# Patient Record
Sex: MALE | Race: WHITE | NOT HISPANIC OR LATINO | Employment: UNEMPLOYED | URBAN - METROPOLITAN AREA
[De-identification: names, ages, dates, MRNs, and addresses within clinical notes are randomized per-mention and may not be internally consistent; named-entity substitution may affect disease eponyms.]

---

## 2020-01-22 ENCOUNTER — OFFICE VISIT (OUTPATIENT)
Dept: URGENT CARE | Facility: MEDICAL CENTER | Age: 55
End: 2020-01-22
Payer: COMMERCIAL

## 2020-01-22 ENCOUNTER — APPOINTMENT (OUTPATIENT)
Dept: RADIOLOGY | Facility: MEDICAL CENTER | Age: 55
End: 2020-01-22
Payer: COMMERCIAL

## 2020-01-22 VITALS
HEART RATE: 97 BPM | HEIGHT: 70 IN | WEIGHT: 225 LBS | TEMPERATURE: 97.5 F | RESPIRATION RATE: 17 BRPM | OXYGEN SATURATION: 96 % | BODY MASS INDEX: 32.21 KG/M2

## 2020-01-22 DIAGNOSIS — R55 SYNCOPE, UNSPECIFIED SYNCOPE TYPE: Primary | ICD-10-CM

## 2020-01-22 DIAGNOSIS — S82.401A CLOSED FRACTURE OF RIGHT TIBIA AND FIBULA, INITIAL ENCOUNTER: ICD-10-CM

## 2020-01-22 DIAGNOSIS — R55 SYNCOPE, UNSPECIFIED SYNCOPE TYPE: ICD-10-CM

## 2020-01-22 DIAGNOSIS — S82.201A CLOSED FRACTURE OF RIGHT TIBIA AND FIBULA, INITIAL ENCOUNTER: ICD-10-CM

## 2020-01-22 PROCEDURE — 99213 OFFICE O/P EST LOW 20 MIN: CPT | Performed by: PHYSICIAN ASSISTANT

## 2020-01-22 PROCEDURE — 73630 X-RAY EXAM OF FOOT: CPT

## 2020-01-22 PROCEDURE — 73590 X-RAY EXAM OF LOWER LEG: CPT

## 2020-01-22 PROCEDURE — 73610 X-RAY EXAM OF ANKLE: CPT

## 2020-01-22 NOTE — PROGRESS NOTES
[facility] [employer] [facility]        NAME: Christoph Lantigua is a 35 y o  male  : 1978    MRN: [id number]  DATE: 2020  TIME: 7:16 PM    Assessment and Plan   Syncope, unspecified syncope type [R55]  1  Syncope, unspecified syncope type  XR ankle 3+ vw right    XR foot 3+ vw right    XR tibia fibula 2 vw right   2  Closed fracture of right tibia and fibula, initial encounter           Patient Instructions     Syncope  Referred to ER for evaluation  Fracture tibia- fibula  Follow up with PCP in 3-5 days  Proceed to  ER if symptoms worsen  Chief Complaint     Chief Complaint   Patient presents with    Ankle Injury     RIGHT ANKLE, HE HURT IT ON MONDAY, TRIPPED IN THE HOUSE, HE SAYS ITS REALLY PAINFUL AND VERY SWOLLEN  History of Present Illness       34 y/o male presents c/o pain to ankle  Patient states he was trying to move his bowels but felt sweaty, weak  Stood up and walked toward the living room and his wife (in the room) states she found him passed out on the floor  Patient denies chest pain, SOB, palpitations  States he woke up with pain to right ankle      Review of Systems   Review of Systems   Constitutional: Negative  HENT: Negative  Eyes: Negative  Respiratory: Negative  Negative for apnea, cough, choking, chest tightness, shortness of breath, wheezing and stridor  Cardiovascular: Negative  Negative for chest pain  Musculoskeletal: Positive for arthralgias  Current Medications     No current outpatient medications on file  Current Allergies     Allergies as of 2020    (No Known Allergies)            The following portions of the patient's history were reviewed and updated as appropriate: allergies, current medications, past family history, past medical history, past social history, past surgical history and problem list      History reviewed  No pertinent past medical history  History reviewed  No pertinent surgical history      History reviewed  No pertinent family history  Medications have been verified  Objective   Pulse 97   Temp 97 5 °F (36 4 °C) (Temporal)   Resp 17   Ht 5' 10" (1 778 m)   Wt 102 kg (225 lb)   SpO2 96%   BMI 32 28 kg/m²        Physical Exam     Physical Exam   Constitutional: He is oriented to person, place, and time  He appears well-developed and well-nourished  No distress  HENT:   Head: Normocephalic and atraumatic  Right Ear: External ear normal    Left Ear: External ear normal    Mouth/Throat: Oropharynx is clear and moist    Neck: Normal range of motion  Neck supple  Cardiovascular: Normal rate, regular rhythm, normal heart sounds and intact distal pulses  Pulmonary/Chest: Effort normal and breath sounds normal  No respiratory distress  He has no wheezes  He has no rales  He exhibits no tenderness  Musculoskeletal:        Right ankle: He exhibits decreased range of motion and swelling  He exhibits no ecchymosis, no deformity, no laceration and normal pulse  Tenderness  Lateral malleolus tenderness found  No medial malleolus, no AITFL, no CF ligament, no posterior TFL, no head of 5th metatarsal and no proximal fibula tenderness found  Achilles tendon normal    Lymphadenopathy:     He has no cervical adenopathy  Neurological: He is alert and oriented to person, place, and time  He displays normal reflexes  No cranial nerve deficit  He exhibits normal muscle tone  Coordination normal    Skin: He is not diaphoretic

## 2020-01-22 NOTE — PATIENT INSTRUCTIONS
Syncope  Referred to ER for evaluation  Fracture tibia- fibula  Follow up with PCP in 3-5 days  Proceed to  ER if symptoms worsen  Syncope   WHAT YOU NEED TO KNOW:   Syncope is also called fainting or passing out  Syncope is a sudden, temporary loss of consciousness, followed by a fall from a standing or sitting position  Syncope ranges from not serious to a sign of a more serious condition that needs to be treated  You can control some health conditions that cause syncope  Your healthcare providers can help you create a plan to manage syncope and prevent episodes  DISCHARGE INSTRUCTIONS:   Seek care immediately if:   · You are bleeding because you hit your head when you fainted  · You suddenly have double vision, difficulty speaking, numbness, and cannot move your arms or legs  · You have chest pain and trouble breathing  · You vomit blood or material that looks like coffee grounds  · You see blood in your bowel movement  Contact your healthcare provider if:   · You have new or worsening symptoms  · You have another syncope episode  · You have a headache, fast heartbeat, or feel too dizzy to stand up  · You have questions or concerns about your condition or care  Follow up with your healthcare provider as directed:  Write down your questions so you remember to ask them during your visits  Manage syncope:   · Keep a record of your syncope episodes  Include your symptoms and your activity before and after the episode  The record can help your healthcare provider find the cause of your syncope and help you manage episodes  · Sit or lie down when needed  This includes when you feel dizzy, your throat is getting tight, and your vision changes  Raise your legs above the level of your heart  · Take slow, deep breaths if you start to breathe faster with anxiety or fear  This can help decrease dizziness and the feeling that you might faint  · Check your blood pressure often    This is important if you take medicine to lower your blood pressure  Check your blood pressure when you are lying down and when you are standing  Ask how often to check during the day  Keep a record of your blood pressure numbers  Your healthcare provider may use the record to help plan your treatment  Prevent a syncope episode:   · Move slowly and let yourself get used to one position before you move to another position  This is very important when you change from a lying or sitting position to a standing position  Take some deep breaths before you stand up from a lying position  Stand up slowly  Sudden movements may cause a fainting spell  Sit on the side of the bed or couch for a few minutes before you stand up  If you are on bedrest, try to be upright for about 2 hours each day, or as directed  Do not lock your legs if you are standing for a long period of time  Move your legs and bend your knees to keep blood flowing  · Follow your healthcare provider's recommendations  Your provider may  recommend that you drink more liquids to prevent dehydration  You may also need to have more salt to keep your blood pressure from dropping too low and causing syncope  Your provider will tell you how much liquid and sodium to have each day  · Watch for signs of low blood sugar  These include hunger, nervousness, sweating, and fast or fluttery heartbeats  Talk with your healthcare provider about ways to keep your blood sugar level steady  · Do not strain if you are constipated  You may faint if you strain to have a bowel movement  Walking is the best way to get your bowels moving  Eat foods high in fiber to make it easier to have a bowel movement  Good examples are high-fiber cereals, beans, vegetables, and whole-grain breads  Prune juice may help make bowel movements softer  · Be careful in hot weather  Heat can cause a syncope episode  Limit activity done outside on hot days   Physical activity in hot weather can lead to dehydration  This can cause an episode  © 2017 [employer]  Information is for End User's use only and may not be sold, redistributed or otherwise used for commercial purposes  All illustrations and images included in CareNotes® are the copyrighted property of A D A M [employer] Inc  or [employer]  The above information is an  only  It is not intended as medical advice for individual conditions or treatments  Talk to your doctor, nurse or pharmacist before following any medical regimen to see if it is safe and effective for you

## 2020-01-24 ENCOUNTER — HOSPITAL ENCOUNTER (EMERGENCY)
Facility: HOSPITAL | Age: 55
Discharge: HOME | End: 2020-01-24
Attending: EMERGENCY MEDICINE | Admitting: EMERGENCY MEDICINE
Payer: COMMERCIAL

## 2020-01-24 VITALS
RESPIRATION RATE: 20 BRPM | HEART RATE: 79 BPM | HEIGHT: 70 IN | SYSTOLIC BLOOD PRESSURE: 157 MMHG | WEIGHT: 229.94 LBS | OXYGEN SATURATION: 94 % | DIASTOLIC BLOOD PRESSURE: 86 MMHG | TEMPERATURE: 97.9 F | BODY MASS INDEX: 32.92 KG/M2

## 2020-01-24 DIAGNOSIS — I10 HIGH BLOOD PRESSURE: ICD-10-CM

## 2020-01-24 DIAGNOSIS — R55 VASOVAGAL SYNCOPE: Primary | ICD-10-CM

## 2020-01-24 DIAGNOSIS — S82.401A CLOSED FRACTURE OF RIGHT TIBIA AND FIBULA, INITIAL ENCOUNTER: ICD-10-CM

## 2020-01-24 DIAGNOSIS — S82.201A CLOSED FRACTURE OF RIGHT TIBIA AND FIBULA, INITIAL ENCOUNTER: ICD-10-CM

## 2020-01-24 LAB
ANION GAP SERPL CALCULATED.3IONS-SCNC: 8 MMOL/L (ref 4–13)
ATRIAL RATE: 96 BPM
BASOPHILS # BLD AUTO: 0.07 THOUSANDS/ΜL (ref 0–0.1)
BASOPHILS NFR BLD AUTO: 1 % (ref 0–1)
BUN SERPL-MCNC: 11 MG/DL (ref 5–25)
CALCIUM SERPL-MCNC: 8.9 MG/DL (ref 8.3–10.1)
CHLORIDE SERPL-SCNC: 97 MMOL/L (ref 100–108)
CO2 SERPL-SCNC: 29 MMOL/L (ref 21–32)
CREAT SERPL-MCNC: 0.93 MG/DL (ref 0.6–1.3)
EOSINOPHIL # BLD AUTO: 0.41 THOUSAND/ΜL (ref 0–0.61)
EOSINOPHIL NFR BLD AUTO: 5 % (ref 0–6)
ERYTHROCYTE [DISTWIDTH] IN BLOOD BY AUTOMATED COUNT: 12.2 % (ref 11.6–15.1)
GFR SERPL CREATININE-BSD FRML MDRD: 91 ML/MIN/1.73SQ M
GLUCOSE SERPL-MCNC: 111 MG/DL (ref 65–140)
HCT VFR BLD AUTO: 47.8 % (ref 36.5–49.3)
HGB BLD-MCNC: 15.9 G/DL (ref 12–17)
IMM GRANULOCYTES # BLD AUTO: 0.03 THOUSAND/UL (ref 0–0.2)
IMM GRANULOCYTES NFR BLD AUTO: 0 % (ref 0–2)
LYMPHOCYTES # BLD AUTO: 1.55 THOUSANDS/ΜL (ref 0.6–4.47)
LYMPHOCYTES NFR BLD AUTO: 19 % (ref 14–44)
MAGNESIUM SERPL-MCNC: 2 MG/DL (ref 1.6–2.6)
MCH RBC QN AUTO: 31.5 PG (ref 26.8–34.3)
MCHC RBC AUTO-ENTMCNC: 33.3 G/DL (ref 31.4–37.4)
MCV RBC AUTO: 95 FL (ref 82–98)
MONOCYTES # BLD AUTO: 1.12 THOUSAND/ΜL (ref 0.17–1.22)
MONOCYTES NFR BLD AUTO: 14 % (ref 4–12)
NEUTROPHILS # BLD AUTO: 5.11 THOUSANDS/ΜL (ref 1.85–7.62)
NEUTS SEG NFR BLD AUTO: 61 % (ref 43–75)
NRBC BLD AUTO-RTO: 0 /100 WBCS
P AXIS: 57 DEGREES
PLATELET # BLD AUTO: 186 THOUSANDS/UL (ref 149–390)
PMV BLD AUTO: 10.4 FL (ref 8.9–12.7)
POTASSIUM SERPL-SCNC: 3.6 MMOL/L (ref 3.5–5.3)
PR INTERVAL: 166 MS
QRS AXIS: 69 DEGREES
QRSD INTERVAL: 108 MS
QT INTERVAL: 380 MS
QTC INTERVAL: 480 MS
RBC # BLD AUTO: 5.04 MILLION/UL (ref 3.88–5.62)
SODIUM SERPL-SCNC: 134 MMOL/L (ref 136–145)
T WAVE AXIS: 80 DEGREES
TROPONIN I SERPL-MCNC: <0.02 NG/ML
TSH SERPL DL<=0.05 MIU/L-ACNC: 1.66 UIU/ML (ref 0.36–3.74)
VENTRICULAR RATE: 96 BPM
WBC # BLD AUTO: 8.29 THOUSAND/UL (ref 4.31–10.16)

## 2020-01-24 PROCEDURE — 99284 EMERGENCY DEPT VISIT MOD MDM: CPT

## 2020-01-24 PROCEDURE — 93010 ELECTROCARDIOGRAM REPORT: CPT | Performed by: INTERNAL MEDICINE

## 2020-01-24 PROCEDURE — 83735 ASSAY OF MAGNESIUM: CPT | Performed by: EMERGENCY MEDICINE

## 2020-01-24 PROCEDURE — 85025 COMPLETE CBC W/AUTO DIFF WBC: CPT | Performed by: EMERGENCY MEDICINE

## 2020-01-24 PROCEDURE — 84484 ASSAY OF TROPONIN QUANT: CPT | Performed by: EMERGENCY MEDICINE

## 2020-01-24 PROCEDURE — 93005 ELECTROCARDIOGRAM TRACING: CPT

## 2020-01-24 PROCEDURE — 99285 EMERGENCY DEPT VISIT HI MDM: CPT | Performed by: EMERGENCY MEDICINE

## 2020-01-24 PROCEDURE — 84443 ASSAY THYROID STIM HORMONE: CPT | Performed by: EMERGENCY MEDICINE

## 2020-01-24 PROCEDURE — 36415 COLL VENOUS BLD VENIPUNCTURE: CPT | Performed by: EMERGENCY MEDICINE

## 2020-01-24 PROCEDURE — 80048 BASIC METABOLIC PNL TOTAL CA: CPT | Performed by: EMERGENCY MEDICINE

## 2020-01-25 NOTE — ED PROVIDER NOTES
History  Chief Complaint   Patient presents with    Syncope     Pt sent here from urgent care for a a fracture of fibula/tibula, which they splinted  However urgent care was more concerned that patient had an episode of passing out on monday after straining on the toliet for a bowel movement  Pt also cannot see orthopaedics till tuesday      HPI    None       History reviewed  No pertinent past medical history  History reviewed  No pertinent surgical history  History reviewed  No pertinent family history  I have reviewed and agree with the history as documented  Social History     Tobacco Use    Smoking status: Never Smoker    Smokeless tobacco: Never Used   Substance Use Topics    Alcohol use: Yes     Frequency: Never    Drug use: Never        Review of Systems    Physical Exam  Physical Exam   Constitutional: He is oriented to person, place, and time  He appears well-developed and well-nourished  No distress  hypertensive   HENT:   Head: Normocephalic and atraumatic  Mouth/Throat: Oropharynx is clear and moist    Eyes: Pupils are equal, round, and reactive to light  Conjunctivae are normal    Neck: Normal range of motion  No tracheal deviation present  Cardiovascular: Normal rate, regular rhythm, normal heart sounds and intact distal pulses  Pulmonary/Chest: Effort normal and breath sounds normal  No respiratory distress  Abdominal: Soft  Bowel sounds are normal  He exhibits no distension  There is no tenderness  Musculoskeletal:   Posterior splint on right lower leg   Neurological: He is alert and oriented to person, place, and time  GCS eye subscore is 4  GCS verbal subscore is 5  GCS motor subscore is 6  Skin: Skin is warm and dry  Psychiatric: He has a normal mood and affect  His behavior is normal    Nursing note and vitals reviewed        Vital Signs  ED Triage Vitals [01/24/20 1801]   Temperature Pulse Respirations Blood Pressure SpO2   97 9 °F (36 6 °C) 92 18 (!) 175/91 94 % Temp Source Heart Rate Source Patient Position - Orthostatic VS BP Location FiO2 (%)   Oral Monitor Sitting Left arm --      Pain Score       --           Vitals:    01/24/20 1801 01/24/20 2000 01/24/20 2115 01/24/20 2130   BP: (!) 175/91 165/85 163/82 157/86   Pulse: 92 88 83 79   Patient Position - Orthostatic VS: Sitting            Visual Acuity  Visual Acuity      Most Recent Value   L Pupil Size (mm)  3   R Pupil Size (mm)  3          ED Medications  Medications - No data to display    Diagnostic Studies  Results Reviewed     Procedure Component Value Units Date/Time    Basic metabolic panel [[id number]]  (Abnormal) Collected:  01/24/20 2028    Lab Status:  Final result Specimen:  Blood from Arm, Right Updated:  01/24/20 2103     Sodium 134 mmol/L      Potassium 3 6 mmol/L      Chloride 97 mmol/L      CO2 29 mmol/L      ANION GAP 8 mmol/L      BUN 11 mg/dL      Creatinine 0 93 mg/dL      Glucose 111 mg/dL      Calcium 8 9 mg/dL      eGFR 91 ml/min/1 73sq m     Narrative:       [place] guidelines for Chronic Kidney Disease (CKD):     Stage 1 with normal or high GFR (GFR > 90 mL/min/1 73 square meters)    Stage 2 Mild CKD (GFR = 60-89 mL/min/1 73 square meters)    Stage 3A Moderate CKD (GFR = 45-59 mL/min/1 73 square meters)    Stage 3B Moderate CKD (GFR = 30-44 mL/min/1 73 square meters)    Stage 4 Severe CKD (GFR = 15-29 mL/min/1 73 square meters)    Stage 5 End Stage CKD (GFR <15 mL/min/1 73 square meters)  Note: GFR calculation is accurate only with a steady state creatinine    Magnesium [[id number]]  (Normal) Collected:  01/24/20 2028    Lab Status:  Final result Specimen:  Blood from Arm, Right Updated:  01/24/20 2103     Magnesium 2 0 mg/dL     TSH [[id number]]  (Normal) Collected:  01/24/20 2028    Lab Status:  Final result Specimen:  Blood from Arm, Right Updated:  01/24/20 2103     TSH 3RD GENERATON 1 658 uIU/mL     Narrative:       Patients undergoing fluorescein dye angiography may retain small amounts of fluorescein in the body for 48-72 hours post procedure  Samples containing fluorescein can produce falsely depressed TSH values  If the patient had this procedure,a specimen should be resubmitted post fluorescein clearance        Troponin I [[id number]]  (Normal) Collected:  01/24/20 2028    Lab Status:  Final result Specimen:  Blood from Arm, Right Updated:  01/24/20 2056     Troponin I <0 02 ng/mL     CBC and differential [[id number]]  (Abnormal) Collected:  01/24/20 2028    Lab Status:  Final result Specimen:  Blood from Arm, Right Updated:  01/24/20 2036     WBC 8 29 Thousand/uL      RBC 5 04 Million/uL      Hemoglobin 15 9 g/dL      Hematocrit 47 8 %      MCV 95 fL      MCH 31 5 pg      MCHC 33 3 g/dL      RDW 12 2 %      MPV 10 4 fL      Platelets 117 Thousands/uL      nRBC 0 /100 WBCs      Neutrophils Relative 61 %      Immat GRANS % 0 %      Lymphocytes Relative 19 %      Monocytes Relative 14 %      Eosinophils Relative 5 %      Basophils Relative 1 %      Neutrophils Absolute 5 11 Thousands/µL      Immature Grans Absolute 0 03 Thousand/uL      Lymphocytes Absolute 1 55 Thousands/µL      Monocytes Absolute 1 12 Thousand/µL      Eosinophils Absolute 0 41 Thousand/µL      Basophils Absolute 0 07 Thousands/µL                  No orders to display              Procedures  ECG 12 Lead Documentation Only  Date/Time: 1/24/2020 7:46 PM  Performed by: Francisca Cunha MD  Authorized by: Francisca Cunha MD     Indications / Diagnosis:  Syncope  ECG reviewed by me, the ED Provider: yes    Patient location:  ED  Previous ECG:     Previous ECG:  Unavailable  Interpretation:     Interpretation: abnormal    Rate:     ECG rate:  96    ECG rate assessment: normal    Rhythm:     Rhythm: sinus rhythm    Ectopy:     Ectopy: none    QRS:     QRS axis:  Normal    QRS intervals:  Normal  Conduction:     Conduction: abnormal      Abnormal conduction: incomplete RBBB ST segments:     ST segments:  Normal  T waves:     T waves: normal    Other findings:     Other findings: poor R wave progression               ED Course                               MDM  Number of Diagnoses or Management Options  Closed fracture of right tibia and fibula, initial encounter:   High blood pressure:   Vasovagal syncope: new and requires workup  Diagnosis management comments: This is a 76-year-old male who presents here today with a syncopal event  On 01/20, he was on the toilet, pushing to strain to move his bowels when he began feeling nauseous and lightheaded  He broke out into a sweat  He tried to get up to walk outside to "get some air and passed out in the hallway  His wife states he was only out for a couple of seconds  He injured his right leg during this  He denies any other injuries from the fall  He denies any preceding chest pain, palpitations, headache, focal weakness or numbness  He denies preceding nausea, vomiting, diarrhea  He has no fevers or URI symptoms  He denies any underlying medical problems, though states it has been several years since he last saw a doctor  He takes no daily medications, including over-the-counter medications  He was seen at urgent care yesterday because of continued difficulties walking because of the leg pain, was told he had a tibia/fibula fracture and was placed in a splint  He does have follow-up scheduled on 01/28 with Orthopedics  He was advised to come to the ER for evaluation of his syncopal event  He waited until today to be seen, because he did not feel it coming yesterday  He denies any recurrent syncopal or presyncopal events  He has had no problems moving his bowels since then  He has no other complaints  Review of systems:  Otherwise negative unless stated as above    He is well-appearing, in no acute distress  He is a posterior splint on his right lower leg    He is hypertensive here, but denies any symptoms related to this and cannot tell me the last time his blood pressure was checked  His exam is otherwise unremarkable  His episode sounds like vasovagal syncope  However, we will check an EKG, lab work to evaluate for anemia, electrolyte abnormality, ACS, dysrhythmia, thyroid abnormality that would have contributed to the event  Review of systems lab work is unremarkable  I discussed with the patient findings, treatment at home, follow-up, and indications for return, and he expresses understanding with this plan  Amount and/or Complexity of Data Reviewed  Clinical lab tests: ordered and reviewed  Independent visualization of images, tracings, or specimens: yes          Disposition  Final diagnoses:   Vasovagal syncope   Closed fracture of right tibia and fibula, initial encounter   High blood pressure     Time reflects when diagnosis was documented in both MDM as applicable and the Disposition within this note     Time User Action Codes Description Comment    1/24/2020  9:22 PM Vilma Mclaughlin Add [R55] Vasovagal syncope     1/24/2020  9:25 PM Vilma Mclaughlin Add [S82 201A,  S82 401A] Closed fracture of right tibia and fibula, initial encounter     1/24/2020  9:26 PM Vilma Mclaughlin Add [I10] High blood pressure       ED Disposition     ED Disposition Condition Date/Time Comment    Discharge Good Fri Jan 24, 2020  9:21 PM Bruno Varela discharge to home/self care  Follow-up Information     Follow up With Specialties Details Why Contact Info    Pavel Estrada MD Orthopedic Surgery Go on 1/28/2020 as scheduled, to follow up on your leg Sergio 1980  [facility]      [employer]  Schedule an appointment as soon as possible for a visit  to set up care with a new primary care doctor [phone]            There are no discharge medications for this patient  No discharge procedures on file      ED Provider  Electronically Signed by Elizabeth Fitzgerald MD  01/24/20 8421

## 2020-01-25 NOTE — DISCHARGE INSTRUCTIONS
Keep your leg elevated as much as possible to help with swelling and pain  Follow up with Orthopedics on Tuesday as scheduled  Do not push to strain to move your bowels, as it may cause you to pass out  If you are having a hard time moving your bowels, take a stool softener or laxative  Your blood pressure was high here  Check it at home, and follow up with a primary care doctor to determine whether you need to be started on medications

## 2020-01-28 ENCOUNTER — APPOINTMENT (OUTPATIENT)
Dept: RADIOLOGY | Facility: CLINIC | Age: 55
End: 2020-01-28
Payer: COMMERCIAL

## 2020-01-28 VITALS
WEIGHT: 229 LBS | DIASTOLIC BLOOD PRESSURE: 90 MMHG | SYSTOLIC BLOOD PRESSURE: 150 MMHG | HEIGHT: 70 IN | BODY MASS INDEX: 32.78 KG/M2

## 2020-01-28 DIAGNOSIS — S82.434A CLOSED NONDISPLACED OBLIQUE FRACTURE OF SHAFT OF RIGHT FIBULA, INITIAL ENCOUNTER: ICD-10-CM

## 2020-01-28 DIAGNOSIS — M25.571 PAIN, JOINT, ANKLE AND FOOT, RIGHT: ICD-10-CM

## 2020-01-28 DIAGNOSIS — S82.301A CLOSED EXTRA-ARTICULAR FRACTURE OF DISTAL END OF RIGHT TIBIA, INITIAL ENCOUNTER: Primary | ICD-10-CM

## 2020-01-28 PROCEDURE — 27824 TREAT LOWER LEG FRACTURE: CPT | Performed by: ORTHOPAEDIC SURGERY

## 2020-01-28 PROCEDURE — 73610 X-RAY EXAM OF ANKLE: CPT

## 2020-01-28 PROCEDURE — 73590 X-RAY EXAM OF LOWER LEG: CPT

## 2020-01-28 PROCEDURE — 27780 TREATMENT OF FIBULA FRACTURE: CPT | Performed by: ORTHOPAEDIC SURGERY

## 2020-01-28 PROCEDURE — 99203 OFFICE O/P NEW LOW 30 MIN: CPT | Performed by: ORTHOPAEDIC SURGERY

## 2020-01-28 RX ORDER — NAPROXEN SODIUM 550 MG/1
550 TABLET ORAL 2 TIMES DAILY WITH MEALS
Qty: 60 TABLET | Refills: 1 | Status: SHIPPED | OUTPATIENT
Start: 2020-01-28 | End: 2020-09-17 | Stop reason: ALTCHOICE

## 2020-01-28 NOTE — PROGRESS NOTES
Assessment:     1  Closed extra-articular fracture of distal end of right tibia, initial encounter    2  Closed nondisplaced oblique fracture of shaft of right fibula, initial encounter          Plan:     Problem List Items Addressed This Visit        Musculoskeletal and Integument    Closed extra-articular fracture of distal end of right tibia - Primary     78-year-old male with a right distal tibia fracture  His alignment has remained excellent with no displacement  He was placed in a long-leg splint today  He will remain nonweightbearing  He will work on elevating and icing  Follow up in one week with x-rays of the right ankle and right tibia in the splint         Relevant Orders    XR tibia fibula 2 vw right    XR ankle 3+ vw right    Walker    Fracture / Dislocation Treatment    Fracture / Dislocation Treatment    Closed nondisplaced oblique fracture of shaft of right fibula    Relevant Orders    Fracture / Dislocation Treatment    Fracture / Dislocation Treatment           Patient ID: Ruth Aguero is a 62 y o  male  Chief Complaint:  Leg injury    HPI:  78-year-old male who was at home, using the restroom on January 20th at night  He got lightheaded and dizzy and fell  He does not recall exactly what happened to his leg  He thinks he maybe had his feet caught up in his underwear pants  He went to the emergency department two days later because he was unable to bear weight and was having pain and swelling  He states that once he was placed in a splint it felt stable  He has not been putting any weight on it  He does complain of some throbbing sensations  He denies numbness or tingling  He is currently on disability  Allergy:  No Known Allergies    Medications:  all current active meds have been reviewed    Past Medical History:  History reviewed  No pertinent past medical history  Past Surgical History:  History reviewed  No pertinent surgical history      Family History:  History reviewed  No pertinent family history  Social History:  Social History     Substance and Sexual Activity   Alcohol Use Yes    Frequency: Never     Social History     Substance and Sexual Activity   Drug Use Never     Social History     Tobacco Use   Smoking Status Never Smoker   Smokeless Tobacco Never Used           ROS:  Review of Systems   Constitutional: Negative  HENT: Negative  Eyes: Negative  Respiratory: Negative  Cardiovascular: Negative  Gastrointestinal: Negative  Endocrine: Negative  Genitourinary: Negative  Musculoskeletal: Positive for arthralgias, joint swelling and myalgias  Skin: Negative  Allergic/Immunologic: Negative  Neurological: Negative  Hematological: Negative  Psychiatric/Behavioral: Negative  Objective:  BP Readings from Last 1 Encounters:   01/28/20 150/90      Wt Readings from Last 1 Encounters:   01/28/20 104 kg (229 lb)        BMI:   Estimated body mass index is 32 86 kg/m² as calculated from the following:    Height as of this encounter: 5' 10" (1 778 m)  Weight as of this encounter: 104 kg (229 lb)  EXAM:   Physical Exam   Constitutional: He is oriented to person, place, and time  He appears well-developed and well-nourished  No distress  HENT:   Head: Normocephalic and atraumatic  Eyes: Right eye exhibits no discharge  Left eye exhibits no discharge  Neck: Normal range of motion  Neck supple  No tracheal deviation present  Cardiovascular: Normal rate and regular rhythm  Pulmonary/Chest: Effort normal  No respiratory distress  Abdominal: Soft  He exhibits no distension  There is no tenderness  Neurological: He is alert and oriented to person, place, and time  Skin: Skin is warm  He is not diaphoretic  No erythema  Psychiatric: He has a normal mood and affect   His behavior is normal      Right Ankle Exam     Comments:  Swelling of the right lower extremity, moving toes up and down, tender to palpation over the distal tibia and the more proximal fibula, brisk cap refill of all toes, no fracture blisters, no lacerations or abrasions      Left Ankle Exam   Left ankle exam is normal     Tenderness   The patient is experiencing no tenderness  Swelling: none    Range of Motion   The patient has normal left ankle ROM  Muscle Strength   The patient has normal left ankle strength  Tests   Anterior drawer: negative  Varus tilt: negative    Other   Erythema: absent  Sensation: normal  Pulse: present              Radiographs:  I have personally reviewed pertinent films in PACS and my interpretation is Nondisplaced fracture of the tibial plafond, extra-articular with a fracture of the fibula shaft proximally  Overall alignment remains excellent      Fracture / Dislocation Treatment  Date/Time: 1/28/2020 1:08 PM  Performed by: Aidee Colbert MD  Authorized by: Aidee Colbert MD     Patient Location:  Clinic  Other Assisting Provider: No    Verbal consent obtained?: Yes    Consent given by:  Patient  Patient states understanding of procedure being performed: Yes    Patient identity confirmed:  Verbally with patient  Injury location:  Lower leg  Location details:  Right lower leg  Injury type:  Fracture  Fracture type: tibial plafond    Fracture type: tibial plafond    Neurovascular status: Neurovascularly intact    Distal perfusion: normal    Neurological function: normal    Range of motion: reduced    Manipulation performed?: No    Immobilization:  Splint  Splint type:  Long leg  Neurovascular status: Neurovascularly intact    Distal perfusion: normal    Neurological function: normal    Range of motion: unchanged    Patient tolerance:  Patient tolerated the procedure well with no immediate complications  Fracture / Dislocation Treatment  Date/Time: 1/28/2020 1:09 PM  Performed by: Aidee Colbert MD  Authorized by: Aidee Colbert MD     Patient Location:  Clinic  Other Assisting Provider: No    Verbal consent obtained?: Yes    Consent given by:  Patient  Patient states understanding of procedure being performed: Yes    Patient identity confirmed:  Verbally with patient  Injury location:  Lower leg  Location details:  Right lower leg  Injury type:  Fracture  Fracture type: fibular shaft    Neurovascular status: Neurovascularly intact    Distal perfusion: normal    Neurological function: normal    Range of motion: reduced    Local anesthesia used?: No    Manipulation performed?: No    Immobilization:  Splint  Neurovascular status: Neurovascularly intact    Distal perfusion: normal    Neurological function: normal    Range of motion: unchanged    Patient tolerance:  Patient tolerated the procedure well with no immediate complications

## 2020-01-28 NOTE — ASSESSMENT & PLAN NOTE
49-year-old male with a right distal tibia fracture  His alignment has remained excellent with no displacement  He was placed in a long-leg splint today  He will remain nonweightbearing  He will work on elevating and icing    Follow up in one week with x-rays of the right ankle and right tibia in the splint

## 2020-01-28 NOTE — LETTER
January 28, 2020     Patient: Keila Gagnon   YOB: 1965   Date of Visit: 1/28/2020       To Whom it May Concern:    Keila Gagnon is under my professional care  He was seen in my office on 1/28/2020  He should not return to work until cleared by a physician  If you have any questions or concerns, please don't hesitate to call           Sincerely,          Kaylee Baron MD        CC: No Recipients

## 2020-01-28 NOTE — TELEPHONE ENCOUNTER
Garry Soto is calling stating that the patient was supposed to have naproxen called in for him but there is nothing at the pharmacy

## 2020-02-04 ENCOUNTER — APPOINTMENT (OUTPATIENT)
Dept: RADIOLOGY | Facility: CLINIC | Age: 55
End: 2020-02-04
Payer: COMMERCIAL

## 2020-02-04 ENCOUNTER — OFFICE VISIT (OUTPATIENT)
Dept: OBGYN CLINIC | Facility: CLINIC | Age: 55
End: 2020-02-04
Payer: COMMERCIAL

## 2020-02-04 VITALS — BODY MASS INDEX: 32.86 KG/M2 | HEIGHT: 70 IN | SYSTOLIC BLOOD PRESSURE: 144 MMHG | DIASTOLIC BLOOD PRESSURE: 90 MMHG

## 2020-02-04 DIAGNOSIS — S82.301A CLOSED EXTRA-ARTICULAR FRACTURE OF DISTAL END OF RIGHT TIBIA, INITIAL ENCOUNTER: ICD-10-CM

## 2020-02-04 DIAGNOSIS — S82.434D CLOSED NONDISPLACED OBLIQUE FRACTURE OF SHAFT OF RIGHT FIBULA WITH ROUTINE HEALING, SUBSEQUENT ENCOUNTER: ICD-10-CM

## 2020-02-04 DIAGNOSIS — S82.301A CLOSED EXTRA-ARTICULAR FRACTURE OF DISTAL END OF RIGHT TIBIA, INITIAL ENCOUNTER: Primary | ICD-10-CM

## 2020-02-04 PROCEDURE — 73610 X-RAY EXAM OF ANKLE: CPT

## 2020-02-04 PROCEDURE — 99024 POSTOP FOLLOW-UP VISIT: CPT | Performed by: ORTHOPAEDIC SURGERY

## 2020-02-04 PROCEDURE — 29435 APPL PATLLR TDN BEARING CAST: CPT | Performed by: ORTHOPAEDIC SURGERY

## 2020-02-04 NOTE — ASSESSMENT & PLAN NOTE
57-year-old male with a right distal tibia fracture and high fibula fracture  X-rays today show well-maintained alignment  He was placed in a molded cast that came up to the level of the knee to minimize range motion of the knee but not eliminated completely  He will follow up in one week with x-rays of the right ankle    X-rays will be done in the cast

## 2020-02-04 NOTE — PROGRESS NOTES
Assessment:     1  Closed extra-articular fracture of distal end of right tibia, initial encounter    2  Closed nondisplaced oblique fracture of shaft of right fibula with routine healing, subsequent encounter          Plan:     Problem List Items Addressed This Visit        Musculoskeletal and Integument    Closed extra-articular fracture of distal end of right tibia - Primary     15-year-old male with a right distal tibia fracture and high fibula fracture  X-rays today show well-maintained alignment  He was placed in a molded cast that came up to the level of the knee to minimize range motion of the knee but not eliminated completely  He will follow up in one week with x-rays of the right ankle  X-rays will be done in the cast          Relevant Orders    XR ankle 3+ vw right    Closed nondisplaced oblique fracture of shaft of right fibula           Patient ID: Jammie Cuellar is a 62 y o  male  Chief Complaint:  Leg injury    HPI:  15-year-old male who was at home, using the restroom on January 20th at night  He got lightheaded and dizzy and fell sustaining a distal tibia fracture and a high fibula fracture  Allergy:  No Known Allergies    Medications:  all current active meds have been reviewed    Past Medical History:  History reviewed  No pertinent past medical history  Past Surgical History:  History reviewed  No pertinent surgical history  Family History:  History reviewed  No pertinent family history  Social History:  Social History     Substance and Sexual Activity   Alcohol Use Yes    Frequency: Never     Social History     Substance and Sexual Activity   Drug Use Never     Social History     Tobacco Use   Smoking Status Never Smoker   Smokeless Tobacco Never Used           ROS:  Review of Systems   Constitutional: Negative  HENT: Negative  Eyes: Negative  Respiratory: Negative  Cardiovascular: Negative  Gastrointestinal: Negative  Endocrine: Negative  Genitourinary: Negative  Musculoskeletal: Positive for arthralgias and joint swelling  Skin: Negative  Allergic/Immunologic: Negative  Neurological: Negative  Hematological: Negative  Psychiatric/Behavioral: Negative  Objective:  BP Readings from Last 1 Encounters:   02/04/20 144/90      Wt Readings from Last 1 Encounters:   01/28/20 104 kg (229 lb)        BMI:   Estimated body mass index is 32 86 kg/m² as calculated from the following:    Height as of this encounter: 5' 10" (1 778 m)  Weight as of 1/28/20: 104 kg (229 lb)  EXAM:   Physical Exam     Right Ankle Exam     Comments:  Minimal swelling of the right lower extremity, moving toes up and down, tender to palpation over the distal tibia and the more proximal fibula, brisk cap refill of all toes, no fracture blisters, no lacerations or abrasions              Radiographs:  I have personally reviewed pertinent films in PACS and my interpretation is minimally displaced fracture of the tibial plafond, extra-articular with a fracture of the fibula shaft proximally  Overall alignment remains excellent  Cast application  Date/Time: 2/4/2020 11:36 AM  Performed by: Nahum Callahan MD  Authorized by: Nahum Callahan MD     Consent:     Consent obtained:  Verbal    Consent given by:  Patient    Risks discussed:  Swelling  Pre-procedure details:     Sensation:  Normal  Procedure details:     Laterality:  Right    Location:  Ankle    Ankle:  R ankleCast type:  Patella tendon bearing    Supplies:  Cotton padding and fiberglass  Post-procedure details:     Pain:  Unchanged    Sensation:  Normal    Patient tolerance of procedure:   Tolerated well, no immediate complications

## 2020-02-11 ENCOUNTER — OFFICE VISIT (OUTPATIENT)
Dept: OBGYN CLINIC | Facility: CLINIC | Age: 55
End: 2020-02-11

## 2020-02-11 ENCOUNTER — APPOINTMENT (OUTPATIENT)
Dept: RADIOLOGY | Facility: CLINIC | Age: 55
End: 2020-02-11
Payer: COMMERCIAL

## 2020-02-11 VITALS — HEIGHT: 70 IN | BODY MASS INDEX: 32.86 KG/M2 | DIASTOLIC BLOOD PRESSURE: 78 MMHG | SYSTOLIC BLOOD PRESSURE: 164 MMHG

## 2020-02-11 DIAGNOSIS — S82.434D CLOSED NONDISPLACED OBLIQUE FRACTURE OF SHAFT OF RIGHT FIBULA WITH ROUTINE HEALING, SUBSEQUENT ENCOUNTER: ICD-10-CM

## 2020-02-11 DIAGNOSIS — S82.301A CLOSED EXTRA-ARTICULAR FRACTURE OF DISTAL END OF RIGHT TIBIA, INITIAL ENCOUNTER: ICD-10-CM

## 2020-02-11 DIAGNOSIS — S82.301A CLOSED EXTRA-ARTICULAR FRACTURE OF DISTAL END OF RIGHT TIBIA, INITIAL ENCOUNTER: Primary | ICD-10-CM

## 2020-02-11 PROCEDURE — 73610 X-RAY EXAM OF ANKLE: CPT

## 2020-02-11 PROCEDURE — 99024 POSTOP FOLLOW-UP VISIT: CPT | Performed by: ORTHOPAEDIC SURGERY

## 2020-02-11 NOTE — ASSESSMENT & PLAN NOTE
66-year-old male with well-maintained alignment of his right tibia fracture  He will continue nonweightbearing    Follow up in five weeks with x-rays of the right ankle out of the cast

## 2020-02-11 NOTE — PROGRESS NOTES
Assessment:     1  Closed extra-articular fracture of distal end of right tibia, initial encounter    2  Closed nondisplaced oblique fracture of shaft of right fibula with routine healing, subsequent encounter          Plan:     Problem List Items Addressed This Visit        Musculoskeletal and Integument    Closed extra-articular fracture of distal end of right tibia - Primary     78-year-old male with well-maintained alignment of his right tibia fracture  He will continue nonweightbearing  Follow up in five weeks with x-rays of the right ankle out of the cast          Relevant Orders    XR ankle 3+ vw right    Closed nondisplaced oblique fracture of shaft of right fibula           Patient ID: Yuko Ely is a 62 y o  male  Chief Complaint:  Leg injury    HPI:  78-year-old male who was at home, using the restroom on January 20th at night  He got lightheaded and dizzy and fell sustaining a distal tibia fracture and a high fibula fracture  He was placed in a cast at his last visit  He has no complaints currently  Allergy:  No Known Allergies    Medications:  all current active meds have been reviewed    Past Medical History:  History reviewed  No pertinent past medical history  Past Surgical History:  History reviewed  No pertinent surgical history  Family History:  History reviewed  No pertinent family history  Social History:  Social History     Substance and Sexual Activity   Alcohol Use Yes    Frequency: Never     Social History     Substance and Sexual Activity   Drug Use Never     Social History     Tobacco Use   Smoking Status Never Smoker   Smokeless Tobacco Never Used           ROS:  Review of Systems   All other systems reviewed and are negative        Objective:  BP Readings from Last 1 Encounters:   02/11/20 164/78      Wt Readings from Last 1 Encounters:   01/28/20 104 kg (229 lb)        BMI:   Estimated body mass index is 32 86 kg/m² as calculated from the following:    Height as of this encounter: 5' 10" (1 778 m)  Weight as of 1/28/20: 104 kg (229 lb)  EXAM:   Physical Exam  Right Ankle Exam     Comments:  Cast fitting well, in good condition, moving toes, brisk cap refill of toes              Radiographs:  I have personally reviewed pertinent films in PACS and my interpretation is minimally displaced fracture of the tibial plafond, extra-articular with a fracture of the fibula shaft proximally  Overall alignment remains excellent

## 2020-02-12 ENCOUNTER — TELEPHONE (OUTPATIENT)
Dept: OBGYN CLINIC | Facility: HOSPITAL | Age: 55
End: 2020-02-12

## 2020-02-12 NOTE — TELEPHONE ENCOUNTER
[employer] rep called  No dates on the patient's disability form  Patient cannot be paid without dates  They will fax the forms back to [address] [facility] and ask that Dr Erica Moreno fill in the dates on the paperwork

## 2020-02-20 NOTE — TELEPHONE ENCOUNTER
This has been complete to the best of my ability   This form is missing the patients signature, but I will fax this back to [phone]

## 2020-02-25 ENCOUNTER — TELEPHONE (OUTPATIENT)
Dept: OBGYN CLINIC | Facility: CLINIC | Age: 55
End: 2020-02-25

## 2020-02-25 DIAGNOSIS — S82.434A CLOSED NONDISPLACED OBLIQUE FRACTURE OF SHAFT OF RIGHT FIBULA, INITIAL ENCOUNTER: Primary | ICD-10-CM

## 2020-02-25 DIAGNOSIS — S82.301A CLOSED EXTRA-ARTICULAR FRACTURE OF DISTAL END OF RIGHT TIBIA, INITIAL ENCOUNTER: ICD-10-CM

## 2020-02-26 ENCOUNTER — TELEPHONE (OUTPATIENT)
Dept: OBGYN CLINIC | Facility: HOSPITAL | Age: 55
End: 2020-02-26

## 2020-02-26 DIAGNOSIS — S82.301D CLOSED EXTRA-ARTICULAR FRACTURE OF DISTAL END OF RIGHT TIBIA WITH ROUTINE HEALING, SUBSEQUENT ENCOUNTER: Primary | ICD-10-CM

## 2020-02-26 NOTE — TELEPHONE ENCOUNTER
Patient sees Dr Olayinka Sanchez at [facility] equipment is calling in asking if we can send a demographic sheet for this patient along with a combo cushion for the wheel chair  She stated that the combo cushion can even be written in the comments of the order for the wheel chair  She is asking if this  Can be done as soon as possible  She is asking if this can be faxed over to them as well            FAX# [phone]  CALL BACK IF NEEDED# [phone]

## 2020-03-17 ENCOUNTER — APPOINTMENT (OUTPATIENT)
Dept: RADIOLOGY | Facility: CLINIC | Age: 55
End: 2020-03-17
Payer: COMMERCIAL

## 2020-03-17 ENCOUNTER — OFFICE VISIT (OUTPATIENT)
Dept: OBGYN CLINIC | Facility: CLINIC | Age: 55
End: 2020-03-17

## 2020-03-17 ENCOUNTER — TELEPHONE (OUTPATIENT)
Dept: OBGYN CLINIC | Facility: HOSPITAL | Age: 55
End: 2020-03-17

## 2020-03-17 VITALS
SYSTOLIC BLOOD PRESSURE: 162 MMHG | TEMPERATURE: 96.4 F | BODY MASS INDEX: 32.78 KG/M2 | DIASTOLIC BLOOD PRESSURE: 88 MMHG | HEIGHT: 70 IN | WEIGHT: 229 LBS

## 2020-03-17 DIAGNOSIS — S82.301D CLOSED EXTRA-ARTICULAR FRACTURE OF DISTAL END OF RIGHT TIBIA WITH ROUTINE HEALING, SUBSEQUENT ENCOUNTER: Primary | ICD-10-CM

## 2020-03-17 DIAGNOSIS — S82.301D CLOSED EXTRA-ARTICULAR FRACTURE OF DISTAL END OF RIGHT TIBIA WITH ROUTINE HEALING, SUBSEQUENT ENCOUNTER: ICD-10-CM

## 2020-03-17 PROCEDURE — 73610 X-RAY EXAM OF ANKLE: CPT

## 2020-03-17 PROCEDURE — 99024 POSTOP FOLLOW-UP VISIT: CPT | Performed by: ORTHOPAEDIC SURGERY

## 2020-03-17 NOTE — PROGRESS NOTES
Assessment:     1  Closed extra-articular fracture of distal end of right tibia with routine healing, subsequent encounter          Plan:     Problem List Items Addressed This Visit        Musculoskeletal and Integument    Closed extra-articular fracture of distal end of right tibia - Primary     41-year-old male with a healing right distal tibia fracture  His alignment remains excellent  There is some early interval healing noted  He will be placed in a Cam boot today  He was instructed on ankle range of motion  He will continue to be nonweightbearing  Follow up with me in four weeks with x-rays of the right tibia and the right ankle  Plan of on progressing weight-bearing at that time if appropriate  Relevant Orders    XR ankle 3+ vw right    Cam Boot           Patient ID: Karlie Hurley is a 62 y o  male  Chief Complaint:  Leg injury    HPI:  41-year-old male who was at home, using the restroom on January 20th at night  He got lightheaded and dizzy and fell sustaining a distal tibia fracture and a high fibula fracture  He has been in a cast since his last visit  He has tolerated this well without problems  Allergy:  No Known Allergies    Medications:  all current active meds have been reviewed    Past Medical History:  History reviewed  No pertinent past medical history  Past Surgical History:  History reviewed  No pertinent surgical history  Family History:  History reviewed  No pertinent family history  Social History:  Social History     Substance and Sexual Activity   Alcohol Use Yes    Frequency: Never     Social History     Substance and Sexual Activity   Drug Use Never     Social History     Tobacco Use   Smoking Status Never Smoker   Smokeless Tobacco Never Used           ROS:  Review of Systems   All other systems reviewed and are negative        Objective:  BP Readings from Last 1 Encounters:   03/17/20 162/88      Wt Readings from Last 1 Encounters:   03/17/20 104 kg (229 lb)        BMI:   Estimated body mass index is 32 86 kg/m² as calculated from the following:    Height as of this encounter: 5' 10" (1 778 m)  Weight as of this encounter: 104 kg (229 lb)  EXAM:   Physical Exam  Right Ankle Exam     Comments:  No swelling, minimal tenderness over the fracture site, limited ankle range of motion, no skin wounds or breakdown              Radiographs:  I have personally reviewed pertinent films in PACS and my interpretation is minimally displaced fracture of the tibial plafond, extra-articular with a fracture of the fibula shaft proximally  Overall alignment remains excellent, there is callus formation and interval healing noted that is early

## 2020-03-17 NOTE — ASSESSMENT & PLAN NOTE
49-year-old male with a healing right distal tibia fracture  His alignment remains excellent  There is some early interval healing noted  He will be placed in a Cam boot today  He was instructed on ankle range of motion  He will continue to be nonweightbearing  Follow up with me in four weeks with x-rays of the right tibia and the right ankle  Plan of on progressing weight-bearing at that time if appropriate

## 2020-03-17 NOTE — TELEPHONE ENCOUNTER
Patient is calling to find out if he can remove the boot that he got today when he sleeps       [phone]

## 2020-03-18 NOTE — TELEPHONE ENCOUNTER
No- he needs to sleep with it on  He may remove it for bathing, dressing, and for 5 min 3x/day for gentle ankle ROM  No other times

## 2020-03-31 ENCOUNTER — TELEPHONE (OUTPATIENT)
Dept: OBGYN CLINIC | Facility: CLINIC | Age: 55
End: 2020-03-31

## 2020-04-01 ENCOUNTER — TELEPHONE (OUTPATIENT)
Dept: OBGYN CLINIC | Facility: CLINIC | Age: 55
End: 2020-04-01

## 2020-04-13 ENCOUNTER — APPOINTMENT (OUTPATIENT)
Dept: RADIOLOGY | Facility: CLINIC | Age: 55
End: 2020-04-13
Payer: COMMERCIAL

## 2020-04-13 ENCOUNTER — OFFICE VISIT (OUTPATIENT)
Dept: OBGYN CLINIC | Facility: CLINIC | Age: 55
End: 2020-04-13

## 2020-04-13 ENCOUNTER — TELEPHONE (OUTPATIENT)
Dept: OBGYN CLINIC | Facility: CLINIC | Age: 55
End: 2020-04-13

## 2020-04-13 VITALS — BODY MASS INDEX: 32.86 KG/M2 | HEIGHT: 70 IN | TEMPERATURE: 98.2 F

## 2020-04-13 DIAGNOSIS — S82.301D CLOSED EXTRA-ARTICULAR FRACTURE OF DISTAL END OF RIGHT TIBIA WITH ROUTINE HEALING, SUBSEQUENT ENCOUNTER: Primary | ICD-10-CM

## 2020-04-13 DIAGNOSIS — S82.301D CLOSED EXTRA-ARTICULAR FRACTURE OF DISTAL END OF RIGHT TIBIA WITH ROUTINE HEALING, SUBSEQUENT ENCOUNTER: ICD-10-CM

## 2020-04-13 DIAGNOSIS — S82.434D CLOSED NONDISPLACED OBLIQUE FRACTURE OF SHAFT OF RIGHT FIBULA WITH ROUTINE HEALING, SUBSEQUENT ENCOUNTER: ICD-10-CM

## 2020-04-13 PROCEDURE — 99024 POSTOP FOLLOW-UP VISIT: CPT | Performed by: ORTHOPAEDIC SURGERY

## 2020-04-13 PROCEDURE — 73610 X-RAY EXAM OF ANKLE: CPT

## 2020-05-21 ENCOUNTER — TELEPHONE (OUTPATIENT)
Dept: OBGYN CLINIC | Facility: CLINIC | Age: 55
End: 2020-05-21

## 2020-05-26 ENCOUNTER — OFFICE VISIT (OUTPATIENT)
Dept: OBGYN CLINIC | Facility: CLINIC | Age: 55
End: 2020-05-26
Payer: COMMERCIAL

## 2020-05-26 ENCOUNTER — APPOINTMENT (OUTPATIENT)
Dept: RADIOLOGY | Facility: CLINIC | Age: 55
End: 2020-05-26
Payer: COMMERCIAL

## 2020-05-26 VITALS
DIASTOLIC BLOOD PRESSURE: 100 MMHG | TEMPERATURE: 97 F | BODY MASS INDEX: 32.86 KG/M2 | HEIGHT: 70 IN | SYSTOLIC BLOOD PRESSURE: 152 MMHG

## 2020-05-26 DIAGNOSIS — S82.301D CLOSED EXTRA-ARTICULAR FRACTURE OF DISTAL END OF RIGHT TIBIA WITH ROUTINE HEALING, SUBSEQUENT ENCOUNTER: Primary | ICD-10-CM

## 2020-05-26 DIAGNOSIS — S82.301D CLOSED EXTRA-ARTICULAR FRACTURE OF DISTAL END OF RIGHT TIBIA WITH ROUTINE HEALING, SUBSEQUENT ENCOUNTER: ICD-10-CM

## 2020-05-26 PROCEDURE — 99214 OFFICE O/P EST MOD 30 MIN: CPT | Performed by: ORTHOPAEDIC SURGERY

## 2020-05-26 PROCEDURE — 73610 X-RAY EXAM OF ANKLE: CPT

## 2020-05-26 PROCEDURE — 73590 X-RAY EXAM OF LOWER LEG: CPT

## 2020-07-01 ENCOUNTER — TELEPHONE (OUTPATIENT)
Dept: OBGYN CLINIC | Facility: HOSPITAL | Age: 55
End: 2020-07-01

## 2020-07-01 NOTE — TELEPHONE ENCOUNTER
We have received a form asking if the patient is able to return to work as of him last office visit      Please advise

## 2020-07-07 ENCOUNTER — OFFICE VISIT (OUTPATIENT)
Dept: OBGYN CLINIC | Facility: CLINIC | Age: 55
End: 2020-07-07
Payer: COMMERCIAL

## 2020-07-07 ENCOUNTER — APPOINTMENT (OUTPATIENT)
Dept: RADIOLOGY | Facility: CLINIC | Age: 55
End: 2020-07-07
Payer: COMMERCIAL

## 2020-07-07 VITALS
SYSTOLIC BLOOD PRESSURE: 164 MMHG | WEIGHT: 229 LBS | TEMPERATURE: 98.1 F | DIASTOLIC BLOOD PRESSURE: 88 MMHG | BODY MASS INDEX: 32.86 KG/M2

## 2020-07-07 DIAGNOSIS — S82.434D CLOSED NONDISPLACED OBLIQUE FRACTURE OF SHAFT OF RIGHT FIBULA WITH ROUTINE HEALING, SUBSEQUENT ENCOUNTER: ICD-10-CM

## 2020-07-07 DIAGNOSIS — S82.301D CLOSED EXTRA-ARTICULAR FRACTURE OF DISTAL END OF RIGHT TIBIA WITH ROUTINE HEALING, SUBSEQUENT ENCOUNTER: Primary | ICD-10-CM

## 2020-07-07 DIAGNOSIS — S82.301D CLOSED EXTRA-ARTICULAR FRACTURE OF DISTAL END OF RIGHT TIBIA WITH ROUTINE HEALING, SUBSEQUENT ENCOUNTER: ICD-10-CM

## 2020-07-07 PROCEDURE — 99213 OFFICE O/P EST LOW 20 MIN: CPT | Performed by: ORTHOPAEDIC SURGERY

## 2020-07-07 PROCEDURE — 73610 X-RAY EXAM OF ANKLE: CPT

## 2020-07-07 PROCEDURE — 73590 X-RAY EXAM OF LOWER LEG: CPT

## 2020-07-07 NOTE — PROGRESS NOTES
Assessment:     1  Closed extra-articular fracture of distal end of right tibia with routine healing, subsequent encounter    2  Closed nondisplaced oblique fracture of shaft of right fibula with routine healing, subsequent encounter        Plan:     Problem List Items Addressed This Visit        Musculoskeletal and Integument    Closed extra-articular fracture of distal end of right tibia - Primary    Relevant Orders    XR ankle 3+ vw right    XR tibia fibula 2 vw right    Closed nondisplaced oblique fracture of shaft of right fibula          62 y o  male with right distal fibula and fibula shaft fractures, DOI 01/20/2020  Repeat Tib/Fib and ankle x-ray's were performed in the office today  His fracture is healing, but the fracture line is still visible  He will continue the use of the CAM walker boot when ambulating  He was encouraged to wean off of the crutches over the next 2-4 weeks  He may use a cane for balance if needed  Lito Terry is a , he is out of work at this time, a work note was provided  He was encouraged to work on gentle ankle ROM while out of the boot  If he begins to experience stiffness he was advised to sleep in the boot  I will see him back in 6 weeks time with repeat right ankle and right tib/fib x-ray's  He will likely not be ready to return to full duty for approximately 4 months  Subjective:     Patient ID: Rafa Nicole is a 62 y o  male  Chief Complaint:  62 y o  male presents to the office today for a follow up regarding a right ankle fracture that occurred on 01/20/2020  Overall Lito Terry is doing well  He has been WBAT to his RLE with the use of a cam walker boot  He states that he is able to walk around the house without the use of the crutches  He is taking NSAID's as needed for pain control  Allergy:  No Known Allergies  Medications:  all current active meds have been reviewed  Past Medical History:  History reviewed  No pertinent past medical history    Past Surgical History:  History reviewed  No pertinent surgical history  Family History:  History reviewed  No pertinent family history  Social History:  Social History     Substance and Sexual Activity   Alcohol Use Yes    Frequency: Never     Social History     Substance and Sexual Activity   Drug Use Never     Social History     Tobacco Use   Smoking Status Never Smoker   Smokeless Tobacco Never Used     Review of Systems   Constitutional: Negative for chills, fever and unexpected weight change  HENT: Negative for hearing loss, nosebleeds and sore throat  Eyes: Negative for pain, redness and visual disturbance  Respiratory: Negative for cough, shortness of breath and wheezing  Cardiovascular: Negative for chest pain, palpitations and leg swelling  Gastrointestinal: Negative for abdominal pain, nausea and vomiting  Endocrine: Negative for polydipsia and polyuria  Genitourinary: Negative for difficulty urinating and hematuria  Musculoskeletal: Negative for arthralgias, joint swelling and myalgias  Skin: Negative for rash and wound  Neurological: Negative for dizziness, numbness and headaches  Psychiatric/Behavioral: Negative for decreased concentration, dysphoric mood and suicidal ideas  The patient is not nervous/anxious  Objective:  BP Readings from Last 1 Encounters:   07/07/20 164/88      Wt Readings from Last 1 Encounters:   07/07/20 104 kg (229 lb)      BMI:   Estimated body mass index is 32 86 kg/m² as calculated from the following:    Height as of 5/26/20: 5' 10" (1 778 m)  Weight as of this encounter: 104 kg (229 lb)  BSA:   Estimated body surface area is 2 21 meters squared as calculated from the following:    Height as of 5/26/20: 5' 10" (1 778 m)  Weight as of this encounter: 104 kg (229 lb)  Physical Exam   Constitutional: He is oriented to person, place, and time  He appears well-developed and well-nourished     Eyes: Pupils are equal, round, and reactive to light  Conjunctivae are normal    Pulmonary/Chest: Effort normal and breath sounds normal    Neurological: He is alert and oriented to person, place, and time  Skin: Skin is warm and dry  Psychiatric: He has a normal mood and affect  His behavior is normal    Nursing note and vitals reviewed  Right Ankle Exam     Tenderness   The patient is experiencing no tenderness  Swelling: mild    Range of Motion   Dorsiflexion: 20 (passive )   Right ankle plantar flexion: full  Other   Erythema: absent  Scars: absent  Sensation: normal  Pulse: present     Comments:  Sensation intact to light touch distally             I have personally reviewed pertinent films in PACS and my interpretation is increased callus formation, fracture line still visible, maintained alignment         Scribe Attestation    I,:   Samm Gilmore am acting as a scribe while in the presence of the attending physician :        I,:   Priscilla Opitz, MD personally performed the services described in this documentation    as scribed in my presence :

## 2020-07-07 NOTE — LETTER
July 7, 2020     Patient: Irina Temple   YOB: 1965   Date of Visit: 7/7/2020       To Whom it May Concern:    Irina Temple is under my professional care  He was seen in my office on 7/7/2020  He is out of work at this time  He will be re-evaluated in 6 weeks time  If you have any questions or concerns, please don't hesitate to call           Sincerely,          Lyndsey Felder MD

## 2020-08-18 ENCOUNTER — OFFICE VISIT (OUTPATIENT)
Dept: OBGYN CLINIC | Facility: CLINIC | Age: 55
End: 2020-08-18
Payer: COMMERCIAL

## 2020-08-18 ENCOUNTER — APPOINTMENT (OUTPATIENT)
Dept: RADIOLOGY | Facility: CLINIC | Age: 55
End: 2020-08-18
Payer: COMMERCIAL

## 2020-08-18 VITALS
DIASTOLIC BLOOD PRESSURE: 96 MMHG | TEMPERATURE: 98.4 F | BODY MASS INDEX: 31.92 KG/M2 | HEIGHT: 70 IN | SYSTOLIC BLOOD PRESSURE: 160 MMHG | WEIGHT: 223 LBS

## 2020-08-18 DIAGNOSIS — S82.301D CLOSED EXTRA-ARTICULAR FRACTURE OF DISTAL END OF RIGHT TIBIA WITH ROUTINE HEALING, SUBSEQUENT ENCOUNTER: ICD-10-CM

## 2020-08-18 DIAGNOSIS — S82.434A CLOSED NONDISPLACED OBLIQUE FRACTURE OF SHAFT OF RIGHT FIBULA, INITIAL ENCOUNTER: Primary | ICD-10-CM

## 2020-08-18 DIAGNOSIS — S82.434A CLOSED NONDISPLACED OBLIQUE FRACTURE OF SHAFT OF RIGHT FIBULA, INITIAL ENCOUNTER: ICD-10-CM

## 2020-08-18 PROCEDURE — 73590 X-RAY EXAM OF LOWER LEG: CPT

## 2020-08-18 PROCEDURE — 73610 X-RAY EXAM OF ANKLE: CPT

## 2020-08-18 PROCEDURE — 99213 OFFICE O/P EST LOW 20 MIN: CPT | Performed by: ORTHOPAEDIC SURGERY

## 2020-08-18 NOTE — LETTER
August 18, 2020     Patient: Nargis Sherman   YOB: 1965   Date of Visit: 8/18/2020       To Whom it May Concern:    Nargis Sherman is under my professional care  He was seen in my office on 8/18/2020  He is unable to return to work until cleared  If you have any questions or concerns, please don't hesitate to call           Sincerely,          Severiano Overland, MD

## 2020-08-18 NOTE — PROGRESS NOTES
Assessment:     1  Closed nondisplaced oblique fracture of shaft of right fibula, initial encounter    2  Closed extra-articular fracture of distal end of right tibia with routine healing, subsequent encounter          Plan:     Problem List Items Addressed This Visit        Musculoskeletal and Integument    Closed extra-articular fracture of distal end of right tibia    Relevant Orders    XR ankle 3+ vw right    Cam Boot    Closed nondisplaced oblique fracture of shaft of right fibula - Primary    Relevant Orders    XR tibia fibula 2 vw right    Cam Boot           62 y o  male with right distal tibial shaft fracture and right proximal fibula fracture  Repeat x-ray's were obtained in the office today  He may begin to walk without the cam walker boot while around the house  He should wear the cam walker boot while ambulating out of the house  If he does have pain over his fracture site while ambulating without the boot, he should use the boot  He is a  and is unable to return to work at this time, note was provided  He was fit with a new cam walker boot today  I will see him back in 2 months time with repeat right ankle and right tib/fib x-ray's  Patient ID: Bari Perez is a 62 y o  male  Chief Complaint:  Right  ankle fracture     HPI:  62 y o  male presents to the office today for a follow up regarding a right ankle fracture  Radhaleonardo Corazon has been imobilized in a cam walker boot  He has weaned off of the crutches  He does note pain to the medial aspect of his ankle with ambulation  He is taking Ibuprofen as needed for pain control  Allergy:  No Known Allergies    Medications:  all current active meds have been reviewed    Past Medical History:  History reviewed  No pertinent past medical history  Past Surgical History:  History reviewed  No pertinent surgical history  Family History:  History reviewed  No pertinent family history      Social History:  Social History Substance and Sexual Activity   Alcohol Use Yes    Frequency: Never     Social History     Substance and Sexual Activity   Drug Use Never     Social History     Tobacco Use   Smoking Status Never Smoker   Smokeless Tobacco Never Used           ROS:  Review of Systems   Constitutional: Negative for chills, fever and unexpected weight change  HENT: Negative for hearing loss, nosebleeds and sore throat  Eyes: Negative for pain, redness and visual disturbance  Respiratory: Negative for cough, shortness of breath and wheezing  Cardiovascular: Negative for chest pain, palpitations and leg swelling  Gastrointestinal: Negative for abdominal pain, nausea and vomiting  Endocrine: Negative for polydipsia and polyuria  Genitourinary: Negative for difficulty urinating and hematuria  Musculoskeletal: Negative for arthralgias, joint swelling and myalgias  Skin: Negative for rash and wound  Neurological: Negative for dizziness, numbness and headaches  Psychiatric/Behavioral: Negative for decreased concentration, dysphoric mood and suicidal ideas  The patient is not nervous/anxious  Objective:  BP Readings from Last 1 Encounters:   08/18/20 160/96      Wt Readings from Last 1 Encounters:   08/18/20 101 kg (223 lb)        BMI:   Estimated body mass index is 32 kg/m² as calculated from the following:    Height as of this encounter: 5' 10" (1 778 m)  Weight as of this encounter: 101 kg (223 lb)  EXAM:   Physical Exam  Vitals signs and nursing note reviewed  Constitutional:       Appearance: He is well-developed  Eyes:      Conjunctiva/sclera: Conjunctivae normal       Pupils: Pupils are equal, round, and reactive to light  Pulmonary:      Effort: Pulmonary effort is normal       Breath sounds: Normal breath sounds  Skin:     General: Skin is warm and dry  Neurological:      Mental Status: He is alert and oriented to person, place, and time     Psychiatric:         Behavior: Behavior normal        Right Ankle Exam     Tenderness   Right ankle tenderness location: tenderness over fracture site   Swelling: none    Range of Motion   Dorsiflexion: 10   Plantar flexion: 40     Other   Erythema: absent  Scars: absent  Sensation: normal  Pulse: present     Comments:  Sensation intact to light touch distally   Palpable pulses             Radiographs:  I have personally reviewed pertinent films in PACS and my interpretation is consolidation of bridging callus at fracture sites with progressive healing, maintained good alignment       Scribe Attestation    I,:   Sebastien Sprague am acting as a scribe while in the presence of the attending physician :        I,:   Mara Harris MD personally performed the services described in this documentation    as scribed in my presence :

## 2020-09-17 ENCOUNTER — OFFICE VISIT (OUTPATIENT)
Dept: FAMILY MEDICINE CLINIC | Facility: CLINIC | Age: 55
End: 2020-09-17
Payer: COMMERCIAL

## 2020-09-17 VITALS
SYSTOLIC BLOOD PRESSURE: 144 MMHG | HEART RATE: 86 BPM | BODY MASS INDEX: 32.64 KG/M2 | OXYGEN SATURATION: 98 % | WEIGHT: 228 LBS | TEMPERATURE: 98 F | HEIGHT: 70 IN | DIASTOLIC BLOOD PRESSURE: 94 MMHG

## 2020-09-17 DIAGNOSIS — Z13.220 SCREENING FOR HYPERLIPIDEMIA: ICD-10-CM

## 2020-09-17 DIAGNOSIS — E55.9 VITAMIN D DEFICIENCY: ICD-10-CM

## 2020-09-17 DIAGNOSIS — E66.09 CLASS 1 OBESITY DUE TO EXCESS CALORIES WITH BODY MASS INDEX (BMI) OF 32.0 TO 32.9 IN ADULT, UNSPECIFIED WHETHER SERIOUS COMORBIDITY PRESENT: ICD-10-CM

## 2020-09-17 DIAGNOSIS — Z11.4 SCREENING FOR HUMAN IMMUNODEFICIENCY VIRUS: ICD-10-CM

## 2020-09-17 DIAGNOSIS — Z12.11 SCREENING FOR COLON CANCER: ICD-10-CM

## 2020-09-17 DIAGNOSIS — Z13.1 SCREENING FOR DIABETES MELLITUS: ICD-10-CM

## 2020-09-17 DIAGNOSIS — Z11.59 NEED FOR HEPATITIS C SCREENING TEST: ICD-10-CM

## 2020-09-17 DIAGNOSIS — Z76.89 ENCOUNTER TO ESTABLISH CARE: Primary | ICD-10-CM

## 2020-09-17 DIAGNOSIS — Z23 FLU VACCINE NEED: ICD-10-CM

## 2020-09-17 DIAGNOSIS — Z13.29 SCREENING FOR THYROID DISORDER: ICD-10-CM

## 2020-09-17 DIAGNOSIS — Z13.0 SCREENING FOR DEFICIENCY ANEMIA: ICD-10-CM

## 2020-09-17 DIAGNOSIS — Z12.5 SCREENING FOR PROSTATE CANCER: ICD-10-CM

## 2020-09-17 PROBLEM — E66.811 CLASS 1 OBESITY DUE TO EXCESS CALORIES WITH BODY MASS INDEX (BMI) OF 32.0 TO 32.9 IN ADULT: Status: ACTIVE | Noted: 2020-09-17

## 2020-09-17 PROCEDURE — 90471 IMMUNIZATION ADMIN: CPT

## 2020-09-17 PROCEDURE — 99203 OFFICE O/P NEW LOW 30 MIN: CPT | Performed by: NURSE PRACTITIONER

## 2020-09-17 PROCEDURE — 3725F SCREEN DEPRESSION PERFORMED: CPT | Performed by: NURSE PRACTITIONER

## 2020-09-17 PROCEDURE — 90682 RIV4 VACC RECOMBINANT DNA IM: CPT

## 2020-09-17 NOTE — PATIENT INSTRUCTIONS
Heart Healthy Diet   AMBULATORY CARE:   A heart healthy diet  is an eating plan low in total fat, unhealthy fats, and sodium (salt)  A heart healthy diet helps decrease your risk for heart disease and stroke  Limit the amount of fat you eat to 25% to 35% of your total daily calories  Limit sodium to less than 2,300 mg each day  Healthy fats:  Healthy fats can help improve cholesterol levels  The risk for heart disease is decreased when cholesterol levels are normal  Choose healthy fats, such as the following:  · Unsaturated fat  is found in foods such as soybean, canola, olive, corn, and safflower oils  It is also found in soft tub margarine that is made with liquid vegetable oil  · Omega-3 fat  is found in certain fish, such as salmon, tuna, and trout, and in walnuts and flaxseed  Unhealthy fats:  Unhealthy fats can cause unhealthy cholesterol levels in your blood and increase your risk of heart disease  Limit unhealthy fats, such as the following:  · Cholesterol  is found in animal foods, such as eggs and lobster, and in dairy products made from whole milk  Limit cholesterol to less than 300 milligrams (mg) each day  You may need to limit cholesterol to 200 mg each day if you have heart disease  · Saturated fat  is found in meats, such as santos and hamburger  It is also found in chicken or turkey skin, whole milk, and butter  Limit saturated fat to less than 7% of your total daily calories  Limit saturated fat to less than 6% if you have heart disease or are at increased risk for it  · Trans fat  is found in packaged foods, such as potato chips and cookies  It is also in hard margarine, some fried foods, and shortening  Avoid trans fats as much as possible    Heart healthy foods and drinks to include:  Ask your dietitian or healthcare provider how many servings to have from each of the following food groups:  · Grains:      ¨ Whole-wheat breads, cereals, and pastas, and brown rice    ¨ Low-fat, low-sodium crackers and chips    · Vegetables:      ¨ Broccoli, green beans, green peas, and spinach    ¨ Collards, kale, and lima beans    ¨ Carrots, sweet potatoes, tomatoes, and peppers    ¨ Canned vegetables with no salt added    · Fruits:      ¨ Bananas, peaches, pears, and pineapple    ¨ Grapes, raisins, and dates    ¨ Oranges, tangerines, grapefruit, orange juice, and grapefruit juice    ¨ Apricots, mangoes, melons, and papaya    ¨ Raspberries and strawberries    ¨ Canned fruit with no added sugar    · Low-fat dairy products:      ¨ Nonfat (skim) milk, 1% milk, and low-fat almond, cashew, or soy milks fortified with calcium    ¨ Low-fat cheese, regular or frozen yogurt, and cottage cheese    · Meats and proteins , such as lean cuts of beef and pork (loin, leg, round), skinless chicken and turkey, legumes, soy products, egg whites, and nuts  Foods and drinks to limit or avoid:  Ask your dietitian or healthcare provider about these and other foods that are high in unhealthy fat, sodium, and sugar:  · Snack or packaged foods , such as frozen dinners, cookies, macaroni and cheese, and cereals with more than 300 mg of sodium per serving    · Canned or dry mixes  for cakes, soups, sauces, or gravies    · Vegetables with added sodium , such as instant potatoes, vegetables with added sauces, or regular canned vegetables    · Other foods high in sodium , such as ketchup, barbecue sauce, salad dressing, pickles, olives, soy sauce, and miso    · High-fat dairy foods  such as whole or 2% milk, cream cheese, or sour cream, and cheeses     · High-fat protein foods  such as high-fat cuts of beef (T-bone steaks, ribs), chicken or turkey with skin, and organ meats, such as liver    · Cured or smoked meats , such as hot dogs, santos, and sausage    · Unhealthy fats and oils , such as butter, stick margarine, shortening, and cooking oils such as coconut or palm oil    · Food and drinks high in sugar , such as soft drinks (soda), sports drinks, sweetened tea, candy, cake, cookies, pies, and doughnuts  Other diet guidelines to follow:   · Eat more foods containing omega-3 fats  Eat fish high in omega-3 fats at least 2 times a week  · Limit alcohol  Too much alcohol can damage your heart and raise your blood pressure  Women should limit alcohol to 1 drink a day  Men should limit alcohol to 2 drinks a day  A drink of alcohol is 12 ounces of beer, 5 ounces of wine, or 1½ ounces of liquor  · Choose low-sodium foods  High-sodium foods can lead to high blood pressure  Add little or no salt to food you prepare  Use herbs and spices in place of salt  · Eat more fiber  to help lower cholesterol levels  Eat at least 5 servings of fruits and vegetables each day  Eat 3 ounces of whole-grain foods each day  Legumes (beans) are also a good source of fiber  Lifestyle guidelines:   · Do not smoke  Nicotine and other chemicals in cigarettes and cigars can cause lung and heart damage  Ask your healthcare provider for information if you currently smoke and need help to quit  E-cigarettes or smokeless tobacco still contain nicotine  Talk to your healthcare provider before you use these products  · Exercise regularly  to help you maintain a healthy weight and improve your blood pressure and cholesterol levels  Ask your healthcare provider about the best exercise plan for you  Do not start an exercise program without asking your healthcare provider  Follow up with your healthcare provider as directed:  Write down your questions so you remember to ask them during your visits  © 2017 [address] [employer] Information is for End User's use only and may not be sold, redistributed or otherwise used for commercial purposes  All illustrations and images included in CareNotes® are the copyrighted property of [employer]  or [employer]  The above information is an  only   It is not intended as medical advice for individual conditions or treatments  Talk to your doctor, nurse or pharmacist before following any medical regimen to see if it is safe and effective for you  Exercise Safety   AMBULATORY CARE:   Exercise safety  includes using correct equipment and positions to work the muscles without causing more injury  You will need to know which exercises to do and how often to do them  You will also need to know what to do if you feel pain or think an exercise caused injury  Do not start an exercise program before you talk to your healthcare provider  You may need to wait until your swelling and pain have gone down before you start to exercise  Contact your healthcare provider if:   · You have sharp pain during exercise or at rest     · You have questions or concerns about your stretches or exercises  What you need to know before you exercise:   · Exercises help lower pain and swelling after an injury or surgery  They also help strengthen the muscles that support your joints and bones  This may help prevent another injury  · You may need a light weight or an exercise band for some exercises  Your healthcare provider will tell you how much weight to exercise with  Ask your healthcare provider where to buy a weight or an exercise band  · Your healthcare provider will tell you how often to do each exercise  The provider will also tell you how many times to repeat each exercise and how many sets you should do  You may be told to do different exercises on different days  · Warm up and stretch before you exercise  Walk or ride a stationary bike for 5 to 10 minutes to help you warm up  Stretching helps increase range of motion  It may also relieve muscle soreness and help prevent another injury  Your healthcare provider will show you which stretches you need to do  What you can do to exercise safely:   · Move slowly and smoothly  Avoid fast or jerky motions  This will help prevent another injury       · Breathe normally  Do not hold your breath  It is important to breathe in and out so you do not tense up during exercise  Tension could prevent you from moving your joint in a full range of motion  · Stop if you feel sharp pain or an increase in pain  Stop the exercise and contact your healthcare provider if you have these symptoms  It is normal to feel some discomfort, such as a dull ache, during exercise  Regular exercise will help decrease your discomfort over time  Follow up with your physical therapist as directed:  Write down your questions so you remember to ask them during your visits  © 2017 [address] [facility] Information is for End User's use only and may not be sold, redistributed or otherwise used for commercial purposes  All illustrations and images included in CareNotes® are the copyrighted property of [employer]  or [employer]  The above information is an  only  It is not intended as medical advice for individual conditions or treatments  Talk to your doctor, nurse or pharmacist before following any medical regimen to see if it is safe and effective for you  Prostate specific antigen measurement   GENERAL INFORMATION:  What is this test?  This test measures the amount of prostate specific antigen (PSA) in blood  This test may be used when benign prostatic hyperplasia (BPH) is suspected  It may also be used to screen for prostate cancer, to help diagnose prostate cancer when it is suspected, and to monitor prostate cancer after treatment  What are other names for this test?  · PSA measurement  · PSA - Prostate-specific antigen level  · PSA - Serum prostate specific antigen level  · tPSA measurement - Total prostate specific antigen measurement  What are related tests? · Rectal examination  · Transrectal biopsy of prostate  Why do I need this test?  Laboratory tests may be done for many reasons   Tests are performed for routine health screenings or if a disease or toxicity is suspected  Lab tests may be used to determine if a medical condition is improving or worsening  Lab tests may also be used to measure the success or failure of a medication or treatment plan  Lab tests may be ordered for professional or legal reasons  You may need this test if you have:   · BPH - Benign prostatic hypertrophy  · Prostate cancer  When and how often should I have this test?  When and how often laboratory tests are done may depend on many factors  The timing of laboratory tests may rely on the results or completion of other tests, procedures, or treatments  Lab tests may be performed immediately in an emergency, or tests may be delayed as a condition is treated or monitored  A test may be suggested or become necessary when certain signs or symptoms appear  Due to changes in the way your body naturally functions through the course of a day, lab tests may need to be performed at a certain time of day  If you have prepared for a test by changing your food or fluid intake, lab tests may be timed in accordance with those changes  Timing of tests may be based on increased and decreased levels of medications, drugs or other substances in the body  The age or gender of the person being tested may affect when and how often a lab test is required  Chronic or progressive conditions may need ongoing monitoring through the use of lab tests  Conditions that worsen and improve may also need frequent monitoring  Certain tests may be repeated to obtain a series of results, or tests may need to be repeated to confirm or disprove results  Timing and frequency of lab tests may vary if they are performed for professional or legal reasons  How should I get ready for the test?  Before having blood collected, tell the person drawing your blood if you are allergic to latex  Tell the healthcare worker if you have a medical condition or are using a medication or supplement that causes excessive bleeding  Also tell the healthcare worker if you have felt nauseated, lightheaded, or have fainted while having blood drawn in the past   How is the test done? When a blood sample from a vein is needed, a vein in your arm is usually selected  A tourniquet (large rubber strap) may be secured above the vein  The skin over the vein will be cleaned, and a needle will be inserted  You will be asked to hold very still while your blood is collected  Blood will be collected into one or more tubes, and the tourniquet will be removed  When enough blood has been collected, the healthcare worker will take the needle out  How will the test feel? The amount of discomfort you feel will depend on many factors, including your sensitivity to pain  Communicate how you are feeling with the person doing the test  Inform the person doing the test if you feel that you cannot continue with the test   During a blood draw, you may feel mild discomfort at the location where the blood sample is being collected  What should I do after the test?  After a blood sample is collected from your vein, a bandage, cotton ball, or gauze may be placed on the area where the needle was inserted  You may be asked to apply pressure to the area  Avoid strenuous exercise immediately after your blood draw  Contact your healthcare worker if you feel pain or see redness, swelling, or discharge from the puncture site  What are the risks? Blood: During a blood draw, a hematoma (blood-filled bump under the skin) or slight bleeding from the puncture site may occur  After a blood draw, a bruise or infection may occur at the puncture site  The person doing this test may need to perform it more than once  Talk to your healthcare worker if you have any concerns about the risks of this test   What are normal results for this test?  Laboratory test results may vary depending on your age, gender, health history, the method used for the test, and many other factors   If your results are different from the results suggested below, this may not mean that you have a disease  Contact your healthcare worker if you have any questions  The following are considered to be normal results for this test:  · Males, ?36years of age: 0-2 ng/mL (0-2 mcg/L) :  · Males, >36years of age: 0-4 ng/mL (0-4 mcg/L)  · Females: <0 5 ng/mL (<0 5 mcg/L) : What might affect my test results? Drug Therapy Use:  Some medications may affect the results of the test  These medications include:  · Finasteride (Oral route, Tablet)  · Indium In 111 Capromab Pendetide (Intravenous route, Kit)  Ejaculation and digital rectal exam can cause an insignificant rise in PSA levels while prostate biopsy and cystoscopy can cause substantial increases; PSA testing should be delayed until 3 to 4 weeks after these procedures  Exercise, infection, and some medications can also cause a rise in PSA levels  Finasteride will lower PSA by approximately 50%   What follow up should I do after this test?  Ask your healthcare worker how you will be informed of the test results  You may be asked to call for results, schedule an appointment to discuss results, or notified of results by mail  Follow up care varies depending on many factors related to your test  Sometimes there is no follow up after you have been notified of test results  At other times follow up may be suggested or necessary  Some examples of follow up care include changes to medication or treatment plans, referral to a specialist, more or less frequent monitoring, and additional tests or procedures  Talk with your healthcare worker about any concerns or questions you have regarding follow up care or instructions  Where can I get more information? Related Companies   ? American Urological Association - https://www burrell org/  org  ? National Kidney and Urologic Diseases Information Clearinghouse - http://kidney  niddk nih gov/    CARE AGREEMENT:   You have the right to help plan your care  To help with this plan, you must learn about your health condition and how it may be treated  You can then discuss treatment options with your caregivers  Work with them to decide what care may be used to treat you  You always have the right to refuse treatment  © Copyright [employer] 2018  Information is for End User's use only and may not be sold, redistributed or otherwise used for commercial purposes  The above information is an  only  It is not intended as a substitute for medical advice for your individual conditions or treatments  Talk to your doctor, nurse or pharmacist before following any medical regimen to see if it is safe and effective for you  Gastroesophageal Reflux Disease   WHAT YOU NEED TO KNOW:   Gastroesophageal reflux occurs when acid and food in the stomach back up into the esophagus  Gastroesophageal reflux disease (GERD) is reflux that occurs more than twice a week for a few weeks  It usually causes heartburn and other symptoms  GERD can cause other health problems over time if it is not treated  DISCHARGE INSTRUCTIONS:   Seek care immediately if:   · You feel full and cannot burp or vomit  · You have severe chest pain and sudden trouble breathing  · Your bowel movements are black, bloody, or tarry-looking  · Your vomit looks like coffee grounds or has blood in it  Contact your healthcare provider if:   · You vomit large amounts, or you vomit often  · You have trouble breathing after you vomit  · You have trouble swallowing, or pain with swallowing  · You are losing weight without trying  · Your symptoms get worse or do not improve with treatment  · You have questions or concerns about your condition or care  Medicines:   · Medicines  are used to decrease stomach acid  Medicine may also be used to help your lower esophageal sphincter and stomach contract (tighten) more  · Take your medicine as directed    Contact your healthcare provider if you think your medicine is not helping or if you have side effects  Tell him or her if you are allergic to any medicine  Keep a list of the medicines, vitamins, and herbs you take  Include the amounts, and when and why you take them  Bring the list or the pill bottles to follow-up visits  Carry your medicine list with you in case of an emergency  Manage GERD:   · Do not have foods or drinks that may increase heartburn  These include chocolate, peppermint, fried or fatty foods, drinks that contain caffeine, or carbonated drinks (soda)  Other foods include spicy foods, onions, tomatoes, and tomato-based foods  Do not have foods or drinks that can irritate your esophagus, such as citrus fruits, juices, and alcohol  · Do not eat large meals  When you eat a lot of food at one time, your stomach needs more acid to digest it  Eat 6 small meals each day instead of 3 large ones, and eat slowly  Do not eat meals 2 to 3 hours before bedtime  · Elevate the head of your bed  Place 6-inch blocks under the head of your bed frame  You may also use more than one pillow under your head and shoulders while you sleep  · Maintain a healthy weight  If you are overweight, weight loss may help relieve symptoms of GERD  · Do not smoke  Smoking weakens the lower esophageal sphincter and increases the risk of GERD  Ask your healthcare provider for information if you currently smoke and need help to quit  E-cigarettes or smokeless tobacco still contain nicotine  Talk to your healthcare provider before you use these products  · Do not wear clothing that is tight around your waist   Tight clothing can put pressure on your stomach and cause or worsen GERD symptoms  Follow up with your healthcare provider as directed:  Write down your questions so you remember to ask them during your visits    © 2017 [employer] Information is for End User's use only and may not be sold, redistributed or otherwise used for commercial purposes  All illustrations and images included in CareNotes® are the copyrighted property of [employer]  or [employer]  The above information is an  only  It is not intended as medical advice for individual conditions or treatments  Talk to your doctor, nurse or pharmacist before following any medical regimen to see if it is safe and effective for you

## 2020-09-17 NOTE — PROGRESS NOTES
Assessment/Plan:    Problem List Items Addressed This Visit     Class 1 obesity due to excess calories with body mass index (BMI) of 32 0 to 32 9 in adult      Other Visit Diagnoses     Encounter to establish care    -  Primary    Need for hepatitis C screening test        Relevant Orders    Hepatitis C antibody    Screening for deficiency anemia        Relevant Orders    CBC and differential    Screening for diabetes mellitus        Relevant Orders    Comprehensive metabolic panel    Screening for hyperlipidemia        Relevant Orders    Lipid panel    Screening for thyroid disorder        Relevant Orders    TSH, 3rd generation with Free T4 reflex    Vitamin D deficiency        Relevant Orders    Vitamin D 25 hydroxy    Screening for prostate cancer        Relevant Orders    PSA, Total Screen    Screening for human immunodeficiency virus        Relevant Orders    HIV 1/2 Antigen/Antibody (4th Generation) w Reflex [facility]    Screening for colon cancer        Relevant Orders    Cologuard    Flu vaccine need        Relevant Orders    influenza vaccine, quadrivalent, recombinant, PF, 0 5 mL, for patients 18 yr+ ([facility]) (Completed)           Diagnoses and all orders for this visit:    Encounter to establish care    Need for hepatitis C screening test  -     Hepatitis C antibody; Future    Screening for deficiency anemia  -     CBC and differential; Future    Screening for diabetes mellitus  -     Comprehensive metabolic panel; Future    Screening for hyperlipidemia  -     Lipid panel; Future    Screening for thyroid disorder  -     TSH, 3rd generation with Free T4 reflex; Future    Vitamin D deficiency  -     Vitamin D 25 hydroxy; Future    Screening for prostate cancer  -     PSA, Total Screen;  Future    Screening for human immunodeficiency virus  -     HIV 1/2 Antigen/Antibody (4th Generation) w Reflex [facility]; Future    Class 1 obesity due to excess calories with body mass index (BMI) of 32 0 to 32 9 in adult, unspecified whether serious comorbidity present  Comments:  discussed diet modification and exercise regimen    Screening for colon cancer  -     Cologuard; Future    Flu vaccine need  -     influenza vaccine, quadrivalent, recombinant, PF, 0 5 mL, for patients 18 yr+ (FLUBLOK)    Other orders  -     Cancel: Ambulatory referral to Gastroenterology; Future        No problem-specific Assessment & Plan notes found for this encounter  Subjective:      Patient ID: Lauren Ovalle is a 60 y o  male  Lauren Ovalle presents today to establish care at this office and routine visit  Previous PCP none  Pt is a full time sheet metal construction  Pt doing well overall, offers no acute complaints today  Discussed with Lauren Ovalle [facility] recommendations for HIV screening for Patients between 13 y o  and 72 y o   Laurencharles Ovalle acknowledges the test and order for HIV screening placed  Risks and benefits of Hep C testing discussed to screen for Hep C infection  Laurencharles Ovalle agreeable to the test and order for Hep C ab placed  Risks and benefits of CRC screening discussed;  Lauren Ovalle is aware of the following:    Cologuard is recommended to be done every three years and  If Cologuard result is not "negative", it is recommended that a colonoscopy will need to be done to provide definitive results  Lauren Ovalle denies first family linage of Colon CA before age 48 and is agreeable to Cologuard  Discussed with Lauren Ovalle that when Cologuard box is received that:  Stool sample is formed and about 3-4 inches and  There is a 1 week time limit to provide sample and send back to address on box by dropping off at a [employer] drop box, or drop off at this office    Pt doing well overall, offers no acute complaints today  Heartburn   He complains of heartburn  This is a recurrent problem  The heartburn is located in the substernum  The heartburn does not wake him from sleep   The heartburn does not limit his activity  The heartburn doesn't change with position  The symptoms are aggravated by certain foods and lying down  Pertinent negatives include no anemia, fatigue, melena, muscle weakness, orthopnea or weight loss  Risk factors include obesity, lack of exercise and ETOH use  Treatments tried: tums  The treatment provided moderate relief  The following portions of the patient's history were reviewed and updated as appropriate:   He has a past medical history of Foot fracture  ,  does not have any pertinent problems on file  ,   has a past surgical history that includes No past surgeries  ,  family history includes ALS in his mother; No Known Problems in his sister; Pneumonia in his father  ,   reports that he quit smoking about 3 years ago  His smoking use included cigarettes  He has never used smokeless tobacco  He reports current alcohol use  He reports previous drug use ,  has No Known Allergies     No current outpatient medications on file  No current facility-administered medications for this visit  PHQ-9 Depression Screening    PHQ-9:    Frequency of the following problems over the past two weeks:       Little interest or pleasure in doing things:  0 - not at all  Feeling down, depressed, or hopeless:  0 - not at all  PHQ-2 Score:  0           Review of Systems   Constitutional: Negative  Negative for fatigue and weight loss  HENT: Negative  Eyes: Negative  Respiratory: Negative  Cardiovascular: Negative  Gastrointestinal: Positive for heartburn  Negative for melena  Endocrine: Negative  Genitourinary: Negative  Musculoskeletal: Negative  Negative for muscle weakness  Skin: Negative  Allergic/Immunologic: Negative  Neurological: Negative  Hematological: Negative  Psychiatric/Behavioral: Negative            Objective:  Vitals:    09/17/20 1338   BP: 144/94   BP Location: Left arm   Patient Position: Sitting   Cuff Size: Large   Pulse: 86 Temp: 98 °F (36 7 °C)   TempSrc: Tympanic   SpO2: 98%   Weight: 103 kg (228 lb)   Height: 5' 10" (1 778 m)     Body mass index is 32 71 kg/m²  BMI Counseling: Body mass index is 32 71 kg/m²  Discussed the patient's BMI with him  The BMI is above normal  Nutrition recommendations include reducing portion sizes, decreasing overall calorie intake, 3-5 servings of fruits/vegetables daily, reducing fast food intake, consuming healthier snacks, decreasing soda and/or juice intake, moderation in carbohydrate intake, increasing intake of lean protein, reducing intake of saturated fat and trans fat and reducing intake of cholesterol  Exercise recommendations include vigorous aerobic physical activity for 75 minutes/week, exercising 3-5 times per week and strength training exercises  Physical Exam  Vitals signs and nursing note reviewed  Constitutional:       Appearance: Normal appearance  He is well-developed  HENT:      Head: Normocephalic and atraumatic  Right Ear: Tympanic membrane, ear canal and external ear normal       Left Ear: Tympanic membrane, ear canal and external ear normal       Nose: Nose normal       Mouth/Throat:      Mouth: Mucous membranes are moist       Pharynx: Uvula midline  Eyes:      General: Lids are normal       Conjunctiva/sclera: Conjunctivae normal       Pupils: Pupils are equal, round, and reactive to light  Neck:      Musculoskeletal: Full passive range of motion without pain, normal range of motion and neck supple  Thyroid: No thyroid mass  Vascular: No JVD  Trachea: Trachea and phonation normal    Cardiovascular:      Rate and Rhythm: Normal rate and regular rhythm  Pulses: Normal pulses  Heart sounds: Normal heart sounds, S1 normal and S2 normal  No murmur  No friction rub  No gallop  Pulmonary:      Effort: Pulmonary effort is normal       Breath sounds: Normal breath sounds     Abdominal:      General: Bowel sounds are normal  Palpations: Abdomen is soft  Tenderness: There is no abdominal tenderness  Genitourinary:     Comments: Deferred  Musculoskeletal: Normal range of motion  Right lower leg: No edema  Left lower leg: No edema  Skin:     General: Skin is warm and dry  Capillary Refill: Capillary refill takes less than 2 seconds  Neurological:      General: No focal deficit present  Mental Status: He is alert and oriented to person, place, and time  Cranial Nerves: Cranial nerves are intact  Sensory: Sensation is intact  Motor: Motor function is intact  Coordination: Coordination is intact  Gait: Gait is intact  Psychiatric:         Attention and Perception: Attention and perception normal          Mood and Affect: Mood and affect normal          Speech: Speech normal          Behavior: Behavior normal  Behavior is cooperative  Thought Content:  Thought content normal          Cognition and Memory: Cognition normal          Judgment: Judgment normal

## 2020-09-23 ENCOUNTER — APPOINTMENT (OUTPATIENT)
Dept: LAB | Facility: CLINIC | Age: 55
End: 2020-09-23
Payer: COMMERCIAL

## 2020-09-23 ENCOUNTER — TRANSCRIBE ORDERS (OUTPATIENT)
Dept: LAB | Facility: CLINIC | Age: 55
End: 2020-09-23

## 2020-09-23 DIAGNOSIS — E55.9 VITAMIN D DEFICIENCY: ICD-10-CM

## 2020-09-23 DIAGNOSIS — Z13.0 SCREENING FOR DEFICIENCY ANEMIA: ICD-10-CM

## 2020-09-23 DIAGNOSIS — Z12.5 SCREENING FOR PROSTATE CANCER: ICD-10-CM

## 2020-09-23 DIAGNOSIS — Z13.1 SCREENING FOR DIABETES MELLITUS: ICD-10-CM

## 2020-09-23 DIAGNOSIS — Z13.220 SCREENING FOR HYPERLIPIDEMIA: ICD-10-CM

## 2020-09-23 DIAGNOSIS — Z11.4 SCREENING FOR HUMAN IMMUNODEFICIENCY VIRUS: ICD-10-CM

## 2020-09-23 DIAGNOSIS — Z11.59 NEED FOR HEPATITIS C SCREENING TEST: ICD-10-CM

## 2020-09-23 DIAGNOSIS — Z13.29 SCREENING FOR THYROID DISORDER: ICD-10-CM

## 2020-09-23 LAB
25(OH)D3 SERPL-MCNC: 10.2 NG/ML (ref 30–100)
ALBUMIN SERPL BCP-MCNC: 3.6 G/DL (ref 3.5–5)
ALP SERPL-CCNC: 162 U/L (ref 46–116)
ALT SERPL W P-5'-P-CCNC: 56 U/L (ref 12–78)
ANION GAP SERPL CALCULATED.3IONS-SCNC: 8 MMOL/L (ref 4–13)
AST SERPL W P-5'-P-CCNC: 35 U/L (ref 5–45)
BASOPHILS # BLD AUTO: 0.1 THOUSANDS/ΜL (ref 0–0.1)
BASOPHILS NFR BLD AUTO: 1 % (ref 0–1)
BILIRUB SERPL-MCNC: 1.07 MG/DL (ref 0.2–1)
BUN SERPL-MCNC: 8 MG/DL (ref 5–25)
CALCIUM SERPL-MCNC: 9.7 MG/DL (ref 8.3–10.1)
CHLORIDE SERPL-SCNC: 104 MMOL/L (ref 100–108)
CHOLEST SERPL-MCNC: 213 MG/DL (ref 50–200)
CO2 SERPL-SCNC: 24 MMOL/L (ref 21–32)
CREAT SERPL-MCNC: 0.62 MG/DL (ref 0.6–1.3)
EOSINOPHIL # BLD AUTO: 0.25 THOUSAND/ΜL (ref 0–0.61)
EOSINOPHIL NFR BLD AUTO: 4 % (ref 0–6)
ERYTHROCYTE [DISTWIDTH] IN BLOOD BY AUTOMATED COUNT: 12.4 % (ref 11.6–15.1)
GFR SERPL CREATININE-BSD FRML MDRD: 110 ML/MIN/1.73SQ M
GLUCOSE P FAST SERPL-MCNC: 128 MG/DL (ref 65–99)
HCT VFR BLD AUTO: 48.1 % (ref 36.5–49.3)
HCV AB SER QL: NORMAL
HDLC SERPL-MCNC: 40 MG/DL
HGB BLD-MCNC: 15.3 G/DL (ref 12–17)
IMM GRANULOCYTES # BLD AUTO: 0.02 THOUSAND/UL (ref 0–0.2)
IMM GRANULOCYTES NFR BLD AUTO: 0 % (ref 0–2)
LDLC SERPL CALC-MCNC: 150 MG/DL (ref 0–100)
LYMPHOCYTES # BLD AUTO: 1.61 THOUSANDS/ΜL (ref 0.6–4.47)
LYMPHOCYTES NFR BLD AUTO: 23 % (ref 14–44)
MCH RBC QN AUTO: 30.1 PG (ref 26.8–34.3)
MCHC RBC AUTO-ENTMCNC: 31.8 G/DL (ref 31.4–37.4)
MCV RBC AUTO: 95 FL (ref 82–98)
MONOCYTES # BLD AUTO: 0.72 THOUSAND/ΜL (ref 0.17–1.22)
MONOCYTES NFR BLD AUTO: 10 % (ref 4–12)
NEUTROPHILS # BLD AUTO: 4.37 THOUSANDS/ΜL (ref 1.85–7.62)
NEUTS SEG NFR BLD AUTO: 62 % (ref 43–75)
NONHDLC SERPL-MCNC: 173 MG/DL
NRBC BLD AUTO-RTO: 0 /100 WBCS
PLATELET # BLD AUTO: 309 THOUSANDS/UL (ref 149–390)
PMV BLD AUTO: 11.4 FL (ref 8.9–12.7)
POTASSIUM SERPL-SCNC: 4.5 MMOL/L (ref 3.5–5.3)
PROT SERPL-MCNC: 7.7 G/DL (ref 6.4–8.2)
PSA SERPL-MCNC: 0.6 NG/ML (ref 0–4)
RBC # BLD AUTO: 5.08 MILLION/UL (ref 3.88–5.62)
SODIUM SERPL-SCNC: 136 MMOL/L (ref 136–145)
TRIGL SERPL-MCNC: 116 MG/DL
TSH SERPL DL<=0.05 MIU/L-ACNC: 1.43 UIU/ML (ref 0.36–3.74)
WBC # BLD AUTO: 7.07 THOUSAND/UL (ref 4.31–10.16)

## 2020-09-23 PROCEDURE — 82306 VITAMIN D 25 HYDROXY: CPT

## 2020-09-23 PROCEDURE — 80061 LIPID PANEL: CPT

## 2020-09-23 PROCEDURE — 36415 COLL VENOUS BLD VENIPUNCTURE: CPT

## 2020-09-23 PROCEDURE — 85025 COMPLETE CBC W/AUTO DIFF WBC: CPT

## 2020-09-23 PROCEDURE — 87389 HIV-1 AG W/HIV-1&-2 AB AG IA: CPT

## 2020-09-23 PROCEDURE — G0103 PSA SCREENING: HCPCS

## 2020-09-23 PROCEDURE — 86803 HEPATITIS C AB TEST: CPT

## 2020-09-23 PROCEDURE — 80053 COMPREHEN METABOLIC PANEL: CPT

## 2020-09-23 PROCEDURE — 84443 ASSAY THYROID STIM HORMONE: CPT

## 2020-09-24 LAB — HIV 1+2 AB+HIV1 P24 AG SERPL QL IA: NORMAL

## 2020-10-05 ENCOUNTER — TRANSCRIBE ORDERS (OUTPATIENT)
Dept: LAB | Facility: CLINIC | Age: 55
End: 2020-10-05

## 2020-10-09 ENCOUNTER — OFFICE VISIT (OUTPATIENT)
Dept: FAMILY MEDICINE CLINIC | Facility: CLINIC | Age: 55
End: 2020-10-09
Payer: COMMERCIAL

## 2020-10-09 VITALS
OXYGEN SATURATION: 96 % | HEIGHT: 70 IN | WEIGHT: 233 LBS | SYSTOLIC BLOOD PRESSURE: 132 MMHG | DIASTOLIC BLOOD PRESSURE: 82 MMHG | TEMPERATURE: 97.3 F | BODY MASS INDEX: 33.36 KG/M2 | HEART RATE: 99 BPM

## 2020-10-09 DIAGNOSIS — E55.9 VITAMIN D DEFICIENCY: ICD-10-CM

## 2020-10-09 DIAGNOSIS — R74.8 ELEVATED SERUM ALKALINE PHOSPHATASE LEVEL: Primary | ICD-10-CM

## 2020-10-09 DIAGNOSIS — E78.00 ELEVATED LDL CHOLESTEROL LEVEL: ICD-10-CM

## 2020-10-09 DIAGNOSIS — F10.10 ALCOHOL USE DISORDER, MILD, ABUSE: ICD-10-CM

## 2020-10-09 DIAGNOSIS — R73.01 ELEVATED FASTING BLOOD SUGAR: ICD-10-CM

## 2020-10-09 PROCEDURE — 3079F DIAST BP 80-89 MM HG: CPT | Performed by: NURSE PRACTITIONER

## 2020-10-09 PROCEDURE — 99213 OFFICE O/P EST LOW 20 MIN: CPT | Performed by: NURSE PRACTITIONER

## 2020-10-09 RX ORDER — ERGOCALCIFEROL 1.25 MG/1
50000 CAPSULE ORAL WEEKLY
Qty: 4 CAPSULE | Refills: 11 | Status: SHIPPED | OUTPATIENT
Start: 2020-10-09 | End: 2022-01-11

## 2020-10-20 ENCOUNTER — APPOINTMENT (OUTPATIENT)
Dept: RADIOLOGY | Facility: CLINIC | Age: 55
End: 2020-10-20
Payer: COMMERCIAL

## 2020-10-20 ENCOUNTER — OFFICE VISIT (OUTPATIENT)
Dept: OBGYN CLINIC | Facility: CLINIC | Age: 55
End: 2020-10-20
Payer: COMMERCIAL

## 2020-10-20 VITALS
HEIGHT: 70 IN | TEMPERATURE: 97.8 F | SYSTOLIC BLOOD PRESSURE: 148 MMHG | WEIGHT: 236 LBS | BODY MASS INDEX: 33.79 KG/M2 | DIASTOLIC BLOOD PRESSURE: 89 MMHG | HEART RATE: 94 BPM

## 2020-10-20 DIAGNOSIS — S82.434D CLOSED NONDISPLACED OBLIQUE FRACTURE OF SHAFT OF RIGHT FIBULA WITH ROUTINE HEALING, SUBSEQUENT ENCOUNTER: ICD-10-CM

## 2020-10-20 DIAGNOSIS — S82.301D CLOSED EXTRA-ARTICULAR FRACTURE OF DISTAL END OF RIGHT TIBIA WITH ROUTINE HEALING, SUBSEQUENT ENCOUNTER: ICD-10-CM

## 2020-10-20 DIAGNOSIS — S82.301D CLOSED EXTRA-ARTICULAR FRACTURE OF DISTAL END OF RIGHT TIBIA WITH ROUTINE HEALING, SUBSEQUENT ENCOUNTER: Primary | ICD-10-CM

## 2020-10-20 PROCEDURE — 73590 X-RAY EXAM OF LOWER LEG: CPT

## 2020-10-20 PROCEDURE — 1036F TOBACCO NON-USER: CPT | Performed by: ORTHOPAEDIC SURGERY

## 2020-10-20 PROCEDURE — 99213 OFFICE O/P EST LOW 20 MIN: CPT | Performed by: ORTHOPAEDIC SURGERY

## 2020-10-26 ENCOUNTER — EVALUATION (OUTPATIENT)
Dept: PHYSICAL THERAPY | Facility: CLINIC | Age: 55
End: 2020-10-26
Payer: COMMERCIAL

## 2020-10-26 DIAGNOSIS — S82.434D CLOSED NONDISPLACED OBLIQUE FRACTURE OF SHAFT OF RIGHT FIBULA WITH ROUTINE HEALING, SUBSEQUENT ENCOUNTER: ICD-10-CM

## 2020-10-26 DIAGNOSIS — S82.301D CLOSED EXTRA-ARTICULAR FRACTURE OF DISTAL END OF RIGHT TIBIA WITH ROUTINE HEALING, SUBSEQUENT ENCOUNTER: ICD-10-CM

## 2020-10-26 PROCEDURE — 97162 PT EVAL MOD COMPLEX 30 MIN: CPT | Performed by: PHYSICAL THERAPIST

## 2020-10-29 ENCOUNTER — OFFICE VISIT (OUTPATIENT)
Dept: PHYSICAL THERAPY | Facility: CLINIC | Age: 55
End: 2020-10-29
Payer: COMMERCIAL

## 2020-10-29 DIAGNOSIS — S82.301D CLOSED EXTRA-ARTICULAR FRACTURE OF DISTAL END OF RIGHT TIBIA WITH ROUTINE HEALING, SUBSEQUENT ENCOUNTER: Primary | ICD-10-CM

## 2020-10-29 DIAGNOSIS — S82.434D CLOSED NONDISPLACED OBLIQUE FRACTURE OF SHAFT OF RIGHT FIBULA WITH ROUTINE HEALING, SUBSEQUENT ENCOUNTER: ICD-10-CM

## 2020-10-29 PROCEDURE — 97140 MANUAL THERAPY 1/> REGIONS: CPT | Performed by: PHYSICAL THERAPIST

## 2020-10-29 PROCEDURE — 97110 THERAPEUTIC EXERCISES: CPT | Performed by: PHYSICAL THERAPIST

## 2020-10-29 PROCEDURE — 97112 NEUROMUSCULAR REEDUCATION: CPT | Performed by: PHYSICAL THERAPIST

## 2020-11-02 ENCOUNTER — OFFICE VISIT (OUTPATIENT)
Dept: PHYSICAL THERAPY | Facility: CLINIC | Age: 55
End: 2020-11-02
Payer: COMMERCIAL

## 2020-11-02 DIAGNOSIS — S82.301D CLOSED EXTRA-ARTICULAR FRACTURE OF DISTAL END OF RIGHT TIBIA WITH ROUTINE HEALING, SUBSEQUENT ENCOUNTER: Primary | ICD-10-CM

## 2020-11-02 DIAGNOSIS — S82.434D CLOSED NONDISPLACED OBLIQUE FRACTURE OF SHAFT OF RIGHT FIBULA WITH ROUTINE HEALING, SUBSEQUENT ENCOUNTER: ICD-10-CM

## 2020-11-02 PROCEDURE — 97140 MANUAL THERAPY 1/> REGIONS: CPT

## 2020-11-02 PROCEDURE — 97110 THERAPEUTIC EXERCISES: CPT

## 2020-11-02 PROCEDURE — 97112 NEUROMUSCULAR REEDUCATION: CPT

## 2020-11-05 ENCOUNTER — APPOINTMENT (OUTPATIENT)
Dept: PHYSICAL THERAPY | Facility: CLINIC | Age: 55
End: 2020-11-05
Payer: COMMERCIAL

## 2020-11-06 ENCOUNTER — OFFICE VISIT (OUTPATIENT)
Dept: PHYSICAL THERAPY | Facility: CLINIC | Age: 55
End: 2020-11-06
Payer: COMMERCIAL

## 2020-11-06 DIAGNOSIS — S82.434D CLOSED NONDISPLACED OBLIQUE FRACTURE OF SHAFT OF RIGHT FIBULA WITH ROUTINE HEALING, SUBSEQUENT ENCOUNTER: ICD-10-CM

## 2020-11-06 DIAGNOSIS — S82.301D CLOSED EXTRA-ARTICULAR FRACTURE OF DISTAL END OF RIGHT TIBIA WITH ROUTINE HEALING, SUBSEQUENT ENCOUNTER: Primary | ICD-10-CM

## 2020-11-06 PROCEDURE — 97140 MANUAL THERAPY 1/> REGIONS: CPT | Performed by: PHYSICAL THERAPIST

## 2020-11-06 PROCEDURE — 97110 THERAPEUTIC EXERCISES: CPT | Performed by: PHYSICAL THERAPIST

## 2020-11-06 PROCEDURE — 97112 NEUROMUSCULAR REEDUCATION: CPT | Performed by: PHYSICAL THERAPIST

## 2020-11-09 ENCOUNTER — OFFICE VISIT (OUTPATIENT)
Dept: PHYSICAL THERAPY | Facility: CLINIC | Age: 55
End: 2020-11-09
Payer: COMMERCIAL

## 2020-11-09 DIAGNOSIS — S82.301D CLOSED EXTRA-ARTICULAR FRACTURE OF DISTAL END OF RIGHT TIBIA WITH ROUTINE HEALING, SUBSEQUENT ENCOUNTER: Primary | ICD-10-CM

## 2020-11-09 DIAGNOSIS — S82.434D CLOSED NONDISPLACED OBLIQUE FRACTURE OF SHAFT OF RIGHT FIBULA WITH ROUTINE HEALING, SUBSEQUENT ENCOUNTER: ICD-10-CM

## 2020-11-09 PROCEDURE — 97140 MANUAL THERAPY 1/> REGIONS: CPT

## 2020-11-09 PROCEDURE — 97110 THERAPEUTIC EXERCISES: CPT

## 2020-11-16 ENCOUNTER — OFFICE VISIT (OUTPATIENT)
Dept: PHYSICAL THERAPY | Facility: CLINIC | Age: 55
End: 2020-11-16
Payer: COMMERCIAL

## 2020-11-16 DIAGNOSIS — S82.434D CLOSED NONDISPLACED OBLIQUE FRACTURE OF SHAFT OF RIGHT FIBULA WITH ROUTINE HEALING, SUBSEQUENT ENCOUNTER: ICD-10-CM

## 2020-11-16 DIAGNOSIS — S82.301D CLOSED EXTRA-ARTICULAR FRACTURE OF DISTAL END OF RIGHT TIBIA WITH ROUTINE HEALING, SUBSEQUENT ENCOUNTER: Primary | ICD-10-CM

## 2020-11-16 PROCEDURE — 97110 THERAPEUTIC EXERCISES: CPT | Performed by: PHYSICAL THERAPIST

## 2020-11-16 PROCEDURE — 97112 NEUROMUSCULAR REEDUCATION: CPT | Performed by: PHYSICAL THERAPIST

## 2020-11-16 PROCEDURE — 97140 MANUAL THERAPY 1/> REGIONS: CPT | Performed by: PHYSICAL THERAPIST

## 2020-11-19 ENCOUNTER — OFFICE VISIT (OUTPATIENT)
Dept: PHYSICAL THERAPY | Facility: CLINIC | Age: 55
End: 2020-11-19
Payer: COMMERCIAL

## 2020-11-19 DIAGNOSIS — S82.301D CLOSED EXTRA-ARTICULAR FRACTURE OF DISTAL END OF RIGHT TIBIA WITH ROUTINE HEALING, SUBSEQUENT ENCOUNTER: Primary | ICD-10-CM

## 2020-11-19 DIAGNOSIS — S82.434D CLOSED NONDISPLACED OBLIQUE FRACTURE OF SHAFT OF RIGHT FIBULA WITH ROUTINE HEALING, SUBSEQUENT ENCOUNTER: ICD-10-CM

## 2020-11-19 PROCEDURE — 97140 MANUAL THERAPY 1/> REGIONS: CPT | Performed by: PHYSICAL THERAPIST

## 2020-11-19 PROCEDURE — 97112 NEUROMUSCULAR REEDUCATION: CPT | Performed by: PHYSICAL THERAPIST

## 2020-11-19 PROCEDURE — 97110 THERAPEUTIC EXERCISES: CPT | Performed by: PHYSICAL THERAPIST

## 2020-11-24 ENCOUNTER — OFFICE VISIT (OUTPATIENT)
Dept: PHYSICAL THERAPY | Facility: CLINIC | Age: 55
End: 2020-11-24
Payer: COMMERCIAL

## 2020-11-24 DIAGNOSIS — S82.434D CLOSED NONDISPLACED OBLIQUE FRACTURE OF SHAFT OF RIGHT FIBULA WITH ROUTINE HEALING, SUBSEQUENT ENCOUNTER: ICD-10-CM

## 2020-11-24 DIAGNOSIS — S82.301D CLOSED EXTRA-ARTICULAR FRACTURE OF DISTAL END OF RIGHT TIBIA WITH ROUTINE HEALING, SUBSEQUENT ENCOUNTER: Primary | ICD-10-CM

## 2020-11-24 PROCEDURE — 97110 THERAPEUTIC EXERCISES: CPT

## 2020-11-24 PROCEDURE — 97112 NEUROMUSCULAR REEDUCATION: CPT

## 2020-11-30 ENCOUNTER — APPOINTMENT (OUTPATIENT)
Dept: PHYSICAL THERAPY | Facility: CLINIC | Age: 55
End: 2020-11-30
Payer: COMMERCIAL

## 2020-12-02 ENCOUNTER — TELEPHONE (OUTPATIENT)
Dept: OBGYN CLINIC | Facility: HOSPITAL | Age: 55
End: 2020-12-02

## 2020-12-03 ENCOUNTER — EVALUATION (OUTPATIENT)
Dept: PHYSICAL THERAPY | Facility: CLINIC | Age: 55
End: 2020-12-03
Payer: COMMERCIAL

## 2020-12-03 DIAGNOSIS — S82.301D CLOSED EXTRA-ARTICULAR FRACTURE OF DISTAL END OF RIGHT TIBIA WITH ROUTINE HEALING, SUBSEQUENT ENCOUNTER: Primary | ICD-10-CM

## 2020-12-03 DIAGNOSIS — S82.434D CLOSED NONDISPLACED OBLIQUE FRACTURE OF SHAFT OF RIGHT FIBULA WITH ROUTINE HEALING, SUBSEQUENT ENCOUNTER: ICD-10-CM

## 2020-12-03 PROCEDURE — 97112 NEUROMUSCULAR REEDUCATION: CPT | Performed by: PHYSICAL THERAPIST

## 2020-12-03 PROCEDURE — 97110 THERAPEUTIC EXERCISES: CPT | Performed by: PHYSICAL THERAPIST

## 2020-12-03 PROCEDURE — 97140 MANUAL THERAPY 1/> REGIONS: CPT | Performed by: PHYSICAL THERAPIST

## 2020-12-08 ENCOUNTER — APPOINTMENT (OUTPATIENT)
Dept: PHYSICAL THERAPY | Facility: CLINIC | Age: 55
End: 2020-12-08
Payer: COMMERCIAL

## 2020-12-10 ENCOUNTER — APPOINTMENT (OUTPATIENT)
Dept: PHYSICAL THERAPY | Facility: CLINIC | Age: 55
End: 2020-12-10
Payer: COMMERCIAL

## 2020-12-18 ENCOUNTER — OFFICE VISIT (OUTPATIENT)
Dept: FAMILY MEDICINE CLINIC | Facility: CLINIC | Age: 55
End: 2020-12-18
Payer: COMMERCIAL

## 2020-12-18 ENCOUNTER — LAB (OUTPATIENT)
Dept: LAB | Facility: CLINIC | Age: 55
End: 2020-12-18
Payer: COMMERCIAL

## 2020-12-18 ENCOUNTER — TRANSCRIBE ORDERS (OUTPATIENT)
Dept: LAB | Facility: CLINIC | Age: 55
End: 2020-12-18

## 2020-12-18 VITALS
OXYGEN SATURATION: 95 % | TEMPERATURE: 97.2 F | SYSTOLIC BLOOD PRESSURE: 152 MMHG | DIASTOLIC BLOOD PRESSURE: 86 MMHG | HEART RATE: 81 BPM | HEIGHT: 70 IN | WEIGHT: 230 LBS | BODY MASS INDEX: 32.93 KG/M2

## 2020-12-18 DIAGNOSIS — I10 BENIGN ESSENTIAL HTN: ICD-10-CM

## 2020-12-18 DIAGNOSIS — Z13.29 SCREENING FOR THYROID DISORDER: ICD-10-CM

## 2020-12-18 DIAGNOSIS — Z13.220 SCREENING FOR HYPERLIPIDEMIA: ICD-10-CM

## 2020-12-18 DIAGNOSIS — E55.9 VITAMIN D DEFICIENCY: ICD-10-CM

## 2020-12-18 DIAGNOSIS — Z13.0 SCREENING FOR DEFICIENCY ANEMIA: ICD-10-CM

## 2020-12-18 DIAGNOSIS — H91.93 BILATERAL HEARING LOSS, UNSPECIFIED HEARING LOSS TYPE: ICD-10-CM

## 2020-12-18 DIAGNOSIS — Z13.31 DEPRESSION SCREENING NEGATIVE: ICD-10-CM

## 2020-12-18 DIAGNOSIS — Z12.5 SCREENING FOR PROSTATE CANCER: ICD-10-CM

## 2020-12-18 DIAGNOSIS — Z00.01 ENCOUNTER FOR WELL ADULT EXAM WITH ABNORMAL FINDINGS: Primary | ICD-10-CM

## 2020-12-18 DIAGNOSIS — R73.01 ELEVATED FASTING BLOOD SUGAR: ICD-10-CM

## 2020-12-18 DIAGNOSIS — R74.8 ELEVATED SERUM ALKALINE PHOSPHATASE LEVEL: ICD-10-CM

## 2020-12-18 DIAGNOSIS — E66.09 CLASS 1 OBESITY DUE TO EXCESS CALORIES WITH SERIOUS COMORBIDITY AND BODY MASS INDEX (BMI) OF 33.0 TO 33.9 IN ADULT: ICD-10-CM

## 2020-12-18 DIAGNOSIS — Z13.1 SCREENING FOR DIABETES MELLITUS: ICD-10-CM

## 2020-12-18 LAB
ALBUMIN SERPL BCP-MCNC: 4.1 G/DL (ref 3.5–5)
ALP SERPL-CCNC: 97 U/L (ref 46–116)
ALT SERPL W P-5'-P-CCNC: 39 U/L (ref 12–78)
ANION GAP SERPL CALCULATED.3IONS-SCNC: 5 MMOL/L (ref 4–13)
AST SERPL W P-5'-P-CCNC: 31 U/L (ref 5–45)
BILIRUB SERPL-MCNC: 1.08 MG/DL (ref 0.2–1)
BUN SERPL-MCNC: 9 MG/DL (ref 5–25)
CALCIUM SERPL-MCNC: 9.7 MG/DL (ref 8.3–10.1)
CHLORIDE SERPL-SCNC: 102 MMOL/L (ref 100–108)
CO2 SERPL-SCNC: 28 MMOL/L (ref 21–32)
CREAT SERPL-MCNC: 0.83 MG/DL (ref 0.6–1.3)
GFR SERPL CREATININE-BSD FRML MDRD: 98 ML/MIN/1.73SQ M
GLUCOSE P FAST SERPL-MCNC: 139 MG/DL (ref 65–99)
POTASSIUM SERPL-SCNC: 4.1 MMOL/L (ref 3.5–5.3)
PROT SERPL-MCNC: 8.5 G/DL (ref 6.4–8.2)
SODIUM SERPL-SCNC: 135 MMOL/L (ref 136–145)

## 2020-12-18 PROCEDURE — 80053 COMPREHEN METABOLIC PANEL: CPT

## 2020-12-18 PROCEDURE — 36415 COLL VENOUS BLD VENIPUNCTURE: CPT

## 2020-12-18 PROCEDURE — 3008F BODY MASS INDEX DOCD: CPT | Performed by: ORTHOPAEDIC SURGERY

## 2020-12-18 PROCEDURE — 99396 PREV VISIT EST AGE 40-64: CPT | Performed by: NURSE PRACTITIONER

## 2020-12-18 RX ORDER — OLMESARTAN MEDOXOMIL 20 MG/1
20 TABLET ORAL DAILY
Qty: 30 TABLET | Refills: 0 | Status: SHIPPED | OUTPATIENT
Start: 2020-12-18 | End: 2021-01-12

## 2020-12-29 ENCOUNTER — OFFICE VISIT (OUTPATIENT)
Dept: OBGYN CLINIC | Facility: CLINIC | Age: 55
End: 2020-12-29
Payer: COMMERCIAL

## 2020-12-29 VITALS — BODY MASS INDEX: 32.93 KG/M2 | HEIGHT: 70 IN | WEIGHT: 230 LBS

## 2020-12-29 DIAGNOSIS — S82.301D CLOSED EXTRA-ARTICULAR FRACTURE OF DISTAL END OF RIGHT TIBIA WITH ROUTINE HEALING, SUBSEQUENT ENCOUNTER: ICD-10-CM

## 2020-12-29 DIAGNOSIS — Z12.11 ENCOUNTER FOR SCREENING COLONOSCOPY: Primary | ICD-10-CM

## 2020-12-29 PROCEDURE — 99213 OFFICE O/P EST LOW 20 MIN: CPT | Performed by: ORTHOPAEDIC SURGERY

## 2020-12-29 PROCEDURE — 3008F BODY MASS INDEX DOCD: CPT | Performed by: ORTHOPAEDIC SURGERY

## 2020-12-29 PROCEDURE — 1036F TOBACCO NON-USER: CPT | Performed by: ORTHOPAEDIC SURGERY

## 2021-01-11 DIAGNOSIS — I10 BENIGN ESSENTIAL HTN: ICD-10-CM

## 2021-01-12 ENCOUNTER — TELEPHONE (OUTPATIENT)
Dept: FAMILY MEDICINE CLINIC | Facility: CLINIC | Age: 56
End: 2021-01-12

## 2021-01-12 ENCOUNTER — CLINICAL SUPPORT (OUTPATIENT)
Dept: FAMILY MEDICINE CLINIC | Facility: CLINIC | Age: 56
End: 2021-01-12

## 2021-01-12 VITALS — DIASTOLIC BLOOD PRESSURE: 78 MMHG | SYSTOLIC BLOOD PRESSURE: 142 MMHG

## 2021-01-12 DIAGNOSIS — I10 HYPERTENSION, UNSPECIFIED TYPE: Primary | ICD-10-CM

## 2021-01-12 RX ORDER — OLMESARTAN MEDOXOMIL 20 MG/1
TABLET ORAL
Qty: 90 TABLET | Refills: 0 | Status: SHIPPED | OUTPATIENT
Start: 2021-01-12 | End: 2022-01-11 | Stop reason: SDUPTHER

## 2021-01-25 ENCOUNTER — TELEPHONE (OUTPATIENT)
Dept: OBGYN CLINIC | Facility: HOSPITAL | Age: 56
End: 2021-01-25

## 2021-01-25 NOTE — TELEPHONE ENCOUNTER
Patient sees Dr Erica Moreno    Patient is calling for an updated work status note  Patient would like a call so he can pick it up in the office      Call back # [phone]

## 2021-01-26 DIAGNOSIS — S82.434D CLOSED NONDISPLACED OBLIQUE FRACTURE OF SHAFT OF RIGHT FIBULA WITH ROUTINE HEALING, SUBSEQUENT ENCOUNTER: Primary | ICD-10-CM

## 2021-01-26 DIAGNOSIS — S82.301D CLOSED EXTRA-ARTICULAR FRACTURE OF DISTAL END OF RIGHT TIBIA WITH ROUTINE HEALING, SUBSEQUENT ENCOUNTER: ICD-10-CM

## 2021-02-01 ENCOUNTER — APPOINTMENT (OUTPATIENT)
Dept: PHYSICAL THERAPY | Facility: CLINIC | Age: 56
End: 2021-02-01
Payer: COMMERCIAL

## 2021-02-04 ENCOUNTER — EVALUATION (OUTPATIENT)
Dept: PHYSICAL THERAPY | Facility: CLINIC | Age: 56
End: 2021-02-04
Payer: COMMERCIAL

## 2021-02-04 DIAGNOSIS — S82.301D CLOSED EXTRA-ARTICULAR FRACTURE OF DISTAL END OF RIGHT TIBIA WITH ROUTINE HEALING, SUBSEQUENT ENCOUNTER: ICD-10-CM

## 2021-02-04 DIAGNOSIS — S82.434D CLOSED NONDISPLACED OBLIQUE FRACTURE OF SHAFT OF RIGHT FIBULA WITH ROUTINE HEALING, SUBSEQUENT ENCOUNTER: Primary | ICD-10-CM

## 2021-02-04 PROCEDURE — 97140 MANUAL THERAPY 1/> REGIONS: CPT | Performed by: PHYSICAL THERAPIST

## 2021-02-04 PROCEDURE — 97110 THERAPEUTIC EXERCISES: CPT | Performed by: PHYSICAL THERAPIST

## 2021-02-04 PROCEDURE — 97161 PT EVAL LOW COMPLEX 20 MIN: CPT | Performed by: PHYSICAL THERAPIST

## 2021-02-04 NOTE — PROGRESS NOTES
PT Evaluation     Today's date: 2021  Patient name: Bruno Varela  : 1965  MRN: [id number]  Referring provider: Licha Rivas MD  Dx:   Encounter Diagnosis     ICD-10-CM    1  Closed nondisplaced oblique fracture of shaft of right fibula with routine healing, subsequent encounter  S82 434D    2  Closed extra-articular fracture of distal end of right tibia with routine healing, subsequent encounter  S82 301D                   Assessment  Assessment details: Bruno Varela is a 60 y o  male who presents to outpatient PT with a  Closed nondisplaced oblique fracture of shaft of right fibula with routine healing, subsequent encounter  (primary encounter diagnosis)  Closed extra-articular fracture of distal end of right tibia with routine healing, subsequent encounter  No further referral appears necessary at this time based upon examination results  Pt presents with decreased strength, ROM, balance, functional activity tolerance, and pain with movement in his right ankle,  which is  limiting his ability to perform the aforementioned functional activities  Etiologic factors include repetitive poor body mechanics  Prognosis is good given HEP compliance and PT 2-3/wk  Please contact me if you have any questions or recommendations  Thank you for the opportunity to share in  [facility] care  Impairments: abnormal coordination, abnormal gait, abnormal muscle firing, abnormal muscle tone, abnormal or restricted ROM, activity intolerance, impaired balance, impaired physical strength, lacks appropriate home exercise program, pain with function, safety issue, weight-bearing intolerance, poor posture  and poor body mechanics  Functional limitations: Difficulty with ambulation, ADL's, IADL's, Stair negotiation  Understanding of Dx/Px/POC: good   Prognosis: good    Goals  STG to be achieved in 4 weeks     1  Pt will reduce subjective pain rating by at least 50 percent the help facilitate return to PLOF  2   Pt will improve right ankle ROM by at least 10 degrees to help promote improved functional activity tolerance   3  Pt will improve right ankle MMT scores by at least 1/2 grade to promote improved functional activity tolerance       LTG to be achieved in 6-8 weeks  1  Pt will be complaint with HEP   2  Pt will improve Right ankle ROM to [facility], to help facilitate independence with ADL's, IADL's, and functional activities   3  Pt will improve Right ankle Strength to [facility] to help facilitate independence with ADL's, IADL's, and functional activities   4  Pt will have no limitations with ambulation to help facilitate independence at home and in the community  5  Pt will have no limitations with stair negotiation to help facilitate independence at home and in the community         Plan  Patient would benefit from: skilled physical therapy  Planned therapy interventions: ADL training, balance, balance/weight bearing training, flexibility, functional ROM exercises, gait training, graded activity, graded exercise, joint mobilization, manual therapy, neuromuscular re-education, patient education, postural training, strengthening, therapeutic activities, therapeutic exercise, stretching, therapeutic training and home exercise program  Frequency: 2x week  Duration in weeks: 12  Plan of Care beginning date: 2021  Plan of Care expiration date: 2021  Treatment plan discussed with: patient, PTA and referring physician        Subjective Evaluation    History of Present Illness  Mechanism of injury: The patient is a 60year old male who presents to outpatient PT with a chief complaint of right ankle pain after fracture of distal right tibia  He recently completed PT 2 months ago, of which he reports helped with the strength and ROM in his right ankle  Reports deficits with walking up and down stairs, and ambulating on uneven surfaces     Pain  No pain reported  Current pain ratin  At best pain ratin  At worst pain rating: 0    Treatments  Previous treatment: physical therapy  Current treatment: physical therapy  Patient Goals  Patient goals for therapy: increased strength, decreased pain and increased motion          Objective     Active Range of Motion     Right Ankle/Foot   Dorsiflexion (ke): 10 degrees   Plantar flexion: 45 degrees   Inversion: 20 degrees   Eversion: 10 degrees     Strength/Myotome Testing     Right Ankle/Foot   Dorsiflexion: 4  Plantar flexion: 4  Inversion: 4-  Eversion: 4-      Flowsheet Rows      Most Recent Value   PT/OT G-Codes   Current Score  44   Projected Score  66                 Manuals 2/4             Stretching to right ankle  Performed 10 min                                                    Neuro Re-Ed             SLS balance              Tandem stance             Biodex  Targets 30x each                                                                 Ther Ex 2/4             Treadmill  1 5 MPH 8 min cues for sequencing               Ankle TB 4 way              Self Gastroc  Prostretch 30''3x             HR/TR             Step Up              Leg Press              Standing Hip 3 way                           Ther Activity                                       Gait Training                                       Modalities

## 2021-02-05 ENCOUNTER — OFFICE VISIT (OUTPATIENT)
Dept: OBGYN CLINIC | Facility: CLINIC | Age: 56
End: 2021-02-05
Payer: COMMERCIAL

## 2021-02-05 VITALS — BODY MASS INDEX: 32.64 KG/M2 | WEIGHT: 228 LBS | HEIGHT: 70 IN

## 2021-02-05 DIAGNOSIS — S82.301D CLOSED EXTRA-ARTICULAR FRACTURE OF DISTAL END OF RIGHT TIBIA WITH ROUTINE HEALING, SUBSEQUENT ENCOUNTER: Primary | ICD-10-CM

## 2021-02-05 PROCEDURE — 1036F TOBACCO NON-USER: CPT | Performed by: ORTHOPAEDIC SURGERY

## 2021-02-05 PROCEDURE — 99213 OFFICE O/P EST LOW 20 MIN: CPT | Performed by: ORTHOPAEDIC SURGERY

## 2021-02-05 PROCEDURE — 3008F BODY MASS INDEX DOCD: CPT | Performed by: ORTHOPAEDIC SURGERY

## 2021-02-05 NOTE — PROGRESS NOTES
Assessment:   No diagnosis found  Plan:     Problem List Items Addressed This Visit        Musculoskeletal and Integument    Closed extra-articular fracture of distal end of right tibia - Primary    Relevant Orders    Ambulatory referral to Physical Therapy            68-year-old male presented to the office for a follow up for a healed right distal tibial shaft fracture and right proximal fibula fracture  The patient is doing well, he has significantly improved since last office visit  The patient will going physical therapy to improve range of motion  He is not ready to return to work at this time, Dr Herminio Taylor is in agreement  Patient has a work note approved until the end of the month, when he believes he will be ready to return  The patient can return to office as needed  Patient ID: Estrellita Mackenzie is a 60 y o  male  Chief Complaint:  Right ankle fracture    HPI:  60 YOM presents to office today for a follow up for a right distal tibial shaft fracture and right proximal fibula fracture  The injury occurred on 01/20/2020  The patient states that he has continued with physical therapy and has significantly improved since last office visit  He states that he has "good days and bad days" and still experiences an achy pain in his ankle and stiffness with ambulation  He also complains of frequent swelling in the lower extremity  The patient is a  and at this time does not feel ready to return to work  The patient denies numbness or tingling  He does not have any other questions or concerns today                 Allergy:  No Known Allergies    Medications:  all current active meds have been reviewed    Past Medical History:  Past Medical History:   Diagnosis Date    Foot fracture        Past Surgical History:  Past Surgical History:   Procedure Laterality Date    NO PAST SURGERIES         Family History:  Family History   Problem Relation Age of Onset   Julieann Frankel ALS Mother     Pneumonia Father  No Known Problems Sister        Social History:  Social History     Substance and Sexual Activity   Alcohol Use Yes    Frequency: 4 or more times a week    Drinks per session: 5 or 6    Binge frequency: Daily or almost daily     Social History     Substance and Sexual Activity   Drug Use Not Currently     Social History     Tobacco Use   Smoking Status Former Smoker    Types: Cigarettes    Quit date:     Years since quittin 0   Smokeless Tobacco Never Used           ROS:  Review of Systems   Constitutional: Negative for chills and fever  Respiratory: Negative for cough and shortness of breath  Cardiovascular: Negative for chest pain  Gastrointestinal: Negative for abdominal pain and vomiting  Musculoskeletal: Positive for arthralgias and joint swelling  Skin: Negative for color change and rash  All other systems reviewed and are negative  Objective:  BP Readings from Last 1 Encounters:   21 142/78      Wt Readings from Last 1 Encounters:   21 103 kg (228 lb)        BMI:   Estimated body mass index is 32 71 kg/m² as calculated from the following:    Height as of this encounter: 5' 10" (1 778 m)  Weight as of this encounter: 103 kg (228 lb)  EXAM:     Right Ankle Exam     Tenderness   The patient is experiencing no tenderness  Swelling: none    Muscle Strength   Dorsiflexion:  4/5  Plantar flexion:  4/5    Other   Erythema: absent  Sensation: normal  Pulse: present     Comments:  Good dorsiflexion, plantarflexion, eversion, and inversion  Slight antalgic gait present                Radiographs:  No x-rays taken in office today

## 2021-02-05 NOTE — PATIENT INSTRUCTIONS
78-year-old male presented to the office for a follow up for a healed right distal tibial shaft fracture and right proximal fibula fracture  The patient is doing well, he has significantly improved since last office visit  The patient will going physical therapy to improve range of motion  He is not ready to return to work at this time, Dr Michael Newman is in agreement  Patient has a work note approved until the end of the month, when he believes he will be ready to return  The patient can return to office in 3 months for follow up and to discuss returning to work

## 2021-02-09 ENCOUNTER — APPOINTMENT (OUTPATIENT)
Dept: PHYSICAL THERAPY | Facility: CLINIC | Age: 56
End: 2021-02-09
Payer: COMMERCIAL

## 2021-02-11 ENCOUNTER — OFFICE VISIT (OUTPATIENT)
Dept: PHYSICAL THERAPY | Facility: CLINIC | Age: 56
End: 2021-02-11
Payer: COMMERCIAL

## 2021-02-11 DIAGNOSIS — S82.301D CLOSED EXTRA-ARTICULAR FRACTURE OF DISTAL END OF RIGHT TIBIA WITH ROUTINE HEALING, SUBSEQUENT ENCOUNTER: ICD-10-CM

## 2021-02-11 DIAGNOSIS — S82.434D CLOSED NONDISPLACED OBLIQUE FRACTURE OF SHAFT OF RIGHT FIBULA WITH ROUTINE HEALING, SUBSEQUENT ENCOUNTER: Primary | ICD-10-CM

## 2021-02-11 PROCEDURE — 97110 THERAPEUTIC EXERCISES: CPT | Performed by: PHYSICAL THERAPIST

## 2021-02-11 PROCEDURE — 97140 MANUAL THERAPY 1/> REGIONS: CPT | Performed by: PHYSICAL THERAPIST

## 2021-02-11 NOTE — PROGRESS NOTES
Daily Note     Today's date: 2021  Patient name: Estrellita Mackenzie  : 1965  MRN: [id number]  Referring provider: Donna Recio MD  Dx:   Encounter Diagnosis     ICD-10-CM    1  Closed nondisplaced oblique fracture of shaft of right fibula with routine healing, subsequent encounter  S82 434D    2  Closed extra-articular fracture of distal end of right tibia with routine healing, subsequent encounter  S82 301D                   Subjective: " My ankle feels ok today "       Objective: See treatment diary below      Assessment: Tolerated treatment well  Patient exhibited good technique with therapeutic exercises and would benefit from continued PT   Good tolerance to initial treatment this session  Cues for proper sequencing during treadmill walking  Plan: Continue per plan of care  Manuals            Stretching to right ankle  Performed 10 min  RR 10 min                                                   Neuro Re-Ed             SLS balance              Tandem stance             Biodex  Targets 30x each  Targets 30x each                                                                Ther Ex             Treadmill  1 5 MPH 8 min cues for sequencing    1 5 MPH 10 min cues for sequencing           Ankle TB 4 way   Blue 20x           Self Gastroc  Prostretch 30''3x  Prostretch 30''3x           HR/TR  20x           Step Up   2x10            Leg Press              Standing Hip 3 way   10x each foam                         Ther Activity                                       Gait Training                                       Modalities

## 2021-02-15 ENCOUNTER — OFFICE VISIT (OUTPATIENT)
Dept: PHYSICAL THERAPY | Facility: CLINIC | Age: 56
End: 2021-02-15
Payer: COMMERCIAL

## 2021-02-15 DIAGNOSIS — S82.301D CLOSED EXTRA-ARTICULAR FRACTURE OF DISTAL END OF RIGHT TIBIA WITH ROUTINE HEALING, SUBSEQUENT ENCOUNTER: ICD-10-CM

## 2021-02-15 DIAGNOSIS — S82.434D CLOSED NONDISPLACED OBLIQUE FRACTURE OF SHAFT OF RIGHT FIBULA WITH ROUTINE HEALING, SUBSEQUENT ENCOUNTER: Primary | ICD-10-CM

## 2021-02-15 PROCEDURE — 97140 MANUAL THERAPY 1/> REGIONS: CPT | Performed by: PHYSICAL THERAPIST

## 2021-02-15 PROCEDURE — 97112 NEUROMUSCULAR REEDUCATION: CPT | Performed by: PHYSICAL THERAPIST

## 2021-02-15 PROCEDURE — 97110 THERAPEUTIC EXERCISES: CPT | Performed by: PHYSICAL THERAPIST

## 2021-02-15 NOTE — PROGRESS NOTES
Daily Note     Today's date: 2/15/2021  Patient name: Oj Campa  : 1965  MRN: [id number]  Referring provider: Roberto Carlos Long MD  Dx:   Encounter Diagnosis     ICD-10-CM    1  Closed nondisplaced oblique fracture of shaft of right fibula with routine healing, subsequent encounter  S82 434D    2  Closed extra-articular fracture of distal end of right tibia with routine healing, subsequent encounter  S82 301D                   Subjective: Patient offers no new complaints today  Saw ortho last week and she wants him to continue PT  Objective: See treatment diary below      Assessment: Tolerated treatment well  Limited functional ROM, challenged with biodex inversion/eversion ROM  Patient demonstrated fatigue post treatment, exhibited good technique with therapeutic exercises and would benefit from continued PT      Plan: Continue per plan of care  Progress treatment as tolerated  Manuals 2/4  2/11 2/15     Stretching to right ankle  Performed 10 min  RR 10 min  KG x15 mins                             Neuro Re-Ed        SLS balance         Tandem stance        Biodex  Targets 30x each  Targets 30x each  L1 2 mins ea PF/DF, "U"                                     Ther Ex 2/4  2/11  2/15     Treadmill  1 5 MPH 8 min cues for sequencing    1 5 MPH 10 min cues for sequencing 1 5 MPH 10 min cues for sequencing     Ankle TB 4 way   Blue 20x Blue 20x     Self Gastroc  Prostretch 30''3x  Prostretch 30''3x Prostretch 30''3x     HR/TR  20x 20x     Step Up   2x10  2x10     Leg Press         Standing Hip 3 way   10x each foam  10x each foam              Ther Activity                        Gait Training                        Modalities

## 2021-02-22 ENCOUNTER — APPOINTMENT (OUTPATIENT)
Dept: PHYSICAL THERAPY | Facility: CLINIC | Age: 56
End: 2021-02-22
Payer: COMMERCIAL

## 2021-02-23 ENCOUNTER — OFFICE VISIT (OUTPATIENT)
Dept: PHYSICAL THERAPY | Facility: CLINIC | Age: 56
End: 2021-02-23
Payer: COMMERCIAL

## 2021-02-23 DIAGNOSIS — S82.301D CLOSED EXTRA-ARTICULAR FRACTURE OF DISTAL END OF RIGHT TIBIA WITH ROUTINE HEALING, SUBSEQUENT ENCOUNTER: ICD-10-CM

## 2021-02-23 DIAGNOSIS — S82.434D CLOSED NONDISPLACED OBLIQUE FRACTURE OF SHAFT OF RIGHT FIBULA WITH ROUTINE HEALING, SUBSEQUENT ENCOUNTER: Primary | ICD-10-CM

## 2021-02-23 PROCEDURE — 97110 THERAPEUTIC EXERCISES: CPT | Performed by: PHYSICAL THERAPIST

## 2021-02-23 PROCEDURE — 97112 NEUROMUSCULAR REEDUCATION: CPT | Performed by: PHYSICAL THERAPIST

## 2021-02-23 PROCEDURE — 97140 MANUAL THERAPY 1/> REGIONS: CPT | Performed by: PHYSICAL THERAPIST

## 2021-02-23 NOTE — PROGRESS NOTES
Daily Note     Today's date: 2021  Patient name: Pili Jin  : 1965  MRN: [id number]  Referring provider: Cira Gomez MD  Dx:   Encounter Diagnosis     ICD-10-CM    1  Closed nondisplaced oblique fracture of shaft of right fibula with routine healing, subsequent encounter  S82 434D    2  Closed extra-articular fracture of distal end of right tibia with routine healing, subsequent encounter  S82 301D                   Subjective: "it's alright"      Objective: See treatment diary below      Assessment: Tolerated treatment well  PREs progressed this visit with pt noting increased difficulty with exercises  Decreased R ankle eversion ROM, requires increased cues during ankle 4 ways exercise to prevent compensation of tibial external rotation  Patient demonstrated fatigue post treatment and would benefit from continued PT  Plan: Continue per plan of care  Progress treatment as tolerated  Manuals 2/4  2/11 2/15 2/23    Stretching to right ankle  Performed 10 min  RR 10 min  KG x15 mins BE/RR   10 min                            Neuro Re-Ed        SLS balance         Tandem stance    L10 2x30 sec ea     Biodex  Targets 30x each  Targets 30x each  L1 2 mins ea PF/DF, "U" L1 30x each  DF/PF, "U"                                    Ther Ex 2/4  2/11  2/15 2/22    Treadmill  1 5 MPH 8 min cues for sequencing    1 5 MPH 10 min cues for sequencing 1 5 MPH 10 min cues for sequencing 1 5 MPH 10 min, no cues required for sequencing this session    Ankle TB 4 way   Blue 20x Blue 20x Blue 20x    Self Gastroc  Prostretch 30''3x  Prostretch 30''3x Prostretch 30''3x     HR/TR  20x 20x 20x on foam     Step Up   2x10  2x10 2x10 B step with L knee hike    Leg Press     80# 2x10    Standing Hip 3 way   10x each foam  10x each foam  1# 10x ea direction  on foam     Mini Squats     2x10    Lunges                 Ther Activity                        Gait Training                        Modalities

## 2021-02-25 ENCOUNTER — OFFICE VISIT (OUTPATIENT)
Dept: PHYSICAL THERAPY | Facility: CLINIC | Age: 56
End: 2021-02-25
Payer: COMMERCIAL

## 2021-02-25 DIAGNOSIS — S82.301D CLOSED EXTRA-ARTICULAR FRACTURE OF DISTAL END OF RIGHT TIBIA WITH ROUTINE HEALING, SUBSEQUENT ENCOUNTER: ICD-10-CM

## 2021-02-25 DIAGNOSIS — S82.434D CLOSED NONDISPLACED OBLIQUE FRACTURE OF SHAFT OF RIGHT FIBULA WITH ROUTINE HEALING, SUBSEQUENT ENCOUNTER: Primary | ICD-10-CM

## 2021-02-25 PROCEDURE — 97112 NEUROMUSCULAR REEDUCATION: CPT | Performed by: PHYSICAL THERAPIST

## 2021-02-25 PROCEDURE — 97110 THERAPEUTIC EXERCISES: CPT | Performed by: PHYSICAL THERAPIST

## 2021-02-25 PROCEDURE — 97140 MANUAL THERAPY 1/> REGIONS: CPT | Performed by: PHYSICAL THERAPIST

## 2021-02-25 NOTE — PROGRESS NOTES
Daily Note     Today's date: 2021  Patient name: Acosta Franks  : 1965  MRN: [id number]  Referring provider: Oleg Powers MD  Dx:   Encounter Diagnosis     ICD-10-CM    1  Closed nondisplaced oblique fracture of shaft of right fibula with routine healing, subsequent encounter  S82 434D    2  Closed extra-articular fracture of distal end of right tibia with routine healing, subsequent encounter  S82 301D                   Subjective: " I am doing good "       Objective: See treatment diary below      Assessment: Tolerated treatment well  Patient exhibited good technique with therapeutic exercises and would benefit from continued PT      Plan: Continue per plan of care  Manuals 2/4  2/11 2/15 2/23 2/25   Stretching to right ankle  Performed 10 min  RR 10 min  KG x15 mins BE/RR   10 min RR  10 min                             Neuro Re-Ed        SLS balance         Tandem stance    L10 2x30 sec ea  L10 2x30 seconds each    Biodex  Targets 30x each  Targets 30x each  L1 2 mins ea PF/DF, "U" L1 30x each  DF/PF, "U" L1 30x each DF/PF "U"                                    Ther Ex 2/4  2/11  2/15 2/22 2/26    Treadmill  1 5 MPH 8 min cues for sequencing    1 5 MPH 10 min cues for sequencing 1 5 MPH 10 min cues for sequencing 1 5 MPH 10 min, no cues required for sequencing this session 1 5 MPH 10 min, no cues required for sequencing this session   Ankle TB 4 way   Blue 20x Blue 20x Blue 20x Blue 20x each    Self Gastroc  Prostretch 30''3x  Prostretch 30''3x Prostretch 30''3x  Prostretch 30''3x   HR/TR  20x 20x 20x on foam  20x on foam    Step Up   2x10  2x10 2x10 B step with L knee hike 2x10 B Step with Left knee    Leg Press     80# 2x10 80# 2x10    Standing Hip 3 way   10x each foam  10x each foam  1# 10x ea direction  on foam  1# 10x each direction on foam    Mini Squats     2x10 2x10    Lunges                 Ther Activity                        Gait Training                        Modalities

## 2021-03-01 ENCOUNTER — TELEPHONE (OUTPATIENT)
Dept: OBGYN CLINIC | Facility: HOSPITAL | Age: 56
End: 2021-03-01

## 2021-03-01 NOTE — TELEPHONE ENCOUNTER
Patient sees Dr Keaton Veliz  Patient is calling to request and updated letter stating is is still to remain out of work        Fax to: [phone] Attn: Sonny Piña

## 2021-03-02 ENCOUNTER — OFFICE VISIT (OUTPATIENT)
Dept: PHYSICAL THERAPY | Facility: CLINIC | Age: 56
End: 2021-03-02
Payer: COMMERCIAL

## 2021-03-02 DIAGNOSIS — S82.301D CLOSED EXTRA-ARTICULAR FRACTURE OF DISTAL END OF RIGHT TIBIA WITH ROUTINE HEALING, SUBSEQUENT ENCOUNTER: ICD-10-CM

## 2021-03-02 DIAGNOSIS — S82.434D CLOSED NONDISPLACED OBLIQUE FRACTURE OF SHAFT OF RIGHT FIBULA WITH ROUTINE HEALING, SUBSEQUENT ENCOUNTER: Primary | ICD-10-CM

## 2021-03-02 PROCEDURE — 97112 NEUROMUSCULAR REEDUCATION: CPT

## 2021-03-02 PROCEDURE — 97140 MANUAL THERAPY 1/> REGIONS: CPT

## 2021-03-02 PROCEDURE — 97110 THERAPEUTIC EXERCISES: CPT

## 2021-03-02 NOTE — PROGRESS NOTES
Daily Note     Today's date: 3/2/2021  Patient name: Estrellita Mackenzie  : 1965  MRN: [id number]  Referring provider: Donna Recio MD  Dx:   Encounter Diagnosis     ICD-10-CM    1  Closed nondisplaced oblique fracture of shaft of right fibula with routine healing, subsequent encounter  S82 434D    2  Closed extra-articular fracture of distal end of right tibia with routine healing, subsequent encounter  S82 301D                   Subjective: Pt has no new c/o  Objective: See treatment diary below      Assessment: Tolerated treatment well  Patient exhibited good technique with therapeutic exercises and would benefit from continued PT      Plan: Continue per plan of care            Manuals 2/11 2/15 2/23 2/25 3   Stretching to right ankle  RR 10 min  KG x15 mins BE/RR   10 min RR  10 min  MJC  10 min                            Neuro Re-Ed        SLS balance         Tandem stance   L10 2x30 sec ea  L10 2x30 seconds each  L10 2x30"ea   Biodex  Targets 30x each  L1 2 mins ea PF/DF, "U" L1 30x each  DF/PF, "U" L1 30x each DF/PF "U"  L1 x30  ea PF/DF  INV/EV                                   Ther Ex 2/11  2/15 2/22 2/26  3/   Treadmill  1 5 MPH 10 min cues for sequencing 1 5 MPH 10 min cues for sequencing 1 5 MPH 10 min, no cues required for sequencing this session 1 5 MPH 10 min, no cues1 required for sequencing this session 1 5 MPH 10 min, no cues1 required for sequencing this session   Ankle TB 4 way  Blue 20x Blue 20x Blue 20x Blue 20x each  Black  x20   Self Gastroc  Prostretch 30''3x Prostretch 30''3x  Prostretch 30''3x prostretch   HR/TR 20x 20x 20x on foam  20x on foam  x20 ea on foam     Step Up  2x10  2x10 2x10 B step with L knee hike 2x10 B Step with Left knee  1# 2x10 B Step with Left knee    Leg Press    80# 2x10 80# 2x10  80# 2x10   Standing Hip 3 way  10x each foam  10x each foam  1# 10x ea direction  on foam  1# 10x each direction on foam  1# 10x each direction on foam    Mini Squats    2x10 2x10     Lunges                 Ther Activity                        Gait Training                        Modalities

## 2021-03-04 ENCOUNTER — OFFICE VISIT (OUTPATIENT)
Dept: PHYSICAL THERAPY | Facility: CLINIC | Age: 56
End: 2021-03-04
Payer: COMMERCIAL

## 2021-03-04 DIAGNOSIS — S82.434D CLOSED NONDISPLACED OBLIQUE FRACTURE OF SHAFT OF RIGHT FIBULA WITH ROUTINE HEALING, SUBSEQUENT ENCOUNTER: Primary | ICD-10-CM

## 2021-03-04 DIAGNOSIS — S82.301D CLOSED EXTRA-ARTICULAR FRACTURE OF DISTAL END OF RIGHT TIBIA WITH ROUTINE HEALING, SUBSEQUENT ENCOUNTER: ICD-10-CM

## 2021-03-04 PROCEDURE — 97140 MANUAL THERAPY 1/> REGIONS: CPT

## 2021-03-04 PROCEDURE — 97112 NEUROMUSCULAR REEDUCATION: CPT

## 2021-03-04 PROCEDURE — 97110 THERAPEUTIC EXERCISES: CPT

## 2021-03-04 NOTE — PROGRESS NOTES
Daily Note     Today's date: 3/4/2021  Patient name: Anais Harp  : 1965  MRN: [id number]  Referring provider: Danie Pillai MD  Dx:   Encounter Diagnosis     ICD-10-CM    1  Closed nondisplaced oblique fracture of shaft of right fibula with routine healing, subsequent encounter  S82 434D    2  Closed extra-articular fracture of distal end of right tibia with routine healing, subsequent encounter  S82 301D                   Subjective: Pt reports his R ankle is feeling ok today  States he still has difficulty going down stairs  Objective: See treatment diary below      Assessment: Tolerated treatment well  Added step downs to program today  Patient exhibited good technique with therapeutic exercises and would benefit from continued PT      Plan: Continue per plan of care            Manuals 2/15 2/23 2/25 3/2 3   Stretching to right ankle  KG x15 mins BE/RR   10 min RR  10 min  MJC  10 min MJC  10min                            Neuro Re-Ed        SLS balance         Tandem stance  L10 2x30 sec ea  L10 2x30 seconds each  L10 2x30"ea L 10  30" x2 ea   Biodex  L1 2 mins ea PF/DF, "U" L1 30x each  DF/PF, "U" L1 30x each DF/PF "U"  L1 x30  ea PF/DF  INV/EV L1 x30 ea  PF/DF  INV/EV                                   Ther Ex 2/15 2/22 2/26  3/2 3/   Treadmill  1 5 MPH 10 min cues for sequencing 1 5 MPH 10 min, no cues required for sequencing this session 1 5 MPH 10 min, no cues1 required for sequencing this session 1 5 MPH 10 min, no cues1 required for sequencing this session 1 5 mph  x10 min   Ankle TB 4 way  Blue 20x Blue 20x Blue 20x each  Black  x20 Black  x20   Self Gastroc  Prostretch 30''3x  Prostretch 30''3x Prostretch  30" x3 Prostretch  30" x3   HR/TR 20x 20x on foam  20x on foam  x20 ea on foam   20 ea on foam     Step Up  2x10 2x10 B step with L knee hike 2x10 B Step with Left knee  1# 2x10 B Step with Left knee  2# "B" 2x10   Leg Press   80# 2x10 80# 2x10  80# 2x10 90# 2x10   Standing Hip 3 way  10x each foam  1# 10x ea direction  on foam  1# 10x each direction on foam  1# 10x each direction on foam  2# x10 ea  Direction  On foam   Mini Squats   2x10 2x10      Lunges         Step downs     "A" x10 step down with L           Ther Activity                        Gait Training                        Modalities

## 2021-03-09 ENCOUNTER — EVALUATION (OUTPATIENT)
Dept: PHYSICAL THERAPY | Facility: CLINIC | Age: 56
End: 2021-03-09
Payer: COMMERCIAL

## 2021-03-09 DIAGNOSIS — S82.301D CLOSED EXTRA-ARTICULAR FRACTURE OF DISTAL END OF RIGHT TIBIA WITH ROUTINE HEALING, SUBSEQUENT ENCOUNTER: ICD-10-CM

## 2021-03-09 DIAGNOSIS — S82.434D CLOSED NONDISPLACED OBLIQUE FRACTURE OF SHAFT OF RIGHT FIBULA WITH ROUTINE HEALING, SUBSEQUENT ENCOUNTER: Primary | ICD-10-CM

## 2021-03-09 PROCEDURE — 97140 MANUAL THERAPY 1/> REGIONS: CPT | Performed by: PHYSICAL THERAPIST

## 2021-03-09 PROCEDURE — 97112 NEUROMUSCULAR REEDUCATION: CPT | Performed by: PHYSICAL THERAPIST

## 2021-03-09 PROCEDURE — 97110 THERAPEUTIC EXERCISES: CPT | Performed by: PHYSICAL THERAPIST

## 2021-03-09 NOTE — PROGRESS NOTES
PT Progress Note   Today's date: 3/9/2021  Patient name: Joyce Douglas   : 1965  MRN: [id number]  Referring provider: Lesa Quiñonez MD  Dx:   Encounter Diagnosis     ICD-10-CM    1  Closed nondisplaced oblique fracture of shaft of right fibula with routine healing, subsequent encounter  S82 434D    2  Closed extra-articular fracture of distal end of right tibia with routine healing, subsequent encounter  S82 301D                 Assessment  Assessment details: Joyce Douglas is a 60 y o  male who presents to outpatient PT with a  Closed nondisplaced oblique fracture of shaft of right fibula with routine healing, subsequent encounter  (primary encounter diagnosis)  Closed extra-articular fracture of distal end of right tibia with routine healing, subsequent encounter  No further referral appears necessary at this time based upon examination results  Pt presents with decreased strength, ROM, balance, functional activity tolerance, and pain with movement in his right ankle,  which is  limiting his ability to perform the aforementioned functional activities  Etiologic factors include repetitive poor body mechanics  Prognosis is good given HEP compliance and PT 2-3/wk  Please contact me if you have any questions or recommendations  Thank you for the opportunity to share in  [facility]  RA 3/9     Joyce Douglas has demonstrated decreased pain, increased strength, increased range of motion, and increased activity tolerance since starting physical therapy services  He reports  an overall improvement of 65% thus far  He continues to present with pain, decreased strength, decreased range of motion, and decreased activity tolerance and would benefit from additional skilled physical therapy interventions to address impairments and maximize function          Impairments: abnormal coordination, abnormal gait, abnormal muscle firing, abnormal muscle tone, abnormal or restricted ROM, activity intolerance, impaired balance, impaired physical strength, lacks appropriate home exercise program, pain with function, safety issue, weight-bearing intolerance, poor posture  and poor body mechanics  Functional limitations: Difficulty with ambulation, ADL's, IADL's, Stair negotiation  Understanding of Dx/Px/POC: good   Prognosis: good    Goals  STG to be achieved in 4 weeks     1  Pt will reduce subjective pain rating by at least 50 percent the help facilitate return to PLOF  Met   2  Pt will improve right ankle ROM by at least 10 degrees to help promote improved functional activity tolerance   Met   3  Pt will improve right ankle MMT scores by at least 1/2 grade to promote improved functional activity tolerance   Met       LTG to be achieved in 6-8 weeks  1  Pt will be complaint with HEP   Met   2  Pt will improve Right ankle ROM to [facility], to help facilitate independence with ADL's, IADL's, and functional activities   Met   3  Pt will improve Right ankle Strength to [facility] to help facilitate independence with ADL's, IADL's, and functional activities  Progressing     4  Pt will have no limitations with ambulation to help facilitate independence at home and in the community  Progressing   5   Pt will have no limitations with stair negotiation to help facilitate independence at home and in the community   Progressing         Plan  Patient would benefit from: skilled physical therapy  Planned therapy interventions: ADL training, balance, balance/weight bearing training, flexibility, functional ROM exercises, gait training, graded activity, graded exercise, joint mobilization, manual therapy, neuromuscular re-education, patient education, postural training, strengthening, therapeutic activities, therapeutic exercise, stretching, therapeutic training and home exercise program  Frequency: 2x week  Duration in weeks: 12  Plan of Care beginning date: 2/4/2021  Plan of Care expiration date: 5/4/2021  Treatment plan discussed with: patient, PTA and referring physician        Subjective Evaluation    History of Present Illness  Mechanism of injury: The patient is a 60year old male who presents to outpatient PT with a chief complaint of right ankle pain after fracture of distal right tibia  He recently completed PT 2 months ago, of which he reports helped with the strength and ROM in his right ankle  Reports deficits with walking up and down stairs, and ambulating on uneven surfaces  RA 3/9   The patient reports an improvement of 65 percent since beginning skilled PT  Reports right ankle is feeling good over all  Largest deficit is descending stairs     Pain     RA 3/9   No pain reported  Current pain ratin    0  At best pain ratin    0  At worst pain ratin    0    Treatments  Previous treatment: physical therapy  Current treatment: physical therapy  Patient Goals  Patient goals for therapy: increased strength, decreased pain and increased motion          Objective     Active Range of Motion     Right Ankle/Foot     RA 3/9   Dorsiflexion (ke): 10 degrees    10  Deg   Plantar flexion: 45 degrees    55  Deg  Inversion: 20 degrees     35  Deg   Eversion: 10 degrees     15  Deg     Strength/Myotome Testing     Right Ankle/Foot   Dorsiflexion: 4     4+  Plantar flexion: 4    4+  Inversion: 4-     4  Eversion: 4-     4           Manuals 3/9 2/23 2/25 3/2 3   Stretching to right ankle  RR x15 mins BE/RR   10 min RR  10 min  MJC  10 min MJC  10min                            Neuro Re-Ed        SLS balance  3/9       Tandem stance L10 2x30 seconds  L10 2x30 sec ea  L10 2x30 seconds each  L10 2x30"ea L 10  30" x2 ea   Biodex  L1 2 mins ea PF/DF, "U" L1 30x each  DF/PF, "U" L1 30x each DF/PF "U"  L1 x30  ea PF/DF  INV/EV L1 x30 ea  PF/DF  INV/EV                                   Ther Ex 3/9  2/22 2/26  3/2 3   Treadmill  1 5 MPH 10 min cues for sequencing 1 5 MPH 10 min, no cues required for sequencing this session 1 5 MPH 10 min, no cues1 required for sequencing this session 1 5 MPH 10 min, no cues1 required for sequencing this session 1 5 mph  x10 min   Ankle TB 4 way  Blue 20x Blue 20x Blue 20x each  Black  x20 Black  x20   Self Gastroc  Prostretch 30''3x  Prostretch 30''3x Prostretch  30" x3 Prostretch  30" x3   HR/TR 20x on foam  20x on foam  20x on foam  x20 ea on foam   20 ea on foam     Step Up  2x10 B step  2x10 B step with L knee hike 2x10 B Step with Left knee  1# 2x10 B Step with Left knee  2# "B" 2x10   Leg Press   80# 2x10 80# 2x10  80# 2x10 90# 2x10   Standing Hip 3 way  2# x10 ea  Direction  On foam 1# 10x ea direction  on foam  1# 10x each direction on foam  1# 10x each direction on foam  2# x10 ea  Direction  On foam   Mini Squats   2x10 2x10      Lunges         Step downs A x 10 step down with LLE     "A" x10 step down with L           Ther Activity                        Gait Training                        Modalities

## 2021-03-12 ENCOUNTER — OFFICE VISIT (OUTPATIENT)
Dept: PHYSICAL THERAPY | Facility: CLINIC | Age: 56
End: 2021-03-12
Payer: COMMERCIAL

## 2021-03-12 DIAGNOSIS — S82.301D CLOSED EXTRA-ARTICULAR FRACTURE OF DISTAL END OF RIGHT TIBIA WITH ROUTINE HEALING, SUBSEQUENT ENCOUNTER: ICD-10-CM

## 2021-03-12 DIAGNOSIS — S82.434D CLOSED NONDISPLACED OBLIQUE FRACTURE OF SHAFT OF RIGHT FIBULA WITH ROUTINE HEALING, SUBSEQUENT ENCOUNTER: Primary | ICD-10-CM

## 2021-03-12 PROCEDURE — 97110 THERAPEUTIC EXERCISES: CPT | Performed by: PHYSICAL THERAPIST

## 2021-03-12 PROCEDURE — 97140 MANUAL THERAPY 1/> REGIONS: CPT | Performed by: PHYSICAL THERAPIST

## 2021-03-12 PROCEDURE — 97112 NEUROMUSCULAR REEDUCATION: CPT | Performed by: PHYSICAL THERAPIST

## 2021-03-12 NOTE — PROGRESS NOTES
Daily Note     Today's date: 3/12/2021  Patient name: Anais Harp  : 1965  MRN: [id number]  Referring provider: Danie Pillai MD  Dx:   Encounter Diagnosis     ICD-10-CM    1  Closed nondisplaced oblique fracture of shaft of right fibula with routine healing, subsequent encounter  S82 434D    2  Closed extra-articular fracture of distal end of right tibia with routine healing, subsequent encounter  S82 301D                   Subjective: Patient reports that his "ankle feels okay"      Objective: See treatment diary below      Assessment: Tolerated treatment well  Lunges on bosu added this visit to challenge R ankle stability  Patient with moderate difficulty completing, required occasional UE support on parallel bars  Patient demonstrated fatigue post treatment and would benefit from continued PT  Plan: Continue per plan of care            Manuals 3/9 3/12 2/25 3/2 3   Stretching to right ankle  RR x15 mins BE/RR  10 min RR  10 min  MJC  10 min MJC  10min                            Neuro Re-Ed        SLS balance         Tandem stance L10 2x30 seconds  L10 2x30" ea  L10 2x30 seconds each  L10 2x30"ea L 10  30" x2 ea   Biodex  L1 2 mins ea PF/DF, "U" L1 x30 ea df/pf, inv/ev  L1 30x each DF/PF "U"  L1 x30  ea PF/DF  INV/EV L1 x30 ea  PF/DF  INV/EV   Bosu Lunges  2x10 with R LE                               Ther Ex 3/9  3/12 2/26  3/2 3/   Treadmill  1 5 MPH 10 min cues for sequencing 2 0 MPH   x10 min  1 5 MPH 10 min, no cues1 required for sequencing this session 1 5 MPH 10 min, no cues1 required for sequencing this session 1 5 mph  x10 min   Ankle TB 4 way  Blue 20x Black x20  Blue 20x each  Black  x20 Black  x20   Self Gastroc  Prostretch 30''3x Prostretch   30" x3  Prostretch 30''3x Prostretch  30" x3 Prostretch  30" x3   HR/TR 20x on foam  20x on foam  20x on foam  x20 ea on foam   20 ea on foam     Step Up  2x10 B step  2# "B" 2x10ea  2x10 B Step with Left knee  1# 2x10 B Step with Left knee 2# "B" 2x10   Leg Press   100# 3x10  80# 2x10  80# 2x10 90# 2x10   TR/HR Leg Press   80# 2x10      Standing Hip 3 way  2# x10 ea  Direction  On foam 2# x10 ea direction on foam  1# 10x each direction on foam  1# 10x each direction on foam  2# x10 ea  Direction  On foam   Mini Squats    2x10      Step downs A x 10 step down with LLE  "A" x10 with L LE    "A" x10 step down with L           Ther Activity                        Gait Training                        Modalities

## 2021-03-16 ENCOUNTER — OFFICE VISIT (OUTPATIENT)
Dept: PHYSICAL THERAPY | Facility: CLINIC | Age: 56
End: 2021-03-16
Payer: COMMERCIAL

## 2021-03-16 DIAGNOSIS — S82.434D CLOSED NONDISPLACED OBLIQUE FRACTURE OF SHAFT OF RIGHT FIBULA WITH ROUTINE HEALING, SUBSEQUENT ENCOUNTER: Primary | ICD-10-CM

## 2021-03-16 DIAGNOSIS — S82.301D CLOSED EXTRA-ARTICULAR FRACTURE OF DISTAL END OF RIGHT TIBIA WITH ROUTINE HEALING, SUBSEQUENT ENCOUNTER: ICD-10-CM

## 2021-03-16 PROCEDURE — 97112 NEUROMUSCULAR REEDUCATION: CPT | Performed by: PHYSICAL THERAPIST

## 2021-03-16 PROCEDURE — 97110 THERAPEUTIC EXERCISES: CPT | Performed by: PHYSICAL THERAPIST

## 2021-03-16 PROCEDURE — 97140 MANUAL THERAPY 1/> REGIONS: CPT | Performed by: PHYSICAL THERAPIST

## 2021-03-16 NOTE — PROGRESS NOTES
Daily Note     Today's date: 3/16/2021  Patient name: Rikki Tran  : 1965  MRN: [id number]  Referring provider: Buzz Beebe MD  Dx:   Encounter Diagnosis     ICD-10-CM    1  Closed nondisplaced oblique fracture of shaft of right fibula with routine healing, subsequent encounter  S82 434D    2  Closed extra-articular fracture of distal end of right tibia with routine healing, subsequent encounter  S82 301D                   Subjective: Patient reports that his ankle feels sore today  Objective: See treatment diary below      Assessment: Tolerated treatment well  Patient with increased difficulty completing tandem stance with R LE behind as well as R SLS  Patient exhibited good technique with therapeutic exercises and would benefit from continued PT  Plan: Continue per plan of care            Manuals 3/9 3/12 3/16 3/2 3/4   Stretching to right ankle  RR x15 mins BE/RR  10 min BE/RR  10 min MJC  10 min MJC  10min                            Neuro Re-Ed        SLS balance    R LE 30"x2     Tandem stance L10 2x30 seconds  L10 2x30" ea  L 10 2x30" ea  L10 2x30"ea L 10  30" x2 ea   Biodex  L1 2 mins ea PF/DF, "U" L1 x30 ea df/pf, inv/ev  L1 x30 ea   Df/pf, inv/ev L1 x30  ea PF/DF  INV/EV L1 x30 ea  PF/DF  INV/EV   Bosu Lunges  2x10 with R LE  2x10 with R LE                             Ther Ex 3/9  3/12 3/16 32 3/4   Treadmill  1 5 MPH 10 min cues for sequencing 2 0 MPH   x10 min  2 0 mph   x10 min 1 5 MPH 10 min, no cues1 required for sequencing this session 1 5 mph  x10 min   Ankle TB 4 way  Blue 20x Black x20  Black x20  Black  x20 Black  x20   Self Gastroc  Prostretch 30''3x Prostretch   30" x3  Prostretch 30"x3 Prostretch  30" x3 Prostretch  30" x3   HR/TR 20x on foam  20x on foam  20x on foam x20 ea on foam   20 ea on foam     Step Up  2x10 B step  2# "B" 2x10ea  2# "B" 2x10 ea 1# 2x10 B Step with Left knee  2# "B" 2x10   Leg Press   100# 3x10  100# 3x10  80# 2x10 90# 2x10   TR/HR Leg Press 80# 2x10 80# 2x10     Standing Hip 3 way  2# x10 ea  Direction  On foam 2# x10 ea direction on foam  2# 3x10 ea direction on foam  1# 10x each direction on foam  2# x10 ea  Direction  On foam   Mini Squats         Step downs A x 10 step down with LLE  "A" x10 with L LE  "A" 2x10 step down with L LE   "A" x10 step down with L           Ther Activity                        Gait Training                        Modalities

## 2021-03-18 ENCOUNTER — OFFICE VISIT (OUTPATIENT)
Dept: PHYSICAL THERAPY | Facility: CLINIC | Age: 56
End: 2021-03-18
Payer: COMMERCIAL

## 2021-03-18 DIAGNOSIS — S82.301D CLOSED EXTRA-ARTICULAR FRACTURE OF DISTAL END OF RIGHT TIBIA WITH ROUTINE HEALING, SUBSEQUENT ENCOUNTER: ICD-10-CM

## 2021-03-18 DIAGNOSIS — S82.434D CLOSED NONDISPLACED OBLIQUE FRACTURE OF SHAFT OF RIGHT FIBULA WITH ROUTINE HEALING, SUBSEQUENT ENCOUNTER: Primary | ICD-10-CM

## 2021-03-18 PROCEDURE — 97112 NEUROMUSCULAR REEDUCATION: CPT

## 2021-03-18 PROCEDURE — 97110 THERAPEUTIC EXERCISES: CPT

## 2021-03-18 PROCEDURE — 97140 MANUAL THERAPY 1/> REGIONS: CPT

## 2021-03-18 NOTE — PROGRESS NOTES
Daily Note     Today's date: 3/18/2021  Patient name: Ruth Aguero  : 1965  MRN: [id number]  Referring provider: Abraham James MD  Dx:   Encounter Diagnosis     ICD-10-CM    1  Closed nondisplaced oblique fracture of shaft of right fibula with routine healing, subsequent encounter  S82 434D    2  Closed extra-articular fracture of distal end of right tibia with routine healing, subsequent encounter  S82 301D        Start Time: 1500          Subjective: Pt reports R ankle is feeling ok  States stairs are getting a little easier now  Objective: See treatment diary below      Assessment: Tolerated treatment well  Progressed with step height on step downs today, stepping down as tolerated  Patient demonstrated fatigue post treatment, exhibited good technique with therapeutic exercises and would benefit from continued PT      Plan: Continue per plan of care            Manuals 3/9 3/12 3/16 3/18 34   Stretching to right ankle  RR x15 mins BE/RR  10 min BE/RR  10 min MJC  10 min MJC  10min                            Neuro Re-Ed        SLS balance    R LE 30"x2 Static R LE  30" x2    Tandem stance L10 2x30 seconds  L10 2x30" ea  L 10 2x30" ea  L 10 30" x2 ea L 10  30" x2 ea   Biodex  L1 2 mins ea PF/DF, "U" L1 x30 ea df/pf, inv/ev  L1 x30 ea   Df/pf, inv/ev L1 x30 ea  DF/PF  INV,EV L1 x30 ea  PF/DF  INV/EV   Bosu Lunges  2x10 with R LE  2x10 with R LE 2x10 with R LE                            Ther Ex 3/9  3/12 3/16 3/18 3/4   Treadmill  1 5 MPH 10 min cues for sequencing 2 0 MPH   x10 min  2 0 mph   x10 min 2 0 mph  x10 min 1 5 mph  x10 min   Ankle TB 4 way  Blue 20x Black x20  Black x20   Black  x20   Self Gastroc  Prostretch 30''3x Prostretch   30" x3  Prostretch 30"x3 Prostretch  30" x3  Prostretch  30" x3   HR/TR 20x on foam  20x on foam  20x on foam  20 ea on foam     Step Up  2x10 B step  2# "B" 2x10ea  2# "B" 2x10 ea 3# "B" 2x10 ea 2# "B" 2x10   Leg Press   100# 3x10  100# 3x10  100# 3x10 90# 2x10 TR/HR Leg Press   80# 2x10 80# 2x10 80# 3x10    Standing Hip 3 way  2# x10 ea  Direction  On foam 2# x10 ea direction on foam  2# 3x10 ea direction on foam  3# 2 x10 ea dir   On foam 2# x10 ea  Direction  On foam   Mini Squats         Step downs A x 10 step down with LLE  "A" x10 with L LE  "A" 2x10 step down with L LE  "B" x15 step   Down with L LE "A" x10 step down with L           Ther Activity                        Gait Training                        Modalities

## 2021-03-23 ENCOUNTER — APPOINTMENT (OUTPATIENT)
Dept: PHYSICAL THERAPY | Facility: CLINIC | Age: 56
End: 2021-03-23
Payer: COMMERCIAL

## 2021-03-25 ENCOUNTER — OFFICE VISIT (OUTPATIENT)
Dept: PHYSICAL THERAPY | Facility: CLINIC | Age: 56
End: 2021-03-25
Payer: COMMERCIAL

## 2021-03-25 DIAGNOSIS — S82.434D CLOSED NONDISPLACED OBLIQUE FRACTURE OF SHAFT OF RIGHT FIBULA WITH ROUTINE HEALING, SUBSEQUENT ENCOUNTER: Primary | ICD-10-CM

## 2021-03-25 DIAGNOSIS — S82.301D CLOSED EXTRA-ARTICULAR FRACTURE OF DISTAL END OF RIGHT TIBIA WITH ROUTINE HEALING, SUBSEQUENT ENCOUNTER: ICD-10-CM

## 2021-03-25 PROCEDURE — 97112 NEUROMUSCULAR REEDUCATION: CPT

## 2021-03-25 PROCEDURE — 97110 THERAPEUTIC EXERCISES: CPT

## 2021-03-25 NOTE — PROGRESS NOTES
Daily Note     Today's date: 3/25/2021  Patient name: Karlie Hurley  : 1965  MRN: [id number]  Referring provider: David Winston MD  Dx:   Encounter Diagnosis     ICD-10-CM    1  Closed nondisplaced oblique fracture of shaft of right fibula with routine healing, subsequent encounter  S82 434D    2  Closed extra-articular fracture of distal end of right tibia with routine healing, subsequent encounter  S82 301D                   Subjective: Pt report he is doing better on stairs at home  Objective: See treatment diary below      Assessment: Tolerated treatment well  Progressed resistance with Leg Press today  Improved tolerance with step downs  Occasional verbal cues with exercise required  Patient demonstrated fatigue post treatment, exhibited good technique with therapeutic exercises and would benefit from continued PT      Plan: Continue per plan of care            Manuals 3/9 3/12 3/16 3/18 3/25   Stretching to right ankle  RR x15 mins BE/RR  10 min BE/RR  10 min MJC  10 min MJC  10 min                            Neuro Re-Ed        SLS balance    R LE 30"x2 Static R LE  30" x2 Static R LE  30" x2   Tandem stance L10 2x30 seconds  L10 2x30" ea  L 10 2x30" ea  L 10 30" x2 ea L9 30" x2 ea   Biodex  L1 2 mins ea PF/DF, "U" L1 x30 ea df/pf, inv/ev  L1 x30 ea   Df/pf, inv/ev L1 x30 ea  DF/PF  INV,EV L1 x30 ea PF/DF  INV/EV   Bosu Lunges  2x10 with R LE  2x10 with R LE 2x10 with R LE 2x10 with RLE                           Ther Ex 3/9  3/12 3/16 3/18 3/25   Treadmill  1 5 MPH 10 min cues for sequencing 2 0 MPH   x10 min  2 0 mph   x10 min 2 0 mph  x10 min 2 0 mph  x10 min   Ankle TB 4 way  Blue 20x Black x20  Black x20      Self Gastroc  Prostretch 30''3x Prostretch   30" x3  Prostretch 30"x3 Prostretch  30" x3  Prostretch  30" x3   HR/TR 20x on foam  20x on foam  20x on foam     Step Up  2x10 B step  2# "B" 2x10ea  2# "B" 2x10 ea 3# "B" 2x10 ea 3# "B"     Leg Press   100# 3x10  100# 3x10  100# 3x10 110# 2x10   TR/HR Leg Press   80# 2x10 80# 2x10 80# 3x10 85# 2x10   Standing Hip 3 way  2# x10 ea  Direction  On foam 2# x10 ea direction on foam  2# 3x10 ea direction on foam  3# 2 x10 ea dir   On foam 3# 2x10 ea dir  On foam   1 UE support   Mini Squats         Step downs A x 10 step down with LLE  "A" x10 with L LE  "A" 2x10 step down with L LE  "B" x15 step   Down with L LE "B" 2x10   Down with LLE           Ther Activity                        Gait Training                        Modalities

## 2021-03-30 ENCOUNTER — OFFICE VISIT (OUTPATIENT)
Dept: PHYSICAL THERAPY | Facility: CLINIC | Age: 56
End: 2021-03-30
Payer: COMMERCIAL

## 2021-03-30 DIAGNOSIS — S82.301D CLOSED EXTRA-ARTICULAR FRACTURE OF DISTAL END OF RIGHT TIBIA WITH ROUTINE HEALING, SUBSEQUENT ENCOUNTER: ICD-10-CM

## 2021-03-30 DIAGNOSIS — S82.434D CLOSED NONDISPLACED OBLIQUE FRACTURE OF SHAFT OF RIGHT FIBULA WITH ROUTINE HEALING, SUBSEQUENT ENCOUNTER: Primary | ICD-10-CM

## 2021-03-30 PROCEDURE — 97112 NEUROMUSCULAR REEDUCATION: CPT

## 2021-03-30 PROCEDURE — 97110 THERAPEUTIC EXERCISES: CPT

## 2021-03-30 PROCEDURE — 97140 MANUAL THERAPY 1/> REGIONS: CPT

## 2021-03-30 NOTE — PROGRESS NOTES
Daily Note     Today's date: 3/30/2021  Patient name: Bruno Varela  : 1965  MRN: [id number]  Referring provider: Licha Rivas MD  Dx:   Encounter Diagnosis     ICD-10-CM    1  Closed nondisplaced oblique fracture of shaft of right fibula with routine healing, subsequent encounter  S82 434D    2  Closed extra-articular fracture of distal end of right tibia with routine healing, subsequent encounter  S82 301D        Start Time: 1330          Subjective: Pt has no new c/o  States his R ankle is feeling ok  Objective: See treatment diary below      Assessment: Tolerated treatment well  Uneven surfaces remain challenging for patient  Patient demonstrated fatigue post treatment, exhibited good technique with therapeutic exercises and would benefit from continued PT       Plan: Continue per plan of care            Manuals 3/12 3/16 3/18 3/25 3/29   Stretching to right ankle  BE/RR  10 min BE/RR  10 min MJC  10 min MJC  10 min MJC  10 min                            Neuro Re-Ed        SLS balance   R LE 30"x2 Static R LE  30" x2 Static R LE  30" x2 Static R LE   30" x2    Tandem stance L10 2x30" ea  L 10 2x30" ea  L 10 30" x2 ea L9 30" x2 ea L9 30" x2 ea   Biodex  L1 x30 ea df/pf, inv/ev  L1 x30 ea   Df/pf, inv/ev L1 x30 ea  DF/PF  INV,EV L1 x30 ea PF/DF  INV/EV L1 x30 ea PF/DF, INV/EV    Bosu Lunges 2x10 with R LE  2x10 with R LE 2x10 with R LE 2x10 with RLE 2x10 with RLE                           Ther Ex 3/12 3/16 3/18 3/25 3/29   Treadmill  2 0 MPH   x10 min  2 0 mph   x10 min 2 0 mph  x10 min 2 0 mph  x10 min 2 0 mph x10 min   Ankle TB 4 way  Black x20  Black x20    Black x20 ea   Self Gastroc  Prostretch   30" x3  Prostretch 30"x3 Prostretch  30" x3  Prostretch  30" x3 Prostretch  30x3   HR/TR 20x on foam  20x on foam      Step Up  2# "B" 2x10ea  2# "B" 2x10 ea 3# "B" 2x10 ea 3# "B" 2x10 ea   3# "B" 2x10    Leg Press  100# 3x10  100# 3x10  100# 3x10 110# 2x10 110# 2x10   TR/HR Leg Press  80# 2x10 80# 2x10 80# 3x10 85# 2x10 90# 2x10   Standing Hip 3 way  2# x10 ea direction on foam  2# 3x10 ea direction on foam  3# 2 x10 ea dir   On foam 3# 2x10 ea dir  On foam   1 UE support 3# 2x10 ea dir  On foam  1 UE support   Mini Squats         Step downs "A" x10 with L LE  "A" 2x10 step down with L LE  "B" x15 step   Down with L LE "B" 2x10   Down with LLE "B" 2x10   Down with LLE           Ther Activity                        Gait Training             Stairs up/down  4 steps reciprocally           Modalities

## 2021-04-01 ENCOUNTER — OFFICE VISIT (OUTPATIENT)
Dept: PHYSICAL THERAPY | Facility: CLINIC | Age: 56
End: 2021-04-01
Payer: COMMERCIAL

## 2021-04-01 DIAGNOSIS — S82.434D CLOSED NONDISPLACED OBLIQUE FRACTURE OF SHAFT OF RIGHT FIBULA WITH ROUTINE HEALING, SUBSEQUENT ENCOUNTER: Primary | ICD-10-CM

## 2021-04-01 DIAGNOSIS — S82.301D CLOSED EXTRA-ARTICULAR FRACTURE OF DISTAL END OF RIGHT TIBIA WITH ROUTINE HEALING, SUBSEQUENT ENCOUNTER: ICD-10-CM

## 2021-04-01 PROCEDURE — 97112 NEUROMUSCULAR REEDUCATION: CPT

## 2021-04-01 PROCEDURE — 97140 MANUAL THERAPY 1/> REGIONS: CPT

## 2021-04-01 PROCEDURE — 97110 THERAPEUTIC EXERCISES: CPT

## 2021-04-01 NOTE — PROGRESS NOTES
Daily Note     Today's date: 2021  Patient name: Kashif Ding  : 1965  MRN: [id number]  Referring provider: Rios Fisher MD  Dx:   Encounter Diagnosis     ICD-10-CM    1  Closed nondisplaced oblique fracture of shaft of right fibula with routine healing, subsequent encounter  S82 434D    2  Closed extra-articular fracture of distal end of right tibia with routine healing, subsequent encounter  S82 301D                   Subjective: Pt reports R ankle feels stiff today  Objective: See treatment diary below      Assessment: Tolerated treatment well  Progressed with tandem stand on Biodex today  Patient exhibited good technique with therapeutic exercises and would benefit from continued PT      Plan: Continue per plan of care            Manuals 3/16 3/18 3/25 3/29 4/1   Stretching to right ankle  BE/RR  10 min MJC  10 min MJC  10 min MJC  10 min MJC  10 min                            Neuro Re-Ed        SLS balance  R LE 30"x2 Static R LE  30" x2 Static R LE  30" x2 Static R LE   30" x2  Static  R LE 30" x2   Tandem stance L 10 2x30" ea  L 10 30" x2 ea L9 30" x2 ea L9 30" x2 ea L8 30" x2 ea   Biodex  L1 x30 ea   Df/pf, inv/ev L1 x30 ea  DF/PF  INV,EV L1 x30 ea PF/DF  INV/EV L1 x30 ea PF/DF, INV/EV  L1 x30 ea  PF/DF, INV, EV   Bosu Lunges 2x10 with R LE 2x10 with R LE 2x10 with RLE 2x10 with RLE 2x10 R LE                           Ther Ex 3/16 3/18 3/25 3/29 4/1   Treadmill  2 0 mph   x10 min 2 0 mph  x10 min 2 0 mph  x10 min 2 0 mph x10 min 2 0 mph x10 min   Ankle TB 4 way  Black x20    Black x20 ea Black x20 ea   Self Gastroc  Prostretch 30"x3 Prostretch  30" x3  Prostretch  30" x3 Prostretch  30x3 Prostretch  30" x3   HR/TR 20x on foam       Step Up  2# "B" 2x10 ea 3# "B" 2x10 ea 3# "B" 2x10 ea   3# "B" 2x10  3# "B" 2x10   Leg Press  100# 3x10  100# 3x10 110# 2x10 110# 2x10 110# 3x10   TR/HR Leg Press  80# 2x10 80# 3x10 85# 2x10 90# 2x10 90# 3x10   Standing Hip 3 way  2# 3x10 ea direction on foam 3# 2 x10 ea dir   On foam 3# 2x10 ea dir  On foam   1 UE support 3# 2x10 ea dir  On foam  1 UE support 3# 2x10 ea dir  On foam  1 UE support   Mini Squats         Step downs "A" 2x10 step down with L LE  "B" x15 step   Down with L LE "B" 2x10   Down with LLE "B" 2x10   Down with LLE "B" 2x10  Down with LLE           Ther Activity                        Gait Training            Stairs up/down  4 steps reciprocally            Modalities

## 2021-04-06 ENCOUNTER — EVALUATION (OUTPATIENT)
Dept: PHYSICAL THERAPY | Facility: CLINIC | Age: 56
End: 2021-04-06
Payer: COMMERCIAL

## 2021-04-06 DIAGNOSIS — S82.301D CLOSED EXTRA-ARTICULAR FRACTURE OF DISTAL END OF RIGHT TIBIA WITH ROUTINE HEALING, SUBSEQUENT ENCOUNTER: ICD-10-CM

## 2021-04-06 DIAGNOSIS — S82.434D CLOSED NONDISPLACED OBLIQUE FRACTURE OF SHAFT OF RIGHT FIBULA WITH ROUTINE HEALING, SUBSEQUENT ENCOUNTER: Primary | ICD-10-CM

## 2021-04-06 PROCEDURE — 97112 NEUROMUSCULAR REEDUCATION: CPT | Performed by: PHYSICAL THERAPIST

## 2021-04-06 PROCEDURE — 97110 THERAPEUTIC EXERCISES: CPT | Performed by: PHYSICAL THERAPIST

## 2021-04-06 PROCEDURE — 97140 MANUAL THERAPY 1/> REGIONS: CPT | Performed by: PHYSICAL THERAPIST

## 2021-04-08 ENCOUNTER — TELEPHONE (OUTPATIENT)
Dept: OBGYN CLINIC | Facility: HOSPITAL | Age: 56
End: 2021-04-08

## 2021-04-08 ENCOUNTER — OFFICE VISIT (OUTPATIENT)
Dept: PHYSICAL THERAPY | Facility: CLINIC | Age: 56
End: 2021-04-08
Payer: COMMERCIAL

## 2021-04-08 DIAGNOSIS — S82.301D CLOSED EXTRA-ARTICULAR FRACTURE OF DISTAL END OF RIGHT TIBIA WITH ROUTINE HEALING, SUBSEQUENT ENCOUNTER: ICD-10-CM

## 2021-04-08 DIAGNOSIS — S82.434D CLOSED NONDISPLACED OBLIQUE FRACTURE OF SHAFT OF RIGHT FIBULA WITH ROUTINE HEALING, SUBSEQUENT ENCOUNTER: Primary | ICD-10-CM

## 2021-04-08 PROCEDURE — 97110 THERAPEUTIC EXERCISES: CPT

## 2021-04-08 PROCEDURE — 97112 NEUROMUSCULAR REEDUCATION: CPT

## 2021-04-08 NOTE — PROGRESS NOTES
Daily Note     Today's date: 2021  Patient name: Ирина Pelaez  : 1965  MRN: [id number]  Referring provider: Yani Summers MD  Dx:   Encounter Diagnosis     ICD-10-CM    1  Closed nondisplaced oblique fracture of shaft of right fibula with routine healing, subsequent encounter  S82 434D    2  Closed extra-articular fracture of distal end of right tibia with routine healing, subsequent encounter  S82 301D        Start Time: 1415          Subjective: Pt reports ankle is feeling ok  Objective: See treatment diary below      Assessment: Tolerated treatment well  Increased resistance with Leg press today  Patient demonstrated fatigue post treatment, exhibited good technique with therapeutic exercises and would benefit from continued PT      Plan: Continue per plan of care            Manuals 4/6 4/8 3/25 3/29 4/1   Stretching to right ankle  BE/RR  10 min MJC  8 min MJC  10 min MJC  10 min MJC  10 min                            Neuro Re-Ed        SLS balance on R LE  Static 30"x2     biodex foam  20"x2 Biodex foam  30" x2    Static R LE  30" x2 Static R LE   30" x2  Static  R LE 30" x2   Tandem stance L8 30"x2 ea  L8 30" x2 ea   L9 30" x2 ea L9 30" x2 ea L8 30" x2 ea   Biodex  L1 x30 ea   DF/PF  INV/EV L1 x30 ea  DF/PF  INV/EV L1 x30 ea PF/DF  INV/EV L1 x30 ea PF/DF, INV/EV  L1 x30 ea  PF/DF, INV, EV   Bosu Lunges 2x10 with R LE  2x10 with R LE 2x10 with RLE 2x10 with RLE 2x10 R LE                           Ther Ex 4/6 4/8 3/25 3/29 4/1   Treadmill  2 5 mph   10 min 2 5 mph  x10 min 2 0 mph  x10 min 2 0 mph x10 min 2 0 mph x10 min   Ankle TB 4 way  Black x20 ea  Black  x20 ea  Black x20 ea Black x20 ea   Self Gastroc  Prostretch  30"x3 Prostretch  30" x3 Prostretch  30" x3 Prostretch  30x3 Prostretch  30" x3   HR/TR        Step Up  3# "B" 3x10  3# "B" 3x10 ea 3# "B" 2x10 ea   3# "B" 2x10  3# "B" 2x10   Leg Press  110# 3x10  120# 3x10   110# 2x10 110# 2x10 110# 3x10   TR/HR Leg Press  90# 3x10 100# 3x10 85# 2x10 90# 2x10 90# 3x10   Standing Hip 3 way  3# 2x10 ea direction, on foam with 1 UE support 3# 2x10 ea  Direction on foam with 1 UE support 3# 2x10 ea dir  On foam   1 UE support 3# 2x10 ea dir  On foam  1 UE support 3# 2x10 ea dir  On foam  1 UE support   Mini Squats         Step downs "B" 2x10 step down with L LE "B"  2x10  Step down with LLE "B" 2x10   Down with LLE "B" 2x10   Down with LLE "B" 2x10  Down with LLE           Ther Activity                        Gait Training            Stairs up/down  4 steps reciprocally            Modalities

## 2021-04-08 NOTE — TELEPHONE ENCOUNTER
Patient sees Dr Lorne Cerda  Patient called requesting an updated work letter for this month of April      Fax # [phone]; Nora Nielsen

## 2021-04-13 ENCOUNTER — APPOINTMENT (OUTPATIENT)
Dept: PHYSICAL THERAPY | Facility: CLINIC | Age: 56
End: 2021-04-13
Payer: COMMERCIAL

## 2021-04-15 ENCOUNTER — OFFICE VISIT (OUTPATIENT)
Dept: PHYSICAL THERAPY | Facility: CLINIC | Age: 56
End: 2021-04-15
Payer: COMMERCIAL

## 2021-04-15 DIAGNOSIS — S82.301D CLOSED EXTRA-ARTICULAR FRACTURE OF DISTAL END OF RIGHT TIBIA WITH ROUTINE HEALING, SUBSEQUENT ENCOUNTER: ICD-10-CM

## 2021-04-15 DIAGNOSIS — S82.434D CLOSED NONDISPLACED OBLIQUE FRACTURE OF SHAFT OF RIGHT FIBULA WITH ROUTINE HEALING, SUBSEQUENT ENCOUNTER: Primary | ICD-10-CM

## 2021-04-15 PROCEDURE — 97112 NEUROMUSCULAR REEDUCATION: CPT | Performed by: PHYSICAL THERAPIST

## 2021-04-15 PROCEDURE — 97110 THERAPEUTIC EXERCISES: CPT | Performed by: PHYSICAL THERAPIST

## 2021-04-15 PROCEDURE — 97140 MANUAL THERAPY 1/> REGIONS: CPT | Performed by: PHYSICAL THERAPIST

## 2021-04-15 NOTE — PROGRESS NOTES
Daily Note     Today's date: 4/15/2021  Patient name: Irina Temple  : 1965  MRN: [id number]  Referring provider: Jocelyn Del Rosario MD  Dx:   Encounter Diagnosis     ICD-10-CM    1  Closed nondisplaced oblique fracture of shaft of right fibula with routine healing, subsequent encounter  S82 434D    2  Closed extra-articular fracture of distal end of right tibia with routine healing, subsequent encounter  S82 301D                   Subjective: Patient reports that his right ankle feels stiff today  Objective: See treatment diary below      Assessment: Tolerated treatment well  Improving SLS balance on R LE when performing hip 3 ways, however patient reports minor discomfort during  Increased weight on leg press exercises for lower extremity strengthening, good tolerance to progressions  Patient demonstrated fatigue post treatment, exhibited good technique with therapeutic exercises and would benefit from continued PT  Plan: Continue per plan of care            Manuals 4/6 4/8 4/15 3/29 4/1   Stretching to right ankle  BE/RR  10 min MJC  8 min BE/RR  10 min MJC  10 min MJC  10 min                            Neuro Re-Ed        SLS balance on R LE  Static 30"x2     biodex foam  20"x2 Biodex foam  30" x2    Biodex foam   30"x2  Static R LE   30" x2  Static  R LE 30" x2   Tandem stance L8 30"x2 ea  L8 30" x2 ea   L7 30" x2 ea L9 30" x2 ea L8 30" x2 ea   Biodex  L1 x30 ea   DF/PF  INV/EV L1 x30 ea  DF/PF  INV/EV L1 x30 ea DF/PF  INV/EF L1 x30 ea PF/DF, INV/EV  L1 x30 ea  PF/DF, INV, EV   Bosu Lunges 2x10 with R LE  2x10 with R LE 2x10 with R LE 2x10 with RLE 2x10 R LE                           Ther Ex 4/6 4/8 4/15 3/29 4/1   Treadmill  2 5 mph   10 min 2 5 mph  x10 min 2-2 5 mph   x10 min 2 0 mph x10 min 2 0 mph x10 min   Ankle TB 4 way  Black x20 ea  Black  x20 ea Black x20 ea Black x20 ea Black x20 ea   Self Gastroc  Prostretch  30"x3 Prostretch  30" x3 Prostretch   30"x3 Prostretch  30x3 Prostretch  30" x3   HR/TR        Step Up  3# "B" 3x10  3# "B" 3x10 ea 3# "B" 3x10 ea  3# "B" 2x10  3# "B" 2x10   Leg Press  110# 3x10  120# 3x10   130# 3x10  110# 2x10 110# 3x10   TR/HR Leg Press  90# 3x10 100# 3x10 110# 90# 2x10 90# 3x10   Standing Hip 3 way  3# 2x10 ea direction, on foam with 1 UE support 3# 2x10 ea  Direction on foam with 1 UE support 3# 2x10 ea direction   Occasional use of 1 UE support 3# 2x10 ea dir  On foam  1 UE support 3# 2x10 ea dir  On foam  1 UE support   Mini Squats         Step downs "B" 2x10 step down with L LE "B"  2x10  Step down with LLE "B" 2x10 step down with LLE "B" 2x10   Down with LLE "B" 2x10  Down with LLE           Ther Activity                        Gait Training            Stairs up/down  4 steps reciprocally            Modalities

## 2021-04-20 ENCOUNTER — APPOINTMENT (OUTPATIENT)
Dept: PHYSICAL THERAPY | Facility: CLINIC | Age: 56
End: 2021-04-20
Payer: COMMERCIAL

## 2021-04-22 ENCOUNTER — OFFICE VISIT (OUTPATIENT)
Dept: PHYSICAL THERAPY | Facility: CLINIC | Age: 56
End: 2021-04-22
Payer: COMMERCIAL

## 2021-04-22 DIAGNOSIS — S82.434D CLOSED NONDISPLACED OBLIQUE FRACTURE OF SHAFT OF RIGHT FIBULA WITH ROUTINE HEALING, SUBSEQUENT ENCOUNTER: Primary | ICD-10-CM

## 2021-04-22 DIAGNOSIS — S82.301D CLOSED EXTRA-ARTICULAR FRACTURE OF DISTAL END OF RIGHT TIBIA WITH ROUTINE HEALING, SUBSEQUENT ENCOUNTER: ICD-10-CM

## 2021-04-22 PROCEDURE — 97110 THERAPEUTIC EXERCISES: CPT | Performed by: PHYSICAL THERAPIST

## 2021-04-22 PROCEDURE — 97112 NEUROMUSCULAR REEDUCATION: CPT | Performed by: PHYSICAL THERAPIST

## 2021-04-22 PROCEDURE — 97140 MANUAL THERAPY 1/> REGIONS: CPT | Performed by: PHYSICAL THERAPIST

## 2021-04-22 NOTE — PROGRESS NOTES
Daily Note     Today's date: 2021  Patient name: Faustina Alberto  : 1965  MRN: [id number]  Referring provider: Anny Adan MD  Dx:   Encounter Diagnosis     ICD-10-CM    1  Closed nondisplaced oblique fracture of shaft of right fibula with routine healing, subsequent encounter  S82 434D    2  Closed extra-articular fracture of distal end of right tibia with routine healing, subsequent encounter  S82 301D                   Subjective: Patient reports that his ankle feels good today  Objective: See treatment diary below      Assessment: Tolerated treatment well  Continues to be challenged with bosu lunges due to decreased dynamic ankle stability  Progressed weight for standing exercises without issue  Patient exhibited good technique with therapeutic exercises and would benefit from continued PT  Plan: Continue per plan of care            Manuals 4/6 4/8 4/15 4/22 4/1   Stretching to right ankle  BE/RR  10 min MJC  8 min BE/RR  10 min BE/RR  10 min MJC  10 min                            Neuro Re-Ed        SLS balance on R LE  Static 30"x2     biodex foam  20"x2 Biodex foam  30" x2    Biodex foam   30"x2  Biodex foam   30"x2 Static  R LE 30" x2   Tandem stance L8 30"x2 ea  L8 30" x2 ea   L7 30" x2 ea L7 30"x2 ea  L8 30" x2 ea   Biodex  L1 x30 ea   DF/PF  INV/EV L1 x30 ea  DF/PF  INV/EV L1 x30 ea DF/PF  INV/EF L1 x30 ea   DF/PF, INV/EV L1 x30 ea  PF/DF, INV, EV   Bosu Lunges 2x10 with R LE  2x10 with R LE 2x10 with R LE 2x10 with R LE 2x10 R LE                           Ther Ex 4/6 4/8 4/15 4/22 4/1   Treadmill  2 5 mph   10 min 2 5 mph  x10 min 2-2 5 mph   x10 min 2 5-2 8 mph x10 min 2 0 mph x10 min   Ankle TB 4 way  Black x20 ea  Black  x20 ea Black x20 ea Black x20 ea Black x20 ea   Self Gastroc  Prostretch  30"x3 Prostretch  30" x3 Prostretch   30"x3 Prostretch   30"x3 Prostretch  30" x3   HR/TR        Step Up  3# "B" 3x10  3# "B" 3x10 ea 3# "B" 3x10 ea  4# "B" 3x10 3# "B" 2x10   Leg Press 110# 3x10  120# 3x10   130# 3x10  130# 3x10 110# 3x10   TR/HR Leg Press  90# 3x10 100# 3x10 110# 110# 3x10 90# 3x10   Standing Hip 3 way  3# 2x10 ea direction, on foam with 1 UE support 3# 2x10 ea  Direction on foam with 1 UE support 3# 2x10 ea direction   Occasional use of 1 UE support 4# 2x10 ea dir on foam   Occasional use of 1 UE support  3# 2x10 ea dir  On foam  1 UE support   Mini Squats         Step downs "B" 2x10 step down with L LE "B"  2x10  Step down with LLE "B" 2x10 step down with LLE "B" 2x10 step down with LLE "B" 2x10  Down with LLE           Ther Activity                        Gait Training                        Modalities

## 2021-04-27 ENCOUNTER — OFFICE VISIT (OUTPATIENT)
Dept: PHYSICAL THERAPY | Facility: CLINIC | Age: 56
End: 2021-04-27
Payer: COMMERCIAL

## 2021-04-27 DIAGNOSIS — S82.434D CLOSED NONDISPLACED OBLIQUE FRACTURE OF SHAFT OF RIGHT FIBULA WITH ROUTINE HEALING, SUBSEQUENT ENCOUNTER: Primary | ICD-10-CM

## 2021-04-27 DIAGNOSIS — S82.301D CLOSED EXTRA-ARTICULAR FRACTURE OF DISTAL END OF RIGHT TIBIA WITH ROUTINE HEALING, SUBSEQUENT ENCOUNTER: ICD-10-CM

## 2021-04-27 PROCEDURE — 97140 MANUAL THERAPY 1/> REGIONS: CPT | Performed by: PHYSICAL THERAPIST

## 2021-04-27 PROCEDURE — 97110 THERAPEUTIC EXERCISES: CPT | Performed by: PHYSICAL THERAPIST

## 2021-04-27 PROCEDURE — 97112 NEUROMUSCULAR REEDUCATION: CPT | Performed by: PHYSICAL THERAPIST

## 2021-04-27 NOTE — PROGRESS NOTES
Daily Note     Today's date: 2021  Patient name: Live Gay  : 1965  MRN: [id number]  Referring provider: Estefani Zamora MD  Dx:   Encounter Diagnosis     ICD-10-CM    1  Closed nondisplaced oblique fracture of shaft of right fibula with routine healing, subsequent encounter  S82 434D    2  Closed extra-articular fracture of distal end of right tibia with routine healing, subsequent encounter  S82 301D                   Subjective:      " I feel pretty good today "     Objective: See treatment diary below      Assessment: Tolerated treatment well  Patient exhibited good technique with therapeutic exercises and would benefit from continued PT  Cues for proper performance of BOSU lunges this session  Mild closed chain stability deficits in right ankle observed during TE this session  Plan: Continue per plan of care            Manuals 4/27 4/8 4/15 4/22 4/1   Stretching to right ankle  BE/RR  10 min MJC  8 min BE/RR  10 min BE/RR  10 min MJC  10 min                            Neuro Re-Ed        SLS balance on R LE  Static 30"x2     biodex foam  20"x2 Biodex foam  30" x2    Biodex foam   30"x2  Biodex foam   30"x2 Static  R LE 30" x2   Tandem stance L8 30"x2 ea  L8 30" x2 ea   L7 30" x2 ea L7 30"x2 ea  L8 30" x2 ea   Biodex  L1 x30 ea   DF/PF  INV/EV L1 x30 ea  DF/PF  INV/EV L1 x30 ea DF/PF  INV/EF L1 x30 ea   DF/PF, INV/EV L1 x30 ea  PF/DF, INV, EV   Bosu Lunges 2x10 with R LE  2x10 with R LE 2x10 with R LE 2x10 with R LE 2x10 R LE                           Ther Ex 4/27 4/8 4/15 4/22 4/1   Treadmill  2 5 mph   10 min 2 5 mph  x10 min 2-2 5 mph   x10 min 2 5-2 8 mph x10 min 2 0 mph x10 min   Ankle TB 4 way   Black  x20 ea Black x20 ea Black x20 ea Black x20 ea   Self Gastroc  Prostretch  30"x3 Prostretch  30" x3 Prostretch   30"x3 Prostretch   30"x3 Prostretch  30" x3   HR/TR        Step Up   3# "B" 3x10 ea 3# "B" 3x10 ea  4# "B" 3x10 3# "B" 2x10   Leg Press  130# 3x10  120# 3x10   130# 3x10  130# 3x10 110# 3x10   TR/HR Leg Press  130# 3x10 100# 3x10 110# 110# 3x10 90# 3x10   Standing Hip 3 way  3# 2x10 ea direction, on foam with 1 UE support 3# 2x10 ea  Direction on foam with 1 UE support 3# 2x10 ea direction   Occasional use of 1 UE support 4# 2x10 ea dir on foam   Occasional use of 1 UE support  3# 2x10 ea dir  On foam  1 UE support   Mini Squats         Step downs "B" 2x10 step down with L LE "B"  2x10  Step down with LLE "B" 2x10 step down with LLE "B" 2x10 step down with LLE "B" 2x10  Down with LLE           Ther Activity                        Gait Training                        Modalities

## 2021-04-29 ENCOUNTER — OFFICE VISIT (OUTPATIENT)
Dept: PHYSICAL THERAPY | Facility: CLINIC | Age: 56
End: 2021-04-29
Payer: COMMERCIAL

## 2021-04-29 DIAGNOSIS — S82.434D CLOSED NONDISPLACED OBLIQUE FRACTURE OF SHAFT OF RIGHT FIBULA WITH ROUTINE HEALING, SUBSEQUENT ENCOUNTER: Primary | ICD-10-CM

## 2021-04-29 DIAGNOSIS — S82.301D CLOSED EXTRA-ARTICULAR FRACTURE OF DISTAL END OF RIGHT TIBIA WITH ROUTINE HEALING, SUBSEQUENT ENCOUNTER: ICD-10-CM

## 2021-04-29 PROCEDURE — 97140 MANUAL THERAPY 1/> REGIONS: CPT | Performed by: PHYSICAL THERAPIST

## 2021-04-29 PROCEDURE — 97110 THERAPEUTIC EXERCISES: CPT | Performed by: PHYSICAL THERAPIST

## 2021-04-29 PROCEDURE — 97112 NEUROMUSCULAR REEDUCATION: CPT | Performed by: PHYSICAL THERAPIST

## 2021-04-30 ENCOUNTER — IMMUNIZATIONS (OUTPATIENT)
Dept: FAMILY MEDICINE CLINIC | Facility: HOSPITAL | Age: 56
End: 2021-04-30

## 2021-04-30 DIAGNOSIS — Z23 ENCOUNTER FOR IMMUNIZATION: Primary | ICD-10-CM

## 2021-04-30 PROCEDURE — 0011A SARS-COV-2 / COVID-19 MRNA VACCINE (MODERNA) 100 MCG: CPT

## 2021-04-30 PROCEDURE — 91301 SARS-COV-2 / COVID-19 MRNA VACCINE (MODERNA) 100 MCG: CPT

## 2021-05-04 ENCOUNTER — OFFICE VISIT (OUTPATIENT)
Dept: OBGYN CLINIC | Facility: CLINIC | Age: 56
End: 2021-05-04
Payer: COMMERCIAL

## 2021-05-04 VITALS
BODY MASS INDEX: 32.64 KG/M2 | DIASTOLIC BLOOD PRESSURE: 115 MMHG | HEIGHT: 70 IN | HEART RATE: 82 BPM | SYSTOLIC BLOOD PRESSURE: 185 MMHG | WEIGHT: 228 LBS

## 2021-05-04 DIAGNOSIS — Z12.11 ENCOUNTER FOR SCREENING COLONOSCOPY: ICD-10-CM

## 2021-05-04 DIAGNOSIS — S82.301D CLOSED EXTRA-ARTICULAR FRACTURE OF DISTAL END OF RIGHT TIBIA WITH ROUTINE HEALING, SUBSEQUENT ENCOUNTER: Primary | ICD-10-CM

## 2021-05-04 PROCEDURE — 1036F TOBACCO NON-USER: CPT | Performed by: ORTHOPAEDIC SURGERY

## 2021-05-04 PROCEDURE — 99213 OFFICE O/P EST LOW 20 MIN: CPT | Performed by: ORTHOPAEDIC SURGERY

## 2021-05-04 PROCEDURE — 3008F BODY MASS INDEX DOCD: CPT | Performed by: ORTHOPAEDIC SURGERY

## 2021-05-04 NOTE — LETTER
May 4, 2021     Patient: Live Gay   YOB: 1965   Date of Visit: 5/4/2021       To Whom it May Concern:    Live Gay is under my professional care  He was seen in my office on 5/4/2021  He may return to work 6/01/2021 with no restrictions  If you have any questions or concerns, please don't hesitate to call           Sincerely,          Charlie Mehta MD        CC: No Recipients

## 2021-05-04 NOTE — PROGRESS NOTES
Assessment:     1  Closed extra-articular fracture of distal end of right tibia with routine healing, subsequent encounter    2  Encounter for screening colonoscopy          Plan:   Diagnoses and all orders for this visit:    Closed extra-articular fracture of distal end of right tibia with routine healing, subsequent encounter    Encounter for screening colonoscopy  -     Ambulatory referral for colon cancer education; Future         60 y o  male with follow up of healed right distal tibial shaft fracture and right proximal fibula fracture  I discussed with the patient that he should continue to work with physical therapy  I discussed that he may return to work 6/01/2021 without restrictions, and a work note was provided  I will see him back in office on an as needed basis if symptoms worsen or fail to improve  Patient ID: Connor Posada is a 60 y o  male  Chief Complaint:  Right ankle fracture     HPI:  60 y o  male presents to the office today for a follow up of his right distal tibial shaft fracture and right proximal fibula fracture  Patient stated the summary of physical therapy  Patient stated that he has been ambulating  without assistance and with regular footwear  Patient denied any new injuries but his ankle  Patient denies any numbness and tingling        Allergy:  No Known Allergies    Medications:  all current active meds have been reviewed    Past Medical History:  Past Medical History:   Diagnosis Date    Foot fracture        Past Surgical History:  Past Surgical History:   Procedure Laterality Date    NO PAST SURGERIES         Family History:  Family History   Problem Relation Age of Onset   Eun  ALS Mother     Pneumonia Father     No Known Problems Sister        Social History:  Social History     Substance and Sexual Activity   Alcohol Use Yes    Frequency: 4 or more times a week    Drinks per session: 5 or 6    Binge frequency: Daily or almost daily     Social History Substance and Sexual Activity   Drug Use Not Currently     Social History     Tobacco Use   Smoking Status Former Smoker    Types: Cigarettes    Quit date:     Years since quittin 3   Smokeless Tobacco Never Used           ROS:  Review of Systems   Constitutional: Negative for chills and fever  HENT: Negative for ear pain and sore throat  Eyes: Negative for pain and visual disturbance  Respiratory: Negative for cough and shortness of breath  Cardiovascular: Negative for chest pain and palpitations  Gastrointestinal: Negative for abdominal pain and vomiting  Genitourinary: Negative for dysuria and hematuria  Musculoskeletal: Positive for arthralgias  Negative for back pain  Skin: Negative for color change and rash  Neurological: Negative for seizures and syncope  All other systems reviewed and are negative  Objective:  BP Readings from Last 1 Encounters:   21 (!) 185/115      Wt Readings from Last 1 Encounters:   21 103 kg (228 lb)        BMI:   Estimated body mass index is 32 71 kg/m² as calculated from the following:    Height as of this encounter: 5' 10" (1 778 m)  Weight as of this encounter: 103 kg (228 lb)  EXAM:   Physical Exam  Right Ankle Exam     Tenderness   The patient is experiencing no tenderness  Swelling: none    Range of Motion   The patient has normal right ankle ROM  Muscle Strength   The patient has normal right ankle strength      Other   Erythema: absent  Scars: absent  Sensation: normal  Pulse: present     Comments:  Slightly antalgic gait       Left Ankle Exam   Left ankle exam is normal               Radiographs:  none today      Scribe Attestation    I,:  Monika Cordero am acting as a scribe while in the presence of the attending physician :       I,:  Valeria Malik MD personally performed the services described in this documentation    as scribed in my presence :

## 2021-05-28 ENCOUNTER — IMMUNIZATIONS (OUTPATIENT)
Dept: FAMILY MEDICINE CLINIC | Facility: HOSPITAL | Age: 56
End: 2021-05-28

## 2021-05-28 DIAGNOSIS — Z23 ENCOUNTER FOR IMMUNIZATION: Primary | ICD-10-CM

## 2021-05-28 PROCEDURE — 0012A SARS-COV-2 / COVID-19 MRNA VACCINE (MODERNA) 100 MCG: CPT

## 2021-05-28 PROCEDURE — 91301 SARS-COV-2 / COVID-19 MRNA VACCINE (MODERNA) 100 MCG: CPT

## 2021-10-26 ENCOUNTER — TELEPHONE (OUTPATIENT)
Dept: FAMILY MEDICINE CLINIC | Facility: CLINIC | Age: 56
End: 2021-10-26

## 2021-10-26 ENCOUNTER — OFFICE VISIT (OUTPATIENT)
Dept: URGENT CARE | Facility: CLINIC | Age: 56
End: 2021-10-26
Payer: COMMERCIAL

## 2021-10-26 VITALS
BODY MASS INDEX: 32.71 KG/M2 | RESPIRATION RATE: 20 BRPM | HEART RATE: 100 BPM | WEIGHT: 228 LBS | SYSTOLIC BLOOD PRESSURE: 144 MMHG | DIASTOLIC BLOOD PRESSURE: 88 MMHG | OXYGEN SATURATION: 98 %

## 2021-10-26 DIAGNOSIS — J06.9 VIRAL UPPER RESPIRATORY TRACT INFECTION: ICD-10-CM

## 2021-10-26 DIAGNOSIS — R09.81 NASAL CONGESTION: Primary | ICD-10-CM

## 2021-10-26 PROCEDURE — U0005 INFEC AGEN DETEC AMPLI PROBE: HCPCS | Performed by: FAMILY MEDICINE

## 2021-10-26 PROCEDURE — U0003 INFECTIOUS AGENT DETECTION BY NUCLEIC ACID (DNA OR RNA); SEVERE ACUTE RESPIRATORY SYNDROME CORONAVIRUS 2 (SARS-COV-2) (CORONAVIRUS DISEASE [COVID-19]), AMPLIFIED PROBE TECHNIQUE, MAKING USE OF HIGH THROUGHPUT TECHNOLOGIES AS DESCRIBED BY CMS-2020-01-R: HCPCS | Performed by: FAMILY MEDICINE

## 2021-10-26 PROCEDURE — 99213 OFFICE O/P EST LOW 20 MIN: CPT | Performed by: FAMILY MEDICINE

## 2021-10-27 ENCOUNTER — TELEPHONE (OUTPATIENT)
Dept: OTHER | Facility: OTHER | Age: 56
End: 2021-10-27

## 2021-10-27 LAB — SARS-COV-2 RNA RESP QL NAA+PROBE: NEGATIVE

## 2022-01-11 ENCOUNTER — APPOINTMENT (OUTPATIENT)
Dept: LAB | Facility: CLINIC | Age: 57
End: 2022-01-11
Payer: COMMERCIAL

## 2022-01-11 ENCOUNTER — OFFICE VISIT (OUTPATIENT)
Dept: FAMILY MEDICINE CLINIC | Facility: CLINIC | Age: 57
End: 2022-01-11
Payer: COMMERCIAL

## 2022-01-11 VITALS
OXYGEN SATURATION: 96 % | HEART RATE: 100 BPM | WEIGHT: 230 LBS | HEIGHT: 70 IN | DIASTOLIC BLOOD PRESSURE: 82 MMHG | BODY MASS INDEX: 32.93 KG/M2 | TEMPERATURE: 97.4 F | SYSTOLIC BLOOD PRESSURE: 142 MMHG

## 2022-01-11 DIAGNOSIS — H90.3 SENSORINEURAL HEARING LOSS (SNHL) OF BOTH EARS: ICD-10-CM

## 2022-01-11 DIAGNOSIS — Z13.31 DEPRESSION SCREENING NEGATIVE: ICD-10-CM

## 2022-01-11 DIAGNOSIS — Z12.5 SCREENING FOR PROSTATE CANCER: ICD-10-CM

## 2022-01-11 DIAGNOSIS — Z13.220 SCREENING FOR HYPERLIPIDEMIA: ICD-10-CM

## 2022-01-11 DIAGNOSIS — Z13.1 SCREENING FOR DIABETES MELLITUS: ICD-10-CM

## 2022-01-11 DIAGNOSIS — Z23 NEED FOR INFLUENZA VACCINATION: ICD-10-CM

## 2022-01-11 DIAGNOSIS — E66.09 CLASS 1 OBESITY DUE TO EXCESS CALORIES WITH BODY MASS INDEX (BMI) OF 33.0 TO 33.9 IN ADULT, UNSPECIFIED WHETHER SERIOUS COMORBIDITY PRESENT: ICD-10-CM

## 2022-01-11 DIAGNOSIS — Z13.0 SCREENING FOR DEFICIENCY ANEMIA: ICD-10-CM

## 2022-01-11 DIAGNOSIS — Z12.11 SCREENING FOR COLON CANCER: ICD-10-CM

## 2022-01-11 DIAGNOSIS — E78.00 ELEVATED LDL CHOLESTEROL LEVEL: ICD-10-CM

## 2022-01-11 DIAGNOSIS — Z00.00 ANNUAL PHYSICAL EXAM: Primary | ICD-10-CM

## 2022-01-11 DIAGNOSIS — Z13.29 SCREENING FOR THYROID DISORDER: ICD-10-CM

## 2022-01-11 DIAGNOSIS — I10 BENIGN ESSENTIAL HTN: ICD-10-CM

## 2022-01-11 LAB
ALBUMIN SERPL BCP-MCNC: 4.1 G/DL (ref 3.5–5)
ALP SERPL-CCNC: 112 U/L (ref 46–116)
ALT SERPL W P-5'-P-CCNC: 75 U/L (ref 12–78)
ANION GAP SERPL CALCULATED.3IONS-SCNC: 5 MMOL/L (ref 4–13)
AST SERPL W P-5'-P-CCNC: 57 U/L (ref 5–45)
BASOPHILS # BLD AUTO: 0.06 THOUSANDS/ΜL (ref 0–0.1)
BASOPHILS NFR BLD AUTO: 1 % (ref 0–1)
BILIRUB SERPL-MCNC: 1.65 MG/DL (ref 0.2–1)
BUN SERPL-MCNC: 12 MG/DL (ref 5–25)
CALCIUM SERPL-MCNC: 9.6 MG/DL (ref 8.3–10.1)
CHLORIDE SERPL-SCNC: 99 MMOL/L (ref 100–108)
CHOLEST SERPL-MCNC: 211 MG/DL
CO2 SERPL-SCNC: 28 MMOL/L (ref 21–32)
CREAT SERPL-MCNC: 0.81 MG/DL (ref 0.6–1.3)
EOSINOPHIL # BLD AUTO: 0.32 THOUSAND/ΜL (ref 0–0.61)
EOSINOPHIL NFR BLD AUTO: 5 % (ref 0–6)
ERYTHROCYTE [DISTWIDTH] IN BLOOD BY AUTOMATED COUNT: 12.2 % (ref 11.6–15.1)
GFR SERPL CREATININE-BSD FRML MDRD: 97 ML/MIN/1.73SQ M
GLUCOSE P FAST SERPL-MCNC: 133 MG/DL (ref 65–99)
HCT VFR BLD AUTO: 53.4 % (ref 36.5–49.3)
HDLC SERPL-MCNC: 30 MG/DL
HGB BLD-MCNC: 17.2 G/DL (ref 12–17)
IMM GRANULOCYTES # BLD AUTO: 0.01 THOUSAND/UL (ref 0–0.2)
IMM GRANULOCYTES NFR BLD AUTO: 0 % (ref 0–2)
LDLC SERPL CALC-MCNC: 160 MG/DL (ref 0–100)
LYMPHOCYTES # BLD AUTO: 1.79 THOUSANDS/ΜL (ref 0.6–4.47)
LYMPHOCYTES NFR BLD AUTO: 26 % (ref 14–44)
MCH RBC QN AUTO: 31 PG (ref 26.8–34.3)
MCHC RBC AUTO-ENTMCNC: 32.2 G/DL (ref 31.4–37.4)
MCV RBC AUTO: 96 FL (ref 82–98)
MONOCYTES # BLD AUTO: 0.85 THOUSAND/ΜL (ref 0.17–1.22)
MONOCYTES NFR BLD AUTO: 12 % (ref 4–12)
NEUTROPHILS # BLD AUTO: 3.95 THOUSANDS/ΜL (ref 1.85–7.62)
NEUTS SEG NFR BLD AUTO: 56 % (ref 43–75)
NRBC BLD AUTO-RTO: 0 /100 WBCS
PLATELET # BLD AUTO: 279 THOUSANDS/UL (ref 149–390)
PMV BLD AUTO: 11 FL (ref 8.9–12.7)
POTASSIUM SERPL-SCNC: 4.4 MMOL/L (ref 3.5–5.3)
PROT SERPL-MCNC: 9 G/DL (ref 6.4–8.2)
PSA SERPL-MCNC: 0.6 NG/ML (ref 0–4)
RBC # BLD AUTO: 5.54 MILLION/UL (ref 3.88–5.62)
SODIUM SERPL-SCNC: 132 MMOL/L (ref 136–145)
TRIGL SERPL-MCNC: 106 MG/DL
TSH SERPL DL<=0.05 MIU/L-ACNC: 0.87 UIU/ML (ref 0.36–3.74)
WBC # BLD AUTO: 6.98 THOUSAND/UL (ref 4.31–10.16)

## 2022-01-11 PROCEDURE — G0103 PSA SCREENING: HCPCS

## 2022-01-11 PROCEDURE — 84443 ASSAY THYROID STIM HORMONE: CPT

## 2022-01-11 PROCEDURE — 3008F BODY MASS INDEX DOCD: CPT | Performed by: NURSE PRACTITIONER

## 2022-01-11 PROCEDURE — 80053 COMPREHEN METABOLIC PANEL: CPT

## 2022-01-11 PROCEDURE — 90471 IMMUNIZATION ADMIN: CPT

## 2022-01-11 PROCEDURE — 1036F TOBACCO NON-USER: CPT | Performed by: NURSE PRACTITIONER

## 2022-01-11 PROCEDURE — 85025 COMPLETE CBC W/AUTO DIFF WBC: CPT

## 2022-01-11 PROCEDURE — 36415 COLL VENOUS BLD VENIPUNCTURE: CPT

## 2022-01-11 PROCEDURE — 80061 LIPID PANEL: CPT

## 2022-01-11 PROCEDURE — 3725F SCREEN DEPRESSION PERFORMED: CPT | Performed by: NURSE PRACTITIONER

## 2022-01-11 PROCEDURE — 90682 RIV4 VACC RECOMBINANT DNA IM: CPT

## 2022-01-11 PROCEDURE — 99396 PREV VISIT EST AGE 40-64: CPT | Performed by: NURSE PRACTITIONER

## 2022-01-11 RX ORDER — OLMESARTAN MEDOXOMIL 20 MG/1
20 TABLET ORAL DAILY
Qty: 30 TABLET | Refills: 0 | Status: SHIPPED | OUTPATIENT
Start: 2022-01-11 | End: 2022-02-07

## 2022-01-11 NOTE — PATIENT INSTRUCTIONS
Wellness Visit for Adults   AMBULATORY CARE:   A wellness visit  is when you see your healthcare provider to get screened for health problems  Your healthcare provider will also give you advice on how to stay healthy  Write down your questions so you remember to ask them  Ask your healthcare provider how often you should have a wellness visit  What happens at a wellness visit:  Your healthcare provider will ask about your health, and your family history of health problems  This includes high blood pressure, heart disease, and cancer  He or she will ask if you have symptoms that concern you, if you smoke, and about your mood  You may also be asked about your intake of medicines, supplements, food, and alcohol  Any of the following may be done:  · Your weight  will be checked  Your height may also be checked so your body mass index (BMI) can be calculated  Your BMI shows if you are at a healthy weight  · Your blood pressure  and heart rate will be checked  Your temperature may also be checked  · Blood and urine tests  may be done  Blood tests may be done to check your cholesterol levels  Abnormal cholesterol levels increase your risk for heart disease and stroke  You may also need a blood or urine test to check for diabetes if you are at increased risk  Urine tests may be done to look for signs of an infection or kidney disease  · A physical exam  includes checking your heartbeat and lungs with a stethoscope  Your healthcare provider may also check your skin to look for sun damage  · Screening tests  may be recommended  A screening test is done to check for diseases that may not cause symptoms  The screening tests you may need depend on your age, gender, family history, and lifestyle habits  For example, colorectal screening may be recommended if you are 48years old or older  Screening tests you need if you are a woman:   · A Pap smear  is used to screen for cervical cancer   Pap smears are usually done every 3 to 5 years depending on your age  You may need them more often if you have had abnormal Pap smear test results in the past  Ask your healthcare provider how often you should have a Pap smear  · A mammogram  is an x-ray of your breasts to screen for breast cancer  Experts recommend mammograms every 2 years starting at age 48 years  You may need a mammogram at age 52 years or younger if you have an increased risk for breast cancer  Talk to your healthcare provider about when you should start having mammograms and how often you need them  Vaccines you may need:   · Get an influenza vaccine  every year  The influenza vaccine protects you from the flu  Several types of viruses cause the flu  The viruses change over time, so new vaccines are made each year  · Get a tetanus-diphtheria (Td) booster vaccine  every 10 years  This vaccine protects you against tetanus and diphtheria  Tetanus is a severe infection that may cause painful muscle spasms and lockjaw  Diphtheria is a severe bacterial infection that causes a thick covering in the back of your mouth and throat  · Get a human papillomavirus (HPV) vaccine  if you are female and aged 23 to 32 or male 23 to 24 and never received it  This vaccine protects you from HPV infection  HPV is the most common infection spread by sexual contact  HPV may also cause vaginal, penile, and anal cancers  · Get a pneumococcal vaccine  if you are aged 72 years or older  The pneumococcal vaccine is an injection given to protect you from pneumococcal disease  Pneumococcal disease is an infection caused by pneumococcal bacteria  The infection may cause pneumonia, meningitis, or an ear infection  · Get a shingles vaccine  if you are 60 or older, even if you have had shingles before  The shingles vaccine is an injection to protect you from the varicella-zoster virus  This is the same virus that causes chickenpox   Shingles is a painful rash that develops in people who had chickenpox or have been exposed to the virus  How to eat healthy:  My Plate is a model for planning healthy meals  It shows the types and amounts of foods that should go on your plate  Fruits and vegetables make up about half of your plate, and grains and protein make up the other half  A serving of dairy is included on the side of your plate  The amount of calories and serving sizes you need depends on your age, gender, weight, and height  Examples of healthy foods are listed below:  · Eat a variety of vegetables  such as dark green, red, and orange vegetables  You can also include canned vegetables low in sodium (salt) and frozen vegetables without added butter or sauces  · Eat a variety of fresh fruits , canned fruit in 100% juice, frozen fruit, and dried fruit  · Include whole grains  At least half of the grains you eat should be whole grains  Examples include whole-wheat bread, wheat pasta, brown rice, and whole-grain cereals such as oatmeal     · Eat a variety of protein foods such as seafood (fish and shellfish), lean meat, and poultry without skin (turkey and chicken)  Examples of lean meats include pork leg, shoulder, or tenderloin, and beef round, sirloin, tenderloin, and extra lean ground beef  Other protein foods include eggs and egg substitutes, beans, peas, soy products, nuts, and seeds  · Choose low-fat dairy products such as skim or 1% milk or low-fat yogurt, cheese, and cottage cheese  · Limit unhealthy fats  such as butter, hard margarine, and shortening  Exercise:  Exercise at least 30 minutes per day on most days of the week  Some examples of exercise include walking, biking, dancing, and swimming  You can also fit in more physical activity by taking the stairs instead of the elevator or parking farther away from stores  Include muscle strengthening activities 2 days each week  Regular exercise provides many health benefits   It helps you manage your weight, and decreases your risk for type 2 diabetes, heart disease, stroke, and high blood pressure  Exercise can also help improve your mood  Ask your healthcare provider about the best exercise plan for you  General health and safety guidelines:   · Do not smoke  Nicotine and other chemicals in cigarettes and cigars can cause lung damage  Ask your healthcare provider for information if you currently smoke and need help to quit  E-cigarettes or smokeless tobacco still contain nicotine  Talk to your healthcare provider before you use these products  · Limit alcohol  A drink of alcohol is 12 ounces of beer, 5 ounces of wine, or 1½ ounces of liquor  · Lose weight, if needed  Being overweight increases your risk of certain health conditions  These include heart disease, high blood pressure, type 2 diabetes, and certain types of cancer  · Protect your skin  Do not sunbathe or use tanning beds  Use sunscreen with a SPF 15 or higher  Apply sunscreen at least 15 minutes before you go outside  Reapply sunscreen every 2 hours  Wear protective clothing, hats, and sunglasses when you are outside  · Drive safely  Always wear your seatbelt  Make sure everyone in your car wears a seatbelt  A seatbelt can save your life if you are in an accident  Do not use your cell phone when you are driving  This could distract you and cause an accident  Pull over if you need to make a call or send a text message  · Practice safe sex  Use latex condoms if are sexually active and have more than one partner  Your healthcare provider may recommend screening tests for sexually transmitted infections (STIs)  · Wear helmets, lifejackets, and protective gear  Always wear a helmet when you ride a bike or motorcycle, go skiing, or play sports that could cause a head injury  Wear protective equipment when you play sports  Wear a lifejacket when you are on a boat or doing water sports      © Copyright [employer] 2021 Information is for End User's use only and may not be sold, redistributed or otherwise used for commercial purposes  All illustrations and images included in CareNotes® are the copyrighted property of [employer]  or Raquel Izaguirre  The above information is an  only  It is not intended as medical advice for individual conditions or treatments  Talk to your doctor, nurse or pharmacist before following any medical regimen to see if it is safe and effective for you  Weight Management   AMBULATORY CARE:   Why it is important to manage your weight:  Being overweight increases your risk of health conditions such as heart disease, high blood pressure, type 2 diabetes, and certain types of cancer  It can also increase your risk for osteoarthritis, sleep apnea, and other respiratory problems  Aim for a slow, steady weight loss  Even a small amount of weight loss can lower your risk of health problems  How to lose weight safely:  A safe and healthy way to lose weight is to eat fewer calories and get regular exercise  · You can lose up about 1 pound a week by decreasing the number of calories you eat by 500 calories each day  You can decrease calories by eating smaller portion sizes or by cutting out high-calorie foods  Read labels to find out how many calories are in the foods you eat  · You can also burn calories with exercise such as walking, swimming, or biking  You will be more likely to keep weight off if you make these changes part of your lifestyle  Exercise at least 30 minutes per day on most days of the week  You can also fit in more physical activity by taking the stairs instead of the elevator or parking farther away from stores  Ask your healthcare provider about the best exercise plan for you  Healthy meal plan for weight management:  A healthy meal plan includes a variety of foods, contains fewer calories, and helps you stay healthy   A healthy meal plan includes the following:     · Eat whole-grain foods more often  A healthy meal plan should contain fiber  Fiber is the part of grains, fruits, and vegetables that is not broken down by your body  Whole-grain foods are healthy and provide extra fiber in your diet  Some examples of whole-grain foods are whole-wheat breads and pastas, oatmeal, brown rice, and bulgur  · Eat a variety of vegetables every day  Include dark, leafy greens such as spinach, kale, corina greens, and mustard greens  Eat yellow and orange vegetables such as carrots, sweet potatoes, and winter squash  · Eat a variety of fruits every day  Choose fresh or canned fruit (canned in its own juice or light syrup) instead of juice  Fruit juice has very little or no fiber  · Eat low-fat dairy foods  Drink fat-free (skim) milk or 1% milk  Eat fat-free yogurt and low-fat cottage cheese  Try low-fat cheeses such as mozzarella and other reduced-fat cheeses  · Choose meat and other protein foods that are low in fat  Choose beans or other legumes such as split peas or lentils  Choose fish, skinless poultry (chicken or turkey), or lean cuts of red meat (beef or pork)  Before you cook meat or poultry, cut off any visible fat  · Use less fat and oil  Try baking foods instead of frying them  Add less fat, such as margarine, sour cream, regular salad dressing and mayonnaise to foods  Eat fewer high-fat foods  Some examples of high-fat foods include french fries, doughnuts, ice cream, and cakes  · Eat fewer sweets  Limit foods and drinks that are high in sugar  This includes candy, cookies, regular soda, and sweetened drinks  Ways to decrease calories:   · Eat smaller portions  ? Use a small plate with smaller servings  ? Do not eat second helpings  ? When you eat at a restaurant, ask for a box and place half of your meal in the box before you eat  ? Share an entrée with someone else  · Replace high-calorie snacks with healthy, low-calorie snacks  ? Choose fresh fruit, vegetables, fat-free rice cakes, or air-popped popcorn instead of potato chips, nuts, or chocolate  ? Choose water or calorie-free drinks instead of soda or sweetened drinks  · Do not shop for groceries when you are hungry  You may be more likely to make unhealthy food choices  Take a grocery list of healthy foods and shop after you have eaten  · Eat regular meals  Do not skip meals  Skipping meals can lead to overeating later in the day  This can make it harder for you to lose weight  Eat a healthy snack in place of a meal if you do not have time to eat a regular meal  Talk with a dietitian to help you create a meal plan and schedule that is right for you  Other things to consider as you try to lose weight:   · Be aware of situations that may give you the urge to overeat, such as eating while watching television  Find ways to avoid these situations  For example, read a book, go for a walk, or do crafts  · Meet with a weight loss support group or friends who are also trying to lose weight  This may help you stay motivated to continue working on your weight loss goals  © Copyright [employer] 2021 Information is for End User's use only and may not be sold, redistributed or otherwise used for commercial purposes  All illustrations and images included in CareNotes® are the copyrighted property of [employer]  or Raquel Izaguirre  The above information is an  only  It is not intended as medical advice for individual conditions or treatments  Talk to your doctor, nurse or pharmacist before following any medical regimen to see if it is safe and effective for you  Obesity   AMBULATORY CARE:   Obesity  is when your body mass index (BMI) is greater than 30  Your healthcare provider will use your height and weight to measure your BMI  The risks of obesity include  many health problems, including injuries or physical disability   You may need tests to check for the following:  · Diabetes    · High blood pressure or high cholesterol    · Heart disease    · Stroke    · Gallbladder or liver disease    · Cancer of the colon, breast, prostate, liver, or kidney    · Sleep apnea    · Arthritis or gout    Seek care immediately if:   · You have a severe headache, confusion, or difficulty speaking  · You have weakness on one side of your body  · You have chest pain, sweating, or shortness of breath  Call your doctor if:   · You have symptoms of gallbladder or liver disease, such as pain in your upper abdomen  · You have knee or hip pain and discomfort while walking  · You have symptoms of diabetes, such as intense hunger and thirst, and frequent urination  · You have symptoms of sleep apnea, such as snoring or daytime sleepiness  · You have questions or concerns about your condition or care  Treatment for obesity  focuses on helping you lose weight to improve your health  Even a small decrease in BMI can reduce the risk for many health problems  Your healthcare provider will help you set a weight-loss goal   · Lifestyle changes  are the first step in treating obesity  These include making healthy food choices and getting regular physical activity  Your healthcare provider may suggest a weight-loss program that involves coaching, education, and therapy  · Medicine  may help you lose weight when it is used with a healthy foods and physical activity  · Surgery  can help you lose weight if you are very obese and have other health problems  There are several types of weight-loss surgery  Ask your healthcare provider for more information  Be successful losing weight:   · Set small, realistic goals  An example of a small goal is to walk for 20 minutes 5 days a week  Luis Fernando goal is to lose 5% of your body weight  · Tell friends, family members, and coworkers about your goals  and ask for their support   Ask a friend to lose weight with you, or join a weight-loss support group  · Identify foods or triggers that may cause you to overeat , and find ways to avoid them  Remove tempting high-calorie foods from your home and workplace  Place a bowl of fresh fruit on your kitchen counter  If stress causes you to eat, then find other ways to cope with stress  A counselor or therapist may be able to help you  · Keep a diary to track what you eat and drink  Also write down how many minutes of physical activity you do each day  Weigh yourself once a week and record it in your diary  Eating changes: You will need to eat 500 to 1,000 fewer calories each day than you currently eat to lose 1 to 2 pounds a week  The following changes will help you cut calories:  · Eat smaller portions  Use small plates, no larger than 9 inches in diameter  Fill your plate half full of fruits and vegetables  Measure your food using measuring cups until you know what a serving size looks like  · Eat 3 meals and 1 or 2 snacks each day  Plan your meals in advance  Sana Littlejohn and eat at home most of the time  Eat slowly  Do not skip meals  Skipping meals can lead to overeating later in the day  This can make it harder for you to lose weight  Talk with a dietitian to help you make a meal plan and schedule that is right for you  · Eat fruits and vegetables at every meal   They are low in calories and high in fiber, which makes you feel full  Do not add butter, margarine, or cream sauce to vegetables  Use herbs to season steamed vegetables  · Eat less fat and fewer fried foods  Eat more baked or grilled chicken and fish  These protein sources are lower in calories and fat than red meat  Limit fast food  Dress your salads with olive oil and vinegar instead of bottled dressing  · Limit the amount of sugar you eat  Do not drink sugary beverages  Limit alcohol  Activity changes:  Physical activity is good for your body in many ways   It helps you burn calories and build strong muscles  It decreases stress and depression, and improves your mood  It can also help you sleep better  Talk to your healthcare provider before you begin an exercise program   · Exercise for at least 30 minutes 5 days a week  Start slowly  Set aside time each day for physical activity that you enjoy and that is convenient for you  It is best to do both weight training and an activity that increases your heart rate, such as walking, bicycling, or swimming  · Find ways to be more active  Do yard work and housecleaning  Walk up the stairs instead of using elevators  Spend your leisure time going to events that require walking, such as outdoor festivals or fairs  This extra physical activity can help you lose weight and keep it off  Follow up with your doctor as directed: You may need to meet with a dietitian  Write down your questions so you remember to ask them during your visits  © Copyright [employer] 2021 Information is for End User's use only and may not be sold, redistributed or otherwise used for commercial purposes  All illustrations and images included in CareNotes® are the copyrighted property of [employer]  or [facility] Leonel petra   The above information is an  only  It is not intended as medical advice for individual conditions or treatments  Talk to your doctor, nurse or pharmacist before following any medical regimen to see if it is safe and effective for you  Cholesterol and Your Health   AMBULATORY CARE:   Cholesterol  is a waxy, fat-like substance  Your body uses cholesterol to make hormones and new cells, and to protect nerves  Cholesterol is made by your body  It also comes from certain foods you eat, such as meat and dairy products  Your healthcare provider can help you set goals for your cholesterol levels  He or she can help you create a plan to meet your goals  Cholesterol level goals:   Your cholesterol level goals depend on your risk for heart disease, your age, and your other health conditions  The following are general guidelines:  · Total cholesterol  includes low-density lipoprotein (LDL), high-density lipoprotein (HDL), and triglyceride levels  The total cholesterol level should be lower than 200 mg/dL and is best at about 150 mg/dL  · LDL cholesterol  is called bad cholesterol  because it forms plaque in your arteries  As plaque builds up, your arteries become narrow, and less blood flows through  When plaque decreases blood flow to your heart, you may have chest pain  If plaque completely blocks an artery that brings blood to your heart, you may have a heart attack  Plaque can break off and form blood clots  Blood clots may block arteries in your brain and cause a stroke  The level should be less than 130 mg/dL and is best at about 100 mg/dL  · HDL cholesterol  is called good cholesterol  because it helps remove LDL cholesterol from your arteries  It does this by attaching to LDL cholesterol and carrying it to your liver  Your liver breaks down LDL cholesterol so your body can get rid of it  High levels of HDL cholesterol can help prevent a heart attack and stroke  Low levels of HDL cholesterol can increase your risk for heart disease, heart attack, and stroke  The level should be 60 mg/dL or higher  · Triglycerides  are a type of fat that store energy from foods you eat  High levels of triglycerides also cause plaque buildup  This can increase your risk for a heart attack or stroke  If your triglyceride level is high, your LDL cholesterol level may also be high  The level should be less than 150 mg/dL      Any of the following can increase your risk for high cholesterol:   · Smoking cigarettes    · Being overweight or obese, or not getting enough exercise    · Drinking large amounts of alcohol    · A medical condition such as hypertension (high blood pressure) or diabetes    · Certain genes passed from your parents to you    · Age older than 72 years    What you need to know about having your cholesterol levels checked: Adults 21to 39years of age should have their cholesterol levels checked every 4 to 6 years  Adults 45 years or older should have their cholesterol checked every 1 to 2 years  You may need your cholesterol checked more often, or at a younger age, if you have risk factors for heart disease  You may also need to have your cholesterol checked more often if you have other health conditions, such as diabetes  Blood tests are used to check cholesterol levels  Blood tests measure your levels of triglycerides, LDL cholesterol, and HDL cholesterol  How healthy fats affect your cholesterol levels:  Healthy fats, also called unsaturated fats, help lower LDL cholesterol and triglyceride levels  Healthy fats include the following:  · Monounsaturated fats  are found in foods such as olive oil, canola oil, avocado, nuts, and olives  · Polyunsaturated fats,  such as omega 3 fats, are found in fish, such as salmon, trout, and tuna  They can also be found in plant foods such as flaxseed, walnuts, and soybeans  How unhealthy fats affect your cholesterol levels:  Unhealthy fats increase LDL cholesterol and triglyceride levels  They are found in foods high in cholesterol, saturated fat, and trans fat:  · Cholesterol  is found in eggs, dairy, and meat  · Saturated fat  is found in butter, cheese, ice cream, whole milk, and coconut oil  Saturated fat is also found in meat, such as sausage, hot dogs, and bologna  · Trans fat  is found in liquid oils and is used in fried and baked foods  Foods that contain trans fats include chips, crackers, muffins, sweet rolls, microwave popcorn, and cookies  Treatment  for high cholesterol will also decrease your risk of heart disease, heart attack, and stroke  Treatment may include any of the following:  · Lifestyle changes  may include food, exercise, weight loss, and quitting smoking   You may also need to decrease the amount of alcohol you drink  Your healthcare provider will want you to start with lifestyle changes  Other treatment may be added if lifestyle changes are not enough  Your healthcare provider may recommend you work with a team to manage hyperlipidemia  The team may include medical experts such as a dietitian, an exercise or physical therapist, and a behavior therapist  Your family members may be included in helping you create lifestyle changes  · Medicines  may be given to lower your LDL cholesterol, triglyceride levels, or total cholesterol level  You may need medicines to lower your cholesterol if any of the following is true:    ? You have a history of stroke, TIA, unstable angina, or a heart attack  ? Your LDL cholesterol level is 190 mg/dL or higher  ? You are age 36 to 76 years, have diabetes or heart disease risk factors, and your LDL cholesterol is 70 mg/dL or higher  · Supplements  include fish oil, red yeast rice, and garlic  Fish oil may help lower your triglyceride and LDL cholesterol levels  It may also increase your HDL cholesterol level  Red yeast rice may help decrease your total cholesterol level and LDL cholesterol level  Garlic may help lower your total cholesterol level  Do not take any supplements without talking to your healthcare provider  Food changes you can make to lower your cholesterol levels:  A dietitian can help you create a healthy eating plan  He or she can show you how to read food labels and choose foods low in saturated fat, trans fats, and cholesterol  · Decrease the total amount of fat you eat  Choose lean meats, fat-free or 1% fat milk, and low-fat dairy products, such as yogurt and cheese  Try to limit or avoid red meats  Limit or do not eat fried foods or baked goods, such as cookies  · Replace unhealthy fats with healthy fats  Cook foods in olive oil or canola oil  Choose soft margarines that are low in saturated fat and trans fat   Seeds, nuts, and avocados are other examples of healthy fats  · Eat foods with omega-3 fats  Examples include salmon, tuna, mackerel, walnuts, and flaxseed  Eat fish 2 times per week  Pregnant women should not eat fish that have high levels of mercury, such as shark, swordfish, and jef mackerel  · Increase the amount of high-fiber foods you eat  High-fiber foods can help lower your LDL cholesterol  Aim to get between 20 and 30 grams of fiber each day  Fruits and vegetables are high in fiber  Eat at least 5 servings each day  Other high-fiber foods are whole-grain or whole-wheat breads, pastas, or cereals, and brown rice  Eat 3 ounces of whole-grain foods each day  Increase fiber slowly  You may have abdominal discomfort, bloating, and gas if you add fiber to your diet too quickly  · Eat healthy protein foods  Examples include low-fat dairy products, skinless chicken and turkey, fish, and nuts  · Limit foods and drinks that are high in sugar  Your dietitian or healthcare provider can help you create daily limits for high-sugar foods and drinks  The limit may be lower if you have diabetes or another health condition  Limits can also help you lose weight if needed  Lifestyle changes you can make to lower your cholesterol levels:   · Maintain a healthy weight  Ask your healthcare provider what a healthy weight is for you  Ask him or her to help you create a weight loss plan if needed  Weight loss can decrease your total cholesterol and triglyceride levels  Weight loss may also help keep your blood pressure at a healthy level  · Be physically active throughout the day  Physical activity, such as exercise, can help lower your total cholesterol level and maintain a healthy weight  Physical activity can also help increase your HDL cholesterol level  Work with your healthcare provider to create an program that is right for you   Get at least 30 to 40 minutes of moderate physical activity most days of the week  Examples of exercise include brisk walking, swimming, or biking  Also include strength training at least 2 times each week  Your healthcare providers can help you create a physical activity plan  · Do not smoke  Nicotine and other chemicals in cigarettes and cigars can raise your cholesterol levels  Ask your healthcare provider for information if you currently smoke and need help to quit  E-cigarettes or smokeless tobacco still contain nicotine  Talk to your healthcare provider before you use these products  · Limit or do not drink alcohol  Alcohol can increase your triglyceride levels  Ask your healthcare provider before you drink alcohol  Ask how much is okay for you to drink in 24 hours or 1 week  Follow up with your doctor as directed:  Write down your questions so you remember to ask them during your visits  © Copyright [employer] 2021 Information is for End User's use only and may not be sold, redistributed or otherwise used for commercial purposes  All illustrations and images included in CareNotes® are the copyrighted property of A D A M , Inc  or [facility] [employer] [facility]  The above information is an  only  It is not intended as medical advice for individual conditions or treatments  Talk to your doctor, nurse or pharmacist before following any medical regimen to see if it is safe and effective for you  Hypertension   AMBULATORY CARE:   Hypertension  is high blood pressure (BP)  Your BP is the force of your blood moving against the walls of your arteries  Normal BP is less than 120/80  Prehypertension is between 120/80 and 139/89  Hypertension is 140/90 or higher  Hypertension causes your BP to get so high that your heart has to work much harder than normal  This can damage your heart  You can control hypertension with a healthy lifestyle or medicines   A controlled blood pressure helps protect your organs, such as your heart, lungs, brain, and kidneys  Common symptoms include the following:   · Headache     · Blurred vision     · Chest pain     · Dizziness or weakness     · Trouble breathing    · Nosebleeds  Call 911 for any of the following:   · You have discomfort in your chest that feels like squeezing, pressure, fullness, or pain  · You become confused or have difficulty speaking  · You suddenly feel lightheaded or have trouble breathing  · You have pain or discomfort in your back, neck, jaw, stomach, or arm  Seek care immediately if:   · You have a severe headache or vision loss  · You have weakness in an arm or leg  Contact your healthcare provider if:   · You feel faint, dizzy, confused, or drowsy  · You have been taking your BP medicine and your BP is still higher than your healthcare provider says it should be  · You have questions or concerns about your condition or care  Treatment for hypertension  may include medicine to lower your BP and lower your cholesterol level  A low cholesterol level helps prevent heart disease and makes it easier to control your blood pressure  You may also need to make lifestyle changes  Take your medicine exactly as directed  Manage hypertension:  Talk with your healthcare provider about these and other ways to manage hypertension:  · Check your BP at home  Sit and rest for 5 minutes before you take your BP  Extend your arm and support it on a flat surface  Your arm should be at the same level as your heart  Follow the directions that came with your BP monitor  If possible, take at least 2 BP readings each time  Take your BP at least twice a day at the same times each day, such as morning and evening  Keep a record of your BP readings and bring it to your follow-up visits  Ask your healthcare provider what your BP should be  · Limit sodium (salt) as directed  Too much sodium can affect your fluid balance  Check labels to find low-sodium or no-salt-added foods   Some low-sodium foods use potassium salts for flavor  Too much potassium can also cause health problems  Your healthcare provider will tell you how much sodium and potassium are safe for you to have in a day  He or she may recommend that you limit sodium to 2,300 mg a day  · Follow the meal plan recommended by your healthcare provider  A dietitian or your provider can give you more information on low-sodium plans or the DASH (Dietary Approaches to Stop Hypertension) eating plan  The DASH plan is low in sodium, unhealthy fats, and total fat  It is high in potassium, calcium, and fiber  · Exercise to maintain a healthy weight  Exercise at least 30 minutes per day, on most days of the week  This will help decrease your blood pressure  Ask your healthcare provider about the best exercise plan for you  · Decrease stress  This may help lower your BP  Learn ways to relax, such as deep breathing or listening to music  · Limit alcohol  Women should limit alcohol to 1 drink a day  Men should limit alcohol to 2 drinks a day  A drink of alcohol is 12 ounces of beer, 5 ounces of wine, or 1½ ounces of liquor  · Do not smoke  Nicotine and other chemicals in cigarettes and cigars can increase your BP and also cause lung damage  Ask your healthcare provider for information if you currently smoke and need help to quit  E-cigarettes or smokeless tobacco still contain nicotine  Talk to your healthcare provider before you use these products  · Manage any other health conditions you have  Health conditions such as diabetes can increase your risk for hypertension  Follow your healthcare provider's instructions and take all your medicines as directed  Follow up with your healthcare provider as directed: You will need to return to have your BP checked and to have other lab tests done  Write down your questions so you remember to ask them during your visits    © 2017 [employer] Information is for End User's use only and may not be sold, redistributed or otherwise used for commercial purposes  All illustrations and images included in CareNotes® are the copyrighted property of A D A M , Inc  or Waqar Fenton  The above information is an  only  It is not intended as medical advice for individual conditions or treatments  Talk to your doctor, nurse or pharmacist before following any medical regimen to see if it is safe and effective for you  DASH Eating Plan   AMBULATORY CARE:   The DASH (Dietary Approaches to Stop Hypertension) Eating Plan  is designed to help prevent or lower high blood pressure  It can also help to lower LDL (bad) cholesterol and decrease your risk for heart disease  The plan is low in sodium, sugar, unhealthy fats, and total fat  It is high in potassium, calcium, magnesium, and fiber  These nutrients are added when you eat more fruits, vegetables, and whole grains  Your sodium limit each day: Your dietitian will tell you how much sodium is safe for you to have each day  People with high blood pressure should have no more than 1,500 to 2,300 mg of sodium in a day  A teaspoon (tsp) of salt has 2,300 mg of sodium  This may seem like a difficult goal, but small changes to the foods you eat can make a big difference  Your healthcare provider or dietitian can help you create a meal plan that follows your sodium limit  How to limit sodium:   · Read food labels  Food labels can help you choose foods that are low in sodium  The amount of sodium is listed in milligrams (mg)  The % Daily Value (DV) column tells you how much of your daily needs are met by 1 serving of the food for each nutrient listed  Choose foods that have less than 5% of the DV of sodium  These foods are considered low in sodium  Foods that have 20% or more of the DV of sodium are considered high in sodium  Avoid foods that have more than 300 mg of sodium in each serving   Choose foods that say low-sodium, reduced-sodium, or no salt added on the food label  · Avoid salt  Do not salt food at the table, and add very little salt to foods during cooking  Use herbs and spices, such as onions, garlic, and salt-free seasonings to add flavor to foods  Try lemon or lime juice or vinegar to give foods a tart flavor  Use hot peppers or a small amount of hot pepper sauce to add a spicy flavor to foods  · Ask about salt substitutes  Ask your healthcare provider if you may use salt substitutes  Some salt substitutes have ingredients that can be harmful if you have certain health conditions  · Choose foods carefully at restaurants  Meals from restaurants, especially fast food restaurants, are often high in sodium  Some restaurants have nutrition information that tells you the amount of sodium in their foods  Ask to have your food prepared with less, or no salt  What you need to know about fats:   · Include healthy fats  Examples are unsaturated fats and omega-3 fatty acids  Unsaturated fats are found in soybean, canola, olive, or sunflower oil, and liquid and soft tub margarines  Omega-3 fatty acids are found in fatty fish, such as salmon, tuna, mackerel, and sardines  It is also found in flaxseed oil and ground flaxseed  · Avoid unhealthy fats  Do not eat unhealthy fats, such as saturated fats and trans fats  Saturated fats are found in foods that contain fat from animals  Examples are fatty meats, whole milk, butter, cream, and other dairy foods  It is also found in shortening, stick margarine, palm oil, and coconut oil  Trans fats are found in fried foods, crackers, chips, and baked goods made with margarine or shortening  Foods to include: With the DASH eating plan, you need to eat a certain number of servings from each food group  This will help you get enough of certain nutrients and limit others  The amount of servings you should eat depends on how many calories you need   Your dietitian can tell you how many calories you need  The number of servings listed next to the food groups below are for people who need about 2,000 calories each day    · Grains:  6 to 8 servings (3 of these servings should be whole-grain foods)    ¨ 1 slice of whole-grain bread     ¨ 1 ounce of dry cereal    ¨ ½ cup of cooked cereal, pasta, or brown rice    · Vegetables and fruits:  4 to 5 servings of fruits and 4 to 5 servings of vegetables    ¨ 1 medium fruit    ¨ ½ cup of frozen, canned (no added salt), or chopped fresh vegetables     ¨ ½ cup of fresh, frozen, dried, or canned fruit (canned in light syrup or fruit juice)    ¨ ½ cup of vegetable or fruit juice    · Dairy:  2 to 3 servings    ¨ 1 cup of nonfat (skim) or 1% milk    ¨ 1½ ounces of fat-free or low-fat cheese    ¨ 6 ounces of nonfat or low-fat yogurt    · Lean meat, poultry, and fish:  6 ounces or less    [employer] (chicken, turkey) with no skin    ¨ Fish (especially fatty fish, such as salmon, fresh tuna, or mackerel)    ¨ Lean beef and pork (loin, round, extra lean hamburger)    ¨ Egg whites and egg substitutes    · Nuts, seeds, and legumes:  4 to 5 servings each week    ¨ ½ cup of cooked beans and peas    ¨ 1½ ounces of unsalted nuts    ¨ 2 tablespoons of peanut butter or seeds    · Sweets and added sugars:  5 or less each week    ¨ 1 tablespoon of sugar, jelly, or jam    ¨ ½ cup of sorbet or gelatin    ¨ 1 cup of lemonade    · Fats:  2 to 3 servings each week    ¨ 1 teaspoon of soft margarine or vegetable oil    ¨ 1 tablespoon of mayonnaise    ¨ 2 tablespoons of salad dressing  Foods to avoid:   · Grains:      [employer], such as doughnuts, pastries, cookies, and biscuits (high in fat and sugar)    ¨ Mixes for cornbread and biscuits, packaged foods, such as bread stuffing, rice and pasta mixes, macaroni and cheese, and instant cereals (high in sodium)    · Fruits and vegetables:      ¨ Regular, canned vegetables (high in sodium)    ¨ Sauerkraut, pickled vegetables, and other foods prepared in brine (high in sodium)    ¨ Fried vegetables or vegetables in butter or high-fat sauces    ¨ Fruit in cream or butter sauce (high in fat)    · Dairy:      ¨ Whole milk, 2% milk, and cream (high in fat)    ¨ Regular cheese and processed cheese (high in fat and sodium)    · Meats and protein foods:      ¨ Smoked or cured meat, such as corned beef, santos, ham, hot dogs, and sausage (high in fat and sodium)    ¨ Canned beans and canned meats or spreads, such as potted meats, sardines, anchovies, and imitation seafood (high in sodium)    ¨ Deli or lunch meats, such as bologna, ham, turkey, and roast beef (high in sodium)    ¨ High-fat meat (T-bone steak, regular hamburger, and ribs)    ¨ Whole eggs and egg yolks (high in fat)    · Other:      ¨ Seasonings made with salt, such as garlic salt, celery salt, onion salt, seasoned salt, meat tenderizers, and monosodium glutamate (MSG)    ¨ Miso soup and canned or dried soup mixes (high in sodium)    ¨ Regular soy sauce, barbecue sauce, teriyaki sauce, steak sauce, Worcestershire sauce, and most flavored vinegars (high in sodium)    ¨ Regular condiments, such as mustard, ketchup, and salad dressings (high in sodium)    ¨ Gravy and sauces, such as Rogelio or cheese sauces (high in sodium and fat)    ¨ Drinks high in sugar, such as soda or fruit drinks    [employer] foods, such as salted chips, popcorn, pretzels, pork rinds, salted crackers, and salted nuts    ¨ Frozen foods, such as dinners, entrees, vegetables with sauces, and breaded meats (high in sodium)  Other guidelines to follow:   · Maintain a healthy weight  Your risk for heart disease is higher if you are overweight  Your healthcare provider may suggest that you lose weight if you are overweight  You can lose weight by eating fewer calories and foods that have added sugars and fat  The DASH meal plan can help you do this   Decrease calories by eating smaller portions at each meal and fewer snacks  Ask your healthcare provider for more information about how to lose weight  · Exercise regularly  Regular exercise can help you reach or maintain a healthy weight  Regular exercise can also help decrease your blood pressure and improve your cholesterol levels  Get 30 minutes or more of moderate exercise each day of the week  To lose weight, get at least 60 minutes of exercise  Talk to your healthcare provider about the best exercise program for you  · Limit alcohol  Women should limit alcohol to 1 drink a day  Men should limit alcohol to 2 drinks a day  A drink of alcohol is 12 ounces of beer, 5 ounces of wine, or 1½ ounces of liquor  © 2017 [facility] [facility] Information is for End User's use only and may not be sold, redistributed or otherwise used for commercial purposes  All illustrations and images included in CareNotes® are the copyrighted property of [employer]  or Waqar Fenton  The above information is an  only  It is not intended as medical advice for individual conditions or treatments  Talk to your doctor, nurse or pharmacist before following any medical regimen to see if it is safe and effective for you

## 2022-01-11 NOTE — PROGRESS NOTES
[facility]    NAME: Jonh Oliver  AGE: 60 y o  SEX: male  : 1965     DATE: 2022     Assessment and Plan:     Problem List Items Addressed This Visit     Class 1 obesity due to excess calories with body mass index (BMI) of 33 0 to 33 9 in adult    Elevated LDL cholesterol level    Relevant Orders    Lipid Panel with Direct LDL reflex      Other Visit Diagnoses     Annual physical exam    -  Primary    Screening for colon cancer        Relevant Orders    Cologuard    Screening for hyperlipidemia        Screening for thyroid disorder        Relevant Orders    TSH, 3rd generation with Free T4 reflex    Screening for deficiency anemia        Relevant Orders    CBC and differential    Screening for diabetes mellitus        Relevant Orders    Comprehensive metabolic panel    Screening for prostate cancer        Relevant Orders    PSA, Total Screen    Need for influenza vaccination        Relevant Orders    influenza vaccine, quadrivalent, recombinant, PF, 0 5 mL, for patients 18 yr+ (FLUBLOK) (Completed)    Benign essential HTN        Relevant Medications    olmesartan (BENICAR) 20 mg tablet    Depression screening negative        Sensorineural hearing loss (SNHL) of both ears        Relevant Orders    Ambulatory Referral to Audiology          Immunizations and preventive care screenings were discussed with patient today  Appropriate education was printed on patient's after visit summary  Counseling:  Alcohol/drug use: discussed moderation in alcohol intake, the recommendations for healthy alcohol use, and avoidance of illicit drug use  Dental Health: discussed importance of regular tooth brushing, flossing, and dental visits  Injury prevention: discussed safety/seat belts, safety helmets, smoke detectors, carbon dioxide detectors, and smoking near bedding or upholstery    Sexual health: discussed sexually transmitted diseases, partner selection, use of condoms, avoidance of unintended pregnancy, and contraceptive alternatives  · Exercise: the importance of regular exercise/physical activity was discussed  Recommend exercise 3-5 times per week for at least 30 minutes  BMI Counseling: Body mass index is 33 kg/m²  The BMI is above normal  Nutrition recommendations include decreasing portion sizes, consuming healthier snacks, limiting drinks that contain sugar, moderation in carbohydrate intake and reducing intake of cholesterol  Exercise recommendations include exercising 3-5 times per week  No pharmacotherapy was ordered  Rationale for BMI follow-up plan is due to patient being overweight or obese  Depression Screening and Follow-up Plan: Patient was screened for depression during today's encounter  They screened negative with a PHQ-2 score of 0  Return in about 1 month (around 2/11/2022)  Chief Complaint:     Chief Complaint   Patient presents with    Annual Exam      History of Present Illness:   Hypertension  This is a chronic problem  The problem is uncontrolled  Risk factors for coronary artery disease include male gender, obesity and dyslipidemia  Past treatments include angiotensin blockers  Compliance problems include exercise and diet (admits he did not take for most of year  Reports restarted medication in last month )  Hyperlipidemia  This is a chronic problem  Exacerbating diseases include obesity  Current antihyperlipidemic treatment includes diet change  Improvement on treatment: due to updated serology  Compliance problems include adherence to diet and adherence to exercise  Risk factors for coronary artery disease include dyslipidemia, hypertension, male sex and obesity  Adult Annual Physical   Patient here for a comprehensive physical exam  The patient reports no problems  Diet and Physical Activity  · Diet/Nutrition: well balanced diet and limited junk food  · Exercise: walking  Depression Screening  PHQ-2/9 Depression Screening    Little interest or pleasure in doing things: 0 - not at all  Feeling down, depressed, or hopeless: 0 - not at all  PHQ-2 Score: 0  PHQ-2 Interpretation: Negative depression screen       General Health  · Sleep: sleeps well and gets 7-8 hours of sleep on average  · Hearing: decreased - bilateral   · Vision: no vision problems, most recent eye exam >1 year ago and wears glasses  · Dental: no dental visits for >1 year, brushes teeth twice daily and flosses teeth occasionally   Health  · Symptoms include: none     Review of Systems:     Review of Systems   HENT: Positive for hearing loss  All other systems reviewed and are negative  Past Medical History:     Past Medical History:   Diagnosis Date    Foot fracture     Hypertension       Past Surgical History:     Past Surgical History:   Procedure Laterality Date    NO PAST SURGERIES        Family History:     Family History   Problem Relation Age of Onset   [facility] ALS Mother     Pneumonia Father     No Known Problems Sister       Social History:     Social History     Socioeconomic History    Marital status: Single     Spouse name: None    Number of children: None    Years of education: None    Highest education level: None   Occupational History    Occupation: Unemployed   Tobacco Use    Smoking status: Former Smoker     Types: Cigarettes     Quit date:      Years since quittin 0    Smokeless tobacco: Never Used   Vaping Use    Vaping Use: Never used   Substance and Sexual Activity    Alcohol use:  Yes    Drug use: Not Currently    Sexual activity: None   Other Topics Concern    None   Social History Narrative    ** Merged History Encounter **          Social Determinants of Health     Financial Resource Strain: Not on file   Food Insecurity: Not on file   Transportation Needs: Not on file   Physical Activity: Not on file   Stress: Not on file Social Connections: Not on file   Intimate Partner Violence: Not on file   Housing Stability: Not on file      Current Medications:     Current Outpatient Medications   Medication Sig Dispense Refill    olmesartan (BENICAR) 20 mg tablet Take 1 tablet (20 mg total) by mouth daily 30 tablet 0     No current facility-administered medications for this visit  Allergies:     No Known Allergies   Physical Exam:     /82 (BP Location: Left arm, Patient Position: Sitting, Cuff Size: Standard)   Pulse 100   Temp (!) 97 4 °F (36 3 °C) (Tympanic)   Ht 5' 10" (1 778 m)   Wt 104 kg (230 lb)   SpO2 96%   BMI 33 00 kg/m²     Physical Exam  Vitals and nursing note reviewed  Constitutional:       Appearance: Normal appearance  He is well-developed  He is obese  Interventions: Face mask in place  HENT:      Head: Normocephalic and atraumatic  Right Ear: External ear normal       Left Ear: External ear normal       Nose: Nose normal    Eyes:      Conjunctiva/sclera: Conjunctivae normal       Pupils: Pupils are equal, round, and reactive to light  Cardiovascular:      Rate and Rhythm: Normal rate and regular rhythm  Heart sounds: Normal heart sounds  Pulmonary:      Effort: Pulmonary effort is normal       Breath sounds: Normal breath sounds  Abdominal:      General: Bowel sounds are normal       Palpations: Abdomen is soft  Genitourinary:     Comments: Deferred  Musculoskeletal:         General: Normal range of motion  Cervical back: Normal range of motion and neck supple  Skin:     General: Skin is warm and dry  Capillary Refill: Capillary refill takes less than 2 seconds  Neurological:      Mental Status: He is alert and oriented to person, place, and time  Psychiatric:         Behavior: Behavior normal          Thought Content:  Thought content normal          Judgment: Judgment normal           CELI Madera Baring 69

## 2022-02-04 DIAGNOSIS — I10 BENIGN ESSENTIAL HTN: ICD-10-CM

## 2022-02-07 RX ORDER — OLMESARTAN MEDOXOMIL 20 MG/1
TABLET ORAL
Qty: 30 TABLET | Refills: 0 | Status: SHIPPED | OUTPATIENT
Start: 2022-02-07 | End: 2022-02-14 | Stop reason: SDUPTHER

## 2022-02-14 ENCOUNTER — OFFICE VISIT (OUTPATIENT)
Dept: FAMILY MEDICINE CLINIC | Facility: CLINIC | Age: 57
End: 2022-02-14
Payer: COMMERCIAL

## 2022-02-14 VITALS
WEIGHT: 236.8 LBS | OXYGEN SATURATION: 97 % | BODY MASS INDEX: 33.9 KG/M2 | HEART RATE: 102 BPM | HEIGHT: 70 IN | TEMPERATURE: 97.9 F | DIASTOLIC BLOOD PRESSURE: 82 MMHG | SYSTOLIC BLOOD PRESSURE: 138 MMHG

## 2022-02-14 DIAGNOSIS — F10.10 ALCOHOL USE DISORDER, MILD, ABUSE: ICD-10-CM

## 2022-02-14 DIAGNOSIS — R73.01 ELEVATED FASTING GLUCOSE: Primary | ICD-10-CM

## 2022-02-14 DIAGNOSIS — R73.03 PREDIABETES: ICD-10-CM

## 2022-02-14 DIAGNOSIS — E78.00 ELEVATED LDL CHOLESTEROL LEVEL: ICD-10-CM

## 2022-02-14 DIAGNOSIS — E66.09 CLASS 1 OBESITY DUE TO EXCESS CALORIES WITH SERIOUS COMORBIDITY AND BODY MASS INDEX (BMI) OF 33.0 TO 33.9 IN ADULT: ICD-10-CM

## 2022-02-14 DIAGNOSIS — I10 BENIGN ESSENTIAL HTN: ICD-10-CM

## 2022-02-14 LAB — SL AMB POCT HEMOGLOBIN AIC: 6.1 (ref ?–6.5)

## 2022-02-14 PROCEDURE — 83036 HEMOGLOBIN GLYCOSYLATED A1C: CPT | Performed by: NURSE PRACTITIONER

## 2022-02-14 PROCEDURE — 1036F TOBACCO NON-USER: CPT | Performed by: NURSE PRACTITIONER

## 2022-02-14 PROCEDURE — 99214 OFFICE O/P EST MOD 30 MIN: CPT | Performed by: NURSE PRACTITIONER

## 2022-02-14 PROCEDURE — 3008F BODY MASS INDEX DOCD: CPT | Performed by: NURSE PRACTITIONER

## 2022-02-14 RX ORDER — ATORVASTATIN CALCIUM 20 MG/1
20 TABLET, FILM COATED ORAL DAILY
Qty: 90 TABLET | Refills: 1 | Status: SHIPPED | OUTPATIENT
Start: 2022-02-14 | End: 2022-06-21

## 2022-02-14 RX ORDER — OLMESARTAN MEDOXOMIL 20 MG/1
20 TABLET ORAL DAILY
Qty: 90 TABLET | Refills: 1 | Status: SHIPPED | OUTPATIENT
Start: 2022-02-14

## 2022-02-14 NOTE — PROGRESS NOTES
Assessment/Plan:    Problem List Items Addressed This Visit     Class 1 obesity due to excess calories with body mass index (BMI) of 33 0 to 33 9 in adult    Elevated LDL cholesterol level    Relevant Medications    atorvastatin (LIPITOR) 20 mg tablet    Other Relevant Orders    Lipid Panel with Direct LDL reflex    Alcohol use disorder, mild, abuse    Benign essential HTN    Relevant Medications    olmesartan (BENICAR) 20 mg tablet    Prediabetes    Relevant Orders    Hemoglobin A1C      Other Visit Diagnoses     Elevated fasting glucose    -  Primary    Relevant Orders    POCT hemoglobin A1c (Completed)           Diagnoses and all orders for this visit:    Elevated fasting glucose  -     POCT hemoglobin A1c    Benign essential HTN  -     olmesartan (BENICAR) 20 mg tablet; Take 1 tablet (20 mg total) by mouth daily    Prediabetes  -     Hemoglobin A1C; Future    Elevated LDL cholesterol level  -     Lipid Panel with Direct LDL reflex; Future  -     atorvastatin (LIPITOR) 20 mg tablet; Take 1 tablet (20 mg total) by mouth daily    Class 1 obesity due to excess calories with serious comorbidity and body mass index (BMI) of 33 0 to 33 9 in adult    Alcohol use disorder, mild, abuse        No problem-specific Assessment & Plan notes found for this encounter  Subjective:      Patient ID: Ced Parent is a 60 y o  male  Patient's past medical history of hyperlipidemia, alcohol use disorder, obesity, and hypertension presents for hypertension follow-up  Patient was started on arms certain 20 mg daily  This shows the affected bring blood pressure down from 030 systolic down to 641 systolic  Noted patient's fasting glucose in the 120s and 130s  A1c checking a office today was 6 1  Hypertension  This is a chronic problem  The problem is controlled  There are no associated agents to hypertension  Risk factors for coronary artery disease include dyslipidemia, male gender and obesity (Pre-DM)   Past treatments include angiotensin blockers  The current treatment provides moderate improvement  Compliance problems include exercise  Hyperlipidemia  This is a chronic problem  The problem is uncontrolled  Pre-DM  Treatments tried: Recommend starting low dose statin  Risk factors for coronary artery disease include hypertension, male sex and obesity (Pre-DM)  Diabetes  He presents for his initial diabetic visit  Diabetes type: Pre-DM  There are no hypoglycemic associated symptoms  There are no diabetic associated symptoms  There are no hypoglycemic complications  There are no diabetic complications  Risk factors for coronary artery disease include hypertension, male sex, obesity and dyslipidemia  Current diabetic treatment includes diet  He never participates in exercise  An ACE inhibitor/angiotensin II receptor blocker is being taken  The following portions of the patient's history were reviewed and updated as appropriate:   He has a past medical history of Foot fracture and Hypertension  ,  does not have any pertinent problems on file  ,   has a past surgical history that includes No past surgeries  ,  family history includes ALS in his mother; No Known Problems in his sister; Pneumonia in his father  ,   reports that he quit smoking about 5 years ago  His smoking use included cigarettes  He has never used smokeless tobacco  He reports current alcohol use  He reports previous drug use ,  has No Known Allergies     Current Outpatient Medications   Medication Sig Dispense Refill    olmesartan (BENICAR) 20 mg tablet Take 1 tablet (20 mg total) by mouth daily 90 tablet 1    atorvastatin (LIPITOR) 20 mg tablet Take 1 tablet (20 mg total) by mouth daily 90 tablet 1     No current facility-administered medications for this visit  Review of Systems   Constitutional: Negative  HENT: Negative  Eyes: Negative  Respiratory: Negative  Cardiovascular: Negative  Gastrointestinal: Negative  Endocrine: Negative  Genitourinary: Negative  Musculoskeletal: Negative  Skin: Negative  Allergic/Immunologic: Negative  Neurological: Negative  Hematological: Negative  Psychiatric/Behavioral: Negative  Objective:  Vitals:    02/14/22 1333   BP: 138/82   BP Location: Left arm   Patient Position: Sitting   Cuff Size: Large   Pulse: 102   Temp: 97 9 °F (36 6 °C)   TempSrc: Tympanic   SpO2: 97%   Weight: 107 kg (236 lb 12 8 oz)   Height: 5' 10" (1 778 m)     Body mass index is 33 98 kg/m²  Physical Exam  Vitals and nursing note reviewed  Constitutional:       Appearance: Normal appearance  He is well-developed  He is obese  Interventions: Face mask in place  HENT:      Head: Normocephalic and atraumatic  Right Ear: Tympanic membrane, ear canal and external ear normal       Left Ear: Tympanic membrane, ear canal and external ear normal       Nose: Nose normal       Mouth/Throat:      Mouth: Mucous membranes are moist       Pharynx: Uvula midline  Eyes:      General: Lids are normal       Conjunctiva/sclera: Conjunctivae normal       Pupils: Pupils are equal, round, and reactive to light  Neck:      Thyroid: No thyroid mass  Vascular: No JVD  Trachea: Trachea and phonation normal    Cardiovascular:      Rate and Rhythm: Normal rate and regular rhythm  Pulses: Normal pulses  Heart sounds: Normal heart sounds, S1 normal and S2 normal  No murmur heard  No friction rub  No gallop  Pulmonary:      Effort: Pulmonary effort is normal       Breath sounds: Normal breath sounds  Abdominal:      General: Bowel sounds are normal       Palpations: Abdomen is soft  Tenderness: There is no abdominal tenderness  Genitourinary:     Comments: Deferred  Musculoskeletal:         General: Normal range of motion  Cervical back: Full passive range of motion without pain, normal range of motion and neck supple  Right lower leg: No edema        Left lower leg: No edema  Lymphadenopathy:      Head:      Right side of head: No submental, submandibular, tonsillar, preauricular, posterior auricular or occipital adenopathy  Left side of head: No submental, submandibular, tonsillar, preauricular, posterior auricular or occipital adenopathy  Cervical: No cervical adenopathy  Skin:     General: Skin is warm and dry  Capillary Refill: Capillary refill takes less than 2 seconds  Neurological:      General: No focal deficit present  Mental Status: He is alert and oriented to person, place, and time  Cranial Nerves: Cranial nerves are intact  Sensory: Sensation is intact  Motor: Motor function is intact  Coordination: Coordination is intact  Gait: Gait is intact  Psychiatric:         Attention and Perception: Attention and perception normal          Mood and Affect: Mood and affect normal          Speech: Speech normal          Behavior: Behavior normal  Behavior is cooperative  Thought Content:  Thought content normal          Cognition and Memory: Cognition normal          Judgment: Judgment normal          Office Visit on 02/14/2022   Component Date Value Ref Range Status    Hemoglobin A1C 02/14/2022 6 1  6 5 Final

## 2022-02-14 NOTE — PATIENT INSTRUCTIONS
Cholesterol and Your Health   AMBULATORY CARE:   Cholesterol  is a waxy, fat-like substance  Cholesterol is made by your body, but also comes from certain foods you eat  Your body uses cholesterol to make hormones and new cells  Your body also uses cholesterol to protect nerves  Cholesterol comes from foods such as meat and dairy products  Your total cholesterol level is made up by LDL cholesterol, HDL cholesterol, and triglycerides:  · LDL cholesterol  is called bad cholesterol  because it forms plaque in your arteries  As plaque builds up, your arteries become narrow, and less blood flows through  When plaque decreases blood flow to your heart, you may have chest pain  If plaque completely blocks an artery that bring blood to your heart, you may have a heart attack  Plaque can break off and form blood clots  Blood clots may block arteries in your brain and cause a stroke  · HDL cholesterol  is called good cholesterol  because it helps remove LDL cholesterol from your arteries  It does this by attaching to LDL cholesterol and carrying it to your liver  Your liver breaks down LDL cholesterol so your body can get rid of it  High levels of HDL cholesterol can help prevent a heart attack and stroke  Low levels of HDL cholesterol can increase your risk for heart disease, heart attack, and stroke  · Triglycerides  are a type of fat that store energy from foods you eat  High levels of triglycerides also cause plaque buildup  This can increase your risk for a heart attack or stroke  If your triglyceride level is high, your LDL cholesterol level may also be high  How food affects your cholesterol levels:   · Unhealthy fats  increase LDL cholesterol and triglyceride levels in your blood  They are found in foods high in cholesterol, saturated fat, and trans fat:     ¨ Cholesterol  is found in eggs, dairy, and meat  ¨ Saturated fat  is found in butter, cheese, ice cream, whole milk, and coconut oil  Saturated fat is also found in meat, such as sausage, hot dogs, and bologna  ¨ Trans fat  is found in liquid oils and is used in fried and baked foods  Foods that contain trans fats include chips, crackers, muffins, sweet rolls, microwave popcorn, and cookies  · Healthy fats,  also called unsaturated fats, help lower LDL cholesterol and triglyceride levels  Healthy fats include the following:     ¨ Monounsaturated fats  are found in foods such as olive oil, canola oil, avocado, nuts, and olives  ¨ Polyunsaturated fats,  such as omega 3 fats, are found in fish, such as salmon, trout, and tuna  They can also be found in plant foods such as flaxseed, walnuts, and soybeans  Other things that affect your cholesterol levels:   · Smoking cigarettes    · Being overweight or obese     · Drinking large amounts of alcohol    · Not enough exercise or no exercise    · Certain genes passed from your parents to you  What you need to know about having your cholesterol levels checked: Adults 21to 39years of age should have their cholesterol levels checked every 4 to 6 years  Adults 45 years and older should have their cholesterol checked every 1 to 2 years  You may need your cholesterol checked more often, or at a younger age, if you have risk factors for heart disease  You may also need to have your cholesterol checked more often if you have other health conditions, such as diabetes  Blood tests are used to check cholesterol levels  Blood tests measure your levels of triglycerides, LDL cholesterol, and HDL cholesterol  Cholesterol level goals: Your cholesterol level goal may depend on your risk for heart disease  It may also depend on your age and other health conditions  Ask your healthcare provider if the following goals are right for you:  · Your total cholesterol level  should be less than 200 mg/dL  This number may also depend on your HDL and LDL cholesterol goals       · Your LDL cholesterol level  should be less than 130 mg/dL  · Your HDL cholesterol level  should be 60 mg/dL or higher  · Your triglyceride level  should be less than 150 mg/dL  Treatment for high cholesterol:  Treatment for high cholesterol will also decrease your risk of heart disease, heart attack, and stroke  Treatment may include any of the following:  · Medicines  may be given to lower your LDL cholesterol, triglyceride levels, or total cholesterol level  You may need medicines to lower your cholesterol if any of the following is true:     ¨ You have a history of stroke, TIA, unstable angina, or a heart attack    ¨ Your LDL cholesterol level is 190 mg/dL or higher    ¨ You are age 36to 76years of age, have diabetes, and your LDL cholesterol is 70 mg/dL or higher    ¨ You are age 36to 76years of age, have risk factors for heart disease, and your LDL cholesterol is 70 mg/dL or higher    · Lifestyle changes  include changes to your diet, exercise, weight loss, and quitting smoking  It also includes decreasing the amount of alcohol you drink  · Supplements  include fish oil, red yeast rice, and garlic  Fish oil may help lower your triglyceride and LDL cholesterol levels  It may also increase your HDL cholesterol level  Red yeast rice may help decrease your total cholesterol level and LDL cholesterol level  Garlic may help lower your total cholesterol level  Do not take these supplements without talking to your healthcare provider  Nutrition to help lower your cholesterol levels:  A registered dietitian can help you create a healthy eating plan  Read food labels and choose foods low in saturated fat, trans fats, and cholesterol  · Decrease the total amount of fat you eat  Choose lean meats, fat-free or 1% fat milk, and low-fat dairy products, such as yogurt and cheese  Try to limit or avoid red meats  Limit or do not eat fried foods or baked goods such as cookies  · Replace unhealthy fats with healthy fats    Cook foods in olive oil or canola oil  Choose soft margarines that are low in saturated fat and trans fat  Seeds, nuts, and avocados are other examples of healthy fats  · Eat foods with omega-3 fats  Examples include salmon, tuna, mackerel, walnuts, and flaxseed  Eat fish 2 times per week  Children and pregnant women should not eat fish that have high levels of mercury, such as shark, swordfish, and jef mackerel  · Increase the amount of plant-based foods you eat  Plant-based foods are low in cholesterol and fat  Eating more of these foods may help lower your cholesterol and help you lose weight  Examples of plant-based foods includes fruits, vegetables, legumes, and whole grains  Replace milk that contains dairy with almond, soy, or coconut milk  Eat beans and foods with soy for protein instead of meat  Ask your healthcare provider or dietitian for more information on plant-based foods  · Increase the amount of fiber you eat  High-fiber foods can help lower your LDL cholesterol  You should eat between 20 and 30 grams of fiber each day  Eat at least 5 servings of fruits and vegetables each day  Other examples of high-fiber foods include whole-grain or whole-wheat breads, pastas, or cereals, and brown rice  Eat 3 ounces of whole-grain foods each day  Increase fiber slowly  You may have abdominal discomfort, bloating, and gas if you add fiber to your diet too quickly  Lifestyle changes you can make to help lower your cholesterol levels:   · Maintain a healthy weight  Ask your healthcare provider how much you should weigh  Ask him or her to help you create a weight loss plan if you are overweight  Weight loss can decrease your total cholesterol and triglyceride levels  · Exercise regularly  Exercise can help lower your total cholesterol level and maintain a healthy weight  Exercise can also help increase your HDL cholesterol level   Work with your healthcare provider to create an exercise program that is right for you  Get at least 30 minutes of moderate exercise most days of the week  Examples of exercise include brisk walking, swimming, or biking  · Do not smoke  Nicotine and other chemicals in cigarettes and cigars can damage your lungs, heart, and blood vessels  They can also raise your triglyceride levels  Ask your healthcare provider for information if you currently smoke and need help to quit  E-cigarettes or smokeless tobacco still contain nicotine  Talk to your healthcare provider before you use these products  · Limit or do not drink alcohol  Alcohol can increase your triglyceride levels  Ask your healthcare provider if it is safe for you to drink alcohol  Also ask how much is safe for you to drink each day  © 2017 [address] [employer]  Information is for End User's use only and may not be sold, redistributed or otherwise used for commercial purposes  All illustrations and images included in CareNotes® are the copyrighted property of [employer]  or [employer]  The above information is an  only  It is not intended as medical advice for individual conditions or treatments  Talk to your doctor, nurse or pharmacist before following any medical regimen to see if it is safe and effective for you  Lipid Profile   AMBULATORY CARE:   A lipid profile,  or lipid panel, is a blood test to check your lipid levels  Lipids are fats that cannot dissolve in blood  High lipid levels increase your risk for heart disease and a heart attack or stroke  A lipid profile includes the following:  · Total cholesterol  is the main number used for cholesterol values  ¨ Goal: Less than 200 mg/dL    ¨ Borderline high: 200 to 239 mg/dL    ¨ High: 240 mg/dL or higher    · LDL (bad) cholesterol  carries cholesterol and deposits it in the arteries  This can cause a blockage      ¨ Goal: 100 mg/dL or lower    ¨ Near goal: 100 to 129 mg/dL     ¨ Borderline high: 130 to 159 mg/dL    ¨ High: 160 to 189 mg/dL    ¨ Very high: 190 mg/dL or higher    · HDL (good) cholesterol  removes cholesterol from your body  ¨ Goal: 60 mg/dL or higher    ¨ Borderline risk: 40 to 59 mg/dL    ¨ High risk: 40 mg/dL or lower    · Triglycerides  are a different kind of fat than cholesterol  ¨ Goal: 150 mg/dL or lower    ¨ Borderline high: 150 to 199 mg/dL    ¨ High: 200 to 499 mg/dL    ¨ Very high: 500 mg/dL or higher  How to prepare for the test:  Do not eat or drink anything, except water, for 12 to 14 hours before the test  Ask your healthcare provider if you should take your medicines on the day of your test    What you need to know about your test results: Your healthcare provider will discuss your test results with you  If your test results are abnormal, you may need treatment to decrease your risk for heart disease  © 2017 [address] [employer] Information is for End User's use only and may not be sold, redistributed or otherwise used for commercial purposes  All illustrations and images included in CareNotes® are the copyrighted property of [employer]  or [employer]  The above information is an  only  It is not intended as medical advice for individual conditions or treatments  Talk to your doctor, nurse or pharmacist before following any medical regimen to see if it is safe and effective for you  Low Fat Diet   AMBULATORY CARE:   A low-fat diet  is an eating plan that is low in total fat, unhealthy fat, and cholesterol  You may need to follow a low-fat diet if you have trouble digesting or absorbing fat  You may also need to follow this diet if you have high cholesterol  You can also lower your cholesterol by increasing the amount of fiber in your diet  Soluble fiber is a type of fiber that helps to decrease cholesterol levels  Different types of fat in food:   · Limit unhealthy fats    A diet that is high in cholesterol, saturated fat, and trans fat may cause unhealthy cholesterol levels  Unhealthy cholesterol levels increase your risk of heart disease  ¨ Cholesterol:  Limit intake of cholesterol to less than 200 mg per day  Cholesterol is found in meat, eggs, and dairy  ¨ Saturated fat:  Limit saturated fat to less than 7% of your total daily calories  Ask your dietitian how many calories you need each day  Saturated fat is found in butter, cheese, ice cream, whole milk, and palm oil  Saturated fat is also found in meat, such as beef, pork, chicken skin, and processed meats  Processed meats include sausage, hot dogs, and bologna  ¨ Trans fat:  Avoid trans fat as much as possible  Trans fat is used in fried and baked foods  Foods that say trans fat free on the label may still have up to 0 5 grams of trans fat per serving  · Include healthy fats  Replace foods that are high in saturated and trans fat with foods high in healthy fats  This may help to decrease high cholesterol levels  ¨ Monounsaturated fats: These are found in avocados, nuts, and vegetable oils, such as olive, canola, and sunflower oil  ¨ Polyunsaturated fats: These can be found in vegetable oils, such as soybean or corn oil  Omega-3 fats can help to decrease the risk of heart disease  Omega-3 fats are found in fish, such as salmon, herring, trout, and tuna  Omega-3 fats can also be found in plant foods, such as walnuts, flaxseed, soybeans, and canola oil    Foods to limit or avoid:   · Grains:      ¨ Snacks that are made with partially hydrogenated oils, such as chips, regular crackers, and butter-flavored popcorn    ¨ High-fat baked goods, such as biscuits, croissants, doughnuts, pies, cookies, and pastries    · Dairy:      ¨ Whole milk, 2% milk, and yogurt and ice cream made with whole milk    ¨ Half and half creamer, heavy cream, and whipping cream    ¨ Cheese, cream cheese, and sour cream    · Meats and proteins:      ¨ High-fat cuts of meat (T-bone steak, regular hamburger, and ribs)    ¨ Fried meat, poultry (turkey and chicken), and fish    ¨ Poultry (chicken and turkey) with skin    ¨ Cold cuts (salami or bologna), hot dogs, santos, and sausage    ¨ Whole eggs and egg yolks    · Vegetables and fruits with added fat:      ¨ Fried vegetables or vegetables in butter or high-fat sauces, such as cream or cheese sauces    ¨ Fried fruit or fruit served with butter or cream    · Fats:      ¨ Butter, stick margarine, and shortening    ¨ Coconut, palm oil, and palm kernel oil  Foods to include:   · Grains:      ¨ Whole-grain breads, cereals, pasta, and brown rice    ¨ Low-fat crackers and pretzels    · Vegetables and fruits:      ¨ Fresh, frozen, or canned vegetables (no salt or low-sodium)    ¨ Fresh, frozen, dried, or canned fruit (canned in light syrup or fruit juice)    ¨ Avocado    · Low-fat dairy products:      ¨ Nonfat (skim) or 1% milk    ¨ Nonfat or low-fat cheese, yogurt, and cottage cheese    · Meats and proteins:      ¨ Chicken or turkey with no skin    ¨ Baked or broiled fish    ¨ Lean beef and pork (loin, round, extra lean hamburger)    ¨ Beans and peas, unsalted nuts, soy products    ¨ Egg whites and substitutes    ¨ Seeds and nuts    · Fats:      ¨ Unsaturated oil, such as canola, olive, peanut, soybean, or sunflower oil    ¨ Soft or liquid margarine and vegetable oil spread    ¨ Low-fat salad dressing  Other ways to decrease fat:   · Read food labels before you buy foods  Choose foods that have less than 30% of calories from fat  Choose low-fat or fat-free dairy products  Remember that fat free does not mean calorie free  These foods still contain calories, and too many calories can lead to weight gain  · Trim fat from meat and avoid fried food  Trim all visible fat from meat before you cook it  Remove the skin from poultry  Do not shrestha meat, fish, or poultry  Bake, roast, boil, or broil these foods instead  Avoid fried foods  Eat a baked potato instead of Western Sanna fries   Steam vegetables instead of sautéing them in butter  · Add less fat to foods  Use imitation santos bits on salads and baked potatoes instead of regular santos bits  Use fat-free or low-fat salad dressings instead of regular dressings  Use low-fat or nonfat butter-flavored topping instead of regular butter or margarine on popcorn and other foods  Ways to decrease fat in recipes:  Replace high-fat ingredients with low-fat or nonfat ones  This may cause baked goods to be drier than usual  You may need to use nonfat cooking spray on pans to prevent food from sticking  You also may need to change the amount of other ingredients, such as water, in the recipe  Try the following:  · Use low-fat or light margarine instead of regular margarine or shortening  · Use lean ground turkey breast or chicken, or lean ground beef (less than 5% fat) instead of hamburger  · Add 1 teaspoon of canola oil to 8 ounces of skim milk instead of using cream or half and half  · Use grated zucchini, carrots, or apples in breads instead of coconut  · Use blenderized, low-fat cottage cheese, plain tofu, or low-fat ricotta cheese instead of cream cheese  · Use 1 egg white and 1 teaspoon of canola oil, or use ¼ cup (2 ounces) of fat-free egg substitute instead of a whole egg  · Replace half of the oil that is called for in a recipe with applesauce when you bake  Use 3 tablespoons of cocoa powder and 1 tablespoon of canola oil instead of a square of baking chocolate  How to increase fiber:  Eat enough high-fiber foods to get 20 to 30 grams of fiber every day  Slowly increase your fiber intake to avoid stomach cramps, gas, and other problems  · Eat 3 ounces of whole-grain foods each day  An ounce is about 1 slice of bread  Eat whole-grain breads, such as whole-wheat bread  Whole wheat, whole-wheat flour, or other whole grains should be listed as the first ingredient on the food label   Replace white flour with whole-grain flour or use half of each in recipes  Whole-grain flour is heavier than white flour, so you may have to add more yeast or baking powder  · Eat a high-fiber cereal for breakfast   Oatmeal is a good source of soluble fiber  Look for cereals that have bran or fiber in the name  Choose whole-grain products, such as brown rice, barley, and whole-wheat pasta  · Eat more beans, peas, and lentils  For example, add beans to soups or salads  Eat at least 5 cups of fruits and vegetables each day  Eat fruits and vegetables with the peel because the peel is high in fiber  © 2017 [facility] [facility] Information is for End User's use only and may not be sold, redistributed or otherwise used for commercial purposes  All illustrations and images included in CareNotes® are the copyrighted property of [employer] M [employer]  or [employer]  The above information is an  only  It is not intended as medical advice for individual conditions or treatments  Talk to your doctor, nurse or pharmacist before following any medical regimen to see if it is safe and effective for you        Cholesterol tips     Foods to avoid:  ·         Cut back on saturated fats and trans fats - also called hydrogenated fats  ·         Fatty meats, cold cuts, santos, sausage  ·         Creamy sauces and fatty gravies  ·         Fried foods  ·         Egg yolks  ·         Shrimp  ·         Coconuts  ·         Shortening, butter, coconut oil, palm oil, hydrogenated oils, partially       hydrogenated margarine and lard   ·         High fat dairy products such as whole milk, cheese, ice cream  ·         Simple sugars (found in soft drinks, candy, cakes, cookies and other     baked goods)      Healthier Choices:  ·         Lean beef, skinless white meat poultry, fish  ·         Tomato sauce, vegetable purée  ·         Dried fruit, bagels, bread with jam  ·         Baked, boiled, steamed, roasted foods  ·         Egg whites or egg substitute  ·         Canola-based margarines (e g  Benacol, I can't Believe it's not Butter,   Smart Balance)  ·         Low-fat or nonfat dairy products such as 1% or fat free milk, reduced fat           cheese, nonfat frozen yogurt     Foods that will help lower cholesterol:  ·         High-fiber foods that contain lots of oats, barley, whole grains  ·         Beans  ·         Foods rich in antioxidants, such as fruits and vegetables - try for 5 -6     servings per day   ·         Cornmeal, popcorn  ·         Fatty fish such as salmon, duane, white albacore tuna, sardines,        mackerel, tilapia  ·         Foods rich in plant sterols, such as nuts like walnuts and almonds  ·         Pumpkin, sesame, or sunflower seeds   ·         Foods high in omega-3 fatty acids, such as avocado, flax seeds, olive oil          and canola oil      Increase your exercise  ·         To keep your heart healthy try for  30 minutes of repetitive exercise daily,         5 days per week (walking, running, cycling, stationary bike,    elliptical,         stair-stepper, swimming, etc ) or 25 minutes of high intensity           exercise 3      days per week  ·         If you need to lose weight or lower your blood pressure, your goal should         be 40 minutes or moderate to high intensity exercise 3 -4 times a week  ·         You can start this slowly with a few 10 minute sessions  ·         Adapt exercise routine to orthopedic limitations  ·         Stay aerobic  ·         You should be able to talk to the person next to you  Or, if alone, you    should be able to whistle or sing  ·         Remember some activity is better than none! ·         Start slow but keep it up!                       Examples of Moderate Intensity:  Walking briskly (3 miles per hour or faster, but not race-walking)   Water aerobics   Bicycling slower than 10 miles per hour   Tennis (doubles)   Ballroom dancing   General gardening Examples of Vigorous Intensity:  Race walking, jogging, or running   Swimming laps   Tennis (singles)   Aerobic dancing   Bicycling 10 miles per hour or faster   Jumping rope   Heavy gardening (continuous digging or hoeing)   Hiking uphill or with a heavy backpack     -Go to www heart  org [heart  org] for more tips   Prediabetes   AMBULATORY CARE:   Prediabetes  is a blood glucose level that is higher than normal  It is not high enough to be considered diabetes  Prediabetes increases your risk for type 2 diabetes and heart disease  Common symptoms include the following:  Prediabetes may not cause any symptoms, or it may cause symptoms similar to diabetes  These may include any of the following:  · More hunger or thirst than usual     · Frequent urination     · Weight loss without trying     · Blurred vision  Contact your healthcare provider if:   · You have more hunger or thirst than usual      · You are urinating more frequently than normal      · You lose weight without trying  · You have blurred vision  · You have questions or concerns about your condition or care  Medicines  may be given to control your blood sugar levels  Medicine may also be given to lower high blood pressure and high cholesterol  Manage prediabetes:  Weight loss and exercise work best to delay or prevent type 2 diabetes  You can decrease your risk of type 2 diabetes by doing the following:  · Lose weight if you are overweight  A weight loss of 7% of your body weight can help to lower your blood sugar level  For example, if you weigh 200 pounds, you should lose 14 pounds  · Exercise regularly  Exercise can help decrease your blood sugar level  It can also help to decrease your risk of heart disease and help you lose weight  Adults should exercise for at least 150 minutes every week  Spread this amount of exercise over at least 3 days a week  Do not skip exercise more than 2 days in a row   Children should exercise for at least 60 minutes on most days of the week  Examples of exercise include walking or swimming  Do not sit for longer than 30 minutes  Work with your healthcare provider to create an exercise plan  · Decrease the amount of calories you eat to help you lose weight  Eat a variety of fruits and vegetables, and eat whole-grain foods more often  Choose dairy foods, meat, and other protein foods that are low in fat  Eat fewer sweets such as candy, cookies, regular soda, and sweetened drinks  You can also decrease calories by eating smaller portion sizes  Work with your healthcare provider or dietitian to develop a meal plan that is right for you  · Do not smoke  Nicotine can damage blood vessels  Do not use e-cigarettes or smokeless tobacco in place of cigarettes or to help you quit  They still contain nicotine  Ask your healthcare provider for information if you currently smoke and need help quitting  Follow up with your healthcare provider as directed: You will need to return every year to get tested for diabetes  Write down your questions so you remember to ask them during your visits  © 2017 [facility] [facility] Information is for End User's use only and may not be sold, redistributed or otherwise used for commercial purposes  All illustrations and images included in CareNotes® are the copyrighted property of [employer]  or [employer]  The above information is an  only  It is not intended as medical advice for individual conditions or treatments  Talk to your doctor, nurse or pharmacist before following any medical regimen to see if it is safe and effective for you  Meal Planning with Diabetes Exchanges   AMBULATORY CARE:   Diabetes exchanges  are servings of food that contain similar amounts of carbohydrate, fat, protein, and calories within a food group   The exchanges can be used to develop a healthy meal plan that helps to keep your blood sugar within the recommended levels  A meal plan with the right amount of carbohydrates is especially important  Your blood sugar naturally rises after you eat carbohydrates  Too many carbohydrates in 1 meal or snack can raise your blood sugar level  Carbohydrates are found in starches, fruit, milk, yogurt, and sweets  How to create a meal plan with exchanges:  A dietitian will work with you to develop a healthy meal plan that is right for you  This meal plan will include the amount of exchanges you can have from each food group throughout the day  Follow your meal plan by keeping track of the amount of exchanges you eat for each meal and snack  Your meal plan will be based on your age, weight, blood sugar levels, medicine, and activity level  Starch food group exchanges:  Each exchange below contains about 15 grams of carbohydrate , 3 grams of protein, 1 gram of fat, and 80 calories  · 1 ounce of white, whole wheat or rye bread (1 slice)    · 1 ounce of bagel (about ¼ of a bagel)    · 1 6-inch flour or corn tortilla or 1 4-inch pancake (about ¼ inch thick)    · ? cup of cooked pasta or rice    · ¾ cup of dry, ready-to-eat cereal with no sugar added     · ½ cup of cooked cereal, such as oatmeal    · 3 sai cracker squares or 8 animal crackers    · 6 saltine-type crackers or     · 3 cups of popcorn or ¾ ounce of pretzels     · Starchy vegetables and cooked legumes:      ¨ ½ cup of corn, green peas, sweet potatoes, or mashed potatoes     ¨ ¼ of a large baked potato     ¨ 1 cup of acorn, butternut squash, or pumpkin     ¨ ½ cup of beans, lentils, or peas (such as kaur, kidney, or black-eyed)    ¨ ? cup of lima beans  Fruit group exchanges:  Each exchange contains about 15 grams of carbohydrate  and 60 calories    · 1 small (4 ounce) apple, banana orange, or nectarine    · ½ cup of canned or fresh fruit    · ½ cup (4 ounces) of unsweetened fruit juice    · 2 tablespoons of dried fruit  Milk group exchanges:  Each exchange contains about 12 grams of carbohydrate  and 8 grams of protein  The amount of fat and calories in each serving depends on the type of milk (such as whole, low-fat, or fat-free)  · 1 cup fat-free or low-fat milk    · ¾ cup of plain, nonfat yogurt    · 1 cup fat-free, flavored yogurt with artificial (no calorie) sweetener  Non-starchy vegetable group exchanges:  Each exchange contains about 5 grams of carbohydrate , 2 grams of protein, and 25 calories  Examples include beets, broccoli, cabbage, carrots, cauliflower, cucumber, mushrooms, tomatoes, and zucchini  · ½ cup of cooked vegetables or 1 cup of raw vegetables     · ½ cup of vegetable juice  Meat and meat substitute group exchanges:  Each exchange of a lean meat  listed below contains about 7 grams of protein, 0 to 3 grams of fat, and 45 calories  The meat and meat substitutes food group does not contain any carbohydrates  Medium and high-fat meats have more calories  · 1 ounce of chicken or turkey without skin, or 1 ounce of fish (not breaded or fried)     · 1 ounce of lean beef, pork, or lamb     · 1-inch cube or 1 ounce of low-fat cheese     · 2 egg whites or ¼ cup of egg substitute     · ½ cup of tofu  Sweets, desserts, and other carbohydrate group exchanges:   · Sweets and other desserts:  Each exchange has about 15 grams of carbohydrate   ¨ 1 ounce of thom food cake or 2-inch square cake (unfrosted)    ¨ 2 small cookies     ¨ ½ cup of sugar-free, fat-free ice cream    ¨ 1 tablespoon of syrup, jam, jelly, table sugar, or honey    · Combination foods:     ¨ 1 cup of an entrée, such as lasagna, spaghetti with meatballs, macaroni and cheese, and chili with beans (each serving counts as 2 carbohydrate exchanges )     ¨ 1 cup of tomato or vegetable beef soup (each serving counts as 1 carbohydrate exchange )  Fat group exchanges:  Each exchange contains 5 grams of fat and 45 calories    · 1 teaspoon of oil (such as canola, olive, or corn oil)     · 6 almonds or cashews, 10 peanuts, or 4 pecan halves     · 2 tablespoons of avocado     · ½ tablespoon of peanut butter     · 1 teaspoon of regular margarine or 2 teaspoons of low-fat margarine     · 1 teaspoon of regular butter or 1 tablespoon of low-fat butter     · 1 teaspoon of regular mayonnaise or 1 tablespoon of low-fat mayonnaise     · 1 tablespoon of regular salad dressing or 2 tablespoons of low-fat salad dressing  Free foods: The foods on this list are called free foods because they have very few calories  Free foods usually do not increase your blood sugar if you limit them  · 1 tablespoon of catsup or taco sauce     · ¼ cup of salsa     · 2 tablespoons of sugar-free syrup or 2 teaspoons of light jam or jelly     · 1 tablespoon of fat-free salad dressing     · 4 tablespoons of fat-free margarine or fat-free mayonnaise     · Sugar-free drinks: diet soda, sugar-free drink mixes, or mineral water     · Low-sodium bouillon or fat-free broth     · Mustard     · Seasonings such as spices, herbs, and garlic     · Sugar-free gelatin without added fruit  Other healthy nutrition guidelines:   · Eat more fiber  Choose foods that are good sources of fiber, such as fruits, vegetables, and whole grains  Cereals that contain 5 or more grams of fiber per serving are good sources of fiber  Legumes such as garbanzo, kaur beans, kidney beans, and lentils are also good sources  · Limit fat  Ask your dietitian or healthcare provider how much fat you should eat each day  Choose foods low in fat, saturated fat, trans fat, and cholesterol  Examples include turkey or chicken without the skin, fish, lean cuts of meat, and beans  Low-fat dairy foods, such as low-fat or fat-free milk and low-fat yogurt are also good choices  Omega-3 fatty acids are healthy fats that are found in canola oil, soybean oil and fatty fish  [place], albacore tuna, and sardines are good sources of omega 3 fatty acids   Eat 2 servings of these types of fish each week  Do not eat fried fish  · Limit sugar  Sugar and sweets must be counted toward the carbohydrate exchanges that you can have within your meal plan  Limit sugar and sweets because they are usually also high in calories and fat  Eat smaller portions of sweets by sharing a dessert or asking for a child-size portion at a restaurant  · Limit sodium  (salt) to about 2,300 mg per day  You may need to eat even less sodium if you have certain medical conditions  Foods high in sodium include soy sauce, potato chips, and soup  · Limit alcohol  Ask your healthcare provider if it is safe for you to drink alcohol  If alcohol is safe for you to have, eat a meal when you drink alcohol  If you drink alcohol on an empty stomach, your blood sugar may drop to a low level  Women should limit alcohol to 1 drink per day  Men should limit alcohol to 2 drinks per day  A drink of alcohol is 5 ounces of wine, 12 ounces of beer, or 1½ ounces of liquor  Other ways to manage your diabetes:   · Control your blood sugar level  Test your blood sugar level regularly and keep a record of the results  Ask your healthcare provider when and how often to test your blood sugar  You may need to check your blood sugar level at least 3 times each day  · Talk to your healthcare provider about your weight  Ask if you need to lose weight, and how much you need to lose  If you are overweight, you may need to make other changes to lose weight  Ask your healthcare provider to help you create a weight loss program      · Exercise  can help to control your blood sugar levels and decrease your risk of heart disease  It can also help you lose or maintain your weight  Get at least 30 minutes of exercise, 5 times each week  Do resistance training (using weights) 2 times each week  Do not sit for longer than 90 minutes  Work with your healthcare provider to plan the best exercise program for you    Contact your healthcare provider if:   · You have high blood sugar levels during a certain time of day, or almost all of the time  · You often have low blood sugar levels  · You have questions or concerns about your condition or care  © 2017 [address] [employer]  Information is for End User's use only and may not be sold, redistributed or otherwise used for commercial purposes  All illustrations and images included in CareNotes® are the copyrighted property of [employer] M [employer]  or [employer]  The above information is an  only  It is not intended as medical advice for individual conditions or treatments  Talk to your doctor, nurse or pharmacist before following any medical regimen to see if it is safe and effective for you  Hemoglobin A1c   WHAT YOU NEED TO KNOW:   What is a hemoglobin A1c test, and why do I need one? A hemoglobin A1c is a blood test that measures your average blood sugar level for the past 2 to 3 months  It is also called an HbA1c or glycohemoglobin test  An A1c test can help diagnose prediabetes or diabetes  It can also tell you how well your diabetes plan is working  A1c testing can help your healthcare provider make changes to your treatment plan  These changes can help improve or control your blood sugar levels  Good control of your blood sugar levels can decrease your risk for problems caused by diabetes  Examples include heart attack, stroke, blindness, kidney disease, and neuropathy (nerve problems)  What increases my risk for diabetes?   You may need an A1c test if you have any of the following risk factors for diabetes:  · Heart disease    · High blood pressure    · Polycystic ovarian syndrome    · A first-degree relative with diabetes, such as a parent, brother, sister, or child    · Being overweight    · Lack of exercise or activity     · History of gestational diabetes and poor nutrition while pregnant  Who should get an A1c test?   · Adults who are overweight and have 1 or more risk factors for diabetes    · Adults 39years of age or older    · Children 7 to 25 years who are overweight and have 2 or more risk factors for diabetes    · Anyone with signs or symptoms of diabetes, such as increased thirst, slow-healing infections, or increased urination  What do the results of an A1c test mean? The results are given in percentages  · An A1c of 5 6% or lower means you do not have diabetes  · An A1c of 5 7% to 6 4% means you are at risk for diabetes  This is also called prediabetes  · An A1c of 6 5% or higher means you have diabetes  · If you currently have diabetes in good control, your A1c goal may be 7% or lower  Your healthcare provider will decide what your goal should be  This decision is based on your diabetes history and other medical conditions  You may need an A1c test 2 to 4 times each year, depending on your blood sugar level control  How should I prepare for the test?  You do not need to do anything to prepare for the test  Wear a short-sleeved or loose shirt to the test  This will make it easier to draw your blood  Other tests may be needed to diagnose or monitor diabetes if you have certain conditions  Tell your healthcare provider if you have any of the following:  · Iron-deficiency or [id number] anemia    · Cystic fibrosis    · Recent heavy blood loss or a blood transfusion    · Recent erythropoietin therapy    · Sickle cell disease or thalassemia    · Kidney failure or liver disease  What will happen after the test?  Schedule a follow-up appointment with your healthcare provider to talk about your test results  · If your A1c is lower than your goal , your medicines may be changed  · If your A1c is at your goal , you may not need any changes to your diabetes treatment plan  · If your A1c is higher than your goal , you may need changes to your medicines, eating plan, or exercise plan    What else should I know about an A1c test?   · You may get an estimated Average Glucose (eAG) with your A1c results  The eAG gives your A1c result in numbers like you see on your glucose meter  For example, an A1c of 6% will be reported as an eAG of 126 mg/dL  This means your average blood sugar level was 126 mg/dL over the last 2 to 3 months  The eAG can help you understand if your A1c result is on target for what your healthcare provider recommends  · You are at risk for an uncommon type of hemoglobin if you are , Mediterranean, or [employer]  This type of hemoglobin can affect your A1c test results  Tell your healthcare provider if you come from any of these backgrounds  You may need to have your A1c sent to a lab with certain equipment  This will help make sure your results are accurate  CARE AGREEMENT:   You have the right to help plan your care  To help with this plan, you must learn about your lab tests  You can then discuss the results with your caregivers  Work with them to decide what care may be used to treat you  You always have the right to refuse treatment  The above information is an  only  It is not intended as medical advice for individual conditions or treatments  Talk to your doctor, nurse or pharmacist before following any medical regimen to see if it is safe and effective for you  © 2017 [address] [employer]  Information is for End User's use only and may not be sold, redistributed or otherwise used for commercial purposes  All illustrations and images included in CareNotes® are the copyrighted property of [employer] M [employer]  or [employer]

## 2022-05-09 ENCOUNTER — TELEPHONE (OUTPATIENT)
Dept: FAMILY MEDICINE CLINIC | Facility: CLINIC | Age: 57
End: 2022-05-09

## 2022-05-15 ENCOUNTER — APPOINTMENT (EMERGENCY)
Dept: ULTRASOUND IMAGING | Facility: HOSPITAL | Age: 57
End: 2022-05-15
Payer: COMMERCIAL

## 2022-05-15 ENCOUNTER — APPOINTMENT (EMERGENCY)
Dept: CT IMAGING | Facility: HOSPITAL | Age: 57
End: 2022-05-15
Payer: COMMERCIAL

## 2022-05-15 ENCOUNTER — HOSPITAL ENCOUNTER (EMERGENCY)
Facility: HOSPITAL | Age: 57
End: 2022-05-15
Attending: EMERGENCY MEDICINE | Admitting: EMERGENCY MEDICINE
Payer: COMMERCIAL

## 2022-05-15 ENCOUNTER — HOSPITAL ENCOUNTER (INPATIENT)
Facility: HOSPITAL | Age: 57
LOS: 7 days | Discharge: HOME | DRG: 418 | End: 2022-05-22
Attending: SURGERY | Admitting: SURGERY
Payer: COMMERCIAL

## 2022-05-15 VITALS
HEIGHT: 70 IN | SYSTOLIC BLOOD PRESSURE: 110 MMHG | OXYGEN SATURATION: 95 % | WEIGHT: 220 LBS | TEMPERATURE: 103.1 F | DIASTOLIC BLOOD PRESSURE: 62 MMHG | HEART RATE: 109 BPM | RESPIRATION RATE: 28 BRPM | BODY MASS INDEX: 31.5 KG/M2

## 2022-05-15 DIAGNOSIS — R79.89 ELEVATED LFTS: Primary | ICD-10-CM

## 2022-05-15 DIAGNOSIS — K83.09 CHOLANGITIS: ICD-10-CM

## 2022-05-15 DIAGNOSIS — K83.8 DILATED BILE DUCT: ICD-10-CM

## 2022-05-15 DIAGNOSIS — K80.50 CHOLEDOCHOLITHIASIS: Primary | ICD-10-CM

## 2022-05-15 LAB
ALBUMIN SERPL BCP-MCNC: 3.7 G/DL (ref 3.5–5)
ALP SERPL-CCNC: 248 U/L (ref 34–104)
ALT SERPL W P-5'-P-CCNC: 75 U/L (ref 7–52)
ANION GAP SERPL CALCULATED.3IONS-SCNC: 12 MMOL/L (ref 4–13)
APTT PPP: 30 SECONDS (ref 23–37)
AST SERPL W P-5'-P-CCNC: 98 U/L (ref 13–39)
BASOPHILS # BLD AUTO: 0.04 THOUSANDS/ΜL (ref 0–0.1)
BASOPHILS NFR BLD AUTO: 0 % (ref 0–1)
BILIRUB SERPL-MCNC: 9.18 MG/DL (ref 0.2–1)
BUN SERPL-MCNC: 19 MG/DL (ref 5–25)
CALCIUM SERPL-MCNC: 9.3 MG/DL (ref 8.4–10.2)
CHLORIDE SERPL-SCNC: 87 MMOL/L (ref 96–108)
CO2 SERPL-SCNC: 29 MMOL/L (ref 21–32)
CREAT SERPL-MCNC: 1.06 MG/DL (ref 0.6–1.3)
EOSINOPHIL # BLD AUTO: 0.04 THOUSAND/ΜL (ref 0–0.61)
EOSINOPHIL NFR BLD AUTO: 0 % (ref 0–6)
ERYTHROCYTE [DISTWIDTH] IN BLOOD BY AUTOMATED COUNT: 12.5 % (ref 11.6–15.1)
FLUAV RNA RESP QL NAA+PROBE: NEGATIVE
FLUBV RNA RESP QL NAA+PROBE: NEGATIVE
GFR SERPL CREATININE-BSD FRML MDRD: 76 ML/MIN/1.73SQ M
GLUCOSE SERPL-MCNC: 138 MG/DL (ref 65–140)
HCT VFR BLD AUTO: 43.5 % (ref 36.5–49.3)
HGB BLD-MCNC: 14 G/DL (ref 12–17)
IMM GRANULOCYTES # BLD AUTO: 0.1 THOUSAND/UL (ref 0–0.2)
IMM GRANULOCYTES NFR BLD AUTO: 1 % (ref 0–2)
INR PPP: 1.13 (ref 0.84–1.19)
LACTATE SERPL-SCNC: 1.6 MMOL/L (ref 0.5–2)
LIPASE SERPL-CCNC: 29 U/L (ref 11–82)
LYMPHOCYTES # BLD AUTO: 0.91 THOUSANDS/ΜL (ref 0.6–4.47)
LYMPHOCYTES NFR BLD AUTO: 7 % (ref 14–44)
MCH RBC QN AUTO: 30.4 PG (ref 26.8–34.3)
MCHC RBC AUTO-ENTMCNC: 32.2 G/DL (ref 31.4–37.4)
MCV RBC AUTO: 95 FL (ref 82–98)
MONOCYTES # BLD AUTO: 1.19 THOUSAND/ΜL (ref 0.17–1.22)
MONOCYTES NFR BLD AUTO: 10 % (ref 4–12)
NEUTROPHILS # BLD AUTO: 9.96 THOUSANDS/ΜL (ref 1.85–7.62)
NEUTS SEG NFR BLD AUTO: 82 % (ref 43–75)
NRBC BLD AUTO-RTO: 0 /100 WBCS
PLATELET # BLD AUTO: 270 THOUSANDS/UL (ref 149–390)
PMV BLD AUTO: 11.3 FL (ref 8.9–12.7)
POTASSIUM SERPL-SCNC: 3.7 MMOL/L (ref 3.5–5.3)
PROCALCITONIN SERPL-MCNC: 2.95 NG/ML
PROT SERPL-MCNC: 8.4 G/DL (ref 6.4–8.4)
PROTHROMBIN TIME: 14.3 SECONDS (ref 11.6–14.5)
RBC # BLD AUTO: 4.6 MILLION/UL (ref 3.88–5.62)
RSV RNA RESP QL NAA+PROBE: NEGATIVE
SARS-COV-2 RNA RESP QL NAA+PROBE: NEGATIVE
SODIUM SERPL-SCNC: 128 MMOL/L (ref 135–147)
WBC # BLD AUTO: 12.24 THOUSAND/UL (ref 4.31–10.16)

## 2022-05-15 PROCEDURE — 85610 PROTHROMBIN TIME: CPT | Performed by: EMERGENCY MEDICINE

## 2022-05-15 PROCEDURE — 76705 ECHO EXAM OF ABDOMEN: CPT

## 2022-05-15 PROCEDURE — 0241U HB NFCT DS VIR RESP RNA 4 TRGT: CPT | Performed by: EMERGENCY MEDICINE

## 2022-05-15 PROCEDURE — 96375 TX/PRO/DX INJ NEW DRUG ADDON: CPT

## 2022-05-15 PROCEDURE — 80053 COMPREHEN METABOLIC PANEL: CPT | Performed by: EMERGENCY MEDICINE

## 2022-05-15 PROCEDURE — 99285 EMERGENCY DEPT VISIT HI MDM: CPT

## 2022-05-15 PROCEDURE — 85025 COMPLETE CBC W/AUTO DIFF WBC: CPT | Performed by: EMERGENCY MEDICINE

## 2022-05-15 PROCEDURE — 83690 ASSAY OF LIPASE: CPT | Performed by: EMERGENCY MEDICINE

## 2022-05-15 PROCEDURE — 96361 HYDRATE IV INFUSION ADD-ON: CPT

## 2022-05-15 PROCEDURE — 84145 PROCALCITONIN (PCT): CPT | Performed by: EMERGENCY MEDICINE

## 2022-05-15 PROCEDURE — 74176 CT ABD & PELVIS W/O CONTRAST: CPT

## 2022-05-15 PROCEDURE — 99284 EMERGENCY DEPT VISIT MOD MDM: CPT

## 2022-05-15 PROCEDURE — 99284 EMERGENCY DEPT VISIT MOD MDM: CPT | Performed by: EMERGENCY MEDICINE

## 2022-05-15 PROCEDURE — 36415 COLL VENOUS BLD VENIPUNCTURE: CPT | Performed by: EMERGENCY MEDICINE

## 2022-05-15 PROCEDURE — 87040 BLOOD CULTURE FOR BACTERIA: CPT | Performed by: EMERGENCY MEDICINE

## 2022-05-15 PROCEDURE — 83605 ASSAY OF LACTIC ACID: CPT | Performed by: EMERGENCY MEDICINE

## 2022-05-15 PROCEDURE — 96365 THER/PROPH/DIAG IV INF INIT: CPT

## 2022-05-15 PROCEDURE — 85730 THROMBOPLASTIN TIME PARTIAL: CPT | Performed by: EMERGENCY MEDICINE

## 2022-05-15 RX ORDER — ACETAMINOPHEN 325 MG/1
650 TABLET ORAL ONCE
Status: COMPLETED | OUTPATIENT
Start: 2022-05-15 | End: 2022-05-15

## 2022-05-15 RX ORDER — ONDANSETRON 2 MG/ML
4 INJECTION INTRAMUSCULAR; INTRAVENOUS EVERY 6 HOURS PRN
Status: DISCONTINUED | OUTPATIENT
Start: 2022-05-15 | End: 2022-05-22 | Stop reason: HOSPADM

## 2022-05-15 RX ORDER — HYDROMORPHONE HCL/PF 1 MG/ML
0.5 SYRINGE (ML) INJECTION EVERY 4 HOURS PRN
Status: DISCONTINUED | OUTPATIENT
Start: 2022-05-15 | End: 2022-05-20

## 2022-05-15 RX ORDER — HEPARIN SODIUM 5000 [USP'U]/ML
5000 INJECTION, SOLUTION INTRAVENOUS; SUBCUTANEOUS EVERY 8 HOURS SCHEDULED
Status: DISCONTINUED | OUTPATIENT
Start: 2022-05-16 | End: 2022-05-22 | Stop reason: HOSPADM

## 2022-05-15 RX ORDER — ACETAMINOPHEN 325 MG/1
650 TABLET ORAL EVERY 6 HOURS PRN
Status: DISCONTINUED | OUTPATIENT
Start: 2022-05-15 | End: 2022-05-22 | Stop reason: HOSPADM

## 2022-05-15 RX ORDER — ONDANSETRON 2 MG/ML
4 INJECTION INTRAMUSCULAR; INTRAVENOUS ONCE
Status: COMPLETED | OUTPATIENT
Start: 2022-05-15 | End: 2022-05-15

## 2022-05-15 RX ORDER — SODIUM CHLORIDE, SODIUM LACTATE, POTASSIUM CHLORIDE, CALCIUM CHLORIDE 600; 310; 30; 20 MG/100ML; MG/100ML; MG/100ML; MG/100ML
125 INJECTION, SOLUTION INTRAVENOUS CONTINUOUS
Status: DISCONTINUED | OUTPATIENT
Start: 2022-05-16 | End: 2022-05-17

## 2022-05-15 RX ORDER — OXYCODONE HYDROCHLORIDE 5 MG/1
5 TABLET ORAL EVERY 4 HOURS PRN
Status: DISCONTINUED | OUTPATIENT
Start: 2022-05-15 | End: 2022-05-22 | Stop reason: HOSPADM

## 2022-05-15 RX ORDER — ATORVASTATIN CALCIUM 20 MG/1
20 TABLET, FILM COATED ORAL DAILY
Status: DISCONTINUED | OUTPATIENT
Start: 2022-05-16 | End: 2022-05-22 | Stop reason: HOSPADM

## 2022-05-15 RX ORDER — OXYCODONE HYDROCHLORIDE 10 MG/1
10 TABLET ORAL EVERY 4 HOURS PRN
Status: DISCONTINUED | OUTPATIENT
Start: 2022-05-15 | End: 2022-05-22 | Stop reason: HOSPADM

## 2022-05-15 RX ADMIN — SODIUM CHLORIDE 1000 ML: 0.9 INJECTION, SOLUTION INTRAVENOUS at 21:55

## 2022-05-15 RX ADMIN — ONDANSETRON 4 MG: 2 INJECTION INTRAMUSCULAR; INTRAVENOUS at 17:08

## 2022-05-15 RX ADMIN — SODIUM CHLORIDE 1000 ML: 0.9 INJECTION, SOLUTION INTRAVENOUS at 17:08

## 2022-05-15 RX ADMIN — ACETAMINOPHEN 650 MG: 325 TABLET ORAL at 22:11

## 2022-05-15 RX ADMIN — Medication 3.38 G: at 20:12

## 2022-05-15 NOTE — LETTER
[address] [facility] [address]  [address] [address]  Dept: [phone]    May 22, 2022     Patient: Kenyon Michele   YOB: 1965   Date of Visit: 5/15/2022       To Whom it May Concern:    Kenyon Michele is under my professional care  He was seen in the hospital from 5/15/2022   to 05/22/22  He may return to work in 1 week with the following limitations: weight lifting restriction of 20lbs maximum until cleared by surgery team outpatient  If you have any questions or concerns, please don't hesitate to call         Sincerely,        Theresa Jefferson MD

## 2022-05-15 NOTE — ED PROVIDER NOTES
History  Chief Complaint   Patient presents with    Abdominal Pain     Abdominal pain with N,V,D for the past two weeks  This is a 33-year-old man who complains of nausea, vomiting, diarrhea, and fever for unclear time somewhere between 1 week and 2 weeks  Also now reports abdominal pain has resolved, seems to be epigastric when it is present  Patient is a poor historian  Patient has been trying to take over-the-counter medications which have not helped with his symptoms  He does note that he had a fever of 101° at home  Reports vomiting has been nonbloody nonbilious  Stools have been watery but not black or tarry  Reports urination is now darker but is not any weight uncomfortable or painful  He now reports he is also having generalized myalgias and malaise  Prior to Admission Medications   Prescriptions Last Dose Informant Patient Reported? Taking?   atorvastatin (LIPITOR) 20 mg tablet   No No   Sig: Take 1 tablet (20 mg total) by mouth daily   olmesartan (BENICAR) 20 mg tablet   No No   Sig: Take 1 tablet (20 mg total) by mouth daily      Facility-Administered Medications: None       Past Medical History:   Diagnosis Date    Foot fracture     Hypertension        Past Surgical History:   Procedure Laterality Date    NO PAST SURGERIES         Family History   Problem Relation Age of Onset    ALS Mother     Pneumonia Father     No Known Problems Sister      I have reviewed and agree with the history as documented  E-Cigarette/Vaping    E-Cigarette Use Never User      E-Cigarette/Vaping Substances    Nicotine No     THC No     CBD No      Social History     Tobacco Use    Smoking status: Former Smoker     Types: Cigarettes     Quit date:      Years since quittin 3    Smokeless tobacco: Never Used   Vaping Use    Vaping Use: Never used   Substance Use Topics    Alcohol use:  Yes    Drug use: Not Currently       Review of Systems   Constitutional: Positive for chills, diaphoresis and fever  Eyes: Negative for visual disturbance  Respiratory: Negative for shortness of breath  Cardiovascular: Negative for chest pain  Gastrointestinal: Positive for diarrhea, nausea and vomiting  Negative for abdominal distention  Endocrine: Negative for polyuria  Genitourinary: Negative for decreased urine volume and dysuria  Skin: Negative for rash  Neurological: Negative for dizziness, syncope and weakness  Physical Exam  Physical Exam  Constitutional:       General: He is not in acute distress  Appearance: He is well-developed  He is not ill-appearing, toxic-appearing or diaphoretic  HENT:      Head: Normocephalic and atraumatic  Eyes:      Conjunctiva/sclera: Conjunctivae normal       Pupils: Pupils are equal, round, and reactive to light  Cardiovascular:      Rate and Rhythm: Normal rate and regular rhythm  Heart sounds: Normal heart sounds  Pulmonary:      Effort: Pulmonary effort is normal  No respiratory distress  Breath sounds: Normal breath sounds  Abdominal:      General: Bowel sounds are normal       Palpations: Abdomen is soft  Tenderness: There is abdominal tenderness in the epigastric area  There is no rebound  Musculoskeletal:         General: Normal range of motion  Cervical back: Normal range of motion and neck supple  Skin:     General: Skin is warm and dry  Neurological:      Mental Status: He is alert and oriented to person, place, and time  Psychiatric:         Behavior: Behavior normal          Thought Content:  Thought content normal          Judgment: Judgment normal          Vital Signs  ED Triage Vitals [05/15/22 1612]   Temperature Pulse Respirations Blood Pressure SpO2   (!) 97 4 °F (36 3 °C) 83 20 122/62 96 %      Temp Source Heart Rate Source Patient Position - Orthostatic VS BP Location FiO2 (%)   Temporal Monitor Sitting Right arm --      Pain Score       8           Vitals:    05/15/22 1612   BP: 122/62 Pulse: 83   Patient Position - Orthostatic VS: Sitting         Visual Acuity      ED Medications  Medications   barium (READI-CAT 2) suspension 900 mL (has no administration in time range)   acetaminophen (TYLENOL) tablet 650 mg (has no administration in time range)   sodium chloride 0 9 % bolus 1,000 mL (has no administration in time range)   sodium chloride 0 9 % bolus 1,000 mL (0 mL Intravenous Stopped 5/15/22 1908)   ondansetron (ZOFRAN) injection 4 mg (4 mg Intravenous Given 5/15/22 1708)   piperacillin-tazobactam (ZOSYN) IVPB 3 375 g (3 375 g Intravenous New Bag 5/15/22 2012)       Diagnostic Studies  Results Reviewed     Procedure Component Value Units Date/Time    COVID/FLU/RSV [[id number]]  (Normal) Collected: 05/15/22 1658    Lab Status: Final result Specimen: Nares from Nose Updated: 05/15/22 1742     SARS-CoV-2 Negative     INFLUENZA A PCR Negative     INFLUENZA B PCR Negative     RSV PCR Negative    Narrative:      FOR PEDIATRIC PATIENTS - copy/paste COVID Guidelines URL to browser: [email or web address]  [email or web address]    SARS-CoV-2 assay is a Nucleic Acid Amplification assay intended for the  qualitative detection of nucleic acid from SARS-CoV-2 in nasopharyngeal  swabs  Results are for the presumptive identification of SARS-CoV-2 RNA  Positive results are indicative of infection with SARS-CoV-2, the virus  causing COVID-19, but do not rule out bacterial infection or co-infection  with other viruses  Laboratories within the [place] and its  territories are required to report all positive results to the appropriate  public health authorities  Negative results do not preclude SARS-CoV-2  infection and should not be used as the sole basis for treatment or other  patient management decisions  Negative results must be combined with  clinical observations, patient history, and epidemiological information  This test has not been FDA cleared or approved      This test has been authorized by FDA under an Emergency Use Authorization  (EUA)  This test is only authorized for the duration of time the  declaration that circumstances exist justifying the authorization of the  emergency use of an in vitro diagnostic tests for detection of SARS-CoV-2  virus and/or diagnosis of COVID-19 infection under section 564(b)(1) of  the Act, 21 U  S C  090UPY-4(S)(6), unless the authorization is terminated  or revoked sooner  The test has been validated but independent review by FDA  and CLIA is pending  Test performed using [employer] GeneXpert: This RT-PCR assay targets N2,  a region unique to SARS-CoV-2  A conserved region in the E-gene was chosen  for pan-Sarbecovirus detection which includes SARS-CoV-2      Lipase [[id number]]  (Normal) Collected: 05/15/22 1658    Lab Status: Final result Specimen: Blood from Arm, Left Updated: 05/15/22 1723     Lipase 29 u/L     CMP [[id number]]  (Abnormal) Collected: 05/15/22 1658    Lab Status: Final result Specimen: Blood from Arm, Left Updated: 05/15/22 1723     Sodium 128 mmol/L      Potassium 3 7 mmol/L      Chloride 87 mmol/L      CO2 29 mmol/L      ANION GAP 12 mmol/L      BUN 19 mg/dL      Creatinine 1 06 mg/dL      Glucose 138 mg/dL      Calcium 9 3 mg/dL      AST 98 U/L      ALT 75 U/L      Alkaline Phosphatase 248 U/L      Total Protein 8 4 g/dL      Albumin 3 7 g/dL      Total Bilirubin 9 18 mg/dL      eGFR 76 ml/min/1 73sq m     Narrative:      Julienne guidelines for Chronic Kidney Disease (CKD):     Stage 1 with normal or high GFR (GFR > 90 mL/min/1 73 square meters)    Stage 2 Mild CKD (GFR = 60-89 mL/min/1 73 square meters)    Stage 3A Moderate CKD (GFR = 45-59 mL/min/1 73 square meters)    Stage 3B Moderate CKD (GFR = 30-44 mL/min/1 73 square meters)    Stage 4 Severe CKD (GFR = 15-29 mL/min/1 73 square meters)    Stage 5 End Stage CKD (GFR <15 mL/min/1 73 square meters)  Note: GFR calculation is accurate only with a steady state creatinine    CBC and differential [[id number]]  (Abnormal) Collected: 05/15/22 1658    Lab Status: Final result Specimen: Blood from Arm, Left Updated: 05/15/22 1702     WBC 12 24 Thousand/uL      RBC 4 60 Million/uL      Hemoglobin 14 0 g/dL      Hematocrit 43 5 %      MCV 95 fL      MCH 30 4 pg      MCHC 32 2 g/dL      RDW 12 5 %      MPV 11 3 fL      Platelets 128 Thousands/uL      nRBC 0 /100 WBCs      Neutrophils Relative 82 %      Immat GRANS % 1 %      Lymphocytes Relative 7 %      Monocytes Relative 10 %      Eosinophils Relative 0 %      Basophils Relative 0 %      Neutrophils Absolute 9 96 Thousands/µL      Immature Grans Absolute 0 10 Thousand/uL      Lymphocytes Absolute 0 91 Thousands/µL      Monocytes Absolute 1 19 Thousand/µL      Eosinophils Absolute 0 04 Thousand/µL      Basophils Absolute 0 04 Thousands/µL     UA w Reflex to Microscopic w Reflex to Culture [[id number]]     Lab Status: No result Specimen: Urine                  US right upper quadrant   Final Result by Anthony Castrejon MD (05/15 2023)      1  Intrahepatic and extrahepatic biliary ductal dilation with the CBD measuring up to 16 mm, similar to prior CT  There is apparent echogenic filling defect in the distal CBD near the ampulla, corresponding to the hyperdensity seen on prior CT  This    is suspicious for choledocholithiasis  Evaluation is suboptimal due to adjacent bowel gas  Further evaluation with MRI/MRCP would be beneficial       2  Cholelithiasis with moderate gallbladder distention and positive sonographic Gutierrez's sign  This may be secondary to the biliary obstruction  However, possibility of associated acute cholecystitis is not excluded  The study was marked in [facility] for immediate notification        Workstation performed: [id number]         CT abdomen pelvis without contrast   Final Result by Rene Payan MD (05/15 1904)      Dilated common bile duct with a faint hyperdensity at the level of the ampulla  An obstructing choledocholithiasis is not excluded  Further evaluation with ultrasound may be helpful  MRCP evaluation may be helpful  Cholelithiasis  No pericholecystic inflammation  Bilateral perinephric stranding may be chronic in etiology but infection not excluded  Mild bladder wall thickening  Cystitis not excluded  Additional findings as above  Workstation performed: [id number]                    Procedures  Procedures         ED Course                               SBIRT 20yo+    Flowsheet Row Most Recent Value   SBIRT (25 yo +)    In order to provide better care to our patients, we are screening all of our patients for alcohol and drug use  Would it be okay to ask you these screening questions? Yes Filed at: 05/15/2022 1701   Initial Alcohol Screen: US AUDIT-C     1  How often do you have a drink containing alcohol? 0 Filed at: 05/15/2022 1701   2  How many drinks containing alcohol do you have on a typical day you are drinking? 0 Filed at: 05/15/2022 1701   3a  Male UNDER 65: How often do you have five or more drinks on one occasion? 0 Filed at: 05/15/2022 1701   3b  FEMALE Any Age, or MALE 65+: How often do you have 4 or more drinks on one occassion? 0 Filed at: 05/15/2022 1701   Audit-C Score 0 Filed at: 05/15/2022 1701   SARITHA: How many times in the past year have you    Used an illegal drug or used a prescription medication for non-medical reasons? Never Filed at: 05/15/2022 1701                    MDM  Number of Diagnoses or Management Options  Choledocholithiasis: new and requires workup  Diagnosis management comments: This is a 53-year-old man with choledocholithiasis  Case discussed with General surgery at our facility contacted General surgery of [facility] accept the patient  Patient started on Zosyn  Given fluid  Patient improved at the time of transfer  States understanding agreement with plan    Patient did not have sepsis criteria when starting antibiotics  Patient later flag positive for sepsis screen aftercare had already been started  Amount and/or Complexity of Data Reviewed  Clinical lab tests: ordered and reviewed  Tests in the radiology section of CPT®: ordered and reviewed  Discuss the patient with other providers: yes  Independent visualization of images, tracings, or specimens: yes        Disposition  Final diagnoses:   Choledocholithiasis     Time reflects when diagnosis was documented in both MDM as applicable and the Disposition within this note     Time User Action Codes Description Comment    5/15/2022  7:41 PM Brendan Estrella Add [K80 50] Choledocholithiasis       ED Disposition     ED Disposition   Transfer to Another Facility-In Network    Condition   --    Date/Time   Sun May 15, 2022  7:41 PM    Comment   Anais Harp should be transferred out to [place]  Follow-up Information    None         Patient's Medications   Discharge Prescriptions    No medications on file       No discharge procedures on file      PDMP Review     None          ED Provider  Electronically Signed by           Kavya Rg MD  05/15/22 1883       Kavya Rg MD  05/15/22 7289

## 2022-05-16 ENCOUNTER — APPOINTMENT (INPATIENT)
Dept: RADIOLOGY | Facility: HOSPITAL | Age: 57
DRG: 418 | End: 2022-05-16
Payer: COMMERCIAL

## 2022-05-16 ENCOUNTER — APPOINTMENT (OUTPATIENT)
Dept: GASTROENTEROLOGY | Facility: HOSPITAL | Age: 57
DRG: 418 | End: 2022-05-16
Payer: COMMERCIAL

## 2022-05-16 ENCOUNTER — ANESTHESIA EVENT (OUTPATIENT)
Dept: ANESTHESIOLOGY | Facility: HOSPITAL | Age: 57
End: 2022-05-16

## 2022-05-16 ENCOUNTER — ANESTHESIA (OUTPATIENT)
Dept: ANESTHESIOLOGY | Facility: HOSPITAL | Age: 57
End: 2022-05-16

## 2022-05-16 ENCOUNTER — ANESTHESIA EVENT (INPATIENT)
Dept: GASTROENTEROLOGY | Facility: HOSPITAL | Age: 57
DRG: 418 | End: 2022-05-16
Payer: COMMERCIAL

## 2022-05-16 ENCOUNTER — ANESTHESIA (INPATIENT)
Dept: GASTROENTEROLOGY | Facility: HOSPITAL | Age: 57
DRG: 418 | End: 2022-05-16
Payer: COMMERCIAL

## 2022-05-16 PROBLEM — K83.09 CHOLANGITIS: Status: ACTIVE | Noted: 2022-05-16

## 2022-05-16 LAB
ALBUMIN SERPL BCP-MCNC: 2.3 G/DL (ref 3.5–5)
ALP SERPL-CCNC: 250 U/L (ref 46–116)
ALT SERPL W P-5'-P-CCNC: 81 U/L (ref 12–78)
ANION GAP SERPL CALCULATED.3IONS-SCNC: 7 MMOL/L (ref 4–13)
AST SERPL W P-5'-P-CCNC: 105 U/L (ref 5–45)
BACTERIA UR QL AUTO: NORMAL /HPF
BASOPHILS # BLD AUTO: 0.02 THOUSANDS/ΜL (ref 0–0.1)
BASOPHILS NFR BLD AUTO: 0 % (ref 0–1)
BILIRUB DIRECT SERPL-MCNC: 6.46 MG/DL (ref 0–0.2)
BILIRUB SERPL-MCNC: 8.23 MG/DL (ref 0.2–1)
BILIRUB UR QL STRIP: ABNORMAL
BUN SERPL-MCNC: 15 MG/DL (ref 5–25)
CALCIUM SERPL-MCNC: 8.5 MG/DL (ref 8.3–10.1)
CHLORIDE SERPL-SCNC: 94 MMOL/L (ref 100–108)
CLARITY UR: CLEAR
CO2 SERPL-SCNC: 30 MMOL/L (ref 21–32)
COLOR UR: ABNORMAL
CREAT SERPL-MCNC: 0.99 MG/DL (ref 0.6–1.3)
EOSINOPHIL # BLD AUTO: 0.02 THOUSAND/ΜL (ref 0–0.61)
EOSINOPHIL NFR BLD AUTO: 0 % (ref 0–6)
ERYTHROCYTE [DISTWIDTH] IN BLOOD BY AUTOMATED COUNT: 12.8 % (ref 11.6–15.1)
GFR SERPL CREATININE-BSD FRML MDRD: 83 ML/MIN/1.73SQ M
GLUCOSE SERPL-MCNC: 118 MG/DL (ref 65–140)
GLUCOSE UR STRIP-MCNC: NEGATIVE MG/DL
HCT VFR BLD AUTO: 40.3 % (ref 36.5–49.3)
HGB BLD-MCNC: 12.8 G/DL (ref 12–17)
HGB UR QL STRIP.AUTO: NEGATIVE
IMM GRANULOCYTES # BLD AUTO: 0.11 THOUSAND/UL (ref 0–0.2)
IMM GRANULOCYTES NFR BLD AUTO: 1 % (ref 0–2)
KETONES UR STRIP-MCNC: NEGATIVE MG/DL
LACTATE SERPL-SCNC: 1.5 MMOL/L (ref 0.5–2)
LEUKOCYTE ESTERASE UR QL STRIP: NEGATIVE
LYMPHOCYTES # BLD AUTO: 0.86 THOUSANDS/ΜL (ref 0.6–4.47)
LYMPHOCYTES NFR BLD AUTO: 8 % (ref 14–44)
MAGNESIUM SERPL-MCNC: 2 MG/DL (ref 1.6–2.6)
MCH RBC QN AUTO: 30.2 PG (ref 26.8–34.3)
MCHC RBC AUTO-ENTMCNC: 31.8 G/DL (ref 31.4–37.4)
MCV RBC AUTO: 95 FL (ref 82–98)
MONOCYTES # BLD AUTO: 1.02 THOUSAND/ΜL (ref 0.17–1.22)
MONOCYTES NFR BLD AUTO: 9 % (ref 4–12)
NEUTROPHILS # BLD AUTO: 8.83 THOUSANDS/ΜL (ref 1.85–7.62)
NEUTS SEG NFR BLD AUTO: 82 % (ref 43–75)
NITRITE UR QL STRIP: NEGATIVE
NON-SQ EPI CELLS URNS QL MICRO: NORMAL /HPF
NRBC BLD AUTO-RTO: 0 /100 WBCS
PH UR STRIP.AUTO: 6 [PH]
PHOSPHATE SERPL-MCNC: 2.7 MG/DL (ref 2.7–4.5)
PLATELET # BLD AUTO: 247 THOUSANDS/UL (ref 149–390)
PMV BLD AUTO: 11.4 FL (ref 8.9–12.7)
POTASSIUM SERPL-SCNC: 3.4 MMOL/L (ref 3.5–5.3)
PROT SERPL-MCNC: 7.1 G/DL (ref 6.4–8.2)
PROT UR STRIP-MCNC: ABNORMAL MG/DL
RBC # BLD AUTO: 4.24 MILLION/UL (ref 3.88–5.62)
RBC #/AREA URNS AUTO: NORMAL /HPF
SODIUM SERPL-SCNC: 131 MMOL/L (ref 136–145)
SP GR UR STRIP.AUTO: 1.03 (ref 1–1.03)
UROBILINOGEN UR STRIP-ACNC: 8 MG/DL
WBC # BLD AUTO: 10.86 THOUSAND/UL (ref 4.31–10.16)
WBC #/AREA URNS AUTO: NORMAL /HPF

## 2022-05-16 PROCEDURE — C1769 GUIDE WIRE: HCPCS

## 2022-05-16 PROCEDURE — 85025 COMPLETE CBC W/AUTO DIFF WBC: CPT | Performed by: SURGERY

## 2022-05-16 PROCEDURE — 0FC98ZZ EXTIRPATION OF MATTER FROM COMMON BILE DUCT, VIA NATURAL OR ARTIFICIAL OPENING ENDOSCOPIC: ICD-10-PCS | Performed by: INTERNAL MEDICINE

## 2022-05-16 PROCEDURE — 43264 ERCP REMOVE DUCT CALCULI: CPT | Performed by: INTERNAL MEDICINE

## 2022-05-16 PROCEDURE — 74330 X-RAY BILE/PANC ENDOSCOPY: CPT

## 2022-05-16 PROCEDURE — 83735 ASSAY OF MAGNESIUM: CPT | Performed by: SURGERY

## 2022-05-16 PROCEDURE — 84100 ASSAY OF PHOSPHORUS: CPT | Performed by: SURGERY

## 2022-05-16 PROCEDURE — 0F798ZZ DILATION OF COMMON BILE DUCT, VIA NATURAL OR ARTIFICIAL OPENING ENDOSCOPIC: ICD-10-PCS | Performed by: INTERNAL MEDICINE

## 2022-05-16 PROCEDURE — NC001 PR NO CHARGE: Performed by: SURGERY

## 2022-05-16 PROCEDURE — 80048 BASIC METABOLIC PNL TOTAL CA: CPT | Performed by: SURGERY

## 2022-05-16 PROCEDURE — 83605 ASSAY OF LACTIC ACID: CPT | Performed by: SURGERY

## 2022-05-16 PROCEDURE — 36415 COLL VENOUS BLD VENIPUNCTURE: CPT | Performed by: SURGERY

## 2022-05-16 PROCEDURE — C9113 INJ PANTOPRAZOLE SODIUM, VIA: HCPCS | Performed by: SURGERY

## 2022-05-16 PROCEDURE — 99222 1ST HOSP IP/OBS MODERATE 55: CPT | Performed by: INTERNAL MEDICINE

## 2022-05-16 PROCEDURE — 43262 ENDO CHOLANGIOPANCREATOGRAPH: CPT | Performed by: INTERNAL MEDICINE

## 2022-05-16 PROCEDURE — A9585 GADOBUTROL INJECTION: HCPCS | Performed by: INTERNAL MEDICINE

## 2022-05-16 PROCEDURE — 99222 1ST HOSP IP/OBS MODERATE 55: CPT | Performed by: SURGERY

## 2022-05-16 PROCEDURE — 0F7C8ZZ DILATION OF AMPULLA OF VATER, VIA NATURAL OR ARTIFICIAL OPENING ENDOSCOPIC: ICD-10-PCS | Performed by: INTERNAL MEDICINE

## 2022-05-16 PROCEDURE — 81001 URINALYSIS AUTO W/SCOPE: CPT | Performed by: SURGERY

## 2022-05-16 PROCEDURE — 80076 HEPATIC FUNCTION PANEL: CPT | Performed by: SURGERY

## 2022-05-16 RX ORDER — FENTANYL CITRATE 50 UG/ML
INJECTION, SOLUTION INTRAMUSCULAR; INTRAVENOUS AS NEEDED
Status: DISCONTINUED | OUTPATIENT
Start: 2022-05-16 | End: 2022-05-16

## 2022-05-16 RX ORDER — LIDOCAINE HYDROCHLORIDE 10 MG/ML
INJECTION, SOLUTION EPIDURAL; INFILTRATION; INTRACAUDAL; PERINEURAL AS NEEDED
Status: DISCONTINUED | OUTPATIENT
Start: 2022-05-16 | End: 2022-05-16

## 2022-05-16 RX ORDER — ONDANSETRON 2 MG/ML
INJECTION INTRAMUSCULAR; INTRAVENOUS AS NEEDED
Status: DISCONTINUED | OUTPATIENT
Start: 2022-05-16 | End: 2022-05-16

## 2022-05-16 RX ORDER — SUCCINYLCHOLINE/SOD CL,ISO/PF 100 MG/5ML
SYRINGE (ML) INTRAVENOUS AS NEEDED
Status: DISCONTINUED | OUTPATIENT
Start: 2022-05-16 | End: 2022-05-16

## 2022-05-16 RX ORDER — PANTOPRAZOLE SODIUM 40 MG/1
40 INJECTION, POWDER, FOR SOLUTION INTRAVENOUS
Status: DISCONTINUED | OUTPATIENT
Start: 2022-05-16 | End: 2022-05-19

## 2022-05-16 RX ORDER — PROPOFOL 10 MG/ML
INJECTION, EMULSION INTRAVENOUS AS NEEDED
Status: DISCONTINUED | OUTPATIENT
Start: 2022-05-16 | End: 2022-05-16

## 2022-05-16 RX ADMIN — PIPERACILLIN AND TAZOBACTAM 4.5 G: 36; 4.5 INJECTION, POWDER, FOR SOLUTION INTRAVENOUS at 08:59

## 2022-05-16 RX ADMIN — PIPERACILLIN AND TAZOBACTAM 4.5 G: 36; 4.5 INJECTION, POWDER, FOR SOLUTION INTRAVENOUS at 15:09

## 2022-05-16 RX ADMIN — PANTOPRAZOLE SODIUM 40 MG: 40 INJECTION, POWDER, FOR SOLUTION INTRAVENOUS at 00:52

## 2022-05-16 RX ADMIN — LIDOCAINE HYDROCHLORIDE 100 MG: 10 INJECTION, SOLUTION EPIDURAL; INFILTRATION; INTRACAUDAL at 11:25

## 2022-05-16 RX ADMIN — SODIUM CHLORIDE, SODIUM LACTATE, POTASSIUM CHLORIDE, AND CALCIUM CHLORIDE 1000 ML: .6; .31; .03; .02 INJECTION, SOLUTION INTRAVENOUS at 10:23

## 2022-05-16 RX ADMIN — Medication 160 MG: at 11:29

## 2022-05-16 RX ADMIN — SODIUM CHLORIDE, SODIUM LACTATE, POTASSIUM CHLORIDE, AND CALCIUM CHLORIDE 125 ML/HR: .6; .31; .03; .02 INJECTION, SOLUTION INTRAVENOUS at 00:55

## 2022-05-16 RX ADMIN — ACETAMINOPHEN 650 MG: 325 TABLET, FILM COATED ORAL at 08:52

## 2022-05-16 RX ADMIN — HYDROMORPHONE HYDROCHLORIDE 0.5 MG: 1 INJECTION, SOLUTION INTRAMUSCULAR; INTRAVENOUS; SUBCUTANEOUS at 04:27

## 2022-05-16 RX ADMIN — GADOBUTROL 20 ML: 604.72 INJECTION INTRAVENOUS at 12:45

## 2022-05-16 RX ADMIN — PROPOFOL 200 MG: 10 INJECTION, EMULSION INTRAVENOUS at 11:26

## 2022-05-16 RX ADMIN — HEPARIN SODIUM 5000 UNITS: 5000 INJECTION INTRAVENOUS; SUBCUTANEOUS at 09:12

## 2022-05-16 RX ADMIN — ATORVASTATIN CALCIUM 20 MG: 20 TABLET, FILM COATED ORAL at 08:52

## 2022-05-16 RX ADMIN — HEPARIN SODIUM 5000 UNITS: 5000 INJECTION INTRAVENOUS; SUBCUTANEOUS at 16:35

## 2022-05-16 RX ADMIN — INDOMETHACIN 100 MG: 50 SUPPOSITORY RECTAL at 11:39

## 2022-05-16 RX ADMIN — PIPERACILLIN AND TAZOBACTAM 4.5 G: 36; 4.5 INJECTION, POWDER, FOR SOLUTION INTRAVENOUS at 19:55

## 2022-05-16 RX ADMIN — HEPARIN SODIUM 5000 UNITS: 5000 INJECTION INTRAVENOUS; SUBCUTANEOUS at 00:55

## 2022-05-16 RX ADMIN — ONDANSETRON 4 MG: 2 INJECTION INTRAMUSCULAR; INTRAVENOUS at 11:30

## 2022-05-16 RX ADMIN — PANTOPRAZOLE SODIUM 40 MG: 40 INJECTION, POWDER, FOR SOLUTION INTRAVENOUS at 08:52

## 2022-05-16 RX ADMIN — FENTANYL CITRATE 100 MCG: 50 INJECTION INTRAMUSCULAR; INTRAVENOUS at 11:26

## 2022-05-16 RX ADMIN — SODIUM CHLORIDE, SODIUM LACTATE, POTASSIUM CHLORIDE, AND CALCIUM CHLORIDE 125 ML/HR: .6; .31; .03; .02 INJECTION, SOLUTION INTRAVENOUS at 16:35

## 2022-05-16 RX ADMIN — PIPERACILLIN AND TAZOBACTAM 4.5 G: 36; 4.5 INJECTION, POWDER, FOR SOLUTION INTRAVENOUS at 01:57

## 2022-05-16 NOTE — DISCHARGE INSTRUCTIONS
Discharge Instructions: Follow-up is required by the following physicians:  Surgery- please call to schedule appointment as soon as possible for a visit within 2 weeks  An order for a blood test to check your liver enzymes has been sent  Please have your blood drawn in 1 week prior to your surgery appointment so that we may review it together at your appointment  Primary care- please call to schedule an appointment as soon as possible for a visit within 1 week  Incidental findings have been discussed and documented, and will require continued follow-up with primary care physician  Diet-  Please continue to consume a low-fat diet upon discharge  For any questions or concerns please call the office    Medications-  A prescription has been sent to your pharmacy for pain medication  Take this as needed  No driving while taking pain medications and or until cleared by surgery  Wound Care-  Your prior abdominal drain site may continue to leak a small amount of fluid over the next couple of days  Okay to use 4 x 4 gauze and tape as needed for any drainage  For any questions or concerns please call the office for further information instructions  Call physician for temperature over 101, wound redness or discharge, vomiting, or intolerance to diet  Activity:  - No lifting greater than 20 pounds or strenuous physical activity or exercise for 2 weeks  - Walking and normal light activities are encouraged  - Normal daily activities including climbing steps are okay  - No driving until no longer using pain medications  Return to work:    - You may return to work in 2 weeks or sooner if you are feeling well enough  Wound Care:  - May shower daily  No tub baths or swimming until cleared by your surgeon   - Wash incision gently with soap and water and pat dry  - Do not apply any creams or ointments unless instructed to do so by your surgeon          Additional Instructions:  - If you have any questions or concerns after discharge please call the office   - Call office or return to ER if fever greater than 101, chills, persistent nausea/vomiting, worsening/uncontrollable pain, and/or increasing redness or purulent/foul smelling drainage from incision(s)  ERCP (Endoscopic Retrograde Cholangiopancreatography)   WHAT YOU NEED TO KNOW:   An ERCP (endoscopic retrograde cholangiopancreatography) is a procedure to examine the ducts of your pancreas or gallbladder  ERCP may also be used to open blocked ducts, or to diagnose problems with your pancreas or gallbladder  An endoscope (thin, flexible tube with a light) will be used for the procedure  DISCHARGE INSTRUCTIONS:   Seek care immediately if:   You have sudden, severe abdominal pain  You have problems swallowing  You have a large amount of black, sticky bowel movements or blood in your bowel movements  You have sudden trouble breathing  You feel weak, lightheaded, or faint or your heart beats faster than normal for you  Contact your healthcare provider if:   You have a fever and chills  You have nausea or are vomiting  Your abdomen is bloated or feels full and hard  You have abdominal pain  You have a large amount of black, sticky bowel movements or blood in your bowel movements  You have not had a bowel movement for 3 days after your procedure  You have rash or hives  You lose your appetite, your skin feels itchy, and your skin turns yellow  You have questions or concerns about your procedure  Self-care:   ·      Rest when you feel it is needed  You may be drowsy for up to 24 hours after your procedure  Return to your daily activities as directed  ·       Ask when you can eat regular foods  Healthy foods include fruits, vegetables, whole-grain breads, low-fat dairy products, beans, lean meats, and fish       ·       Relieve a sore throat with ice chips, liquids, or lozenges as directed  Follow up with your healthcare provider as directed: Write down your questions so you remember to ask them during your visits

## 2022-05-16 NOTE — CASE MANAGEMENT
Case Management Assessment & Discharge Planning Note    Patient name Joyce Douglas  Location [address] [place] [address] [id number]  : 1965 Date 2022       Current Admission Date: 5/15/2022  Current Admission Diagnosis:Cholangitis   Patient Active Problem List    Diagnosis Date Noted    Cholangitis 2022    Benign essential HTN 2022    Prediabetes 2022    Elevated LDL cholesterol level 10/09/2020    Alcohol use disorder, mild, abuse 10/09/2020    Class 1 obesity due to excess calories with body mass index (BMI) of 33 0 to 33 9 in adult 2020    Closed extra-articular fracture of distal end of right tibia 2020    Closed nondisplaced oblique fracture of shaft of right fibula 2020      LOS (days): 1  Geometric Mean LOS (GMLOS) (days):   Days to GMLOS:     OBJECTIVE:    Risk of Unplanned Readmission Score: 5 98         Current admission status: Inpatient       Preferred Pharmacy:   [facility]/pharmacy #[id number],  [place] [address]  Phone: [phone] Fax: [phone]    Primary Care Provider: CELI Guo    Primary Insurance: BLUE CROSS  Secondary Insurance:     ASSESSMENT:  Sherly [place]raz[place] Proxies    There are no active Health Care Proxies on file  Advance Directives  Does patient have a [address]?: Yes  Does patient have Advance Directives?: Yes  Primary Contact: Violet Christianson              Patient Information  Admitted from[de-identified] Home  Mental Status: Alert  During Assessment patient was accompanied by: Not accompanied during assessment  Assessment information provided by[de-identified] Patient  Primary Caregiver: Self  Support Systems: Spouse/significant other  [place] of Residence: [address] do you live in?: [facility] entry access options   Select all that apply : No steps to enter home  Type of Current Residence: 2 story home  Upon entering residence, is there a bedroom on the main floor (no further steps)?: Yes  Upon entering residence, is there a bathroom on the main floor (no further steps)?: Yes  In the last 12 months, was there a time when you were not able to pay the mortgage or rent on time?: No  In the last 12 months, was there a time when you did not have a steady place to sleep or slept in a shelter (including now)?: No  Homeless/housing insecurity resource given?: N/A  Living Arrangements: Lives w/ Spouse/significant other    Activities of Daily Living Prior to Admission  Functional Status: Independent  Completes ADLs independently?: Yes  Ambulates independently?: Yes  Does patient use assisted devices?: No  Does patient currently own DME?: No  Does patient have a history of Outpatient Therapy (PT/OT)?: No  Does the patient have a history of Short-Term Rehab?: No  Does patient have a history of HHC?: No         Patient Information Continued  Income Source: Unemployed  Within the past 12 months, you worried that your food would run out before you got the money to buy more : Never true  Within the past 12 months, the food you bought just didn't last and you didn't have money to get more : Never true  Food insecurity resource given?: N/A  Does patient receive dialysis treatments?: No  Does patient have a history of substance abuse?: No  Does patient have a history of Mental Health Diagnosis?: No         Means of Transportation  Means of Transport to Appts[de-identified] Drives Self  In the past 12 months, has lack of transportation kept you from medical appointments or from getting medications?: No  In the past 12 months, has lack of transportation kept you from meetings, work, or from getting things needed for daily living?: No  Was application for public transport provided?: N/A        DISCHARGE DETAILS:    Discharge planning discussed with[de-identified] patient  Freedom of Choice: Yes                   Contacts  Patient Contacts: Radha Macias  Relationship to Patient[de-identified] Family  Contact Method: Phone  Phone Number: [phone]  Reason/Outcome: Continuity of Care, Emergency Contact, Discharge Planning                   Would you like to participate in our [address] service program?  : No - Declined    Treatment Team Recommendation: Home     CM reviewed d/c planning process including the following: identifying help at home, patient preference for d/c planning needs, Discharge Lounge, [facility] Meds to Bed program, availability of treatment team to discuss questions or concerns patient and/or family may have regarding understanding medications and recognizing signs and symptoms once discharged  CM also encouraged patient to follow up with all recommended appointments after discharge  Patient advised of importance for patient and family to participate in managing patients medical well being  Patient/caregiver received discharge checklist  Content reviewed  Patient/caregiver encouraged to participate in discharge plan of care prior to discharge home

## 2022-05-16 NOTE — QUICK NOTE
Patient seen examined this morning  Complains of epigastric abdominal pain  Occasional nausea has currently improved  Patient states he is urinating  Vitals stable or night  Was febrile at outside hospital 103      WBC 10 from 12, T bili 8 from 9, creatinine 0 99 from 1 06    Continue IV antibiotics  Continue IV fluid resuscitation  GI consult for ERCP given concerns for cholangitis  Likely laparoscopic cholecystostomy following ductal clearance later this admission

## 2022-05-16 NOTE — PLAN OF CARE
Problem: PAIN - ADULT  Goal: Verbalizes/displays adequate comfort level or baseline comfort level  Description: Interventions:  - Encourage patient to monitor pain and request assistance  - Assess pain using appropriate pain scale  - Administer analgesics based on type and severity of pain and evaluate response  - Implement non-pharmacological measures as appropriate and evaluate response  - Consider cultural and social influences on pain and pain management  - Notify physician/advanced practitioner if interventions unsuccessful or patient reports new pain  Outcome: Progressing     Problem: INFECTION - ADULT  Goal: Absence or prevention of progression during hospitalization  Description: INTERVENTIONS:  - Assess and monitor for signs and symptoms of infection  - Monitor lab/diagnostic results  - Monitor all insertion sites, i e  indwelling lines, tubes, and drains  - Monitor endotracheal if appropriate and nasal secretions for changes in amount and color  - [place] appropriate cooling/warming therapies per order  - Administer medications as ordered  - Instruct and encourage patient and family to use good hand hygiene technique  - Identify and instruct in appropriate isolation precautions for identified infection/condition  Outcome: Progressing  Goal: Absence of fever/infection during neutropenic period  Description: INTERVENTIONS:  - Monitor WBC    Outcome: Progressing     Problem: SAFETY ADULT  Goal: Patient will remain free of falls  Description: INTERVENTIONS:  - Educate patient/family on patient safety including physical limitations  - Instruct patient to call for assistance with activity   - Consult OT/PT to assist with strengthening/mobility   - Keep Call bell within reach  - Keep bed low and locked with side rails adjusted as appropriate  - Keep care items and personal belongings within reach  - Initiate and maintain comfort rounds  - Make Fall Risk Sign visible to staff  - Offer Toileting every 2 Hours, in advance of need  - Initiate/Maintain alarm  - Obtain necessary fall risk management equipment  - Apply yellow socks and bracelet for high fall risk patients  - Consider moving patient to room near nurses station  Outcome: Progressing  Goal: Maintain or return to baseline ADL function  Description: INTERVENTIONS:  -  Assess patient's ability to carry out ADLs; assess patient's baseline for ADL function and identify physical deficits which impact ability to perform ADLs (bathing, care of mouth/teeth, toileting, grooming, dressing, etc )  - Assess/evaluate cause of self-care deficits   - Assess range of motion  - Assess patient's mobility; develop plan if impaired  - Assess patient's need for assistive devices and provide as appropriate  - Encourage maximum independence but intervene and supervise when necessary  - Involve family in performance of ADLs  - Assess for home care needs following discharge   - Consider OT consult to assist with ADL evaluation and planning for discharge  - Provide patient education as appropriate  Outcome: Progressing  Goal: Maintains/Returns to pre admission functional level  Description: INTERVENTIONS:  - Perform BMAT or MOVE assessment daily    - Set and communicate daily mobility goal to care team and patient/family/caregiver  - Collaborate with rehabilitation services on mobility goals if consulted  - Reposition patient every 2 hours  - Stand patient 3 times a day  - Ambulate patient 3 times a day  - Out of bed to chair 3 times a day   - Out of bed for meals 3 times a day  - Out of bed for toileting  - Record patient progress and toleration of activity level   Outcome: Progressing        Problem: Knowledge Deficit  Goal: Patient/family/caregiver demonstrates understanding of disease process, treatment plan, medications, and discharge instructions  Description: Complete learning assessment and assess knowledge base    Interventions:  - Provide teaching at level of understanding  - Provide teaching via preferred learning methods  Outcome: Progressing

## 2022-05-16 NOTE — ANESTHESIA PREPROCEDURE EVALUATION
Procedure:  ERCP    Relevant Problems   ANESTHESIA (within normal limits)      CARDIO   (+) Benign essential HTN      GI/HEPATIC (within normal limits)      /RENAL (within normal limits)      HEMATOLOGY (within normal limits)      NEURO/PSYCH (within normal limits)      PULMONARY (within normal limits)      Digestive   (+) Cholangitis      Other   (+) Alcohol use disorder, mild, abuse   (+) Class 1 obesity due to excess calories with body mass index (BMI) of 33 0 to 33 9 in adult   (+) Elevated LDL cholesterol level   (+) Prediabetes      Zosyn 4 5g q6hr last 5/16/2022 @ 0859    EKG 1/24/2020:  Normal sinus rhythm  Incomplete right bundle branch block  Cannot rule out Anterior infarct , age undetermined  Abnormal ECG  No previous ECGs available    US RUQ Abdomen 5/15/2022:  1  Intrahepatic and extrahepatic biliary ductal dilation with the CBD measuring up to 16 mm, similar to prior CT  There is apparent echogenic filling defect in the distal CBD near the ampulla, corresponding to the hyperdensity seen on prior CT  This   is suspicious for choledocholithiasis  Evaluation is suboptimal due to adjacent bowel gas  Further evaluation with MRI/MRCP would be beneficial      2  Cholelithiasis with moderate gallbladder distention and positive sonographic Gutierrez's sign  This may be secondary to the biliary obstruction  However, possibility of associated acute cholecystitis is not excluded  CTAP 5/15/2022:  Dilated common bile duct with a faint hyperdensity at the level of the ampulla  An obstructing choledocholithiasis is not excluded  Further evaluation with ultrasound may be helpful  MRCP evaluation may be helpful      Cholelithiasis  No pericholecystic inflammation      Bilateral perinephric stranding may be chronic in etiology but infection not excluded  Mild bladder wall thickening  Cystitis not excluded      Lab Results   Component Value Date    WBC 10 86 (H) 05/16/2022    HGB 12 8 05/16/2022    HCT 40 3 05/16/2022    MCV 95 05/16/2022     05/16/2022     Lab Results   Component Value Date    SODIUM 131 (L) 05/16/2022    K 3 4 (L) 05/16/2022    CL 94 (L) 05/16/2022    CO2 30 05/16/2022    BUN 15 05/16/2022    CREATININE 0 99 05/16/2022    GLUC 118 05/16/2022    CALCIUM 8 5 05/16/2022     Lab Results   Component Value Date    INR 1 13 05/15/2022    PROTIME 14 3 05/15/2022     Lab Results   Component Value Date    HGBA1C 6 1 02/14/2022          Physical Exam    Airway    Mallampati score: I  TM Distance: >3 FB  Neck ROM: full     Dental   No notable dental hx     Cardiovascular  Cardiovascular exam normal    Pulmonary  Pulmonary exam normal     Other Findings        Anesthesia Plan  ASA Score- 3     Anesthesia Type- general with ASA Monitors  Additional Monitors:   Airway Plan: ETT  Plan Factors-Exercise tolerance (METS): >4 METS  Chart reviewed  EKG reviewed  Existing labs reviewed  Patient summary reviewed  Induction- intravenous and rapid sequence induction  Postoperative Plan-   Planned trial extubation    Informed Consent- Anesthetic plan and risks discussed with patient  I personally reviewed this patient with the CRNA  Discussed and agreed on the Anesthesia Plan with the CRNA  Ming Jung

## 2022-05-16 NOTE — EMTALA/ACUTE CARE TRANSFER
[address] [facility] [address]  Dept: [phone]      EMTALA TRANSFER CONSENT    NAME Ruth DEWITT 1965                              MRN [id number]    I have been informed of my rights regarding examination, treatment, and transfer   by Dr Chance Boswell MD    Benefits:      Risks:        Consent for Transfer:  I acknowledge that my medical condition has been evaluated and explained to me by the emergency department physician or other qualified medical person and/or my attending physician, who has recommended that I be transferred to the service of    at    The above potential benefits of such transfer, the potential risks associated with such transfer, and the probable risks of not being transferred have been explained to me, and I fully understand them  The doctor has explained that, in my case, the benefits of transfer outweigh the risks  I agree to be transferred  I authorize the performance of emergency medical procedures and treatments upon me in both transit and upon arrival at the receiving facility  Additionally, I authorize the release of any and all medical records to the receiving facility and request they be transported with me, if possible  I understand that the safest mode of transportation during a medical emergency is an ambulance and that the Hospital advocates the use of this mode of transport  Risks of traveling to the receiving facility by car, including absence of medical control, life sustaining equipment, such as oxygen, and medical personnel has been explained to me and I fully understand them  (RAE CORRECT BOX BELOW)  [  ]  I consent to the stated transfer and to be transported by ambulance/helicopter  [  ]  I consent to the stated transfer, but refuse transportation by ambulance and accept full responsibility for my transportation by car    I understand the risks of non-ambulance transfers and I exonerate the Hospital and its staff from any deterioration in my condition that results from this refusal     X___________________________________________    DATE  05/15/22  TIME________  Signature of patient or legally responsible individual signing on patient behalf           RELATIONSHIP TO PATIENT_________________________          Provider Certification    NAME Negrito Lockhart                                         1965                              MRN [id number]    A medical screening exam was performed on the above named patient  Based on the examination:    Condition Necessitating Transfer The encounter diagnosis was Choledocholithiasis  Patient Condition:      Reason for Transfer:      Transfer Requirements: Facility     · Space available and qualified personnel available for treatment as acknowledged by    · Agreed to accept transfer and to provide appropriate medical treatment as acknowledged by          · Appropriate medical records of the examination and treatment of the patient are provided at the time of transfer   [address] [place] [address] [address] _______  · Transfer will be performed by qualified personnel from    and appropriate transfer equipment as required, including the use of necessary and appropriate life support measures      Provider Certification: I have examined the patient and explained the following risks and benefits of being transferred/refusing transfer to the patient/family:         Based on these reasonable risks and benefits to the patient and/or the unborn child(brynn), and based upon the information available at the time of the patients examination, I certify that the medical benefits reasonably to be expected from the provision of appropriate medical treatments at another medical facility outweigh the increasing risks, if any, to the individuals medical condition, and in the case of labor to the unborn child, from effecting the transfer      X____________________________________________ DATE 05/15/22        TIME_______      ORIGINAL - SEND TO MEDICAL RECORDS   COPY - SEND WITH PATIENT DURING TRANSFER

## 2022-05-16 NOTE — ED NOTES
Upon reassessment, pt tachypneic, diaphoretic, tachycardic, and febrile  MD aware  Antipyretic and IVFs ordered and administered  Given +SIRS criteria, blood cultures, lactate, and inflammatory markers will be collected prior to transport       Ar Gomez RN  05/15/22 4222

## 2022-05-16 NOTE — ED NOTES
Patient arrived to ED bed 23, surgical resident aware     Nemesio Mcmullen RN  05/15/22 [address] [facility] [address] Tiana Rosado  05/16/22 0000

## 2022-05-16 NOTE — CONSULTS
[facility] - Inpatient Consultation  [place] Kc 60 y o  male MRN: [id number]  Unit/Bed#: ED 23 Encounter: [id number]    Reason for Consult / Principal Problem:     Dilated bile duct, choledocholithiasis    ASSESSMENT & PLAN:      66-year-old male with history of hypertension and hyperlipidemia presented with abdominal pain, nausea/vomiting for the last 1-2 weeks and was found to have elevated LFTs and dilated bile ducts with possible choledocholithiasis  GI consultation requested for ERCP  1  Possible choledocholithiasis, elevated LFTs, dilated bile duct - probable choledocholithiasis based on imaging findings as well as elevated liver chemistries and dilated duct  Possible mild cholangitis based on fever, pain, and elevated bilirubin  Hemodynamically stable  · Plan for ERCP today  · NPO for procedure  · Continue IV antibiotics for now  · Follow-up blood cultures  · Follow LFTs  · Follow clinically with serial abdominal exams  · IV fluid hydration and supportive care per primary team    2  Cholelithiasis - source of above  Positive sonographic Aliene Bue sign reported  · Recommend cholecystectomy following ERCP at discretion of surgery    3  Colon cancer screening - no previous CRC screening  · Recommend outpatient screening colonoscopy    ______________________________________________________________________    HISTORY OF PRESENT ILLNESS:  66-year-old male with history of hypertension and hyperlipidemia who presented with complaints of epigastric abdominal pain, nausea/vomiting for the last week  Patient reports that the pain has been intermittent over the last 1-2 weeks  Denies any prior episodes  Reported fever at home up to 101  Due to persistent symptoms, patient went to the ED at [facility] and was found to have elevated liver chemistries    CT imaging revealed dilated bile duct with possible faint hyperdensity at the level of the ampulla concerning for choledocholithiasis  Patient was transferred to [facility] for surgical evaluation  GI consultation requested for evaluation of suspected choledocholithiasis and potential intervention        REVIEW OF SYSTEMS:    CONSTITUTIONAL: Denies any chills, rigors  HEENT: No earache or tinnitus, denies hearing loss or visual disturbances  CARDIOVASCULAR: No chest pain or palpitations   RESPIRATORY: Denies any cough, hemoptysis, shortness of breath or dyspnea on exertion  GASTROINTESTINAL: As noted in the History of Present Illness   GENITOURINARY: No problems with urination, denies any hematuria or dysuria  NEUROLOGIC: No dizziness or vertigo, denies headaches   MUSCULOSKELETAL: Denies any muscle or joint pain   SKIN: Denies skin rashes or itching   ENDOCRINE: Denies excessive thirst, denies intolerance to heat or cold  PSYCHOSOCIAL: Denies depression or anxiety, denies any recent memory loss     Historical Information   Past Medical History:   Diagnosis Date    Foot fracture     Hypertension      Past Surgical History:   Procedure Laterality Date    NO PAST SURGERIES       Social History   Social History     Substance and Sexual Activity   Alcohol Use Yes     Social History     Substance and Sexual Activity   Drug Use Not Currently     Social History     Tobacco Use   Smoking Status Former Smoker    Types: Cigarettes    Quit date:     Years since quittin 3   Smokeless Tobacco Never Used     Family History   Problem Relation Age of Onset    ALS Mother     Pneumonia Father     No Known Problems Sister        Meds/Allergies   (Not in a hospital admission)    Current Facility-Administered Medications   Medication Dose Route Frequency    acetaminophen (TYLENOL) tablet 650 mg  650 mg Oral Q6H PRN    atorvastatin (LIPITOR) tablet 20 mg  20 mg Oral Daily    heparin (porcine) subcutaneous injection 5,000 Units  5,000 Units Subcutaneous Q8H [address]    HYDROmorphone (DILAUDID) injection 0 5 mg  0 5 mg Intravenous Q4H PRN    lactated ringers infusion  125 mL/hr Intravenous Continuous    ondansetron (ZOFRAN) injection 4 mg  4 mg Intravenous Q6H PRN    oxyCODONE (ROXICODONE) immediate release tablet 10 mg  10 mg Oral Q4H PRN    oxyCODONE (ROXICODONE) IR tablet 5 mg  5 mg Oral Q4H PRN    pantoprazole (PROTONIX) injection 40 mg  40 mg Intravenous Q24H MANPREET    piperacillin-tazobactam (ZOSYN) 4 5 g in sodium chloride 0 9 % 100 mL IVPB  4 5 g Intravenous Q6H     No Known Allergies    PHYSICAL EXAM:      Objective   Blood pressure 140/65, pulse 104, temperature 98 3 °F (36 8 °C), temperature source Oral, resp  rate 18, height 5' 10" (1 778 m), weight 104 kg (229 lb), SpO2 94 %  Body mass index is 32 86 kg/m²  Intake/Output Summary (Last 24 hours) at 5/16/2022 0714  Last data filed at 5/16/2022 0308  Gross per 24 hour   Intake 100 ml   Output --   Net 100 ml       General Appearance:   Alert, cooperative, no distress   HEENT:   Normocephalic, atraumatic    Neck:   Supple, symmetrical, trachea midline   Lungs:   Equal chest rise, respirations unlabored    Heart:   Regular rhythm, tachycardic   Abdomen:   Soft, TTP RUQ/epigastrium, non-distended; normal bowel sounds; no masses, no organomegaly    Rectal:   Deferred    Extremities:   No cyanosis, clubbing or edema    Neuro:    Moves all 4 extremities    Skin:   Jaundiced, rashes, or lesions      LAB RESULTS:     Admission on 05/15/2022   Component Date Value    Color, UA 05/16/2022 Dark Yellow     Clarity, UA 05/16/2022 Clear     Specific Gravity, UA 05/16/2022 1 029     pH, UA 05/16/2022 6 0     Leukocytes, UA 05/16/2022 Negative     Nitrite, UA 05/16/2022 Negative     Protein, UA 05/16/2022 Trace (A)    Glucose, UA 05/16/2022 Negative     Ketones, UA 05/16/2022 Negative     Urobilinogen, UA 05/16/2022 8 0 (A)    Bilirubin, UA 05/16/2022 Moderate (A)    Blood, UA 05/16/2022 Negative     Sodium 05/16/2022 131 (A)    Potassium 05/16/2022 3 4 (A)    Chloride 05/16/2022 94 (A)    CO2 05/16/2022 30     ANION GAP 05/16/2022 7     BUN 05/16/2022 15     Creatinine 05/16/2022 0 99     Glucose 05/16/2022 118     Calcium 05/16/2022 8 5     eGFR 05/16/2022 83     Magnesium 05/16/2022 2 0     Phosphorus 05/16/2022 2 7     WBC 05/16/2022 10 86 (A)    RBC 05/16/2022 4 24     Hemoglobin 05/16/2022 12 8     Hematocrit 05/16/2022 40 3     MCV 05/16/2022 95     MCH 05/16/2022 30 2     MCHC 05/16/2022 31 8     RDW 05/16/2022 12 8     MPV 05/16/2022 11 4     Platelets 88/81/2514 247     nRBC 05/16/2022 0     Neutrophils Relative 05/16/2022 82 (A)    Immat GRANS % 05/16/2022 1     Lymphocytes Relative 05/16/2022 8 (A)    Monocytes Relative 05/16/2022 9     Eosinophils Relative 05/16/2022 0     Basophils Relative 05/16/2022 0     Neutrophils Absolute 05/16/2022 8 83 (A)    Immature Grans Absolute 05/16/2022 0 11     Lymphocytes Absolute 05/16/2022 0 86     Monocytes Absolute 05/16/2022 1 02     Eosinophils Absolute 05/16/2022 0 02     Basophils Absolute 05/16/2022 0 02     Total Bilirubin 05/16/2022 8 23 (A)    Bilirubin, Direct 05/16/2022 6 46 (A)    Alkaline Phosphatase 05/16/2022 250 (A)    AST 05/16/2022 105 (A)    ALT 05/16/2022 81 (A)    Total Protein 05/16/2022 7 1     Albumin 05/16/2022 2 3 (A)    LACTIC ACID 05/16/2022 1 5     RBC, UA 05/16/2022 1-2     WBC, UA 05/16/2022 1-2     Epithelial Cells 05/16/2022 Occasional     Bacteria, UA 05/16/2022 None Seen        RADIOLOGY RESULTS: I have personally reviewed pertinent imaging studies  YASIR Whiteside  Chief Gastroenterology Fellow  [facility]  Available on [email or web address]

## 2022-05-16 NOTE — ED NOTES
Pt asking for ice water, informed diet order is NPO except sips with meds   Pt verbalized understanding      Beti Verma RN  05/16/22 0217

## 2022-05-16 NOTE — ANESTHESIA POSTPROCEDURE EVALUATION
Post-Op Assessment Note    CV Status:  Stable  Pain Score: 0    Pain management: adequate     Mental Status:  Sleepy   Hydration Status:  Stable   PONV Controlled:  Controlled   Airway Patency:  Patent      Post Op Vitals Reviewed: Yes      Staff: CRNA         No complications documented      /69 (05/16/22 1228)    Temp (!) 96 5 °F (35 8 °C) (05/16/22 1228)    Pulse 89 (05/16/22 1228)   Resp 16 (05/16/22 1228)    SpO2 92 % (05/16/22 1228)

## 2022-05-16 NOTE — CONSULTS
H&P General Surgery   Oliva Adams 60 y o  male MRN: [id number]  Unit/Bed#:  Encounter: [id number]    Assessment/Plan     Assessment:  33-year-old male presents with concern for cholangitis     Plan:  -NPO  -IV fluids  -IV antibiotics, Zosyn  -gastroenterology consult, discussed with GI possible plan for ERCP tomorrow  -no signs of acute cholangitis at this time will continue to monitor  -trend temp curve/WBC/LFTs  -will obtain a UA for perinephric fat stranding and bladder wall thickening    History of Present Illness     HPI:  Oliva Adams is a 60 y o  male who presents with abdominal pain associated nausea and vomiting for the past 1-2 weeks  States the pain is located in the epigastrium  Pain is currently improved  Does admit to fever up to 101 at home  Patient has never had a colonoscopy  States he lost 10 lbs in the last 1 5 weeks  No similar symptoms in the past       At OSH patient was noted to have a fever to 103  Other vitals were within normal limits  Laboratory evaluation was significant for leukocytosis to 12  T bili was noted to be elevated 9  Mild elevation in transaminases  CT abdomen pelvis significant for dilated CBD with faint hyperdensity at the level of ampulla possible choledocholithiasis  Noted to have cholelithiasis without pericholecystic inflammation  Bilateral perinephric fat stranding  Mild bladder wall thickening  Right upper quadrant ultrasound was obtained demonstrated intra and extrahepatic biliary ductal dilation up to 1 6 cm  Echogenic filling defect noted near ampulla  Positive Gutierrez sign  Past medical history significant for hypertension, denies any surgical history  Consults    Review of Systems   Constitutional: Positive for chills and fever  Respiratory: Negative  Cardiovascular: Negative  Gastrointestinal: Positive for abdominal pain, nausea and vomiting  Genitourinary: Negative  Musculoskeletal: Negative  Skin: Negative  Neurological: Negative  Hematological: Negative  Psychiatric/Behavioral: Negative  Historical Information   Past Medical History:   Diagnosis Date    Foot fracture     Hypertension      Past Surgical History:   Procedure Laterality Date    NO PAST SURGERIES       Social History   Social History     Substance and Sexual Activity   Alcohol Use Yes     Social History     Substance and Sexual Activity   Drug Use Not Currently     E-Cigarette/Vaping    E-Cigarette Use Never User      E-Cigarette/Vaping Substances    Nicotine No     THC No     CBD No      Social History     Tobacco Use   Smoking Status Former Smoker    Types: Cigarettes    Quit date:     Years since quittin 3   Smokeless Tobacco Never Used     Family History:   Family History   Problem Relation Age of Onset    ALS Mother     Pneumonia Father     No Known Problems Sister        Meds/Allergies   PTA meds:   Prior to Admission Medications   Prescriptions Last Dose Informant Patient Reported? Taking?   atorvastatin (LIPITOR) 20 mg tablet   No No   Sig: Take 1 tablet (20 mg total) by mouth daily   olmesartan (BENICAR) 20 mg tablet   No No   Sig: Take 1 tablet (20 mg total) by mouth daily      Facility-Administered Medications: None     No Known Allergies    Objective   First Vitals:        Current Vitals:          Intake/Output Summary (Last 24 hours) at 5/15/2022 2124  Last data filed at 5/15/2022 1908  Gross per 24 hour   Intake 1000 ml   Output --   Net 1000 ml       Invasive Devices  Report    Peripheral Intravenous Line  Duration           Peripheral IV 05/15/22 Left Antecubital <1 day                Physical Exam  Constitutional:       General: He is not in acute distress  Appearance: Normal appearance  He is not ill-appearing or toxic-appearing  HENT:      Head: Normocephalic and atraumatic  Cardiovascular:      Rate and Rhythm: Normal rate and regular rhythm  Pulses: Normal pulses     Pulmonary: Effort: Pulmonary effort is normal  No respiratory distress  Abdominal:      Comments: Soft, obese, tender right upper quadrant with fullness, negative Gutierrez sign, no guarding rebound or rigidity   Musculoskeletal:         General: Normal range of motion  Right lower leg: No edema  Skin:     General: Skin is warm  Capillary Refill: Capillary refill takes less than 2 seconds  Neurological:      General: No focal deficit present  Mental Status: He is alert and oriented to person, place, and time  Psychiatric:         Mood and Affect: Mood normal          Behavior: Behavior normal          Lab Results: I have personally reviewed pertinent lab results  Imaging: I have personally reviewed pertinent reports  EKG, Pathology, and Other Studies: I have personally reviewed pertinent reports

## 2022-05-16 NOTE — ANESTHESIA PREPROCEDURE EVALUATION
Procedure:  PRE-OP ONLY    Relevant Problems   CARDIO   (+) Benign essential HTN      Digestive   (+) Cholangitis      Other   (+) Alcohol use disorder, mild, abuse   (+) Class 1 obesity due to excess calories with body mass index (BMI) of 33 0 to 33 9 in adult   (+) Elevated LDL cholesterol level   (+) Prediabetes      EKG 1/24/2020:  Normal sinus rhythm  Incomplete right bundle branch block  Cannot rule out Anterior infarct , age undetermined  Abnormal ECG  No previous ECGs available    US RUQ Abdomen 5/15/2022:  1  Intrahepatic and extrahepatic biliary ductal dilation with the CBD measuring up to 16 mm, similar to prior CT  There is apparent echogenic filling defect in the distal CBD near the ampulla, corresponding to the hyperdensity seen on prior CT  This   is suspicious for choledocholithiasis  Evaluation is suboptimal due to adjacent bowel gas  Further evaluation with MRI/MRCP would be beneficial      2  Cholelithiasis with moderate gallbladder distention and positive sonographic Gutierrez's sign  This may be secondary to the biliary obstruction  However, possibility of associated acute cholecystitis is not excluded  CTAP 5/15/2022:  Dilated common bile duct with a faint hyperdensity at the level of the ampulla  An obstructing choledocholithiasis is not excluded  Further evaluation with ultrasound may be helpful  MRCP evaluation may be helpful      Cholelithiasis  No pericholecystic inflammation      Bilateral perinephric stranding may be chronic in etiology but infection not excluded  Mild bladder wall thickening  Cystitis not excluded      Lab Results   Component Value Date    WBC 10 86 (H) 05/16/2022    HGB 12 8 05/16/2022    HCT 40 3 05/16/2022    MCV 95 05/16/2022     05/16/2022     Lab Results   Component Value Date    SODIUM 131 (L) 05/16/2022    K 3 4 (L) 05/16/2022    CL 94 (L) 05/16/2022    CO2 30 05/16/2022    BUN 15 05/16/2022    CREATININE 0 99 05/16/2022    GLUC 118 05/16/2022 CALCIUM 8 5 05/16/2022     Lab Results   Component Value Date    INR 1 13 05/15/2022    PROTIME 14 3 05/15/2022     Lab Results   Component Value Date    HGBA1C 6 1 02/14/2022

## 2022-05-16 NOTE — QUICK NOTE
Nurse-Patient-Provider rounds were completed with the patient's nurse today, Gerardine Call  We discussed the plan is to get ERCP today, Spoke with Dr Columba Mckeon and he said patient may have sq heparin this morning    We reviewed all of the invasive devices/lines/telemetry orders   - None  DVT Prophylaxis:  - sq heparin    Pain Assessment / Plan:  - Continue current analgesic regimen  Mobility Assessment / Plan:  - Activity as tolerated  Goals / Barriers for discharge:  - ERCP today  Case management following; case and discharge needs discussed  All questions and concerns were addressed  I spent greater than 10 minutes reviewing the plan with the patient and the nurse, and coordinating care for the day      CELI Hoyos  5/16/2022

## 2022-05-17 ENCOUNTER — ANESTHESIA EVENT (INPATIENT)
Dept: PERIOP | Facility: HOSPITAL | Age: 57
DRG: 418 | End: 2022-05-17
Payer: COMMERCIAL

## 2022-05-17 LAB
ALBUMIN SERPL BCP-MCNC: 2.1 G/DL (ref 3.5–5)
ALP SERPL-CCNC: 188 U/L (ref 46–116)
ALT SERPL W P-5'-P-CCNC: 57 U/L (ref 12–78)
ANION GAP SERPL CALCULATED.3IONS-SCNC: 6 MMOL/L (ref 4–13)
AST SERPL W P-5'-P-CCNC: 46 U/L (ref 5–45)
BILIRUB SERPL-MCNC: 2.93 MG/DL (ref 0.2–1)
BUN SERPL-MCNC: 14 MG/DL (ref 5–25)
CALCIUM ALBUM COR SERPL-MCNC: 10.1 MG/DL (ref 8.3–10.1)
CALCIUM SERPL-MCNC: 8.6 MG/DL (ref 8.3–10.1)
CHLORIDE SERPL-SCNC: 100 MMOL/L (ref 100–108)
CO2 SERPL-SCNC: 29 MMOL/L (ref 21–32)
CREAT SERPL-MCNC: 0.7 MG/DL (ref 0.6–1.3)
GFR SERPL CREATININE-BSD FRML MDRD: 104 ML/MIN/1.73SQ M
GLUCOSE SERPL-MCNC: 127 MG/DL (ref 65–140)
POTASSIUM SERPL-SCNC: 3.1 MMOL/L (ref 3.5–5.3)
PROT SERPL-MCNC: 6.5 G/DL (ref 6.4–8.2)
SODIUM SERPL-SCNC: 135 MMOL/L (ref 136–145)

## 2022-05-17 PROCEDURE — C9113 INJ PANTOPRAZOLE SODIUM, VIA: HCPCS | Performed by: SURGERY

## 2022-05-17 PROCEDURE — 99232 SBSQ HOSP IP/OBS MODERATE 35: CPT | Performed by: SURGERY

## 2022-05-17 PROCEDURE — 80053 COMPREHEN METABOLIC PANEL: CPT | Performed by: STUDENT IN AN ORGANIZED HEALTH CARE EDUCATION/TRAINING PROGRAM

## 2022-05-17 PROCEDURE — 99232 SBSQ HOSP IP/OBS MODERATE 35: CPT | Performed by: INTERNAL MEDICINE

## 2022-05-17 RX ORDER — SODIUM CHLORIDE, SODIUM LACTATE, POTASSIUM CHLORIDE, CALCIUM CHLORIDE 600; 310; 30; 20 MG/100ML; MG/100ML; MG/100ML; MG/100ML
75 INJECTION, SOLUTION INTRAVENOUS CONTINUOUS
Status: DISCONTINUED | OUTPATIENT
Start: 2022-05-18 | End: 2022-05-19

## 2022-05-17 RX ORDER — POTASSIUM CHLORIDE 20 MEQ/1
40 TABLET, EXTENDED RELEASE ORAL 2 TIMES DAILY
Status: COMPLETED | OUTPATIENT
Start: 2022-05-17 | End: 2022-05-17

## 2022-05-17 RX ADMIN — SODIUM CHLORIDE, SODIUM LACTATE, POTASSIUM CHLORIDE, AND CALCIUM CHLORIDE 125 ML/HR: .6; .31; .03; .02 INJECTION, SOLUTION INTRAVENOUS at 01:24

## 2022-05-17 RX ADMIN — POTASSIUM CHLORIDE 40 MEQ: 1500 TABLET, EXTENDED RELEASE ORAL at 17:46

## 2022-05-17 RX ADMIN — PIPERACILLIN AND TAZOBACTAM 4.5 G: 36; 4.5 INJECTION, POWDER, FOR SOLUTION INTRAVENOUS at 10:25

## 2022-05-17 RX ADMIN — ATORVASTATIN CALCIUM 20 MG: 20 TABLET, FILM COATED ORAL at 10:26

## 2022-05-17 RX ADMIN — PIPERACILLIN AND TAZOBACTAM 4.5 G: 36; 4.5 INJECTION, POWDER, FOR SOLUTION INTRAVENOUS at 15:48

## 2022-05-17 RX ADMIN — PANTOPRAZOLE SODIUM 40 MG: 40 INJECTION, POWDER, FOR SOLUTION INTRAVENOUS at 10:25

## 2022-05-17 RX ADMIN — HEPARIN SODIUM 5000 UNITS: 5000 INJECTION INTRAVENOUS; SUBCUTANEOUS at 01:21

## 2022-05-17 RX ADMIN — POTASSIUM CHLORIDE 40 MEQ: 1500 TABLET, EXTENDED RELEASE ORAL at 10:25

## 2022-05-17 RX ADMIN — HEPARIN SODIUM 5000 UNITS: 5000 INJECTION INTRAVENOUS; SUBCUTANEOUS at 17:46

## 2022-05-17 RX ADMIN — PIPERACILLIN AND TAZOBACTAM 4.5 G: 36; 4.5 INJECTION, POWDER, FOR SOLUTION INTRAVENOUS at 01:21

## 2022-05-17 RX ADMIN — HEPARIN SODIUM 5000 UNITS: 5000 INJECTION INTRAVENOUS; SUBCUTANEOUS at 10:26

## 2022-05-17 NOTE — PLAN OF CARE
Problem: PAIN - ADULT  Goal: Verbalizes/displays adequate comfort level or baseline comfort level  Description: Interventions:  - Encourage patient to monitor pain and request assistance  - Assess pain using appropriate pain scale  - Administer analgesics based on type and severity of pain and evaluate response  - Implement non-pharmacological measures as appropriate and evaluate response  - Consider cultural and social influences on pain and pain management  - Notify physician/advanced practitioner if interventions unsuccessful or patient reports new pain  Outcome: Progressing     Problem: INFECTION - ADULT  Goal: Absence or prevention of progression during hospitalization  Description: INTERVENTIONS:  - Assess and monitor for signs and symptoms of infection  - Monitor lab/diagnostic results  - Monitor all insertion sites, i e  indwelling lines, tubes, and drains  - Monitor endotracheal if appropriate and nasal secretions for changes in amount and color  - [place] appropriate cooling/warming therapies per order  - Administer medications as ordered  - Instruct and encourage patient and family to use good hand hygiene technique  - Identify and instruct in appropriate isolation precautions for identified infection/condition  Outcome: Progressing  Goal: Absence of fever/infection during neutropenic period  Description: INTERVENTIONS:  - Monitor WBC    Outcome: Progressing     Problem: DISCHARGE PLANNING  Goal: Discharge to home or other facility with appropriate resources  Description: INTERVENTIONS:  - Identify barriers to discharge w/patient and caregiver  - Arrange for needed discharge resources and transportation as appropriate  - Identify discharge learning needs (meds, wound care, etc )  - Arrange for interpretive services to assist at discharge as needed  - Refer to Case Management Department for coordinating discharge planning if the patient needs post-hospital services based on physician/advanced practitioner order or complex needs related to functional status, cognitive ability, or social support system  Outcome: Progressing     Problem: Knowledge Deficit  Goal: Patient/family/caregiver demonstrates understanding of disease process, treatment plan, medications, and discharge instructions  Description: Complete learning assessment and assess knowledge base    Interventions:  - Provide teaching at level of understanding  - Provide teaching via preferred learning methods  Outcome: Progressing

## 2022-05-17 NOTE — UTILIZATION REVIEW
Initial Clinical Review    Admission: Date/Time/Statement:   Admission Orders (From admission, onward)     Ordered        05/16/22 0037  Inpatient Admission  Once                      Orders Placed This Encounter   Procedures    Inpatient Admission     Standing Status:   Standing     Number of Occurrences:   1     Order Specific Question:   Level of Care     Answer:   Level 2 Stepdown / HOT [14]     Order Specific Question:   Estimated length of stay     Answer:   More than 2 Midnights     Order Specific Question:   Certification     Answer:   I certify that inpatient services are medically necessary for this patient for a duration of greater than two midnights  See H&P and MD Progress Notes for additional information about the patient's course of treatment  ED Arrival Information     Expected   5/15/2022 19:55    Arrival   5/15/2022 23:51    Acuity   Urgent            Means of arrival   Ambulance    Escorted by   [address] [facility]    Service   Surgery-General    Admission type   Urgent            Arrival complaint   cholelithiasis           Chief Complaint   Patient presents with    Medical Problem     Patient reports that he was transferred from [facility] for surgical procedure that was unavailable at previous hospital       Initial Presentation: 60 y o  male  who initially presented to [address] [place] [address] ED with c/o epigastric abdominal pain associated nausea and vomiting for 1-2 weeks and fever 101 at home  Patient has never had a colonoscopy  States he lost 10 lbs in the last 1 5 weeks  In ED, fever to 103, Leukocytosis to 12,T bili was noted to be elevated 9  Mild elevation in transaminases  CT abdomen pelvis significant for dilated CBD with faint hyperdensity at the level of ampulla possible choledocholithiasis  Noted to have cholelithiasis without pericholecystic inflammation  Bilateral perinephric fat stranding  Mild bladder wall thickening    Right upper quadrant ultrasound was obtained demonstrated intra and extrahepatic biliary ductal dilation up to 1 6 cm  Echogenic filling defect noted near ampulla  Positive Gutierrez sign  [facility] concern for cholangitis, elevated LFTs and dilated bile duct  Admit Inpatient Level 2 stepdown unit:   Keep NPO, IVF, IV ABX, trend labs, GI consult, follow LFTs, check UA     5/16 GI Consult:  Concern for cholangitis  Abd pain, nausea and vomiting  Plan for ERCP today  Keep NPO, IVF, IV ABX, trend labs, GI consult, follow LFTs, check UA  Date: 5/17   Day 2:   S/P ERCP on 05/16 with finding of large ampulla with overlying blood clot likely secondary to passed stone  ERCP did show dilated bile duct with small stone  Performed sphincterotomy and clearance of the bile duct  LFTs improved today  No evidence of post-ERCP pancreatitis  Follow-up blood cultures, continue IV ABX, dc fluids and prepare for OR on 05/18/2022 for lap cholecystectomy  No other intervention today on 05/17/2022   Will continue to trend labs, pain control         ED Triage Vitals   Temperature Pulse Respirations Blood Pressure SpO2   05/16/22 0011 05/16/22 0011 05/16/22 0011 05/16/22 0011 05/16/22 0011   98 3 °F (36 8 °C) 96 (!) 24 133/69 94 %      Temp Source Heart Rate Source Patient Position - Orthostatic VS BP Location FiO2 (%)   05/16/22 0011 05/16/22 0011 05/16/22 0011 05/16/22 0011 --   Oral Monitor Lying Right arm       Pain Score       05/16/22 0027       8          Wt Readings from Last 1 Encounters:   05/16/22 104 kg (229 lb)     Additional Vital Signs:   Date/Time Temp Pulse Resp BP MAP (mmHg) SpO2 Calculated FIO2 (%) - Nasal Cannula O2 Flow Rate (L/min) Nasal Cannula O2 Flow Rate (L/min) O2 Device   05/17/22 05:32:58 97 9 °F (36 6 °C) 75 20 126/77 93 94 % -- -- -- --   05/17/22 01:36:19 97 3 °F (36 3 °C) Abnormal  68 20 122/63 83 -- -- -- -- --   05/16/22 2300 97 9 °F (36 6 °C) 70 20 128/66 90 -- -- -- -- --   05/16/22 21:08:07 97 3 °F (36 3 °C) Abnormal  72 20 111/68 82 -- -- -- -- --   05/16/22 1759 97 2 °F (36 2 °C) Abnormal  66 16 136/83 -- 94 % -- -- -- None (Room air)   05/16/22 1600 -- 81 -- -- -- 95 % -- -- -- --   05/16/22 1450 -- -- -- -- -- -- -- -- -- None (Room air)   05/16/22 14:37:11 -- 83 14 123/72 89 94 % -- -- -- --   05/16/22 1308 -- 81 16 131/79 -- 93 % 28 -- 2 L/min None (Room air)   05/16/22 1253 -- 83 16 118/72 -- 93 % -- -- -- None (Room air)   05/16/22 1238 -- 86 16 122/69 -- 92 % -- 2 L/min -- Nasal cannula   05/16/22 1233 -- 89 20 122/60 -- 92 % -- -- -- Simple mask   05/16/22 1228 96 5 °F (35 8 °C) Abnormal  89 16 141/69 -- 92 % -- 4 L/min -- Simple mask   05/16/22 1103 98 2 °F (36 8 °C) 91 16 140/74 -- 95 % -- -- -- --   05/16/22 09:25:12 100 2 °F (37 9 °C) 101 -- 117/69 85 93 % -- -- -- --   05/16/22 09:24:46 -- 101 -- 117/69 85 93 % -- -- -- --   05/16/22 0750 -- -- -- -- -- -- -- -- -- None (Room air)   05/16/22 0700 -- 100 16 113/57 82 92 % -- -- -- None (Room air)   05/16/22 0600 -- 104 18 140/65 93 94 % -- -- -- --   05/16/22 0400 -- 98 18 146/71 99 96 % -- -- -- --   05/16/22 0201 -- 87 18 129/57 -- 95 % -- -- -- None (Room air)   05/16/22 0200 -- 86 18 129/57 82 95 % -- -- -- None (Room air)       Pertinent Labs/Diagnostic Test Results:   FL ERCP biliary and pancreatic   Final Result by Amara Luu MD (05/16 1556)      Imaging guidance for ERCP initially demonstrating distal common duct stone with subsequent removal   See procedure note for further details       Results from last 7 days   Lab Units 05/15/22  1658   SARS-COV-2  Negative     Results from last 7 days   Lab Units 05/16/22  0420 05/15/22  1658   WBC Thousand/uL 10 86* 12 24*   HEMOGLOBIN g/dL 12 8 14 0   HEMATOCRIT % 40 3 43 5   PLATELETS Thousands/uL 247 270   NEUTROS ABS Thousands/µL 8 83* 9 96*     Results from last 7 days   Lab Units 05/17/22  0530 05/16/22  0420 05/15/22  1658   SODIUM mmol/L 135* 131* 128*   POTASSIUM mmol/L 3 1* 3 4* 3 7 CHLORIDE mmol/L 100 94* 87*   CO2 mmol/L 29 30 29   ANION GAP mmol/L 6 7 12   BUN mg/dL 14 15 19   CREATININE mg/dL 0 70 0 99 1 06   EGFR ml/min/1 73sq m 104 83 76   CALCIUM mg/dL 8 6 8 5 9 3   MAGNESIUM mg/dL  --  2 0  --    PHOSPHORUS mg/dL  --  2 7  --      Results from last 7 days   Lab Units 05/17/22  0530 05/16/22  0420 05/15/22  1658   AST U/L 46* 105* 98*   ALT U/L 57 81* 75*   ALK PHOS U/L 188* 250* 248*   TOTAL PROTEIN g/dL 6 5 7 1 8 4   ALBUMIN g/dL 2 1* 2 3* 3 7   TOTAL BILIRUBIN mg/dL 2 93* 8 23* 9 18*   BILIRUBIN DIRECT mg/dL  --  6 46*  --      Results from last 7 days   Lab Units 05/17/22  0530 05/16/22  0420 05/15/22  1658   GLUCOSE RANDOM mg/dL 127 118 138     Results from last 7 days   Lab Units 05/15/22  2234   PROTIME seconds 14 3   INR  1 13   PTT seconds 30     Results from last 7 days   Lab Units 05/15/22  2234   PROCALCITONIN ng/ml 2 95*     Results from last 7 days   Lab Units 05/16/22  0420 05/15/22  2234   LACTIC ACID mmol/L 1 5 1 6     Results from last 7 days   Lab Units 05/15/22  1658   LIPASE u/L 29     Results from last 7 days   Lab Units 05/16/22  0430   CLARITY UA  Clear   COLOR UA  Dark Yellow   SPEC GRAV UA  1 029   PH UA  6 0   GLUCOSE UA mg/dl Negative   KETONES UA mg/dl Negative   BLOOD UA  Negative   PROTEIN UA mg/dl Trace*   NITRITE UA  Negative   BILIRUBIN UA  Moderate*   UROBILINOGEN UA (BE) mg/dl 8 0*   LEUKOCYTES UA  Negative   WBC UA /hpf 1-2   RBC UA /hpf 1-2   BACTERIA UA /hpf None Seen   EPITHELIAL CELLS WET PREP /hpf Occasional     Results from last 7 days   Lab Units 05/15/22  1658   INFLUENZA A PCR  Negative   INFLUENZA B PCR  Negative   RSV PCR  Negative     Results from last 7 days   Lab Units 05/15/22  2234   BLOOD CULTURE  No Growth at 24 hrs  No Growth at 24 hrs       ED Treatment:   Medication Administration from 05/15/2022 1955 to 05/16/2022 0830       Date/Time Order Dose Route Action     05/16/2022 0055 lactated ringers infusion 125 mL/hr Intravenous New Bag     05/16/2022 0055 heparin (porcine) subcutaneous injection 5,000 Units 5,000 Units Subcutaneous Given     05/16/2022 0157 piperacillin-tazobactam (ZOSYN) 4 5 g in sodium chloride 0 9 % 100 mL IVPB 4 5 g Intravenous New Bag     05/16/2022 0427 HYDROmorphone (DILAUDID) injection 0 5 mg 0 5 mg Intravenous Given     05/16/2022 0052 pantoprazole (PROTONIX) injection 40 mg 40 mg Intravenous Given        Past Medical History:   Diagnosis Date    Foot fracture     Hypertension      Present on Admission:   Cholangitis      Admitting Diagnosis: Cholelithiasis [K80 20]  Elevated LFTs [R79 89]  Dilated bile duct [K83 8]  Age/Sex: 60 y o  male  Admission Orders:  Scheduled Medications:  atorvastatin, 20 mg, Oral, Daily  heparin (porcine), 5,000 Units, Subcutaneous, Q8H MANPREET  pantoprazole, 40 mg, Intravenous, Q24H [address]  piperacillin-tazobactam, 4 5 g, Intravenous, Q6H  potassium chloride, 40 mEq, Oral, BID      Continuous IV Infusions:   lactated ringers infusion  Rate: 125 mL/hr Dose: 125 mL/hr  Freq: Continuous Route: IV  Last Dose: 125 mL/hr (05/17/22 0124)  Start: 05/16/22 0000 End: 05/17/22 0728     PRN Meds:  acetaminophen, 650 mg, Oral, Q6H PRN x1 thus far  HYDROmorphone, 0 5 mg, Intravenous, Q4H PRN x1 thus far  ondansetron, 4 mg, Intravenous, Q6H PRN  oxyCODONE, 10 mg, Oral, Q4H PRN  oxyCODONE, 5 mg, Oral, Q4H PRN    scd    IP CONSULT TO GASTROENTEROLOGY    Network Utilization Review Department  ATTENTION: Please call with any questions or concerns to [phone] and carefully listen to the prompts so that you are directed to the right person  All voicemails are confidential   Charu York all requests for admission clinical reviews, approved or denied determinations and any other requests to dedicated fax number below belonging to the campus where the patient is receiving treatment   List of dedicated fax numbers for the Facilities:  FACILITY NAME UR FAX NUMBER   ADMISSION DENIALS (Administrative/Medical Necessity) [facility] (Maternity/NICU/Pediatrics) [address] [place] [address],[address] [facility] [address] [place] [address]  [phone]   [address] [place] [id number] [address] [facility] [address] [phone]   [address] [phone]

## 2022-05-17 NOTE — PROGRESS NOTES
Progress Note- General Surgery   Negrito Lockhart 60 y o  male MRN: [id number]  Unit/Bed#: [facility] 815-01 Encounter: [id number]    Assessment/Plan     Assessment:  47-year-old male presents with concern for cholangitis now s/p ERCP on 5/16  AVSS  CTAP on 5/15 showed: dilated CBD with faint hyperdensity at the level of ampulla possible choledocholithiasis  Noted to have cholelithiasis without pericholecystic inflammation  Bilateral perinephric fat stranding  Mild bladder wall thickening  RUQ US on 5/15 showed: intra and extrahepatic biliary ductal dilation up to 1 6 cm  Echogenic filling defect noted near ampulla  Positive Gutierrez sign  UA on 5/16 showed: no UTI, moderate bilirubin and urobilinogen of 8  ERCP on 5/16 showed: Imaging guidance for ERCP initially demonstrating distal common duct stone with subsequent removal      Plan:  -CLD  -IV fluids  -IV antibiotics, Zosyn  -no signs of acute cholangitis at this time will continue to monitor  -plan for laparoscopic cholecystectomy 5/18  -trend temp curve/WBC/LFTs     Subjective:  Overall pt states he is doing well  Pt denies nausea and vomiting  Pt was NPO  Pt denies fever and chills  Pt has minimal RUQ tenderness  Pt having regular BM and flatus  Objective:  Current Vitals:   Blood Pressure: 122/63 (05/17/22 0136)  Pulse: 68 (05/17/22 0136)  Temperature: (!) 97 3 °F (36 3 °C) (05/17/22 0136)  Temp Source: Oral (05/17/22 0136)  Respirations: 20 (05/17/22 0136)  Height: 5' 10" (177 8 cm) (05/16/22 0027)  Weight - Scale: 104 kg (229 lb) (05/16/22 0027)  SpO2: 94 % (05/16/22 1759)      Intake/Output Summary (Last 24 hours) at 5/17/2022 0410  Last data filed at 5/16/2022 1700  Gross per 24 hour   Intake 1940 ml   Output --   Net 1940 ml       Invasive Devices  Report    Peripheral Intravenous Line  Duration           Peripheral IV 05/15/22 Right Forearm 1 day                Physical Exam  Constitutional:       General: He is not in acute distress  Appearance: Normal appearance  He is not ill-appearing or toxic-appearing  HENT:      Head: Normocephalic and atraumatic  Cardiovascular:      Rate and Rhythm: Normal rate and regular rhythm  Pulses: Normal pulses  Pulmonary:      Effort: Pulmonary effort is normal  No respiratory distress  Abdominal:      Comments: Soft, obese, tender right upper quadrant with fullness, negative Gutierrez sign, no guarding rebound or rigidity   Musculoskeletal:         General: Normal range of motion  Right lower leg: No edema  Skin:     General: Skin is warm  Capillary Refill: Capillary refill takes less than 2 seconds  Neurological:      General: No focal deficit present  Mental Status: He is alert and oriented to person, place, and time  Psychiatric:         Mood and Affect: Mood normal          Behavior: Behavior normal          Lab Results: I have personally reviewed pertinent lab results  Imaging: I have personally reviewed pertinent reports  EKG, Pathology, and Other Studies: I have personally reviewed pertinent reports

## 2022-05-17 NOTE — QUICK NOTE
Nurse-Patient-Provider rounds were completed with the patient's nurse today, Ash Christian  We discussed the plan is to discontinue fluids and prepare for OR on 05/18/2022  No other intervention today on 05/17/2022  Will continue to trend labs  Monitor for any any acute changes in exam     We reviewed all of the invasive devices/lines/telemetry orders   - None  DVT Prophylaxis:  - SCDs and subcutaneous heparin    Pain Assessment / Plan:  - Continue current analgesic regimen  Mobility Assessment / Plan:  - Activity as tolerated  Goals / Barriers for discharge:  - OR on 05/18/2022 for laparoscopic cholecystectomy  - will monitor the perioperative setting  Case management following; case and discharge needs discussed  All questions and concerns were addressed  I spent greater than 8 minutes reviewing the plan with the patient and the nurse, and coordinating care for the day      Meredith Melagr PA-C  5/17/2022

## 2022-05-17 NOTE — PROGRESS NOTES
[facility] - Progress Note  Keila Gagnon 60 y o  male MRN: [id number]  Unit/Bed#: [facility] 815-01 Encounter: [id number]      ASSESSMENT & PLAN:    78-year-old male with history of hypertension and hyperlipidemia presented with abdominal pain, nausea/vomiting for the last 1-2 weeks and was found to have elevated LFTs and dilated bile ducts with possible choledocholithiasis  GI consultation requested for ERCP      1  Possible choledocholithiasis, elevated LFTs, cholelithiasis - status post ERCP on 05/16 with finding of large ampulla with overlying blood clot likely secondary to passed stone  ERCP did show dilated bile duct with small stone  Performed sphincterotomy and clearance of the bile duct  LFTs improved today  No evidence of post-ERCP pancreatitis  · Okay to continue diet from GI standpoint  · Follow clinically  · Plan for cholecystectomy tomorrow per surgery  · Follow-up blood cultures  · Antibiotics and supportive care per primary team     2  Colon cancer screening - no previous CRC screening  · Recommend outpatient screening colonoscopy      GI will sign off  Please contact the GI fellow on call with any questions or concerns  ______________________________________________________________________    SUBJECTIVE:     Patient seen and evaluated at bedside  Doing well  Denies any abdominal pain  Tolerating clear liquids      Medication Administration - last 24 hours from 05/16/2022 0731 to 05/17/2022 0731       Date/Time Order Dose Route Action Action by     05/16/2022 0852 atorvastatin (LIPITOR) tablet 20 mg 20 mg Oral Given Belinda Coelho     05/17/2022 0124 lactated ringers infusion 125 mL/hr Intravenous New Bag Sahara Paredes RN     05/16/2022 1635 lactated ringers infusion 125 mL/hr Intravenous New Bag Jerrol Bence Szoke     05/16/2022 1515 lactated ringers infusion 0 mL/hr Intravenous Stopped Belinda Coelho     05/16/2022 1210 lactated ringers infusion   Intravenous Restarted Delaney Tanner Qamar Lucho, CRNA     05/16/2022 1209 lactated ringers infusion   Intravenous Paused Vene 89, CRNA     05/16/2022 1153 lactated ringers infusion   Intravenous Restarted Vene 89, CRNA     05/16/2022 1024 lactated ringers infusion 0 mL/hr Intravenous Stopped [facility]     05/17/2022 0121 heparin (porcine) subcutaneous injection 5,000 Units 5,000 Units Subcutaneous Given Sahara Paredes RN     05/16/2022 1635 heparin (porcine) subcutaneous injection 5,000 Units 5,000 Units Subcutaneous Given [facility]     05/16/2022 0912 heparin (porcine) subcutaneous injection 5,000 Units 5,000 Units Subcutaneous Given [facility]     05/16/2022 0843 heparin (porcine) subcutaneous injection 5,000 Units 0 Units Subcutaneous Hold [facility]     05/17/2022 0121 piperacillin-tazobactam (ZOSYN) 4 5 g in sodium chloride 0 9 % 100 mL IVPB 4 5 g Intravenous New Bag Sahara Paredes RN     05/16/2022 1955 piperacillin-tazobactam (ZOSYN) 4 5 g in sodium chloride 0 9 % 100 mL IVPB 4 5 g Intravenous New Bag Sahara Paredes RN     05/16/2022 1509 piperacillin-tazobactam (ZOSYN) 4 5 g in sodium chloride 0 9 % 100 mL IVPB 4 5 g Intravenous New Bag Gurdeep Valdez     05/16/2022 0859 piperacillin-tazobactam (ZOSYN) 4 5 g in sodium chloride 0 9 % 100 mL IVPB 4 5 g Intravenous New Bag Prieto Valdez     05/16/2022 0852 acetaminophen (TYLENOL) tablet 650 mg 650 mg Oral Given [facility]     05/16/2022 0852 pantoprazole (PROTONIX) injection 40 mg 40 mg Intravenous Given [facility]     05/16/2022 1023 lactated ringers bolus 1,000 mL 1,000 mL Intravenous New Bag Prieto Valdez     05/16/2022 1139 indomethacin (INDOCIN) rectal suppository 100 mg Rectal Given David Isabel RN     05/16/2022 1245 Gadobutrol injection (SINGLE-DOSE) SOLN 20 mL 20 mL Other Given by Other [facility]          OBJECTIVE:     Objective   Blood pressure 126/77, pulse 75, temperature 97 9 °F (36 6 °C), temperature source Oral, resp   rate 20, height 5' 10" (1 778 m), weight 104 kg (229 lb), SpO2 94 %  Body mass index is 32 86 kg/m²      Intake/Output Summary (Last 24 hours) at 5/17/2022 0731  Last data filed at 5/16/2022 1700  Gross per 24 hour   Intake 1940 ml   Output --   Net 1940 ml       PHYSICAL EXAM:   General Appearance: Awake and alert, in no acute distress  Abdomen: Soft, non-tender, non-distended; bowel sounds normal; no masses or no organomegaly    Invasive Devices  Report    Peripheral Intravenous Line  Duration           Peripheral IV 05/15/22 Right Forearm 1 day                LAB RESULTS:  Admission on 05/15/2022   Component Date Value    Color, UA 05/16/2022 Dark Yellow     Clarity, UA 05/16/2022 Clear     Specific Gravity, UA 05/16/2022 1 029     pH, UA 05/16/2022 6 0     Leukocytes, UA 05/16/2022 Negative     Nitrite, UA 05/16/2022 Negative     Protein, UA 05/16/2022 Trace (A)    Glucose, UA 05/16/2022 Negative     Ketones, UA 05/16/2022 Negative     Urobilinogen, UA 05/16/2022 8 0 (A)    Bilirubin, UA 05/16/2022 Moderate (A)    Blood, UA 05/16/2022 Negative     Sodium 05/16/2022 131 (A)    Potassium 05/16/2022 3 4 (A)    Chloride 05/16/2022 94 (A)    CO2 05/16/2022 30     ANION GAP 05/16/2022 7     BUN 05/16/2022 15     Creatinine 05/16/2022 0 99     Glucose 05/16/2022 118     Calcium 05/16/2022 8 5     eGFR 05/16/2022 83     Magnesium 05/16/2022 2 0     Phosphorus 05/16/2022 2 7     WBC 05/16/2022 10 86 (A)    RBC 05/16/2022 4 24     Hemoglobin 05/16/2022 12 8     Hematocrit 05/16/2022 40 3     MCV 05/16/2022 95     MCH 05/16/2022 30 2     MCHC 05/16/2022 31 8     RDW 05/16/2022 12 8     MPV 05/16/2022 11 4     Platelets 75/10/6172 247     nRBC 05/16/2022 0     Neutrophils Relative 05/16/2022 82 (A)    Immat GRANS % 05/16/2022 1     Lymphocytes Relative 05/16/2022 8 (A)    Monocytes Relative 05/16/2022 9     Eosinophils Relative 05/16/2022 0     Basophils Relative 05/16/2022 0     Neutrophils Absolute 05/16/2022 8 83 (A)    Immature Grans Absolute 05/16/2022 0 11     Lymphocytes Absolute 05/16/2022 0 86     Monocytes Absolute 05/16/2022 1 02     Eosinophils Absolute 05/16/2022 0 02     Basophils Absolute 05/16/2022 0 02     Total Bilirubin 05/16/2022 8 23 (A)    Bilirubin, Direct 05/16/2022 6 46 (A)    Alkaline Phosphatase 05/16/2022 250 (A)    AST 05/16/2022 105 (A)    ALT 05/16/2022 81 (A)    Total Protein 05/16/2022 7 1     Albumin 05/16/2022 2 3 (A)    LACTIC ACID 05/16/2022 1 5     RBC, UA 05/16/2022 1-2     WBC, UA 05/16/2022 1-2     Epithelial Cells 05/16/2022 Occasional     Bacteria, UA 05/16/2022 None Seen        RADIOLOGY RESULTS: I have personally reviewed pertinent imaging studies  YASIR Pacheco  Chief Gastroenterology Fellow  [facility]  Available on [email or web address]

## 2022-05-18 ENCOUNTER — ANESTHESIA (INPATIENT)
Dept: PERIOP | Facility: HOSPITAL | Age: 57
DRG: 418 | End: 2022-05-18
Payer: COMMERCIAL

## 2022-05-18 PROBLEM — E66.9 CLASS 1 OBESITY IN ADULT: Status: ACTIVE | Noted: 2020-09-17

## 2022-05-18 LAB
A1 AG RBC QL: NEGATIVE
ABO GROUP BLD: NORMAL
ABO GROUP BLD: NORMAL
ALBUMIN SERPL BCP-MCNC: 2.2 G/DL (ref 3.5–5)
ALP SERPL-CCNC: 180 U/L (ref 46–116)
ALT SERPL W P-5'-P-CCNC: 49 U/L (ref 12–78)
ANION GAP SERPL CALCULATED.3IONS-SCNC: 5 MMOL/L (ref 4–13)
AST SERPL W P-5'-P-CCNC: 42 U/L (ref 5–45)
BASOPHILS # BLD AUTO: 0.04 THOUSANDS/ΜL (ref 0–0.1)
BASOPHILS NFR BLD AUTO: 1 % (ref 0–1)
BILIRUB SERPL-MCNC: 1.86 MG/DL (ref 0.2–1)
BLD GP AB SCN SERPL QL: NEGATIVE
BLOOD GROUP ANTIBODIES SERPL: NORMAL
BUN SERPL-MCNC: 10 MG/DL (ref 5–25)
CALCIUM ALBUM COR SERPL-MCNC: 10.4 MG/DL (ref 8.3–10.1)
CALCIUM SERPL-MCNC: 9 MG/DL (ref 8.3–10.1)
CHLORIDE SERPL-SCNC: 104 MMOL/L (ref 100–108)
CO2 SERPL-SCNC: 27 MMOL/L (ref 21–32)
CREAT SERPL-MCNC: 0.64 MG/DL (ref 0.6–1.3)
EOSINOPHIL # BLD AUTO: 0.15 THOUSAND/ΜL (ref 0–0.61)
EOSINOPHIL NFR BLD AUTO: 2 % (ref 0–6)
ERYTHROCYTE [DISTWIDTH] IN BLOOD BY AUTOMATED COUNT: 13 % (ref 11.6–15.1)
GFR SERPL CREATININE-BSD FRML MDRD: 107 ML/MIN/1.73SQ M
GLUCOSE SERPL-MCNC: 117 MG/DL (ref 65–140)
GLUCOSE SERPL-MCNC: 169 MG/DL (ref 65–140)
HCT VFR BLD AUTO: 37.7 % (ref 36.5–49.3)
HGB BLD-MCNC: 12.2 G/DL (ref 12–17)
IMM GRANULOCYTES # BLD AUTO: 0.06 THOUSAND/UL (ref 0–0.2)
IMM GRANULOCYTES NFR BLD AUTO: 1 % (ref 0–2)
LYMPHOCYTES # BLD AUTO: 1.65 THOUSANDS/ΜL (ref 0.6–4.47)
LYMPHOCYTES NFR BLD AUTO: 27 % (ref 14–44)
MAGNESIUM SERPL-MCNC: 2.3 MG/DL (ref 1.6–2.6)
MCH RBC QN AUTO: 31.1 PG (ref 26.8–34.3)
MCHC RBC AUTO-ENTMCNC: 32.4 G/DL (ref 31.4–37.4)
MCV RBC AUTO: 96 FL (ref 82–98)
MONOCYTES # BLD AUTO: 0.56 THOUSAND/ΜL (ref 0.17–1.22)
MONOCYTES NFR BLD AUTO: 9 % (ref 4–12)
NEUTROPHILS # BLD AUTO: 3.72 THOUSANDS/ΜL (ref 1.85–7.62)
NEUTS SEG NFR BLD AUTO: 60 % (ref 43–75)
NRBC BLD AUTO-RTO: 0 /100 WBCS
PLATELET # BLD AUTO: 294 THOUSANDS/UL (ref 149–390)
PMV BLD AUTO: 11.4 FL (ref 8.9–12.7)
POTASSIUM SERPL-SCNC: 4 MMOL/L (ref 3.5–5.3)
PROT SERPL-MCNC: 7.2 G/DL (ref 6.4–8.2)
RBC # BLD AUTO: 3.92 MILLION/UL (ref 3.88–5.62)
RH BLD: POSITIVE
RH BLD: POSITIVE
SODIUM SERPL-SCNC: 136 MMOL/L (ref 136–145)
SPECIMEN EXPIRATION DATE: NORMAL
WBC # BLD AUTO: 6.18 THOUSAND/UL (ref 4.31–10.16)

## 2022-05-18 PROCEDURE — 47562 LAPAROSCOPIC CHOLECYSTECTOMY: CPT | Performed by: SURGERY

## 2022-05-18 PROCEDURE — 86900 BLOOD TYPING SEROLOGIC ABO: CPT | Performed by: PHYSICIAN ASSISTANT

## 2022-05-18 PROCEDURE — 99232 SBSQ HOSP IP/OBS MODERATE 35: CPT | Performed by: SURGERY

## 2022-05-18 PROCEDURE — 83735 ASSAY OF MAGNESIUM: CPT | Performed by: PHYSICIAN ASSISTANT

## 2022-05-18 PROCEDURE — 0FT44ZZ RESECTION OF GALLBLADDER, PERCUTANEOUS ENDOSCOPIC APPROACH: ICD-10-PCS | Performed by: SURGERY

## 2022-05-18 PROCEDURE — 86850 RBC ANTIBODY SCREEN: CPT | Performed by: PHYSICIAN ASSISTANT

## 2022-05-18 PROCEDURE — 88304 TISSUE EXAM BY PATHOLOGIST: CPT | Performed by: PATHOLOGY

## 2022-05-18 PROCEDURE — C9113 INJ PANTOPRAZOLE SODIUM, VIA: HCPCS | Performed by: SURGERY

## 2022-05-18 PROCEDURE — 86901 BLOOD TYPING SEROLOGIC RH(D): CPT | Performed by: PHYSICIAN ASSISTANT

## 2022-05-18 PROCEDURE — 85025 COMPLETE CBC W/AUTO DIFF WBC: CPT | Performed by: PHYSICIAN ASSISTANT

## 2022-05-18 PROCEDURE — 86870 RBC ANTIBODY IDENTIFICATION: CPT | Performed by: SURGERY

## 2022-05-18 PROCEDURE — 80053 COMPREHEN METABOLIC PANEL: CPT | Performed by: PHYSICIAN ASSISTANT

## 2022-05-18 PROCEDURE — 82948 REAGENT STRIP/BLOOD GLUCOSE: CPT

## 2022-05-18 RX ORDER — LIDOCAINE 50 MG/G
2 PATCH TOPICAL DAILY
Status: DISCONTINUED | OUTPATIENT
Start: 2022-05-19 | End: 2022-05-22 | Stop reason: HOSPADM

## 2022-05-18 RX ORDER — FENTANYL CITRATE/PF 50 MCG/ML
50 SYRINGE (ML) INJECTION
Status: COMPLETED | OUTPATIENT
Start: 2022-05-18 | End: 2022-05-18

## 2022-05-18 RX ORDER — FENTANYL CITRATE 50 UG/ML
INJECTION, SOLUTION INTRAMUSCULAR; INTRAVENOUS AS NEEDED
Status: DISCONTINUED | OUTPATIENT
Start: 2022-05-18 | End: 2022-05-18

## 2022-05-18 RX ORDER — LABETALOL HYDROCHLORIDE 5 MG/ML
INJECTION, SOLUTION INTRAVENOUS AS NEEDED
Status: DISCONTINUED | OUTPATIENT
Start: 2022-05-18 | End: 2022-05-18

## 2022-05-18 RX ORDER — BUPIVACAINE HYDROCHLORIDE 5 MG/ML
INJECTION, SOLUTION PERINEURAL AS NEEDED
Status: DISCONTINUED | OUTPATIENT
Start: 2022-05-18 | End: 2022-05-18 | Stop reason: HOSPADM

## 2022-05-18 RX ORDER — GABAPENTIN 100 MG/1
100 CAPSULE ORAL 3 TIMES DAILY
Status: DISCONTINUED | OUTPATIENT
Start: 2022-05-18 | End: 2022-05-22 | Stop reason: HOSPADM

## 2022-05-18 RX ORDER — ONDANSETRON 2 MG/ML
INJECTION INTRAMUSCULAR; INTRAVENOUS AS NEEDED
Status: DISCONTINUED | OUTPATIENT
Start: 2022-05-18 | End: 2022-05-18

## 2022-05-18 RX ORDER — NEOSTIGMINE METHYLSULFATE 1 MG/ML
INJECTION INTRAVENOUS AS NEEDED
Status: DISCONTINUED | OUTPATIENT
Start: 2022-05-18 | End: 2022-05-18

## 2022-05-18 RX ORDER — GLYCOPYRROLATE 0.2 MG/ML
INJECTION INTRAMUSCULAR; INTRAVENOUS AS NEEDED
Status: DISCONTINUED | OUTPATIENT
Start: 2022-05-18 | End: 2022-05-18

## 2022-05-18 RX ORDER — LIDOCAINE HYDROCHLORIDE 10 MG/ML
INJECTION, SOLUTION EPIDURAL; INFILTRATION; INTRACAUDAL; PERINEURAL AS NEEDED
Status: DISCONTINUED | OUTPATIENT
Start: 2022-05-18 | End: 2022-05-18

## 2022-05-18 RX ORDER — ROCURONIUM BROMIDE 10 MG/ML
INJECTION, SOLUTION INTRAVENOUS AS NEEDED
Status: DISCONTINUED | OUTPATIENT
Start: 2022-05-18 | End: 2022-05-18

## 2022-05-18 RX ORDER — MAGNESIUM HYDROXIDE 1200 MG/15ML
LIQUID ORAL AS NEEDED
Status: DISCONTINUED | OUTPATIENT
Start: 2022-05-18 | End: 2022-05-18 | Stop reason: HOSPADM

## 2022-05-18 RX ORDER — METHOCARBAMOL 500 MG/1
500 TABLET, FILM COATED ORAL EVERY 6 HOURS SCHEDULED
Status: DISCONTINUED | OUTPATIENT
Start: 2022-05-18 | End: 2022-05-22 | Stop reason: HOSPADM

## 2022-05-18 RX ORDER — CEFAZOLIN SODIUM 1 G/3ML
INJECTION, POWDER, FOR SOLUTION INTRAMUSCULAR; INTRAVENOUS AS NEEDED
Status: DISCONTINUED | OUTPATIENT
Start: 2022-05-18 | End: 2022-05-18

## 2022-05-18 RX ORDER — HYDROMORPHONE HCL/PF 1 MG/ML
0.5 SYRINGE (ML) INJECTION
Status: COMPLETED | OUTPATIENT
Start: 2022-05-18 | End: 2022-05-18

## 2022-05-18 RX ORDER — DEXAMETHASONE SODIUM PHOSPHATE 10 MG/ML
INJECTION, SOLUTION INTRAMUSCULAR; INTRAVENOUS AS NEEDED
Status: DISCONTINUED | OUTPATIENT
Start: 2022-05-18 | End: 2022-05-18

## 2022-05-18 RX ORDER — MIDAZOLAM HYDROCHLORIDE 2 MG/2ML
INJECTION, SOLUTION INTRAMUSCULAR; INTRAVENOUS AS NEEDED
Status: DISCONTINUED | OUTPATIENT
Start: 2022-05-18 | End: 2022-05-18

## 2022-05-18 RX ORDER — PROPOFOL 10 MG/ML
INJECTION, EMULSION INTRAVENOUS AS NEEDED
Status: DISCONTINUED | OUTPATIENT
Start: 2022-05-18 | End: 2022-05-18

## 2022-05-18 RX ADMIN — ROCURONIUM BROMIDE 50 MG: 50 INJECTION INTRAVENOUS at 11:23

## 2022-05-18 RX ADMIN — HYDROMORPHONE HYDROCHLORIDE 0.5 MG: 1 INJECTION, SOLUTION INTRAMUSCULAR; INTRAVENOUS; SUBCUTANEOUS at 15:05

## 2022-05-18 RX ADMIN — HYDROMORPHONE HYDROCHLORIDE 0.5 MG: 1 INJECTION, SOLUTION INTRAMUSCULAR; INTRAVENOUS; SUBCUTANEOUS at 20:28

## 2022-05-18 RX ADMIN — PROPOFOL 200 MG: 10 INJECTION, EMULSION INTRAVENOUS at 11:23

## 2022-05-18 RX ADMIN — SODIUM CHLORIDE, SODIUM LACTATE, POTASSIUM CHLORIDE, AND CALCIUM CHLORIDE 75 ML/HR: .6; .31; .03; .02 INJECTION, SOLUTION INTRAVENOUS at 19:41

## 2022-05-18 RX ADMIN — PIPERACILLIN AND TAZOBACTAM 4.5 G: 36; 4.5 INJECTION, POWDER, FOR SOLUTION INTRAVENOUS at 19:39

## 2022-05-18 RX ADMIN — HEPARIN SODIUM 5000 UNITS: 5000 INJECTION INTRAVENOUS; SUBCUTANEOUS at 19:39

## 2022-05-18 RX ADMIN — GABAPENTIN 100 MG: 100 CAPSULE ORAL at 20:46

## 2022-05-18 RX ADMIN — FENTANYL CITRATE 100 MCG: 50 INJECTION INTRAMUSCULAR; INTRAVENOUS at 11:23

## 2022-05-18 RX ADMIN — HEPARIN SODIUM 5000 UNITS: 5000 INJECTION INTRAVENOUS; SUBCUTANEOUS at 23:37

## 2022-05-18 RX ADMIN — Medication 5 MG: at 12:43

## 2022-05-18 RX ADMIN — ONDANSETRON 4 MG: 2 INJECTION INTRAMUSCULAR; INTRAVENOUS at 13:37

## 2022-05-18 RX ADMIN — METHOCARBAMOL 500 MG: 500 TABLET, FILM COATED ORAL at 23:37

## 2022-05-18 RX ADMIN — HYDROMORPHONE HYDROCHLORIDE 0.5 MG: 1 INJECTION, SOLUTION INTRAMUSCULAR; INTRAVENOUS; SUBCUTANEOUS at 14:53

## 2022-05-18 RX ADMIN — DEXAMETHASONE SODIUM PHOSPHATE 10 MG: 10 INJECTION, SOLUTION INTRAMUSCULAR; INTRAVENOUS at 11:28

## 2022-05-18 RX ADMIN — ATORVASTATIN CALCIUM 20 MG: 20 TABLET, FILM COATED ORAL at 19:39

## 2022-05-18 RX ADMIN — OXYCODONE HYDROCHLORIDE 10 MG: 10 TABLET ORAL at 19:53

## 2022-05-18 RX ADMIN — PIPERACILLIN AND TAZOBACTAM 4.5 G: 36; 4.5 INJECTION, POWDER, FOR SOLUTION INTRAVENOUS at 23:37

## 2022-05-18 RX ADMIN — CEFAZOLIN SODIUM 2000 MG: 1 INJECTION, POWDER, FOR SOLUTION INTRAMUSCULAR; INTRAVENOUS at 11:30

## 2022-05-18 RX ADMIN — HYDROMORPHONE HYDROCHLORIDE 0.5 MG: 1 INJECTION, SOLUTION INTRAMUSCULAR; INTRAVENOUS; SUBCUTANEOUS at 14:19

## 2022-05-18 RX ADMIN — HEPARIN SODIUM 5000 UNITS: 5000 INJECTION INTRAVENOUS; SUBCUTANEOUS at 09:16

## 2022-05-18 RX ADMIN — PIPERACILLIN AND TAZOBACTAM 4.5 G: 36; 4.5 INJECTION, POWDER, FOR SOLUTION INTRAVENOUS at 00:06

## 2022-05-18 RX ADMIN — ROCURONIUM BROMIDE 10 MG: 50 INJECTION INTRAVENOUS at 12:45

## 2022-05-18 RX ADMIN — PIPERACILLIN AND TAZOBACTAM 4.5 G: 36; 4.5 INJECTION, POWDER, FOR SOLUTION INTRAVENOUS at 05:25

## 2022-05-18 RX ADMIN — Medication 50 MCG: at 14:32

## 2022-05-18 RX ADMIN — LIDOCAINE HYDROCHLORIDE 50 MG: 10 INJECTION, SOLUTION EPIDURAL; INFILTRATION; INTRACAUDAL at 11:23

## 2022-05-18 RX ADMIN — Medication 50 MCG: at 14:46

## 2022-05-18 RX ADMIN — SODIUM CHLORIDE, SODIUM LACTATE, POTASSIUM CHLORIDE, AND CALCIUM CHLORIDE 75 ML/HR: .6; .31; .03; .02 INJECTION, SOLUTION INTRAVENOUS at 01:15

## 2022-05-18 RX ADMIN — SODIUM CHLORIDE, SODIUM LACTATE, POTASSIUM CHLORIDE, AND CALCIUM CHLORIDE: .6; .31; .03; .02 INJECTION, SOLUTION INTRAVENOUS at 12:06

## 2022-05-18 RX ADMIN — NEOSTIGMINE 4 MG: 1 INJECTION INTRAVENOUS at 14:02

## 2022-05-18 RX ADMIN — METHOCARBAMOL 500 MG: 500 TABLET, FILM COATED ORAL at 20:46

## 2022-05-18 RX ADMIN — PANTOPRAZOLE SODIUM 40 MG: 40 INJECTION, POWDER, FOR SOLUTION INTRAVENOUS at 09:13

## 2022-05-18 RX ADMIN — FENTANYL CITRATE 50 MCG: 50 INJECTION INTRAMUSCULAR; INTRAVENOUS at 14:09

## 2022-05-18 RX ADMIN — FENTANYL CITRATE 50 MCG: 50 INJECTION INTRAMUSCULAR; INTRAVENOUS at 11:57

## 2022-05-18 RX ADMIN — ROCURONIUM BROMIDE 25 MG: 50 INJECTION INTRAVENOUS at 11:56

## 2022-05-18 RX ADMIN — MIDAZOLAM 2 MG: 1 INJECTION INTRAMUSCULAR; INTRAVENOUS at 11:21

## 2022-05-18 RX ADMIN — HEPARIN SODIUM 5000 UNITS: 5000 INJECTION INTRAVENOUS; SUBCUTANEOUS at 00:06

## 2022-05-18 RX ADMIN — GLYCOPYRROLATE 0.6 MG: 0.2 INJECTION, SOLUTION INTRAMUSCULAR; INTRAVENOUS at 14:02

## 2022-05-18 NOTE — PROGRESS NOTES
Progress Note- General Surgery   Anita [place] 60 y o  male MRN: [id number]  Unit/Bed#: [facility] 815-01 Encounter: [id number]    Assessment/Plan     Assessment:  70-year-old male presents with concern for cholangitis now s/p ERCP on 5/16 with sphincterotomy and stone removal     AVSS  CTAP on 5/15 showed: dilated CBD with faint hyperdensity at the level of ampulla possible choledocholithiasis  Noted to have cholelithiasis without pericholecystic inflammation  Bilateral perinephric fat stranding  Mild bladder wall thickening  RUQ US on 5/15 showed: intra and extrahepatic biliary ductal dilation up to 1 6 cm  Echogenic filling defect noted near ampulla  Positive Gutierrez sign  UA on 5/16 showed: no UTI, moderate bilirubin and urobilinogen of 8  ERCP on 5/16 showed: Imaging guidance for ERCP initially demonstrating distal common duct stone with subsequent removal      Plan:  -NPO for lap solitario today 5/18  -IV fluids  -IV antibiotics, Zosyn  -trend temp curve/WBC/LFTs     Subjective:  Overall pt states he is doing well  Pt denies nausea and vomiting  Pt is NPO  Pt denies fever and chills  Pt has minimal RUQ tenderness  Pt having regular BM and flatus  Objective:  Current Vitals:   Blood Pressure: 127/73 (05/17/22 1356)  Pulse: 85 (05/17/22 1356)  Temperature: 97 5 °F (36 4 °C) (05/17/22 1356)  Temp Source: Oral (05/17/22 0532)  Respirations: 18 (05/17/22 1356)  Height: 5' 10" (177 8 cm) (05/16/22 0027)  Weight - Scale: 104 kg (229 lb) (05/16/22 0027)  SpO2: 95 % (05/17/22 1356)      Intake/Output Summary (Last 24 hours) at 5/18/2022 0533  Last data filed at 5/17/2022 1700  Gross per 24 hour   Intake 600 ml   Output --   Net 600 ml       Invasive Devices  Report    Peripheral Intravenous Line  Duration           Peripheral IV 05/15/22 Right Forearm 2 days                Physical Exam  Constitutional:       General: He is not in acute distress  Appearance: Normal appearance   He is not ill-appearing or toxic-appearing  HENT:      Head: Normocephalic and atraumatic  Cardiovascular:      Rate and Rhythm: Normal rate and regular rhythm  Pulses: Normal pulses  Pulmonary:      Effort: Pulmonary effort is normal  No respiratory distress  Abdominal:      Comments: Soft, obese, nontender, negative Gutierrez sign, no guarding rebound or rigidity   Musculoskeletal:         General: Normal range of motion  Right lower leg: No edema  Skin:     General: Skin is warm  Capillary Refill: Capillary refill takes less than 2 seconds  Neurological:      General: No focal deficit present  Mental Status: He is alert and oriented to person, place, and time  Psychiatric:         Mood and Affect: Mood normal          Behavior: Behavior normal          Lab Results: I have personally reviewed pertinent lab results  Imaging: I have personally reviewed pertinent reports  EKG, Pathology, and Other Studies: I have personally reviewed pertinent reports

## 2022-05-18 NOTE — ANESTHESIA POSTPROCEDURE EVALUATION
Post-Op Assessment Note    CV Status:  Stable    Pain management: adequate     Mental Status:  Awake   Hydration Status:  Euvolemic   PONV Controlled:  Controlled   Airway Patency:  Patent      Post Op Vitals Reviewed: Yes      Staff: CRNA         No complications documented      BP  139/84   Temp  97 0   Pulse  72   Resp   16   SpO2   97%

## 2022-05-18 NOTE — ANESTHESIA PREPROCEDURE EVALUATION
Procedure:  CHOLECYSTECTOMY LAPAROSCOPIC (N/A Abdomen)    Relevant Problems   ANESTHESIA (within normal limits)      CARDIO   (+) Benign essential HTN      /RENAL (within normal limits)      PULMONARY (within normal limits)      Digestive   (+) Cholangitis      Other   (+) Class 1 obesity in adult   (+) Elevated LDL cholesterol level   (+) Prediabetes        Physical Exam    Airway    Mallampati score: II  TM Distance: >3 FB  Neck ROM: full     Dental   No notable dental hx     Cardiovascular      Pulmonary      Other Findings        Anesthesia Plan  ASA Score- 2     Anesthesia Type- general with ASA Monitors  Additional Monitors:   Airway Plan: ETT  Plan Factors-Exercise tolerance (METS): >4 METS  Chart reviewed  EKG reviewed  Existing labs reviewed  Patient summary reviewed  Patient is not a current smoker  Induction- intravenous  Postoperative Plan-     Informed Consent- Anesthetic plan and risks discussed with patient  I personally reviewed this patient with the CRNA  Discussed and agreed on the Anesthesia Plan with the CRNA  Massimo Gillette

## 2022-05-18 NOTE — OP NOTE
OPERATIVE REPORT  PATIENT NAME: Nadeem Costa    :  1965  MRN: [id number]  Pt Location: [facility] OR ROOM 03    SURGERY DATE: 2022    Surgeon(s) and Role:     * Perry Bonds MD - Primary     * Selene Mccollum MD - Assisting     * Ray Fleischer, MD - Assisting     * Sangeetha Rashid DO - Assisting    Preop Diagnosis:  Cholangitis [K83 09]    Post-Op Diagnosis Codes:     * Cholangitis [K83 09]    Procedure(s) (LRB):  CHOLECYSTECTOMY LAPAROSCOPIC (N/A)    Specimen(s):  ID Type Source Tests Collected by Time Destination   1 :  Tissue Gallbladder TISSUE EXAM Perry Bonds MD 2022 1337        Estimated Blood Loss:   Minimal    Drains:  Closed/Suction Drain Right RUQ Bulb 19 Fr  (Active)   Number of days: 0       Anesthesia Type:   General    Operative Indications:  Cholangitis [K83 09]      Operative Findings:  Severe acute on chronic inflammation, redundant gallbladder    Complications:   None    Procedure and Technique:    Patient was identified in the preoperative holding area by name, date of birth, MRN  Patient was taken the operating suite and placed in the supine position  General anesthesia was then induced  Appropriate time-out was called  Preoperative antibiotics were administered  A 12 mm incision was made in the supraumbilical region, fascia was directly incised and a 5 mm trocar was inserted into the abdominal cavity  The abdominal cavity was insufflated to 15 mmHg  A 5 mm camera was introduced the underlying abdomen was inspected for signs of injury, no injuries identified  Next 5 mm ports were were placed in the right mid quadrant, right upper quadrant and epigastrium all under direct vision  The umbilical port site was then upsized to a 12 mm port  The patient was then placed in reverse Trendelenburg and right-side up  The gallbladder was grasped and retracted cephalad and laterally to expose close triangle    The gallbladder was noted to be acutely inflamed and edematous as well as redundant  And unavoidable hole was made in the gallbladder during retraction, spillage of bile and stones was noted  Dense adhesions were present from gallbladder to surrounding intra-abdominal fat  These were meticulously taken down  Gallbladder peritoneum was incised  Further dissection was noted to be very difficult secondary to inflammation redundancy  A very high anterior cystic artery was noted this was doubly clipped and ligated and retracted down  Further dissection on the gallbladder was carried downwards exposing the infundibulum and subsequent large cystic duct  The cystic duct was then ligated using 3 Endoloops  The gallbladder was then dissected free from the gallbladder fossa of the liver using electrocautery  This was then passed off the table as specimen an Endo-Catch bag  The abdomen is then thoroughly irrigated with 2 L of saline and suction  Every effort was made to  visible gallstones  The abdomen is irrigated and suctioned until effluent was clear  Hemostasis was ensured using electrocautery  A 12 [place] Sanna Timmy drain was then placed in the gallbladder fossa and secured in place with a 2-0 nylon stitch  Fascia was then closed using 0 Vicryl  Skin was closed using 4-0 Monocryl with Exofin glue over top  The patient was then awakened taken to PACU in stable condition  He tolerated the procedure well      Patient Disposition:  PACU       SIGNATURE: Julieta Simmonds, DO  DATE: May 18, 2022  TIME: 2:25 PM

## 2022-05-18 NOTE — QUICK NOTE
Nurse-Patient-Provider rounds were completed with the patient's nurse today, Lucy Wan  We discussed the plan is to have patient go to the OR for laparoscopic cholecystectomy today  Will follow-up with post-operative evaluation  No other acute intervention at this time  Continue supportive measures and prn pain support; will adjust in post-operative setting  We reviewed all of the invasive devices/lines/telemetry orders   - None  DVT Prophylaxis:  - SCDs and SQH    Pain Assessment / Plan:  - Continue current analgesic regimen  Mobility Assessment / Plan:  - Activity as tolerated  Goals / Barriers for discharge:  - OR today for laparoscopic cholecystectomy  - Post-operative check indicated  - Continue prn pain support  - AM labs  Case management following; case and discharge needs discussed  All questions and concerns were addressed  I spent greater than 8 minutes reviewing the plan with the patient and the nurse, and coordinating care for the day      Matt Puentes PA-C  5/18/2022

## 2022-05-18 NOTE — PLAN OF CARE
Problem: PAIN - ADULT  Goal: Verbalizes/displays adequate comfort level or baseline comfort level  Description: Interventions:  - Encourage patient to monitor pain and request assistance  - Assess pain using appropriate pain scale  - Administer analgesics based on type and severity of pain and evaluate response  - Implement non-pharmacological measures as appropriate and evaluate response  - Consider cultural and social influences on pain and pain management  - Notify physician/advanced practitioner if interventions unsuccessful or patient reports new pain  Outcome: Progressing     Problem: INFECTION - ADULT  Goal: Absence or prevention of progression during hospitalization  Description: INTERVENTIONS:  - Assess and monitor for signs and symptoms of infection  - Monitor lab/diagnostic results  - Monitor all insertion sites, i e  indwelling lines, tubes, and drains  - Monitor endotracheal if appropriate and nasal secretions for changes in amount and color  - [place] appropriate cooling/warming therapies per order  - Administer medications as ordered  - Instruct and encourage patient and family to use good hand hygiene technique  - Identify and instruct in appropriate isolation precautions for identified infection/condition  Outcome: Progressing

## 2022-05-19 LAB
ALBUMIN SERPL BCP-MCNC: 2.6 G/DL (ref 3.5–5)
ALP SERPL-CCNC: 165 U/L (ref 46–116)
ALT SERPL W P-5'-P-CCNC: 51 U/L (ref 12–78)
ANION GAP SERPL CALCULATED.3IONS-SCNC: 6 MMOL/L (ref 4–13)
AST SERPL W P-5'-P-CCNC: 45 U/L (ref 5–45)
BASOPHILS # BLD AUTO: 0.02 THOUSANDS/ΜL (ref 0–0.1)
BASOPHILS NFR BLD AUTO: 0 % (ref 0–1)
BILIRUB SERPL-MCNC: 2.07 MG/DL (ref 0.2–1)
BUN SERPL-MCNC: 8 MG/DL (ref 5–25)
CALCIUM ALBUM COR SERPL-MCNC: 10.1 MG/DL (ref 8.3–10.1)
CALCIUM SERPL-MCNC: 9 MG/DL (ref 8.3–10.1)
CHLORIDE SERPL-SCNC: 99 MMOL/L (ref 100–108)
CO2 SERPL-SCNC: 29 MMOL/L (ref 21–32)
CREAT SERPL-MCNC: 0.76 MG/DL (ref 0.6–1.3)
EOSINOPHIL # BLD AUTO: 0 THOUSAND/ΜL (ref 0–0.61)
EOSINOPHIL NFR BLD AUTO: 0 % (ref 0–6)
ERYTHROCYTE [DISTWIDTH] IN BLOOD BY AUTOMATED COUNT: 12.9 % (ref 11.6–15.1)
GFR SERPL CREATININE-BSD FRML MDRD: 100 ML/MIN/1.73SQ M
GLUCOSE SERPL-MCNC: 167 MG/DL (ref 65–140)
HCT VFR BLD AUTO: 40.9 % (ref 36.5–49.3)
HGB BLD-MCNC: 12.6 G/DL (ref 12–17)
IMM GRANULOCYTES # BLD AUTO: 0.08 THOUSAND/UL (ref 0–0.2)
IMM GRANULOCYTES NFR BLD AUTO: 1 % (ref 0–2)
LYMPHOCYTES # BLD AUTO: 0.99 THOUSANDS/ΜL (ref 0.6–4.47)
LYMPHOCYTES NFR BLD AUTO: 8 % (ref 14–44)
MCH RBC QN AUTO: 30.6 PG (ref 26.8–34.3)
MCHC RBC AUTO-ENTMCNC: 30.8 G/DL (ref 31.4–37.4)
MCV RBC AUTO: 99 FL (ref 82–98)
MONOCYTES # BLD AUTO: 1.14 THOUSAND/ΜL (ref 0.17–1.22)
MONOCYTES NFR BLD AUTO: 9 % (ref 4–12)
NEUTROPHILS # BLD AUTO: 10.34 THOUSANDS/ΜL (ref 1.85–7.62)
NEUTS SEG NFR BLD AUTO: 82 % (ref 43–75)
NRBC BLD AUTO-RTO: 0 /100 WBCS
PLATELET # BLD AUTO: 429 THOUSANDS/UL (ref 149–390)
PMV BLD AUTO: 11.2 FL (ref 8.9–12.7)
POTASSIUM SERPL-SCNC: 4.5 MMOL/L (ref 3.5–5.3)
PROT SERPL-MCNC: 8 G/DL (ref 6.4–8.2)
RBC # BLD AUTO: 4.12 MILLION/UL (ref 3.88–5.62)
SODIUM SERPL-SCNC: 134 MMOL/L (ref 136–145)
WBC # BLD AUTO: 12.57 THOUSAND/UL (ref 4.31–10.16)

## 2022-05-19 PROCEDURE — 80053 COMPREHEN METABOLIC PANEL: CPT | Performed by: SURGERY

## 2022-05-19 PROCEDURE — 85025 COMPLETE CBC W/AUTO DIFF WBC: CPT | Performed by: SURGERY

## 2022-05-19 PROCEDURE — 99024 POSTOP FOLLOW-UP VISIT: CPT | Performed by: SURGERY

## 2022-05-19 RX ORDER — PANTOPRAZOLE SODIUM 40 MG/1
40 TABLET, DELAYED RELEASE ORAL
Status: DISCONTINUED | OUTPATIENT
Start: 2022-05-19 | End: 2022-05-22 | Stop reason: HOSPADM

## 2022-05-19 RX ADMIN — OXYCODONE HYDROCHLORIDE 10 MG: 10 TABLET ORAL at 21:52

## 2022-05-19 RX ADMIN — OXYCODONE HYDROCHLORIDE 10 MG: 10 TABLET ORAL at 02:21

## 2022-05-19 RX ADMIN — GABAPENTIN 100 MG: 100 CAPSULE ORAL at 21:52

## 2022-05-19 RX ADMIN — GABAPENTIN 100 MG: 100 CAPSULE ORAL at 15:56

## 2022-05-19 RX ADMIN — HEPARIN SODIUM 5000 UNITS: 5000 INJECTION INTRAVENOUS; SUBCUTANEOUS at 06:21

## 2022-05-19 RX ADMIN — OXYCODONE HYDROCHLORIDE 10 MG: 10 TABLET ORAL at 06:21

## 2022-05-19 RX ADMIN — HEPARIN SODIUM 5000 UNITS: 5000 INJECTION INTRAVENOUS; SUBCUTANEOUS at 21:53

## 2022-05-19 RX ADMIN — OXYCODONE HYDROCHLORIDE 10 MG: 10 TABLET ORAL at 10:46

## 2022-05-19 RX ADMIN — METHOCARBAMOL 500 MG: 500 TABLET, FILM COATED ORAL at 06:21

## 2022-05-19 RX ADMIN — METHOCARBAMOL 500 MG: 500 TABLET, FILM COATED ORAL at 17:47

## 2022-05-19 RX ADMIN — PANTOPRAZOLE SODIUM 40 MG: 40 TABLET, DELAYED RELEASE ORAL at 08:08

## 2022-05-19 RX ADMIN — METHOCARBAMOL 500 MG: 500 TABLET, FILM COATED ORAL at 12:30

## 2022-05-19 RX ADMIN — GABAPENTIN 100 MG: 100 CAPSULE ORAL at 08:07

## 2022-05-19 RX ADMIN — PIPERACILLIN AND TAZOBACTAM 4.5 G: 36; 4.5 INJECTION, POWDER, FOR SOLUTION INTRAVENOUS at 06:21

## 2022-05-19 RX ADMIN — LIDOCAINE 5% 2 PATCH: 700 PATCH TOPICAL at 08:08

## 2022-05-19 RX ADMIN — OXYCODONE HYDROCHLORIDE 10 MG: 10 TABLET ORAL at 15:59

## 2022-05-19 RX ADMIN — HEPARIN SODIUM 5000 UNITS: 5000 INJECTION INTRAVENOUS; SUBCUTANEOUS at 15:56

## 2022-05-19 RX ADMIN — HYDROMORPHONE HYDROCHLORIDE 0.5 MG: 1 INJECTION, SOLUTION INTRAMUSCULAR; INTRAVENOUS; SUBCUTANEOUS at 08:07

## 2022-05-19 RX ADMIN — ATORVASTATIN CALCIUM 20 MG: 20 TABLET, FILM COATED ORAL at 17:46

## 2022-05-19 NOTE — QUICK NOTE
Nurse-Patient-Provider rounds were completed with the patient's nurse today, Maiar Long  We discussed the plan is to possible discharge this afternoon and removed DIANA drain  Patient does not require any further workup at this time  Will monitor this afternoon and assess whether not patient's abdominal pain is more controlled  If patient's abdominal exam remains benign and he is overall improved this afternoon he can clinically be discharged with outpatient follow-up with repeat LFTs in 1 week and removal a DIANA drain prior to discharge  We reviewed all of the invasive devices/lines/telemetry orders   - None  DVT Prophylaxis:  - SCDs and subcutaneous heparin    Pain Assessment / Plan:  - Continue current analgesic regimen  Mobility Assessment / Plan:  - Activity as tolerated  Goals / Barriers for discharge:  - DC planning  - no further workup at this time  - anticipate possible discharge in next 24 hours and even this afternoon pending patient overall improvement in exam  Case management following; case and discharge needs discussed  All questions and concerns were addressed  I spent greater than 9 minutes reviewing the plan with the patient and the nurse, and coordinating care for the day      Tabatha Monreal PA-C  5/19/2022

## 2022-05-19 NOTE — QUICK NOTE
Post Op Check Note - Surgery Resident  Kenyon Michele 60 y o  male MRN: [id number]  Unit/Bed#: [facility] 815-01 Encounter: [id number]    ASSESSMENT:  Kenyon Michele is a 60 y o  male who is status post lap solitario    Subjective: Endorses significant abdominal pain, denies nausea/emesis/flatus    Physical Exam:  GEN: NAD  CV: RRR  Lung: Normal effort  Ab: Soft, appropriately mildly tender RUQ / near incisions, abdomen mildly  DIANA SS distended  Neuro: A+Ox3  Incisions: CDI    Blood pressure (!) 159/101, pulse 94, temperature 97 6 °F (36 4 °C), resp  rate 18, height 5' 10" (1 778 m), weight 104 kg (229 lb), SpO2 92 %  ,Body mass index is 32 86 kg/m²        Intake/Output Summary (Last 24 hours) at 5/18/2022 2025  Last data filed at 5/18/2022 1941  Gross per 24 hour   Intake 1500 ml   Output 285 ml   Net 1215 ml       Invasive Devices  Report    Peripheral Intravenous Line  Duration           Peripheral IV 05/15/22 Right Forearm 2 days    Peripheral IV 05/18/22 Right;Dorsal (posterior) Hand <1 day          Drain  Duration           Closed/Suction Drain Right RUQ Bulb 19 Fr  <1 day                VTE Pharmacologic Prophylaxis: Heparin

## 2022-05-19 NOTE — PROGRESS NOTES
Progress Note- General Surgery   Oliva Adams 60 y o  male MRN: [id number]  Unit/Bed#: [facility] 815-01 Encounter: [id number]    Assessment/Plan     Assessment:  64-year-old male presents with concern for cholangitis with ERCP on 5/16 now s/p laparoscopic cholecystectomy 5/19  AVSS  CTAP on 5/15 showed: dilated CBD with faint hyperdensity at the level of ampulla possible choledocholithiasis  Noted to have cholelithiasis without pericholecystic inflammation  Bilateral perinephric fat stranding  Mild bladder wall thickening  RUQ US on 5/15 showed: intra and extrahepatic biliary ductal dilation up to 1 6 cm  Echogenic filling defect noted near ampulla  Positive Gutierrez sign  UA on 5/16 showed: no UTI, moderate bilirubin and urobilinogen of 8  ERCP on 5/16 showed: Imaging guidance for ERCP initially demonstrating distal common duct stone with subsequent removal    Lap Gina on 5/18 showed: Severe acute on chronic inflammation, redundant gallbladder      Plan:  -GI/Low fat diet  -IV antibiotics, Zosyn day #4  -trend temp curve/WBC/LFTs  -pain control PRN  -drain maintenance       Subjective:  Overall pt states he is doing okay  Pt has moderate RUQ pain  Pt denies nausea and vomiting  Pt is tolerating diet  Pt denies fever and chills  Pt denies having BM and flatus       Objective:  Current Vitals:   Blood Pressure: 148/89 (05/19/22 0241)  Pulse: 81 (05/19/22 0241)  Temperature: 97 5 °F (36 4 °C) (05/19/22 0241)  Temp Source: Oral (05/17/22 0532)  Respirations: 18 (05/19/22 0241)  Height: 5' 10" (177 8 cm) (05/16/22 0027)  Weight - Scale: 104 kg (229 lb) (05/16/22 0027)  SpO2: 92 % (05/19/22 0241)      Intake/Output Summary (Last 24 hours) at 5/19/2022 0538  Last data filed at 5/19/2022 0531  Gross per 24 hour   Intake 2560 83 ml   Output 2315 ml   Net 245 83 ml       Invasive Devices  Report    Peripheral Intravenous Line  Duration           Peripheral IV 05/18/22 Proximal;Right;Ventral (anterior) Forearm <1 day Drain  Duration           Closed/Suction Drain Right RUQ Bulb 19 Fr  <1 day                Physical Exam  Constitutional:       General: He is not in acute distress  Appearance: Normal appearance  He is not ill-appearing or toxic-appearing  HENT:      Head: Normocephalic and atraumatic  Cardiovascular:      Rate and Rhythm: Normal rate and regular rhythm  Pulses: Normal pulses  Pulmonary:      Effort: Pulmonary effort is normal  No respiratory distress  Abdominal:      Comments: Soft, obese, nontender, negative Gutierrez sign, no guarding rebound or rigidity   Musculoskeletal:         General: Normal range of motion  Right lower leg: No edema  Skin:     General: Skin is warm  Capillary Refill: Capillary refill takes less than 2 seconds  Neurological:      General: No focal deficit present  Mental Status: He is alert and oriented to person, place, and time  Psychiatric:         Mood and Affect: Mood normal          Behavior: Behavior normal          Lab Results: I have personally reviewed pertinent lab results  Imaging: I have personally reviewed pertinent reports  EKG, Pathology, and Other Studies: I have personally reviewed pertinent reports

## 2022-05-19 NOTE — PROGRESS NOTES
The pantoprazole has been converted to Oral per [facility] IV-to-PO Auto-Conversion Protocol for Adults as approved by the Pharmacy and Therapeutics Committee  The patient met all eligible criteria:  3 Age = 23years old   2) Received at least one dose of the IV form   3) Receiving at least one other scheduled oral/enteral medication   4) Tolerating an oral/enteral diet   and did not have any exclusions:   1) Critical care patient   2) Active GI bleed (IF assessing H2RAs or PPIs)   3) Continuous tube feeding (IF assessing cipro, doxycycline, levofloxacin, minocycline, rifampin, or voriconazole)   4) Receiving PO vancomycin (IF assessing metronidazole)   5) Persistent nausea and/or vomiting   6) Ileus or gastrointestinal obstruction   7) Susan/nasogastric tube set for continuous suction   8) Specific order not to automatically convert to PO (in the order's comments or if discussed in the most recent Infectious Disease or primary team's progress notes)

## 2022-05-20 LAB
ALBUMIN SERPL BCP-MCNC: 2.4 G/DL (ref 3.5–5)
ALP SERPL-CCNC: 131 U/L (ref 46–116)
ALT SERPL W P-5'-P-CCNC: 38 U/L (ref 12–78)
ANION GAP SERPL CALCULATED.3IONS-SCNC: 6 MMOL/L (ref 4–13)
AST SERPL W P-5'-P-CCNC: 31 U/L (ref 5–45)
BASOPHILS # BLD AUTO: 0.02 THOUSANDS/ΜL (ref 0–0.1)
BASOPHILS NFR BLD AUTO: 0 % (ref 0–1)
BILIRUB SERPL-MCNC: 1.75 MG/DL (ref 0.2–1)
BUN SERPL-MCNC: 8 MG/DL (ref 5–25)
CALCIUM ALBUM COR SERPL-MCNC: 10 MG/DL (ref 8.3–10.1)
CALCIUM SERPL-MCNC: 8.7 MG/DL (ref 8.3–10.1)
CHLORIDE SERPL-SCNC: 99 MMOL/L (ref 100–108)
CO2 SERPL-SCNC: 26 MMOL/L (ref 21–32)
CREAT SERPL-MCNC: 0.61 MG/DL (ref 0.6–1.3)
EOSINOPHIL # BLD AUTO: 0.05 THOUSAND/ΜL (ref 0–0.61)
EOSINOPHIL NFR BLD AUTO: 0 % (ref 0–6)
ERYTHROCYTE [DISTWIDTH] IN BLOOD BY AUTOMATED COUNT: 12.9 % (ref 11.6–15.1)
GFR SERPL CREATININE-BSD FRML MDRD: 110 ML/MIN/1.73SQ M
GLUCOSE SERPL-MCNC: 128 MG/DL (ref 65–140)
HCT VFR BLD AUTO: 36.2 % (ref 36.5–49.3)
HGB BLD-MCNC: 11.6 G/DL (ref 12–17)
IMM GRANULOCYTES # BLD AUTO: 0.11 THOUSAND/UL (ref 0–0.2)
IMM GRANULOCYTES NFR BLD AUTO: 1 % (ref 0–2)
LYMPHOCYTES # BLD AUTO: 1.75 THOUSANDS/ΜL (ref 0.6–4.47)
LYMPHOCYTES NFR BLD AUTO: 13 % (ref 14–44)
MCH RBC QN AUTO: 30.4 PG (ref 26.8–34.3)
MCHC RBC AUTO-ENTMCNC: 32 G/DL (ref 31.4–37.4)
MCV RBC AUTO: 95 FL (ref 82–98)
MONOCYTES # BLD AUTO: 1.45 THOUSAND/ΜL (ref 0.17–1.22)
MONOCYTES NFR BLD AUTO: 11 % (ref 4–12)
NEUTROPHILS # BLD AUTO: 9.92 THOUSANDS/ΜL (ref 1.85–7.62)
NEUTS SEG NFR BLD AUTO: 75 % (ref 43–75)
NRBC BLD AUTO-RTO: 0 /100 WBCS
PLATELET # BLD AUTO: 392 THOUSANDS/UL (ref 149–390)
PMV BLD AUTO: 10.4 FL (ref 8.9–12.7)
POTASSIUM SERPL-SCNC: 3.9 MMOL/L (ref 3.5–5.3)
PROT SERPL-MCNC: 7.2 G/DL (ref 6.4–8.2)
RBC # BLD AUTO: 3.82 MILLION/UL (ref 3.88–5.62)
SODIUM SERPL-SCNC: 131 MMOL/L (ref 136–145)
WBC # BLD AUTO: 13.3 THOUSAND/UL (ref 4.31–10.16)

## 2022-05-20 PROCEDURE — 99024 POSTOP FOLLOW-UP VISIT: CPT | Performed by: SURGERY

## 2022-05-20 PROCEDURE — 85025 COMPLETE CBC W/AUTO DIFF WBC: CPT | Performed by: PHYSICIAN ASSISTANT

## 2022-05-20 PROCEDURE — 80053 COMPREHEN METABOLIC PANEL: CPT | Performed by: PHYSICIAN ASSISTANT

## 2022-05-20 RX ADMIN — GABAPENTIN 100 MG: 100 CAPSULE ORAL at 22:34

## 2022-05-20 RX ADMIN — METHOCARBAMOL 500 MG: 500 TABLET, FILM COATED ORAL at 13:23

## 2022-05-20 RX ADMIN — METHOCARBAMOL 500 MG: 500 TABLET, FILM COATED ORAL at 23:37

## 2022-05-20 RX ADMIN — HEPARIN SODIUM 5000 UNITS: 5000 INJECTION INTRAVENOUS; SUBCUTANEOUS at 22:34

## 2022-05-20 RX ADMIN — PANTOPRAZOLE SODIUM 40 MG: 40 TABLET, DELAYED RELEASE ORAL at 06:26

## 2022-05-20 RX ADMIN — ATORVASTATIN CALCIUM 20 MG: 20 TABLET, FILM COATED ORAL at 17:19

## 2022-05-20 RX ADMIN — GABAPENTIN 100 MG: 100 CAPSULE ORAL at 09:44

## 2022-05-20 RX ADMIN — METHOCARBAMOL 500 MG: 500 TABLET, FILM COATED ORAL at 17:18

## 2022-05-20 RX ADMIN — METHOCARBAMOL 500 MG: 500 TABLET, FILM COATED ORAL at 06:26

## 2022-05-20 RX ADMIN — GABAPENTIN 100 MG: 100 CAPSULE ORAL at 17:19

## 2022-05-20 RX ADMIN — OXYCODONE HYDROCHLORIDE 10 MG: 10 TABLET ORAL at 17:23

## 2022-05-20 RX ADMIN — METHOCARBAMOL 500 MG: 500 TABLET, FILM COATED ORAL at 00:10

## 2022-05-20 RX ADMIN — LIDOCAINE 5% 2 PATCH: 700 PATCH TOPICAL at 09:44

## 2022-05-20 RX ADMIN — HEPARIN SODIUM 5000 UNITS: 5000 INJECTION INTRAVENOUS; SUBCUTANEOUS at 06:26

## 2022-05-20 RX ADMIN — OXYCODONE HYDROCHLORIDE 10 MG: 10 TABLET ORAL at 04:08

## 2022-05-20 RX ADMIN — HEPARIN SODIUM 5000 UNITS: 5000 INJECTION INTRAVENOUS; SUBCUTANEOUS at 13:23

## 2022-05-20 NOTE — PLAN OF CARE
Problem: PAIN - ADULT  Goal: Verbalizes/displays adequate comfort level or baseline comfort level  Description: Interventions:  - Encourage patient to monitor pain and request assistance  - Assess pain using appropriate pain scale  - Administer analgesics based on type and severity of pain and evaluate response  - Implement non-pharmacological measures as appropriate and evaluate response  - Consider cultural and social influences on pain and pain management  - Notify physician/advanced practitioner if interventions unsuccessful or patient reports new pain  Outcome: Progressing     Problem: INFECTION - ADULT  Goal: Absence or prevention of progression during hospitalization  Description: INTERVENTIONS:  - Assess and monitor for signs and symptoms of infection  - Monitor lab/diagnostic results  - Monitor all insertion sites, i e  indwelling lines, tubes, and drains  - Monitor endotracheal if appropriate and nasal secretions for changes in amount and color  - [place] appropriate cooling/warming therapies per order  - Administer medications as ordered  - Instruct and encourage patient and family to use good hand hygiene technique  - Identify and instruct in appropriate isolation precautions for identified infection/condition  Outcome: Progressing  Goal: Absence of fever/infection during neutropenic period  Description: INTERVENTIONS:  - Monitor WBC    Outcome: Progressing     Problem: SAFETY ADULT  Goal: Patient will remain free of falls  Description: INTERVENTIONS:  - Educate patient/family on patient safety including physical limitations  - Instruct patient to call for assistance with activity   - Consult OT/PT to assist with strengthening/mobility   - Keep Call bell within reach  - Keep bed low and locked with side rails adjusted as appropriate  - Keep care items and personal belongings within reach  - Initiate and maintain comfort rounds  - Make Fall Risk Sign visible to staff  - Offer Toileting every 2 Hours, in advance of need  - Initiate/Maintain alarm  - Obtain necessary fall risk management equipment  - Apply yellow socks and bracelet for high fall risk patients  - Consider moving patient to room near nurses station  Outcome: Progressing  Goal: Maintain or return to baseline ADL function  Description: INTERVENTIONS:  -  Assess patient's ability to carry out ADLs; assess patient's baseline for ADL function and identify physical deficits which impact ability to perform ADLs (bathing, care of mouth/teeth, toileting, grooming, dressing, etc )  - Assess/evaluate cause of self-care deficits   - Assess range of motion  - Assess patient's mobility; develop plan if impaired  - Assess patient's need for assistive devices and provide as appropriate  - Encourage maximum independence but intervene and supervise when necessary  - Involve family in performance of ADLs  - Assess for home care needs following discharge   - Consider OT consult to assist with ADL evaluation and planning for discharge  - Provide patient education as appropriate  Outcome: Progressing  Goal: Maintains/Returns to pre admission functional level  Description: INTERVENTIONS:  - Perform BMAT or MOVE assessment daily    - Set and communicate daily mobility goal to care team and patient/family/caregiver  - Collaborate with rehabilitation services on mobility goals if consulted  - Reposition patient every 2 hours    - Stand patient 3 times a day  - Ambulate patient 3 times a day  - Out of bed to chair 3 times a day   - Out of bed for meals 3 times a day  - Out of bed for toileting  - Record patient progress and toleration of activity level   Outcome: Progressing     Problem: DISCHARGE PLANNING  Goal: Discharge to home or other facility with appropriate resources  Description: INTERVENTIONS:  - Identify barriers to discharge w/patient and caregiver  - Arrange for needed discharge resources and transportation as appropriate  - Identify discharge learning needs (meds, wound care, etc )  - Arrange for interpretive services to assist at discharge as needed  - Refer to Case Management Department for coordinating discharge planning if the patient needs post-hospital services based on physician/advanced practitioner order or complex needs related to functional status, cognitive ability, or social support system  Outcome: Progressing     Problem: Knowledge Deficit  Goal: Patient/family/caregiver demonstrates understanding of disease process, treatment plan, medications, and discharge instructions  Description: Complete learning assessment and assess knowledge base  Interventions:  - Provide teaching at level of understanding  - Provide teaching via preferred learning methods  Outcome: Progressing     Problem: Nutrition/Hydration-ADULT  Goal: Nutrient/Hydration intake appropriate for improving, restoring or maintaining nutritional needs  Description: Monitor and assess patient's nutrition/hydration status for malnutrition  Collaborate with interdisciplinary team and initiate plan and interventions as ordered  Monitor patient's weight and dietary intake as ordered or per policy  Utilize nutrition screening tool and intervene as necessary  Determine patient's food preferences and provide high-protein, high-caloric foods as appropriate       INTERVENTIONS:  - Monitor oral intake, urinary output, labs, and treatment plans  - Assess nutrition and hydration status and recommend course of action  - Evaluate amount of meals eaten  - Assist patient with eating if necessary   - Allow adequate time for meals  - Recommend/ encourage appropriate diets, oral nutritional supplements, and vitamin/mineral supplements  - Order, calculate, and assess calorie counts as needed  - Recommend, monitor, and adjust tube feedings and TPN/PPN based on assessed needs  - Assess need for intravenous fluids  - Provide specific nutrition/hydration education as appropriate  - Include patient/family/caregiver in decisions related to nutrition  Outcome: Progressing     Problem: Potential for Falls  Goal: Patient will remain free of falls  Description: INTERVENTIONS:  - Educate patient/family on patient safety including physical limitations  - Instruct patient to call for assistance with activity   - Consult OT/PT to assist with strengthening/mobility   - Keep Call bell within reach  - Keep bed low and locked with side rails adjusted as appropriate  - Keep care items and personal belongings within reach  - Initiate and maintain comfort rounds  - Make Fall Risk Sign visible to staff  - Offer Toileting every 2 Hours, in advance of need  - Initiate/Maintain alarm  - Obtain necessary fall risk management equipment  - Apply yellow socks and bracelet for high fall risk patients  - Consider moving patient to room near nurses station  Outcome: Progressing

## 2022-05-20 NOTE — PROGRESS NOTES
Progress Note- General Surgery   Joyce Douglas 60 y o  male MRN: [id number]  Unit/Bed#: [facility] 815-01 Encounter: [id number]    Assessment/Plan     Assessment:  23-year-old male presents with concern for cholangitis with ERCP on 5/16 now s/p laparoscopic cholecystectomy 5/19  AVSS  CTAP on 5/15 showed: dilated CBD with faint hyperdensity at the level of ampulla possible choledocholithiasis  Noted to have cholelithiasis without pericholecystic inflammation  Bilateral perinephric fat stranding  Mild bladder wall thickening  RUQ US on 5/15 showed: intra and extrahepatic biliary ductal dilation up to 1 6 cm  Echogenic filling defect noted near ampulla  Positive Gutierrez sign  UA on 5/16 showed: no UTI, moderate bilirubin and urobilinogen of 8  ERCP on 5/16 showed: Imaging guidance for ERCP initially demonstrating distal common duct stone with subsequent removal    Lap Gina on 5/18 showed: Severe acute on chronic inflammation, redundant gallbladder      Plan:  -GI/Low fat diet  -trend temp curve/WBC/LFTs  -pain control PRN  -drain maintenance       Subjective:  Overall pt states he is doing better  Pt has some RUQ pain  Pt denies nausea and vomiting  Pt is tolerating diet  Pt denies fever and chills  Pt denies having BM and flatus       Objective:  Current Vitals:   Blood Pressure: (!) 156/106 (05/19/22 2300)  Pulse: 95 (05/19/22 1536)  Temperature: (!) 97 2 °F (36 2 °C) (05/19/22 2300)  Temp Source: Oral (05/19/22 2300)  Respirations: 18 (05/19/22 2300)  Height: 5' 10" (177 8 cm) (05/16/22 0027)  Weight - Scale: 104 kg (229 lb) (05/16/22 0027)  SpO2: 95 % (05/19/22 1536)      Intake/Output Summary (Last 24 hours) at 5/20/2022 0603  Last data filed at 5/19/2022 2158  Gross per 24 hour   Intake 416 25 ml   Output 355 ml   Net 61 25 ml       Invasive Devices  Report    Peripheral Intravenous Line  Duration           Peripheral IV 05/18/22 Proximal;Right;Ventral (anterior) Forearm 1 day          Drain  Duration Closed/Suction Drain Right RUQ Bulb 19 Fr  1 day                Physical Exam  Constitutional:       General: He is not in acute distress  Appearance: Normal appearance  He is not ill-appearing or toxic-appearing  HENT:      Head: Normocephalic and atraumatic  Cardiovascular:      Rate and Rhythm: Normal rate and regular rhythm  Pulses: Normal pulses  Pulmonary:      Effort: Pulmonary effort is normal  No respiratory distress  Abdominal:      Comments: Soft, obese, nontender, negative Gutierrez sign, no guarding rebound or rigidity   Musculoskeletal:         General: Normal range of motion  Right lower leg: No edema  Skin:     General: Skin is warm  Capillary Refill: Capillary refill takes less than 2 seconds  Neurological:      General: No focal deficit present  Mental Status: He is alert and oriented to person, place, and time  Psychiatric:         Mood and Affect: Mood normal          Behavior: Behavior normal          Lab Results: I have personally reviewed pertinent lab results  Imaging: I have personally reviewed pertinent reports  EKG, Pathology, and Other Studies: I have personally reviewed pertinent reports

## 2022-05-20 NOTE — QUICK NOTE
Nurse-Patient-Provider rounds were completed with the patient's nurse today, Mitesh Waddell  We discussed the plan is to continue observation for another 24 hours  Repeat labs in a [facility] Continue monitor drain output  Anticipate discharge tomorrow patient exam continues to improve  We reviewed all of the invasive devices/lines/telemetry orders   - None  DVT Prophylaxis:  - SCDs and SQH    Pain Assessment / Plan:  - Continue current analgesic regimen  Mobility Assessment / Plan:  - Activity as tolerated  Goals / Barriers for discharge:  - D/C planning  - Follow-up Drain output  - Prn pain support  Case management following; case and discharge needs discussed  All questions and concerns were addressed  I spent greater than 7 minutes reviewing the plan with the patient and the nurse, and coordinating care for the day      Matt Puentes PA-C  5/20/2022

## 2022-05-20 NOTE — PLAN OF CARE
Problem: PAIN - ADULT  Goal: Verbalizes/displays adequate comfort level or baseline comfort level  Description: Interventions:  - Encourage patient to monitor pain and request assistance  - Assess pain using appropriate pain scale  - Administer analgesics based on type and severity of pain and evaluate response  - Implement non-pharmacological measures as appropriate and evaluate response  - Consider cultural and social influences on pain and pain management  - Notify physician/advanced practitioner if interventions unsuccessful or patient reports new pain  Outcome: Progressing     Problem: INFECTION - ADULT  Goal: Absence or prevention of progression during hospitalization  Description: INTERVENTIONS:  - Assess and monitor for signs and symptoms of infection  - Monitor lab/diagnostic results  - Monitor all insertion sites, i e  indwelling lines, tubes, and drains  - Monitor endotracheal if appropriate and nasal secretions for changes in amount and color  - [place] appropriate cooling/warming therapies per order  - Administer medications as ordered  - Instruct and encourage patient and family to use good hand hygiene technique  - Identify and instruct in appropriate isolation precautions for identified infection/condition  Outcome: Progressing  Goal: Absence of fever/infection during neutropenic period  Description: INTERVENTIONS:  - Monitor WBC    Outcome: Progressing     Problem: SAFETY ADULT  Goal: Patient will remain free of falls  Description: INTERVENTIONS:  - Educate patient/family on patient safety including physical limitations  - Instruct patient to call for assistance with activity   - Consult OT/PT to assist with strengthening/mobility   - Keep Call bell within reach  - Keep bed low and locked with side rails adjusted as appropriate  - Keep care items and personal belongings within reach  - Initiate and maintain comfort rounds  - Make Fall Risk Sign visible to staff  - Offer Toileting every 2 Hours, in advance of need  - Initiate/Maintain alarm  - Obtain necessary fall risk management equipment  - Apply yellow socks and bracelet for high fall risk patients  - Consider moving patient to room near nurses station  Outcome: Progressing  Goal: Maintain or return to baseline ADL function  Description: INTERVENTIONS:  -  Assess patient's ability to carry out ADLs; assess patient's baseline for ADL function and identify physical deficits which impact ability to perform ADLs (bathing, care of mouth/teeth, toileting, grooming, dressing, etc )  - Assess/evaluate cause of self-care deficits   - Assess range of motion  - Assess patient's mobility; develop plan if impaired  - Assess patient's need for assistive devices and provide as appropriate  - Encourage maximum independence but intervene and supervise when necessary  - Involve family in performance of ADLs  - Assess for home care needs following discharge   - Consider OT consult to assist with ADL evaluation and planning for discharge  - Provide patient education as appropriate  Outcome: Progressing  Goal: Maintains/Returns to pre admission functional level  Description: INTERVENTIONS:  - Perform BMAT or MOVE assessment daily    - Set and communicate daily mobility goal to care team and patient/family/caregiver  - Collaborate with rehabilitation services on mobility goals if consulted  - Reposition patient every 2 hours    - Stand patient 3 times a day  - Ambulate patient 3 times a day  - Out of bed to chair 3 times a day   - Out of bed for meals 3 times a day  - Out of bed for toileting  - Record patient progress and toleration of activity level   Outcome: Progressing     Problem: DISCHARGE PLANNING  Goal: Discharge to home or other facility with appropriate resources  Description: INTERVENTIONS:  - Identify barriers to discharge w/patient and caregiver  - Arrange for needed discharge resources and transportation as appropriate  - Identify discharge learning needs (meds, wound care, etc )  - Arrange for interpretive services to assist at discharge as needed  - Refer to Case Management Department for coordinating discharge planning if the patient needs post-hospital services based on physician/advanced practitioner order or complex needs related to functional status, cognitive ability, or social support system  Outcome: Progressing     Problem: Knowledge Deficit  Goal: Patient/family/caregiver demonstrates understanding of disease process, treatment plan, medications, and discharge instructions  Description: Complete learning assessment and assess knowledge base  Interventions:  - Provide teaching at level of understanding  - Provide teaching via preferred learning methods  Outcome: Progressing     Problem: Nutrition/Hydration-ADULT  Goal: Nutrient/Hydration intake appropriate for improving, restoring or maintaining nutritional needs  Description: Monitor and assess patient's nutrition/hydration status for malnutrition  Collaborate with interdisciplinary team and initiate plan and interventions as ordered  Monitor patient's weight and dietary intake as ordered or per policy  Utilize nutrition screening tool and intervene as necessary  Determine patient's food preferences and provide high-protein, high-caloric foods as appropriate       INTERVENTIONS:  - Monitor oral intake, urinary output, labs, and treatment plans  - Assess nutrition and hydration status and recommend course of action  - Evaluate amount of meals eaten  - Assist patient with eating if necessary   - Allow adequate time for meals  - Recommend/ encourage appropriate diets, oral nutritional supplements, and vitamin/mineral supplements  - Order, calculate, and assess calorie counts as needed  - Recommend, monitor, and adjust tube feedings and TPN/PPN based on assessed needs  - Assess need for intravenous fluids  - Provide specific nutrition/hydration education as appropriate  - Include patient/family/caregiver in decisions related to nutrition  Outcome: Progressing     Problem: Potential for Falls  Goal: Patient will remain free of falls  Description: INTERVENTIONS:  - Educate patient/family on patient safety including physical limitations  - Instruct patient to call for assistance with activity   - Consult OT/PT to assist with strengthening/mobility   - Keep Call bell within reach  - Keep bed low and locked with side rails adjusted as appropriate  - Keep care items and personal belongings within reach  - Initiate and maintain comfort rounds  - Make Fall Risk Sign visible to staff  - Offer Toileting every 2 Hours, in advance of need  - Initiate/Maintain alarm  - Obtain necessary fall risk management equipment  - Apply yellow socks and bracelet for high fall risk patients  - Consider moving patient to room near nurses station  Outcome: Progressing

## 2022-05-21 LAB
ALBUMIN SERPL BCP-MCNC: 2.5 G/DL (ref 3.5–5)
ALP SERPL-CCNC: 118 U/L (ref 46–116)
ALT SERPL W P-5'-P-CCNC: 35 U/L (ref 12–78)
ANION GAP SERPL CALCULATED.3IONS-SCNC: 3 MMOL/L (ref 4–13)
AST SERPL W P-5'-P-CCNC: 28 U/L (ref 5–45)
BACTERIA BLD CULT: NORMAL
BACTERIA BLD CULT: NORMAL
BASOPHILS # BLD AUTO: 0.04 THOUSANDS/ΜL (ref 0–0.1)
BASOPHILS NFR BLD AUTO: 0 % (ref 0–1)
BILIRUB DIRECT SERPL-MCNC: 1.22 MG/DL (ref 0–0.2)
BILIRUB SERPL-MCNC: 1.91 MG/DL (ref 0.2–1)
BUN SERPL-MCNC: 7 MG/DL (ref 5–25)
CALCIUM ALBUM COR SERPL-MCNC: 10.4 MG/DL (ref 8.3–10.1)
CALCIUM SERPL-MCNC: 9.2 MG/DL (ref 8.3–10.1)
CHLORIDE SERPL-SCNC: 97 MMOL/L (ref 100–108)
CO2 SERPL-SCNC: 29 MMOL/L (ref 21–32)
CREAT SERPL-MCNC: 0.58 MG/DL (ref 0.6–1.3)
EOSINOPHIL # BLD AUTO: 0.07 THOUSAND/ΜL (ref 0–0.61)
EOSINOPHIL NFR BLD AUTO: 1 % (ref 0–6)
ERYTHROCYTE [DISTWIDTH] IN BLOOD BY AUTOMATED COUNT: 12.8 % (ref 11.6–15.1)
GFR SERPL CREATININE-BSD FRML MDRD: 112 ML/MIN/1.73SQ M
GLUCOSE SERPL-MCNC: 174 MG/DL (ref 65–140)
HCT VFR BLD AUTO: 37.3 % (ref 36.5–49.3)
HGB BLD-MCNC: 12.1 G/DL (ref 12–17)
IMM GRANULOCYTES # BLD AUTO: 0.19 THOUSAND/UL (ref 0–0.2)
IMM GRANULOCYTES NFR BLD AUTO: 1 % (ref 0–2)
LYMPHOCYTES # BLD AUTO: 1.84 THOUSANDS/ΜL (ref 0.6–4.47)
LYMPHOCYTES NFR BLD AUTO: 12 % (ref 14–44)
MCH RBC QN AUTO: 30.5 PG (ref 26.8–34.3)
MCHC RBC AUTO-ENTMCNC: 32.4 G/DL (ref 31.4–37.4)
MCV RBC AUTO: 94 FL (ref 82–98)
MONOCYTES # BLD AUTO: 1.65 THOUSAND/ΜL (ref 0.17–1.22)
MONOCYTES NFR BLD AUTO: 11 % (ref 4–12)
NEUTROPHILS # BLD AUTO: 11.72 THOUSANDS/ΜL (ref 1.85–7.62)
NEUTS SEG NFR BLD AUTO: 75 % (ref 43–75)
NRBC BLD AUTO-RTO: 0 /100 WBCS
PLATELET # BLD AUTO: 422 THOUSANDS/UL (ref 149–390)
PMV BLD AUTO: 10.4 FL (ref 8.9–12.7)
POTASSIUM SERPL-SCNC: 3.6 MMOL/L (ref 3.5–5.3)
PROT SERPL-MCNC: 7.4 G/DL (ref 6.4–8.2)
RBC # BLD AUTO: 3.97 MILLION/UL (ref 3.88–5.62)
SODIUM SERPL-SCNC: 129 MMOL/L (ref 136–145)
WBC # BLD AUTO: 15.51 THOUSAND/UL (ref 4.31–10.16)

## 2022-05-21 PROCEDURE — 82248 BILIRUBIN DIRECT: CPT | Performed by: SURGERY

## 2022-05-21 PROCEDURE — 80053 COMPREHEN METABOLIC PANEL: CPT | Performed by: STUDENT IN AN ORGANIZED HEALTH CARE EDUCATION/TRAINING PROGRAM

## 2022-05-21 PROCEDURE — 99024 POSTOP FOLLOW-UP VISIT: CPT | Performed by: SURGERY

## 2022-05-21 PROCEDURE — 85025 COMPLETE CBC W/AUTO DIFF WBC: CPT | Performed by: STUDENT IN AN ORGANIZED HEALTH CARE EDUCATION/TRAINING PROGRAM

## 2022-05-21 RX ORDER — POTASSIUM CHLORIDE 20 MEQ/1
40 TABLET, EXTENDED RELEASE ORAL ONCE
Status: COMPLETED | OUTPATIENT
Start: 2022-05-21 | End: 2022-05-21

## 2022-05-21 RX ADMIN — HEPARIN SODIUM 5000 UNITS: 5000 INJECTION INTRAVENOUS; SUBCUTANEOUS at 22:12

## 2022-05-21 RX ADMIN — METHOCARBAMOL 500 MG: 500 TABLET, FILM COATED ORAL at 05:42

## 2022-05-21 RX ADMIN — METHOCARBAMOL 500 MG: 500 TABLET, FILM COATED ORAL at 11:23

## 2022-05-21 RX ADMIN — HEPARIN SODIUM 5000 UNITS: 5000 INJECTION INTRAVENOUS; SUBCUTANEOUS at 05:42

## 2022-05-21 RX ADMIN — GABAPENTIN 100 MG: 100 CAPSULE ORAL at 08:29

## 2022-05-21 RX ADMIN — ACETAMINOPHEN 650 MG: 325 TABLET, FILM COATED ORAL at 22:12

## 2022-05-21 RX ADMIN — LIDOCAINE 5% 2 PATCH: 700 PATCH TOPICAL at 08:29

## 2022-05-21 RX ADMIN — OXYCODONE HYDROCHLORIDE 5 MG: 5 TABLET ORAL at 00:59

## 2022-05-21 RX ADMIN — PANTOPRAZOLE SODIUM 40 MG: 40 TABLET, DELAYED RELEASE ORAL at 05:42

## 2022-05-21 RX ADMIN — METHOCARBAMOL 500 MG: 500 TABLET, FILM COATED ORAL at 18:06

## 2022-05-21 RX ADMIN — HEPARIN SODIUM 5000 UNITS: 5000 INJECTION INTRAVENOUS; SUBCUTANEOUS at 15:50

## 2022-05-21 RX ADMIN — GABAPENTIN 100 MG: 100 CAPSULE ORAL at 15:51

## 2022-05-21 RX ADMIN — OXYCODONE HYDROCHLORIDE 5 MG: 5 TABLET ORAL at 15:51

## 2022-05-21 RX ADMIN — POTASSIUM CHLORIDE 40 MEQ: 1500 TABLET, EXTENDED RELEASE ORAL at 11:23

## 2022-05-21 RX ADMIN — ATORVASTATIN CALCIUM 20 MG: 20 TABLET, FILM COATED ORAL at 18:07

## 2022-05-21 RX ADMIN — GABAPENTIN 100 MG: 100 CAPSULE ORAL at 22:12

## 2022-05-21 NOTE — PROGRESS NOTES
Progress Note- General Surgery   Jaymie Dickens 60 y o  male MRN: [id number]  Unit/Bed#: [facility] 815-01 Encounter: [id number]    Assessment/Plan     Assessment:  57-year-old male presents with concern for cholangitis with ERCP on 5/16 now s/p laparoscopic cholecystectomy 5/19  I/O not recorded  DIANA with serosang output on exam   SBP, tachy to 111    Plan:  -GI/Low fat diet  -trend temp curve/WBC/LFTs   -f/u am labs  -pain control PRN  -drain maintenance  -Strict I/Os  -dvt ppx  -PRN HTN meds     Subjective:  Endorses RUQ pain surrounding drain site  Serosang output in tubing and bulb  Tolerating diet  No nausea or emesis  No fevers or chills  Objective:  Current Vitals:   Blood Pressure: 165/93 (05/21/22 0732)  Pulse: (!) 111 (05/20/22 2153)  Temperature: 98 2 °F (36 8 °C) (05/21/22 0732)  Temp Source: Oral (05/19/22 2300)  Respirations: 17 (05/21/22 0732)  Height: 5' 10" (177 8 cm) (05/16/22 0027)  Weight - Scale: 104 kg (229 lb) (05/16/22 0027)  SpO2: 92 % (05/20/22 2153)      Intake/Output Summary (Last 24 hours) at 5/21/2022 0826  Last data filed at 5/21/2022 0801  Gross per 24 hour   Intake 240 ml   Output 25 ml   Net 215 ml     Physical Exam:  General - no acute distress, responsive and cooperative  CV - warm, regular rate  Pulm - normal work of breathing, no respiratory distress  Abd - soft, nondistended, tender RUQ surround drain, DIANA to bulb sxn with serosang output, incisions c/d/i  Neuro - m/s grossly intact, cn grossly intact  Ext - moving all extremities      Invasive Devices  Report    Peripheral Intravenous Line  Duration           Peripheral IV 05/18/22 Proximal;Right;Ventral (anterior) Forearm 2 days          Drain  Duration           Closed/Suction Drain Right RUQ Bulb 19 Fr  2 days                Lab Results: I have personally reviewed pertinent lab results  Imaging: I have personally reviewed pertinent reports      EKG, Pathology, and Other Studies: I have personally reviewed pertinent reports

## 2022-05-21 NOTE — PLAN OF CARE
Problem: INFECTION - ADULT  Goal: Absence or prevention of progression during hospitalization  Description: INTERVENTIONS:  - Assess and monitor for signs and symptoms of infection  - Monitor lab/diagnostic results  - Monitor all insertion sites, i e  indwelling lines, tubes, and drains  - Monitor endotracheal if appropriate and nasal secretions for changes in amount and color  - [place] appropriate cooling/warming therapies per order  - Administer medications as ordered  - Instruct and encourage patient and family to use good hand hygiene technique  - Identify and instruct in appropriate isolation precautions for identified infection/condition  Outcome: Progressing  Goal: Absence of fever/infection during neutropenic period  Description: INTERVENTIONS:  - Monitor WBC    Outcome: Progressing     Problem: SAFETY ADULT  Goal: Patient will remain free of falls  Description: INTERVENTIONS:  - Educate patient/family on patient safety including physical limitations  - Instruct patient to call for assistance with activity   - Consult OT/PT to assist with strengthening/mobility   - Keep Call bell within reach  - Keep bed low and locked with side rails adjusted as appropriate  - Keep care items and personal belongings within reach  - Initiate and maintain comfort rounds  - Make Fall Risk Sign visible to staff  - Offer Toileting every 2 Hours, in advance of need  - Initiate/Maintain alarm  - Obtain necessary fall risk management equipment  - Apply yellow socks and bracelet for high fall risk patients  - Consider moving patient to room near nurses station  Outcome: Progressing  Goal: Maintain or return to baseline ADL function  Description: INTERVENTIONS:  -  Assess patient's ability to carry out ADLs; assess patient's baseline for ADL function and identify physical deficits which impact ability to perform ADLs (bathing, care of mouth/teeth, toileting, grooming, dressing, etc )  - Assess/evaluate cause of self-care deficits   - Assess range of motion  - Assess patient's mobility; develop plan if impaired  - Assess patient's need for assistive devices and provide as appropriate  - Encourage maximum independence but intervene and supervise when necessary  - Involve family in performance of ADLs  - Assess for home care needs following discharge   - Consider OT consult to assist with ADL evaluation and planning for discharge  - Provide patient education as appropriate  Outcome: Progressing  Goal: Maintains/Returns to pre admission functional level  Description: INTERVENTIONS:  - Perform BMAT or MOVE assessment daily    - Set and communicate daily mobility goal to care team and patient/family/caregiver  - Collaborate with rehabilitation services on mobility goals if consulted  - Reposition patient every 2 hours  - Stand patient 3 times a day  - Ambulate patient 3 times a day  - Out of bed to chair 3 times a day   - Out of bed for meals 3 times a day  - Out of bed for toileting  - Record patient progress and toleration of activity level   Outcome: Progressing     Problem: Knowledge Deficit  Goal: Patient/family/caregiver demonstrates understanding of disease process, treatment plan, medications, and discharge instructions  Description: Complete learning assessment and assess knowledge base    Interventions:  - Provide teaching at level of understanding  - Provide teaching via preferred learning methods  Outcome: Progressing

## 2022-05-21 NOTE — PLAN OF CARE
Problem: PAIN - ADULT  Goal: Verbalizes/displays adequate comfort level or baseline comfort level  Description: Interventions:  - Encourage patient to monitor pain and request assistance  - Assess pain using appropriate pain scale  - Administer analgesics based on type and severity of pain and evaluate response  - Implement non-pharmacological measures as appropriate and evaluate response  - Consider cultural and social influences on pain and pain management  - Notify physician/advanced practitioner if interventions unsuccessful or patient reports new pain  Outcome: Progressing     Problem: INFECTION - ADULT  Goal: Absence or prevention of progression during hospitalization  Description: INTERVENTIONS:  - Assess and monitor for signs and symptoms of infection  - Monitor lab/diagnostic results  - Monitor all insertion sites, i e  indwelling lines, tubes, and drains  - Monitor endotracheal if appropriate and nasal secretions for changes in amount and color  - [place] appropriate cooling/warming therapies per order  - Administer medications as ordered  - Instruct and encourage patient and family to use good hand hygiene technique  - Identify and instruct in appropriate isolation precautions for identified infection/condition  Outcome: Progressing  Goal: Absence of fever/infection during neutropenic period  Description: INTERVENTIONS:  - Monitor WBC    Outcome: Progressing     Problem: SAFETY ADULT  Goal: Patient will remain free of falls  Description: INTERVENTIONS:  - Educate patient/family on patient safety including physical limitations  - Instruct patient to call for assistance with activity   - Consult OT/PT to assist with strengthening/mobility   - Keep Call bell within reach  - Keep bed low and locked with side rails adjusted as appropriate  - Keep care items and personal belongings within reach  - Initiate and maintain comfort rounds  - Make Fall Risk Sign visible to staff  - Offer Toileting every 2 Hours, in advance of need  - Initiate/Maintain alarm  - Obtain necessary fall risk management equipment  - Apply yellow socks and bracelet for high fall risk patients  - Consider moving patient to room near nurses station  Outcome: Progressing  Goal: Maintain or return to baseline ADL function  Description: INTERVENTIONS:  -  Assess patient's ability to carry out ADLs; assess patient's baseline for ADL function and identify physical deficits which impact ability to perform ADLs (bathing, care of mouth/teeth, toileting, grooming, dressing, etc )  - Assess/evaluate cause of self-care deficits   - Assess range of motion  - Assess patient's mobility; develop plan if impaired  - Assess patient's need for assistive devices and provide as appropriate  - Encourage maximum independence but intervene and supervise when necessary  - Involve family in performance of ADLs  - Assess for home care needs following discharge   - Consider OT consult to assist with ADL evaluation and planning for discharge  - Provide patient education as appropriate  Outcome: Progressing  Goal: Maintains/Returns to pre admission functional level  Description: INTERVENTIONS:  - Perform BMAT or MOVE assessment daily    - Set and communicate daily mobility goal to care team and patient/family/caregiver  - Collaborate with rehabilitation services on mobility goals if consulted  - Reposition patient every 2 hours    - Stand patient 3 times a day  - Ambulate patient 3 times a day  - Out of bed to chair 3 times a day   - Out of bed for meals 3 times a day  - Out of bed for toileting  - Record patient progress and toleration of activity level   Outcome: Progressing     Problem: DISCHARGE PLANNING  Goal: Discharge to home or other facility with appropriate resources  Description: INTERVENTIONS:  - Identify barriers to discharge w/patient and caregiver  - Arrange for needed discharge resources and transportation as appropriate  - Identify discharge learning needs (meds, wound care, etc )  - Arrange for interpretive services to assist at discharge as needed  - Refer to Case Management Department for coordinating discharge planning if the patient needs post-hospital services based on physician/advanced practitioner order or complex needs related to functional status, cognitive ability, or social support system  Outcome: Progressing     Problem: Knowledge Deficit  Goal: Patient/family/caregiver demonstrates understanding of disease process, treatment plan, medications, and discharge instructions  Description: Complete learning assessment and assess knowledge base  Interventions:  - Provide teaching at level of understanding  - Provide teaching via preferred learning methods  Outcome: Progressing     Problem: Nutrition/Hydration-ADULT  Goal: Nutrient/Hydration intake appropriate for improving, restoring or maintaining nutritional needs  Description: Monitor and assess patient's nutrition/hydration status for malnutrition  Collaborate with interdisciplinary team and initiate plan and interventions as ordered  Monitor patient's weight and dietary intake as ordered or per policy  Utilize nutrition screening tool and intervene as necessary  Determine patient's food preferences and provide high-protein, high-caloric foods as appropriate       INTERVENTIONS:  - Monitor oral intake, urinary output, labs, and treatment plans  - Assess nutrition and hydration status and recommend course of action  - Evaluate amount of meals eaten  - Assist patient with eating if necessary   - Allow adequate time for meals  - Recommend/ encourage appropriate diets, oral nutritional supplements, and vitamin/mineral supplements  - Order, calculate, and assess calorie counts as needed  - Recommend, monitor, and adjust tube feedings and TPN/PPN based on assessed needs  - Assess need for intravenous fluids  - Provide specific nutrition/hydration education as appropriate  - Include patient/family/caregiver in decisions related to nutrition  Outcome: Progressing     Problem: Potential for Falls  Goal: Patient will remain free of falls  Description: INTERVENTIONS:  - Educate patient/family on patient safety including physical limitations  - Instruct patient to call for assistance with activity   - Consult OT/PT to assist with strengthening/mobility   - Keep Call bell within reach  - Keep bed low and locked with side rails adjusted as appropriate  - Keep care items and personal belongings within reach  - Initiate and maintain comfort rounds  - Make Fall Risk Sign visible to staff  - Offer Toileting every 2 Hours, in advance of need  - Initiate/Maintain alarm  - Obtain necessary fall risk management equipment  - Apply yellow socks and bracelet for high fall risk patients  - Consider moving patient to room near nurses station  Outcome: Progressing

## 2022-05-22 VITALS
OXYGEN SATURATION: 95 % | BODY MASS INDEX: 32.78 KG/M2 | WEIGHT: 229 LBS | TEMPERATURE: 98.3 F | HEIGHT: 70 IN | DIASTOLIC BLOOD PRESSURE: 96 MMHG | RESPIRATION RATE: 17 BRPM | SYSTOLIC BLOOD PRESSURE: 173 MMHG | HEART RATE: 94 BPM

## 2022-05-22 LAB
ALBUMIN SERPL BCP-MCNC: 2.4 G/DL (ref 3.5–5)
ALP SERPL-CCNC: 105 U/L (ref 46–116)
ALT SERPL W P-5'-P-CCNC: 31 U/L (ref 12–78)
ANION GAP SERPL CALCULATED.3IONS-SCNC: 3 MMOL/L (ref 4–13)
AST SERPL W P-5'-P-CCNC: 29 U/L (ref 5–45)
BASOPHILS # BLD AUTO: 0.04 THOUSANDS/ΜL (ref 0–0.1)
BASOPHILS NFR BLD AUTO: 0 % (ref 0–1)
BILIRUB DIRECT SERPL-MCNC: 1.05 MG/DL (ref 0–0.2)
BILIRUB SERPL-MCNC: 1.52 MG/DL (ref 0.2–1)
BUN SERPL-MCNC: 7 MG/DL (ref 5–25)
CALCIUM ALBUM COR SERPL-MCNC: 10.9 MG/DL (ref 8.3–10.1)
CALCIUM SERPL-MCNC: 9.6 MG/DL (ref 8.3–10.1)
CHLORIDE SERPL-SCNC: 99 MMOL/L (ref 100–108)
CO2 SERPL-SCNC: 32 MMOL/L (ref 21–32)
CREAT SERPL-MCNC: 0.57 MG/DL (ref 0.6–1.3)
EOSINOPHIL # BLD AUTO: 0.13 THOUSAND/ΜL (ref 0–0.61)
EOSINOPHIL NFR BLD AUTO: 1 % (ref 0–6)
ERYTHROCYTE [DISTWIDTH] IN BLOOD BY AUTOMATED COUNT: 12.6 % (ref 11.6–15.1)
GFR SERPL CREATININE-BSD FRML MDRD: 113 ML/MIN/1.73SQ M
GLUCOSE SERPL-MCNC: 127 MG/DL (ref 65–140)
HCT VFR BLD AUTO: 37.6 % (ref 36.5–49.3)
HGB BLD-MCNC: 11.9 G/DL (ref 12–17)
IMM GRANULOCYTES # BLD AUTO: 0.23 THOUSAND/UL (ref 0–0.2)
IMM GRANULOCYTES NFR BLD AUTO: 2 % (ref 0–2)
LYMPHOCYTES # BLD AUTO: 2.05 THOUSANDS/ΜL (ref 0.6–4.47)
LYMPHOCYTES NFR BLD AUTO: 15 % (ref 14–44)
MAGNESIUM SERPL-MCNC: 2 MG/DL (ref 1.6–2.6)
MCH RBC QN AUTO: 30.9 PG (ref 26.8–34.3)
MCHC RBC AUTO-ENTMCNC: 31.6 G/DL (ref 31.4–37.4)
MCV RBC AUTO: 98 FL (ref 82–98)
MONOCYTES # BLD AUTO: 1.49 THOUSAND/ΜL (ref 0.17–1.22)
MONOCYTES NFR BLD AUTO: 11 % (ref 4–12)
NEUTROPHILS # BLD AUTO: 9.43 THOUSANDS/ΜL (ref 1.85–7.62)
NEUTS SEG NFR BLD AUTO: 71 % (ref 43–75)
NRBC BLD AUTO-RTO: 0 /100 WBCS
PHOSPHATE SERPL-MCNC: 3.6 MG/DL (ref 2.7–4.5)
PLATELET # BLD AUTO: 398 THOUSANDS/UL (ref 149–390)
PMV BLD AUTO: 10.1 FL (ref 8.9–12.7)
POTASSIUM SERPL-SCNC: 3.9 MMOL/L (ref 3.5–5.3)
PROT SERPL-MCNC: 7.3 G/DL (ref 6.4–8.2)
RBC # BLD AUTO: 3.85 MILLION/UL (ref 3.88–5.62)
SODIUM SERPL-SCNC: 134 MMOL/L (ref 136–145)
WBC # BLD AUTO: 13.37 THOUSAND/UL (ref 4.31–10.16)

## 2022-05-22 PROCEDURE — 99024 POSTOP FOLLOW-UP VISIT: CPT | Performed by: SURGERY

## 2022-05-22 PROCEDURE — 80053 COMPREHEN METABOLIC PANEL: CPT | Performed by: SURGERY

## 2022-05-22 PROCEDURE — 82248 BILIRUBIN DIRECT: CPT | Performed by: SURGERY

## 2022-05-22 PROCEDURE — 83735 ASSAY OF MAGNESIUM: CPT | Performed by: SURGERY

## 2022-05-22 PROCEDURE — 85025 COMPLETE CBC W/AUTO DIFF WBC: CPT | Performed by: SURGERY

## 2022-05-22 PROCEDURE — 84100 ASSAY OF PHOSPHORUS: CPT | Performed by: SURGERY

## 2022-05-22 RX ORDER — OXYCODONE HYDROCHLORIDE 5 MG/1
2.5-5 TABLET ORAL EVERY 4 HOURS PRN
Qty: 20 TABLET | Refills: 0 | Status: SHIPPED | OUTPATIENT
Start: 2022-05-22

## 2022-05-22 RX ORDER — GABAPENTIN 100 MG/1
100 CAPSULE ORAL 3 TIMES DAILY
Qty: 21 CAPSULE | Refills: 0 | Status: SHIPPED | OUTPATIENT
Start: 2022-05-22 | End: 2022-05-29

## 2022-05-22 RX ORDER — ACETAMINOPHEN 325 MG/1
650 TABLET ORAL EVERY 6 HOURS PRN
Refills: 0
Start: 2022-05-22

## 2022-05-22 RX ORDER — METHOCARBAMOL 500 MG/1
500 TABLET, FILM COATED ORAL EVERY 6 HOURS SCHEDULED
Qty: 20 TABLET | Refills: 0 | Status: SHIPPED | OUTPATIENT
Start: 2022-05-22

## 2022-05-22 RX ORDER — LIDOCAINE 4 G/G
1 PATCH TOPICAL DAILY
Refills: 0
Start: 2022-05-22

## 2022-05-22 RX ADMIN — METHOCARBAMOL 500 MG: 500 TABLET, FILM COATED ORAL at 01:29

## 2022-05-22 RX ADMIN — PANTOPRAZOLE SODIUM 40 MG: 40 TABLET, DELAYED RELEASE ORAL at 05:14

## 2022-05-22 RX ADMIN — OXYCODONE HYDROCHLORIDE 5 MG: 5 TABLET ORAL at 10:17

## 2022-05-22 RX ADMIN — GABAPENTIN 100 MG: 100 CAPSULE ORAL at 10:08

## 2022-05-22 RX ADMIN — LIDOCAINE 5% 1 PATCH: 700 PATCH TOPICAL at 10:08

## 2022-05-22 RX ADMIN — METHOCARBAMOL 500 MG: 500 TABLET, FILM COATED ORAL at 12:15

## 2022-05-22 RX ADMIN — HEPARIN SODIUM 5000 UNITS: 5000 INJECTION INTRAVENOUS; SUBCUTANEOUS at 05:14

## 2022-05-22 NOTE — PLAN OF CARE
Problem: PAIN - ADULT  Goal: Verbalizes/displays adequate comfort level or baseline comfort level  Description: Interventions:  - Encourage patient to monitor pain and request assistance  - Assess pain using appropriate pain scale  - Administer analgesics based on type and severity of pain and evaluate response  - Implement non-pharmacological measures as appropriate and evaluate response  - Consider cultural and social influences on pain and pain management  - Notify physician/advanced practitioner if interventions unsuccessful or patient reports new pain  Outcome: Progressing     Problem: INFECTION - ADULT  Goal: Absence or prevention of progression during hospitalization  Description: INTERVENTIONS:  - Assess and monitor for signs and symptoms of infection  - Monitor lab/diagnostic results  - Monitor all insertion sites, i e  indwelling lines, tubes, and drains  - Monitor endotracheal if appropriate and nasal secretions for changes in amount and color  - [place] appropriate cooling/warming therapies per order  - Administer medications as ordered  - Instruct and encourage patient and family to use good hand hygiene technique  - Identify and instruct in appropriate isolation precautions for identified infection/condition  Outcome: Progressing  Goal: Absence of fever/infection during neutropenic period  Description: INTERVENTIONS:  - Monitor WBC    Outcome: Progressing

## 2022-05-22 NOTE — INCIDENTAL FINDINGS
The following findings require follow up:  Radiographic finding   Findings:  · Bilateral perinephric stranding likely chronic in etiology  No evidence of nephrolithiasis or hydronephrosis  No hydroureter  Mild bladder wall thickening  · Prominent Schmorl's node inferior endplate of L4 and smaller Schmorl's node superior endplate of L5  Follow up required:  Please follow-up with primary care provider within 1 week  Follow up should be done within 1 week(s) please call to schedule an appointment as soon as possible    Please notify the following clinician to assist with the follow up:   CELI Valentin; internal medicine nurse practitioner, [phone]    I discussed the following findings found on CT imaging with the patient at 10:15 a m  On 05/22/2022  He endorsed understanding of the need to follow-up with his primary care provider for continued surveillance and or any additional follow-up that may be necessary for the above documented findings

## 2022-05-22 NOTE — PROGRESS NOTES
Progress Note - General Surgery   Karlie Hurley 60 y o  male MRN: [id number]  Unit/Bed#: [facility] 815-01 Encounter: [id number]    Assessment:  24-year-old male presents with concern for cholangitis with ERCP on 5/16 now s/p laparoscopic cholecystectomy 5/19  DIANA: 25cc SS non bilious    Plan:   Hopeful d/c today   Lo Fat diet   I/Os   Pain control   Please TigerText on call Red Surgery or Acute Care Surgery Floor Call with any questions     Subjective/Objective     Subjective:   No acute events overnight  Tolerating diet  Pain controlled  Passing flatus  Pertinent review of systems as above  All other review of systems negative  Objective:    Blood pressure (!) 173/96, pulse 94, temperature 98 3 °F (36 8 °C), resp  rate 17, height 5' 10" (1 778 m), weight 104 kg (229 lb), SpO2 95 %  ,Body mass index is 32 86 kg/m²  No intake or output data in the 24 hours ending 05/22/22 0805    Invasive Devices  Report    Peripheral Intravenous Line  Duration           Peripheral IV 05/18/22 Proximal;Right;Ventral (anterior) Forearm 3 days          Drain  Duration           Closed/Suction Drain Right RUQ Bulb 19 Fr  3 days                Physical Exam:   Gen:  NAD  HEENT: NCAT  MMM  CV: well perfused  Lungs: Normal respiratory effort  Abd: soft, nt/nd,incisions cdi drains intact  Skin: warm/ dry  Extremities: no peripheral edema, no clubbing or cyanosis  Neuro:  AxO x3      Results from last 7 days   Lab Units 05/22/22  0510 05/21/22  0832 05/20/22  0614   WBC Thousand/uL 13 37* 15 51* 13 30*   HEMOGLOBIN g/dL 11 9* 12 1 11 6*   HEMATOCRIT % 37 6 37 3 36 2*   PLATELETS Thousands/uL 398* 422* 392*     Results from last 7 days   Lab Units 05/22/22  0510 05/21/22  0832 05/20/22  0614   POTASSIUM mmol/L 3 9 3 6 3 9   CHLORIDE mmol/L 99* 97* 99*   CO2 mmol/L 32 29 26   BUN mg/dL 7 7 8   CREATININE mg/dL 0 57* 0 58* 0 61   CALCIUM mg/dL 9 6 9 2 8 7     Results from last 7 days   Lab Units 05/15/22  2234   INR  1 13   PTT seconds 30        I have personally reviewed pertinent films in PACS      Medications:   Scheduled Meds:  Current Facility-Administered Medications   Medication Dose Route Frequency Provider Last Rate    acetaminophen  650 mg Oral Q6H PRN Artur Christine MD      atorvastatin  20 mg Oral Daily Artur Christine MD      gabapentin  100 mg Oral TID Artur Christine MD      heparin (porcine)  5,000 Units Subcutaneous [facility] Artur Christine MD      lidocaine  2 patch Topical Daily Artur Christine MD      methocarbamol  500 mg Oral Q6H [address] Artur Christine MD      ondansetron  4 mg Intravenous Q6H PRN Artur Christine MD      oxyCODONE  10 mg Oral Q4H PRN Artur Christine MD      oxyCODONE  5 mg Oral Q4H PRN Artur Christine MD      pantoprazole  40 mg Oral Early Morning Artur Christine MD       Continuous Infusions:   PRN Meds:  acetaminophen, 650 mg, Q6H PRN  ondansetron, 4 mg, Q6H PRN  oxyCODONE, 10 mg, Q4H PRN  oxyCODONE, 5 mg, Q4H PRN      VTE Pharmacologic Prophylaxis: Heparin  VTE Mechanical Prophylaxis: sequential compression device

## 2022-05-23 ENCOUNTER — TRANSITIONAL CARE MANAGEMENT (OUTPATIENT)
Dept: FAMILY MEDICINE CLINIC | Facility: CLINIC | Age: 57
End: 2022-05-23

## 2022-05-23 NOTE — UTILIZATION REVIEW
Notification of Discharge   This is a Notification of Discharge from our facility [address]  Please be advised that this patient has been discharge from our facility  Below you will find the admission and discharge date and time including the patients disposition  UTILIZATION REVIEW CONTACT:  Edil Dawn  Utilization   Network Utilization Review Department  Phone: [phone] x carefully listen to the prompts  All voicemails are confidential   Email: [email or web address]     PHYSICIAN ADVISORY SERVICES:  FOR REMA-IN-LVCK REVIEW - MEDICAL NECESSITY DENIAL  Phone: [phone]  Fax: [phone]  Email: [email or web address]     PRESENTATION DATE: 5/15/2022 11:51 PM  OBERVATION ADMISSION DATE:   INPATIENT ADMISSION DATE: 5/15/22 11:51 PM   DISCHARGE DATE: 5/22/2022  4:55 PM  DISPOSITION: Home/Self Care Home/Self Care      IMPORTANT INFORMATION:  Send all requests for admission clinical reviews, approved or denied determinations and any other requests to dedicated fax number below belonging to the campus where the patient is receiving treatment   List of dedicated fax numbers:  [address] DENIALS (Administrative/Medical Necessity) [phone]   [address] (Maternity/NICU/Pediatrics) [phone]   [facility] [phone]   130 [facility] [phone]   [address] [place] [address] [phone]   [address] [phone]   [facility]  [phone]   [address] [facility]  2401 [facility] 1000 [address] [facility] [phone]

## 2022-05-25 ENCOUNTER — OFFICE VISIT (OUTPATIENT)
Dept: FAMILY MEDICINE CLINIC | Facility: CLINIC | Age: 57
End: 2022-05-25
Payer: COMMERCIAL

## 2022-05-25 VITALS
SYSTOLIC BLOOD PRESSURE: 136 MMHG | HEIGHT: 70 IN | TEMPERATURE: 97 F | HEART RATE: 109 BPM | DIASTOLIC BLOOD PRESSURE: 78 MMHG | OXYGEN SATURATION: 95 % | WEIGHT: 226 LBS | BODY MASS INDEX: 32.35 KG/M2

## 2022-05-25 DIAGNOSIS — Z76.89 ENCOUNTER FOR SUPPORT AND COORDINATION OF TRANSITION OF CARE: Primary | ICD-10-CM

## 2022-05-25 DIAGNOSIS — K80.50 CHOLEDOCHOLITHIASIS: ICD-10-CM

## 2022-05-25 DIAGNOSIS — Z90.49 S/P CHOLECYSTECTOMY: ICD-10-CM

## 2022-05-25 PROCEDURE — 99495 TRANSJ CARE MGMT MOD F2F 14D: CPT | Performed by: NURSE PRACTITIONER

## 2022-05-25 PROCEDURE — 1111F DSCHRG MED/CURRENT MED MERGE: CPT | Performed by: NURSE PRACTITIONER

## 2022-05-25 NOTE — PROGRESS NOTES
Transition of Care  Follow-up After Hospitalization    Oj Campa 60 y o  male   Date:  5/25/2022    TCM Call (since 4/24/2022)     Date and time call was made  5/23/2022  8:45 AM    Hospital care reviewed  Records reviewed    Patient was hospitialized at  [facility]    Date of Admission  05/15/22    Date of discharge  05/22/22    Diagnosis  Cholangitis    Disposition  Home      TCM Call (since 4/24/2022)     Post hospital issues  Poor ADL (Activities of Daily Living) performance; Poor medication adherence; Reduced activity; Aftercare intervention    Should patient be enrolled in anticoag monitoring? No    Scheduled for follow up? Yes    Did you obtain your prescribed medications  Yes    Do you need help managing your prescriptions or medications  No    Is transportation to your appointment needed  No    I have advised the patient to call PCP with any new or worsening symptoms  Lilijumana Gaocharla RMA    Living Arrangements  Spouse or Significiant other; Family members    Are you recieving any outpatient services  No    Are you recieving home care services  No    Are you using any community resources  No    Current waiver services  No    Have you fallen in the last 12 months  No    Interperter language line needed  No    Counseling  Patient; [facility] records were reviewed  Medications upon discharge reviewed/updated  Medication Changes: none  Imaging: CT ABD/pelvis, US RUQ, and FL ERCP biliary and pancreatic  Consults: general surgery  Follow up visits with other specialists: General surgery 2 weeks s/p d/c  Assessment and Plan:    Deyanira Ceja was seen today for transition of care management  Diagnoses and all orders for this visit:    Encounter for support and coordination of transition of care    Choledocholithiasis    S/P cholecystectomy            Oj Campa present for TCM visit s/p hospitalization for choledocholithiasis with N/V and elevated LFTs   Laparoscopic cholecystectomy was done   Pt      ROS: Review of Systems   Constitutional: Negative  HENT: Negative  Eyes: Negative  Respiratory: Negative  Cardiovascular: Negative  Gastrointestinal: Negative  Endocrine: Negative  Genitourinary: Negative  Musculoskeletal: Negative  Skin: Negative  Allergic/Immunologic: Negative  Neurological: Negative  Hematological: Negative  Psychiatric/Behavioral: Negative  Past Medical History:   Diagnosis Date    Foot fracture     Hypertension        Past Surgical History:   Procedure Laterality Date    CHOLECYSTECTOMY LAPAROSCOPIC N/A 2022    Procedure: CHOLECYSTECTOMY LAPAROSCOPIC;  Surgeon: Will Schmidt MD;  [facility]: [facility];  Service: General    NO PAST SURGERIES         Social History     Socioeconomic History    Marital status: Single     Spouse name: None    Number of children: None    Years of education: None    Highest education level: None   Occupational History    Occupation: Unemployed   Tobacco Use    Smoking status: Former Smoker     Types: Cigarettes     Quit date:      Years since quittin 3    Smokeless tobacco: Never Used   Vaping Use    Vaping Use: Never used   Substance and Sexual Activity    Alcohol use: Yes    Drug use: Not Currently    Sexual activity: None   Other Topics Concern    None   Social History Narrative    ** Merged History Encounter **          Social Determinants of Health     Financial Resource Strain: Not on file   Food Insecurity: No Food Insecurity    Worried About Running Out of Food in the Last Year: Never true    Reshma of Food in the Last Year: Never true   Transportation Needs: No Transportation Needs    Lack of Transportation (Medical): No    Lack of Transportation (Non-Medical):  No   Physical Activity: Not on file   Stress: Not on file   Social Connections: Not on file   Intimate Partner Violence: Not on file   Housing Stability: Unknown    Unable to Pay for Housing in the Last Year: No    Number of Places Lived in the Last Year: Not on file    Unstable Housing in the Last Year: No       Family History   Problem Relation Age of Onset    ALS Mother     Pneumonia Father     No Known Problems Sister        No Known Allergies      Current Outpatient Medications:     acetaminophen (TYLENOL) 325 mg tablet, Take 2 tablets (650 mg total) by mouth every 6 (six) hours as needed for mild pain, Disp: , Rfl: 0    atorvastatin (LIPITOR) 20 mg tablet, Take 1 tablet (20 mg total) by mouth daily, Disp: 90 tablet, Rfl: 1    gabapentin (NEURONTIN) 100 mg capsule, Take 1 capsule (100 mg total) by mouth in the morning and 1 capsule (100 mg total) in the evening and 1 capsule (100 mg total) before bedtime  Do all this for 7 days  , Disp: 21 capsule, Rfl: 0    methocarbamol (ROBAXIN) 500 mg tablet, Take 1 tablet (500 mg total) by mouth every 6 (six) hours, Disp: 20 tablet, Rfl: 0    olmesartan (BENICAR) 20 mg tablet, Take 1 tablet (20 mg total) by mouth daily, Disp: 90 tablet, Rfl: 1    oxyCODONE (ROXICODONE) 5 immediate release tablet, Take 0 5-1 tablets (2 5-5 mg total) by mouth every 4 (four) hours as needed for moderate pain or severe pain Max Daily Amount: 30 mg, Disp: 20 tablet, Rfl: 0    Lidocaine 4 % PTCH, Apply 1 patch topically daily (Patient not taking: Reported on 5/25/2022), Disp: , Rfl: 0      Physical Exam:  /78   Pulse (!) 109   Temp (!) 97 °F (36 1 °C) (Tympanic)   Ht 5' 10" (1 778 m)   Wt 103 kg (226 lb)   SpO2 95%   BMI 32 43 kg/m²     Physical Exam  Vitals and nursing note reviewed  Constitutional:       Appearance: Normal appearance  He is well-developed  He is obese  Interventions: Face mask in place  HENT:      Head: Normocephalic and atraumatic        Right Ear: Tympanic membrane, ear canal and external ear normal       Left Ear: Tympanic membrane, ear canal and external ear normal       Nose: Nose normal       Mouth/Throat: Mouth: Mucous membranes are moist       Pharynx: Uvula midline  Eyes:      General: Lids are normal       Conjunctiva/sclera: Conjunctivae normal       Pupils: Pupils are equal, round, and reactive to light  Neck:      Thyroid: No thyroid mass  Vascular: No JVD  Trachea: Trachea and phonation normal    Cardiovascular:      Rate and Rhythm: Normal rate and regular rhythm  Pulses: Normal pulses  Heart sounds: Normal heart sounds, S1 normal and S2 normal  No murmur heard  No friction rub  No gallop  Pulmonary:      Effort: Pulmonary effort is normal       Breath sounds: Normal breath sounds  Abdominal:      General: Bowel sounds are normal       Palpations: Abdomen is soft  Tenderness: There is no abdominal tenderness  Genitourinary:     Comments: Deferred  Musculoskeletal:         General: Normal range of motion  Cervical back: Full passive range of motion without pain, normal range of motion and neck supple  Right lower leg: No edema  Left lower leg: No edema  Lymphadenopathy:      Head:      Right side of head: No submental, submandibular, tonsillar, preauricular, posterior auricular or occipital adenopathy  Left side of head: No submental, submandibular, tonsillar, preauricular, posterior auricular or occipital adenopathy  Cervical: No cervical adenopathy  Skin:     General: Skin is warm and dry  Capillary Refill: Capillary refill takes less than 2 seconds  Neurological:      General: No focal deficit present  Mental Status: He is alert and oriented to person, place, and time  Cranial Nerves: Cranial nerves are intact  Sensory: Sensation is intact  Motor: Motor function is intact  Coordination: Coordination is intact  Gait: Gait is intact     Psychiatric:         Attention and Perception: Attention and perception normal          Mood and Affect: Mood and affect normal          Speech: Speech normal  Behavior: Behavior normal  Behavior is cooperative  Thought Content:  Thought content normal          Cognition and Memory: Cognition normal          Judgment: Judgment normal              Labs:  Lab Results   Component Value Date    WBC 13 37 (H) 05/22/2022    HGB 11 9 (L) 05/22/2022    HCT 37 6 05/22/2022    MCV 98 05/22/2022     (H) 05/22/2022     Lab Results   Component Value Date    K 3 9 05/22/2022    CL 99 (L) 05/22/2022    CO2 32 05/22/2022    BUN 7 05/22/2022    CREATININE 0 57 (L) 05/22/2022    GLUF 133 (H) 01/11/2022    CALCIUM 9 6 05/22/2022    CORRECTEDCA 10 9 (H) 05/22/2022    AST 29 05/22/2022    ALT 31 05/22/2022    ALKPHOS 105 05/22/2022    EGFR 113 05/22/2022

## 2022-05-27 NOTE — DISCHARGE SUMMARY
Discharge Summary - Jaymie Dickens 60 y o  male MRN: [id number]    Unit/Bed#: [facility] 090-61 Encounter: [id number]    Admission Date:   Admission Orders (From admission, onward)     Ordered        05/16/22 0037  Inpatient Admission  Once                        Admitting Diagnosis: Cholelithiasis [K80 20]  Elevated LFTs [R79 89]  Dilated bile duct [K83 8]    HPI: Per Dr Valentin Darlin: " Jaymie Dickens is a 60 y o  male who presents with abdominal pain associated nausea and vomiting for the past 1-2 weeks  States the pain is located in the epigastrium  Pain is currently improved  Does admit to fever up to 101 at home  Patient has never had a colonoscopy  States he lost 10 lbs in the last 1 5 weeks  No similar symptoms in the past         At OSH patient was noted to have a fever to 103  Other vitals were within normal limits  Laboratory evaluation was significant for leukocytosis to 12  T bili was noted to be elevated 9  Mild elevation in transaminases  CT abdomen pelvis significant for dilated CBD with faint hyperdensity at the level of ampulla possible choledocholithiasis  Noted to have cholelithiasis without pericholecystic inflammation  Bilateral perinephric fat stranding  Mild bladder wall thickening      Right upper quadrant ultrasound was obtained demonstrated intra and extrahepatic biliary ductal dilation up to 1 6 cm  Echogenic filling defect noted near ampulla  Positive Gutierrez sign      Past medical history significant for hypertension, denies any surgical history "    Procedures Performed: No orders of the defined types were placed in this encounter  Summary of Hospital Course: Please see daily progress notes for further details  Patient was admitted on 5/16/22 with concerns for cholangitis  He was Started on IV antibiotics  GI was consulted for ERCP which was performed with sphincterotomy and stone extraction   His LFTs improved post procedure and he was taken to the OR on 5/18 for cholecystectomy  His diet was advanced and his pain was well controlled  He was deemed stable for discharge from a surgical standpoint  He was instructed to call to schedule an appointment for follow up outpatient  Additional incidental findings of perinephric stranding and Schmorl's node were discussed and patient endorsed understanding to call to schedule follow up with his primary care provider for continued follow up  Significant Findings, Care, Treatment and Services Provided: as described and per daily progress notes    Complications: n/a    Discharge Diagnosis: cholangitis    Medical Problems             Resolved Problems  Date Reviewed: 5/25/2022   None                 Condition at Discharge: stable         Discharge instructions/Information to patient and family:   See after visit summary for information provided to patient and family  Provisions for Follow-Up Care:  See after visit summary for information related to follow-up care and any pertinent home health orders  PCP: CELI Avina    Disposition: Home    Planned Readmission: No      Discharge Statement   I spent 30 minutes discharging the patient  This time was spent on the day of discharge  I had direct contact with the patient on the day of discharge  Additional documentation is required if more than 30 minutes were spent on discharge  Discharge Medications:  See after visit summary for reconciled discharge medications provided to patient and family

## 2022-06-13 ENCOUNTER — OFFICE VISIT (OUTPATIENT)
Dept: SURGERY | Facility: CLINIC | Age: 57
End: 2022-06-13

## 2022-06-13 VITALS — BODY MASS INDEX: 32.5 KG/M2 | WEIGHT: 227 LBS | HEIGHT: 70 IN | TEMPERATURE: 97.9 F

## 2022-06-13 DIAGNOSIS — K83.09 CHOLANGITIS: Primary | ICD-10-CM

## 2022-06-13 PROCEDURE — 99024 POSTOP FOLLOW-UP VISIT: CPT | Performed by: SURGERY

## 2022-06-13 NOTE — ASSESSMENT & PLAN NOTE
59yo M who initially presented with acute cholangitis presents to the office for follow-up visit following a laparoscopic cholecystectomy performed on 05/18  Is doing well and has no complaints at this time      - follow up in clinic p r n    - clear return to work, but lifting restrictions until 6/29  - regular diet as tolerated

## 2022-06-13 NOTE — LETTER
June 13, 2022     Patient: Pili Jin  YOB: 1965  Date of Visit: 6/13/2022      To Whom it May Concern:    Pili Jin is under my professional care  Artemio Crawford was seen in my office on 6/13/2022  Artemio Crawford may return to work on 6/18 or sooner if feels able  Artemio Crawford however should be on lifting restrictions of maximum of 25 lb until 6/29       If you have any questions or concerns, please don't hesitate to call           Sincerely,          Irby Fleischer, MD        CC: No Recipients

## 2022-06-13 NOTE — PROGRESS NOTES
Office Visit - General Surgery  Navi Aranda MRN: [id number]  Encounter: [id number]    Assessment and Plan  Problem List Items Addressed This Visit        Digestive    Cholangitis - Primary     61yo M who initially presented with acute cholangitis presents to the office for follow-up visit following a laparoscopic cholecystectomy performed on   Is doing well and has no complaints at this time  - follow up in clinic p r n    - clear return to work, but lifting restrictions until   - regular diet as tolerated                 Chief Complaint:  Navi Aranda is a 60 y o  male who presents for Post-op (P/o lap solitario)    Subjective  Patient is doing well following his procedure  He is back to eating a regular diet  He says that pain is intermittent but is overall tolerable  States that his stools are beginning to solidify once again  Overall has no complaints and feels healthy and well  Past Medical History:   Diagnosis Date    Foot fracture     Hypertension        Past Surgical History:   Procedure Laterality Date    CHOLECYSTECTOMY LAPAROSCOPIC N/A 2022    Procedure: CHOLECYSTECTOMY LAPAROSCOPIC;  Surgeon: Joo Blackwell MD;  Location: [facility];  Service: General    NO PAST SURGERIES         Family History   Problem Relation Age of Onset   Leila Drop ALS Mother     Pneumonia Father     No Known Problems Sister        Social History     Tobacco Use    Smoking status: Former Smoker     Types: Cigarettes     Quit date:      Years since quittin 4    Smokeless tobacco: Never Used   Vaping Use    Vaping Use: Never used   Substance Use Topics    Alcohol use:  Yes    Drug use: Not Currently        Medications  Current Outpatient Medications on File Prior to Visit   Medication Sig Dispense Refill    acetaminophen (TYLENOL) 325 mg tablet Take 2 tablets (650 mg total) by mouth every 6 (six) hours as needed for mild pain  0    atorvastatin (LIPITOR) 20 mg tablet Take 1 tablet (20 mg total) by mouth daily 90 tablet 1    methocarbamol (ROBAXIN) 500 mg tablet Take 1 tablet (500 mg total) by mouth every 6 (six) hours 20 tablet 0    olmesartan (BENICAR) 20 mg tablet Take 1 tablet (20 mg total) by mouth daily 90 tablet 1    oxyCODONE (ROXICODONE) 5 immediate release tablet Take 0 5-1 tablets (2 5-5 mg total) by mouth every 4 (four) hours as needed for moderate pain or severe pain Max Daily Amount: 30 mg 20 tablet 0    gabapentin (NEURONTIN) 100 mg capsule Take 1 capsule (100 mg total) by mouth in the morning and 1 capsule (100 mg total) in the evening and 1 capsule (100 mg total) before bedtime  Do all this for 7 days  21 capsule 0    Lidocaine 4 % PTCH Apply 1 patch topically daily (Patient not taking: No sig reported)  0     No current facility-administered medications on file prior to visit  Allergies  No Known Allergies    Review of Systems   Constitutional: Negative  HENT: Negative  Eyes: Negative  Respiratory: Negative  Cardiovascular: Negative  Gastrointestinal: Negative  Endocrine: Negative  Genitourinary: Negative  Musculoskeletal: Negative  Neurological: Negative  Objective  Vitals:    06/13/22 1111   Temp: 97 9 °F (36 6 °C)       Physical Exam  Vitals reviewed  HENT:      Head: Normocephalic  Nose: Nose normal    Eyes:      Pupils: Pupils are equal, round, and reactive to light  Cardiovascular:      Rate and Rhythm: Normal rate  Abdominal:      General: There is no distension  Tenderness: There is no abdominal tenderness  Musculoskeletal:         General: Normal range of motion  Cervical back: Normal range of motion  Skin:     General: Skin is warm  Capillary Refill: Capillary refill takes less than 2 seconds  Neurological:      General: No focal deficit present  Mental Status: He is alert and oriented to person, place, and time  Mental status is at baseline

## 2022-06-21 DIAGNOSIS — E78.00 ELEVATED LDL CHOLESTEROL LEVEL: ICD-10-CM

## 2022-06-21 RX ORDER — ATORVASTATIN CALCIUM 20 MG/1
TABLET, FILM COATED ORAL
Qty: 90 TABLET | Refills: 2 | Status: SHIPPED | OUTPATIENT
Start: 2022-06-21

## 2022-06-30 NOTE — PROGRESS NOTES
Daily Note     Today's date: 2021  Patient name: Kishore Maya  : 1965  MRN: [id number]  Referring provider: Nicole Machuca MD  Dx:   Encounter Diagnosis     ICD-10-CM    1  Closed nondisplaced oblique fracture of shaft of right fibula with routine healing, subsequent encounter  S82 434D    2  Closed extra-articular fracture of distal end of right tibia with routine healing, subsequent encounter  S82 301D                   Subjective: My ankle is feeling pretty good  It feels like it is getting better "       Objective: See treatment diary below      Assessment: Tolerated treatment well  Patient exhibited good technique with therapeutic exercises and would benefit from continued PT      Plan: Continue per plan of care            Manuals 4/27 4/29  4/15 4/22 4/1   Stretching to right ankle  BE/RR  10 min RR  10 min BE/RR  10 min BE/RR  10 min MJC  10 min                            Neuro Re-Ed        SLS balance on R LE  Static 30"x2     biodex foam  20"x2 Biodex foam  30" x2    Biodex foam   30"x2  Biodex foam   30"x2 Static  R LE 30" x2   Tandem stance L8 30"x2 ea  L8 30" x2 ea   L7 30" x2 ea L7 30"x2 ea  L8 30" x2 ea   Biodex  L1 x30 ea   DF/PF  INV/EV L1 x30 ea  DF/PF  INV/EV L1 x30 ea DF/PF  INV/EF L1 x30 ea   DF/PF, INV/EV L1 x30 ea  PF/DF, INV, EV   Bosu Lunges 2x10 with R LE  2x10 with R LE 2x10 with R LE 2x10 with R LE 2x10 R LE                           Ther Ex 4/27 4/29 4/15 4/22 4/1   Treadmill  2 5 mph   10 min 2 5 mph  x10 min 2-2 5 mph   x10 min 2 5-2 8 mph x10 min 2 0 mph x10 min   Ankle TB 4 way    Black x20 ea Black x20 ea Black x20 ea   Self Gastroc  Prostretch  30"x3 Prostretch  30" x3 Prostretch   30"x3 Prostretch   30"x3 Prostretch  30" x3   HR/TR        Step Up   3# "B" 3x10 ea 3# "B" 3x10 ea  4# "B" 3x10 3# "B" 2x10   Leg Press  130# 3x10  130# 3x10   130# 3x10  130# 3x10 110# 3x10   TR/HR Leg Press  130# 3x10 130# 3x10 110# 110# 3x10 90# 3x10   Standing Hip 3 way  3# 2x10 ea direction, on foam with 1 UE support 3# 2x10 ea  Direction on foam with 1 UE support 3# 2x10 ea direction   Occasional use of 1 UE support 4# 2x10 ea dir on foam   Occasional use of 1 UE support  3# 2x10 ea dir  On foam  1 UE support   Mini Squats         Step downs "B" 2x10 step down with L LE "B"  2x10  Step down with LLE "B" 2x10 step down with LLE "B" 2x10 step down with LLE "B" 2x10  Down with LLE           Ther Activity                        Gait Training                        Modalities Epidural

## 2022-09-07 ENCOUNTER — OFFICE VISIT (OUTPATIENT)
Dept: FAMILY MEDICINE CLINIC | Facility: CLINIC | Age: 57
End: 2022-09-07
Payer: COMMERCIAL

## 2022-09-07 VITALS
DIASTOLIC BLOOD PRESSURE: 80 MMHG | WEIGHT: 228 LBS | BODY MASS INDEX: 32.64 KG/M2 | HEART RATE: 94 BPM | OXYGEN SATURATION: 98 % | SYSTOLIC BLOOD PRESSURE: 148 MMHG | TEMPERATURE: 98.1 F | HEIGHT: 70 IN

## 2022-09-07 DIAGNOSIS — Z13.29 SCREENING FOR THYROID DISORDER: ICD-10-CM

## 2022-09-07 DIAGNOSIS — R73.03 PREDIABETES: ICD-10-CM

## 2022-09-07 DIAGNOSIS — I10 BENIGN ESSENTIAL HTN: Primary | ICD-10-CM

## 2022-09-07 DIAGNOSIS — Z13.0 SCREENING FOR DEFICIENCY ANEMIA: ICD-10-CM

## 2022-09-07 DIAGNOSIS — E78.00 ELEVATED LDL CHOLESTEROL LEVEL: ICD-10-CM

## 2022-09-07 DIAGNOSIS — Z12.11 SCREENING FOR COLON CANCER: ICD-10-CM

## 2022-09-07 DIAGNOSIS — F10.10 ALCOHOL USE DISORDER, MILD, ABUSE: ICD-10-CM

## 2022-09-07 DIAGNOSIS — Z12.5 SCREENING FOR PROSTATE CANCER: ICD-10-CM

## 2022-09-07 DIAGNOSIS — E66.9 CLASS 1 OBESITY WITH SERIOUS COMORBIDITY AND BODY MASS INDEX (BMI) OF 32.0 TO 32.9 IN ADULT, UNSPECIFIED OBESITY TYPE: ICD-10-CM

## 2022-09-07 PROBLEM — S82.301A CLOSED EXTRA-ARTICULAR FRACTURE OF DISTAL END OF RIGHT TIBIA: Status: RESOLVED | Noted: 2020-01-28 | Resolved: 2022-09-07

## 2022-09-07 PROBLEM — S82.434A CLOSED NONDISPLACED OBLIQUE FRACTURE OF SHAFT OF RIGHT FIBULA: Status: RESOLVED | Noted: 2020-01-28 | Resolved: 2022-09-07

## 2022-09-07 PROCEDURE — 99213 OFFICE O/P EST LOW 20 MIN: CPT | Performed by: NURSE PRACTITIONER

## 2022-09-07 PROCEDURE — 3725F SCREEN DEPRESSION PERFORMED: CPT | Performed by: NURSE PRACTITIONER

## 2022-09-07 RX ORDER — OLMESARTAN MEDOXOMIL 20 MG/1
20 TABLET ORAL DAILY
Qty: 30 TABLET | Refills: 1 | Status: SHIPPED | OUTPATIENT
Start: 2022-09-07 | End: 2022-10-14

## 2022-09-07 NOTE — PROGRESS NOTES
Assessment/Plan:    Problem List Items Addressed This Visit     Class 1 obesity with body mass index (BMI) of 32 0 to 32 9 in adult    Elevated LDL cholesterol level    Relevant Orders    Lipid Panel with Direct LDL reflex    Alcohol use disorder, mild, abuse    Relevant Orders    Comprehensive metabolic panel    Benign essential HTN - Primary    Relevant Medications    olmesartan (BENICAR) 20 mg tablet    Prediabetes    Relevant Orders    Comprehensive metabolic panel    HEMOGLOBIN A1C W/ EAG ESTIMATION      Other Visit Diagnoses     Screening for colon cancer        Relevant Orders    Cologuard    Screening for thyroid disorder        Relevant Orders    TSH, 3rd generation with Free T4 reflex    Screening for deficiency anemia        Relevant Orders    CBC and differential    Screening for prostate cancer        Relevant Orders    PSA, Total Screen           Diagnoses and all orders for this visit:    Benign essential HTN  -     olmesartan (BENICAR) 20 mg tablet; Take 1 tablet (20 mg total) by mouth daily    Screening for colon cancer  -     Cologuard    Prediabetes  -     Comprehensive metabolic panel; Future  -     HEMOGLOBIN A1C W/ EAG ESTIMATION; Future    Elevated LDL cholesterol level  -     Lipid Panel with Direct LDL reflex; Future    Screening for thyroid disorder  -     TSH, 3rd generation with Free T4 reflex; Future    Screening for deficiency anemia  -     CBC and differential; Future    Screening for prostate cancer  -     PSA, Total Screen; Future    Class 1 obesity with serious comorbidity and body mass index (BMI) of 32 0 to 32 9 in adult, unspecified obesity type    Alcohol use disorder, mild, abuse  -     Comprehensive metabolic panel; Future      No problem-specific Assessment & Plan notes found for this encounter  Subjective:      Patient ID: Kashif Ding is a 60 y o  male  Hypertension  This is a chronic problem  The problem is uncontrolled   Pertinent negatives include no anxiety  There are no associated agents to hypertension  Risk factors for coronary artery disease include dyslipidemia, male gender and obesity  Past treatments include angiotensin blockers  The current treatment provides mild improvement  There are no compliance problems  There is no history of angina or CAD/MI  There is no history of chronic renal disease  The following portions of the patient's history were reviewed and updated as appropriate:   He has a past medical history of Closed extra-articular fracture of distal end of right tibia (1/28/2020), Closed nondisplaced oblique fracture of shaft of right fibula (1/28/2020), Foot fracture, and Hypertension  ,  does not have any pertinent problems on file  ,   has a past surgical history that includes No past surgeries and CHOLECYSTECTOMY LAPAROSCOPIC (N/A, 5/18/2022)  ,  family history includes ALS in his mother; No Known Problems in his sister; Pneumonia in his father  ,   reports that he quit smoking about 5 years ago  His smoking use included cigarettes  He has never used smokeless tobacco  He reports current alcohol use  He reports previous drug use ,  has No Known Allergies     Current Outpatient Medications   Medication Sig Dispense Refill    atorvastatin (LIPITOR) 20 mg tablet TAKE 1 TABLET BY MOUTH EVERY DAY 90 tablet 2    olmesartan (BENICAR) 20 mg tablet Take 1 tablet (20 mg total) by mouth daily 30 tablet 1    gabapentin (NEURONTIN) 100 mg capsule Take 1 capsule (100 mg total) by mouth in the morning and 1 capsule (100 mg total) in the evening and 1 capsule (100 mg total) before bedtime  Do all this for 7 days  21 capsule 0     No current facility-administered medications for this visit  Depression Screening and Follow-up Plan: Patient was screened for depression during today's encounter  They screened negative with a PHQ-2 score of 0  Review of Systems   All other systems reviewed and are negative          Objective:  Vitals: 09/07/22 1405   BP: 148/80   BP Location: Left arm   Patient Position: Sitting   Cuff Size: Adult   Pulse: 94   Temp: 98 1 °F (36 7 °C)   TempSrc: Tympanic   SpO2: 98%   Weight: 103 kg (228 lb)   Height: 5' 10" (1 778 m)     Body mass index is 32 71 kg/m²  Physical Exam  Vitals and nursing note reviewed  Constitutional:       Appearance: Normal appearance  He is well-developed  He is obese  Interventions: Face mask in place  HENT:      Head: Normocephalic and atraumatic  Right Ear: Tympanic membrane, ear canal and external ear normal       Left Ear: Tympanic membrane, ear canal and external ear normal       Nose: Nose normal       Mouth/Throat:      Mouth: Mucous membranes are moist       Pharynx: Uvula midline  Eyes:      General: Lids are normal       Conjunctiva/sclera: Conjunctivae normal       Pupils: Pupils are equal, round, and reactive to light  Neck:      Thyroid: No thyroid mass  Vascular: No JVD  Trachea: Trachea and phonation normal    Cardiovascular:      Rate and Rhythm: Normal rate and regular rhythm  Pulses: Normal pulses  Heart sounds: Normal heart sounds, S1 normal and S2 normal  No murmur heard  No friction rub  No gallop  Pulmonary:      Effort: Pulmonary effort is normal       Breath sounds: Normal breath sounds  Abdominal:      General: Bowel sounds are normal       Palpations: Abdomen is soft  Tenderness: There is no abdominal tenderness  Genitourinary:     Comments: Deferred  Musculoskeletal:         General: Normal range of motion  Cervical back: Full passive range of motion without pain, normal range of motion and neck supple  Right lower leg: No edema  Left lower leg: No edema  Lymphadenopathy:      Head:      Right side of head: No submental, submandibular, tonsillar, preauricular, posterior auricular or occipital adenopathy        Left side of head: No submental, submandibular, tonsillar, preauricular, posterior auricular or occipital adenopathy  Cervical: No cervical adenopathy  Skin:     General: Skin is warm and dry  Capillary Refill: Capillary refill takes less than 2 seconds  Neurological:      General: No focal deficit present  Mental Status: He is alert and oriented to person, place, and time  Cranial Nerves: Cranial nerves are intact  Sensory: Sensation is intact  Motor: Motor function is intact  Coordination: Coordination is intact  Gait: Gait is intact  Psychiatric:         Attention and Perception: Attention and perception normal          Mood and Affect: Mood and affect normal          Speech: Speech normal          Behavior: Behavior normal  Behavior is cooperative  Thought Content:  Thought content normal          Cognition and Memory: Cognition normal          Judgment: Judgment normal

## 2022-09-07 NOTE — PROGRESS NOTES
Assessment/Plan:    Problem List Items Addressed This Visit     Elevated LDL cholesterol level    Relevant Orders    Lipid Panel with Direct LDL reflex    Benign essential HTN    Relevant Medications    olmesartan (BENICAR) 20 mg tablet    Prediabetes    Relevant Orders    Comprehensive metabolic panel    HEMOGLOBIN A1C W/ EAG ESTIMATION      Other Visit Diagnoses     Screening for colon cancer    -  Primary    Relevant Orders    Cologuard    Screening for thyroid disorder        Relevant Orders    TSH, 3rd generation with Free T4 reflex    Screening for deficiency anemia        Relevant Orders    CBC and differential    Screening for prostate cancer        Relevant Orders    PSA, Total Screen           Diagnoses and all orders for this visit:    Screening for colon cancer  -     Cologuard    Benign essential HTN  -     olmesartan (BENICAR) 20 mg tablet; Take 1 tablet (20 mg total) by mouth daily    Prediabetes  -     Comprehensive metabolic panel; Future  -     HEMOGLOBIN A1C W/ EAG ESTIMATION; Future    Elevated LDL cholesterol level  -     Lipid Panel with Direct LDL reflex; Future    Screening for thyroid disorder  -     TSH, 3rd generation with Free T4 reflex; Future    Screening for deficiency anemia  -     CBC and differential; Future    Screening for prostate cancer  -     PSA, Total Screen; Future      No problem-specific Assessment & Plan notes found for this encounter  Subjective:      Patient ID: Giulia Najera is a 60 y o  male  HPI    The following portions of the patient's history were reviewed and updated as appropriate:   He has a past medical history of Foot fracture and Hypertension  ,  does not have any pertinent problems on file  ,   has a past surgical history that includes No past surgeries and CHOLECYSTECTOMY LAPAROSCOPIC (N/A, 5/18/2022)  ,  family history includes ALS in his mother; No Known Problems in his sister; Pneumonia in his father  ,   reports that he quit smoking about 5 years ago  His smoking use included cigarettes  He has never used smokeless tobacco  He reports current alcohol use  He reports previous drug use ,  has No Known Allergies     Current Outpatient Medications   Medication Sig Dispense Refill    atorvastatin (LIPITOR) 20 mg tablet TAKE 1 TABLET BY MOUTH EVERY DAY 90 tablet 2    olmesartan (BENICAR) 20 mg tablet Take 1 tablet (20 mg total) by mouth daily 30 tablet 1    gabapentin (NEURONTIN) 100 mg capsule Take 1 capsule (100 mg total) by mouth in the morning and 1 capsule (100 mg total) in the evening and 1 capsule (100 mg total) before bedtime  Do all this for 7 days  21 capsule 0     No current facility-administered medications for this visit  Review of Systems      Objective:  Vitals:    09/07/22 1405   BP: 148/80   BP Location: Left arm   Patient Position: Sitting   Cuff Size: Adult   Pulse: 94   Temp: 98 1 °F (36 7 °C)   TempSrc: Tympanic   SpO2: 98%   Weight: 103 kg (228 lb)   Height: 5' 10" (1 778 m)     Body mass index is 32 71 kg/m²       Physical Exam

## 2022-09-07 NOTE — PATIENT INSTRUCTIONS
Blood Pressure Diary    Check blood pressures at home and keep a log  Bring log to your follow up or call if the average home BP is greater than 372 systolic and or 90 diastolic before your follow up  [facility]   Tues  Wed Thurs Fri Sat  COMMENTS                                                                            Keep a log of your blood pressure readings at home  Please take blood pressure at same time of day  Please record date and time blood pressure was taken  Make sure to take your blood pressure when you are seated and resting for about 3 minutes  Please call office if Blood Pressure is <110/<60  You can send these to me via "[employer] or by calling the office  This will help me manage your blood pressure better

## 2022-09-23 ENCOUNTER — APPOINTMENT (OUTPATIENT)
Dept: LAB | Facility: CLINIC | Age: 57
End: 2022-09-23
Payer: COMMERCIAL

## 2022-09-23 DIAGNOSIS — Z13.29 SCREENING FOR THYROID DISORDER: ICD-10-CM

## 2022-09-23 DIAGNOSIS — K83.09 CHOLANGITIS: ICD-10-CM

## 2022-09-23 DIAGNOSIS — R73.03 PREDIABETES: ICD-10-CM

## 2022-09-23 DIAGNOSIS — Z13.0 SCREENING FOR DEFICIENCY ANEMIA: ICD-10-CM

## 2022-09-23 DIAGNOSIS — E78.00 ELEVATED LDL CHOLESTEROL LEVEL: ICD-10-CM

## 2022-09-23 DIAGNOSIS — Z12.5 SCREENING FOR PROSTATE CANCER: ICD-10-CM

## 2022-09-23 DIAGNOSIS — F10.10 ALCOHOL USE DISORDER, MILD, ABUSE: ICD-10-CM

## 2022-09-23 LAB
ALBUMIN SERPL BCP-MCNC: 3.4 G/DL (ref 3.5–5)
ALP SERPL-CCNC: 86 U/L (ref 46–116)
ALT SERPL W P-5'-P-CCNC: 35 U/L (ref 12–78)
ANION GAP SERPL CALCULATED.3IONS-SCNC: 6 MMOL/L (ref 4–13)
AST SERPL W P-5'-P-CCNC: 34 U/L (ref 5–45)
BASOPHILS # BLD AUTO: 0.06 THOUSANDS/ΜL (ref 0–0.1)
BASOPHILS NFR BLD AUTO: 1 % (ref 0–1)
BILIRUB SERPL-MCNC: 1.09 MG/DL (ref 0.2–1)
BUN SERPL-MCNC: 8 MG/DL (ref 5–25)
CALCIUM ALBUM COR SERPL-MCNC: 10.7 MG/DL (ref 8.3–10.1)
CALCIUM SERPL-MCNC: 10.2 MG/DL (ref 8.3–10.1)
CHLORIDE SERPL-SCNC: 104 MMOL/L (ref 96–108)
CHOLEST SERPL-MCNC: 148 MG/DL
CO2 SERPL-SCNC: 26 MMOL/L (ref 21–32)
CREAT SERPL-MCNC: 0.92 MG/DL (ref 0.6–1.3)
EOSINOPHIL # BLD AUTO: 0.41 THOUSAND/ΜL (ref 0–0.61)
EOSINOPHIL NFR BLD AUTO: 5 % (ref 0–6)
ERYTHROCYTE [DISTWIDTH] IN BLOOD BY AUTOMATED COUNT: 12.7 % (ref 11.6–15.1)
GFR SERPL CREATININE-BSD FRML MDRD: 90 ML/MIN/1.73SQ M
GLUCOSE P FAST SERPL-MCNC: 166 MG/DL (ref 65–99)
HCT VFR BLD AUTO: 50.2 % (ref 36.5–49.3)
HDLC SERPL-MCNC: 50 MG/DL
HGB BLD-MCNC: 15.7 G/DL (ref 12–17)
IMM GRANULOCYTES # BLD AUTO: 0.02 THOUSAND/UL (ref 0–0.2)
IMM GRANULOCYTES NFR BLD AUTO: 0 % (ref 0–2)
LDLC SERPL CALC-MCNC: 81 MG/DL (ref 0–100)
LYMPHOCYTES # BLD AUTO: 1.86 THOUSANDS/ΜL (ref 0.6–4.47)
LYMPHOCYTES NFR BLD AUTO: 24 % (ref 14–44)
MCH RBC QN AUTO: 29.6 PG (ref 26.8–34.3)
MCHC RBC AUTO-ENTMCNC: 31.3 G/DL (ref 31.4–37.4)
MCV RBC AUTO: 95 FL (ref 82–98)
MONOCYTES # BLD AUTO: 0.97 THOUSAND/ΜL (ref 0.17–1.22)
MONOCYTES NFR BLD AUTO: 12 % (ref 4–12)
NEUTROPHILS # BLD AUTO: 4.5 THOUSANDS/ΜL (ref 1.85–7.62)
NEUTS SEG NFR BLD AUTO: 58 % (ref 43–75)
NRBC BLD AUTO-RTO: 0 /100 WBCS
PLATELET # BLD AUTO: 216 THOUSANDS/UL (ref 149–390)
PMV BLD AUTO: 10.9 FL (ref 8.9–12.7)
POTASSIUM SERPL-SCNC: 4 MMOL/L (ref 3.5–5.3)
PROT SERPL-MCNC: 8.6 G/DL (ref 6.4–8.4)
PSA SERPL-MCNC: 0.7 NG/ML (ref 0–4)
RBC # BLD AUTO: 5.3 MILLION/UL (ref 3.88–5.62)
SODIUM SERPL-SCNC: 136 MMOL/L (ref 135–147)
TRIGL SERPL-MCNC: 85 MG/DL
TSH SERPL DL<=0.05 MIU/L-ACNC: 1.81 UIU/ML (ref 0.45–4.5)
WBC # BLD AUTO: 7.82 THOUSAND/UL (ref 4.31–10.16)

## 2022-09-23 PROCEDURE — 83036 HEMOGLOBIN GLYCOSYLATED A1C: CPT

## 2022-09-23 PROCEDURE — 80053 COMPREHEN METABOLIC PANEL: CPT

## 2022-09-23 PROCEDURE — 84443 ASSAY THYROID STIM HORMONE: CPT

## 2022-09-23 PROCEDURE — 36415 COLL VENOUS BLD VENIPUNCTURE: CPT

## 2022-09-23 PROCEDURE — 85025 COMPLETE CBC W/AUTO DIFF WBC: CPT

## 2022-09-23 PROCEDURE — G0103 PSA SCREENING: HCPCS

## 2022-09-23 PROCEDURE — 80061 LIPID PANEL: CPT

## 2022-09-24 LAB
EST. AVERAGE GLUCOSE BLD GHB EST-MCNC: 126 MG/DL
HBA1C MFR BLD: 6 %

## 2022-09-26 DIAGNOSIS — E83.52 HYPERCALCEMIA: Primary | ICD-10-CM

## 2022-10-14 ENCOUNTER — OFFICE VISIT (OUTPATIENT)
Dept: FAMILY MEDICINE CLINIC | Facility: CLINIC | Age: 57
End: 2022-10-14
Payer: COMMERCIAL

## 2022-10-14 VITALS
TEMPERATURE: 97.3 F | BODY MASS INDEX: 33.18 KG/M2 | HEART RATE: 96 BPM | DIASTOLIC BLOOD PRESSURE: 98 MMHG | HEIGHT: 70 IN | OXYGEN SATURATION: 97 % | WEIGHT: 231.8 LBS | SYSTOLIC BLOOD PRESSURE: 164 MMHG

## 2022-10-14 DIAGNOSIS — I10 PRIMARY HYPERTENSION: ICD-10-CM

## 2022-10-14 DIAGNOSIS — Z23 NEED FOR INFLUENZA VACCINATION: Primary | ICD-10-CM

## 2022-10-14 DIAGNOSIS — Z12.11 COLON CANCER SCREENING: ICD-10-CM

## 2022-10-14 PROCEDURE — 90471 IMMUNIZATION ADMIN: CPT

## 2022-10-14 PROCEDURE — 90682 RIV4 VACC RECOMBINANT DNA IM: CPT

## 2022-10-14 PROCEDURE — 99213 OFFICE O/P EST LOW 20 MIN: CPT | Performed by: NURSE PRACTITIONER

## 2022-10-14 RX ORDER — VALSARTAN 160 MG/1
160 TABLET ORAL DAILY
Qty: 30 TABLET | Refills: 1 | Status: SHIPPED | OUTPATIENT
Start: 2022-10-14

## 2022-10-14 NOTE — PATIENT INSTRUCTIONS
Blood Pressure Diary    Check blood pressures at home and keep a log  Bring log to your follow up or call if the average home BP is greater than 890 systolic and or 90 diastolic before your follow up  Esteban Melton   Tues  Wed Thurs Fri Sat  COMMENTS                                                                            Keep a log of your blood pressure readings at home  Please take blood pressure at same time of day  Please record date and time blood pressure was taken  Make sure to take your blood pressure when you are seated and resting for about 3 minutes  Please call office if Blood Pressure is <110/<60  You can send these to me via [employer] or by calling the office  This will help me manage your blood pressure better

## 2022-10-14 NOTE — PROGRESS NOTES
Assessment/Plan:    Problem List Items Addressed This Visit     Benign essential HTN    Relevant Medications    valsartan (DIOVAN) 160 mg tablet      Other Visit Diagnoses     Need for influenza vaccination    -  Primary    Relevant Orders    influenza vaccine, quadrivalent, recombinant, PF, 0 5 mL, for patients 18 yr+ ([facility]) (Completed)    Colon cancer screening        Relevant Orders    Cologuard           Diagnoses and all orders for this visit:    Need for influenza vaccination  -     influenza vaccine, quadrivalent, recombinant, PF, 0 5 mL, for patients 18 yr+ ([facility])    Colon cancer screening  -     Cologuard    Primary hypertension  -     valsartan (DIOVAN) 160 mg tablet; Take 1 tablet (160 mg total) by mouth daily      No problem-specific Assessment & Plan notes found for this encounter  Subjective:      Patient ID: Eddie Leonard is a 59 y o  male  Hypertension  This is a chronic problem  The problem is uncontrolled  Pertinent negatives include no anxiety, blurred vision, chest pain, headaches, malaise/fatigue, neck pain, orthopnea, palpitations, peripheral edema, PND, shortness of breath or sweats  There are no associated agents to hypertension  Risk factors for coronary artery disease include male gender and obesity  Past treatments include angiotensin blockers  The current treatment provides no improvement  There are no compliance problems  There is no history of angina or CAD/MI  There is no history of chronic renal disease  The following portions of the patient's history were reviewed and updated as appropriate:   He has a past medical history of Closed extra-articular fracture of distal end of right tibia (1/28/2020), Closed nondisplaced oblique fracture of shaft of right fibula (1/28/2020), Foot fracture, and Hypertension  ,  does not have any pertinent problems on file  ,   has a past surgical history that includes No past surgeries and CHOLECYSTECTOMY LAPAROSCOPIC (N/A, 5/18/2022)  ,  family history includes ALS in his mother; No Known Problems in his sister; Pneumonia in his father  ,   reports that he quit smoking about 5 years ago  His smoking use included cigarettes  He has never used smokeless tobacco  He reports current alcohol use  He reports previous drug use ,  has No Known Allergies     Current Outpatient Medications   Medication Sig Dispense Refill   • atorvastatin (LIPITOR) 20 mg tablet TAKE 1 TABLET BY MOUTH EVERY DAY 90 tablet 2   • valsartan (DIOVAN) 160 mg tablet Take 1 tablet (160 mg total) by mouth daily 30 tablet 1   • gabapentin (NEURONTIN) 100 mg capsule Take 1 capsule (100 mg total) by mouth in the morning and 1 capsule (100 mg total) in the evening and 1 capsule (100 mg total) before bedtime  Do all this for 7 days  (Patient not taking: Reported on 10/14/2022) 21 capsule 0     No current facility-administered medications for this visit  Review of Systems   Constitutional: Negative for malaise/fatigue  Eyes: Negative for blurred vision  Respiratory: Negative for shortness of breath  Cardiovascular: Negative for chest pain, palpitations, orthopnea and PND  Musculoskeletal: Negative for neck pain  Neurological: Negative for headaches  All other systems reviewed and are negative  Objective:  Vitals:    10/14/22 1408 10/14/22 1425   BP: (!) 184/108 164/98   BP Location: Left arm    Patient Position: Sitting    Cuff Size: Large    Pulse: 96    Temp: (!) 97 3 °F (36 3 °C)    SpO2: 97%    Weight: 105 kg (231 lb 12 8 oz)    Height: 5' 10" (1 778 m)      Body mass index is 33 26 kg/m²  Physical Exam  Vitals and nursing note reviewed  Constitutional:       Appearance: He is well-developed  He is obese  Interventions: Face mask in place  HENT:      Head: Normocephalic and atraumatic        Right Ear: Tympanic membrane, ear canal and external ear normal       Left Ear: Tympanic membrane, ear canal and external ear normal       Nose: Nose normal       Mouth/Throat:      Mouth: Mucous membranes are moist       Pharynx: Uvula midline  Eyes:      General: Lids are normal       Conjunctiva/sclera: Conjunctivae normal       Pupils: Pupils are equal, round, and reactive to light  Neck:      Thyroid: No thyroid mass  Vascular: No JVD  Trachea: Trachea and phonation normal    Cardiovascular:      Rate and Rhythm: Normal rate and regular rhythm  Pulses: Normal pulses  Heart sounds: Normal heart sounds, S1 normal and S2 normal  No murmur heard  No friction rub  No gallop  Pulmonary:      Effort: Pulmonary effort is normal       Breath sounds: Normal breath sounds  Abdominal:      General: Bowel sounds are normal       Palpations: Abdomen is soft  Tenderness: There is no abdominal tenderness  Genitourinary:     Comments: Deferred  Musculoskeletal:         General: Normal range of motion  Cervical back: Full passive range of motion without pain, normal range of motion and neck supple  Right lower leg: No edema  Left lower leg: No edema  Lymphadenopathy:      Head:      Right side of head: No submental, submandibular, tonsillar, preauricular, posterior auricular or occipital adenopathy  Left side of head: No submental, submandibular, tonsillar, preauricular, posterior auricular or occipital adenopathy  Cervical: No cervical adenopathy  Skin:     General: Skin is warm and dry  Capillary Refill: Capillary refill takes less than 2 seconds  Neurological:      General: No focal deficit present  Mental Status: He is alert and oriented to person, place, and time  Cranial Nerves: Cranial nerves are intact  Sensory: Sensation is intact  Motor: Motor function is intact  Coordination: Coordination is intact  Gait: Gait is intact     Psychiatric:         Attention and Perception: Attention and perception normal          Mood and Affect: Mood and affect normal  Speech: Speech normal          Behavior: Behavior normal  Behavior is cooperative  Thought Content:  Thought content normal          Cognition and Memory: Cognition normal          Judgment: Judgment normal

## 2022-11-06 DIAGNOSIS — I10 PRIMARY HYPERTENSION: ICD-10-CM

## 2022-11-06 RX ORDER — VALSARTAN 160 MG/1
TABLET ORAL
Qty: 90 TABLET | Refills: 1 | Status: SHIPPED | OUTPATIENT
Start: 2022-11-06

## 2022-12-06 ENCOUNTER — OFFICE VISIT (OUTPATIENT)
Dept: FAMILY MEDICINE CLINIC | Facility: CLINIC | Age: 57
End: 2022-12-06

## 2022-12-06 VITALS
BODY MASS INDEX: 33.64 KG/M2 | TEMPERATURE: 96.4 F | OXYGEN SATURATION: 95 % | DIASTOLIC BLOOD PRESSURE: 80 MMHG | WEIGHT: 235 LBS | HEIGHT: 70 IN | SYSTOLIC BLOOD PRESSURE: 144 MMHG | HEART RATE: 106 BPM

## 2022-12-06 DIAGNOSIS — R73.03 PREDIABETES: ICD-10-CM

## 2022-12-06 DIAGNOSIS — Z12.11 SCREENING FOR COLON CANCER: ICD-10-CM

## 2022-12-06 DIAGNOSIS — E66.9 CLASS 1 OBESITY WITH SERIOUS COMORBIDITY AND BODY MASS INDEX (BMI) OF 32.0 TO 32.9 IN ADULT, UNSPECIFIED OBESITY TYPE: ICD-10-CM

## 2022-12-06 DIAGNOSIS — I10 PRIMARY HYPERTENSION: Primary | ICD-10-CM

## 2022-12-06 RX ORDER — AMLODIPINE BESYLATE 2.5 MG/1
2.5 TABLET ORAL DAILY
Qty: 30 TABLET | Refills: 1 | Status: SHIPPED | OUTPATIENT
Start: 2022-12-06

## 2022-12-06 RX ORDER — VALSARTAN 160 MG/1
160 TABLET ORAL DAILY
Qty: 90 TABLET | Refills: 1 | Status: SHIPPED | OUTPATIENT
Start: 2022-12-06

## 2022-12-06 NOTE — PROGRESS NOTES
Assessment/Plan:    Problem List Items Addressed This Visit     Class 1 obesity with body mass index (BMI) of 32 0 to 32 9 in adult    Prediabetes    Relevant Orders    HEMOGLOBIN A1C W/ EAG ESTIMATION   Other Visit Diagnoses     Primary hypertension    -  Primary    Relevant Medications    valsartan (DIOVAN) 160 mg tablet    amLODIPine (NORVASC) 2 5 mg tablet    Screening for colon cancer        Relevant Orders    Cologuard           Diagnoses and all orders for this visit:    Primary hypertension  -     valsartan (DIOVAN) 160 mg tablet; Take 1 tablet (160 mg total) by mouth daily  -     amLODIPine (NORVASC) 2 5 mg tablet; Take 1 tablet (2 5 mg total) by mouth daily    Class 1 obesity with serious comorbidity and body mass index (BMI) of 32 0 to 32 9 in adult, unspecified obesity type    Prediabetes  -     HEMOGLOBIN A1C W/ EAG ESTIMATION; Future    Screening for colon cancer  -     Cologuard      No problem-specific Assessment & Plan notes found for this encounter  Subjective:      Patient ID: Dionisio Islas is a 59 y o  male  Pt presents for BP F/U  Switched Pt from olmesartan to valsartan  BP has improved from 180's/100's to 140's/80's  Reports BP at home is generally upper 130's to low 140's  Will add CCB to regimen and F/U in 1 month with DM F/U  Hypertension  This is a chronic problem  The problem has been gradually improving since onset  The problem is uncontrolled  There are no associated agents to hypertension  Risk factors for coronary artery disease include dyslipidemia, obesity, male gender and sedentary lifestyle (pre-DM)  Past treatments include angiotensin blockers and calcium channel blockers  The current treatment provides moderate improvement  There are no compliance problems  There is no history of angina  There is no history of chronic renal disease, hyperaldosteronism, hyperparathyroidism, a hypertension causing med, renovascular disease or a thyroid problem         The following portions of the patient's history were reviewed and updated as appropriate:   He has a past medical history of Closed extra-articular fracture of distal end of right tibia (1/28/2020), Closed nondisplaced oblique fracture of shaft of right fibula (1/28/2020), Foot fracture, and Hypertension  ,  does not have any pertinent problems on file  ,   has a past surgical history that includes No past surgeries and CHOLECYSTECTOMY LAPAROSCOPIC (N/A, 5/18/2022)  ,  family history includes ALS in his mother; No Known Problems in his sister; Pneumonia in his father  ,   reports that he quit smoking about 5 years ago  His smoking use included cigarettes  He has never used smokeless tobacco  He reports current alcohol use  He reports that he does not currently use drugs  ,  is allergic to pollen extract     Current Outpatient Medications   Medication Sig Dispense Refill   • amLODIPine (NORVASC) 2 5 mg tablet Take 1 tablet (2 5 mg total) by mouth daily 30 tablet 1   • atorvastatin (LIPITOR) 20 mg tablet TAKE 1 TABLET BY MOUTH EVERY DAY 90 tablet 2   • valsartan (DIOVAN) 160 mg tablet Take 1 tablet (160 mg total) by mouth daily 90 tablet 1   • gabapentin (NEURONTIN) 100 mg capsule Take 1 capsule (100 mg total) by mouth in the morning and 1 capsule (100 mg total) in the evening and 1 capsule (100 mg total) before bedtime  Do all this for 7 days  (Patient not taking: Reported on 10/14/2022) 21 capsule 0     No current facility-administered medications for this visit  Review of Systems   All other systems reviewed and are negative  Objective:  Vitals:    12/06/22 1229   BP: 144/80   BP Location: Left arm   Patient Position: Sitting   Cuff Size: Large   Pulse: (!) 106   Temp: (!) 96 4 °F (35 8 °C)   TempSrc: Tympanic   SpO2: 95%   Weight: 107 kg (235 lb)   Height: 5' 10" (1 778 m)     Body mass index is 33 72 kg/m²  Physical Exam  Vitals and nursing note reviewed     Constitutional:       Appearance: He is well-developed  He is obese  Interventions: Face mask in place  HENT:      Head: Normocephalic and atraumatic  Right Ear: Tympanic membrane, ear canal and external ear normal       Left Ear: Tympanic membrane, ear canal and external ear normal       Nose: Nose normal       Mouth/Throat:      Mouth: Mucous membranes are moist       Pharynx: Uvula midline  Eyes:      General: Lids are normal       Conjunctiva/sclera: Conjunctivae normal       Pupils: Pupils are equal, round, and reactive to light  Neck:      Thyroid: No thyroid mass  Vascular: No JVD  Trachea: Trachea and phonation normal    Cardiovascular:      Rate and Rhythm: Regular rhythm  Tachycardia present  Pulses: Normal pulses  Heart sounds: Normal heart sounds, S1 normal and S2 normal  No murmur heard  No friction rub  No gallop  Pulmonary:      Effort: Pulmonary effort is normal       Breath sounds: Normal breath sounds  Abdominal:      General: Bowel sounds are normal       Palpations: Abdomen is soft  Tenderness: There is no abdominal tenderness  Genitourinary:     Comments: Deferred  Musculoskeletal:         General: Normal range of motion  Cervical back: Full passive range of motion without pain, normal range of motion and neck supple  Right lower leg: No edema  Left lower leg: No edema  Lymphadenopathy:      Head:      Right side of head: No submental, submandibular, tonsillar, preauricular, posterior auricular or occipital adenopathy  Left side of head: No submental, submandibular, tonsillar, preauricular, posterior auricular or occipital adenopathy  Cervical: No cervical adenopathy  Skin:     General: Skin is warm and dry  Capillary Refill: Capillary refill takes less than 2 seconds  Neurological:      General: No focal deficit present  Mental Status: He is alert and oriented to person, place, and time  Cranial Nerves: Cranial nerves are intact  Sensory: Sensation is intact  Motor: Motor function is intact  Coordination: Coordination is intact  Gait: Gait is intact  Psychiatric:         Attention and Perception: Attention and perception normal          Mood and Affect: Mood and affect normal          Speech: Speech normal          Behavior: Behavior normal  Behavior is cooperative  Thought Content:  Thought content normal          Cognition and Memory: Cognition normal          Judgment: Judgment normal

## 2022-12-21 DIAGNOSIS — Z12.11 SCREENING FOR COLORECTAL CANCER: Primary | ICD-10-CM

## 2022-12-21 DIAGNOSIS — Z12.12 SCREENING FOR COLORECTAL CANCER: Primary | ICD-10-CM

## 2022-12-30 DIAGNOSIS — I10 PRIMARY HYPERTENSION: ICD-10-CM

## 2022-12-30 RX ORDER — AMLODIPINE BESYLATE 2.5 MG/1
TABLET ORAL
Qty: 90 TABLET | Refills: 1 | Status: SHIPPED | OUTPATIENT
Start: 2022-12-30 | End: 2023-01-06 | Stop reason: SDUPTHER

## 2023-01-06 ENCOUNTER — OFFICE VISIT (OUTPATIENT)
Dept: FAMILY MEDICINE CLINIC | Facility: CLINIC | Age: 58
End: 2023-01-06

## 2023-01-06 VITALS
OXYGEN SATURATION: 95 % | TEMPERATURE: 96.3 F | SYSTOLIC BLOOD PRESSURE: 148 MMHG | HEIGHT: 70 IN | HEART RATE: 100 BPM | DIASTOLIC BLOOD PRESSURE: 78 MMHG | WEIGHT: 241 LBS | RESPIRATION RATE: 18 BRPM | BODY MASS INDEX: 34.5 KG/M2

## 2023-01-06 DIAGNOSIS — I10 PRIMARY HYPERTENSION: ICD-10-CM

## 2023-01-06 DIAGNOSIS — E66.9 CLASS 1 OBESITY WITH SERIOUS COMORBIDITY AND BODY MASS INDEX (BMI) OF 32.0 TO 32.9 IN ADULT, UNSPECIFIED OBESITY TYPE: Primary | ICD-10-CM

## 2023-01-06 RX ORDER — AMLODIPINE BESYLATE 2.5 MG/1
5 TABLET ORAL DAILY
Qty: 90 TABLET | Refills: 1
Start: 2023-01-06

## 2023-01-06 RX ORDER — AMLODIPINE BESYLATE 2.5 MG/1
2.5 TABLET ORAL DAILY
Qty: 90 TABLET | Refills: 1
Start: 2023-01-06 | End: 2023-01-06 | Stop reason: SDUPTHER

## 2023-01-06 NOTE — PROGRESS NOTES
Assessment/Plan:    Problem List Items Addressed This Visit     Class 1 obesity with body mass index (BMI) of 32 0 to 32 9 in adult - Primary   Other Visit Diagnoses     Primary hypertension        Relevant Medications    amLODIPine (NORVASC) 2 5 mg tablet           Diagnoses and all orders for this visit:    Class 1 obesity with serious comorbidity and body mass index (BMI) of 32 0 to 32 9 in adult, unspecified obesity type    Primary hypertension  -     Discontinue: amLODIPine (NORVASC) 2 5 mg tablet; Take 1 tablet (2 5 mg total) by mouth daily  -     amLODIPine (NORVASC) 2 5 mg tablet; Take 2 tablets (5 mg total) by mouth daily      No problem-specific Assessment & Plan notes found for this encounter  Subjective:      Patient ID: Isidro Guerin is a 59 y o  male  Patient presents for month follow-up for hypertension  He switched the patient from olmesartan to valsartan which improved patient's blood pressure to the 140s over 80s   1 month ago he started amlodipine 2 5 mg, note diastolic pressure has decreased but systolic pressure still remains elevated  Will increase amlodipine to 5 mg daily continue with the valsartan 160 mg daily patient will follow up with NSG in 2 to 4 weeks for BP check  Hypertension  This is a chronic problem  The problem is uncontrolled  There are no associated agents to hypertension  Risk factors for coronary artery disease include obesity, male gender and dyslipidemia  Past treatments include calcium channel blockers and angiotensin blockers  The current treatment provides moderate improvement  There are no compliance problems  There is no history of angina or CAD/MI  There is no history of chronic renal disease, hyperaldosteronism, hyperparathyroidism, a hypertension causing med, renovascular disease or a thyroid problem         The following portions of the patient's history were reviewed and updated as appropriate:   He has a past medical history of Closed extra-articular fracture of distal end of right tibia (1/28/2020), Closed nondisplaced oblique fracture of shaft of right fibula (1/28/2020), Foot fracture, and Hypertension  ,  does not have any pertinent problems on file  ,   has a past surgical history that includes No past surgeries and CHOLECYSTECTOMY LAPAROSCOPIC (N/A, 5/18/2022)  ,  family history includes ALS in his mother; No Known Problems in his sister; Pneumonia in his father  ,   reports that he quit smoking about 6 years ago  His smoking use included cigarettes  He has never used smokeless tobacco  He reports current alcohol use  He reports that he does not currently use drugs  ,  is allergic to pollen extract     Current Outpatient Medications   Medication Sig Dispense Refill   • amLODIPine (NORVASC) 2 5 mg tablet Take 2 tablets (5 mg total) by mouth daily 90 tablet 1   • atorvastatin (LIPITOR) 20 mg tablet TAKE 1 TABLET BY MOUTH EVERY DAY 90 tablet 2   • valsartan (DIOVAN) 160 mg tablet Take 1 tablet (160 mg total) by mouth daily 90 tablet 1   • gabapentin (NEURONTIN) 100 mg capsule Take 1 capsule (100 mg total) by mouth in the morning and 1 capsule (100 mg total) in the evening and 1 capsule (100 mg total) before bedtime  Do all this for 7 days  (Patient not taking: Reported on 10/14/2022) 21 capsule 0     No current facility-administered medications for this visit  BMI Counseling: Body mass index is 34 58 kg/m²  The BMI is above normal  Nutrition recommendations include decreasing portion sizes, consuming healthier snacks, limiting drinks that contain sugar, moderation in carbohydrate intake, increasing intake of lean protein, reducing intake of saturated and trans fat and reducing intake of cholesterol  Exercise recommendations include exercising 3-5 times per week  No pharmacotherapy was ordered  Rationale for BMI follow-up plan is due to patient being overweight or obese  Review of Systems   All other systems reviewed and are negative  Objective:  Vitals:    01/06/23 1233   BP: 148/78   BP Location: Left arm   Cuff Size: Large   Pulse: 100   Resp: 18   Temp: (!) 96 3 °F (35 7 °C)   SpO2: 95%   Weight: 109 kg (241 lb)   Height: 5' 10" (1 778 m)     Body mass index is 34 58 kg/m²  Physical Exam  Vitals and nursing note reviewed  Constitutional:       Appearance: Normal appearance  He is well-developed  He is obese  Interventions: Face mask in place  HENT:      Head: Normocephalic and atraumatic  Right Ear: Tympanic membrane, ear canal and external ear normal       Left Ear: Tympanic membrane, ear canal and external ear normal       Nose: Nose normal       Mouth/Throat:      Mouth: Mucous membranes are moist       Pharynx: Uvula midline  Eyes:      General: Lids are normal       Conjunctiva/sclera: Conjunctivae normal       Pupils: Pupils are equal, round, and reactive to light  Neck:      Thyroid: No thyroid mass  Vascular: No JVD  Trachea: Trachea and phonation normal    Cardiovascular:      Rate and Rhythm: Normal rate and regular rhythm  Pulses: Normal pulses  Heart sounds: Normal heart sounds, S1 normal and S2 normal  No murmur heard  No friction rub  No gallop  Pulmonary:      Effort: Pulmonary effort is normal       Breath sounds: Normal breath sounds  Abdominal:      General: Bowel sounds are normal       Palpations: Abdomen is soft  Tenderness: There is no abdominal tenderness  Genitourinary:     Comments: Deferred  Musculoskeletal:         General: Normal range of motion  Cervical back: Full passive range of motion without pain, normal range of motion and neck supple  Right lower leg: No edema  Left lower leg: No edema  Lymphadenopathy:      Head:      Right side of head: No submental, submandibular, tonsillar, preauricular, posterior auricular or occipital adenopathy        Left side of head: No submental, submandibular, tonsillar, preauricular, posterior auricular or occipital adenopathy  Cervical: No cervical adenopathy  Skin:     General: Skin is warm and dry  Capillary Refill: Capillary refill takes less than 2 seconds  Neurological:      General: No focal deficit present  Mental Status: He is alert and oriented to person, place, and time  Sensory: Sensation is intact  Motor: Motor function is intact  Coordination: Coordination is intact  Gait: Gait is intact  Psychiatric:         Attention and Perception: Attention and perception normal          Mood and Affect: Mood and affect normal          Speech: Speech normal          Behavior: Behavior normal  Behavior is cooperative  Thought Content:  Thought content normal          Cognition and Memory: Cognition normal          Judgment: Judgment normal

## 2023-01-27 ENCOUNTER — CLINICAL SUPPORT (OUTPATIENT)
Dept: FAMILY MEDICINE CLINIC | Facility: CLINIC | Age: 58
End: 2023-01-27

## 2023-01-27 VITALS — DIASTOLIC BLOOD PRESSURE: 78 MMHG | HEART RATE: 95 BPM | SYSTOLIC BLOOD PRESSURE: 134 MMHG

## 2023-01-27 DIAGNOSIS — I10 HYPERTENSION, UNSPECIFIED TYPE: Primary | ICD-10-CM

## 2023-02-27 ENCOUNTER — OFFICE VISIT (OUTPATIENT)
Dept: FAMILY MEDICINE CLINIC | Facility: CLINIC | Age: 58
End: 2023-02-27

## 2023-02-27 VITALS
SYSTOLIC BLOOD PRESSURE: 136 MMHG | BODY MASS INDEX: 34.5 KG/M2 | OXYGEN SATURATION: 97 % | WEIGHT: 241 LBS | TEMPERATURE: 97.6 F | HEIGHT: 70 IN | HEART RATE: 104 BPM | DIASTOLIC BLOOD PRESSURE: 82 MMHG

## 2023-02-27 DIAGNOSIS — Z12.5 SCREENING FOR PROSTATE CANCER: ICD-10-CM

## 2023-02-27 DIAGNOSIS — Z12.11 SCREENING FOR COLORECTAL CANCER: ICD-10-CM

## 2023-02-27 DIAGNOSIS — I10 BENIGN ESSENTIAL HTN: ICD-10-CM

## 2023-02-27 DIAGNOSIS — E66.9 CLASS 1 OBESITY WITH SERIOUS COMORBIDITY AND BODY MASS INDEX (BMI) OF 32.0 TO 32.9 IN ADULT, UNSPECIFIED OBESITY TYPE: ICD-10-CM

## 2023-02-27 DIAGNOSIS — Z12.12 SCREENING FOR COLORECTAL CANCER: ICD-10-CM

## 2023-02-27 DIAGNOSIS — Z13.29 SCREENING FOR THYROID DISORDER: ICD-10-CM

## 2023-02-27 DIAGNOSIS — R73.03 PREDIABETES: ICD-10-CM

## 2023-02-27 DIAGNOSIS — I10 PRIMARY HYPERTENSION: Primary | ICD-10-CM

## 2023-02-27 DIAGNOSIS — Z12.5 SCREENING FOR MALIGNANT NEOPLASM OF PROSTATE: ICD-10-CM

## 2023-02-27 DIAGNOSIS — E78.00 ELEVATED LDL CHOLESTEROL LEVEL: ICD-10-CM

## 2023-02-27 DIAGNOSIS — Z13.0 SCREENING FOR DEFICIENCY ANEMIA: ICD-10-CM

## 2023-02-27 PROBLEM — K83.09 CHOLANGITIS: Status: RESOLVED | Noted: 2022-05-16 | Resolved: 2023-02-27

## 2023-02-27 RX ORDER — AMLODIPINE BESYLATE 5 MG/1
5 TABLET ORAL DAILY
Qty: 90 TABLET | Refills: 1 | Status: SHIPPED | OUTPATIENT
Start: 2023-02-27 | End: 2023-02-28

## 2023-02-27 RX ORDER — VALSARTAN 160 MG/1
160 TABLET ORAL DAILY
Qty: 90 TABLET | Refills: 1 | Status: SHIPPED | OUTPATIENT
Start: 2023-02-27

## 2023-02-27 RX ORDER — ATORVASTATIN CALCIUM 20 MG/1
20 TABLET, FILM COATED ORAL DAILY
Qty: 90 TABLET | Refills: 1 | Status: SHIPPED | OUTPATIENT
Start: 2023-02-27

## 2023-02-27 NOTE — PROGRESS NOTES
[facility]    NAME: Loraine New  AGE: 59 y o  SEX: male  : 1965     DATE: 2023     Assessment and Plan:     Problem List Items Addressed This Visit     Class 1 obesity with body mass index (BMI) of 32 0 to 32 9 in adult    Elevated LDL cholesterol level    Relevant Medications    atorvastatin (LIPITOR) 20 mg tablet    Other Relevant Orders    Lipid Panel with Direct LDL reflex    Benign essential HTN    Relevant Medications    amLODIPine (NORVASC) 5 mg tablet    valsartan (DIOVAN) 160 mg tablet    Prediabetes    Relevant Orders    Comprehensive metabolic panel   Other Visit Diagnoses     Primary hypertension    -  Primary    Relevant Medications    amLODIPine (NORVASC) 5 mg tablet    valsartan (DIOVAN) 160 mg tablet    Screening for colorectal cancer        Relevant Orders    Ambulatory referral for colonoscopy    Screening for thyroid disorder        Relevant Orders    TSH, 3rd generation with Free T4 reflex    Screening for deficiency anemia        Relevant Orders    CBC and differential    Screening for prostate cancer        Screening for malignant neoplasm of prostate        Relevant Orders    PSA, Total Screen          Immunizations and preventive care screenings were discussed with patient today  Appropriate education was printed on patient's after visit summary  Counseling:  Alcohol/drug use: discussed moderation in alcohol intake, the recommendations for healthy alcohol use, and avoidance of illicit drug use  Dental Health: discussed importance of regular tooth brushing, flossing, and dental visits  Injury prevention: discussed safety/seat belts, safety helmets, smoke detectors, carbon dioxide detectors, and smoking near bedding or upholstery    Sexual health: discussed sexually transmitted diseases, partner selection, use of condoms, avoidance of unintended pregnancy, and contraceptive alternatives  Exercise: the importance of regular exercise/physical activity was discussed  Recommend exercise 3-5 times per week for at least 30 minutes  Return in about 6 months (around 8/27/2023)  Chief Complaint:     Chief Complaint   Patient presents with   • Annual Exam      History of Present Illness:     Adult Annual Physical   Patient here for a comprehensive physical exam  The patient reports no problems  Hypertension  This is a chronic problem  The problem is controlled  There are no associated agents to hypertension  Risk factors for coronary artery disease include male gender and obesity  Past treatments include calcium channel blockers and angiotensin blockers  The current treatment provides moderate improvement  There are no compliance problems  There is no history of angina, CAD/MI or heart failure  There is no history of chronic renal disease, hyperaldosteronism, hyperparathyroidism, a hypertension causing med, renovascular disease or a thyroid problem  Diet and Physical Activity  Diet/Nutrition: well balanced diet and consuming 3-5 servings of fruits/vegetables daily  Exercise: moderate cardiovascular exercise, strength training exercises, 3-4 times a week on average and 30-60 minutes on average  Depression Screening  PHQ-2/9 Depression Screening         General Health  Sleep: sleeps well and gets more than 8 hours of sleep on average  Hearing: requires use of hearing aids  Vision: wears glasses  Dental: regular dental visits          Health  Symptoms include: none     Review of Systems:     Review of Systems   Past Medical History:     Past Medical History:   Diagnosis Date   • Cholangitis 5/16/2022   • Closed extra-articular fracture of distal end of right tibia 1/28/2020   • Closed nondisplaced oblique fracture of shaft of right fibula 1/28/2020   • Foot fracture    • Hypertension       Past Surgical History:     Past Surgical History:   Procedure Laterality Date   • CHOLECYSTECTOMY LAPAROSCOPIC N/A 2022    Procedure: CHOLECYSTECTOMY LAPAROSCOPIC;  Surgeon: Shani Jim MD;  Location: [facility];  Service: General   • NO PAST SURGERIES        Family History:     Family History   Problem Relation Age of Onset   • ALS Mother    • Pneumonia Father    • No Known Problems Sister       Social History:     Social History     Socioeconomic History   • Marital status: Single     Spouse name: None   • Number of children: None   • Years of education: None   • Highest education level: None   Occupational History   • Occupation: Unemployed   Tobacco Use   • Smoking status: Former     Types: Cigarettes     Quit date:      Years since quittin 1   • Smokeless tobacco: Never   Vaping Use   • Vaping Use: Never used   Substance and Sexual Activity   • Alcohol use: Yes     Comment: glass of wine at dinner   • Drug use: Not Currently   • Sexual activity: None   Other Topics Concern   • None   Social History Narrative    ** Merged History Encounter **          Social Determinants of Health     Financial Resource Strain: Not on file   Food Insecurity: No Food Insecurity   • Worried About Running Out of Food in the Last Year: Never true   • Ran Out of Food in the Last Year: Never true   Transportation Needs: No Transportation Needs   • Lack of Transportation (Medical): No   • Lack of Transportation (Non-Medical):  No   Physical Activity: Not on file   Stress: Not on file   Social Connections: Not on file   Intimate Partner Violence: Not on file   Housing Stability: Unknown   • Unable to Pay for Housing in the Last Year: No   • Number of Places Lived in the Last Year: Not on file   • Unstable Housing in the Last Year: No      Current Medications:     Current Outpatient Medications   Medication Sig Dispense Refill   • amLODIPine (NORVASC) 5 mg tablet Take 1 tablet (5 mg total) by mouth daily 90 tablet 1   • atorvastatin (LIPITOR) 20 mg tablet Take 1 tablet (20 mg total) by mouth daily 90 tablet 1   • valsartan (DIOVAN) 160 mg tablet Take 1 tablet (160 mg total) by mouth daily 90 tablet 1     No current facility-administered medications for this visit  Allergies:      Allergies   Allergen Reactions   • Pollen Extract Allergic Rhinitis      Physical Exam:     /82   Pulse 104   Temp 97 6 °F (36 4 °C) (Tympanic)   Ht 5' 10" (1 778 m)   Wt 109 kg (241 lb)   SpO2 97%   BMI 34 58 kg/m²     Physical Exam     Amy Arita, [address] [facility]

## 2023-07-19 ENCOUNTER — APPOINTMENT (OUTPATIENT)
Dept: RADIOLOGY | Facility: CLINIC | Age: 58
End: 2023-07-19
Payer: COMMERCIAL

## 2023-07-19 ENCOUNTER — OFFICE VISIT (OUTPATIENT)
Dept: FAMILY MEDICINE CLINIC | Facility: CLINIC | Age: 58
End: 2023-07-19
Payer: COMMERCIAL

## 2023-07-19 VITALS
HEART RATE: 104 BPM | OXYGEN SATURATION: 91 % | DIASTOLIC BLOOD PRESSURE: 86 MMHG | WEIGHT: 245 LBS | TEMPERATURE: 96.4 F | HEIGHT: 70 IN | BODY MASS INDEX: 35.07 KG/M2 | SYSTOLIC BLOOD PRESSURE: 154 MMHG

## 2023-07-19 DIAGNOSIS — R05.3 CHRONIC COUGH: ICD-10-CM

## 2023-07-19 DIAGNOSIS — R05.3 CHRONIC COUGH: Primary | ICD-10-CM

## 2023-07-19 DIAGNOSIS — R09.82 PND (POST-NASAL DRIP): ICD-10-CM

## 2023-07-19 PROCEDURE — 99213 OFFICE O/P EST LOW 20 MIN: CPT | Performed by: NURSE PRACTITIONER

## 2023-07-19 PROCEDURE — 71046 X-RAY EXAM CHEST 2 VIEWS: CPT

## 2023-07-19 RX ORDER — FLUTICASONE PROPIONATE 50 MCG
1 SPRAY, SUSPENSION (ML) NASAL DAILY
Qty: 16 G | Refills: 0 | Status: SHIPPED | OUTPATIENT
Start: 2023-07-19

## 2023-07-19 NOTE — PROGRESS NOTES
Assessment/Plan:    Problem List Items Addressed This Visit    None  Visit Diagnoses     Chronic cough    -  Primary    Relevant Orders    XR chest pa & lateral    PND (post-nasal drip)        Relevant Medications    Misc Natural Product Nasal (Nasal Cleanse Rinse Mix) PACK    fluticasone (FLONASE) 50 mcg/act nasal spray           Diagnoses and all orders for this visit:    Chronic cough  -     XR chest pa & lateral; Future    PND (post-nasal drip)  -     Misc Natural Product Nasal (Nasal Cleanse Rinse Mix) PACK; 1 Container into each nostril 3 (three) times a day as needed (nasal congestion)  -     fluticasone (FLONASE) 50 mcg/act nasal spray; 1 spray into each nostril daily        No problem-specific Assessment & Plan notes found for this encounter. Subjective:      Patient ID: Usha Beltran is a 59 y.o. male. Cough  This is a chronic problem. The current episode started more than 1 month ago. The problem has been rapidly worsening. The problem occurs constantly. The cough is productive of sputum (white or yellow). Associated symptoms include postnasal drip, shortness of breath and wheezing (especially in AM). Pertinent negatives include no ear congestion, fever, headaches, hemoptysis, nasal congestion, rhinorrhea, sore throat or sweats. The symptoms are aggravated by other (going outside). He has tried cool air (mucinex, allegra) for the symptoms. The treatment provided mild relief. The following portions of the patient's history were reviewed and updated as appropriate:   He has a past medical history of Cholangitis (5/16/2022), Closed extra-articular fracture of distal end of right tibia (1/28/2020), Closed nondisplaced oblique fracture of shaft of right fibula (1/28/2020), Foot fracture, and Hypertension. ,  does not have any pertinent problems on file. ,   has a past surgical history that includes No past surgeries and CHOLECYSTECTOMY LAPAROSCOPIC (N/A, 5/18/2022). ,  family history includes ALS in his mother; No Known Problems in his sister; Pneumonia in his father. ,   reports that he quit smoking about 6 years ago. His smoking use included cigarettes. He has a 2.50 pack-year smoking history. He has never used smokeless tobacco. He reports current alcohol use of about 7.0 standard drinks of alcohol per week. He reports that he does not currently use drugs. ,  is allergic to pollen extract. .  Current Outpatient Medications   Medication Sig Dispense Refill   • amLODIPine (NORVASC) 5 mg tablet TAKE 1 TABLET (5 MG TOTAL) BY MOUTH DAILY. 90 tablet 1   • atorvastatin (LIPITOR) 20 mg tablet Take 1 tablet (20 mg total) by mouth daily 90 tablet 1   • fluticasone (FLONASE) 50 mcg/act nasal spray 1 spray into each nostril daily 16 g 0   • Misc Natural Product Nasal (Nasal Cleanse Rinse Mix) PACK 1 Container into each nostril 3 (three) times a day as needed (nasal congestion) 1 each 0   • valsartan (DIOVAN) 160 mg tablet Take 1 tablet (160 mg total) by mouth daily 90 tablet 1     No current facility-administered medications for this visit. Review of Systems   Constitutional: Negative for fever. HENT: Positive for postnasal drip. Negative for rhinorrhea and sore throat. Respiratory: Positive for cough, shortness of breath and wheezing (especially in AM). Negative for hemoptysis. Neurological: Negative for headaches. All other systems reviewed and are negative. Objective:  Vitals:    07/19/23 1221   BP: 154/86   BP Location: Left arm   Patient Position: Sitting   Cuff Size: Adult   Pulse: 104   Temp: (!) 96.4 °F (35.8 °C)   TempSrc: Tympanic   SpO2: 91%   Weight: 111 kg (245 lb)   Height: 5' 10" (1.778 m)     Body mass index is 35.15 kg/m². Physical Exam  Vitals and nursing note reviewed. Constitutional:       Appearance: Normal appearance. He is well-developed. HENT:      Head: Normocephalic and atraumatic.       Right Ear: Tympanic membrane, ear canal and external ear normal. Left Ear: Tympanic membrane, ear canal and external ear normal.      Nose: Nose normal.      Mouth/Throat:      Mouth: Mucous membranes are moist.      Pharynx: Uvula midline. Eyes:      General: Lids are normal.      Conjunctiva/sclera: Conjunctivae normal.      Pupils: Pupils are equal, round, and reactive to light. Neck:      Thyroid: No thyroid mass. Vascular: No JVD. Trachea: Trachea and phonation normal.   Cardiovascular:      Rate and Rhythm: Normal rate and regular rhythm. Pulses: Normal pulses. Heart sounds: Normal heart sounds, S1 normal and S2 normal. No murmur heard. No friction rub. No gallop. Pulmonary:      Effort: Pulmonary effort is normal. No respiratory distress. Breath sounds: Rhonchi present. Abdominal:      General: Bowel sounds are normal.      Palpations: Abdomen is soft. Tenderness: There is no abdominal tenderness. Genitourinary:     Comments: Deferred  Musculoskeletal:         General: Normal range of motion. Cervical back: Full passive range of motion without pain, normal range of motion and neck supple. Right lower leg: No edema. Left lower leg: No edema. Lymphadenopathy:      Head:      Right side of head: No submental, submandibular, tonsillar, preauricular, posterior auricular or occipital adenopathy. Left side of head: No submental, submandibular, tonsillar, preauricular, posterior auricular or occipital adenopathy. Cervical: No cervical adenopathy. Skin:     General: Skin is warm and dry. Capillary Refill: Capillary refill takes less than 2 seconds. Neurological:      General: No focal deficit present. Mental Status: He is alert and oriented to person, place, and time. Sensory: Sensation is intact. Motor: Motor function is intact. Coordination: Coordination is intact. Gait: Gait is intact.    Psychiatric:         Attention and Perception: Attention and perception normal. Mood and Affect: Mood and affect normal.         Speech: Speech normal.         Behavior: Behavior normal. Behavior is cooperative. Thought Content: Thought content normal.         Cognition and Memory: Cognition normal.         Judgment: Judgment normal.           Portions of the record may have been created with voice recognition software. Occasional wrong word or "sound a like" substitutions may have occurred due to the inherent limitations of voice recognition software. Read the chart carefully and recognize, using context, where substitutions have occurred. Contact me with any questions.

## 2023-07-25 ENCOUNTER — TELEPHONE (OUTPATIENT)
Dept: FAMILY MEDICINE CLINIC | Facility: CLINIC | Age: 58
End: 2023-07-25

## 2023-07-25 NOTE — TELEPHONE ENCOUNTER
Patient is still experiencing chronic cough, was seen on 07/19, had an xray completed, would like to know what the next steps would be?

## 2023-07-28 DIAGNOSIS — R09.82 PND (POST-NASAL DRIP): ICD-10-CM

## 2023-07-28 DIAGNOSIS — R05.3 CHRONIC COUGH: Primary | ICD-10-CM

## 2023-08-14 ENCOUNTER — CONSULT (OUTPATIENT)
Dept: PULMONOLOGY | Facility: CLINIC | Age: 58
End: 2023-08-14
Payer: COMMERCIAL

## 2023-08-14 VITALS
WEIGHT: 247.8 LBS | BODY MASS INDEX: 35.48 KG/M2 | OXYGEN SATURATION: 93 % | HEART RATE: 94 BPM | SYSTOLIC BLOOD PRESSURE: 132 MMHG | TEMPERATURE: 97.7 F | DIASTOLIC BLOOD PRESSURE: 76 MMHG | HEIGHT: 70 IN

## 2023-08-14 DIAGNOSIS — J20.9 ACUTE BRONCHITIS, UNSPECIFIED ORGANISM: Primary | ICD-10-CM

## 2023-08-14 DIAGNOSIS — R05.3 CHRONIC COUGH: ICD-10-CM

## 2023-08-14 PROCEDURE — 99214 OFFICE O/P EST MOD 30 MIN: CPT | Performed by: INTERNAL MEDICINE

## 2023-08-14 RX ORDER — AZELASTINE 1 MG/ML
1 SPRAY, METERED NASAL 2 TIMES DAILY
Qty: 30 ML | Refills: 5 | Status: SHIPPED | OUTPATIENT
Start: 2023-08-14

## 2023-08-14 RX ORDER — BUDESONIDE AND FORMOTEROL FUMARATE DIHYDRATE 160; 4.5 UG/1; UG/1
2 AEROSOL RESPIRATORY (INHALATION) 2 TIMES DAILY
Qty: 10.2 G | Refills: 5 | Status: SHIPPED | OUTPATIENT
Start: 2023-08-14

## 2023-08-14 RX ORDER — PREDNISONE 20 MG/1
TABLET ORAL
Qty: 19 TABLET | Refills: 0 | Status: SHIPPED | OUTPATIENT
Start: 2023-08-14 | End: 2023-08-27

## 2023-08-14 RX ORDER — AMOXICILLIN AND CLAVULANATE POTASSIUM 875; 125 MG/1; MG/1
1 TABLET, FILM COATED ORAL EVERY 12 HOURS SCHEDULED
Qty: 14 TABLET | Refills: 0 | Status: SHIPPED | OUTPATIENT
Start: 2023-08-14 | End: 2023-08-21

## 2023-08-14 NOTE — PROGRESS NOTES
Pulmonary Consultation   Isa Toure 59 y.o. male MRN: [id number]  8/14/2023      Assessment:  Subacute cough  · Associated with wheezing/rhinorrhea/sinusitis like symptoms  · Suspect acute bronchitis/possibly on background of chronic bronchitis  · Possible contribution from URI/sinusitis  · + Moderate inspiratory/expiratory wheeze, upper airway congestion/nasal congestion  · Cough is productive of white/yellow sputum    Plan:  · Treatment for acute/chronic bronchitis  · Augmentin for 7 days, prednisone taper  · Sputum cultures/given collection cup  · Continue Flonase/Allegra, added Astelin nasal spray  · Add trial of ICS/LABA Symbicort 160, advised to watch YouTube/online videos on how to use, rinse mouth following each intake  · Check complete pulmonary function test in 1 months, and TTE given the orthopnea  · Close follow-up in 2 weeks, if still with nasal congestion needs ENT referral and possible sinus x-ray    Positive DONG screen  · We will order sleep study after next visit    Return in about 2 weeks (around 8/28/2023). History of Present Illness     New Consult for: chronic cough/dyspnea on exertion      Background  59 y.o. male with a h/o prediabetes, class 1 obesity, HTN, mild alcohol abuse, former brief tobacco abuse    Presented today for initial evaluation of by pulmonary    Noted acute/subacute cough about 6 weeks ago. Productive to yellow/clear/white sputum. No fever or chills. No inciting factors. Reports worse when exposure to temperature variation. No prior history of asthma/or COPD. Smoked briefly few cigarettes per day for 10 years, quit in early 2000. Symptoms associated with wheezing/chest congestion. Also reported URI-like symptoms/including rhinorrhea, nasal congestion, decreased hearing. Tried Flonase/Allegra with partial relief. Also reported chronic orthopnea for several months, cannot lay flat, no lower extremity edema.   Wife reports chronic loud snoring/witnessed periods of apnea. Never tested for sleep apnea before    Social/exposure history  Originally from [place], moved to [place] 2 years ago  Smoked briefly few cigarettes per day for 10 years, quit in 2008  Drinks alcohol daily 4-5 beers for several years  Lives in a new house no exposure to mold  Has 1 dog for 13 years  Used to work in constructions    Review of Systems  As per hpi, all other systems reviewed and were negative. Studies:  Imaging and other studies: I have personally reviewed pertinent films in PACS  Chest x-ray 7/19/2023-clear lung fields    Pulmonary function testing:       EKG, Pathology, and Other Studies: I have personally reviewed pertinent reports. Historical Information   Past Medical History:   Diagnosis Date   • Cholangitis 5/16/2022   • Closed extra-articular fracture of distal end of right tibia 1/28/2020   • Closed nondisplaced oblique fracture of shaft of right fibula 1/28/2020   • Foot fracture    • Hypertension      Past Surgical History:   Procedure Laterality Date   • CHOLECYSTECTOMY LAPAROSCOPIC N/A 5/18/2022    Procedure: CHOLECYSTECTOMY LAPAROSCOPIC;  Surgeon: Lisa Cook MD;  Location: [facility];  Service: General   • NO PAST SURGERIES       Family History   Problem Relation Age of Onset   • ALS Mother    • Pneumonia Father    • No Known Problems Sister          Medications/Allergies: Reviewed    Vitals: Blood pressure 132/76, pulse 94, temperature 97.7 °F (36.5 °C), temperature source Temporal, height 5' 10" (1.778 m), weight 112 kg (247 lb 12.8 oz), SpO2 93 %. Body mass index is 35.56 kg/m². Oxygen Therapy  SpO2: 93 %  Oxygen Therapy: None (Room air)      Physical Exam  Body mass index is 35.56 kg/m².    Gen: not in acute distress, obese  Neck/Eyes: supple, PERRL  Ear: normal appearance, + hearing impairment, mild wax in the external canals  Nose:  + Congested nasal mucosa, mild clear drainage  Mouth:  unremarkable/normal appearance of lips, teeth and gums  Salivary glands: soft nontender  Chest: normal respiratory efforts, moderate bilateral inspiratory/expiratory wheezing  CV: RRR, no murmurs appreciated, no edema  Abdomen: soft, non tender  Extremities:  No observed deformity   Skin: unremarkable  Neuro: AAO X3, no focal motor deficit         Labs:  Lab Results   Component Value Date    WBC 7.82 09/23/2022    HGB 15.7 09/23/2022    HCT 50.2 (H) 09/23/2022    MCV 95 09/23/2022     09/23/2022     Lab Results   Component Value Date    CALCIUM 10.2 (H) 09/23/2022    K 4.0 09/23/2022    CO2 26 09/23/2022     09/23/2022    BUN 8 09/23/2022    CREATININE 0.92 09/23/2022     No results found for: "IGE"  Lab Results   Component Value Date    ALT 35 09/23/2022    AST 34 09/23/2022    ALKPHOS 86 09/23/2022           Portions of the record may have been created with voice recognition software. Occasional wrong word or "sound a like" substitutions may have occurred due to the inherent limitations of voice recognition software. Read the chart carefully and recognize, using context, where substitutions have occurred    Yao Andrade M.D.   [facility]

## 2023-08-15 ENCOUNTER — APPOINTMENT (OUTPATIENT)
Dept: LAB | Facility: CLINIC | Age: 58
End: 2023-08-15
Payer: COMMERCIAL

## 2023-08-15 DIAGNOSIS — R05.3 CHRONIC COUGH: ICD-10-CM

## 2023-08-15 DIAGNOSIS — J20.9 ACUTE BRONCHITIS, UNSPECIFIED ORGANISM: ICD-10-CM

## 2023-08-15 PROCEDURE — 87205 SMEAR GRAM STAIN: CPT

## 2023-08-15 PROCEDURE — 87070 CULTURE OTHR SPECIMN AEROBIC: CPT

## 2023-08-17 LAB
BACTERIA SPT RESP CULT: ABNORMAL
GRAM STN SPEC: ABNORMAL

## 2023-09-12 DIAGNOSIS — R09.82 PND (POST-NASAL DRIP): ICD-10-CM

## 2023-09-12 RX ORDER — FLUTICASONE PROPIONATE 50 MCG
SPRAY, SUSPENSION (ML) NASAL
Qty: 24 ML | Refills: 1 | Status: SHIPPED | OUTPATIENT
Start: 2023-09-12

## 2023-09-14 ENCOUNTER — HOSPITAL ENCOUNTER (OUTPATIENT)
Dept: PULMONOLOGY | Facility: HOSPITAL | Age: 58
End: 2023-09-14
Attending: INTERNAL MEDICINE
Payer: COMMERCIAL

## 2023-09-14 ENCOUNTER — HOSPITAL ENCOUNTER (OUTPATIENT)
Dept: NON INVASIVE DIAGNOSTICS | Facility: HOSPITAL | Age: 58
Discharge: HOME | End: 2023-09-14
Attending: INTERNAL MEDICINE
Payer: COMMERCIAL

## 2023-09-14 VITALS
HEIGHT: 70 IN | BODY MASS INDEX: 35.36 KG/M2 | WEIGHT: 247 LBS | DIASTOLIC BLOOD PRESSURE: 76 MMHG | HEART RATE: 86 BPM | SYSTOLIC BLOOD PRESSURE: 132 MMHG

## 2023-09-14 DIAGNOSIS — R05.3 CHRONIC COUGH: ICD-10-CM

## 2023-09-14 DIAGNOSIS — J20.9 ACUTE BRONCHITIS, UNSPECIFIED ORGANISM: ICD-10-CM

## 2023-09-14 LAB
AORTIC ROOT: 3.7 CM
APICAL FOUR CHAMBER EJECTION FRACTION: 75 %
ASCENDING AORTA: 3.4 CM
E WAVE DECELERATION TIME: 270 MS
E/A RATIO: 0.78
FRACTIONAL SHORTENING: 30 (ref 28–44)
INTERVENTRICULAR SEPTUM IN DIASTOLE (PARASTERNAL SHORT AXIS VIEW): 1.4 CM
INTERVENTRICULAR SEPTUM: 1.4 CM (ref 0.6–1.1)
IVC: 17 MM
LAAS-AP2: 12 CM2
LAAS-AP4: 9.7 CM2
LEFT ATRIUM SIZE: 3.7 CM
LEFT ATRIUM VOLUME (MOD BIPLANE): 21 ML
LEFT ATRIUM VOLUME INDEX (MOD BIPLANE): 9.2
LEFT INTERNAL DIMENSION IN SYSTOLE: 3.2 CM (ref 2.1–4)
LEFT VENTRICULAR INTERNAL DIMENSION IN DIASTOLE: 4.6 CM (ref 3.5–6)
LEFT VENTRICULAR POSTERIOR WALL IN END DIASTOLE: 1.4 CM
LEFT VENTRICULAR STROKE VOLUME: 57 ML
LVSV (TEICH): 57 ML
MV E'TISSUE VEL-SEP: 6 CM/S
MV PEAK A VEL: 0.74 M/S
MV PEAK E VEL: 58 CM/S
MV STENOSIS PRESSURE HALF TIME: 78 MS
MV VALVE AREA P 1/2 METHOD: 2.8
RIGHT ATRIUM AREA SYSTOLE A4C: 8.7 CM2
RIGHT VENTRICLE ID DIMENSION: 3.1 CM
SL CV LEFT ATRIUM LENGTH A2C: 4.2 CM
SL CV LV EF: 65
SL CV PED ECHO LEFT VENTRICLE DIASTOLIC VOLUME (MOD BIPLANE) 2D: 98 ML
SL CV PED ECHO LEFT VENTRICLE SYSTOLIC VOLUME (MOD BIPLANE) 2D: 41 ML
TR MAX PG: 7 MMHG
TR PEAK VELOCITY: 1.3 M/S
TRICUSPID ANNULAR PLANE SYSTOLIC EXCURSION: 2.3 CM
TRICUSPID VALVE PEAK REGURGITATION VELOCITY: 1.3 M/S

## 2023-09-14 PROCEDURE — 94760 N-INVAS EAR/PLS OXIMETRY 1: CPT

## 2023-09-14 PROCEDURE — 94726 PLETHYSMOGRAPHY LUNG VOLUMES: CPT | Performed by: INTERNAL MEDICINE

## 2023-09-14 PROCEDURE — 94729 DIFFUSING CAPACITY: CPT | Performed by: INTERNAL MEDICINE

## 2023-09-14 PROCEDURE — 93306 TTE W/DOPPLER COMPLETE: CPT

## 2023-09-14 PROCEDURE — 94726 PLETHYSMOGRAPHY LUNG VOLUMES: CPT

## 2023-09-14 PROCEDURE — 94060 EVALUATION OF WHEEZING: CPT | Performed by: INTERNAL MEDICINE

## 2023-09-14 PROCEDURE — 94060 EVALUATION OF WHEEZING: CPT

## 2023-09-14 PROCEDURE — 94729 DIFFUSING CAPACITY: CPT

## 2023-09-14 PROCEDURE — 93306 TTE W/DOPPLER COMPLETE: CPT | Performed by: INTERNAL MEDICINE

## 2023-09-14 RX ORDER — ALBUTEROL SULFATE 2.5 MG/3ML
2.5 SOLUTION RESPIRATORY (INHALATION) ONCE AS NEEDED
Status: COMPLETED | OUTPATIENT
Start: 2023-09-14 | End: 2023-09-14

## 2023-09-14 RX ADMIN — ALBUTEROL SULFATE 2.5 MG: 2.5 SOLUTION RESPIRATORY (INHALATION) at 09:16

## 2023-09-19 ENCOUNTER — OFFICE VISIT (OUTPATIENT)
Dept: PULMONOLOGY | Facility: CLINIC | Age: 58
End: 2023-09-19
Payer: COMMERCIAL

## 2023-09-19 VITALS
WEIGHT: 249.6 LBS | BODY MASS INDEX: 35.73 KG/M2 | SYSTOLIC BLOOD PRESSURE: 128 MMHG | HEART RATE: 92 BPM | DIASTOLIC BLOOD PRESSURE: 64 MMHG | HEIGHT: 70 IN | OXYGEN SATURATION: 95 % | TEMPERATURE: 97.9 F

## 2023-09-19 DIAGNOSIS — G47.19 EXCESSIVE DAYTIME SLEEPINESS: ICD-10-CM

## 2023-09-19 DIAGNOSIS — J45.40 MODERATE PERSISTENT ASTHMA WITHOUT COMPLICATION: Primary | ICD-10-CM

## 2023-09-19 DIAGNOSIS — R05.3 CHRONIC COUGH: ICD-10-CM

## 2023-09-19 PROCEDURE — 99214 OFFICE O/P EST MOD 30 MIN: CPT

## 2023-09-19 RX ORDER — ALBUTEROL SULFATE 90 UG/1
2 AEROSOL, METERED RESPIRATORY (INHALATION) EVERY 6 HOURS PRN
Qty: 18 G | Refills: 3 | Status: SHIPPED | OUTPATIENT
Start: 2023-09-19

## 2023-09-19 RX ORDER — MOMETASONE FUROATE AND FORMOTEROL FUMARATE DIHYDRATE 200; 5 UG/1; UG/1
2 AEROSOL RESPIRATORY (INHALATION) 2 TIMES DAILY
Qty: 13 G | Refills: 1 | Status: SHIPPED | OUTPATIENT
Start: 2023-09-19

## 2023-09-19 NOTE — ASSESSMENT & PLAN NOTE
Patient's wife reports excessive snoring and witnessed apneas. Patient reports excessive daytime fatigue with frequent napping during the day. Highly suspect DONG given patient's symptoms, large neck, and obesity. Will order home sleep study and follow-up with patient with those results.

## 2023-09-19 NOTE — PROGRESS NOTES
Pulmonary Follow Up Note  Gurpreet Odonnell 59 y.o. male MRN: [id number]  9/19/2023    Assessment:    Excessive daytime sleepiness  Patient's wife reports excessive snoring and witnessed apneas. Patient reports excessive daytime fatigue with frequent napping during the day. Highly suspect DONG given patient's symptoms, large neck, and obesity. Will order home sleep study and follow-up with patient with those results. Chronic cough  Continues with daily chronic cough that is worse in the morning and worse at night. We will start patient on inhaler therapy as below. Moderate persistent asthma without complication  Reviewed recent PFTs with patient and his wife. Results demonstrate a moderate obstruction with FEV1 49% and ratio 67% with significant bronchodilator response. This is consistent with asthma. He had normal diffusion capacity. Unfortunately, patient has not been on Symbicort as prescribed at his last visit due to cost.      Recommend patient be on daily ICS/LABA. Will switch to [facility] 200-5 mcg/act 2 puffs twice daily, reminded patient to rinse mouth after use. Also start on albuterol rescue inhaler 2 puffs every 6 hours as needed for shortness of breath or wheezing. Check [facility] allergy panel. If elevated IgE, may benefit from Singulair. We will follow-up in 6 weeks to review patient's response. Plan:    Diagnoses and all orders for this visit:    Moderate persistent asthma without complication  -     mometasone-formoterol (Dulera) 200-5 MCG/ACT inhaler; Inhale 2 puffs 2 (two) times a day Rinse mouth after use. -     albuterol (Ventolin HFA) 90 mcg/act inhaler; Inhale 2 puffs every 6 (six) hours as needed for wheezing or shortness of breath  -     [facility] Allergy Panel, Adult; Future    Chronic cough    Excessive daytime sleepiness  -     Home Study; Future          Return in about 6 weeks (around 10/31/2023).       History of Present Illness     Chief Complaint:   Chief Complaint   Patient presents with   • Follow-up       Patient ID: Margaret Cabral is a 59 y.o. y.o. male has a past medical history of Cholangitis (5/16/2022), Closed extra-articular fracture of distal end of right tibia (1/28/2020), Closed nondisplaced oblique fracture of shaft of right fibula (1/28/2020), Foot fracture, and Hypertension. 9/19/2023  HPI: Minnie Malin is a 59 y.o. male with a past medical history significant for hypertension, prediabetes, obesity, mild alcohol abuse, and former brief tobacco use who is here today for a follow-up visit. He is accompanied by his wife. He was previously seen in the office 1 month ago as a new patient consult for chronic cough and WILKS. He had no prior history of asthma or COPD. He had been experiencing a subacute cough that started 6 weeks prior to the initial consult. Cough was productive with clear to white sputum, and was also having wheezing and chest congestion. Also with orthopnea. He was sent for an echocardiogram, sputum culture, PFTs. Treated with Augmentin and prednisone taper for bronchitis. Continued on Flonase, Allegra, and Astelin nasal spray. Started on trial of Symbicort 160. Patient's sputum culture negative for Staph aureus/MRSA or Pseudomonas aeruginosa. Showed mixed respiratory chad. No acute findings on echocardiogram.  PFTs consistent with asthma. Patient states he is feeling better than his last office visit, but still experiences shortness of breath with talking. Does not have significant shortness of breath with walking and light exercise which he performs a few times per week. Still with occasional wheezing and productive cough that is worse in the morning and at night. No URI symptoms. Unfortunately, patient has not been on any inhalers as Symbicort was too expensive at his pharmacy. Wife still reports excessive snoring and witnessed apneas. Patient also takes frequent naps during the day.         Review of Systems Constitutional: Negative for activity change, chills, fever and unexpected weight change. HENT: Negative for congestion, postnasal drip, rhinorrhea, sore throat and trouble swallowing. Respiratory: Positive for cough, shortness of breath and wheezing. Negative for chest tightness. Cardiovascular: Negative for chest pain, palpitations and leg swelling. Allergic/Immunologic: Negative. Psychiatric/Behavioral: Positive for sleep disturbance. Historical Information   Past Medical History:   Diagnosis Date   • Cholangitis 5/16/2022   • Closed extra-articular fracture of distal end of right tibia 1/28/2020   • Closed nondisplaced oblique fracture of shaft of right fibula 1/28/2020   • Foot fracture    • Hypertension      Past Surgical History:   Procedure Laterality Date   • CHOLECYSTECTOMY LAPAROSCOPIC N/A 5/18/2022    Procedure: CHOLECYSTECTOMY LAPAROSCOPIC;  Surgeon: Cherie Miller MD;  Location: [facility];  Service: General   • NO PAST SURGERIES       Family History   Problem Relation Age of Onset   • ALS Mother    • Pneumonia Father    • No Known Problems Sister        Smoking history: He reports that he quit smoking about 6 years ago. His smoking use included cigarettes. He has a 2.50 pack-year smoking history. He has never used smokeless tobacco.    Occupational History:     Immunization History   Administered Date(s) Administered   • COVID-19 MODERNA VACC 0.5 ML IM 04/30/2021, 05/28/2021   • INFLUENZA 10/14/2022   • Influenza, recombinant, quadrivalent,injectable, preservative free 09/17/2020, 01/11/2022, 10/14/2022       Meds/Allergies     Current Outpatient Medications:   •  albuterol (Ventolin HFA) 90 mcg/act inhaler, Inhale 2 puffs every 6 (six) hours as needed for wheezing or shortness of breath, Disp: 18 g, Rfl: 3  •  amLODIPine (NORVASC) 5 mg tablet, TAKE 1 TABLET (5 MG TOTAL) BY MOUTH DAILY. , Disp: 90 tablet, Rfl: 1  •  atorvastatin (LIPITOR) 20 mg tablet, Take 1 tablet (20 mg total) by mouth daily, Disp: 90 tablet, Rfl: 1  •  azelastine (ASTELIN) 0.1 % nasal spray, 1 spray into each nostril 2 (two) times a day Use in each nostril as directed, Disp: 30 mL, Rfl: 5  •  fluticasone (FLONASE) 50 mcg/act nasal spray, SPRAY 1 SPRAY INTO EACH NOSTRIL EVERY DAY, Disp: 24 mL, Rfl: 1  •  mometasone-formoterol (Dulera) 200-5 MCG/ACT inhaler, Inhale 2 puffs 2 (two) times a day Rinse mouth after use., Disp: 13 g, Rfl: 1  •  valsartan (DIOVAN) 160 mg tablet, Take 1 tablet (160 mg total) by mouth daily, Disp: 90 tablet, Rfl: 1  •  Misc Natural Product Nasal (Nasal Cleanse Rinse Mix) PACK, 1 Container into each nostril 3 (three) times a day as needed (nasal congestion) (Patient not taking: Reported on 9/19/2023), Disp: 1 each, Rfl: 0  Allergies: Allergies   Allergen Reactions   • Pollen Extract Allergic Rhinitis         Vitals:  Vitals:    09/19/23 0819   BP: 128/64   BP Location: Left arm   Patient Position: Sitting   Cuff Size: Large   Pulse: 92   Temp: 97.9 °F (36.6 °C)   TempSrc: Temporal   SpO2: 95%   Weight: 113 kg (249 lb 9.6 oz)   Height: 5' 10" (1.778 m)   Oxygen Therapy  SpO2: 95 %  . Wt Readings from Last 3 Encounters:   09/19/23 113 kg (249 lb 9.6 oz)   09/14/23 112 kg (247 lb)   08/14/23 112 kg (247 lb 12.8 oz)     Body mass index is 35.81 kg/m². Physical Exam  Vitals and nursing note reviewed. Constitutional:       General: He is not in acute distress. Appearance: Normal appearance. He is obese. Comments: Large neck   Cardiovascular:      Rate and Rhythm: Normal rate and regular rhythm. Heart sounds: Normal heart sounds. No murmur heard. Pulmonary:      Effort: Pulmonary effort is normal. No respiratory distress. Breath sounds: Decreased breath sounds present. No wheezing, rhonchi or rales. Comments: Clear, but slightly diminished breath sounds throughout. Musculoskeletal:         General: No swelling. Right lower leg: No edema.       Left lower leg: No edema. Psychiatric:         Mood and Affect: Mood and affect normal.         Behavior: Behavior normal. Behavior is cooperative. Labs: I have personally reviewed pertinent lab results. Lab Results   Component Value Date    WBC 7.82 09/23/2022    HGB 15.7 09/23/2022    HCT 50.2 (H) 09/23/2022    MCV 95 09/23/2022     09/23/2022     Lab Results   Component Value Date    CALCIUM 10.2 (H) 09/23/2022    K 4.0 09/23/2022    CO2 26 09/23/2022     09/23/2022    BUN 8 09/23/2022    CREATININE 0.92 09/23/2022     No results found for: "IGE"  Lab Results   Component Value Date    ALT 35 09/23/2022    AST 34 09/23/2022    ALKPHOS 86 09/23/2022       Imaging and other studies: I have personally reviewed pertinent reports and I have personally reviewed pertinent films in PACS     Echo 9/14/2023  Normal left ventricular cavity size. Wall thickness increased. Moderate concentric hypertrophy. The left ventricular ejection fraction is 65 to 70%. Systolic function is normal.  Wall motion is normal.  Diastolic function is mildly abnormal, consistent with grade 1 relaxation. Mitral valve annular calcification. Aortic root is mildly dilated. Ascending aorta is normal in size. The aortic root is 3.70 cm. The ascending aorta is 3.4 cm. Pulmonary function testing:     Pulmonary Functions Testing Results: 9/14/23    FEV1/FVC ratio 67%    FEV1 49% predicted  FVC 56% predicted  Significant response to bronchodilators  TLC 79 % predicted   % predicted  DLCO corrected for hemoglobin 89 % predicted    Impression: Reduced total lung capacity at 79% predicted with normal residual volume and thoracic gas volume. Moderate airflow limitation. Significant improvement in airflow and vital capacity following the administration of bronchodilators. Normal diffusion capacity. Increased airway resistance as indicated by the specific airway conductance.

## 2023-09-19 NOTE — ASSESSMENT & PLAN NOTE
Continues with daily chronic cough that is worse in the morning and worse at night. We will start patient on inhaler therapy as below.

## 2023-09-19 NOTE — ASSESSMENT & PLAN NOTE
Reviewed recent PFTs with patient and his wife. Results demonstrate a moderate obstruction with FEV1 49% and ratio 67% with significant bronchodilator response. This is consistent with asthma. He had normal diffusion capacity. Unfortunately, patient has not been on Symbicort as prescribed at his last visit due to cost.      Recommend patient be on daily ICS/LABA. Will switch to [facility] 200-5 mcg/act 2 puffs twice daily, reminded patient to rinse mouth after use. Also start on albuterol rescue inhaler 2 puffs every 6 hours as needed for shortness of breath or wheezing. Check [facility] allergy panel. If elevated IgE, may benefit from Singulair. We will follow-up in 6 weeks to review patient's response.

## 2023-10-09 DIAGNOSIS — R09.82 PND (POST-NASAL DRIP): ICD-10-CM

## 2023-10-09 RX ORDER — FLUTICASONE PROPIONATE 50 MCG
SPRAY, SUSPENSION (ML) NASAL
Qty: 48 ML | Refills: 1 | Status: SHIPPED | OUTPATIENT
Start: 2023-10-09

## 2023-10-31 ENCOUNTER — IMMUNIZATIONS (OUTPATIENT)
Dept: FAMILY MEDICINE CLINIC | Facility: CLINIC | Age: 58
End: 2023-10-31
Payer: COMMERCIAL

## 2023-10-31 DIAGNOSIS — Z23 ENCOUNTER FOR IMMUNIZATION: Primary | ICD-10-CM

## 2023-10-31 PROCEDURE — 90471 IMMUNIZATION ADMIN: CPT

## 2023-10-31 PROCEDURE — 90686 IIV4 VACC NO PRSV 0.5 ML IM: CPT

## 2023-11-06 ENCOUNTER — TELEPHONE (OUTPATIENT)
Dept: SLEEP CENTER | Facility: CLINIC | Age: 58
End: 2023-11-06

## 2023-11-06 NOTE — TELEPHONE ENCOUNTER
----- Message from Giles Plunkett MD sent at 11/6/2023  6:03 AM EST -----  Approved    ----- Message -----  From: [facility]  Sent: 10/27/2023   1:55 PM EST  To: Sleep Medicine [place] Provider    This Home sleep study needs approval.     If approved please sign and return to clerical pool. If denied please include reasons why. Also provide alternative testing if warranted. Please sign and return to clerical pool.

## 2023-11-16 DIAGNOSIS — I10 PRIMARY HYPERTENSION: ICD-10-CM

## 2023-11-16 DIAGNOSIS — J45.40 MODERATE PERSISTENT ASTHMA WITHOUT COMPLICATION: ICD-10-CM

## 2023-11-16 DIAGNOSIS — E78.00 ELEVATED LDL CHOLESTEROL LEVEL: ICD-10-CM

## 2023-11-16 RX ORDER — AMLODIPINE BESYLATE 5 MG/1
5 TABLET ORAL DAILY
Qty: 90 TABLET | Refills: 1 | Status: SHIPPED | OUTPATIENT
Start: 2023-11-16

## 2023-11-16 RX ORDER — ATORVASTATIN CALCIUM 20 MG/1
20 TABLET, FILM COATED ORAL DAILY
Qty: 90 TABLET | Refills: 1 | Status: SHIPPED | OUTPATIENT
Start: 2023-11-16

## 2023-11-16 RX ORDER — MOMETASONE FUROATE AND FORMOTEROL FUMARATE DIHYDRATE 200; 5 UG/1; UG/1
2 AEROSOL RESPIRATORY (INHALATION) 2 TIMES DAILY
Qty: 13 G | Refills: 1 | Status: SHIPPED | OUTPATIENT
Start: 2023-11-16

## 2024-02-15 DIAGNOSIS — I10 PRIMARY HYPERTENSION: ICD-10-CM

## 2024-02-15 RX ORDER — VALSARTAN 160 MG/1
160 TABLET ORAL DAILY
Qty: 90 TABLET | Refills: 1 | Status: SHIPPED | OUTPATIENT
Start: 2024-02-15

## 2024-02-28 DIAGNOSIS — R73.03 PREDIABETES: ICD-10-CM

## 2024-02-28 DIAGNOSIS — Z13.29 SCREENING FOR THYROID DISORDER: ICD-10-CM

## 2024-02-28 DIAGNOSIS — Z13.0 SCREENING FOR DEFICIENCY ANEMIA: ICD-10-CM

## 2024-02-28 DIAGNOSIS — E78.00 ELEVATED LDL CHOLESTEROL LEVEL: Primary | ICD-10-CM

## 2024-02-28 DIAGNOSIS — Z12.5 SCREENING FOR PROSTATE CANCER: ICD-10-CM

## 2024-02-28 PROBLEM — E66.812 CLASS 2 OBESITY WITH BODY MASS INDEX (BMI) OF 35.0 TO 35.9 IN ADULT: Status: ACTIVE | Noted: 2020-09-17

## 2024-03-05 ENCOUNTER — HOSPITAL ENCOUNTER (OUTPATIENT)
Dept: SLEEP CENTER | Facility: HOSPITAL | Age: 59
Discharge: HOME | End: 2024-03-05
Payer: COMMERCIAL

## 2024-03-05 DIAGNOSIS — G47.19 EXCESSIVE DAYTIME SLEEPINESS: ICD-10-CM

## 2024-03-05 PROCEDURE — G0399 HOME SLEEP TEST/TYPE 3 PORTA: HCPCS

## 2024-03-05 NOTE — PROGRESS NOTES
Home Sleep Study Documentation    HOME STUDY DEVICE: Noxturnal no                                           Karen G3 yes      Pre-Sleep Home Study:    Set-up and instructions performed by: Naila    Technician performed demonstration for Patient: yes    Return demonstration performed by Patient: yes    Written instructions provided to Patient: yes    Patient signed consent form: yes        Post-Sleep Home Study:    Additional comments by Patient: None    Home Sleep Study Failed:no:    Failure reason: N/A    Reported or Detected: N/A    Scored by: JELLY Brito

## 2024-03-06 PROBLEM — G47.33 OSA (OBSTRUCTIVE SLEEP APNEA): Status: ACTIVE | Noted: 2024-03-06

## 2024-03-08 DIAGNOSIS — G47.33 OSA (OBSTRUCTIVE SLEEP APNEA): Primary | ICD-10-CM

## 2024-03-08 PROCEDURE — 95806 SLEEP STUDY UNATT&RESP EFFT: CPT | Performed by: INTERNAL MEDICINE

## 2024-03-11 ENCOUNTER — APPOINTMENT (OUTPATIENT)
Dept: LAB | Facility: CLINIC | Age: 59
End: 2024-03-11
Payer: COMMERCIAL

## 2024-03-11 ENCOUNTER — TELEPHONE (OUTPATIENT)
Dept: SLEEP CENTER | Facility: CLINIC | Age: 59
End: 2024-03-11

## 2024-03-11 DIAGNOSIS — Z13.29 SCREENING FOR THYROID DISORDER: ICD-10-CM

## 2024-03-11 DIAGNOSIS — Z12.5 SCREENING FOR PROSTATE CANCER: ICD-10-CM

## 2024-03-11 DIAGNOSIS — R73.03 PREDIABETES: ICD-10-CM

## 2024-03-11 DIAGNOSIS — Z13.0 SCREENING FOR DEFICIENCY ANEMIA: ICD-10-CM

## 2024-03-11 DIAGNOSIS — E78.00 ELEVATED LDL CHOLESTEROL LEVEL: ICD-10-CM

## 2024-03-11 LAB
ALBUMIN SERPL BCP-MCNC: 4.5 G/DL (ref 3.5–5)
ALP SERPL-CCNC: 72 U/L (ref 34–104)
ALT SERPL W P-5'-P-CCNC: 19 U/L (ref 7–52)
ANION GAP SERPL CALCULATED.3IONS-SCNC: 11 MMOL/L
AST SERPL W P-5'-P-CCNC: 21 U/L (ref 13–39)
BASOPHILS # BLD AUTO: 0.06 THOUSANDS/ÂΜL (ref 0–0.1)
BASOPHILS NFR BLD AUTO: 1 % (ref 0–1)
BILIRUB SERPL-MCNC: 1.05 MG/DL (ref 0.2–1)
BUN SERPL-MCNC: 8 MG/DL (ref 5–25)
CALCIUM SERPL-MCNC: 9.2 MG/DL (ref 8.4–10.2)
CHLORIDE SERPL-SCNC: 98 MMOL/L (ref 96–108)
CHOLEST SERPL-MCNC: 148 MG/DL
CO2 SERPL-SCNC: 29 MMOL/L (ref 21–32)
CREAT SERPL-MCNC: 0.72 MG/DL (ref 0.6–1.3)
EOSINOPHIL # BLD AUTO: 0.45 THOUSAND/ÂΜL (ref 0–0.61)
EOSINOPHIL NFR BLD AUTO: 5 % (ref 0–6)
ERYTHROCYTE [DISTWIDTH] IN BLOOD BY AUTOMATED COUNT: 12.9 % (ref 11.6–15.1)
GFR SERPL CREATININE-BSD FRML MDRD: 101 ML/MIN/1.73SQ M
GLUCOSE P FAST SERPL-MCNC: 149 MG/DL (ref 65–99)
HCT VFR BLD AUTO: 47.5 % (ref 36.5–49.3)
HDLC SERPL-MCNC: 62 MG/DL
HGB BLD-MCNC: 15.8 G/DL (ref 12–17)
IMM GRANULOCYTES # BLD AUTO: 0.03 THOUSAND/UL (ref 0–0.2)
IMM GRANULOCYTES NFR BLD AUTO: 0 % (ref 0–2)
LDLC SERPL CALC-MCNC: 68 MG/DL (ref 0–100)
LYMPHOCYTES # BLD AUTO: 2.02 THOUSANDS/ÂΜL (ref 0.6–4.47)
LYMPHOCYTES NFR BLD AUTO: 23 % (ref 14–44)
MCH RBC QN AUTO: 30.2 PG (ref 26.8–34.3)
MCHC RBC AUTO-ENTMCNC: 33.3 G/DL (ref 31.4–37.4)
MCV RBC AUTO: 91 FL (ref 82–98)
MONOCYTES # BLD AUTO: 0.97 THOUSAND/ÂΜL (ref 0.17–1.22)
MONOCYTES NFR BLD AUTO: 11 % (ref 4–12)
NEUTROPHILS # BLD AUTO: 5.43 THOUSANDS/ÂΜL (ref 1.85–7.62)
NEUTS SEG NFR BLD AUTO: 60 % (ref 43–75)
NRBC BLD AUTO-RTO: 0 /100 WBCS
PLATELET # BLD AUTO: 225 THOUSANDS/UL (ref 149–390)
PMV BLD AUTO: 11 FL (ref 8.9–12.7)
POTASSIUM SERPL-SCNC: 4.4 MMOL/L (ref 3.5–5.3)
PROT SERPL-MCNC: 7.9 G/DL (ref 6.4–8.4)
PSA SERPL-MCNC: 0.8 NG/ML (ref 0–4)
RBC # BLD AUTO: 5.24 MILLION/UL (ref 3.88–5.62)
SODIUM SERPL-SCNC: 138 MMOL/L (ref 135–147)
TRIGL SERPL-MCNC: 91 MG/DL
TSH SERPL DL<=0.05 MIU/L-ACNC: 1.65 UIU/ML (ref 0.45–4.5)
WBC # BLD AUTO: 8.96 THOUSAND/UL (ref 4.31–10.16)

## 2024-03-11 PROCEDURE — G0103 PSA SCREENING: HCPCS

## 2024-03-11 PROCEDURE — 80053 COMPREHEN METABOLIC PANEL: CPT

## 2024-03-11 PROCEDURE — 85025 COMPLETE CBC W/AUTO DIFF WBC: CPT

## 2024-03-11 PROCEDURE — 80061 LIPID PANEL: CPT

## 2024-03-11 PROCEDURE — 84443 ASSAY THYROID STIM HORMONE: CPT

## 2024-03-11 PROCEDURE — 36415 COLL VENOUS BLD VENIPUNCTURE: CPT

## 2024-03-11 NOTE — TELEPHONE ENCOUNTER
----- Message from Bautista Zuniga DO sent at 3/8/2024 11:12 AM EST -----  Regarding: CPAP titraiton  Has significant apnea and hypoxia.  Please get in soon for titration.  Thanks.

## 2024-03-11 NOTE — TELEPHONE ENCOUNTER
Patient of CELI Hadley at [facility].    Home sleep study resulted, shows severe DONG (SHANTEL-89.3) with event related desaturations.  Periods of bradycardia noted.  CPAP titration study recommended.    Call placed to patient, spoke with spouse Mandi (communication consent in media), advised of above.    CPAP titration study scheduled 9/27/2024 in [place].  Added to wait list for sooner study in [place].  Teams message sent to notify staff of need for sooner study.

## 2024-03-13 ENCOUNTER — OFFICE VISIT (OUTPATIENT)
Dept: PULMONOLOGY | Facility: CLINIC | Age: 59
End: 2024-03-13
Payer: COMMERCIAL

## 2024-03-13 VITALS
HEART RATE: 97 BPM | DIASTOLIC BLOOD PRESSURE: 72 MMHG | OXYGEN SATURATION: 96 % | BODY MASS INDEX: 36.79 KG/M2 | HEIGHT: 70 IN | WEIGHT: 257 LBS | SYSTOLIC BLOOD PRESSURE: 140 MMHG | TEMPERATURE: 98.7 F

## 2024-03-13 DIAGNOSIS — J34.89 NASAL OBSTRUCTION: ICD-10-CM

## 2024-03-13 DIAGNOSIS — G47.33 OSA (OBSTRUCTIVE SLEEP APNEA): Primary | ICD-10-CM

## 2024-03-13 DIAGNOSIS — J45.40 MODERATE PERSISTENT ASTHMA WITHOUT COMPLICATION: ICD-10-CM

## 2024-03-13 DIAGNOSIS — E66.09 CLASS 2 OBESITY DUE TO EXCESS CALORIES WITH BODY MASS INDEX (BMI) OF 36.0 TO 36.9 IN ADULT, UNSPECIFIED WHETHER SERIOUS COMORBIDITY PRESENT: ICD-10-CM

## 2024-03-13 PROCEDURE — 99214 OFFICE O/P EST MOD 30 MIN: CPT

## 2024-03-13 RX ORDER — MOMETASONE FUROATE AND FORMOTEROL FUMARATE DIHYDRATE 200; 5 UG/1; UG/1
2 AEROSOL RESPIRATORY (INHALATION) 2 TIMES DAILY
Qty: 13 G | Refills: 5 | Status: SHIPPED | OUTPATIENT
Start: 2024-03-13

## 2024-03-13 NOTE — PROGRESS NOTES
Pulmonary Follow Up Note  Jr Mendoza 58 y.o. male MRN: [id number]  3/13/2024    Assessment:    Moderate persistent asthma without complication  -Symptoms stable without any recent exacerbations or need for rescue inhaler.  He remains on Dulera twice daily  -Encouraged patient to complete [facility] allergy panel to check for underlying environmental allergens and IgE  -Continue Dulera 200-5 mcg 2 puffs twice daily and albuterol PRN    DONG (obstructive sleep apnea)  -Reviewed recent home sleep study from 3/5/2024 with patient and his wife.  Showed severe DONG with SHANTEL of 89.3 and event related desaturations.  Lowest SpO2 63%.  Also periods of bradycardia noted  -Recommend CPAP titration study, scheduled for September 2024, however, he is on a wait list to expedite  -In the meanwhile, will order CPAP so there is no further delay in treatment  -Return in 60 to 90 days for compliance review    Nasal obstruction  -Patient has continued sensation of nasal obstruction.  On Flonase and Astelin nasal spray without significant relief  -Recommend patient return to ENT for evaluation.  He already follows with ENT for sensorineural hearing loss, last visit 1 year ago    Class 2 obesity with body mass index (BMI) of 36.0 to 36.9 in adult  -Discussed lifestyle modification and encouraged weight loss.  Offered weight management referral, however he declined.    -Wants to follow-up with his PCP tomorrow for further discussion of treatment options like Wegovy    Plan:    Diagnoses and all orders for this visit:    DONG (obstructive sleep apnea)  -     CPAP Auto New DME    Moderate persistent asthma without complication  -     mometasone-formoterol (Dulera) 200-5 MCG/ACT inhaler; Inhale 2 puffs 2 (two) times a day Rinse mouth after use.    Nasal obstruction    Class 2 obesity due to excess calories with body mass index (BMI) of 36.0 to 36.9 in adult, unspecified whether serious comorbidity present    Other orders  -     CPAP  Package            Return in about 3 months (around 6/13/2024).      History of Present Illness     Chief Complaint:   Chief Complaint   Patient presents with    Follow-up     Patient is still having some breathing issues       Patient ID: Jr is a 58 y.o. y.o. male has a past medical history of Cholangitis (5/16/2022), Closed extra-articular fracture of distal end of right tibia (1/28/2020), Closed nondisplaced oblique fracture of shaft of right fibula (1/28/2020), Foot fracture, and Hypertension.      3/13/2024  HPI: Jr Mendoza is a 58 y.o. male with a past medical history significant for hypertension, prediabetes, obesity, mild alcohol abuse, and former brief tobacco use who is here today for a follow-up visit.  He is accompanied by his wife.  He was previously seen in the office in September 2023.  Due to his excessive daytime sleepiness, he had a home sleep study on 3/5/2024 which showed severe obstructive sleep apnea with SHANTEL of 89.3 and event related desaturations.  Recommend CPAP titration study which has been scheduled for September 2024. At his last office visit, he was switched from Symbicort to Dulera inhaler due to insurance coverage and ordered [facility] allergy panel which was not completed.    Patient states he is doing good on the Dulera inhaler.  Not needing to use his albuterol inhaler.  Has had no exacerbations of his asthma since his last office visit.  Using Astelin and Flonase nasal spray and still having sensation of nasal obstruction.  He really wants to go back on oral steroids.  He was given steroids and antibiotics in August 2023 for URI/sinusitis, felt much better during treatment.  He denies any sinus pain, fevers, chills, rhinorrhea, wheezing, chest pain or chest tightness.  Patient still having excessive daytime sleepiness.  His wife reports he sleeps sometimes up to 16 hours/day.         Review of Systems   Constitutional:  Negative for activity change, chills, fever and  unexpected weight change.   HENT:  Negative for congestion, postnasal drip, rhinorrhea, sore throat and trouble swallowing.    Respiratory:  Negative for cough, chest tightness, shortness of breath and wheezing.    Cardiovascular:  Negative for chest pain, palpitations and leg swelling.   Allergic/Immunologic: Negative.        Historical Information   Past Medical History:   Diagnosis Date    Cholangitis 5/16/2022    Closed extra-articular fracture of distal end of right tibia 1/28/2020    Closed nondisplaced oblique fracture of shaft of right fibula 1/28/2020    Foot fracture     Hypertension      Past Surgical History:   Procedure Laterality Date    CHOLECYSTECTOMY LAPAROSCOPIC N/A 5/18/2022    Procedure: CHOLECYSTECTOMY LAPAROSCOPIC;  Surgeon: Zachary Kern MD;  Location: [facility];  Service: General    NO PAST SURGERIES       Family History   Problem Relation Age of Onset    ALS Mother     Pneumonia Father     No Known Problems Sister        Smoking history: He reports that he quit smoking about 7 years ago. His smoking use included cigarettes. He started smoking about 17 years ago. He has a 2.5 pack-year smoking history. He has never used smokeless tobacco.    Occupational History:     Immunization History   Administered Date(s) Administered    COVID-19 MODERNA VACC 0.5 ML IM 04/30/2021, 05/28/2021    INFLUENZA 10/14/2022    Influenza, injectable, quadrivalent, preservative free 0.5 mL 10/31/2023    Influenza, recombinant, quadrivalent,injectable, preservative free 09/17/2020, 01/11/2022, 10/14/2022       Meds/Allergies     Current Outpatient Medications:     albuterol (Ventolin HFA) 90 mcg/act inhaler, Inhale 2 puffs every 6 (six) hours as needed for wheezing or shortness of breath, Disp: 18 g, Rfl: 3    amLODIPine (NORVASC) 5 mg tablet, TAKE 1 TABLET (5 MG TOTAL) BY MOUTH DAILY., Disp: 90 tablet, Rfl: 1    atorvastatin (LIPITOR) 20 mg tablet, TAKE 1 TABLET BY MOUTH EVERY DAY, Disp: 90 tablet, Rfl: 1     "azelastine (ASTELIN) 0.1 % nasal spray, 1 spray into each nostril 2 (two) times a day Use in each nostril as directed, Disp: 30 mL, Rfl: 5    fluticasone (FLONASE) 50 mcg/act nasal spray, SPRAY 1 SPRAY INTO EACH NOSTRIL EVERY DAY, Disp: 48 mL, Rfl: 1    mometasone-formoterol (Dulera) 200-5 MCG/ACT inhaler, Inhale 2 puffs 2 (two) times a day Rinse mouth after use., Disp: 13 g, Rfl: 5    valsartan (DIOVAN) 160 mg tablet, TAKE 1 TABLET BY MOUTH EVERY DAY, Disp: 90 tablet, Rfl: 1    Misc Natural Product Nasal (Nasal Cleanse Rinse Mix) PACK, 1 Container into each nostril 3 (three) times a day as needed (nasal congestion) (Patient not taking: Reported on 9/19/2023), Disp: 1 each, Rfl: 0  Allergies:   Allergies   Allergen Reactions    Pollen Extract Allergic Rhinitis         Vitals:  Vitals:    03/13/24 1407   BP: 140/72   BP Location: Right arm   Patient Position: Sitting   Cuff Size: Adult   Pulse: 97   Temp: 98.7 °F (37.1 °C)   TempSrc: Temporal   SpO2: 96%   Weight: 117 kg (257 lb)   Height: 5' 10\" (1.778 m)     Oxygen Therapy  SpO2: 96 %  .  Wt Readings from Last 3 Encounters:   03/13/24 117 kg (257 lb)   09/19/23 113 kg (249 lb 9.6 oz)   09/14/23 112 kg (247 lb)     Body mass index is 36.88 kg/m².    Physical Exam  Vitals and nursing note reviewed.   Constitutional:       General: He is not in acute distress.     Appearance: Normal appearance. He is morbidly obese.      Comments: Large neck   Cardiovascular:      Rate and Rhythm: Normal rate and regular rhythm.      Heart sounds: Normal heart sounds. No murmur heard.  Pulmonary:      Effort: Pulmonary effort is normal. No respiratory distress.      Breath sounds: No decreased breath sounds, wheezing, rhonchi or rales.   Musculoskeletal:         General: No swelling.      Right lower leg: No edema.      Left lower leg: No edema.   Psychiatric:         Mood and Affect: Mood and affect normal.         Behavior: Behavior normal. Behavior is cooperative.           Labs: I " "have personally reviewed pertinent lab results.  Lab Results   Component Value Date    WBC 8.96 03/11/2024    HGB 15.8 03/11/2024    HCT 47.5 03/11/2024    MCV 91 03/11/2024     03/11/2024     Lab Results   Component Value Date    CALCIUM 9.2 03/11/2024    K 4.4 03/11/2024    CO2 29 03/11/2024    CL 98 03/11/2024    BUN 8 03/11/2024    CREATININE 0.72 03/11/2024     No results found for: \"IGE\"  Lab Results   Component Value Date    ALT 19 03/11/2024    AST 21 03/11/2024    ALKPHOS 72 03/11/2024       Imaging and other studies: I have personally reviewed pertinent reports and I have personally reviewed pertinent films in PACS     Home sleep study 3/5/2024  Severe obstructive sleep apnea with an SHANTEL of 89.3 and event related desaturations.  The lowest SpO2 recorded 63%.  Periods of bradycardia noted.  A CPAP titration study is recommended due to sleep apnea and hypoxemia.    Echo 9/14/2023  Normal left ventricular cavity size.  Wall thickness increased.  Moderate concentric hypertrophy.  The left ventricular ejection fraction is 65 to 70%.  Systolic function is normal.  Wall motion is normal.  Diastolic function is mildly abnormal, consistent with grade 1 relaxation.  Mitral valve annular calcification.  Aortic root is mildly dilated.  Ascending aorta is normal in size.  The aortic root is 3.70 cm.  The ascending aorta is 3.4 cm.      Pulmonary function testing:     Pulmonary Functions Testing Results: 9/14/23    FEV1/FVC ratio 67%    FEV1 49% predicted  FVC 56% predicted  Significant response to bronchodilators  TLC 79 % predicted   % predicted  DLCO corrected for hemoglobin 89 % predicted    Impression: Reduced total lung capacity at 79% predicted with normal residual volume and thoracic gas volume.  Moderate airflow limitation.  Significant improvement in airflow and vital capacity following the administration of bronchodilators.  Normal diffusion capacity.  Increased airway resistance as indicated by " the specific airway conductance.

## 2024-03-13 NOTE — ASSESSMENT & PLAN NOTE
-Symptoms stable without any recent exacerbations or need for rescue inhaler.  He remains on Dulera twice daily  -Encouraged patient to complete [facility] allergy panel to check for underlying environmental allergens and IgE  -Continue Dulera 200-5 mcg 2 puffs twice daily and albuterol PRN

## 2024-03-13 NOTE — ASSESSMENT & PLAN NOTE
-Reviewed recent home sleep study from 3/5/2024 with patient and his wife.  Showed severe DONG with SHANTEL of 89.3 and event related desaturations.  Lowest SpO2 63%.  Also periods of bradycardia noted  -Recommend CPAP titration study, scheduled for September 2024, however, he is on a wait list to expedite  -In the meanwhile, will order CPAP so there is no further delay in treatment  -Return in 60 to 90 days for compliance review

## 2024-03-13 NOTE — ASSESSMENT & PLAN NOTE
-Patient has continued sensation of nasal obstruction.  On Flonase and Astelin nasal spray without significant relief  -Recommend patient return to ENT for evaluation.  He already follows with ENT for sensorineural hearing loss, last visit 1 year ago

## 2024-03-13 NOTE — ASSESSMENT & PLAN NOTE
-Discussed lifestyle modification and encouraged weight loss.  Offered weight management referral, however he declined.    -Wants to follow-up with his PCP tomorrow for further discussion of treatment options like Wegovy

## 2024-03-14 ENCOUNTER — OFFICE VISIT (OUTPATIENT)
Dept: FAMILY MEDICINE CLINIC | Facility: CLINIC | Age: 59
End: 2024-03-14
Payer: COMMERCIAL

## 2024-03-14 VITALS
TEMPERATURE: 97.1 F | SYSTOLIC BLOOD PRESSURE: 142 MMHG | WEIGHT: 256 LBS | DIASTOLIC BLOOD PRESSURE: 78 MMHG | OXYGEN SATURATION: 95 % | HEIGHT: 70 IN | HEART RATE: 80 BPM | BODY MASS INDEX: 36.65 KG/M2

## 2024-03-14 DIAGNOSIS — Z13.5 ENCOUNTER FOR SCREENING FOR DIABETIC RETINOPATHY: ICD-10-CM

## 2024-03-14 DIAGNOSIS — N13.8 BPH WITH OBSTRUCTION/LOWER URINARY TRACT SYMPTOMS: ICD-10-CM

## 2024-03-14 DIAGNOSIS — Z23 ENCOUNTER FOR IMMUNIZATION: ICD-10-CM

## 2024-03-14 DIAGNOSIS — Z12.12 SCREENING FOR COLORECTAL CANCER: ICD-10-CM

## 2024-03-14 DIAGNOSIS — Z23 ENCOUNTER FOR VACCINATION: ICD-10-CM

## 2024-03-14 DIAGNOSIS — Z00.01 ABNORMAL PHYSICAL EVALUATION: Primary | ICD-10-CM

## 2024-03-14 DIAGNOSIS — N52.9 ERECTILE DYSFUNCTION, UNSPECIFIED ERECTILE DYSFUNCTION TYPE: ICD-10-CM

## 2024-03-14 DIAGNOSIS — N40.1 BPH WITH OBSTRUCTION/LOWER URINARY TRACT SYMPTOMS: ICD-10-CM

## 2024-03-14 DIAGNOSIS — Z12.11 SCREENING FOR COLORECTAL CANCER: ICD-10-CM

## 2024-03-14 DIAGNOSIS — L60.8 ONYCHOMADESIS OF TOENAIL: ICD-10-CM

## 2024-03-14 DIAGNOSIS — E11.9 TYPE 2 DIABETES MELLITUS WITHOUT COMPLICATION, WITHOUT LONG-TERM CURRENT USE OF INSULIN (HCC): ICD-10-CM

## 2024-03-14 LAB
LEFT EYE DIABETIC RETINOPATHY: NORMAL
LEFT EYE IMAGE QUALITY: NORMAL
LEFT EYE MACULAR EDEMA: NORMAL
LEFT EYE OTHER RETINOPATHY: NORMAL
RIGHT EYE DIABETIC RETINOPATHY: NORMAL
RIGHT EYE IMAGE QUALITY: NORMAL
RIGHT EYE MACULAR EDEMA: NORMAL
RIGHT EYE OTHER RETINOPATHY: NORMAL
SEVERITY (EYE EXAM): NORMAL
SL AMB POCT HEMOGLOBIN AIC: 7.1 (ref ?–6.5)

## 2024-03-14 PROCEDURE — 82043 UR ALBUMIN QUANTITATIVE: CPT | Performed by: NURSE PRACTITIONER

## 2024-03-14 PROCEDURE — 83036 HEMOGLOBIN GLYCOSYLATED A1C: CPT | Performed by: NURSE PRACTITIONER

## 2024-03-14 PROCEDURE — 90750 HZV VACC RECOMBINANT IM: CPT

## 2024-03-14 PROCEDURE — 90715 TDAP VACCINE 7 YRS/> IM: CPT

## 2024-03-14 PROCEDURE — 99396 PREV VISIT EST AGE 40-64: CPT | Performed by: NURSE PRACTITIONER

## 2024-03-14 PROCEDURE — 90472 IMMUNIZATION ADMIN EACH ADD: CPT

## 2024-03-14 PROCEDURE — 90471 IMMUNIZATION ADMIN: CPT

## 2024-03-14 PROCEDURE — 82570 ASSAY OF URINE CREATININE: CPT | Performed by: NURSE PRACTITIONER

## 2024-03-14 RX ORDER — TAMSULOSIN HYDROCHLORIDE 0.4 MG/1
0.4 CAPSULE ORAL
Qty: 90 CAPSULE | Refills: 3 | Status: SHIPPED | OUTPATIENT
Start: 2024-03-14

## 2024-03-14 RX ORDER — TERBINAFINE HYDROCHLORIDE 250 MG/1
250 TABLET ORAL DAILY
Qty: 90 TABLET | Refills: 0 | Status: SHIPPED | OUTPATIENT
Start: 2024-03-14 | End: 2024-06-12

## 2024-03-14 RX ORDER — SILDENAFIL 25 MG/1
25 TABLET, FILM COATED ORAL DAILY PRN
Qty: 10 TABLET | Refills: 0 | Status: SHIPPED | OUTPATIENT
Start: 2024-03-14

## 2024-03-14 NOTE — PATIENT INSTRUCTIONS
"10% - bad control\"> 10% - bad control,Hemoglobin A1c (HbA1c) greater than 10% indicating poor diabetic control,Haemoglobin A1c greater than 10% indicating poor diabetic control\">   Diabetes Mellitus Type 2 in Adults, Ambulatory Care   GENERAL INFORMATION:   Diabetes mellitus type 2  is a disease that affects how your body uses glucose (sugar). Insulin helps move sugar out of the blood so it can be used for energy. Normally, when the blood sugar level increases, the pancreas makes more insulin. Type 2 diabetes develops because either the body cannot make enough insulin, or it cannot use the insulin correctly. After many years, your pancreas may stop making insulin.  Common symptoms include the following:   More hunger or thirst than usual     Frequent urination     Weight loss without trying     Blurred vision  Seek immediate care for the following symptoms:   Severe abdominal pain, or pain that spreads to your back. You may also be vomiting.    Trouble staying awake or focusing    Shaking or sweating    Blurred or double vision    Breath has a fruity, sweet smell    Breathing is deep and labored, or rapid and shallow    Heartbeat is fast and weak  Treatment for diabetes mellitus type 2  includes keeping your blood sugar at a normal level. You must eat the right foods, and exercise regularly. You may also need medicine if you cannot control your blood sugar level with nutrition and exercise.  Manage diabetes mellitus type 2:   Check your blood sugar level.  You will be taught how to check a small drop of blood in a glucose monitor. Ask your healthcare provider when and how often to check during the day. Ask your healthcare provider what your blood sugar levels should be when you check them.     Keep track of carbohydrates (sugar and starchy foods).  Your blood sugar level can get too high if you eat too many carbohydrates. Your dietitian will help you plan meals and snacks that have the right amount of " carbohydrates.    Eat low-fat foods.  Some examples are skinless chicken and low-fat milk.     Eat less sodium (salt).  Some examples of high-sodium foods to limit are soy sauce, potato chips, and soup. Do not add salt to food you cook. Limit your use of table salt.    Eat high-fiber foods.  Foods that are a good source of fiber include vegetables, whole grain bread, and beans.    Limit alcohol.  Alcohol affects your blood sugar level and can make it harder to manage your diabetes. Women should limit alcohol to 1 drink a day. Men should limit alcohol to 2 drinks a day. A drink of alcohol is 12 ounces of beer, 5 ounces of wine, or 1½ ounces of liquor.     Get regular exercise.  Exercise can help keep your blood sugar level steady, decrease your risk of heart disease, and help you lose weight. Exercise for at least 30 minutes, 5 days a week. Include muscle strengthening activities 2 days each week. Work with your healthcare provider to create an exercise plan.    Check your feet each day  for injuries or open sores. Ask your healthcare provider for activities you can do if you have an open sore.    Quit smoking.  If you smoke, it is never too late to quit. Smoking can worsen the problems that may occur with diabetes. Ask your healthcare provider for information about how to stop smoking if you are having trouble quitting.     Ask about your weight:  Ask healthcare providers if you need to lose weight, and how much to lose. Ask them to help you with a weight loss program. Even a 10 to 15 pound weight loss can help you manage your blood sugar level.     Carry medical alert identification.  Wear medical alert jewelry or carry a card that says you have diabetes. Ask your healthcare provider where to get these items.     Ask about vaccines.  Diabetes puts you at risk of serious illness if you get the flu, pneumonia, or hepatitis. Ask your healthcare provider if you should get a flu, pneumonia, or hepatitis B vaccine, and  when to get the vaccine.  Follow up with your healthcare provider as directed:  Write down your questions so you remember to ask them during your visits.   CARE AGREEMENT:   You have the right to help plan your care. Learn about your health condition and how it may be treated. Discuss treatment options with your caregivers to decide what care you want to receive. You always have the right to refuse treatment. The above information is an  only. It is not intended as medical advice for individual conditions or treatments. Talk to your doctor, nurse or pharmacist before following any medical regimen to see if it is safe and effective for you.  © 2014 [employer]. Information is for End User's use only and may not be sold, redistributed or otherwise used for commercial purposes. All illustrations and images included in CareNotes® are the copyrighted property of [employer] or [employer].    Foot Care for People with Diabetes   WHAT YOU NEED TO KNOW:   What do I need to know about foot care?   Foot care helps protect your feet and prevent foot ulcers or sores. Long-term high blood sugar levels can damage the blood vessels and nerves in your legs and feet. This damage makes it hard to feel pressure, pain, temperature, and touch. You may not be able to feel a cut or sore, or shoes that are too tight. Foot care is needed to prevent serious problems, such as an infection or amputation.     Diabetes may cause your toes to become crooked or curved under. These changes may affect the way you walk and can lead to increased pressure on your foot. The pressure can decrease blood flow to your feet. Lack of blood flow increases your risk for a foot ulcer. Do not ignore small problems, such as dry skin or small wounds. These can become life-threatening over time without proper care.  How do I care for my feet?   Check your feet each day.  Look at your whole foot, including the bottom,  and between and under your toes. Check for wounds, corns, and calluses. Use a mirror to see the bottom of your feet. The skin on your feet may be shiny, tight, or darker than normal. Your feet may also be cold and pale. Feel your feet by running your hands along the tops, bottoms, sides, and between your toes. Redness, swelling, and warmth are signs of blood flow problems that can lead to a foot ulcer. Do not try to remove corns or calluses yourself.           Wash your feet each day with soap and warm water.  Do not use hot water, because this can injure your foot. Dry your feet gently with a towel after you wash them. Dry between and under your toes.     Apply lotion or a moisturizer on your dry feet.  Ask your healthcare provider what lotions are best to use. Do not put lotion or moisturizer between your toes.     Cut your toenails correctly.  File or cut your toenails straight across. Use a soft brush to clean around your toenails. If your toenails are very thick, you may need to have a healthcare provider or specialist cut them.     Protect your feet.  Do not walk barefoot or wear your shoes without socks. Check your shoes for rocks or other objects that can hurt your feet. Wear cotton socks to help keep your feet dry. Wear socks without toe seams, or wear them with the seams inside out. Change your socks each day. Do not wear socks that are dirty or damp.    Wear shoes that fit well.  Wear shoes that do not rub against any area of your feet. Your shoes should be ½ to ¾ inch (1 to 2 centimeters) longer than your feet. Your shoes should also have extra space around the widest part of your feet. Walking or athletic shoes with laces or straps that adjust are best. Ask your healthcare provider for help to choose shoes that fit you best. Ask him if you need to wear an insert, orthotic, or bandage on your feet.    Go to your follow-up visits.  Your healthcare provider will do a foot exam at least once a year. You may  need a foot exam more often if you have nerve damage, foot deformities, or ulcers. He will check for nerve damage and how well you can feel your feet. He will check your shoes to see if they fit well.  When should I contact my healthcare provider?   Your feet become numb, weak, or hard to move.     You have pus draining from a sore on your foot.     You have a wound on your foot that gets bigger, deeper, or does not heal.     You see blisters, cuts, scratches, calluses, or sores on your foot.     You have a fever, and your feet become red, warm, and swollen.     Your toenails become thick, curled, or yellow.     You find it hard to check your feet because your vision is poor.     You have questions or concerns about your condition or care.  CARE AGREEMENT:   You have the right to help plan your care. Learn about your health condition and how it may be treated. Discuss treatment options with your caregivers to decide what care you want to receive. You always have the right to refuse treatment. The above information is an  only. It is not intended as medical advice for individual conditions or treatments. Talk to your doctor, nurse or pharmacist before following any medical regimen to see if it is safe and effective for you.  © 2017 [employer] Information is for End User's use only and may not be sold, redistributed or otherwise used for commercial purposes. All illustrations and images included in CareNotes® are the copyrighted property of [employer] or [employer].    Meal Planning with Diabetes Exchanges   AMBULATORY CARE:   Diabetes exchanges  are servings of food that contain similar amounts of carbohydrate, fat, protein, and calories within a food group. The exchanges can be used to develop a healthy meal plan that helps to keep your blood sugar within the recommended levels. A meal plan with the right amount of carbohydrates is especially important. Your blood  sugar naturally rises after you eat carbohydrates. Too many carbohydrates in 1 meal or snack can raise your blood sugar level. Carbohydrates are found in starches, fruit, milk, yogurt, and sweets.  How to create a meal plan with exchanges:  A dietitian will work with you to develop a healthy meal plan that is right for you. This meal plan will include the amount of exchanges you can have from each food group throughout the day. Follow your meal plan by keeping track of the amount of exchanges you eat for each meal and snack. Your meal plan will be based on your age, weight, blood sugar levels, medicine, and activity level.   Starch food group exchanges:  Each exchange below contains about 15 grams of carbohydrate , 3 grams of protein, 1 gram of fat, and 80 calories.  1 ounce of white, whole wheat or rye bread (1 slice)    1 ounce of bagel (about ¼ of a bagel)    1 6-inch flour or corn tortilla or 1 4-inch pancake (about ¼ inch thick)    ? cup of cooked pasta or rice    ¾ cup of dry, ready-to-eat cereal with no sugar added     ½ cup of cooked cereal, such as oatmeal    3 sai cracker squares or 8 animal crackers    6 saltine-type crackers or     3 cups of popcorn or ¾ ounce of pretzels     Starchy vegetables and cooked legumes:      ½ cup of corn, green peas, sweet potatoes, or mashed potatoes     ¼ of a large baked potato     1 cup of acorn, butternut squash, or pumpkin     ½ cup of beans, lentils, or peas (such as kaur, kidney, or black-eyed)    ? cup of lima beans  Fruit group exchanges:  Each exchange contains about 15 grams of carbohydrate  and 60 calories.  1 small (4 ounce) apple, banana orange, or nectarine    ½ cup of canned or fresh fruit    ½ cup (4 ounces) of unsweetened fruit juice    2 tablespoons of dried fruit  Milk group exchanges:  Each exchange contains about 12 grams of carbohydrate  and 8 grams of protein. The amount of fat and calories in each serving depends on the type of milk (such as  whole, low-fat, or fat-free).  1 cup fat-free or low-fat milk    ¾ cup of plain, nonfat yogurt    1 cup fat-free, flavored yogurt with artificial (no calorie) sweetener  Non-starchy vegetable group exchanges:  Each exchange contains about 5 grams of carbohydrate , 2 grams of protein, and 25 calories. Examples include beets, broccoli, cabbage, carrots, cauliflower, cucumber, mushrooms, tomatoes, and zucchini.  ½ cup of cooked vegetables or 1 cup of raw vegetables     ½ cup of vegetable juice  Meat and meat substitute group exchanges:  Each exchange of a lean meat  listed below contains about 7 grams of protein, 0 to 3 grams of fat, and 45 calories. The meat and meat substitutes food group does not contain any carbohydrates. Medium and high-fat meats have more calories.  1 ounce of chicken or turkey without skin, or 1 ounce of fish (not breaded or fried)     1 ounce of lean beef, pork, or lamb     1-inch cube or 1 ounce of low-fat cheese     2 egg whites or ¼ cup of egg substitute     ½ cup of tofu  Sweets, desserts, and other carbohydrate group exchanges:   Sweets and other desserts:  Each exchange has about 15 grams of carbohydrate .    1 ounce of thom food cake or 2-inch square cake (unfrosted)    2 small cookies     ½ cup of sugar-free, fat-free ice cream    1 tablespoon of syrup, jam, jelly, table sugar, or honey    Combination foods:     1 cup of an entrée, such as lasagna, spaghetti with meatballs, macaroni and cheese, and chili with beans (each serving counts as 2 carbohydrate exchanges )     1 cup of tomato or vegetable beef soup (each serving counts as 1 carbohydrate exchange )  Fat group exchanges:  Each exchange contains 5 grams of fat and 45 calories.  1 teaspoon of oil (such as canola, olive, or corn oil)     6 almonds or cashews, 10 peanuts, or 4 pecan halves     2 tablespoons of avocado     ½ tablespoon of peanut butter     1 teaspoon of regular margarine or 2 teaspoons of low-fat margarine     1  teaspoon of regular butter or 1 tablespoon of low-fat butter     1 teaspoon of regular mayonnaise or 1 tablespoon of low-fat mayonnaise     1 tablespoon of regular salad dressing or 2 tablespoons of low-fat salad dressing  Free foods: The foods on this list are called free foods because they have very few calories. Free foods usually do not increase your blood sugar if you limit them.  1 tablespoon of catsup or taco sauce     ¼ cup of salsa     2 tablespoons of sugar-free syrup or 2 teaspoons of light jam or jelly     1 tablespoon of fat-free salad dressing     4 tablespoons of fat-free margarine or fat-free mayonnaise     Sugar-free drinks: diet soda, sugar-free drink mixes, or mineral water     Low-sodium bouillon or fat-free broth     Mustard     Seasonings such as spices, herbs, and garlic     Sugar-free gelatin without added fruit  Other healthy nutrition guidelines:   Eat more fiber.  Choose foods that are good sources of fiber, such as fruits, vegetables, and whole grains. Cereals that contain 5 or more grams of fiber per serving are good sources of fiber. Legumes such as garbanzo, kaur beans, kidney beans, and lentils are also good sources.     Limit fat.  Ask your dietitian or healthcare provider how much fat you should eat each day. Choose foods low in fat, saturated fat, trans fat, and cholesterol. Examples include turkey or chicken without the skin, fish, lean cuts of meat, and beans. Low-fat dairy foods, such as low-fat or fat-free milk and low-fat yogurt are also good choices. Omega-3 fatty acids are healthy fats that are found in canola oil, soybean oil and fatty fish. [place], albacore tuna, and sardines are good sources of omega 3 fatty acids. Eat 2 servings of these types of fish each week. Do not eat fried fish.     Limit sugar.  Sugar and sweets must be counted toward the carbohydrate exchanges that you can have within your meal plan. Limit sugar and sweets because they are usually also high in  calories and fat. Eat smaller portions of sweets by sharing a dessert or asking for a child-size portion at a restaurant.    Limit sodium  (salt) to about 2,300 mg per day. You may need to eat even less sodium if you have certain medical conditions. Foods high in sodium include soy sauce, potato chips, and soup.     Limit alcohol.  Ask your healthcare provider if it is safe for you to drink alcohol. If alcohol is safe for you to have, eat a meal when you drink alcohol. If you drink alcohol on an empty stomach, your blood sugar may drop to a low level. Women should limit alcohol to 1 drink per day. Men should limit alcohol to 2 drinks per day. A drink of alcohol is 5 ounces of wine, 12 ounces of beer, or 1½ ounces of liquor.  Other ways to manage your diabetes:   Control your blood sugar level.  Test your blood sugar level regularly and keep a record of the results. Ask your healthcare provider when and how often to test your blood sugar. You may need to check your blood sugar level at least 3 times each day.     Talk to your healthcare provider about your weight.  Ask if you need to lose weight, and how much you need to lose. If you are overweight, you may need to make other changes to lose weight. Ask your healthcare provider to help you create a weight loss program.     Exercise  can help to control your blood sugar levels and decrease your risk of heart disease. It can also help you lose or maintain your weight. Get at least 30 minutes of exercise, 5 times each week. Do resistance training (using weights) 2 times each week. Do not sit for longer than 90 minutes. Work with your healthcare provider to plan the best exercise program for you.  Contact your healthcare provider if:   You have high blood sugar levels during a certain time of day, or almost all of the time.    You often have low blood sugar levels.    You have questions or concerns about your condition or care.  © 2017 [employer]  Information is for End User's use only and may not be sold, redistributed or otherwise used for commercial purposes. All illustrations and images included in CareNotes® are the copyrighted property of [employer]. or [employer].  The above information is an  only. It is not intended as medical advice for individual conditions or treatments. Talk to your doctor, nurse or pharmacist before following any medical regimen to see if it is safe and effective for you.    Hemoglobin A1c   WHAT YOU NEED TO KNOW:   What is a hemoglobin A1c test, and why do I need one?  A hemoglobin A1c is a blood test that measures your average blood sugar level for the past 2 to 3 months. It is also called an HbA1c or glycohemoglobin test. An A1c test can help diagnose prediabetes or diabetes. It can also tell you how well your diabetes plan is working. A1c testing can help your healthcare provider make changes to your treatment plan. These changes can help improve or control your blood sugar levels. Good control of your blood sugar levels can decrease your risk for problems caused by diabetes. Examples include heart attack, stroke, blindness, kidney disease, and neuropathy (nerve problems).   What increases my risk for diabetes?  You may need an A1c test if you have any of the following risk factors for diabetes:  Heart disease    High blood pressure    Polycystic ovarian syndrome    A first-degree relative with diabetes, such as a parent, brother, sister, or child    Being overweight    Lack of exercise or activity     History of gestational diabetes and poor nutrition while pregnant  Who should get an A1c test?   Adults who are overweight and have 1 or more risk factors for diabetes    Adults 45 years of age or older    Children 10 to 18 years who are overweight and have 2 or more risk factors for diabetes    Anyone with signs or symptoms of diabetes, such as increased thirst, slow-healing infections, or  increased urination  What do the results of an A1c test mean?  The results are given in percentages.  An A1c of 5.6% or lower means you do not have diabetes.     An A1c of 5.7% to 6.4% means you are at risk for diabetes. This is also called prediabetes.     An A1c of 6.5% or higher means you have diabetes.     If you currently have diabetes in good control, your A1c goal may be 7% or lower. Your healthcare provider will decide what your goal should be. This decision is based on your diabetes history and other medical conditions. You may need an A1c test 2 to 4 times each year, depending on your blood sugar level control.  How should I prepare for the test?  You do not need to do anything to prepare for the test. Wear a short-sleeved or loose shirt to the test. This will make it easier to draw your blood. Other tests may be needed to diagnose or monitor diabetes if you have certain conditions. Tell your healthcare provider if you have any of the following:  Iron-deficiency or [id number] anemia    Cystic fibrosis    Recent heavy blood loss or a blood transfusion    Recent erythropoietin therapy    Sickle cell disease or thalassemia    Kidney failure or liver disease  What will happen after the test?  Schedule a follow-up appointment with your healthcare provider to talk about your test results.  If your A1c is lower than your goal , your medicines may be changed.     If your A1c is at your goal , you may not need any changes to your diabetes treatment plan.     If your A1c is higher than your goal , you may need changes to your medicines, eating plan, or exercise plan.  What else should I know about an A1c test?   You may get an estimated Average Glucose (eAG) with your A1c results. The eAG gives your A1c result in numbers like you see on your glucose meter. For example, an A1c of 6% will be reported as an eAG of 126 mg/dL. This means your average blood sugar level was 126 mg/dL over the last 2 to 3 months. The  eAG can help you understand if your A1c result is on target for what your healthcare provider recommends.     You are at risk for an uncommon type of hemoglobin if you are , Mediterranean, or [place] . This type of hemoglobin can affect your A1c test results. Tell your healthcare provider if you come from any of these backgrounds. You may need to have your A1c sent to a lab with certain equipment. This will help make sure your results are accurate.  CARE AGREEMENT:   You have the right to help plan your care. To help with this plan, you must learn about your lab tests. You can then discuss the results with your caregivers. Work with them to decide what care may be used to treat you. You always have the right to refuse treatment. The above information is an  only. It is not intended as medical advice for individual conditions or treatments. Talk to your doctor, nurse or pharmacist before following any medical regimen to see if it is safe and effective for you.  © 2017 [employer] Information is for End User's use only and may not be sold, redistributed or otherwise used for commercial purposes. All illustrations and images included in CareNotes® are the copyrighted property of [employer] or [employer].      Diabetic Retinopathy   WHAT YOU NEED TO KNOW:   Diabetic retinopathy (DR) is eye damage caused by long-term high blood sugar levels. The walls of the blood vessels in the retina weaken and leak blood. This causes swelling and vision problems. Over time, new, weak blood vessels grow, leak blood, and cover the center of the retina. DR can lead to blindness.       DISCHARGE INSTRUCTIONS:   Call 911 for any of the following:   You suddenly cannot see.    Contact your healthcare provider if:   Your blurred vision gets worse, or you start to see double.    You see more floating spots.    You see dark spots.    You have questions or concerns about your  condition or care.  Prevent DR:   Control your blood sugar.  Keep your blood sugar levels as close to normal as possible. You may need to check your blood sugar levels 3 times each day.           Get your eyes checked at least once each year.  Your eye doctor may want to see you every 6 months or more often. If you are pregnant, get your eyes checked during the first 13 weeks of your pregnancy. You will need frequent eye exams during pregnancy and for 1 year after you give birth.    Manage your blood pressure and cholesterol.  Your blood pressure should be 140/90 mmHg or lower. You may need lab tests that measure the amount of cholesterol in your blood. You may need to make lifestyle changes and take medicines to control your blood pressure and cholesterol.    Exercise regularly.  Ask your healthcare provider about the best exercise plan for you. He will tell you how to control your blood sugar when you exercise. You may need to check your blood sugar more often during exercise. Bring a snack with you when you exercise in case your blood sugar gets too low.     Do not smoke.  Nicotine can damage blood vessels in your eyes and make it more difficult to manage your diabetes. Do not use e-cigarettes or smokeless tobacco in place of cigarettes or to help you quit. They still contain nicotine. Ask your healthcare provider for information if you currently smoke and need help quitting.  Follow up with your healthcare provider or eye specialist as directed:  Write down your questions so you remember to ask them during your visits.   © 2017 [employer] Information is for End User's use only and may not be sold, redistributed or otherwise used for commercial purposes. All illustrations and images included in CareNotes® are the copyrighted property of [employer] or [employer].  The above information is an  only. It is not intended as medical advice for individual conditions or  treatments. Talk to your doctor, nurse or pharmacist before following any medical regimen to see if it is safe and effective for you. CARBOHYDRATES  As a carbohydrate is digested through our bodies it becomes a sugar.      There are TWO main things I want you to know about CARBOHYDRATES:     1. NUMBER     How many carbohydrates are in each serving of food you are about to eat matters to weight loss/gain, diabetes control, and sugar levels.  A food product is in the LOW carbohydrate level if it has less than 15grams carbohydrates per serving.  These are always good choices in general for a snack.  A meal can include anywhere from 15-45 grams carbohydrates in the meal.      TIP: Sugar Free foods contain carbohydrates which become sugar in the body.  If you are going to consider eating sugar free foods, you MUST follow serving size carefully.  These foods will still result in high glucose levels.      2. TYPE     The type of carbohydrate is VERY important.  A slice of bread (white or wheat) will have same amount of carbohydrates but both break down at different paces in the body.  Carbohydrates that are more complex like Rye, whole wheat, and multi grain process slower through our bodies, therefore, making sugar levels rise slowly and steady and over long period of time.  WHITE carbohydrates such as white bread, white pasta and white rice digest quickly in your body and raise your glucose levels fast just like eating a candy bar.     TIP: Read the INGREDIENTS on each item to make sure the first ingredient is WHOLE WHEAT or Rye  (not enriched white flour) rather than reading front label of the product in order to verify whole wheat product.             Simple carbohydrates digest through your body QUICKLY causing the conversion of carbohydrates to become sugars if you are not using them immediately for energy.  Complex carbohydrates will become sugars SLOWLY over time as your body breaks them down in your digestive  system.  Below is a graph demonstrating these two concepts.  The rate of sugar breakdown can easily affect your energy, metabolism, ability to gain or lose weight and more.  The goal is a slow and steady release of sugar into your body and to avoid sugar spikes in order to feel your best and manage your health.        Simple Carbohydrates: Candy bar, white bread, white pasta, white rice, white flour, cookies, sweets, cakes, corn, string beans etc               A little information about carbs ...   ·         Try for a low carbohydrate diet. This means less than 100 grams per day.   ·         Try to get a high amount of protein per day - shoot for 60-70 grams per day.  ·         To really lose weight you may need to try an ultra low carb diet - this means 10-15 grams per meal. And 5 - 10 grams per snack. This is usually under the supervision of a physician weight loss program.   ·         Carbohydrates are in starches and turn to sugars. Lower your intake of bread, pasta, potatoes, rice. Never drink your carbs - switch to water. No juice or soda.   ·         When you do consume carbs, try for high fiber choices like fruits and veggies. Try for complex carbohydrates found in oats and whole grains. A daily fiber supplement can also be helpful.      Here is a lot more information …     Understanding Carbohydrates  A car needs the right type of fuel to run. And you need the right kind of food to function. To keep your energy level up, your body needs food that has carbohydrates. But carbohydrates raise blood sugar levels higher and faster than other kinds of food.      Starches - AVOID   Starches are found in grains, some vegetables, and beans. Grain products include bread, pasta, cereal, and tortillas. Starchy vegetables include potatoes, peas, corn, lima beans, yams, and squash. Kidney beans, kaur beans, black beans, garbanzo beans, and lentils also contain starches.  Sugars - AVOID  Sugars are found naturally in many  foods. Or sugar can be added. Foods that contain natural sugar include fruits and fruit juices, dairy products, honey, and molasses. Added sugars are found in most desserts, processed foods, candy, regular soda, and fruit drinks. These are very helpful for treating low blood sugar, or hypoglycemia. They provide sugar quickly.  Fiber - A BETTER CHOICE  Fiber comes from plant foods. Most fiber isn’t digested by the body. Instead of raising blood sugar levels like other carbohydrates, it actually stops blood sugar from rising too fast. Fiber is found in fruits, vegetables, whole grains, beans, peas, and many nuts.  Carb counting  It’s important to keep track of the amount of carbohydrates you eat. This can help you keep the right balance of physical activity and medicine. The amount of carbohydrates needed will vary for each person. It depends on many things such as your health, the medicines you take, and how active you are. You may start with around 45 to 60 grams of carbohydrate per meal, depending on your situation. Counting your carbohydrates is a good way to control how many you eat. You can do this by keeping a food diary. There are great apps to help with this too like My [employer]itness Pal.      Carbohydrates come from a variety of foods. These include grains, starchy vegetables, fruit, milk, beans, and snack foods. You can either count carbohydrate grams or carbohydrate servings. When you count carbohydrate servings, 1 carbohydrate serving = 15 grams of carbohydrates.     Here are some examples of foods containing about 15 grams of carbohydrates (1 serving of carbohydrates):  ·      1/2 cup of canned or frozen fruit  ·      A small piece of fresh fruit (4 ounces)  ·      1 slice of bread  ·      1/2 cup of oatmeal  ·      1/3 cup of rice  ·      4 to 6 crackers  ·      1/2 English muffin  ·      1/2 cup of black beans  ·      1/4 of a large baked potato (3 ounces)  ·      2/3 cup of plain fat-free yogurt  ·      1  cup of soup  ·      1/2 cup of casserole  ·      6 chicken nuggets  ·      2-inch square brownie or cake without frosting  ·      2 small cookies  ·      1/2 cup of ice cream or sherbet     Carb counting is easier when food labels are available. Look at the label to see how many grams of total carbohydrates the food contains. Then you can figure out how much you should eat.  It’s also important to be consistent with the amount and time you eat when taking a fixed dose of diabetes medicine.     Make your meal plan  A meal plan gives guidelines for the types and amounts of food you should eat.  Watch serving sizes  Your meal plan will group foods by servings. To learn how much a serving is, start by measuring food portions at each meal. Soon you’ll know what a serving looks like on your plate. A quick rule is a serving size of meat is about the size of your fist.   Eat from all the food groups  The basis of a healthy meal plan is variety (eating lots of different foods). Choose lean meats, fresh fruits and vegetables, whole grains, and low-fat or nonfat dairy products. Eating a wide variety of foods provides the nutrients your body needs. It can also keep you from getting bored with your meal plan.  Learn about carbohydrates, fats, and protein  ·      Carbohydrates are starches, sugars, and fiber. They are found in many foods, including fruit, bread, pasta, milk, and sweets. Of all the foods you eat, carbohydrates have the most effect on your blood sugar.   ·      Fats have the most calories. They also have the most effect on your weight and your risk of heart disease. It’s important to control your weight and protect your heart. Foods that are high in fat include whole milk, cheese, snack foods, and desserts.  ·      Protein is important for building and repairing muscles and bones. Choose low-fat protein sources, such as fish, egg whites, and skinless chicken.  Reduce liquid sugars  Extra calories from sodas, sports  drinks, and fruit drinks make it hard to keep blood sugar in range. Cut as many liquid sugars from your meal plan as you can.  This includes most fruit juices, which are often high in natural or added sugar. Instead, drink plenty of water and other sugar-free beverages.  Eat less fat  If you need to lose weight, try to reduce the amount of fat in your diet. This can also help lower your cholesterol level to keep blood vessels healthier. Cut fat by using only small amounts of liquid oil for cooking. Read food labels carefully to avoid foods with unhealthy trans fats.  Timing your meals  When it comes to blood sugar control, when you eat is as important as what you eat. You may need to eat several small meals spaced evenly throughout the day to stay in your target range. So don’t skip breakfast or wait until late in the day to get most of your calories. Doing so can cause your blood sugar to rise too high or fall too low.         Understanding Carbohydrates, Fats, and Protein  Food is a source of fuel and nourishment for your body. It’s also a source of pleasure. Having diabetes doesn’t mean you have to eat special foods or give up desserts. Instead, your dietitian can show you how to plan meals to suit your body. To start, learn how different foods affect blood sugar.     Carbohydrates  Carbohydrates are the main source of fuel for the body. Carbohydrates raise blood sugar. Many people think carbohydrates are only found in pasta or bread. But carbohydrates are actually in many kinds of foods:  ·      Sugars occur naturally in foods such as fruit, milk, honey, and molasses. Sugars can also be added to many foods, from cereals and yogurt to candy and desserts. Sugars raise blood sugar.  ·      Starches are found in bread, cereals, pasta, and dried beans. They’re also found in corn, peas, potatoes, yam, acorn squash, and butternut squash. Starches also raise blood sugar.   ·      Fiber is found in foods such as  vegetables, fruits, beans, and whole grains. Unlike other carbs, fiber isn’t digested or absorbed. So it doesn’t raise blood sugar. In fact, fiber can help keep blood sugar from rising too fast. It also helps keep blood cholesterol at a healthy level.     Did you know?  Even though carbohydrates raise blood sugar, it’s best to have some in every meal. They are an important part of a healthy diet.      Fat  Fat is an energy source that can be stored until needed. Fat does not raise blood sugar. However, it can raise blood cholesterol, increasing the risk of heart disease. Fat is also high in calories, which can cause weight gain. Not all types of fat are the same.  More Healthy:  ·      Monounsaturated fats are mostly found in vegetable oils, such as olive, canola, and peanut oils. They are also found in avocados and some nuts. Monounsaturated fats are healthy for your heart. That’s because they lower LDL (unhealthy) cholesterol.  ·      Polyunsaturated fats are mostly found in vegetable oils, such as corn, safflower, and soybean oils. They are also found in some seeds, nuts, and fish. Polyunsaturated fats lower LDL (unhealthy) cholesterol. So, choosing them instead of saturated fats is healthy for your heart. Certain unsaturated fats can help lower triglycerides.   Less Healthy:  ·      Saturated fats are found in animal products, such as meat, poultry, whole milk, lard, and butter. Saturated fats raise LDL cholesterol and are not healthy for your heart.  ·      Hydrogenated oils and trans fats are formed when vegetable oils are processed into solid fats. They are found in many processed foods. Hydrogenated oils and trans fats raise LDL cholesterol and lower HDL (healthy) cholesterol. They are not healthy for your heart.  Protein  Protein helps the body build and repair muscle and other tissue. Protein has little or no effect on blood sugar. However, many foods that contain protein also contain saturated fat. By  choosing low-fat protein sources, you can get the benefits of protein without the extra fat:  ·      Plant protein is found in dry beans and peas, nuts, and soy products, such as tofu and soymilk. These sources tend to be cholesterol-free and low in saturated fat.  ·      Animal protein is found in fish, poultry, meat, cheese, milk, and eggs. These contain cholesterol and can be high in saturated fat. Aim for lean, lower-fat choices.     Reading food labels  Pay close attention to food labels. The information on them will help you choose healthy foods that make managing your blood sugar easier. Look for the Nutrition Facts label on packaged foods. This label tells you how many carbohydrates and how much sugar, fat, and fiber are in each serving. Then you can decide whether the food fits your meal plan.      Reading food labels helps you keep track of your carbohydrate intake. Remember to pay attention to serving sizes. If you eat this entire box, he will have eaten 34 grams of carbohydrate.   ·      Serving size: This number is very important and tells you how much of the food makes up a single serving. If you eat more than one serving, all other numbers, like calories and carbohydrates, also increase.  ·      Total carbohydrates: This number tells you how much carbohydrate is in each serving. If you are carb counting, this number will help you fit the food into your meal plan. Also, keep in mind the number of servings you eat. If  you eat two servings, you’ll need to double the amount of carbohydrates listed on the box.   ·      Sugars: This number includes both natural and added sugars. Sugars count as part of your carbohydrate intake, and are included in the total carbohydrate number on the label.   ·      Fat: This number tells you the total amount of fat in each serving. Watch out for saturated fats, which raise cholesterol. Limit fats, especially if you are trying to lose weight.  ·      Trans fat: This number  tells you if the food includes trans fat. Liquid oils made into a solid fat, such as margarine, have a lot of trans fat. Trans fat is bad for the heart. Try to avoid foods containing trans fat.  ·      Dietary fiber: This number tells you how much of the carbohydrate in the food is fiber. Foods high in fiber are healthy. They also help keep blood sugar levels steady.  Learning portion sizes  Portion control is an important part of healthy eating. How much food you eat affects your blood sugar.  And until you learn what portion sizes look like, use measuring cups and spoons to be sure portions are accurate.     Adapted from:  © 8443-1939 [employer]. [address] [place] [address], [place] [address]. All rights reserved. This information is not intended as a substitute for professional medical care. Always follow your healthcare professional's instructions.

## 2024-03-14 NOTE — PROGRESS NOTES
ADULT ANNUAL PHYSICAL  [facility] CARE    NAME: Jr Mendoza  AGE: 58 y.o. SEX: male  : 1965     DATE: 3/14/2024     Assessment and Plan:     Problem List Items Addressed This Visit     Prediabetes    Relevant Medications    metFORMIN (GLUCOPHAGE) 500 mg tablet    Erectile dysfunction    Relevant Medications    sildenafil (VIAGRA) 25 MG tablet    BPH with obstruction/lower urinary tract symptoms    Relevant Medications    tamsulosin (FLOMAX) 0.4 mg    sildenafil (VIAGRA) 25 MG tablet   Other Visit Diagnoses     Abnormal physical evaluation    -  Primary    Encounter for immunization        Encounter for vaccination        Relevant Orders    Tdap Vaccine greater than or equal to 8yo (Completed)    Zoster Vaccine Recombinant IM (Completed)    Screening for colorectal cancer        Relevant Orders    Cologuard    Encounter for screening for diabetic retinopathy        Relevant Orders    IRIS Diabetic eye exam    Onychomadesis of toenail        Relevant Medications    terbinafine (LamISIL) 250 mg tablet    Other Relevant Orders    Ambulatory Referral to Podiatry          Immunizations and preventive care screenings were discussed with patient today. Appropriate education was printed on patient's after visit summary.    Discussed risks and benefits of prostate cancer screening. We discussed the controversial history of PSA screening for prostate cancer in the [place] as well as the risk of over detection and over treatment of prostate cancer by way of PSA screening.  The patient understands that PSA blood testing is an imperfect way to screen for prostate cancer and that elevated PSA levels in the blood may also be caused by infection, inflammation, prostatic trauma or manipulation, urological procedures, or by benign prostatic enlargement.    The role of the digital rectal examination in prostate cancer screening was also discussed and I  discussed with him that there is large interobserver variability in the findings of digital rectal examination.    Counseling:  Alcohol/drug use: discussed moderation in alcohol intake, the recommendations for healthy alcohol use, and avoidance of illicit drug use.  Dental Health: discussed importance of regular tooth brushing, flossing, and dental visits.  Injury prevention: discussed safety/seat belts, safety helmets, smoke detectors, carbon dioxide detectors, and smoking near bedding or upholstery.  Sexual health: discussed sexually transmitted diseases, partner selection, use of condoms, avoidance of unintended pregnancy, and contraceptive alternatives.  Exercise: the importance of regular exercise/physical activity was discussed. Recommend exercise 3-5 times per week for at least 30 minutes.       Depression Screening and Follow-up Plan: Patient was screened for depression during today's encounter. They screened negative with a PHQ-2 score of 0.        Return in about 4 months (around 7/14/2024).     Chief Complaint:     Chief Complaint   Patient presents with   • Physical Exam     Annual physical        History of Present Illness:     Adult Annual Physical   Patient here for a comprehensive physical exam. The patient reports no problems.    Diabetes  He presents for his follow-up diabetic visit. He has type 2 diabetes mellitus. The initial diagnosis of diabetes was made 0 days ago. There are no hypoglycemic associated symptoms. Pertinent negatives for hypoglycemia include no nervousness/anxiousness. There are no diabetic associated symptoms. There are no hypoglycemic complications. There are no diabetic complications. Risk factors for coronary artery disease include hypertension, male sex, obesity, dyslipidemia and diabetes mellitus. When asked about current treatments, none were reported. He participates in exercise three times a week. An ACE inhibitor/angiotensin II receptor blocker is being taken. He does  not see a podiatrist.Eye exam is not current.   Hypertension  This is a chronic problem. The problem is controlled. There are no associated agents to hypertension. Risk factors for coronary artery disease include diabetes mellitus, male gender, obesity and dyslipidemia. Past treatments include angiotensin blockers and calcium channel blockers. The current treatment provides moderate improvement. There are no compliance problems.  There is no history of angina, kidney disease or CAD/MI. There is no history of chronic renal disease.   Erectile Dysfunction  This is a chronic problem. The nature of his difficulty is maintaining erection and penetration. He reports no anxiety, decreased libido or performance anxiety. He reports his erection duration to be 1 to 5 minutes. Irritative symptoms include nocturia and urgency. Irritative symptoms do not include frequency. Obstructive symptoms do not include dribbling, incomplete emptying or a slower stream. Treatments tried: rings. The treatment provided mild relief. He has had no adverse reactions caused by medications. Risk factors include no AM erections, BPH, hypertension and diabetes mellitus.     A1c noted   Lab Results   Component Value Date    HGBA1C 7.1 (A) 03/14/2024        Recommend lifestyle and dietary changes which include plant based low glycemic diet.  Counseled at length on the importance of diet and exercise regarding the control of their diabetes.    Will monitor in 4  months.         Diet and Physical Activity  Diet/Nutrition: well balanced diet, limited junk food, and consuming 3-5 servings of fruits/vegetables daily.   Exercise: no formal exercise.      Depression Screening  PHQ-2/9 Depression Screening    Little interest or pleasure in doing things: 0 - not at all  Feeling down, depressed, or hopeless: 0 - not at all  Trouble falling or staying asleep, or sleeping too much: 0 - not at all  Feeling tired or having little energy: 0 - not at all  Poor  appetite or overeatin - not at all  Feeling bad about yourself - or that you are a failure or have let yourself or your family down: 0 - not at all  Trouble concentrating on things, such as reading the newspaper or watching television: 0 - not at all  Moving or speaking so slowly that other people could have noticed. Or the opposite - being so fidgety or restless that you have been moving around a lot more than usual: 0 - not at all  Thoughts that you would be better off dead, or of hurting yourself in some way: 0 - not at all  PHQ-2 Score: 0  PHQ-2 Interpretation: Negative depression screen  PHQ-9 Score: 0  PHQ-9 Interpretation: No or Minimal depression       General Health  Sleep: snores loudly, experiences daytime hypersomnolence, unrefreshing sleep, witnessed apnea, witnessed gasping, and qualifies for CPAP .   Hearing: decreased - bilateral.  Vision: vision problems: slightly blurriness, most recent eye exam >1 year ago, and wears glasses.   Dental: regular dental visits, brushes teeth twice daily, and flosses teeth occasionally.        Health  Symptoms include: erectile dysfunction, urinary frequency, urinary urgency, and nocturia    IPSS Questionnaire (AUA-7):  Over the past month…    1)  How often have you had a sensation of not emptying your bladder completely after you finish urinating?  3 - About half the time   2)  How often have you had to urinate again less than two hours after you finished urinating? 3 - About half the time   3)  How often have you found you stopped and started again several times when you urinated?  0 - Not at all   4) How difficult have you found it to postpone urination?  4 - More than half the time   5) How often have you had a weak urinary stream?  0 - Not at all   6) How often have you had to push or strain to begin urination?  0 - Not at all   7) How many times did you most typically get up to urinate from the time you went to bed until the time you got up in the morning?   2 - 2 times   Total score:      8-19 moderately symptomatic          Advanced Care Planning  Do you have an advanced directive? yes  Do you have a durable medical power of ? yes  ACP document given to patient? no     Review of Systems:     Review of Systems   Genitourinary:  Positive for nocturia and urgency. Negative for decreased libido, frequency and incomplete emptying.   Psychiatric/Behavioral:  The patient is not nervous/anxious.    All other systems reviewed and are negative.     Past Medical History:     Past Medical History:   Diagnosis Date   • Cholangitis 2022   • Closed extra-articular fracture of distal end of right tibia 2020   • Closed nondisplaced oblique fracture of shaft of right fibula 2020   • Foot fracture    • Hypertension       Past Surgical History:     Past Surgical History:   Procedure Laterality Date   • CHOLECYSTECTOMY LAPAROSCOPIC N/A 2022    Procedure: CHOLECYSTECTOMY LAPAROSCOPIC;  Surgeon: Zachary Kern MD;  Location: [facility];  Service: General   • NO PAST SURGERIES        Family History:     Family History   Problem Relation Age of Onset   • ALS Mother    • Pneumonia Father    • No Known Problems Sister       Social History:     Social History     Socioeconomic History   • Marital status: Single     Spouse name: None   • Number of children: None   • Years of education: None   • Highest education level: None   Occupational History   • Occupation: Unemployed   Tobacco Use   • Smoking status: Former     Current packs/day: 0.00     Average packs/day: 0.3 packs/day for 10.0 years (2.5 ttl pk-yrs)     Types: Cigarettes     Start date:      Quit date: 2017     Years since quittin.2   • Smokeless tobacco: Never   Vaping Use   • Vaping status: Never Used   Substance and Sexual Activity   • Alcohol use: Yes     Alcohol/week: 7.0 standard drinks of alcohol     Types: 7 Glasses of wine per week     Comment: glass of wine at dinner   • Drug use: Not  Currently   • Sexual activity: None   Other Topics Concern   • None   Social History Narrative    ** Merged History Encounter **          Social Determinants of Health     Financial Resource Strain: Not on file   Food Insecurity: No Food Insecurity (5/16/2022)    Hunger Vital Sign    • Worried About Running Out of Food in the Last Year: Never true    • Ran Out of Food in the Last Year: Never true   Transportation Needs: No Transportation Needs (5/16/2022)    PRAPARE - Transportation    • Lack of Transportation (Medical): No    • Lack of Transportation (Non-Medical): No   Physical Activity: Not on file   Stress: Not on file   Social Connections: Not on file   Intimate Partner Violence: Not on file   Housing Stability: Unknown (5/16/2022)    Housing Stability Vital Sign    • Unable to Pay for Housing in the Last Year: No    • Number of Places Lived in the Last Year: Not on file    • Unstable Housing in the Last Year: No      Current Medications:     Current Outpatient Medications   Medication Sig Dispense Refill   • albuterol (Ventolin HFA) 90 mcg/act inhaler Inhale 2 puffs every 6 (six) hours as needed for wheezing or shortness of breath 18 g 3   • amLODIPine (NORVASC) 5 mg tablet TAKE 1 TABLET (5 MG TOTAL) BY MOUTH DAILY. 90 tablet 1   • atorvastatin (LIPITOR) 20 mg tablet TAKE 1 TABLET BY MOUTH EVERY DAY 90 tablet 1   • azelastine (ASTELIN) 0.1 % nasal spray 1 spray into each nostril 2 (two) times a day Use in each nostril as directed 30 mL 5   • metFORMIN (GLUCOPHAGE) 500 mg tablet Take 1 tablet (500 mg total) by mouth 2 (two) times a day with meals 180 tablet 0   • mometasone-formoterol (Dulera) 200-5 MCG/ACT inhaler Inhale 2 puffs 2 (two) times a day Rinse mouth after use. 13 g 5   • sildenafil (VIAGRA) 25 MG tablet Take 1 tablet (25 mg total) by mouth daily as needed for erectile dysfunction 10 tablet 0   • tamsulosin (FLOMAX) 0.4 mg Take 1 capsule (0.4 mg total) by mouth daily with dinner 90 capsule 3   •  "terbinafine (LamISIL) 250 mg tablet Take 1 tablet (250 mg total) by mouth daily 90 tablet 0   • valsartan (DIOVAN) 160 mg tablet TAKE 1 TABLET BY MOUTH EVERY DAY 90 tablet 1   • fluticasone (FLONASE) 50 mcg/act nasal spray SPRAY 1 SPRAY INTO EACH NOSTRIL EVERY DAY 48 mL 1     No current facility-administered medications for this visit.      Allergies:     Allergies   Allergen Reactions   • Pollen Extract Allergic Rhinitis      Physical Exam:     /78   Pulse 80   Temp (!) 97.1 °F (36.2 °C) (Tympanic)   Ht 5' 10\" (1.778 m)   Wt 116 kg (256 lb)   SpO2 95%   BMI 36.73 kg/m²     Physical Exam  Vitals and nursing note reviewed.   Constitutional:       Appearance: Normal appearance. He is well-developed.   HENT:      Head: Normocephalic and atraumatic.      Right Ear: External ear normal.      Left Ear: External ear normal.      Nose: Nose normal.   Eyes:      Conjunctiva/sclera: Conjunctivae normal.      Pupils: Pupils are equal, round, and reactive to light.   Cardiovascular:      Rate and Rhythm: Normal rate and regular rhythm.      Pulses: no weak pulses.           Dorsalis pedis pulses are 2+ on the right side and 2+ on the left side.        Posterior tibial pulses are 2+ on the right side and 2+ on the left side.      Heart sounds: Normal heart sounds.   Pulmonary:      Effort: Pulmonary effort is normal.      Breath sounds: Normal breath sounds.   Abdominal:      General: Bowel sounds are normal.      Palpations: Abdomen is soft.   Genitourinary:     Comments: Deferred  Musculoskeletal:         General: Normal range of motion.      Cervical back: Normal range of motion and neck supple.   Feet:      Right foot:      Skin integrity: Warmth and dry skin present. No ulcer, skin breakdown, erythema or callus.      Left foot:      Skin integrity: Warmth and dry skin present. No ulcer, skin breakdown, erythema or callus.   Skin:     General: Skin is warm and dry.      Capillary Refill: Capillary refill takes " less than 2 seconds.   Neurological:      Mental Status: He is alert and oriented to person, place, and time.   Psychiatric:         Behavior: Behavior normal.         Thought Content: Thought content normal.         Judgment: Judgment normal.        Patient's shoes and socks removed.    Right Foot/Ankle   Right Foot Inspection  Skin Exam: skin normal, skin intact, dry skin and warmth. No callus, no erythema, no maceration, no abnormal color, no pre-ulcer, no ulcer and no callus.     Toe Exam: ROM and strength within normal limits. No swelling, no tenderness, erythema and  no right toe deformity    Sensory   Vibration: intact  Proprioception: intact  Monofilament testing: intact    Vascular  Capillary refills: < 3 seconds  The right DP pulse is 2+. The right PT pulse is 2+.     Right Toe  - Comprehensive Exam  Ecchymosis: none  Arch: normal  Hammertoes: absent  Claw Toes: absent  Swelling: none   Tenderness: none       Left Foot/Ankle  Left Foot Inspection  Skin Exam: skin normal, skin intact, dry skin and warmth. No erythema, no maceration, normal color, no pre-ulcer, no ulcer and no callus.     Toe Exam: ROM and strength within normal limits. No swelling, no tenderness, no erythema and no left toe deformity.     Sensory   Vibration: intact  Proprioception: intact  Monofilament testing: intact    Vascular  Capillary refills: < 3 seconds  The left DP pulse is 2+. The left PT pulse is 2+.     Left Toe  - Comprehensive Exam  Ecchymosis: none  Arch: normal  Hammertoes: absent  Claw toes: absent  Swelling: none   Tenderness: none       Assign Risk Category  No deformity present  No loss of protective sensation  No weak pulses  Risk: 0        Office Visit on 03/14/2024   Component Date Value Ref Range Status   • Hemoglobin A1C 03/14/2024 7.1 (A)  6.5 Final   Office Visit on 03/13/2024   Component Date Value Ref Range Status   • Supplier Name 03/13/2024 [employer] [facility]   In process   • Supplier Phone  Number 03/13/2024 [phone]   In process   • Order Status 03/13/2024 Pending Further Review   In process   • Delivery Request Date 03/13/2024 03/13/2024   In process   • Item Description 03/13/2024 CPAP Machine, [employer]   In process    Comment: Qty: 1  Auto Min Pressure: 4 cm  Auto Max Pressure: 20 cm  Oxygen Usage: None     • Item Description 03/13/2024 PAP Mask, Full Face, React, Fit Upon Setup, 1 per 3 months   In process    Qty: 1   • Item Description 03/13/2024 PAP Mask Interface Cushion, Full Face, 1 per 1 month   In process    Qty: 1   • Item Description 03/13/2024 PAP Headgear, 1 per 6 months   In process    Qty: 1   • Item Description 03/13/2024 PAP Humidifier, Heated   In process    Qty: 1   • Item Description 03/13/2024 Disposable PAP Filter, 2 per 1 month   In process    Qty: 1   • Item Description 03/13/2024 Non-Disposable PAP Filter, 1 per 6 months   In process    Qty: 1   • Item Description 03/13/2024 PAP Machine Tubing, Heated, 1 per 3 months   In process    Qty: 1   • Item Description 03/13/2024 Humidifier Water Chamber, 1 per 6 months   In process    Qty: 1   • Item Description 03/13/2024 PAP Chinstrap, 1 per 6 months   In process    Qty: 1   Appointment on 03/11/2024   Component Date Value Ref Range Status   • PSA 03/11/2024 0.80  0.00 - 4.00 ng/mL Final    Infof[employer] Access chemiluminescent immunoassay. Confirm baseline values for patients being serially monitored.    • Cholesterol 03/11/2024 148  See Comment mg/dL Final    Cholesterol:         Pediatric <18 Years        Desirable          <170 mg/dL      Borderline High    170-199 mg/dL      High               >=200 mg/dL        Adult >=18 Years            Desirable         <200 mg/dL      Borderline High   200-239 mg/dL      High              >239 mg/dL     • Triglycerides 03/11/2024 91  See Comment mg/dL Final    Triglyceride:     0-9Y            <75mg/dL     10Y-17Y         <90 mg/dL       >=18Y     Normal          <150 mg/dL      Borderline High 150-199 mg/dL     High            200-499 mg/dL        Very High       >499 mg/dL    Specimen collection should occur prior to Metamizole administration due to the potential for falsely depressed results.   • HDL, Direct 03/11/2024 62  >=40 mg/dL Final   • LDL Calculated 03/11/2024 68  0 - 100 mg/dL Final    LDL Cholesterol:     Optimal           <100 mg/dl     Near Optimal      100-129 mg/dl     Above Optimal       Borderline High 130-159 mg/dl       High            160-189 mg/dl       Very High       >189 mg/dl         This screening LDL is a calculated result.   It does not have the accuracy of the Direct Measured LDL in the monitoring of patients with hyperlipidemia and/or statin therapy.   Direct Measure LDL ([id number]) must be ordered separately in these patients.   • WBC 03/11/2024 8.96  4.31 - 10.16 Thousand/uL Final   • RBC 03/11/2024 5.24  3.88 - 5.62 Million/uL Final   • Hemoglobin 03/11/2024 15.8  12.0 - 17.0 g/dL Final   • Hematocrit 03/11/2024 47.5  36.5 - 49.3 % Final   • MCV 03/11/2024 91  82 - 98 fL Final   • MCH 03/11/2024 30.2  26.8 - 34.3 pg Final   • MCHC 03/11/2024 33.3  31.4 - 37.4 g/dL Final   • RDW 03/11/2024 12.9  11.6 - 15.1 % Final   • MPV 03/11/2024 11.0  8.9 - 12.7 fL Final   • Platelets 03/11/2024 225  149 - 390 Thousands/uL Final   • nRBC 03/11/2024 0  /100 WBCs Final   • Neutrophils Relative 03/11/2024 60  43 - 75 % Final   • Immature Grans % 03/11/2024 0  0 - 2 % Final   • Lymphocytes Relative 03/11/2024 23  14 - 44 % Final   • Monocytes Relative 03/11/2024 11  4 - 12 % Final   • Eosinophils Relative 03/11/2024 5  0 - 6 % Final   • Basophils Relative 03/11/2024 1  0 - 1 % Final   • Neutrophils Absolute 03/11/2024 5.43  1.85 - 7.62 Thousands/µL Final   • Absolute Immature Grans 03/11/2024 0.03  0.00 - 0.20 Thousand/uL Final   • Absolute Lymphocytes 03/11/2024 2.02  0.60 - 4.47 Thousands/µL Final   • Absolute Monocytes 03/11/2024 0.97  0.17 - 1.22 Thousand/µL Final   •  Eosinophils Absolute 03/11/2024 0.45  0.00 - 0.61 Thousand/µL Final   • Basophils Absolute 03/11/2024 0.06  0.00 - 0.10 Thousands/µL Final   • TSH 3RD GENERATON 03/11/2024 1.649  0.450 - 4.500 uIU/mL Final    Adult TSH (3rd generation) reference range follows the recommended guidelines of the American Thyroid Association, January, 2020.   • Sodium 03/11/2024 138  135 - 147 mmol/L Final   • Potassium 03/11/2024 4.4  3.5 - 5.3 mmol/L Final   • Chloride 03/11/2024 98  96 - 108 mmol/L Final   • CO2 03/11/2024 29  21 - 32 mmol/L Final   • ANION GAP 03/11/2024 11  mmol/L Final   • BUN 03/11/2024 8  5 - 25 mg/dL Final   • Creatinine 03/11/2024 0.72  0.60 - 1.30 mg/dL Final    Standardized to IDMS reference method   • Glucose, Fasting 03/11/2024 149 (H)  65 - 99 mg/dL Final   • Calcium 03/11/2024 9.2  8.4 - 10.2 mg/dL Final   • AST 03/11/2024 21  13 - 39 U/L Final   • ALT 03/11/2024 19  7 - 52 U/L Final    Specimen collection should occur prior to Sulfasalazine administration due to the potential for falsely depressed results.    • Alkaline Phosphatase 03/11/2024 72  34 - 104 U/L Final   • Total Protein 03/11/2024 7.9  6.4 - 8.4 g/dL Final   • Albumin 03/11/2024 4.5  3.5 - 5.0 g/dL Final   • Total Bilirubin 03/11/2024 1.05 (H)  0.20 - 1.00 mg/dL Final    Use of this assay is not recommended for patients undergoing treatment with eltrombopag due to the potential for falsely elevated results.  N-acetyl-p-benzoquinone imine (metabolite of Acetaminophen) will generate erroneously low results in samples for patients that have taken an overdose of Acetaminophen.   • eGFR 03/11/2024 101  ml/min/1.73sq m Final       I have reviewed all the lab results. There are some abnormalities that are not critical to the patient's health, I have discussed these in person at this office appointment.    CELI Bowling  [facility]

## 2024-03-15 LAB
CREAT UR-MCNC: 190.8 MG/DL
MICROALBUMIN UR-MCNC: 12.1 MG/L
MICROALBUMIN/CREAT 24H UR: 6 MG/G CREATININE (ref 0–30)

## 2024-04-01 ENCOUNTER — OFFICE VISIT (OUTPATIENT)
Dept: PODIATRY | Facility: CLINIC | Age: 59
End: 2024-04-01
Payer: COMMERCIAL

## 2024-04-01 VITALS
BODY MASS INDEX: 37.51 KG/M2 | DIASTOLIC BLOOD PRESSURE: 87 MMHG | WEIGHT: 262 LBS | HEIGHT: 70 IN | HEART RATE: 88 BPM | SYSTOLIC BLOOD PRESSURE: 144 MMHG

## 2024-04-01 DIAGNOSIS — E11.49 OTHER DIABETIC NEUROLOGICAL COMPLICATION ASSOCIATED WITH TYPE 2 DIABETES MELLITUS (HCC): Primary | ICD-10-CM

## 2024-04-01 DIAGNOSIS — L60.8 ONYCHOMADESIS OF TOENAIL: ICD-10-CM

## 2024-04-01 DIAGNOSIS — B35.1 TINEA UNGUIUM: ICD-10-CM

## 2024-04-01 PROCEDURE — 11721 DEBRIDE NAIL 6 OR MORE: CPT | Performed by: STUDENT IN AN ORGANIZED HEALTH CARE EDUCATION/TRAINING PROGRAM

## 2024-04-01 PROCEDURE — 99203 OFFICE O/P NEW LOW 30 MIN: CPT | Performed by: STUDENT IN AN ORGANIZED HEALTH CARE EDUCATION/TRAINING PROGRAM

## 2024-04-01 RX ORDER — CICLOPIROX 80 MG/ML
SOLUTION TOPICAL
Qty: 6 ML | Refills: 3 | Status: SHIPPED | OUTPATIENT
Start: 2024-04-01

## 2024-04-02 NOTE — PROGRESS NOTES
Assessment/Plan:    No problem-specific Assessment & Plan notes found for this encounter.       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    Onychomadesis of toenail  -     Ambulatory Referral to Podiatry    Tinea unguium  -     ciclopirox (PENLAC) 8 % solution; Apply topically daily at bedtime Apply Penlac at bedtime to the affected toenails daily, wipe off the Penlac from the toenails with acetone or rubbing alcohol on the 7th day and restart the cycle.      Plan:     1. A thorough neurovascular exam was performed. Patient presents for at-risk foot care.  Patient has no acute concerns today.  Patient has significant lower extremity risk due to neuropathy, parasthesia, edema, and trophic skin changes to the lower extremity. Patient has Q9  findings and is recommended for at risk foot care every 3 months.    2. All 10 toenails were debridement as follow: using nail nipper, john, and curette, nails were sharply debrided, reduced in thickness and length. Devitalized nail tissue and fungal debris excised and removed. Patient tolerated well.      3. Patient was educated on importance of glycemic control, daily foot assessment and proper shoe gear. Educational materials were provided. Patient will follow up annually for diabetic foot risk assessment.     4. Rx Penlac for bilateral toenail onychomycosis.  Defer Lamisil management as per PCP.     5. Return in 10-12 weeks.     6.  Recent PCP notes reviewed, recent blood work reviewed    Lab Results   Component Value Date    HGBA1C 7.1 (A) 03/14/2024           Subjective:      Patient ID: Jr Mendoza is a 58 y.o. male.    58-year-old male with past  medical history as below presents for diabetic foot risk assessment.  Patient reports he has thickened mycotic appearing toenail for which she is interested in treatment options.  Patient also reports they are very hard to cut at home.  He was seen by his PCP who put him on oral Lamisil.  Has been taking as per PCP guidance.  He also complains of numbness and tingling sensation to bilateral feet.  No other complaints at this visit.        The following portions of the patient's history were reviewed and updated as appropriate: He  has a past medical history of Cholangitis (5/16/2022), Closed extra-articular fracture of distal end of right tibia (1/28/2020), Closed nondisplaced oblique fracture of shaft of right fibula (1/28/2020), Foot fracture, and Hypertension.  He   Patient Active Problem List    Diagnosis Date Noted    Erectile dysfunction 03/14/2024    BPH with obstruction/lower urinary tract symptoms 03/14/2024    Nasal obstruction 03/13/2024    DONG (obstructive sleep apnea) 03/06/2024    Moderate persistent asthma without complication 09/19/2023    Excessive daytime sleepiness 09/19/2023    Chronic cough     Acute bronchitis     Benign essential HTN 02/14/2022    Prediabetes 02/14/2022    Elevated LDL cholesterol level 10/09/2020    Alcohol use disorder, mild, abuse 10/09/2020    Class 2 obesity with body mass index (BMI) of 36.0 to 36.9 in adult 09/17/2020     He  has a past surgical history that includes No past surgeries and CHOLECYSTECTOMY LAPAROSCOPIC (N/A, 5/18/2022).  Current Outpatient Medications   Medication Sig Dispense Refill    albuterol (Ventolin HFA) 90 mcg/act inhaler Inhale 2 puffs every 6 (six) hours as needed for wheezing or shortness of breath 18 g 3    amLODIPine (NORVASC) 5 mg tablet TAKE 1 TABLET (5 MG TOTAL) BY MOUTH DAILY. 90 tablet 1    atorvastatin (LIPITOR) 20 mg tablet TAKE 1 TABLET BY MOUTH EVERY DAY 90 tablet 1    azelastine (ASTELIN) 0.1 % nasal spray 1 spray into each nostril  "2 (two) times a day Use in each nostril as directed 30 mL 5    ciclopirox (PENLAC) 8 % solution Apply topically daily at bedtime Apply Penlac at bedtime to the affected toenails daily, wipe off the Penlac from the toenails with acetone or rubbing alcohol on the 7th day and restart the cycle. 6 mL 3    fluticasone (FLONASE) 50 mcg/act nasal spray SPRAY 1 SPRAY INTO EACH NOSTRIL EVERY DAY 48 mL 1    metFORMIN (GLUCOPHAGE) 500 mg tablet Take 1 tablet (500 mg total) by mouth 2 (two) times a day with meals 180 tablet 0    mometasone-formoterol (Dulera) 200-5 MCG/ACT inhaler Inhale 2 puffs 2 (two) times a day Rinse mouth after use. 13 g 5    sildenafil (VIAGRA) 25 MG tablet Take 1 tablet (25 mg total) by mouth daily as needed for erectile dysfunction 10 tablet 0    tamsulosin (FLOMAX) 0.4 mg Take 1 capsule (0.4 mg total) by mouth daily with dinner 90 capsule 3    terbinafine (LamISIL) 250 mg tablet Take 1 tablet (250 mg total) by mouth daily 90 tablet 0    valsartan (DIOVAN) 160 mg tablet TAKE 1 TABLET BY MOUTH EVERY DAY 90 tablet 1     No current facility-administered medications for this visit.   .    Review of Systems   All other systems reviewed and are negative.        Objective:      /87   Pulse 88   Ht 5' 10\" (1.778 m)   Wt 119 kg (262 lb)   BMI 37.59 kg/m²          Physical Exam  Vitals reviewed.   Cardiovascular:      Pulses: Pulses are weak.           Dorsalis pedis pulses are 1+ on the right side and 1+ on the left side.        Posterior tibial pulses are 1+ on the right side and 1+ on the left side.   Feet:      Right foot:      Protective Sensation: 10 sites tested.  6 sites sensed.      Skin integrity: Dry skin present.      Toenail Condition: Right toenails are abnormally thick and long. Fungal disease present.     Left foot:      Protective Sensation: 10 sites tested.  8 sites sensed.      Skin integrity: Dry skin present.      Toenail Condition: Left toenails are abnormally thick and long. " Fungal disease present.     Comments: On exam patient has thickened, hypertrophic, discolored, brittle toenails with subungual debris and tenderness x10.  Patient has lower extremity edema. Patients skin is atrophic, thickened nails, and decreased pedal hair. Patient has decreased pinprick and vibratory sensation to his feet and parasthesia.         Diabetic Foot Exam    Patient's shoes and socks removed.    Right Foot/Ankle   Right Foot Inspection  Skin Exam: dry skin.     Toe Exam: swelling.     Sensory   Monofilament testing: diminished    Vascular  The right DP pulse is 1+. The right PT pulse is 1+.     Left Foot/Ankle  Left Foot Inspection  Skin Exam: dry skin.     Toe Exam: swelling.     Sensory   Monofilament testing: diminished    Vascular  The left DP pulse is 1+. The left PT pulse is 1+.     Assign Risk Category  No deformity present  Loss of protective sensation  Weak pulses  Risk: 2

## 2024-04-19 DIAGNOSIS — R19.5 POSITIVE COLORECTAL CANCER SCREENING USING COLOGUARD TEST: Primary | ICD-10-CM

## 2024-04-19 LAB — COLOGUARD RESULT REPORTABLE: POSITIVE

## 2024-04-24 LAB
DME PARACHUTE DELIVERY DATE ACTUAL: NORMAL
DME PARACHUTE DELIVERY DATE EXPECTED: NORMAL
DME PARACHUTE DELIVERY DATE REQUESTED: NORMAL
DME PARACHUTE ITEM DESCRIPTION: NORMAL
DME PARACHUTE ORDER STATUS: NORMAL
DME PARACHUTE SUPPLIER NAME: NORMAL
DME PARACHUTE SUPPLIER PHONE: NORMAL

## 2024-04-30 ENCOUNTER — CONSULT (OUTPATIENT)
Dept: GASTROENTEROLOGY | Facility: CLINIC | Age: 59
End: 2024-04-30
Payer: COMMERCIAL

## 2024-04-30 VITALS
HEART RATE: 92 BPM | SYSTOLIC BLOOD PRESSURE: 128 MMHG | WEIGHT: 261.2 LBS | OXYGEN SATURATION: 91 % | BODY MASS INDEX: 37.48 KG/M2 | DIASTOLIC BLOOD PRESSURE: 60 MMHG | TEMPERATURE: 98.7 F

## 2024-04-30 DIAGNOSIS — R17 ELEVATED BILIRUBIN: ICD-10-CM

## 2024-04-30 DIAGNOSIS — Z78.9 DAILY CONSUMPTION OF ALCOHOL: ICD-10-CM

## 2024-04-30 DIAGNOSIS — R19.5 POSITIVE COLORECTAL CANCER SCREENING USING COLOGUARD TEST: Primary | ICD-10-CM

## 2024-04-30 PROCEDURE — 99203 OFFICE O/P NEW LOW 30 MIN: CPT | Performed by: PHYSICIAN ASSISTANT

## 2024-04-30 NOTE — PROGRESS NOTES
[facility] - Outpatient Consultation  Jr Mendoza 58 y.o. male MRN: [id number]  Encounter: [id number]    ASSESSMENT AND PLAN:      1. Positive colorectal cancer screening using Cologuard test    Pt with asthma, DONG on CPAP, BMI 37 here today for positive cologuard testing. No prior colonoscopy on file, no family hx of CRC.     Discussed that Cologuard tests for both stool DNA biomarkers as well as the presence of occult blood and statistics regarding associated colonoscopy findings. Patient is aware that for a positive Cologuard, it is recommended patient undergo diagnostic colonoscopy for further evaluation - Particularly to exclude advanced polyps or underlying malignancy.      I obtained informed consent from the patient. The risks/benefits/alternatives of the procedure were discussed with the patient. Risks included, but not limited to, infection, bleeding, perforation, injury to organs in the abdomen, missed lesion and incomplete procedure were discussed. Patient was agreeable and electronic signature was obtained. Golytely bowel prep sent to pharmacy. Diabetic instructions need to be provided to pt.     - Ambulatory Referral to Gastroenterology  - Colonoscopy; Future  - Golytely     2. Elevated bilirubin  3. Daily consumption of alcohol    Patient did have hepatomegaly noted on an ultrasound from 2022.  He likely does have some degree of hepatic steatosis and consumes EtOH regularly.  He has an isolated hyperbilirubinemia, no evidence of anemia.  Synthetic function of the liver is intact.  I am most suspicious this is likely a benign etiology such as a conjugation issue like Gilbert's syndrome, and we will plan to repeat a hepatic function and fractionate bilirubin now.  If this is primarily indirect, no additional investigation is necessary.  I am recommending he have his liver tests monitored intermittently, recommend cutting back on alcohol and slow sustained weight loss.   An ultrasound of the liver should be considered in the future.    - Hepatic function panel; Future    We will follow up as needed for any new/worsening GI symptoms.   ______________________________________________________________________    HPI: Patient is a 58 y.o. male who presents today for a consultation regarding colonoscopy consultation. Pmhx sig for HTN, asthma, DONG, EtOH use disorder, BMI 37.  History of cholecystectomy.    Pt is new to clinic.  He is here today with his wife.  He is feeling well from a GI standpoint.  He is having a bowel movement daily.  He denies any excess straining or chronic constipation.  No diarrhea.  No nocturnal BMs.  No BRBPR or melena.  No abdominal pain or rectal pain related to defecation.    Patient shares he has rare heartburn depending on oral intake. He denies nausea, emesis, hematemesis.  He denies any dysphagia or odynophagia.  No unintentional weight loss in the past 6 months.    Patient has never had a colonoscopy in the past.  He denies any family history of colon cancer.    NSAIDs: as needed   Etoh: 4-5 beers coor light daily  Tobacco: none     03/2024: Hb 15.8, MCV 91, platelets 225, BUN 8, creatinine 0.72, AST 21, ALT 19, ALP 72, albumin 4.5, T. bili 1.05  04/2024: negative cologuard     Endoscopic history:   EGD: none   Colon: none     Review of Systems   Constitutional:  Negative for fever.   Gastrointestinal:  Positive for abdominal pain. Negative for constipation, diarrhea, nausea and vomiting.   Endocrine: Negative.    Genitourinary:  Negative for dysuria, frequency and hematuria.   Musculoskeletal:  Positive for arthralgias and myalgias.   Allergic/Immunologic: Negative.    Neurological:  Negative for headaches.   Hematological: Negative.    Otherwise Per HPI    Historical Information   Past Medical History:   Diagnosis Date    Cholangitis 5/16/2022    Closed extra-articular fracture of distal end of right tibia 1/28/2020    Closed nondisplaced oblique  fracture of shaft of right fibula 2020    Foot fracture     Hypertension      Past Surgical History:   Procedure Laterality Date    CHOLECYSTECTOMY LAPAROSCOPIC N/A 2022    Procedure: CHOLECYSTECTOMY LAPAROSCOPIC;  Surgeon: Zachary Kern MD;  Location: [facility];  Service: General    NO PAST SURGERIES       Social History   Social History     Substance and Sexual Activity   Alcohol Use Yes    Alcohol/week: 7.0 standard drinks of alcohol    Types: 7 Glasses of wine per week    Comment: glass of wine at dinner     Social History     Substance and Sexual Activity   Drug Use Not Currently     Social History     Tobacco Use   Smoking Status Former    Current packs/day: 0.00    Average packs/day: 0.3 packs/day for 10.0 years (2.5 ttl pk-yrs)    Types: Cigarettes    Start date:     Quit date:     Years since quittin.3   Smokeless Tobacco Never     Family History   Problem Relation Age of Onset    ALS Mother     Pneumonia Father     No Known Problems Sister        Meds/Allergies       Current Outpatient Medications:     albuterol (Ventolin HFA) 90 mcg/act inhaler    amLODIPine (NORVASC) 5 mg tablet    atorvastatin (LIPITOR) 20 mg tablet    azelastine (ASTELIN) 0.1 % nasal spray    ciclopirox (PENLAC) 8 % solution    fluticasone (FLONASE) 50 mcg/act nasal spray    metFORMIN (GLUCOPHAGE) 500 mg tablet    mometasone-formoterol (Dulera) 200-5 MCG/ACT inhaler    sildenafil (VIAGRA) 25 MG tablet    tamsulosin (FLOMAX) 0.4 mg    terbinafine (LamISIL) 250 mg tablet    valsartan (DIOVAN) 160 mg tablet    Allergies   Allergen Reactions    Pollen Extract Allergic Rhinitis     Objective     Blood pressure 128/60, pulse 92, temperature 98.7 °F (37.1 °C), weight 118 kg (261 lb 3.2 oz), SpO2 91%. Body mass index is 37.48 kg/m².    Physical Exam  Vitals and nursing note reviewed.   Constitutional:       General: He is not in acute distress.     Appearance: He is well-developed. He is obese.   HENT:      Head:  Normocephalic and atraumatic.   Eyes:      General: No scleral icterus.     Conjunctiva/sclera: Conjunctivae normal.   Cardiovascular:      Rate and Rhythm: Normal rate.   Pulmonary:      Effort: Pulmonary effort is normal. No respiratory distress.      Breath sounds: Wheezing present.   Abdominal:      General: Abdomen is protuberant. Bowel sounds are normal. There is no distension.      Palpations: Abdomen is soft.      Tenderness: There is no abdominal tenderness. There is no guarding.   Skin:     General: Skin is warm and dry.      Coloration: Skin is not jaundiced.   Neurological:      General: No focal deficit present.      Mental Status: He is alert and oriented to person, place, and time.   Psychiatric:         Mood and Affect: Mood normal.         Behavior: Behavior normal.        Lab Results:   No visits with results within 1 Day(s) from this visit.   Latest known visit with results is:   Office Visit on 2024   Component Date Value    Cologuard Result 2024 Positive (A)     Hemoglobin A1C 2024 7.1 (A)     Severity 2024 NORMAL     Right Eye Diabetic Retin* 2024 None     Right Eye Macular Edema 2024 None     Right Eye Other Retinopa* 2024 None     Right Eye Image Quality 2024 Gradable Image     Left Eye Diabetic Retino* 2024 None     Left Eye Macular Edema 2024 None     Left Eye Other Retinopat* 2024 None     Left Eye Image Quality 2024 Gradable Image     Result 2024 Retinal Study Result for INOCENCIO TONYA     Result 2024 INOCENCIO CASTANEDA, a 59 y/o, M (: 1965, MRN: [id number])     Result 2024 presented to [facility] on 2024 for a retinal imaging study of the left and right eyes.     Result 2024 Based on the findings of the study, the following is recommended for INOCENCIO TONYA     Result 2024 Normal Study: Re-scan the patient in 12 months or in the next calendar  year.     Result 03/14/2024 Interpreting Provider's Comments:  No comments provided     Result 03/14/2024 Diagnoses Present: There are no ICD-10 codes present for this exam.     Result 03/14/2024 Right eye findings: Negative for Diabetic Retinopathy     Result 03/14/2024 Negative for Macular Edema     Result 03/14/2024 Left eye findings: Negative for Diabetic Retinopathy     Result 03/14/2024 Negative for Macular Edema     Result 03/14/2024 This result was electronically signed by Alfonzo Winkler MD, NPI: [id number], Taxonomy: [id number] on 03- 18:36 UTC.     Result 03/14/2024 NOTE:  Any pathology noted on this diabetic retinal evaluation should be confirmed by an appropriate ophthalmic examination.     Creatinine, Ur 03/14/2024 190.8     Albumin,U,Random 03/14/2024 12.1     Albumin Creat Ratio 03/14/2024 6      Radiology Results:   No results found.    Alesha Rm PA-C    **Please note:  Dictation voice to text software may have been used in the creation of this record.  Occasional wrong word or “sound alike” substitutions may have occurred due to the inherent limitations of voice recognition software.  Read the chart carefully and recognize, using context, where substitutions have occurred.**

## 2024-05-22 DIAGNOSIS — I10 PRIMARY HYPERTENSION: ICD-10-CM

## 2024-05-22 DIAGNOSIS — E78.00 ELEVATED LDL CHOLESTEROL LEVEL: ICD-10-CM

## 2024-05-22 RX ORDER — ATORVASTATIN CALCIUM 20 MG/1
20 TABLET, FILM COATED ORAL DAILY
Qty: 90 TABLET | Refills: 1 | Status: SHIPPED | OUTPATIENT
Start: 2024-05-22

## 2024-05-22 RX ORDER — AMLODIPINE BESYLATE 5 MG/1
5 TABLET ORAL DAILY
Qty: 90 TABLET | Refills: 1 | Status: SHIPPED | OUTPATIENT
Start: 2024-05-22

## 2024-05-29 ENCOUNTER — HOSPITAL ENCOUNTER (OUTPATIENT)
Dept: SLEEP CENTER | Facility: CLINIC | Age: 59
Discharge: HOME | End: 2024-05-29
Payer: COMMERCIAL

## 2024-05-29 DIAGNOSIS — G47.33 OSA (OBSTRUCTIVE SLEEP APNEA): ICD-10-CM

## 2024-05-29 PROCEDURE — 95811 POLYSOM 6/>YRS CPAP 4/> PARM: CPT

## 2024-05-29 PROCEDURE — 95811 POLYSOM 6/>YRS CPAP 4/> PARM: CPT | Performed by: INTERNAL MEDICINE

## 2024-05-30 NOTE — PROGRESS NOTES
Sleep Study Documentation    Pre-Sleep Study       Sleep testing procedure explained to patient:YES    Patient napped prior to study:YES- more than 30 minutes. Napped after 2PM: yes    Caffeine:Dayshift worker after 12PM.  Caffeine use:YES- ice tea  12 ounces    Alcohol:Dayshift workers after 5PM: Alcohol use:NO    Typical day for patient:YES       Study Documentation    Sleep Study Indications: DONG, EDS    Sleep Study: Treatment   Optimal PAP pressure: 18/14cm  Leak:Small  Snore:Eliminated  REM Obtained:yes  Supplemental O2: no    Minimum SaO2 85%  Baseline SaO2 94%  PAP mask tried (list all)Medium Resmed Airfit F20 full facemask  PAP mask choice (final)Medium Resmed Airfit F20 full facemask  PAP mask type:full face  PAP pressure at which snoring was eliminated 11cm  Minimum SaO2 at final PAP pressure 88%  Mode of Therapy:BiPAP  ETCO2:No  CPAP changed to BiPAP:Yes. If yes why as per order low threshold to bipap    Mode of Therapy:BiPAP    EKG abnormalities: no     EEG abnormalities: no    Were abnormal behaviors in sleep observed:NO    Is Total Sleep Study Recording Time < 2 hours: N/A    Is Total Sleep Study Recording Time > 2 hours but study is incomplete: N/A    Is Total Sleep Study Recording Time 6 hours or more but sleep was not obtained: NO    Patient classification: retired       Post-Sleep Study    Medication used at bedtime or during sleep study:NO    Patient reports time it took to fall asleep:30 to 60 minutes    Patient reports waking up during study:1 to 2 times.  Patient reports returning to sleep without difficulty.    Patient reports sleeping 6 to 8 hours without dreaming.    Does the Patient feel this is a typical night of sleep:typical    Patient rated sleepiness: Not sleepy or tired    PAP treatment:yes: Post PAP treatment patient reports feeling better and  would wear PAP mask at home.

## 2024-05-31 DIAGNOSIS — G47.33 OSA (OBSTRUCTIVE SLEEP APNEA): Primary | ICD-10-CM

## 2024-05-31 DIAGNOSIS — G47.34 NOCTURNAL HYPOXEMIA: ICD-10-CM

## 2024-05-31 NOTE — PROGRESS NOTES
Ordered bilevel PAP (set at 17/13 cm H2O) via a medium Resmed Airfit F20 full face interface as treatment for his severe DONG and nocturnal hypoxemia based off of his recent PAP titration study.     Danial Melendez  Sleep Medicine Fellow

## 2024-06-01 DIAGNOSIS — N52.9 ERECTILE DYSFUNCTION, UNSPECIFIED ERECTILE DYSFUNCTION TYPE: ICD-10-CM

## 2024-06-01 RX ORDER — SILDENAFIL 25 MG/1
TABLET, FILM COATED ORAL
Qty: 3 TABLET | Refills: 3 | Status: SHIPPED | OUTPATIENT
Start: 2024-06-01

## 2024-06-03 ENCOUNTER — APPOINTMENT (OUTPATIENT)
Dept: LAB | Facility: CLINIC | Age: 59
End: 2024-06-03
Payer: COMMERCIAL

## 2024-06-03 DIAGNOSIS — R17 ELEVATED BILIRUBIN: ICD-10-CM

## 2024-06-03 LAB
ALBUMIN SERPL BCP-MCNC: 4.5 G/DL (ref 3.5–5)
ALP SERPL-CCNC: 73 U/L (ref 34–104)
ALT SERPL W P-5'-P-CCNC: 22 U/L (ref 7–52)
AST SERPL W P-5'-P-CCNC: 26 U/L (ref 13–39)
BILIRUB DIRECT SERPL-MCNC: 0.18 MG/DL (ref 0–0.2)
BILIRUB SERPL-MCNC: 0.86 MG/DL (ref 0.2–1)
DME PARACHUTE DELIVERY DATE REQUESTED: NORMAL
DME PARACHUTE ITEM DESCRIPTION: NORMAL
DME PARACHUTE ORDER STATUS: NORMAL
DME PARACHUTE SUPPLIER NAME: NORMAL
DME PARACHUTE SUPPLIER PHONE: NORMAL
EST. AVERAGE GLUCOSE BLD GHB EST-MCNC: 143 MG/DL
HBA1C MFR BLD: 6.6 %
PROT SERPL-MCNC: 7.8 G/DL (ref 6.4–8.4)

## 2024-06-03 PROCEDURE — 36415 COLL VENOUS BLD VENIPUNCTURE: CPT

## 2024-06-03 PROCEDURE — 80076 HEPATIC FUNCTION PANEL: CPT

## 2024-06-06 ENCOUNTER — ANESTHESIA EVENT (OUTPATIENT)
Dept: GASTROENTEROLOGY | Facility: HOSPITAL | Age: 59
End: 2024-06-06

## 2024-06-06 ENCOUNTER — HOSPITAL ENCOUNTER (OUTPATIENT)
Dept: GASTROENTEROLOGY | Facility: HOSPITAL | Age: 59
Setting detail: OUTPATIENT SURGERY
End: 2024-06-06
Payer: COMMERCIAL

## 2024-06-06 ENCOUNTER — ANESTHESIA (OUTPATIENT)
Dept: GASTROENTEROLOGY | Facility: HOSPITAL | Age: 59
End: 2024-06-06

## 2024-06-06 VITALS
SYSTOLIC BLOOD PRESSURE: 141 MMHG | RESPIRATION RATE: 20 BRPM | TEMPERATURE: 97.4 F | BODY MASS INDEX: 37.37 KG/M2 | OXYGEN SATURATION: 95 % | WEIGHT: 261 LBS | HEIGHT: 70 IN | HEART RATE: 74 BPM | DIASTOLIC BLOOD PRESSURE: 83 MMHG

## 2024-06-06 DIAGNOSIS — K63.89 COLONIC MASS: Primary | ICD-10-CM

## 2024-06-06 DIAGNOSIS — R19.5 POSITIVE COLORECTAL CANCER SCREENING USING COLOGUARD TEST: ICD-10-CM

## 2024-06-06 LAB — GLUCOSE SERPL-MCNC: 172 MG/DL (ref 65–140)

## 2024-06-06 PROCEDURE — 88305 TISSUE EXAM BY PATHOLOGIST: CPT | Performed by: STUDENT IN AN ORGANIZED HEALTH CARE EDUCATION/TRAINING PROGRAM

## 2024-06-06 PROCEDURE — 88341 IMHCHEM/IMCYTCHM EA ADD ANTB: CPT | Performed by: STUDENT IN AN ORGANIZED HEALTH CARE EDUCATION/TRAINING PROGRAM

## 2024-06-06 PROCEDURE — 82948 REAGENT STRIP/BLOOD GLUCOSE: CPT

## 2024-06-06 PROCEDURE — 88342 IMHCHEM/IMCYTCHM 1ST ANTB: CPT | Performed by: STUDENT IN AN ORGANIZED HEALTH CARE EDUCATION/TRAINING PROGRAM

## 2024-06-06 PROCEDURE — 45381 COLONOSCOPY SUBMUCOUS NJX: CPT | Performed by: INTERNAL MEDICINE

## 2024-06-06 PROCEDURE — 45385 COLONOSCOPY W/LESION REMOVAL: CPT | Performed by: INTERNAL MEDICINE

## 2024-06-06 RX ORDER — LIDOCAINE HYDROCHLORIDE 20 MG/ML
INJECTION, SOLUTION EPIDURAL; INFILTRATION; INTRACAUDAL; PERINEURAL AS NEEDED
Status: DISCONTINUED | OUTPATIENT
Start: 2024-06-06 | End: 2024-06-06

## 2024-06-06 RX ORDER — SODIUM CHLORIDE, SODIUM LACTATE, POTASSIUM CHLORIDE, CALCIUM CHLORIDE 600; 310; 30; 20 MG/100ML; MG/100ML; MG/100ML; MG/100ML
100 INJECTION, SOLUTION INTRAVENOUS CONTINUOUS
Status: DISCONTINUED | OUTPATIENT
Start: 2024-06-06 | End: 2024-06-06

## 2024-06-06 RX ORDER — PROPOFOL 10 MG/ML
INJECTION, EMULSION INTRAVENOUS AS NEEDED
Status: DISCONTINUED | OUTPATIENT
Start: 2024-06-06 | End: 2024-06-06

## 2024-06-06 RX ADMIN — PROPOFOL 130 MG: 10 INJECTION, EMULSION INTRAVENOUS at 08:55

## 2024-06-06 RX ADMIN — SODIUM CHLORIDE, SODIUM LACTATE, POTASSIUM CHLORIDE, AND CALCIUM CHLORIDE: .6; .31; .03; .02 INJECTION, SOLUTION INTRAVENOUS at 08:12

## 2024-06-06 RX ADMIN — SODIUM CHLORIDE, SODIUM LACTATE, POTASSIUM CHLORIDE, AND CALCIUM CHLORIDE 100 ML/HR: .6; .31; .03; .02 INJECTION, SOLUTION INTRAVENOUS at 08:34

## 2024-06-06 RX ADMIN — LIDOCAINE HYDROCHLORIDE 60 MG: 20 INJECTION, SOLUTION EPIDURAL; INFILTRATION; INTRACAUDAL; PERINEURAL at 08:54

## 2024-06-06 RX ADMIN — PROPOFOL 130 MCG/KG/MIN: 10 INJECTION, EMULSION INTRAVENOUS at 08:56

## 2024-06-06 NOTE — ANESTHESIA PREPROCEDURE EVALUATION
Procedure:  COLONOSCOPY    Relevant Problems   CARDIO   (+) Benign essential HTN      /RENAL   (+) BPH with obstruction/lower urinary tract symptoms      PULMONARY   (+) Moderate persistent asthma without complication   (+) DONG (obstructive sleep apnea)        Physical Exam    Airway    Mallampati score: I  TM Distance: >3 FB  Neck ROM: full     Dental       Cardiovascular  Cardiovascular exam normal    Pulmonary  Pulmonary exam normal     Other Findings        Anesthesia Plan  ASA Score- 3     Anesthesia Type- IV sedation with anesthesia with ASA Monitors.         Additional Monitors:     Airway Plan:            Plan Factors-Exercise tolerance (METS): >4 METS.    Chart reviewed. EKG reviewed.  Existing labs reviewed. Patient summary reviewed.    Patient is not a current smoker.  Patient did not smoke on day of surgery.    Obstructive sleep apnea risk education given perioperatively.        Induction- intravenous.    Postoperative Plan-     Perioperative Resuscitation Plan - Level 1 - Full Code.       Informed Consent- Anesthetic plan and risks discussed with patient.  I personally reviewed this patient with the CRNA. Discussed and agreed on the Anesthesia Plan with the CRNA..

## 2024-06-06 NOTE — H&P
History and Physical - [facility]  Jr Mendoza 58 y.o. male MRN: [id number]                  HPI: Jr Mendoza is a 58 y.o. year old male who presents for colonoscopy.  Please refer to Alesha Rm's office note dated April 30, 2024 for details of medical history.  Was noted to have a positive Cologuard.    Patient denies any change in his bowel habits.  Denies any acid reflux problems dysphagia nausea or vomiting.  He may have occasional heartburn depending upon when he eats.  There is been no unintentional weight loss or abdominal pain.  Biopsy has not had a prior colonoscopy.    There is no family history colon cancer or colon polyps.  He believes his dad may have some form of colon polyp problem but he is not sure what.  He states previously drank 4-5 beers per night but has not been drinking much lately.    He does not smoke      REVIEW OF SYSTEMS: Per the HPI, and otherwise unremarkable.    Historical Information   Past Medical History:   Diagnosis Date    Asthma     Cholangitis 05/16/2022    Closed extra-articular fracture of distal end of right tibia 01/28/2020    Closed nondisplaced oblique fracture of shaft of right fibula 01/28/2020    Diabetes mellitus (HCC)     Foot fracture     Hypertension      Past Surgical History:   Procedure Laterality Date    CHOLECYSTECTOMY LAPAROSCOPIC N/A 05/18/2022    Procedure: CHOLECYSTECTOMY LAPAROSCOPIC;  Surgeon: Zachary Kern MD;  Location: [facility];  Service: General     Social History   Social History     Substance and Sexual Activity   Alcohol Use Yes    Alcohol/week: 7.0 standard drinks of alcohol    Types: 7 Glasses of wine per week    Comment: glass of wine at dinner     Social History     Substance and Sexual Activity   Drug Use Not Currently     Social History     Tobacco Use   Smoking Status Former    Current packs/day: 0.00    Average packs/day: 0.3 packs/day for 10.0 years (2.5 ttl pk-yrs)    Types: Cigarettes     "Start date:     Quit date:     Years since quittin.4   Smokeless Tobacco Never     Family History   Problem Relation Age of Onset    ALS Mother     Pneumonia Father     No Known Problems Sister        Meds/Allergies       Current Outpatient Medications:     albuterol (Ventolin HFA) 90 mcg/act inhaler    amLODIPine (NORVASC) 5 mg tablet    atorvastatin (LIPITOR) 20 mg tablet    azelastine (ASTELIN) 0.1 % nasal spray    ciclopirox (PENLAC) 8 % solution    fluticasone (FLONASE) 50 mcg/act nasal spray    metFORMIN (GLUCOPHAGE) 500 mg tablet    mometasone-formoterol (Dulera) 200-5 MCG/ACT inhaler    sildenafil (VIAGRA) 25 MG tablet    tamsulosin (FLOMAX) 0.4 mg    terbinafine (LamISIL) 250 mg tablet    valsartan (DIOVAN) 160 mg tablet    polyethylene glycol (GOLYTELY) 4000 mL solution    Current Facility-Administered Medications:     lactated ringers infusion, 100 mL/hr, Intravenous, Continuous, 100 mL/hr at 24 0834    Allergies   Allergen Reactions    Pollen Extract Allergic Rhinitis       Objective     BP (!) 173/85   Pulse 85   Temp 97.8 °F (36.6 °C) (Temporal)   Resp 16   Ht 5' 10\" (1.778 m)   Wt 118 kg (261 lb)   SpO2 95%   BMI 37.45 kg/m²       PHYSICAL EXAM    Gen: NAD  Head: NCAT.  Very large neck.  CV: RRR  CHEST: Clear  ABD: soft, NT/ND.  Obese abdomen.  EXT: no edema      ASSESSMENT/PLAN:  This is a 58 y.o. year old male here for colonoscopy, and he is stable and optimized for his procedure.        "

## 2024-06-06 NOTE — ANESTHESIA POSTPROCEDURE EVALUATION
Post-Op Assessment Note    CV Status:  Stable  Pain Score: 0    Pain management: adequate       Mental Status:  Arousable   Hydration Status:  Stable   PONV Controlled:  None   Airway Patency:  Patent  Two or more mitigation strategies used for obstructive sleep apnea   Post Op Vitals Reviewed: Yes    No anethesia notable event occurred.    Staff: TAMI           BP   173/93   Temp      Pulse  83   Resp   20   SpO2   100%

## 2024-06-07 ENCOUNTER — TELEPHONE (OUTPATIENT)
Age: 59
End: 2024-06-07

## 2024-06-07 NOTE — TELEPHONE ENCOUNTER
Patients GI provider:  SHIN Rm    Number to return call: [phone]    Reason for call: Patient spouse called back and scheduled fu with SHIN Eduardo. Patient given first avail at that location which is in Nov,  Please call patient if he need to be seen sooner.    Scheduled procedure/appointment date if applicable: appt 11/13/24

## 2024-06-07 NOTE — TELEPHONE ENCOUNTER
VM for patient to call to schedule appointment with one of our doctors for a consult.  Had FC 6/6/24 with Dr. Haley.  Awaiting pathology.

## 2024-06-10 ENCOUNTER — OFFICE VISIT (OUTPATIENT)
Dept: PODIATRY | Facility: CLINIC | Age: 59
End: 2024-06-10
Payer: COMMERCIAL

## 2024-06-10 ENCOUNTER — TELEPHONE (OUTPATIENT)
Dept: GASTROENTEROLOGY | Facility: CLINIC | Age: 59
End: 2024-06-10

## 2024-06-10 VITALS
DIASTOLIC BLOOD PRESSURE: 96 MMHG | HEART RATE: 91 BPM | BODY MASS INDEX: 36.94 KG/M2 | SYSTOLIC BLOOD PRESSURE: 170 MMHG | WEIGHT: 258 LBS | HEIGHT: 70 IN

## 2024-06-10 DIAGNOSIS — E11.49 OTHER DIABETIC NEUROLOGICAL COMPLICATION ASSOCIATED WITH TYPE 2 DIABETES MELLITUS (HCC): ICD-10-CM

## 2024-06-10 DIAGNOSIS — B35.1 TINEA UNGUIUM: Primary | ICD-10-CM

## 2024-06-10 PROCEDURE — 11721 DEBRIDE NAIL 6 OR MORE: CPT | Performed by: STUDENT IN AN ORGANIZED HEALTH CARE EDUCATION/TRAINING PROGRAM

## 2024-06-10 NOTE — TELEPHONE ENCOUNTER
----- Message from Satish Haley DO sent at 6/6/2024  9:44 AM EDT -----  Please also assist patient in getting set up for CT scan of the chest abdomen pelvis.  Assist getting appointment with colorectal surgery.  If there is a lower GI cancer group that this note should go to, could you please send it in the right direction.  Thanks a lot

## 2024-06-11 PROCEDURE — 88342 IMHCHEM/IMCYTCHM 1ST ANTB: CPT | Performed by: STUDENT IN AN ORGANIZED HEALTH CARE EDUCATION/TRAINING PROGRAM

## 2024-06-11 PROCEDURE — 88305 TISSUE EXAM BY PATHOLOGIST: CPT | Performed by: STUDENT IN AN ORGANIZED HEALTH CARE EDUCATION/TRAINING PROGRAM

## 2024-06-11 PROCEDURE — 88341 IMHCHEM/IMCYTCHM EA ADD ANTB: CPT | Performed by: STUDENT IN AN ORGANIZED HEALTH CARE EDUCATION/TRAINING PROGRAM

## 2024-06-11 NOTE — PROGRESS NOTES
Ambulatory Visit  Name: Jr Mendoza      : 1965      MRN: [id number]  Encounter Provider: Birgit Ellison DPM  Encounter Date: 6/10/2024   Encounter department: [facility] PODIATRY [place]    Assessment & Plan   1. Tinea unguium  2. Other diabetic neurological complication associated with type 2 diabetes mellitus (HCC)    Plan:     1. A thorough neurovascular exam was performed. Patient presents for at-risk foot care.  Patient has no acute concerns today.  Patient has significant lower extremity risk due to neuropathy, parasthesia, edema, and trophic skin changes to the lower extremity. Patient has Q9  findings and is recommended for at risk foot care every 3 months.    2. All 10 toenails were debridement as follow: using nail nipper, john, and curette, nails were sharply debrided, reduced in thickness and length. Devitalized nail tissue and fungal debris excised and removed. Patient tolerated well.      3. Patient was educated on importance of glycemic control, daily foot assessment and proper shoe gear. Educational materials were provided. Patient will follow up annually for diabetic foot risk assessment.     4. Call if any questions or occurrence of any foot and ankle related complications     5. Return in 10-12 weeks.     Lab Results   Component Value Date    HGBA1C 6.6 (H) 2024         History of Present Illness   HPI:  Jr Menodza is a 58 y.o. male who presents with painful, elongated toenails. Requires at risk foot care.       Review of Systems   All other systems reviewed and are negative.    Medical History Reviewed by provider this encounter:       Past Medical History   Past Medical History:   Diagnosis Date    Asthma     Cholangitis 2022    Closed extra-articular fracture of distal end of right tibia 2020    Closed nondisplaced oblique fracture of shaft of right fibula 2020    Diabetes mellitus (HCC)     Foot fracture     Hypertension      Past Surgical  History:   Procedure Laterality Date    CHOLECYSTECTOMY LAPAROSCOPIC N/A 05/18/2022    Procedure: CHOLECYSTECTOMY LAPAROSCOPIC;  Surgeon: Zachary Kern MD;  Location: [facility];  Service: General     Family History   Problem Relation Age of Onset    ALS Mother     Pneumonia Father     No Known Problems Sister      Current Outpatient Medications on File Prior to Visit   Medication Sig Dispense Refill    albuterol (Ventolin HFA) 90 mcg/act inhaler Inhale 2 puffs every 6 (six) hours as needed for wheezing or shortness of breath 18 g 3    amLODIPine (NORVASC) 5 mg tablet TAKE 1 TABLET (5 MG TOTAL) BY MOUTH DAILY. 90 tablet 1    atorvastatin (LIPITOR) 20 mg tablet TAKE 1 TABLET BY MOUTH EVERY DAY 90 tablet 1    azelastine (ASTELIN) 0.1 % nasal spray 1 spray into each nostril 2 (two) times a day Use in each nostril as directed 30 mL 5    ciclopirox (PENLAC) 8 % solution Apply topically daily at bedtime Apply Penlac at bedtime to the affected toenails daily, wipe off the Penlac from the toenails with acetone or rubbing alcohol on the 7th day and restart the cycle. 6 mL 3    fluticasone (FLONASE) 50 mcg/act nasal spray SPRAY 1 SPRAY INTO EACH NOSTRIL EVERY DAY 48 mL 1    metFORMIN (GLUCOPHAGE) 500 mg tablet Take 1 tablet (500 mg total) by mouth 2 (two) times a day with meals 180 tablet 0    mometasone-formoterol (Dulera) 200-5 MCG/ACT inhaler Inhale 2 puffs 2 (two) times a day Rinse mouth after use. 13 g 5    sildenafil (VIAGRA) 25 MG tablet TAKE 1 TABLET BY MOUTH DAILY AS NEEDED FOR ERECTILE DYSFUNCTION. 3 tablet 3    tamsulosin (FLOMAX) 0.4 mg Take 1 capsule (0.4 mg total) by mouth daily with dinner 90 capsule 3    terbinafine (LamISIL) 250 mg tablet Take 1 tablet (250 mg total) by mouth daily 90 tablet 0    valsartan (DIOVAN) 160 mg tablet TAKE 1 TABLET BY MOUTH EVERY DAY 90 tablet 1     No current facility-administered medications on file prior to visit.     Allergies   Allergen Reactions    Pollen Extract  "Allergic Rhinitis      Current Outpatient Medications on File Prior to Visit   Medication Sig Dispense Refill    albuterol (Ventolin HFA) 90 mcg/act inhaler Inhale 2 puffs every 6 (six) hours as needed for wheezing or shortness of breath 18 g 3    amLODIPine (NORVASC) 5 mg tablet TAKE 1 TABLET (5 MG TOTAL) BY MOUTH DAILY. 90 tablet 1    atorvastatin (LIPITOR) 20 mg tablet TAKE 1 TABLET BY MOUTH EVERY DAY 90 tablet 1    azelastine (ASTELIN) 0.1 % nasal spray 1 spray into each nostril 2 (two) times a day Use in each nostril as directed 30 mL 5    ciclopirox (PENLAC) 8 % solution Apply topically daily at bedtime Apply Penlac at bedtime to the affected toenails daily, wipe off the Penlac from the toenails with acetone or rubbing alcohol on the 7th day and restart the cycle. 6 mL 3    fluticasone (FLONASE) 50 mcg/act nasal spray SPRAY 1 SPRAY INTO EACH NOSTRIL EVERY DAY 48 mL 1    metFORMIN (GLUCOPHAGE) 500 mg tablet Take 1 tablet (500 mg total) by mouth 2 (two) times a day with meals 180 tablet 0    mometasone-formoterol (Dulera) 200-5 MCG/ACT inhaler Inhale 2 puffs 2 (two) times a day Rinse mouth after use. 13 g 5    sildenafil (VIAGRA) 25 MG tablet TAKE 1 TABLET BY MOUTH DAILY AS NEEDED FOR ERECTILE DYSFUNCTION. 3 tablet 3    tamsulosin (FLOMAX) 0.4 mg Take 1 capsule (0.4 mg total) by mouth daily with dinner 90 capsule 3    terbinafine (LamISIL) 250 mg tablet Take 1 tablet (250 mg total) by mouth daily 90 tablet 0    valsartan (DIOVAN) 160 mg tablet TAKE 1 TABLET BY MOUTH EVERY DAY 90 tablet 1     No current facility-administered medications on file prior to visit.      Objective     /96   Pulse 91   Ht 5' 10\" (1.778 m)   Wt 117 kg (258 lb)   BMI 37.02 kg/m²     Physical Exam  Vitals reviewed.   Feet:      Comments:  On exam patient has thickened, hypertrophic, discolored, brittle toenails with subungual debris and tenderness x10.  Patient has lower extremity edema. Patients skin is atrophic, thickened " nails, and decreased pedal hair. Patient has decreased pinprick and vibratory sensation to his feet and parasthesia.

## 2024-06-11 NOTE — RESULT ENCOUNTER NOTE
I informed patient and his friend that the mass at 32 cm is at least a cancer arising from an advanced polyp.  Polyp in the cecum was a benign precancerous polyp.  I will copy Dr Perez of colorectal surgery as he has an appointment with her on June 19.  I did place an order for a CAT scan on 6/6/2024 but they have not been contacted yet regarding scheduling.  Could you please help assist with getting a CT scan scheduled as ordered.  Please also check to see that they follow through with the laboratory test that I ordered as well to include a CEA.  Thank you

## 2024-06-13 ENCOUNTER — LAB (OUTPATIENT)
Dept: LAB | Facility: CLINIC | Age: 59
End: 2024-06-13
Payer: COMMERCIAL

## 2024-06-13 DIAGNOSIS — K63.89 COLONIC MASS: ICD-10-CM

## 2024-06-13 LAB
ALBUMIN SERPL BCP-MCNC: 4.3 G/DL (ref 3.5–5)
ALP SERPL-CCNC: 73 U/L (ref 34–104)
ALT SERPL W P-5'-P-CCNC: 20 U/L (ref 7–52)
ANION GAP SERPL CALCULATED.3IONS-SCNC: 12 MMOL/L (ref 4–13)
AST SERPL W P-5'-P-CCNC: 20 U/L (ref 13–39)
BILIRUB SERPL-MCNC: 0.64 MG/DL (ref 0.2–1)
BUN SERPL-MCNC: 9 MG/DL (ref 5–25)
CALCIUM SERPL-MCNC: 9.1 MG/DL (ref 8.4–10.2)
CEA SERPL-MCNC: 2.5 NG/ML (ref 0–3)
CHLORIDE SERPL-SCNC: 99 MMOL/L (ref 96–108)
CO2 SERPL-SCNC: 26 MMOL/L (ref 21–32)
CREAT SERPL-MCNC: 0.65 MG/DL (ref 0.6–1.3)
CRP SERPL QL: 10.3 MG/L
ERYTHROCYTE [DISTWIDTH] IN BLOOD BY AUTOMATED COUNT: 13.3 % (ref 11.6–15.1)
GFR SERPL CREATININE-BSD FRML MDRD: 105 ML/MIN/1.73SQ M
GLUCOSE P FAST SERPL-MCNC: 163 MG/DL (ref 65–99)
HCT VFR BLD AUTO: 45.7 % (ref 36.5–49.3)
HGB BLD-MCNC: 14.8 G/DL (ref 12–17)
MCH RBC QN AUTO: 30.6 PG (ref 26.8–34.3)
MCHC RBC AUTO-ENTMCNC: 32.4 G/DL (ref 31.4–37.4)
MCV RBC AUTO: 95 FL (ref 82–98)
PLATELET # BLD AUTO: 205 THOUSANDS/UL (ref 149–390)
PMV BLD AUTO: 11.1 FL (ref 8.9–12.7)
POTASSIUM SERPL-SCNC: 3.8 MMOL/L (ref 3.5–5.3)
PROT SERPL-MCNC: 7.6 G/DL (ref 6.4–8.4)
RBC # BLD AUTO: 4.83 MILLION/UL (ref 3.88–5.62)
SODIUM SERPL-SCNC: 137 MMOL/L (ref 135–147)
WBC # BLD AUTO: 7.14 THOUSAND/UL (ref 4.31–10.16)

## 2024-06-13 PROCEDURE — 86140 C-REACTIVE PROTEIN: CPT

## 2024-06-13 PROCEDURE — 80053 COMPREHEN METABOLIC PANEL: CPT

## 2024-06-13 PROCEDURE — 82378 CARCINOEMBRYONIC ANTIGEN: CPT

## 2024-06-13 PROCEDURE — 36415 COLL VENOUS BLD VENIPUNCTURE: CPT

## 2024-06-13 PROCEDURE — 85027 COMPLETE CBC AUTOMATED: CPT

## 2024-06-14 NOTE — PROGRESS NOTES
Ambulatory Visit  Name: Jr Mendoza      : 1965      MRN: [id number]  Encounter Provider: Ann Perez MD  Encounter Date: 2024   Encounter department: [facility] COLON AND RECTAL SURGERY [place]    Assessment & Plan   1. Malignant neoplasm of descending colon (HCC)  -     Case request operating room: RESECTION COLON LEFT LAPAROSCOPIC HAND-ASSISTED; Standing  -     EKG 12 lead; Future  -     Type and screen; Future  -     Case request operating room: RESECTION COLON LEFT LAPAROSCOPIC HAND-ASSISTED  2. Colonic mass  -     Ambulatory Referral to Colorectal Surgery  3. DONG (obstructive sleep apnea)  4. Class 2 obesity due to excess calories with body mass index (BMI) of 36.0 to 36.9 in adult, unspecified whether serious comorbidity present  Reviewed pathology and CT imaging with patient and his significant other, Mandi  Discussed the TNM staging of colon cancer  Discussed the risk, benefits, and alternatives of laparoscopic possible open left hemicolectomy  Patient will require preoperative bowel prep with antibiotics, preoperative pulmonary clearance, and surgical optimization consultation  Discussed possible complications of surgery, and how body habitus and pulmonary status would affect risk of hernia, possible cardiopulmonary complications, and DVT  All questions regarding surgery and postoperative recovery answered, and patient would like to proceed with surgery  He understands that he will require close postoperative surveillance, including CT follow-up of his pulmonary nodules, colonoscopy within 1 year of surgery, and annual CT imaging and CEA  Will tentatively plan for surgery next week, however, patient understands the need for preoperative optimization and clearance  Follow-up within 1 month of surgery    History of Present Illness     Jr Mendoza is a 58 y.o. male who presents referred by Dr. Satish Haley for colonic mass.  He had undergone colonoscopies  following a positive Cologuard.  He denies abdominal pain, diarrhea, blood in stools, and unexpected weight loss.  Past surgical history includes laparoscopic cholecystectomy.    Last colonoscopy on 6/6/2024 by Dr. Haley showed: Indication: Positive Cologuard.  Impression: 1) Single malignant-appearing, friable, fungating, invasive and ulcerated mass measuring 5 cm x 4 cm 32 cm from the anal verge, covering one quarter of the circumference; minimal bleeding occurred after intervention; performed cold forceps biopsy; tattooed 5 cm proximal and 3 cm distal to the finding.  2) Moderate diverticulosis sigmoid colon subcentimeter polyp in the cecum was removed with cold snare. 3) 6 mm sessile polyp cecum status post cold snare polypectomy. 4) Two polyps measuring from 8 mm up to 10 mm in the rectum; performed cold snare removal; single clip was placed at end procedure for hemostasis.  5) Normal terminal ileum and retroflexion the rectum was normal.    Colonoscopy pathology:  A. Rectum, polyp x2:  - Fragments of hyperplastic polyps.  - Negative for dysplasia and malignancy.   B. Large Intestine, Cecum, polyp:  - Tubular adenoma.  - Negative for high grade dysplasia and malignancy.   C. Colon, mass at 32 cm from anal verge, biopsy:  - At least intramucosal carcinoma arising from tubulovillous adenoma. See note.  - Deeper levels examined.  ~Note: Colonoscopic evidence of a 5 x 4 cm malignant-appearing mass is noted. Clinical correlation is needed to ensure this biopsy is representative of the lesion. Intradepartmental consultation is in agreement.     CT chest abdomen and pelvis performed on 6/18/2024 showed: Right pulmonary nodules, the larger 5 mm, favoring intrapleural lymph nodes. Given reported colonic mass, recommend follow-up in 3 months to confirm benignity. Soft tissue focus in the one of the subsegmental right lower lobe bronchi, likely secretions. This, too, can be reassessed at 3 months follow-up.  No  findings of intra-abdominal metastases.  Chronic periportal lymphadenopathy, suggestive of chronic liver disease. Correlate with liver function tests. Right cardiophrenic lymph node, slightly enlarged since 2022, of uncertain clinical significance. Other findings, as per the body of the report.     Lab Results   Component Value Date    CEA 2.5 06/13/2024     Lab Results   Component Value Date    WBC 7.14 06/13/2024    HGB 14.8 06/13/2024    HCT 45.7 06/13/2024    MCV 95 06/13/2024     06/13/2024     Lab Results   Component Value Date    SODIUM 137 06/13/2024    K 3.8 06/13/2024    CL 99 06/13/2024    CO2 26 06/13/2024    AGAP 12 06/13/2024    BUN 9 06/13/2024    CREATININE 0.65 06/13/2024    GLUC 127 05/22/2022    GLUF 163 (H) 06/13/2024    CALCIUM 9.1 06/13/2024    AST 20 06/13/2024    ALT 20 06/13/2024    ALKPHOS 73 06/13/2024    TP 7.6 06/13/2024    TBILI 0.64 06/13/2024    EGFR 105 06/13/2024     Review of Systems   Constitutional:  Negative for chills and fever.   HENT:  Negative for ear pain and sore throat.    Eyes:  Negative for pain and visual disturbance.   Respiratory:  Negative for cough and shortness of breath.    Cardiovascular:  Negative for chest pain and palpitations.   Gastrointestinal:  Negative for abdominal pain and vomiting.   Genitourinary:  Negative for dysuria and hematuria.   Musculoskeletal:  Negative for arthralgias and back pain.   Skin:  Negative for color change and rash.   Neurological:  Negative for seizures and syncope.   All other systems reviewed and are negative.    Past Medical History   Past Medical History:   Diagnosis Date    Asthma     Cholangitis 05/16/2022    Closed extra-articular fracture of distal end of right tibia 01/28/2020    Closed nondisplaced oblique fracture of shaft of right fibula 01/28/2020    Diabetes mellitus (HCC)     Foot fracture     Hypertension      Past Surgical History:   Procedure Laterality Date    CHOLECYSTECTOMY LAPAROSCOPIC N/A 05/18/2022     Procedure: CHOLECYSTECTOMY LAPAROSCOPIC;  Surgeon: Zachary Kern MD;  Location: [facility];  Service: General     Family History   Problem Relation Age of Onset    ALS Mother     Pneumonia Father     No Known Problems Sister      Current Outpatient Medications on File Prior to Visit   Medication Sig Dispense Refill    albuterol (Ventolin HFA) 90 mcg/act inhaler Inhale 2 puffs every 6 (six) hours as needed for wheezing or shortness of breath 18 g 3    amLODIPine (NORVASC) 5 mg tablet TAKE 1 TABLET (5 MG TOTAL) BY MOUTH DAILY. 90 tablet 1    atorvastatin (LIPITOR) 20 mg tablet TAKE 1 TABLET BY MOUTH EVERY DAY 90 tablet 1    azelastine (ASTELIN) 0.1 % nasal spray 1 spray into each nostril 2 (two) times a day Use in each nostril as directed 30 mL 5    ciclopirox (PENLAC) 8 % solution Apply topically daily at bedtime Apply Penlac at bedtime to the affected toenails daily, wipe off the Penlac from the toenails with acetone or rubbing alcohol on the 7th day and restart the cycle. 6 mL 3    fluticasone (FLONASE) 50 mcg/act nasal spray SPRAY 1 SPRAY INTO EACH NOSTRIL EVERY DAY 48 mL 1    metFORMIN (GLUCOPHAGE) 500 mg tablet Take 1 tablet (500 mg total) by mouth 2 (two) times a day with meals 180 tablet 0    mometasone-formoterol (Dulera) 200-5 MCG/ACT inhaler Inhale 2 puffs 2 (two) times a day Rinse mouth after use. 13 g 5    sildenafil (VIAGRA) 25 MG tablet TAKE 1 TABLET BY MOUTH DAILY AS NEEDED FOR ERECTILE DYSFUNCTION. 3 tablet 3    tamsulosin (FLOMAX) 0.4 mg Take 1 capsule (0.4 mg total) by mouth daily with dinner 90 capsule 3    valsartan (DIOVAN) 160 mg tablet TAKE 1 TABLET BY MOUTH EVERY DAY 90 tablet 1     Current Facility-Administered Medications on File Prior to Visit   Medication Dose Route Frequency Provider Last Rate Last Admin    [COMPLETED] barium (READI-CAT 2) suspension 900 mL  900 mL Oral Once in imaging Satish Haley, DO   900 mL at 06/18/24 1537    [COMPLETED] iohexol (OMNIPAQUE) 350  MG/ML injection (MULTI-DOSE) 100 mL  100 mL Intravenous Once in imaging Satish Haley, DO   100 mL at 06/18/24 3947     Allergies   Allergen Reactions    Pollen Extract Allergic Rhinitis      Objective     There were no vitals taken for this visit.    Physical Exam  Vitals and nursing note reviewed.   Constitutional:       General: He is not in acute distress.     Appearance: He is well-developed. He is obese.   HENT:      Head: Normocephalic and atraumatic.   Eyes:      Conjunctiva/sclera: Conjunctivae normal.   Cardiovascular:      Rate and Rhythm: Normal rate and regular rhythm.      Heart sounds: No murmur heard.  Pulmonary:      Effort: Pulmonary effort is normal. No respiratory distress.      Breath sounds: Normal breath sounds.   Abdominal:      Palpations: Abdomen is soft.      Tenderness: There is no abdominal tenderness.   Musculoskeletal:         General: No swelling.      Cervical back: Neck supple.   Skin:     General: Skin is warm and dry.      Capillary Refill: Capillary refill takes less than 2 seconds.   Neurological:      Mental Status: He is alert.   Psychiatric:         Mood and Affect: Mood normal.       Administrative Statements   I have spent a total time of 47 minutes on 06/19/24 In caring for this patient including Diagnostic results, Prognosis, Risks and benefits of tx options, Instructions for management, Patient and family education, Importance of tx compliance, Risk factor reductions, Impressions, Counseling / Coordination of care, Documenting in the medical record, Reviewing / ordering tests, medicine, procedures  , Obtaining or reviewing history  , and Communicating with other healthcare professionals .

## 2024-06-14 NOTE — H&P (VIEW-ONLY)
Ambulatory Visit  Name: Jr Mendoza      : 1965      MRN: [id number]  Encounter Provider: Ann Perez MD  Encounter Date: 2024   Encounter department: [facility] COLON AND RECTAL SURGERY [place]    Assessment & Plan   1. Malignant neoplasm of descending colon (HCC)  -     Case request operating room: RESECTION COLON LEFT LAPAROSCOPIC HAND-ASSISTED; Standing  -     EKG 12 lead; Future  -     Type and screen; Future  -     Case request operating room: RESECTION COLON LEFT LAPAROSCOPIC HAND-ASSISTED  2. Colonic mass  -     Ambulatory Referral to Colorectal Surgery  3. DONG (obstructive sleep apnea)  4. Class 2 obesity due to excess calories with body mass index (BMI) of 36.0 to 36.9 in adult, unspecified whether serious comorbidity present  Reviewed pathology and CT imaging with patient and his significant other, Mandi  Discussed the TNM staging of colon cancer  Discussed the risk, benefits, and alternatives of laparoscopic possible open left hemicolectomy  Patient will require preoperative bowel prep with antibiotics, preoperative pulmonary clearance, and surgical optimization consultation  Discussed possible complications of surgery, and how body habitus and pulmonary status would affect risk of hernia, possible cardiopulmonary complications, and DVT  All questions regarding surgery and postoperative recovery answered, and patient would like to proceed with surgery  He understands that he will require close postoperative surveillance, including CT follow-up of his pulmonary nodules, colonoscopy within 1 year of surgery, and annual CT imaging and CEA  Will tentatively plan for surgery next week, however, patient understands the need for preoperative optimization and clearance  Follow-up within 1 month of surgery    History of Present Illness     Jr Mendoza is a 58 y.o. male who presents referred by Dr. Satish Haley for colonic mass.  He had undergone colonoscopies  following a positive Cologuard.  He denies abdominal pain, diarrhea, blood in stools, and unexpected weight loss.  Past surgical history includes laparoscopic cholecystectomy.    Last colonoscopy on 6/6/2024 by Dr. Haley showed: Indication: Positive Cologuard.  Impression: 1) Single malignant-appearing, friable, fungating, invasive and ulcerated mass measuring 5 cm x 4 cm 32 cm from the anal verge, covering one quarter of the circumference; minimal bleeding occurred after intervention; performed cold forceps biopsy; tattooed 5 cm proximal and 3 cm distal to the finding.  2) Moderate diverticulosis sigmoid colon subcentimeter polyp in the cecum was removed with cold snare. 3) 6 mm sessile polyp cecum status post cold snare polypectomy. 4) Two polyps measuring from 8 mm up to 10 mm in the rectum; performed cold snare removal; single clip was placed at end procedure for hemostasis.  5) Normal terminal ileum and retroflexion the rectum was normal.    Colonoscopy pathology:  A. Rectum, polyp x2:  - Fragments of hyperplastic polyps.  - Negative for dysplasia and malignancy.   B. Large Intestine, Cecum, polyp:  - Tubular adenoma.  - Negative for high grade dysplasia and malignancy.   C. Colon, mass at 32 cm from anal verge, biopsy:  - At least intramucosal carcinoma arising from tubulovillous adenoma. See note.  - Deeper levels examined.  ~Note: Colonoscopic evidence of a 5 x 4 cm malignant-appearing mass is noted. Clinical correlation is needed to ensure this biopsy is representative of the lesion. Intradepartmental consultation is in agreement.     CT chest abdomen and pelvis performed on 6/18/2024 showed: Right pulmonary nodules, the larger 5 mm, favoring intrapleural lymph nodes. Given reported colonic mass, recommend follow-up in 3 months to confirm benignity. Soft tissue focus in the one of the subsegmental right lower lobe bronchi, likely secretions. This, too, can be reassessed at 3 months follow-up.  No  findings of intra-abdominal metastases.  Chronic periportal lymphadenopathy, suggestive of chronic liver disease. Correlate with liver function tests. Right cardiophrenic lymph node, slightly enlarged since 2022, of uncertain clinical significance. Other findings, as per the body of the report.     Lab Results   Component Value Date    CEA 2.5 06/13/2024     Lab Results   Component Value Date    WBC 7.14 06/13/2024    HGB 14.8 06/13/2024    HCT 45.7 06/13/2024    MCV 95 06/13/2024     06/13/2024     Lab Results   Component Value Date    SODIUM 137 06/13/2024    K 3.8 06/13/2024    CL 99 06/13/2024    CO2 26 06/13/2024    AGAP 12 06/13/2024    BUN 9 06/13/2024    CREATININE 0.65 06/13/2024    GLUC 127 05/22/2022    GLUF 163 (H) 06/13/2024    CALCIUM 9.1 06/13/2024    AST 20 06/13/2024    ALT 20 06/13/2024    ALKPHOS 73 06/13/2024    TP 7.6 06/13/2024    TBILI 0.64 06/13/2024    EGFR 105 06/13/2024     Review of Systems   Constitutional:  Negative for chills and fever.   HENT:  Negative for ear pain and sore throat.    Eyes:  Negative for pain and visual disturbance.   Respiratory:  Negative for cough and shortness of breath.    Cardiovascular:  Negative for chest pain and palpitations.   Gastrointestinal:  Negative for abdominal pain and vomiting.   Genitourinary:  Negative for dysuria and hematuria.   Musculoskeletal:  Negative for arthralgias and back pain.   Skin:  Negative for color change and rash.   Neurological:  Negative for seizures and syncope.   All other systems reviewed and are negative.    Past Medical History   Past Medical History:   Diagnosis Date    Asthma     Cholangitis 05/16/2022    Closed extra-articular fracture of distal end of right tibia 01/28/2020    Closed nondisplaced oblique fracture of shaft of right fibula 01/28/2020    Diabetes mellitus (HCC)     Foot fracture     Hypertension      Past Surgical History:   Procedure Laterality Date    CHOLECYSTECTOMY LAPAROSCOPIC N/A 05/18/2022     Procedure: CHOLECYSTECTOMY LAPAROSCOPIC;  Surgeon: Zachary Kern MD;  Location: [facility];  Service: General     Family History   Problem Relation Age of Onset    ALS Mother     Pneumonia Father     No Known Problems Sister      Current Outpatient Medications on File Prior to Visit   Medication Sig Dispense Refill    albuterol (Ventolin HFA) 90 mcg/act inhaler Inhale 2 puffs every 6 (six) hours as needed for wheezing or shortness of breath 18 g 3    amLODIPine (NORVASC) 5 mg tablet TAKE 1 TABLET (5 MG TOTAL) BY MOUTH DAILY. 90 tablet 1    atorvastatin (LIPITOR) 20 mg tablet TAKE 1 TABLET BY MOUTH EVERY DAY 90 tablet 1    azelastine (ASTELIN) 0.1 % nasal spray 1 spray into each nostril 2 (two) times a day Use in each nostril as directed 30 mL 5    ciclopirox (PENLAC) 8 % solution Apply topically daily at bedtime Apply Penlac at bedtime to the affected toenails daily, wipe off the Penlac from the toenails with acetone or rubbing alcohol on the 7th day and restart the cycle. 6 mL 3    fluticasone (FLONASE) 50 mcg/act nasal spray SPRAY 1 SPRAY INTO EACH NOSTRIL EVERY DAY 48 mL 1    metFORMIN (GLUCOPHAGE) 500 mg tablet Take 1 tablet (500 mg total) by mouth 2 (two) times a day with meals 180 tablet 0    mometasone-formoterol (Dulera) 200-5 MCG/ACT inhaler Inhale 2 puffs 2 (two) times a day Rinse mouth after use. 13 g 5    sildenafil (VIAGRA) 25 MG tablet TAKE 1 TABLET BY MOUTH DAILY AS NEEDED FOR ERECTILE DYSFUNCTION. 3 tablet 3    tamsulosin (FLOMAX) 0.4 mg Take 1 capsule (0.4 mg total) by mouth daily with dinner 90 capsule 3    valsartan (DIOVAN) 160 mg tablet TAKE 1 TABLET BY MOUTH EVERY DAY 90 tablet 1     Current Facility-Administered Medications on File Prior to Visit   Medication Dose Route Frequency Provider Last Rate Last Admin    [COMPLETED] barium (READI-CAT 2) suspension 900 mL  900 mL Oral Once in imaging Satish Haley, DO   900 mL at 06/18/24 1537    [COMPLETED] iohexol (OMNIPAQUE) 350  MG/ML injection (MULTI-DOSE) 100 mL  100 mL Intravenous Once in imaging Satish Haley, DO   100 mL at 06/18/24 2561     Allergies   Allergen Reactions    Pollen Extract Allergic Rhinitis      Objective     There were no vitals taken for this visit.    Physical Exam  Vitals and nursing note reviewed.   Constitutional:       General: He is not in acute distress.     Appearance: He is well-developed. He is obese.   HENT:      Head: Normocephalic and atraumatic.   Eyes:      Conjunctiva/sclera: Conjunctivae normal.   Cardiovascular:      Rate and Rhythm: Normal rate and regular rhythm.      Heart sounds: No murmur heard.  Pulmonary:      Effort: Pulmonary effort is normal. No respiratory distress.      Breath sounds: Normal breath sounds.   Abdominal:      Palpations: Abdomen is soft.      Tenderness: There is no abdominal tenderness.   Musculoskeletal:         General: No swelling.      Cervical back: Neck supple.   Skin:     General: Skin is warm and dry.      Capillary Refill: Capillary refill takes less than 2 seconds.   Neurological:      Mental Status: He is alert.   Psychiatric:         Mood and Affect: Mood normal.       Administrative Statements   I have spent a total time of 47 minutes on 06/19/24 In caring for this patient including Diagnostic results, Prognosis, Risks and benefits of tx options, Instructions for management, Patient and family education, Importance of tx compliance, Risk factor reductions, Impressions, Counseling / Coordination of care, Documenting in the medical record, Reviewing / ordering tests, medicine, procedures  , Obtaining or reviewing history  , and Communicating with other healthcare professionals .

## 2024-06-14 NOTE — RESULT ENCOUNTER NOTE
Please inform patient that CBC is normal.  Chemistry complete is normal except for high glucose of 163.  Liver enzymes normal.  C-reactive protein is high at 10.3, this is a nonspecific test of inflammation.  CEA tumor marker is normal at 2.5.  Thank you

## 2024-06-18 ENCOUNTER — HOSPITAL ENCOUNTER (OUTPATIENT)
Dept: CT IMAGING | Facility: HOSPITAL | Age: 59
Discharge: HOME | End: 2024-06-18
Attending: INTERNAL MEDICINE
Payer: COMMERCIAL

## 2024-06-18 DIAGNOSIS — K63.89 COLONIC MASS: ICD-10-CM

## 2024-06-18 PROCEDURE — 71260 CT THORAX DX C+: CPT

## 2024-06-18 PROCEDURE — 74177 CT ABD & PELVIS W/CONTRAST: CPT

## 2024-06-18 RX ADMIN — IOHEXOL 100 ML: 350 INJECTION, SOLUTION INTRAVENOUS at 15:37

## 2024-06-19 ENCOUNTER — DOCUMENTATION (OUTPATIENT)
Dept: HEMATOLOGY ONCOLOGY | Facility: CLINIC | Age: 59
End: 2024-06-19

## 2024-06-19 ENCOUNTER — OFFICE VISIT (OUTPATIENT)
Age: 59
End: 2024-06-19
Payer: COMMERCIAL

## 2024-06-19 ENCOUNTER — PREP FOR PROCEDURE (OUTPATIENT)
Age: 59
End: 2024-06-19

## 2024-06-19 VITALS — BODY MASS INDEX: 36.94 KG/M2 | HEIGHT: 70 IN | WEIGHT: 258 LBS

## 2024-06-19 DIAGNOSIS — K63.89 COLONIC MASS: Primary | ICD-10-CM

## 2024-06-19 DIAGNOSIS — K74.00 LIVER FIBROSIS: Primary | ICD-10-CM

## 2024-06-19 DIAGNOSIS — R93.2 ABNORMAL CT OF LIVER: ICD-10-CM

## 2024-06-19 DIAGNOSIS — K63.89 COLONIC MASS: ICD-10-CM

## 2024-06-19 DIAGNOSIS — E66.09 CLASS 2 OBESITY DUE TO EXCESS CALORIES WITH BODY MASS INDEX (BMI) OF 36.0 TO 36.9 IN ADULT, UNSPECIFIED WHETHER SERIOUS COMORBIDITY PRESENT: ICD-10-CM

## 2024-06-19 DIAGNOSIS — G47.33 OSA (OBSTRUCTIVE SLEEP APNEA): ICD-10-CM

## 2024-06-19 DIAGNOSIS — C18.6 MALIGNANT NEOPLASM OF DESCENDING COLON (HCC): Primary | ICD-10-CM

## 2024-06-19 PROCEDURE — 99204 OFFICE O/P NEW MOD 45 MIN: CPT | Performed by: SURGERY

## 2024-06-19 RX ORDER — NEOMYCIN SULFATE 500 MG/1
TABLET ORAL
Qty: 4 TABLET | Refills: 0 | Status: SHIPPED | OUTPATIENT
Start: 2024-06-20 | End: 2024-06-20

## 2024-06-19 RX ORDER — METRONIDAZOLE 500 MG/1
TABLET ORAL
Qty: 2 TABLET | Refills: 0 | Status: SHIPPED | OUTPATIENT
Start: 2024-06-20 | End: 2024-06-20

## 2024-06-19 NOTE — PROGRESS NOTES
In-basket message received from Dr. Perez to add patient to the [facility] on 7/11/2024. Chart reviewed and prep completed.

## 2024-06-19 NOTE — LETTER
Jr Mendoza  [address]  [place] [address]        ------------------------------------------------------------------------------------------------------------  6/19/2024    Dear Jr Mendoza,    Your surgery is scheduled for: 6/26/2024   The hospital will call the evening prior to your surgery with your expected arrival time.   Location:[address] [address]    CHECK LIST PRIOR TO INPATIENT SURGERY  It is your responsibility to obtain any/all referrals needed for your surgery if required by your insurance. Our office will contact you to discuss your insurance coverage for this procedure.    Special instructions required if you are taking any blood thinners. Please verify with the prescribing physician. Examples include Coumadin, Plavix, Xarelto, Eliquis, Pradaxa, etc.    Please check with your family physician if you are taking the following medications: Aspirin or any Aspirin containing medication, Gingko biloba, Ginseng, Feverfew, and/or Roger’s Wort. We suggest stopping these for 3 days.     The night before and the day of your surgery, wash from your neck to groin with chlorhexidine soap.  This soap is available at most retail pharmacies under such brand names as Hibiclens, Endure or Aplicare.    Pre-admission testing Required: YES x NO     If Yes, what type:  BLOOD-WORK  x EKG   CHEST X-RAY           BOWEL PREPARATION REQUIRED: YES x  NO   If yes, start date: 06/25/2024 . Please see attached bowel preparation instructions.    NOTHING TO EAT OR DRINK AFTER MIDNIGHT PRIOR TO SURGERY.    Please do not hesitate to call our office with any questions regarding your surgery.                   MIRALAX/GATORADE SURGERY PREPARATION INSTRUCTIONS    Purchase:   (1) 238g bottle of MiraLax or Glycolax - no prescription needed   4 Dulcolax Laxative Tablets - no prescription needed   64 oz. bottle of Gatorade (NOT RED), Crystal Light, or another  clear liquid of  choice.   **REMEMBER** A prescription for antibiotics has been called into your pharmacy for  prior to your surgery.    One Day Prior to Surgery  Have a normal breakfast, light lunch, and clear liquids thereafter.  It is important that you drink plenty of clear liquids throughout the day to prevent dehydration.  CLEAR LIQUIDS INCLUDE:  Water, Clear Fruit Juice (Apple, Cranberry, Grape), Black Coffee, Tea, Ginger Ale, Gatorade, Jasiel-Aid, Hi-C, plain Jello, Ice Pops, Clear Broth or Bouillon, Crystal Light, Lemonade, Soda (regular or diet).    No Milk Products!    At 1:00 pm, take (4) Dulcolax Tablets with 8 oz. of water.  Swallow the tablets whole with a full glass of water.  The package may direct you not to exceed (2) tablets at any time but for the purpose of this examination, you should take (4).    At 1:00 pm, take your first dose of antibiotic as prescribed.    At 1:00 pm, Mix the 238g bottle of MiraLax in 64 oz. of Gatorade and shake the solution until the MiraLax is dissolved.  Drink 8 oz. glassfuls at your own pace.  It may take 3-4 hours to drink all the solution.    At 10:00 pm, take your last dose of antibiotic as prescribed.    REMEMBER TO STAY CLOSE TO TOILET FACILITIES.    The Day of Surgery  Take nothing by mouth until after surgery.                                            Post-Operative Care Information  Abdominal Surgery  What are my post-operative instructions?   The following information and instructions are for your continued care after discharge from the hospital. Please read the information (including the After Visit Summary provided to you at the time of discharge) carefully. If you have any questions or concerns, please contact the clinical staff at [phone]    How do I take care of my incision?  Your incision(s) may have surgical glue, dissolvable sutures with white pieces of tape called steri strips, or staples.   If you have steri strips or surgical glue, do  not remove them. Steri strips will fall off naturally in 7-10 days. Surgical glue (Exofin) will fall off over a period of up to 2-3 weeks.   Do not put any topical ointments or lotions on the incisions.   Avoid tight clothing around your incision(s) or fabric that may irritate your skin such as wool, hooks and cecily.     Should I continue taking my prescribed medications?   Take medications as prescribed. Please review the After Visit Summary provided to you at the time of discharge for details.     How will I manage my pain at home?  For best pain control take your pain medication at regular intervals for the first couple of days, about every 4-6 hours. This will prevent pain build-up, which occurs when medication is taken on an as needed basis.   Use only as much prescription pain medication as you need (i.e. 1 tablet instead of 2).  Taper off the prescription pain medication as pain decreases, stopping narcotics first.   Use over-the-counter acetaminophen (Tylenolâ) if your doctor allows. When using over-the-counter medication, never use more than what is prescribed on the package directions. Do not take more than 3000mg of Tylenolâ in a 24 hour period. Do not take more than 2400mg of Ibuprofen (such as Motrinâ or Advilâ) in a 24 hour period.   While taking prescription pain medication you may not drive, work, or drink alcohol.     Are there any diet restrictions?   Continue low fiber diet up to 1 week post op.  (This allows the bowel to rest)    It is normal for bowel movements to be loose and occur more frequently post-operatively. This should improve over time.  During this time pay attention to hydration  1.5 weeks post op you may slowly begin to add fiber back into your diet.    By 2 weeks post op you may resume a normal high fiber diet.     What activity restrictions will I have?   Walk as much as tolerated.  Do not lift, pull, or push objects greater than 10 pounds for 6 weeks. This includes vacuuming,  lifting children or groceries, walking the dog, mowing lawns, snow shoveling, etc. Please discuss other specific physical activities with the doctor at your post op appointment.   Do not drive while taking pain medications. You may drive when you have no pain - usually in 1-2 weeks following surgery.   You may climb stairs in moderation but do not overtire.  Resume sexual activity after you are cleared by your doctor.     When will I receive follow-up care?   You will be scheduled to return to see your physician in the office typically in 3-4 weeks after surgery.    If you do not have a scheduled appointment at the time of discharge, please call the office at [phone].    When should I call my doctor?   Notify your doctor for any of the following signs and symptoms of possible infection:   Temperature above 101 degrees.   Increase in pain or discomfort.   Redness, swelling, drainage at incision site(s). A small amount of clear yellow or pinkish-yellow drainage is normal  If incision begins to open.     Call if you have any changes in your overall health such as having:   Nausea   Vomiting   Chills   profuse (excessive) sweating   inability to urinate or completely empty bladder   diarrhea   constipation

## 2024-06-19 NOTE — RESULT ENCOUNTER NOTE
I informed the patient and Mandi of the CAT scan results.  I explained that there are pulmonary nodules that radiology is recommending a 3-month CT scan of the chest to follow-up.  I did inform him that this should be done either through his primary care provider or through colorectal surgery or hematology.  There is also an abnormal.  Based upon this finding I will franca been checking ultrasound elastography.  Also recommend checking hepatitis A, and B serologies and iron studies.  Suspect findings may be related to metabolic dysfunction associated steatotic liver disease.  Please see if the patient has a follow-up with me in the office in 6 to 8 months.  Thank you

## 2024-06-19 NOTE — TELEPHONE ENCOUNTER
Patient scheduled for surgery 6/26/2024  at [facility] OR. Instructions and PAT's gone over with and handed to the patient.     Pt has Pulmonary clearance appointment 6/25/2024    Meds routed to Dr. Perez

## 2024-06-19 NOTE — Clinical Note
Patient with newly diagnosed colon cancer. Tentatively scheduled for 6/26; however, he understands that he needs pulmonary clearance given history of DONG. Appreciate your help!

## 2024-06-20 ENCOUNTER — DOCUMENTATION (OUTPATIENT)
Dept: HEMATOLOGY ONCOLOGY | Facility: CLINIC | Age: 59
End: 2024-06-20

## 2024-06-20 ENCOUNTER — APPOINTMENT (OUTPATIENT)
Dept: LAB | Facility: CLINIC | Age: 59
End: 2024-06-20
Payer: COMMERCIAL

## 2024-06-20 ENCOUNTER — LAB REQUISITION (OUTPATIENT)
Dept: LAB | Facility: HOSPITAL | Age: 59
End: 2024-06-20
Payer: COMMERCIAL

## 2024-06-20 ENCOUNTER — ANESTHESIA EVENT (OUTPATIENT)
Dept: PERIOP | Facility: HOSPITAL | Age: 59
End: 2024-06-20
Payer: COMMERCIAL

## 2024-06-20 DIAGNOSIS — C18.6 MALIGNANT NEOPLASM OF DESCENDING COLON (HCC): ICD-10-CM

## 2024-06-20 DIAGNOSIS — Z01.818 ENCOUNTER FOR OTHER PREPROCEDURAL EXAMINATION: ICD-10-CM

## 2024-06-20 DIAGNOSIS — Z01.818 PREOP TESTING: ICD-10-CM

## 2024-06-20 DIAGNOSIS — K63.89 COLONIC MASS: ICD-10-CM

## 2024-06-20 PROCEDURE — 86901 BLOOD TYPING SEROLOGIC RH(D): CPT | Performed by: SURGERY

## 2024-06-20 PROCEDURE — 86900 BLOOD TYPING SEROLOGIC ABO: CPT | Performed by: SURGERY

## 2024-06-20 PROCEDURE — 36415 COLL VENOUS BLD VENIPUNCTURE: CPT

## 2024-06-20 PROCEDURE — 83036 HEMOGLOBIN GLYCOSYLATED A1C: CPT

## 2024-06-20 PROCEDURE — 86850 RBC ANTIBODY SCREEN: CPT | Performed by: SURGERY

## 2024-06-20 NOTE — PROGRESS NOTES
Chart rvw'd on 6/20/2024    Referring provider-  Dr. Satish Haley    Colonoscopy date-  6/6/2024    Impression-  Normal terminal ileum.  Two polyps measuring from 8 mm up to 10 mm in the rectum; performed cold snare removal; single clip was placed at end procedure for hemostasis.  6 mm sessile polyp cecum status post cold snare polypectomy  Moderate diverticulosis sigmoid colon subcentimeter polyp in the cecum was removed with cold snare  Single malignant-appearing, friable, fungating, invasive and ulcerated mass measuring 5 cm x 4 cm 32 cm from the anal verge, covering one quarter of the circumference; minimal bleeding occurred after intervention; performed cold forceps biopsy; tattooed 5 cm proximal and 3 cm distal to the finding  Retroflexion the rectum was normal.    Pathology-  A. Rectum, polyp x2:  - Fragments of hyperplastic polyps.  - Negative for dysplasia and malignancy.      B. Large Intestine, Cecum, polyp:  - Tubular adenoma.  - Negative for high grade dysplasia and malignancy.      C. Colon, mass at 32 cm from anal verge, biopsy:  - At least intramucosal carcinoma arising from tubulovillous adenoma. See note.  - Deeper levels examined.    Labs-  6/13/2024  CEA 2.5    Imaging-  6/18/2024 CT CAP-  Right pulmonary nodules, the larger 5 mm, favoring intrapleural lymph nodes. Given reported colonic mass, recommend follow-up in 3 months to confirm benignity.  Soft tissue focus in the one of the subsegmental right lower lobe bronchi, likely secretions. This, too, can be reassessed at 3 months follow-up.  No findings of intra-abdominal metastases.  Chronic periportal lymphadenopathy, suggestive of chronic liver disease. Correlate with liver function tests.  Right cardiophrenic lymph node, slightly enlarged since 2022, of uncertain clinical significance.  Other findings, as per the body of the report.      Surgical date-  6/26/2024  L colon resection    Post surgical pathology-  N/A

## 2024-06-21 LAB
EST. AVERAGE GLUCOSE BLD GHB EST-MCNC: 140 MG/DL
HBA1C MFR BLD: 6.5 %

## 2024-06-22 LAB
ABO GROUP BLD: NORMAL
BLD GP AB SCN SERPL QL: NEGATIVE
RH BLD: POSITIVE
SPECIMEN EXPIRATION DATE: NORMAL

## 2024-06-24 ENCOUNTER — LAB (OUTPATIENT)
Dept: LAB | Facility: HOSPITAL | Age: 59
End: 2024-06-24
Payer: COMMERCIAL

## 2024-06-24 ENCOUNTER — APPOINTMENT (OUTPATIENT)
Dept: LAB | Facility: HOSPITAL | Age: 59
End: 2024-06-24
Payer: COMMERCIAL

## 2024-06-24 ENCOUNTER — LAB REQUISITION (OUTPATIENT)
Dept: LAB | Facility: HOSPITAL | Age: 59
DRG: 330 | End: 2024-06-24
Payer: COMMERCIAL

## 2024-06-24 DIAGNOSIS — Z01.818 PREOP TESTING: ICD-10-CM

## 2024-06-24 DIAGNOSIS — Z01.818 ENCOUNTER FOR OTHER PREPROCEDURAL EXAMINATION: ICD-10-CM

## 2024-06-24 DIAGNOSIS — C18.6 MALIGNANT NEOPLASM OF DESCENDING COLON (HCC): ICD-10-CM

## 2024-06-24 LAB
ABO GROUP BLD: NORMAL
ATRIAL RATE: 80 BPM
BLD GP AB SCN SERPL QL: NEGATIVE
P AXIS: 56 DEGREES
PR INTERVAL: 174 MS
QRS AXIS: 44 DEGREES
QRSD INTERVAL: 98 MS
QT INTERVAL: 382 MS
QTC INTERVAL: 440 MS
RH BLD: POSITIVE
SPECIMEN EXPIRATION DATE: NORMAL
T WAVE AXIS: 68 DEGREES
VENTRICULAR RATE: 80 BPM

## 2024-06-24 PROCEDURE — 86901 BLOOD TYPING SEROLOGIC RH(D): CPT | Performed by: UROLOGY

## 2024-06-24 PROCEDURE — 86900 BLOOD TYPING SEROLOGIC ABO: CPT | Performed by: UROLOGY

## 2024-06-24 PROCEDURE — 36415 COLL VENOUS BLD VENIPUNCTURE: CPT

## 2024-06-24 PROCEDURE — 93005 ELECTROCARDIOGRAM TRACING: CPT

## 2024-06-24 PROCEDURE — 86850 RBC ANTIBODY SCREEN: CPT | Performed by: UROLOGY

## 2024-06-24 PROCEDURE — 93010 ELECTROCARDIOGRAM REPORT: CPT | Performed by: INTERNAL MEDICINE

## 2024-06-25 ENCOUNTER — OFFICE VISIT (OUTPATIENT)
Dept: PULMONOLOGY | Facility: CLINIC | Age: 59
End: 2024-06-25
Payer: COMMERCIAL

## 2024-06-25 VITALS
WEIGHT: 259 LBS | DIASTOLIC BLOOD PRESSURE: 64 MMHG | RESPIRATION RATE: 18 BRPM | SYSTOLIC BLOOD PRESSURE: 106 MMHG | OXYGEN SATURATION: 95 % | BODY MASS INDEX: 37.08 KG/M2 | HEART RATE: 97 BPM | HEIGHT: 70 IN

## 2024-06-25 DIAGNOSIS — J45.40 MODERATE PERSISTENT ASTHMA WITHOUT COMPLICATION: Primary | ICD-10-CM

## 2024-06-25 DIAGNOSIS — E66.09 CLASS 2 OBESITY DUE TO EXCESS CALORIES WITH BODY MASS INDEX (BMI) OF 36.0 TO 36.9 IN ADULT, UNSPECIFIED WHETHER SERIOUS COMORBIDITY PRESENT: ICD-10-CM

## 2024-06-25 DIAGNOSIS — Z01.818 PRE-OPERATIVE CLEARANCE: ICD-10-CM

## 2024-06-25 DIAGNOSIS — G47.33 OSA (OBSTRUCTIVE SLEEP APNEA): ICD-10-CM

## 2024-06-25 DIAGNOSIS — R93.89 ABNORMAL CT OF THE CHEST: ICD-10-CM

## 2024-06-25 PROCEDURE — 99214 OFFICE O/P EST MOD 30 MIN: CPT

## 2024-06-25 RX ORDER — TERBINAFINE HYDROCHLORIDE 250 MG/1
250 TABLET ORAL DAILY
COMMUNITY
End: 2024-07-24

## 2024-06-25 NOTE — ANESTHESIA PREPROCEDURE EVALUATION
Procedure:  INSERTION URETERAL CATHETERS PREOP (Bilateral: Ureter)  RESECTION COLON LEFT LAPAROSCOPIC HAND-ASSISTED (Abdomen)    Relevant Problems   CARDIO   (+) Benign essential HTN      /RENAL   (+) BPH with obstruction/lower urinary tract symptoms      PULMONARY   (+) Moderate persistent asthma without complication   (+) DONG (obstructive sleep apnea)      Behavioral Health   (+) Alcohol use disorder, mild, abuse      Ear/Nose/Throat   (+) Nasal obstruction      Respiratory/Allergy   (+) Acute bronchitis   (+) Nocturnal hypoxemia      Other   (+) Chronic cough   (+) Prediabetes      TTE (9/14/23) & EKG (6/24/24): WNL    CBC & CMP: Itx=670 otherwise wnl    PFTS 9/14/23 Study Interpretation:   Reduced total lung capacity at 79% predicted with normal residual volume and thoracic gas volume.  Moderate airflow limitation.  Significant improvement in airflow and vital capacity following the administration of bronchodilators.  Normal diffusion capacity.  Increased airway resistance as indicated by the specific airway conductance        Physical Exam    Airway    Mallampati score: IV  TM Distance: >3 FB  Neck ROM: full     Dental       Cardiovascular      Pulmonary      Other Findings  Lungs are clear bilaterally    Anesthesia Plan  ASA Score- 3     Anesthesia Type- general with ASA Monitors.         Additional Monitors:     Airway Plan: ETT.    Comment: Pre-op bronchodilators indicated  Patient took albuterol this am.       Plan Factors-    Chart reviewed. EKG reviewed. Imaging results reviewed. Existing labs reviewed. Patient summary reviewed.    Patient is not a current smoker.  Patient did not smoke on day of surgery.            Induction- intravenous.    Postoperative Plan- Plan for postoperative opioid use. Planned trial extubation    Perioperative Resuscitation Plan - Level 1 - Full Code.       Informed Consent- Anesthetic plan and risks discussed with patient.  I personally reviewed this patient with the CRNA.  Discussed and agreed on the Anesthesia Plan with the CRNA..

## 2024-06-25 NOTE — ASSESSMENT & PLAN NOTE
-Encouraged weight loss.  Patient interested in assistance with this.  Referral placed for weight management

## 2024-06-25 NOTE — ASSESSMENT & PLAN NOTE
-CT chest abdomen pelvis performed on 6/18/2024 with RML 5 mm and RUL 3 mm nodule, appearance favoring intrapleural lymph nodes.  -Given recent findings of colonic mass, will repeat CT chest in 3 months to confirm benignity

## 2024-06-25 NOTE — ASSESSMENT & PLAN NOTE
-No recent exacerbations or respiratory infections.  Symptoms remain stable without frequent need for rescue inhaler.  -Lungs are clear on exam today  -Will continue Dulera 200-5 mcg 2 puffs twice daily and albuterol as needed

## 2024-06-25 NOTE — PROGRESS NOTES
Pulmonary Follow Up Note  Jr Mendoza 58 y.o. male MRN: [id number]  6/25/2024    Assessment:    Moderate persistent asthma without complication  -No recent exacerbations or respiratory infections.  Symptoms remain stable without frequent need for rescue inhaler.  -Lungs are clear on exam today  -Will continue Dulera 200-5 mcg 2 puffs twice daily and albuterol as needed    DONG (obstructive sleep apnea)  -HST in March with severe DONG with SHANTEL 89.3, started on CPAP  -Underwent CPAP study 5/29/2024, recommended set level PAP with IPAP 17 cm H2O and EPAP 13 cm H2O  -Unfortunately, no compliance report available to review at today's visit  -Patient confirms he is wearing nightly on average 8 hours per night.  Sleeping well, no issues with mask or machine    Plan:  -Encouraged patient to continue wearing during all hours of sleep which include naps as well.  Will try to obtain compliance report to ensure settings updated with new recommendations  -If there are increased central events on compliance report that persist for 6 months, will consider an ASV titration study  -Follow-up in 3 months or sooner if needed    Class 2 obesity with body mass index (BMI) of 37.0 to 37.9 in adult  -Encouraged weight loss.  Patient interested in assistance with this.  Referral placed for weight management    Abnormal CT of the chest  -CT chest abdomen pelvis performed on 6/18/2024 with RML 5 mm and RUL 3 mm nodule, appearance favoring intrapleural lymph nodes.  -Given recent findings of colonic mass, will repeat CT chest in 3 months to confirm benignity    Preoperative clearance  -ARISCAT Score 34. Patient is at intermediate risk for post-op pulmonary complications. Overall, there are no strict pulmonary contraindications to undergoing surgical operation.  Would not recommend proceeding with surgery if patient has current infection or exacerbation. Patient should maintain his inhaler therapy and BiPAP perioperatively.  Consider NIPPV  post-op. May benefit from Pulmonary consult.        Plan:    Diagnoses and all orders for this visit:    Moderate persistent asthma without complication    Pre-operative clearance    DONG (obstructive sleep apnea)    Abnormal CT of the chest  -     CT chest wo contrast; Future    Class 2 obesity due to excess calories with body mass index (BMI) of 36.0 to 36.9 in adult, unspecified whether serious comorbidity present  -     Ambulatory Referral to Weight Management; Future              Return in about 3 months (around 9/25/2024).      History of Present Illness     Chief Complaint:   No chief complaint on file.      Patient ID: Jr is a 58 y.o. y.o. male has a past medical history of Asthma, Cholangitis (05/16/2022), Closed extra-articular fracture of distal end of right tibia (01/28/2020), Closed nondisplaced oblique fracture of shaft of right fibula (01/28/2020), CPAP (continuous positive airway pressure) dependence, Diabetes mellitus (HCC), Foot fracture, Hyperlipidemia, Hypertension, and Sleep apnea.      6/25/2024  HPI: Jr Mendoza is a 58 y.o. male with a past medical history significant for hypertension, prediabetes, obesity, DONG, mild alcohol abuse, and former brief tobacco use who is here today for a follow-up visit and for pre-op clearance.   He is accompanied by his wife.  Patient previously seen in the office 3 months ago.  Maintained on Dulera 200 and albuterol as needed for his asthma.  He was also ordered a CPAP due to his home sleep study results with severe DONG with SHANTEL 89 and event related desaturations.  He had a CPAP study completed on 5/30/2024.  Recommended BiPAP, this was ordered 3 weeks ago.  Patient is scheduled to have a laparoscopic bowel resection for colonic mass tomorrow.    Patient denies any recent exacerbations of his asthma nor respiratory infections.  States his breathing is stable.  Denies any cough, wheezing, fevers, or chills.  Only using rescue inhaler in the mornings  upon awakening.  Using Dulera inhaler twice a day as ordered.  Denies any issues with prior general anesthesia.  Patient confirms he is wearing his BiPAP nightly.  Not wearing with naps.  Sleeping for at least 8 hours with it on nightly.  Denies any issues with the mask or machine.          Review of Systems   Constitutional:  Negative for activity change, appetite change, chills, fever and unexpected weight change.   HENT:  Negative for congestion, ear pain, postnasal drip, rhinorrhea, sneezing, sore throat and trouble swallowing.    Respiratory:  Negative for cough, chest tightness, shortness of breath and wheezing.    Cardiovascular:  Negative for chest pain, palpitations and leg swelling.   Musculoskeletal:  Negative for myalgias.   Allergic/Immunologic: Negative.        Historical Information   Past Medical History:   Diagnosis Date    Asthma     Cholangitis 05/16/2022    Closed extra-articular fracture of distal end of right tibia 01/28/2020    Closed nondisplaced oblique fracture of shaft of right fibula 01/28/2020    CPAP (continuous positive airway pressure) dependence     Diabetes mellitus (HCC)     Foot fracture     Hyperlipidemia     Hypertension     Sleep apnea      Past Surgical History:   Procedure Laterality Date    CHOLECYSTECTOMY LAPAROSCOPIC N/A 05/18/2022    Procedure: CHOLECYSTECTOMY LAPAROSCOPIC;  Surgeon: Zachary Kern MD;  Location: [facility];  Service: General    COLONOSCOPY       Family History   Problem Relation Age of Onset    ALS Mother     Pneumonia Father     No Known Problems Sister        Smoking history: He reports that he quit smoking about 7 years ago. His smoking use included cigarettes. He started smoking about 17 years ago. He has a 2.5 pack-year smoking history. He has never used smokeless tobacco.    Occupational History:     Immunization History   Administered Date(s) Administered    COVID-19 MODERNA VACC 0.5 ML IM 04/30/2021, 05/28/2021    INFLUENZA 10/14/2022     Influenza, injectable, quadrivalent, preservative free 0.5 mL 10/31/2023    Influenza, recombinant, quadrivalent,injectable, preservative free 09/17/2020, 01/11/2022, 10/14/2022    Tdap 03/14/2024    Zoster Vaccine Recombinant 03/14/2024       Meds/Allergies     Current Outpatient Medications:     albuterol (Ventolin HFA) 90 mcg/act inhaler, Inhale 2 puffs every 6 (six) hours as needed for wheezing or shortness of breath, Disp: 18 g, Rfl: 3    amLODIPine (NORVASC) 5 mg tablet, TAKE 1 TABLET (5 MG TOTAL) BY MOUTH DAILY., Disp: 90 tablet, Rfl: 1    atorvastatin (LIPITOR) 20 mg tablet, TAKE 1 TABLET BY MOUTH EVERY DAY, Disp: 90 tablet, Rfl: 1    azelastine (ASTELIN) 0.1 % nasal spray, 1 spray into each nostril 2 (two) times a day Use in each nostril as directed, Disp: 30 mL, Rfl: 5    ciclopirox (PENLAC) 8 % solution, Apply topically daily at bedtime Apply Penlac at bedtime to the affected toenails daily, wipe off the Penlac from the toenails with acetone or rubbing alcohol on the 7th day and restart the cycle., Disp: 6 mL, Rfl: 3    fluticasone (FLONASE) 50 mcg/act nasal spray, SPRAY 1 SPRAY INTO EACH NOSTRIL EVERY DAY, Disp: 48 mL, Rfl: 1    metFORMIN (GLUCOPHAGE) 500 mg tablet, Take 1 tablet (500 mg total) by mouth 2 (two) times a day with meals, Disp: 180 tablet, Rfl: 0    mometasone-formoterol (Dulera) 200-5 MCG/ACT inhaler, Inhale 2 puffs 2 (two) times a day Rinse mouth after use., Disp: 13 g, Rfl: 5    sildenafil (VIAGRA) 25 MG tablet, TAKE 1 TABLET BY MOUTH DAILY AS NEEDED FOR ERECTILE DYSFUNCTION., Disp: 3 tablet, Rfl: 3    tamsulosin (FLOMAX) 0.4 mg, Take 1 capsule (0.4 mg total) by mouth daily with dinner, Disp: 90 capsule, Rfl: 3    terbinafine (LamISIL) 250 mg tablet, Take 250 mg by mouth daily, Disp: , Rfl:     valsartan (DIOVAN) 160 mg tablet, TAKE 1 TABLET BY MOUTH EVERY DAY, Disp: 90 tablet, Rfl: 1  Allergies:   Allergies   Allergen Reactions    Pollen Extract Allergic Rhinitis         Vitals:  Vitals:  "   06/25/24 1317   BP: 106/64   Pulse: 97   Resp: 18   SpO2: 95%   Weight: 117 kg (259 lb)   Height: 5' 10\" (1.778 m)     Oxygen Therapy  SpO2: 95 %  .  Wt Readings from Last 3 Encounters:   06/25/24 117 kg (259 lb)   06/19/24 117 kg (258 lb)   06/10/24 117 kg (258 lb)     Body mass index is 37.16 kg/m².    Physical Exam  Vitals and nursing note reviewed.   Constitutional:       General: He is not in acute distress.     Appearance: Normal appearance. He is obese.      Comments: Large neck   Cardiovascular:      Rate and Rhythm: Normal rate and regular rhythm.      Heart sounds: Normal heart sounds. No murmur heard.  Pulmonary:      Effort: Pulmonary effort is normal. No respiratory distress.      Breath sounds: No decreased breath sounds, wheezing, rhonchi or rales.   Musculoskeletal:         General: No swelling.      Right lower leg: No edema.      Left lower leg: No edema.   Psychiatric:         Mood and Affect: Mood and affect normal.         Behavior: Behavior normal. Behavior is cooperative.           Labs: I have personally reviewed pertinent lab results.  Lab Results   Component Value Date    WBC 7.14 06/13/2024    HGB 14.8 06/13/2024    HCT 45.7 06/13/2024    MCV 95 06/13/2024     06/13/2024     Lab Results   Component Value Date    CALCIUM 9.1 06/13/2024    K 3.8 06/13/2024    CO2 26 06/13/2024    CL 99 06/13/2024    BUN 9 06/13/2024    CREATININE 0.65 06/13/2024     No results found for: \"IGE\"  Lab Results   Component Value Date    ALT 20 06/13/2024    AST 20 06/13/2024    ALKPHOS 73 06/13/2024       Imaging and other studies: I have personally reviewed pertinent reports and I have personally reviewed pertinent films in PACS     CT chest abdomen pelvis 6/18/2024  Right pulmonary nodules, the larger 5 mm, favoring intrapleural lymph nodes.  Given reported colonic mass, recommend follow-up in 3 months to confirm benignity.  Soft tissue focus in one of the subsegmental right lower lobe bronchi, likely " secretions.  This, too, can be reassessed at 3 months follow-up.      No findings of intra-abdominal metastases.    CPAP Study 5/29/24  IMPRESSION: 1. This was an ineffective PAP titration study (despite the use of both CPAP and BiPAP) as obstructive events occurred at lower pressures and central events occurred at higher pressures. As an optimal setting was not identified, there is no pressure that can be recommended based on these results. 2. Normal baseline oxygen saturation with use of PAP on this test. 3. Sleep efficiency was low due to prolonged sleep latency and sleep fragmentation (improved at higher PAP pressures). A high normal amount percentage of Stage N1 sleep was observed. Stage N3 sleep (deep sleep) percentage was decreased. REM sleep percentage was markedly increased. 4. Markedly increased number of periodic limb movements (PLM) during sleep.       RECOMMENDATION: Recommend set bilevel PAP (EPAP 13 cm H2O and IPAP 17 cm H2O). Close follow-up recommended and if there are a high number of central events on compliance report that persist for 6 months then consider an ASV titration study. Due to the frequency of periodic limb movements during sleep, clinical correlation is recommended. Assessment of an iron panel to exclude iron deficiency should be considered. Follow-up with ordering provider or sleep medicine in 1-3 months for a PAP compliance visit.     Home sleep study 3/5/2024  Severe obstructive sleep apnea with an SHANTEL of 89.3 and event related desaturations.  The lowest SpO2 recorded 63%.  Periods of bradycardia noted.  A CPAP titration study is recommended due to sleep apnea and hypoxemia.    Echo 9/14/2023  Normal left ventricular cavity size.  Wall thickness increased.  Moderate concentric hypertrophy.  The left ventricular ejection fraction is 65 to 70%.  Systolic function is normal.  Wall motion is normal.  Diastolic function is mildly abnormal, consistent with grade 1 relaxation.  Mitral  valve annular calcification.  Aortic root is mildly dilated.  Ascending aorta is normal in size.  The aortic root is 3.70 cm.  The ascending aorta is 3.4 cm.      Pulmonary function testing:     Pulmonary Functions Testing Results: 9/14/23    FEV1/FVC ratio 67%    FEV1 49% predicted  FVC 56% predicted  Significant response to bronchodilators  TLC 79 % predicted   % predicted  DLCO corrected for hemoglobin 89 % predicted    Impression: Reduced total lung capacity at 79% predicted with normal residual volume and thoracic gas volume.  Moderate airflow limitation.  Significant improvement in airflow and vital capacity following the administration of bronchodilators.  Normal diffusion capacity.  Increased airway resistance as indicated by the specific airway conductance.

## 2024-06-25 NOTE — ASSESSMENT & PLAN NOTE
-HST in March with severe DONG with SHANTEL 89.3, started on CPAP  -Underwent CPAP study 5/29/2024, recommended set level PAP with IPAP 17 cm H2O and EPAP 13 cm H2O  -Unfortunately, no compliance report available to review at today's visit  -Patient confirms he is wearing nightly on average 8 hours per night.  Sleeping well, no issues with mask or machine    Plan:  -Encouraged patient to continue wearing during all hours of sleep which include naps as well.  Will try to obtain compliance report to ensure settings updated with new recommendations  -If there are increased central events on compliance report that persist for 6 months, will consider an ASV titration study  -Follow-up in 3 months or sooner if needed

## 2024-06-25 NOTE — PRE-PROCEDURE INSTRUCTIONS
Pre-Surgery Instructions:   Medication Instructions    albuterol (Ventolin HFA) 90 mcg/act inhaler Uses PRN- OK to take day of surgery    amLODIPine (NORVASC) 5 mg tablet Take day of surgery.    atorvastatin (LIPITOR) 20 mg tablet Take day of surgery.    azelastine (ASTELIN) 0.1 % nasal spray Uses PRN- OK to take day of surgery    ciclopirox (PENLAC) 8 % solution Hold day of surgery.    fluticasone (FLONASE) 50 mcg/act nasal spray Uses PRN- OK to take day of surgery    metFORMIN (GLUCOPHAGE) 500 mg tablet Hold day of surgery.    mometasone-formoterol (Dulera) 200-5 MCG/ACT inhaler Uses PRN- OK to take day of surgery    sildenafil (VIAGRA) 25 MG tablet Uses PRN- DO NOT take day of surgery    tamsulosin (FLOMAX) 0.4 mg Take night before surgery    terbinafine (LamISIL) 250 mg tablet Take night before surgery    valsartan (DIOVAN) 160 mg tablet Hold day of surgery.      Medication instructions for day surgery reviewed. Please use only a sip of water to take your instructed medications. Avoid all over the counter vitamins, supplements and NSAIDS for one week prior to surgery per anesthesia guidelines. Tylenol is ok to take as needed.     You will receive a call one business day prior to surgery with an arrival time and hospital directions. If your surgery is scheduled on a Monday, the hospital will be calling you on the Friday prior to your surgery. If you have not heard from anyone by 8pm, please call the hospital supervisor through the hospital  at [phone]. ([place] [phone] or [place] [phone]).    Do not eat or drink anything after midnight the night before your surgery, including candy, mints, lifesavers, or chewing gum. Do not drink alcohol 24hrs before your surgery. Try not to smoke at least 24hrs before your surgery.       Follow the pre surgery showering instructions as listed in the “My Surgical Experience Booklet” or otherwise provided by your surgeon's office. Do not use a blade to  shave the surgical area 1 week before surgery. It is okay to use a clean electric clippers up to 24 hours before surgery. Do not apply any lotions, creams, including makeup, cologne, deodorant, or perfumes after showering on the day of your surgery. Do not use dry shampoo, hair spray, hair gel, or any type of hair products.     No contact lenses, eye make-up, or artificial eyelashes. Remove nail polish, including gel polish, and any artificial, gel, or acrylic nails if possible. Remove all jewelry including rings and body piercing jewelry.     Wear causal clothing that is easy to take on and off. Consider your type of surgery.    Keep any valuables, jewelry, piercings at home. Please bring any specially ordered equipment (sling, braces) if indicated.    Arrange for a responsible person to drive you to and from the hospital on the day of your surgery. Please confirm the visitor policy for the day of your procedure when you receive your phone call with an arrival time.     Call the surgeon's office with any new illnesses, exposures, or additional questions prior to surgery.    Please reference your “My Surgical Experience Booklet” for additional information to prepare for your upcoming surgery.

## 2024-06-26 ENCOUNTER — HOSPITAL ENCOUNTER (INPATIENT)
Facility: HOSPITAL | Age: 59
LOS: 16 days | Discharge: HOME | DRG: 330 | End: 2024-07-12
Attending: UROLOGY | Admitting: SURGERY
Payer: COMMERCIAL

## 2024-06-26 ENCOUNTER — ANESTHESIA (OUTPATIENT)
Dept: PERIOP | Facility: HOSPITAL | Age: 59
End: 2024-06-26
Payer: COMMERCIAL

## 2024-06-26 DIAGNOSIS — C18.6 MALIGNANT NEOPLASM OF DESCENDING COLON (HCC): Primary | ICD-10-CM

## 2024-06-26 LAB
APTT PPP: 29 SECONDS (ref 23–37)
BASE EXCESS BLDA CALC-SCNC: -1 MMOL/L (ref -2–3)
BASE EXCESS BLDA CALC-SCNC: -3 MMOL/L (ref -2–3)
BASE EXCESS BLDA CALC-SCNC: -3 MMOL/L (ref -2–3)
CA-I BLD-SCNC: 1.1 MMOL/L (ref 1.12–1.32)
CA-I BLD-SCNC: 1.11 MMOL/L (ref 1.12–1.32)
CA-I BLD-SCNC: 1.16 MMOL/L (ref 1.12–1.32)
GLUCOSE SERPL-MCNC: 164 MG/DL (ref 65–140)
GLUCOSE SERPL-MCNC: 203 MG/DL (ref 65–140)
GLUCOSE SERPL-MCNC: 208 MG/DL (ref 65–140)
GLUCOSE SERPL-MCNC: 217 MG/DL (ref 65–140)
GLUCOSE SERPL-MCNC: 219 MG/DL (ref 65–140)
HCO3 BLDA-SCNC: 23.7 MMOL/L (ref 22–28)
HCO3 BLDA-SCNC: 24.9 MMOL/L (ref 22–28)
HCO3 BLDA-SCNC: 26 MMOL/L (ref 22–28)
HCT VFR BLD CALC: 38 % (ref 36.5–49.3)
HCT VFR BLD CALC: 38 % (ref 36.5–49.3)
HCT VFR BLD CALC: 39 % (ref 36.5–49.3)
HGB BLDA-MCNC: 12.9 G/DL (ref 12–17)
HGB BLDA-MCNC: 12.9 G/DL (ref 12–17)
HGB BLDA-MCNC: 13.3 G/DL (ref 12–17)
INR PPP: 1.19 (ref 0.84–1.19)
PCO2 BLD: 25 MMOL/L (ref 21–32)
PCO2 BLD: 27 MMOL/L (ref 21–32)
PCO2 BLD: 28 MMOL/L (ref 21–32)
PCO2 BLD: 49.5 MM HG (ref 36–44)
PCO2 BLD: 53.6 MM HG (ref 36–44)
PCO2 BLD: 56.9 MM HG (ref 36–44)
PH BLD: 7.25 [PH] (ref 7.35–7.45)
PH BLD: 7.29 [PH] (ref 7.35–7.45)
PH BLD: 7.29 [PH] (ref 7.35–7.45)
PO2 BLD: 111 MM HG (ref 75–129)
PO2 BLD: 76 MM HG (ref 75–129)
PO2 BLD: 97 MM HG (ref 75–129)
POTASSIUM BLD-SCNC: 4.5 MMOL/L (ref 3.5–5.3)
POTASSIUM BLD-SCNC: 4.6 MMOL/L (ref 3.5–5.3)
POTASSIUM BLD-SCNC: 4.8 MMOL/L (ref 3.5–5.3)
PROTHROMBIN TIME: 15 SECONDS (ref 11.6–14.5)
SAO2 % BLD FROM PO2: 93 % (ref 60–85)
SAO2 % BLD FROM PO2: 97 % (ref 60–85)
SAO2 % BLD FROM PO2: 97 % (ref 60–85)
SODIUM BLD-SCNC: 134 MMOL/L (ref 136–145)
SODIUM BLD-SCNC: 135 MMOL/L (ref 136–145)
SODIUM BLD-SCNC: 135 MMOL/L (ref 136–145)
SPECIMEN SOURCE: ABNORMAL

## 2024-06-26 PROCEDURE — 0T788DZ DILATION OF BILATERAL URETERS WITH INTRALUMINAL DEVICE, VIA NATURAL OR ARTIFICIAL OPENING ENDOSCOPIC: ICD-10-PCS | Performed by: UROLOGY

## 2024-06-26 PROCEDURE — 88309 TISSUE EXAM BY PATHOLOGIST: CPT | Performed by: PATHOLOGY

## 2024-06-26 PROCEDURE — 85610 PROTHROMBIN TIME: CPT | Performed by: SURGERY

## 2024-06-26 PROCEDURE — 0DTN4ZG RESECTION OF SIGMOID COLON, PERCUTANEOUS ENDOSCOPIC APPROACH, HAND-ASSISTED: ICD-10-PCS | Performed by: SURGERY

## 2024-06-26 PROCEDURE — 0DJD8ZZ INSPECTION OF LOWER INTESTINAL TRACT, VIA NATURAL OR ARTIFICIAL OPENING ENDOSCOPIC: ICD-10-PCS | Performed by: SURGERY

## 2024-06-26 PROCEDURE — 84132 ASSAY OF SERUM POTASSIUM: CPT

## 2024-06-26 PROCEDURE — 52005 CYSTO W/URTRL CATHJ: CPT | Performed by: UROLOGY

## 2024-06-26 PROCEDURE — 85014 HEMATOCRIT: CPT

## 2024-06-26 PROCEDURE — 88344 IMHCHEM/IMCYTCHM EA MLT ANTB: CPT | Performed by: PATHOLOGY

## 2024-06-26 PROCEDURE — 82947 ASSAY GLUCOSE BLOOD QUANT: CPT

## 2024-06-26 PROCEDURE — 88304 TISSUE EXAM BY PATHOLOGIST: CPT | Performed by: PATHOLOGY

## 2024-06-26 PROCEDURE — C1758 CATHETER, URETERAL: HCPCS | Performed by: UROLOGY

## 2024-06-26 PROCEDURE — 99024 POSTOP FOLLOW-UP VISIT: CPT | Performed by: UROLOGY

## 2024-06-26 PROCEDURE — 85730 THROMBOPLASTIN TIME PARTIAL: CPT | Performed by: SURGERY

## 2024-06-26 PROCEDURE — 82948 REAGENT STRIP/BLOOD GLUCOSE: CPT

## 2024-06-26 PROCEDURE — 44204 LAPARO PARTIAL COLECTOMY: CPT | Performed by: SURGERY

## 2024-06-26 PROCEDURE — 44213 LAP MOBIL SPLENIC FL ADD-ON: CPT | Performed by: SURGERY

## 2024-06-26 PROCEDURE — 84295 ASSAY OF SERUM SODIUM: CPT

## 2024-06-26 PROCEDURE — 88341 IMHCHEM/IMCYTCHM EA ADD ANTB: CPT | Performed by: PATHOLOGY

## 2024-06-26 PROCEDURE — 82803 BLOOD GASES ANY COMBINATION: CPT

## 2024-06-26 PROCEDURE — 82330 ASSAY OF CALCIUM: CPT

## 2024-06-26 PROCEDURE — 88342 IMHCHEM/IMCYTCHM 1ST ANTB: CPT | Performed by: PATHOLOGY

## 2024-06-26 PROCEDURE — C9290 INJ, BUPIVACAINE LIPOSOME: HCPCS | Performed by: SURGERY

## 2024-06-26 RX ORDER — ROCURONIUM BROMIDE 10 MG/ML
INJECTION, SOLUTION INTRAVENOUS AS NEEDED
Status: DISCONTINUED | OUTPATIENT
Start: 2024-06-26 | End: 2024-06-26

## 2024-06-26 RX ORDER — ACETAMINOPHEN 10 MG/ML
1000 INJECTION, SOLUTION INTRAVENOUS ONCE
Status: COMPLETED | OUTPATIENT
Start: 2024-06-26 | End: 2024-06-27

## 2024-06-26 RX ORDER — LIDOCAINE HYDROCHLORIDE 10 MG/ML
INJECTION, SOLUTION EPIDURAL; INFILTRATION; INTRACAUDAL; PERINEURAL AS NEEDED
Status: DISCONTINUED | OUTPATIENT
Start: 2024-06-26 | End: 2024-06-26

## 2024-06-26 RX ORDER — ONDANSETRON 2 MG/ML
4 INJECTION INTRAMUSCULAR; INTRAVENOUS ONCE AS NEEDED
Status: DISCONTINUED | OUTPATIENT
Start: 2024-06-26 | End: 2024-06-26 | Stop reason: HOSPADM

## 2024-06-26 RX ORDER — SODIUM CHLORIDE, SODIUM LACTATE, POTASSIUM CHLORIDE, CALCIUM CHLORIDE 600; 310; 30; 20 MG/100ML; MG/100ML; MG/100ML; MG/100ML
125 INJECTION, SOLUTION INTRAVENOUS CONTINUOUS
Status: DISCONTINUED | OUTPATIENT
Start: 2024-06-26 | End: 2024-06-27

## 2024-06-26 RX ORDER — METHOCARBAMOL 500 MG/1
500 TABLET, FILM COATED ORAL EVERY 6 HOURS SCHEDULED
Status: DISCONTINUED | OUTPATIENT
Start: 2024-06-26 | End: 2024-06-28

## 2024-06-26 RX ORDER — GABAPENTIN 100 MG/1
100 CAPSULE ORAL 3 TIMES DAILY
Status: DISCONTINUED | OUTPATIENT
Start: 2024-06-26 | End: 2024-06-28

## 2024-06-26 RX ORDER — CEFAZOLIN SODIUM 1 G/3ML
INJECTION, POWDER, FOR SOLUTION INTRAMUSCULAR; INTRAVENOUS AS NEEDED
Status: DISCONTINUED | OUTPATIENT
Start: 2024-06-26 | End: 2024-06-26

## 2024-06-26 RX ORDER — AZELASTINE 1 MG/ML
1 SPRAY, METERED NASAL 2 TIMES DAILY
Status: DISCONTINUED | OUTPATIENT
Start: 2024-06-26 | End: 2024-07-12 | Stop reason: HOSPADM

## 2024-06-26 RX ORDER — DEXAMETHASONE SODIUM PHOSPHATE 10 MG/ML
INJECTION, SOLUTION INTRAMUSCULAR; INTRAVENOUS AS NEEDED
Status: DISCONTINUED | OUTPATIENT
Start: 2024-06-26 | End: 2024-06-26

## 2024-06-26 RX ORDER — INSULIN LISPRO 100 [IU]/ML
2-12 INJECTION, SOLUTION INTRAVENOUS; SUBCUTANEOUS
Status: DISCONTINUED | OUTPATIENT
Start: 2024-06-26 | End: 2024-06-28

## 2024-06-26 RX ORDER — FENTANYL CITRATE 50 UG/ML
INJECTION, SOLUTION INTRAMUSCULAR; INTRAVENOUS AS NEEDED
Status: DISCONTINUED | OUTPATIENT
Start: 2024-06-26 | End: 2024-06-26

## 2024-06-26 RX ORDER — ALBUTEROL SULFATE 90 UG/1
2 AEROSOL, METERED RESPIRATORY (INHALATION) EVERY 6 HOURS PRN
Status: DISCONTINUED | OUTPATIENT
Start: 2024-06-26 | End: 2024-07-12 | Stop reason: HOSPADM

## 2024-06-26 RX ORDER — PROPOFOL 10 MG/ML
INJECTION, EMULSION INTRAVENOUS AS NEEDED
Status: DISCONTINUED | OUTPATIENT
Start: 2024-06-26 | End: 2024-06-26

## 2024-06-26 RX ORDER — CEFAZOLIN SODIUM 2 G/50ML
2000 SOLUTION INTRAVENOUS ONCE
Status: COMPLETED | OUTPATIENT
Start: 2024-06-26 | End: 2024-06-26

## 2024-06-26 RX ORDER — HYDROMORPHONE HCL/PF 1 MG/ML
SYRINGE (ML) INJECTION AS NEEDED
Status: DISCONTINUED | OUTPATIENT
Start: 2024-06-26 | End: 2024-06-26

## 2024-06-26 RX ORDER — SUCCINYLCHOLINE/SOD CL,ISO/PF 100 MG/5ML
SYRINGE (ML) INTRAVENOUS AS NEEDED
Status: DISCONTINUED | OUTPATIENT
Start: 2024-06-26 | End: 2024-06-26

## 2024-06-26 RX ORDER — TAMSULOSIN HYDROCHLORIDE 0.4 MG/1
0.4 CAPSULE ORAL
Status: DISCONTINUED | OUTPATIENT
Start: 2024-06-26 | End: 2024-07-05

## 2024-06-26 RX ORDER — ACETAMINOPHEN 325 MG/1
975 TABLET ORAL EVERY 8 HOURS SCHEDULED
Status: DISCONTINUED | OUTPATIENT
Start: 2024-06-26 | End: 2024-06-28

## 2024-06-26 RX ORDER — ONDANSETRON 2 MG/ML
4 INJECTION INTRAMUSCULAR; INTRAVENOUS EVERY 4 HOURS PRN
Status: DISCONTINUED | OUTPATIENT
Start: 2024-06-26 | End: 2024-07-05

## 2024-06-26 RX ORDER — MIDAZOLAM HYDROCHLORIDE 2 MG/2ML
INJECTION, SOLUTION INTRAMUSCULAR; INTRAVENOUS AS NEEDED
Status: DISCONTINUED | OUTPATIENT
Start: 2024-06-26 | End: 2024-06-26

## 2024-06-26 RX ORDER — HEPARIN SODIUM 5000 [USP'U]/ML
5000 INJECTION, SOLUTION INTRAVENOUS; SUBCUTANEOUS EVERY 8 HOURS SCHEDULED
Status: DISCONTINUED | OUTPATIENT
Start: 2024-06-26 | End: 2024-06-27

## 2024-06-26 RX ORDER — EPHEDRINE SULFATE 50 MG/ML
INJECTION INTRAVENOUS AS NEEDED
Status: DISCONTINUED | OUTPATIENT
Start: 2024-06-26 | End: 2024-06-26

## 2024-06-26 RX ORDER — SODIUM CHLORIDE 9 MG/ML
INJECTION, SOLUTION INTRAVENOUS CONTINUOUS PRN
Status: DISCONTINUED | OUTPATIENT
Start: 2024-06-26 | End: 2024-06-26

## 2024-06-26 RX ORDER — ALBUTEROL SULFATE 90 UG/1
AEROSOL, METERED RESPIRATORY (INHALATION) AS NEEDED
Status: DISCONTINUED | OUTPATIENT
Start: 2024-06-26 | End: 2024-06-26

## 2024-06-26 RX ORDER — HYDROMORPHONE HCL IN WATER/PF 6 MG/30 ML
0.2 PATIENT CONTROLLED ANALGESIA SYRINGE INTRAVENOUS
Status: DISCONTINUED | OUTPATIENT
Start: 2024-06-26 | End: 2024-06-26 | Stop reason: HOSPADM

## 2024-06-26 RX ORDER — GLYCOPYRROLATE 0.2 MG/ML
INJECTION INTRAMUSCULAR; INTRAVENOUS AS NEEDED
Status: DISCONTINUED | OUTPATIENT
Start: 2024-06-26 | End: 2024-06-26

## 2024-06-26 RX ORDER — ALBUMIN, HUMAN INJ 5% 5 %
SOLUTION INTRAVENOUS CONTINUOUS PRN
Status: DISCONTINUED | OUTPATIENT
Start: 2024-06-26 | End: 2024-06-26

## 2024-06-26 RX ORDER — ATORVASTATIN CALCIUM 20 MG/1
20 TABLET, FILM COATED ORAL
Status: DISCONTINUED | OUTPATIENT
Start: 2024-06-26 | End: 2024-06-28

## 2024-06-26 RX ORDER — FLUTICASONE FUROATE AND VILANTEROL 200; 25 UG/1; UG/1
1 POWDER RESPIRATORY (INHALATION) DAILY
Status: DISCONTINUED | OUTPATIENT
Start: 2024-06-26 | End: 2024-07-12 | Stop reason: HOSPADM

## 2024-06-26 RX ORDER — ONDANSETRON 2 MG/ML
INJECTION INTRAMUSCULAR; INTRAVENOUS AS NEEDED
Status: DISCONTINUED | OUTPATIENT
Start: 2024-06-26 | End: 2024-06-26

## 2024-06-26 RX ORDER — METRONIDAZOLE 500 MG/100ML
500 INJECTION, SOLUTION INTRAVENOUS ONCE
Status: COMPLETED | OUTPATIENT
Start: 2024-06-26 | End: 2024-06-26

## 2024-06-26 RX ORDER — HYDROMORPHONE HCL/PF 1 MG/ML
0.5 SYRINGE (ML) INJECTION EVERY 4 HOURS PRN
Status: DISCONTINUED | OUTPATIENT
Start: 2024-06-26 | End: 2024-06-28

## 2024-06-26 RX ORDER — KETAMINE HCL IN NACL, ISO-OSM 100MG/10ML
SYRINGE (ML) INJECTION AS NEEDED
Status: DISCONTINUED | OUTPATIENT
Start: 2024-06-26 | End: 2024-06-26

## 2024-06-26 RX ORDER — SODIUM CHLORIDE, SODIUM LACTATE, POTASSIUM CHLORIDE, CALCIUM CHLORIDE 600; 310; 30; 20 MG/100ML; MG/100ML; MG/100ML; MG/100ML
INJECTION, SOLUTION INTRAVENOUS CONTINUOUS PRN
Status: DISCONTINUED | OUTPATIENT
Start: 2024-06-26 | End: 2024-06-26

## 2024-06-26 RX ORDER — LABETALOL HYDROCHLORIDE 5 MG/ML
10 INJECTION, SOLUTION INTRAVENOUS EVERY 4 HOURS PRN
Status: DISCONTINUED | OUTPATIENT
Start: 2024-06-26 | End: 2024-07-12 | Stop reason: HOSPADM

## 2024-06-26 RX ORDER — FENTANYL CITRATE/PF 50 MCG/ML
50 SYRINGE (ML) INJECTION
Status: DISCONTINUED | OUTPATIENT
Start: 2024-06-26 | End: 2024-06-26 | Stop reason: HOSPADM

## 2024-06-26 RX ORDER — ALBUTEROL SULFATE 2.5 MG/3ML
2.5 SOLUTION RESPIRATORY (INHALATION) ONCE AS NEEDED
Status: DISCONTINUED | OUTPATIENT
Start: 2024-06-26 | End: 2024-06-26

## 2024-06-26 RX ORDER — LEVALBUTEROL INHALATION SOLUTION 1.25 MG/3ML
1.25 SOLUTION RESPIRATORY (INHALATION) EVERY 8 HOURS PRN
Status: DISCONTINUED | OUTPATIENT
Start: 2024-06-26 | End: 2024-06-26 | Stop reason: HOSPADM

## 2024-06-26 RX ORDER — FLUTICASONE PROPIONATE 50 MCG
1 SPRAY, SUSPENSION (ML) NASAL DAILY
Status: DISCONTINUED | OUTPATIENT
Start: 2024-06-26 | End: 2024-07-12 | Stop reason: HOSPADM

## 2024-06-26 RX ADMIN — ROCURONIUM BROMIDE 20 MG: 10 INJECTION, SOLUTION INTRAVENOUS at 13:19

## 2024-06-26 RX ADMIN — ALBUTEROL SULFATE 2 PUFF: 90 AEROSOL, METERED RESPIRATORY (INHALATION) at 14:16

## 2024-06-26 RX ADMIN — HYDROMORPHONE HYDROCHLORIDE 0.2 MG: 0.2 INJECTION, SOLUTION INTRAMUSCULAR; INTRAVENOUS; SUBCUTANEOUS at 16:12

## 2024-06-26 RX ADMIN — DEXAMETHASONE SODIUM PHOSPHATE 10 MG: 10 INJECTION, SOLUTION INTRAMUSCULAR; INTRAVENOUS at 08:40

## 2024-06-26 RX ADMIN — EPHEDRINE SULFATE 5 MG: 50 INJECTION, SOLUTION INTRAVENOUS at 09:58

## 2024-06-26 RX ADMIN — ROCURONIUM BROMIDE 40 MG: 10 INJECTION, SOLUTION INTRAVENOUS at 08:40

## 2024-06-26 RX ADMIN — SUGAMMADEX 200 MG: 100 INJECTION, SOLUTION INTRAVENOUS at 14:05

## 2024-06-26 RX ADMIN — FLUTICASONE FUROATE AND VILANTEROL TRIFENATATE 1 PUFF: 200; 25 POWDER RESPIRATORY (INHALATION) at 22:08

## 2024-06-26 RX ADMIN — FENTANYL CITRATE 50 MCG: 50 INJECTION INTRAMUSCULAR; INTRAVENOUS at 08:33

## 2024-06-26 RX ADMIN — EPHEDRINE SULFATE 5 MG: 50 INJECTION, SOLUTION INTRAVENOUS at 10:01

## 2024-06-26 RX ADMIN — Medication: at 15:30

## 2024-06-26 RX ADMIN — FENTANYL CITRATE 50 MCG: 50 INJECTION INTRAMUSCULAR; INTRAVENOUS at 09:45

## 2024-06-26 RX ADMIN — ALBUTEROL SULFATE 2 PUFF: 90 AEROSOL, METERED RESPIRATORY (INHALATION) at 12:58

## 2024-06-26 RX ADMIN — FENTANYL CITRATE 50 MCG: 50 INJECTION INTRAMUSCULAR; INTRAVENOUS at 15:17

## 2024-06-26 RX ADMIN — ROCURONIUM BROMIDE 20 MG: 10 INJECTION, SOLUTION INTRAVENOUS at 11:01

## 2024-06-26 RX ADMIN — ROCURONIUM BROMIDE 50 MG: 10 INJECTION, SOLUTION INTRAVENOUS at 10:29

## 2024-06-26 RX ADMIN — INSULIN LISPRO 4 UNITS: 100 INJECTION, SOLUTION INTRAVENOUS; SUBCUTANEOUS at 22:05

## 2024-06-26 RX ADMIN — EPHEDRINE SULFATE 5 MG: 50 INJECTION, SOLUTION INTRAVENOUS at 10:38

## 2024-06-26 RX ADMIN — CEFAZOLIN SODIUM 2000 MG: 2 SOLUTION INTRAVENOUS at 12:26

## 2024-06-26 RX ADMIN — CEFAZOLIN SODIUM 2000 MG: 2 SOLUTION INTRAVENOUS at 08:41

## 2024-06-26 RX ADMIN — ATORVASTATIN CALCIUM 20 MG: 20 TABLET, FILM COATED ORAL at 18:52

## 2024-06-26 RX ADMIN — ALBUMIN (HUMAN): 12.5 INJECTION, SOLUTION INTRAVENOUS at 10:03

## 2024-06-26 RX ADMIN — GLYCOPYRROLATE 0.2 MG: 0.2 INJECTION, SOLUTION INTRAMUSCULAR; INTRAVENOUS at 08:40

## 2024-06-26 RX ADMIN — Medication 20 MG: at 12:20

## 2024-06-26 RX ADMIN — ONDANSETRON 4 MG: 2 INJECTION INTRAMUSCULAR; INTRAVENOUS at 14:01

## 2024-06-26 RX ADMIN — TAMSULOSIN HYDROCHLORIDE 0.4 MG: 0.4 CAPSULE ORAL at 18:52

## 2024-06-26 RX ADMIN — SODIUM CHLORIDE, SODIUM LACTATE, POTASSIUM CHLORIDE, AND CALCIUM CHLORIDE: .6; .31; .03; .02 INJECTION, SOLUTION INTRAVENOUS at 10:00

## 2024-06-26 RX ADMIN — ALBUTEROL SULFATE 2 PUFF: 90 AEROSOL, METERED RESPIRATORY (INHALATION) at 14:13

## 2024-06-26 RX ADMIN — ALBUTEROL SULFATE 1 PUFF: 90 AEROSOL, METERED RESPIRATORY (INHALATION) at 08:05

## 2024-06-26 RX ADMIN — ROCURONIUM BROMIDE 10 MG: 10 INJECTION, SOLUTION INTRAVENOUS at 12:14

## 2024-06-26 RX ADMIN — SODIUM CHLORIDE, SODIUM LACTATE, POTASSIUM CHLORIDE, AND CALCIUM CHLORIDE 125 ML/HR: .6; .31; .03; .02 INJECTION, SOLUTION INTRAVENOUS at 15:29

## 2024-06-26 RX ADMIN — PROPOFOL 200 MG: 10 INJECTION, EMULSION INTRAVENOUS at 08:35

## 2024-06-26 RX ADMIN — MIDAZOLAM 2 MG: 1 INJECTION INTRAMUSCULAR; INTRAVENOUS at 08:29

## 2024-06-26 RX ADMIN — ALBUTEROL SULFATE 2 PUFF: 90 AEROSOL, METERED RESPIRATORY (INHALATION) at 12:56

## 2024-06-26 RX ADMIN — Medication 50 MG: at 08:36

## 2024-06-26 RX ADMIN — METHOCARBAMOL 500 MG: 500 TABLET ORAL at 18:52

## 2024-06-26 RX ADMIN — HEPARIN SODIUM 5000 UNITS: 5000 INJECTION INTRAVENOUS; SUBCUTANEOUS at 22:05

## 2024-06-26 RX ADMIN — ROCURONIUM BROMIDE 10 MG: 10 INJECTION, SOLUTION INTRAVENOUS at 08:35

## 2024-06-26 RX ADMIN — FENTANYL CITRATE 50 MCG: 50 INJECTION INTRAMUSCULAR; INTRAVENOUS at 14:59

## 2024-06-26 RX ADMIN — PROPOFOL 50 MG: 10 INJECTION, EMULSION INTRAVENOUS at 08:44

## 2024-06-26 RX ADMIN — HYDROMORPHONE HYDROCHLORIDE 0.5 MG: 1 INJECTION, SOLUTION INTRAMUSCULAR; INTRAVENOUS; SUBCUTANEOUS at 10:31

## 2024-06-26 RX ADMIN — HYDROMORPHONE HYDROCHLORIDE 0.5 MG: 1 INJECTION, SOLUTION INTRAMUSCULAR; INTRAVENOUS; SUBCUTANEOUS at 11:49

## 2024-06-26 RX ADMIN — ROCURONIUM BROMIDE 50 MG: 10 INJECTION, SOLUTION INTRAVENOUS at 09:21

## 2024-06-26 RX ADMIN — SUGAMMADEX 100 MG: 100 INJECTION, SOLUTION INTRAVENOUS at 14:10

## 2024-06-26 RX ADMIN — METRONIDAZOLE: 500 SOLUTION INTRAVENOUS at 09:05

## 2024-06-26 RX ADMIN — PHENYLEPHRINE HYDROCHLORIDE 20 MCG/MIN: 10 INJECTION INTRAVENOUS at 08:52

## 2024-06-26 RX ADMIN — GABAPENTIN 100 MG: 100 CAPSULE ORAL at 22:04

## 2024-06-26 RX ADMIN — Medication 140 MG: at 08:36

## 2024-06-26 RX ADMIN — FENTANYL CITRATE 50 MCG: 50 INJECTION INTRAMUSCULAR; INTRAVENOUS at 09:25

## 2024-06-26 RX ADMIN — SODIUM CHLORIDE: 0.9 INJECTION, SOLUTION INTRAVENOUS at 08:45

## 2024-06-26 RX ADMIN — ROCURONIUM BROMIDE 20 MG: 10 INJECTION, SOLUTION INTRAVENOUS at 11:45

## 2024-06-26 RX ADMIN — ALBUTEROL SULFATE 1 PUFF: 90 AEROSOL, METERED RESPIRATORY (INHALATION) at 08:07

## 2024-06-26 RX ADMIN — LIDOCAINE HYDROCHLORIDE 100 MG: 10 INJECTION, SOLUTION EPIDURAL; INFILTRATION; INTRACAUDAL; PERINEURAL at 08:34

## 2024-06-26 RX ADMIN — SODIUM CHLORIDE: 0.9 INJECTION, SOLUTION INTRAVENOUS at 11:00

## 2024-06-26 RX ADMIN — ALBUTEROL SULFATE 2 PUFF: 90 AEROSOL, METERED RESPIRATORY (INHALATION) at 12:53

## 2024-06-26 RX ADMIN — METHOCARBAMOL 500 MG: 500 TABLET ORAL at 22:05

## 2024-06-26 RX ADMIN — ACETAMINOPHEN 975 MG: 325 TABLET, FILM COATED ORAL at 22:05

## 2024-06-26 RX ADMIN — SODIUM CHLORIDE, SODIUM LACTATE, POTASSIUM CHLORIDE, AND CALCIUM CHLORIDE: .6; .31; .03; .02 INJECTION, SOLUTION INTRAVENOUS at 08:29

## 2024-06-26 RX ADMIN — ACETAMINOPHEN 1000 MG: 10 INJECTION INTRAVENOUS at 17:11

## 2024-06-26 RX ADMIN — HYDROMORPHONE HYDROCHLORIDE 0.2 MG: 0.2 INJECTION, SOLUTION INTRAMUSCULAR; INTRAVENOUS; SUBCUTANEOUS at 16:05

## 2024-06-26 RX ADMIN — FENTANYL CITRATE 50 MCG: 50 INJECTION INTRAMUSCULAR; INTRAVENOUS at 09:07

## 2024-06-26 NOTE — OP NOTE
Operative Note     PATIENT:  Jr Mendoza (MRN [id number])    DATE OF PROCEDURE:   6/26/2024    PRE-OP DIAGNOSIS:   1).  Colon cancer  2) need for bilateral ureteral identification    POST-OP DIAGNOSIS:   1) colon cancer  2) need for bilateral ureteral identification    PROCEDURES PERFORMED:  1) Cystoscopy  2) bilateral ureteral catheter placement    SURGEON:  Arron Serna MD    ASSISTANTS:  There were no qualified teaching residents to assist with this case    ANESTHESIA: General     COMPLICATIONS:   None    ANTIBIOTICS:      INTRAOPERATIVE THROMBOEMBOLISM PROPHYLAXIS:  Pneumatic compression stockings         INDICATIONS FOR PROCEDURE:  rJ Mendoza is an 58 y.o. old male with colon cancer.  Intraoperative cystoscopy and bilateral ureteral catheter placement has been requested by the primary operative team.  I evaluated the patient prior to surgery.  We discussed the procedure in detail, the alternatives, and the risks, and they signed informed consent to proceed.    PROCEDURE IN DETAIL:   The patient was identified and brought to the OR.  Antibiotic prophylaxis and DVT prophylaxis were administered.  They were placed in the comfortable dorsal lithotomy position with care to pad all pressure points.  They were prepped and draped in the usual sterile fashion using hibiclens.  A surgical time out was performed with all in the room in agreement with the correct patient, procedure, indications, and laterality.  A 21-Irish rigid cystoscope was used to enter the bladder.  The bladder was inspected in its entirety and there were no lesions noted.  The ureteral orifices were identified in their normal orthotopic positions.     The right ureteral orifice was identified and cannulated with a a 5 Fr open ended catheter.  This was advanced under vision to 25 cm.  No resistance was encountered.  The identical procedure was then carried out the contralateral left side.  The endoscope was removed.  A Chaparro  catheter was placed.  Each ureteral stent is then internalized into the Chaparro catheter using a standard adapter device and gravity bag drainage.     Patient was then dispositioned to the primary team for the remainder of the case.    COMPLICATIONS: None    PLAN:   Please remove the ureteral catheters at the conclusion of the case.  Please reconsult urology if needed

## 2024-06-26 NOTE — OP NOTE
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 LAPARSCOPIC MOBILIZATION OF SPLENIC FLEXURE. INTRAOP SPY ANGIOOGRAPHY . FLEXIBLE SIGMOIDOSCOPY    Specimen(s):  ID Type Source Tests Collected by Time Destination   1 : sigmoidectomy Tissue Large Intestine, Sigmoid Colon TISSUE EXAM Arron Serna MD 2024 1307    2 : anamostic donuts Tissue Large Intestine, Sigmoid Colon TISSUE EXAM Arron Serna MD 2024 1332        Estimated Blood Loss:   35 mL    Drains:  Urethral Catheter Latex 18 Fr. (Active)   Number of days: 0       [REMOVED] Ureteral Internal Stent Left ureter 5 Fr. (Removed)   Number of days: 0       [REMOVED] Ureteral Internal Stent Right ureter 5 Fr. (Removed)   Number of days: 0       Anesthesia Type:   General    Operative Indications:  Sigmoid colon cancer    Operative Findings:  Tattoo x2 noted at proximal sigmoid colon  Tumor palpated in between tattoo  High ligation of NICKO pedicle  SPY angiography performed showing well perfused descending colon  Primary end-to-end coloproctostomy matured with 29 EEA stapler  Flexible sigmoidoscopy showing intact anastomosis, negative leak test    Complications:   None    Procedure and Technique:  The patient was identified by name and armband in the preoperative holding area.  Informed consent was reviewed.  He was then taken to the operating room, where general anesthesia was induced and an endotracheal tube was placed by  the anesthesiology team.  He was placed in the lithotomy position with all pressure points padded.  A brief timeout was called.  All in the room were in agreement.    Cystoscopy with bilateral ureteral stent stent placement was performed by Dr. Serna.  Please see his operative note for further details.    The abdomen was insufflated to a pressure of 15 mmHg via a Veress needle through a supraumbilical incision.  It was then entered using a 5 mm trocar with Optiview under direct visualization.  No iatrogenic injury was identified.  An additional 12 mm trocar was placed in the right lower quadrant.  2 additional 5 mm trocars were placed in the right upper quadrant and subxiphoid regions.    The area of tattoos were identified in the sigmoid colon.  A lateral to medial approach was then employed in order to mobilize the descending colon from retroperitoneal attachments.  The patient was then placed in reverse Trendelenburg position.  The lesser sac was entered, and the splenic flexure was fully mobilized from a medial to lateral fashion.    Next, with the patient back in Trendelenburg, the pelvis was exposed.  A HandPort was placed through a Pfannenstiel incision in order to allow for better retraction of the small bowel.  The peritoneum overlying the right mesocolon was sharply incised just inferior to the NICKO pedicle.  The TME plane was developed using mostly blunt dissection until the left lateral sidewall was encountered.  Retroperitoneal structures were protected and preserved.     The superior rectal artery was ligated using a single fire of the 60 mm [place] stapler with a white load after identifying and protecting the ureter.  The rectum was then retracted out of the pelvis.  A mesenteric window was made just posterior to the rectum.  The rectum was transected using a single fire of the 60 mm [place] stapler with blue load.  The intervening mesorectum was then transected with Enseal, ensuring  hemostasis.    We then moved to exteriorized the colon.  There was a tethering point at the NICKO, which was ligated at its base using a single fire of the [place] stapler with white load, again protecting and preserving the left ureter.    The colon was then exteriorized through the Pfannenstiel incision.  The abdomen was draped with green towels.  Spy angiography was performed, showing robust perfusion to the descending colon to the level of the transected NICKO pedicle.  A proximal transection point was chosen.  Mesentery and pericolonic fat was cleared off this area.  A manual pursestring applier was placed.  A 2-0 Prolene was used to mature a pursestring.  The specimen was then transected and passed off the field.  The tumor was palpated in between the 2 tattooed areas, with greater than 5 cm proximal and distal to the tumor included.  The anvil of a 29 EEA stapler was then placed in the lumen of the descending colon, and the pursestring was tied down.  Pericolonic fat was cleared off, ensuring hemostasis.  The colon was then returned to the abdomen.    The patient back in Trendelenburg position, a 29 mm sizer was placed in the rectum and guided up to the transverse staple line without issue.  The 29 EEA stapler was then guided to the transverse staple line.  The spike was deployed at the central part of the staple line.  The anvil and the spike were , ensuring pericolonic fat remained away from the anastomosis.  The stapler was closed and fired, resulting in 2 intact anastomotic rings.    Flexible sigmoidoscopy was performed.  Intraluminally, there was healthy colon and rectum with an intact staple line that was hemostatic.  After filling the pelvis with saline, there was a negative leak test with insufflation of the coloproctostomy.    Hemostasis was ensured by carefully inspecting the abdomen.  The 12 mm trocar site was closed using a single 0 Vicryl suture at the level of the fascia.  Trocars were  removed under direct visualization.  The fascia of the Pfannenstiel incision was closed using 2x running #1 PDS with a centrally secured knot.  The fascia of the incisions were infiltrated using a total of 20 cc of plain Exparel under direct visualization.  Skin was closed using 4-0 Monocryl.  The abdomen was cleansed and patted dry.  The skin incisions were dressed with skin glue.    I was present for the entire procedure.    Patient Disposition:  PACU     Colon Resection  Operation performed with curative intent Yes   Tumor Location (select all that apply) Sigmoid colon   Extent of colon and vascular resection (select all that apply) Sigmoid resection - inferior mesenteric          SIGNATURE: Ann Perez MD  DATE: June 26, 2024  TIME: 2:26 PM

## 2024-06-26 NOTE — ANESTHESIA PROCEDURE NOTES
"Arterial Line Insertion    Performed by: Remington Lee MD  Authorized by: Remington Lee MD  Consent: Verbal consent obtained. Written consent obtained.  Risks and benefits: risks, benefits and alternatives were discussed  Consent given by: patient  Patient understanding: patient states understanding of the procedure being performed  Patient consent: the patient's understanding of the procedure matches consent given  Procedure consent: procedure consent matches procedure scheduled  Relevant documents: relevant documents present and verified  Site marked: the operative site was marked  Patient identity confirmed: anonymous protocol, patient vented/unresponsive and arm band  Time out: Immediately prior to procedure a \"time out\" was called to verify the correct patient, procedure, equipment, support staff and site/side marked as required.  Preparation: Patient was prepped and draped in the usual sterile fashion.  Indications: hemodynamic monitoring  Orientation:  Left  Location: radial arteryMedication group details: GETA.  Sedation:  Patient sedated: GETA.    Procedure Details:  Benoit's test normal: yes  Needle gauge: 20  Seldinger technique used: Ultrasound.  Number of attempts: 2    Post-procedure:  Post-procedure: dressing applied  Waveform: good waveform  Post-procedure CNS: normal  Patient tolerance: patient tolerated the procedure well with no immediate complications          "

## 2024-06-26 NOTE — PROGRESS NOTES
Intraoperative cystoscopy and bilateral ureteral catheter placement has been requested by colorectal surgery    I met with the patient preoperatively.  Reviewed the risk benefits and alternatives and informed consent obtained

## 2024-06-26 NOTE — ANESTHESIA POSTPROCEDURE EVALUATION
Post-Op Assessment Note    CV Status:  Stable  Pain Score: 0    Pain management: adequate    Multimodal analgesia used between 6 hours prior to anesthesia start to PACU discharge    Mental Status:  Sleepy and arousable   Hydration Status:  Stable   PONV Controlled:  None   Airway Patency:  Patent     Post Op Vitals Reviewed: Yes    No anethesia notable event occurred.    Staff: CRNA               /70 (06/26/24 1435)    Temp 97.6 °F (36.4 °C) (06/26/24 1435)    Pulse 93 (06/26/24 1435)   Resp 20 (06/26/24 1435)    SpO2 95 % (06/26/24 1435)

## 2024-06-27 LAB
ANION GAP SERPL CALCULATED.3IONS-SCNC: 7 MMOL/L (ref 4–13)
BASOPHILS # BLD AUTO: 0.02 THOUSANDS/ÂΜL (ref 0–0.1)
BASOPHILS NFR BLD AUTO: 0 % (ref 0–1)
BUN SERPL-MCNC: 6 MG/DL (ref 5–25)
CALCIUM SERPL-MCNC: 8.2 MG/DL (ref 8.4–10.2)
CHLORIDE SERPL-SCNC: 101 MMOL/L (ref 96–108)
CO2 SERPL-SCNC: 26 MMOL/L (ref 21–32)
CREAT SERPL-MCNC: 0.63 MG/DL (ref 0.6–1.3)
EOSINOPHIL # BLD AUTO: 0 THOUSAND/ÂΜL (ref 0–0.61)
EOSINOPHIL NFR BLD AUTO: 0 % (ref 0–6)
ERYTHROCYTE [DISTWIDTH] IN BLOOD BY AUTOMATED COUNT: 12.9 % (ref 11.6–15.1)
GFR SERPL CREATININE-BSD FRML MDRD: 107 ML/MIN/1.73SQ M
GLUCOSE SERPL-MCNC: 150 MG/DL (ref 65–140)
GLUCOSE SERPL-MCNC: 163 MG/DL (ref 65–140)
GLUCOSE SERPL-MCNC: 194 MG/DL (ref 65–140)
GLUCOSE SERPL-MCNC: 214 MG/DL (ref 65–140)
HCT VFR BLD AUTO: 40.8 % (ref 36.5–49.3)
HGB BLD-MCNC: 14 G/DL (ref 12–17)
IMM GRANULOCYTES # BLD AUTO: 0.05 THOUSAND/UL (ref 0–0.2)
IMM GRANULOCYTES NFR BLD AUTO: 0 % (ref 0–2)
LYMPHOCYTES # BLD AUTO: 0.94 THOUSANDS/ÂΜL (ref 0.6–4.47)
LYMPHOCYTES NFR BLD AUTO: 8 % (ref 14–44)
MAGNESIUM SERPL-MCNC: 1.5 MG/DL (ref 1.9–2.7)
MCH RBC QN AUTO: 32.3 PG (ref 26.8–34.3)
MCHC RBC AUTO-ENTMCNC: 34.3 G/DL (ref 31.4–37.4)
MCV RBC AUTO: 94 FL (ref 82–98)
MONOCYTES # BLD AUTO: 1.23 THOUSAND/ÂΜL (ref 0.17–1.22)
MONOCYTES NFR BLD AUTO: 11 % (ref 4–12)
NEUTROPHILS # BLD AUTO: 9.21 THOUSANDS/ÂΜL (ref 1.85–7.62)
NEUTS SEG NFR BLD AUTO: 81 % (ref 43–75)
NRBC BLD AUTO-RTO: 0 /100 WBCS
PHOSPHATE SERPL-MCNC: 2.7 MG/DL (ref 2.3–4.1)
PLATELET # BLD AUTO: 233 THOUSANDS/UL (ref 149–390)
PMV BLD AUTO: 11.5 FL (ref 8.9–12.7)
POTASSIUM SERPL-SCNC: 3.6 MMOL/L (ref 3.5–5.3)
RBC # BLD AUTO: 4.33 MILLION/UL (ref 3.88–5.62)
SODIUM SERPL-SCNC: 134 MMOL/L (ref 135–147)
WBC # BLD AUTO: 11.45 THOUSAND/UL (ref 4.31–10.16)

## 2024-06-27 PROCEDURE — 83735 ASSAY OF MAGNESIUM: CPT | Performed by: PHYSICIAN ASSISTANT

## 2024-06-27 PROCEDURE — 80048 BASIC METABOLIC PNL TOTAL CA: CPT | Performed by: PHYSICIAN ASSISTANT

## 2024-06-27 PROCEDURE — 94760 N-INVAS EAR/PLS OXIMETRY 1: CPT

## 2024-06-27 PROCEDURE — 97167 OT EVAL HIGH COMPLEX 60 MIN: CPT

## 2024-06-27 PROCEDURE — 99024 POSTOP FOLLOW-UP VISIT: CPT | Performed by: SURGERY

## 2024-06-27 PROCEDURE — 84100 ASSAY OF PHOSPHORUS: CPT | Performed by: PHYSICIAN ASSISTANT

## 2024-06-27 PROCEDURE — 82948 REAGENT STRIP/BLOOD GLUCOSE: CPT

## 2024-06-27 PROCEDURE — 97163 PT EVAL HIGH COMPLEX 45 MIN: CPT | Performed by: PHYSICAL THERAPIST

## 2024-06-27 PROCEDURE — 85025 COMPLETE CBC W/AUTO DIFF WBC: CPT | Performed by: PHYSICIAN ASSISTANT

## 2024-06-27 PROCEDURE — 97116 GAIT TRAINING THERAPY: CPT | Performed by: PHYSICAL THERAPIST

## 2024-06-27 RX ORDER — ENOXAPARIN SODIUM 100 MG/ML
40 INJECTION SUBCUTANEOUS
Status: DISCONTINUED | OUTPATIENT
Start: 2024-06-27 | End: 2024-07-12 | Stop reason: HOSPADM

## 2024-06-27 RX ORDER — OXYCODONE HYDROCHLORIDE 5 MG/1
5 TABLET ORAL EVERY 4 HOURS PRN
Status: DISCONTINUED | OUTPATIENT
Start: 2024-06-27 | End: 2024-06-28

## 2024-06-27 RX ORDER — POTASSIUM CHLORIDE 20 MEQ/1
40 TABLET, EXTENDED RELEASE ORAL ONCE
Status: COMPLETED | OUTPATIENT
Start: 2024-06-27 | End: 2024-06-27

## 2024-06-27 RX ORDER — OXYCODONE HYDROCHLORIDE 10 MG/1
10 TABLET ORAL EVERY 4 HOURS PRN
Status: DISCONTINUED | OUTPATIENT
Start: 2024-06-27 | End: 2024-06-28

## 2024-06-27 RX ORDER — MAGNESIUM SULFATE HEPTAHYDRATE 40 MG/ML
4 INJECTION, SOLUTION INTRAVENOUS ONCE
Status: COMPLETED | OUTPATIENT
Start: 2024-06-27 | End: 2024-06-28

## 2024-06-27 RX ORDER — DEXTROSE MONOHYDRATE, SODIUM CHLORIDE, AND POTASSIUM CHLORIDE 50; 1.49; 4.5 G/1000ML; G/1000ML; G/1000ML
125 INJECTION, SOLUTION INTRAVENOUS CONTINUOUS
Status: DISCONTINUED | OUTPATIENT
Start: 2024-06-27 | End: 2024-07-02

## 2024-06-27 RX ADMIN — FLUTICASONE PROPIONATE 1 SPRAY: 50 SPRAY, METERED NASAL at 08:07

## 2024-06-27 RX ADMIN — ATORVASTATIN CALCIUM 20 MG: 20 TABLET, FILM COATED ORAL at 16:14

## 2024-06-27 RX ADMIN — GABAPENTIN 100 MG: 100 CAPSULE ORAL at 21:59

## 2024-06-27 RX ADMIN — METHOCARBAMOL 500 MG: 500 TABLET ORAL at 23:14

## 2024-06-27 RX ADMIN — SODIUM CHLORIDE, SODIUM LACTATE, POTASSIUM CHLORIDE, AND CALCIUM CHLORIDE 125 ML/HR: .6; .31; .03; .02 INJECTION, SOLUTION INTRAVENOUS at 00:48

## 2024-06-27 RX ADMIN — DEXTROSE, SODIUM CHLORIDE, AND POTASSIUM CHLORIDE 125 ML/HR: 5; .45; .15 INJECTION INTRAVENOUS at 06:32

## 2024-06-27 RX ADMIN — TAMSULOSIN HYDROCHLORIDE 0.4 MG: 0.4 CAPSULE ORAL at 16:14

## 2024-06-27 RX ADMIN — AZELASTINE HYDROCHLORIDE 1 SPRAY: 137 SPRAY, METERED NASAL at 08:07

## 2024-06-27 RX ADMIN — INSULIN LISPRO 2 UNITS: 100 INJECTION, SOLUTION INTRAVENOUS; SUBCUTANEOUS at 16:15

## 2024-06-27 RX ADMIN — GABAPENTIN 100 MG: 100 CAPSULE ORAL at 08:07

## 2024-06-27 RX ADMIN — ONDANSETRON 4 MG: 2 INJECTION INTRAMUSCULAR; INTRAVENOUS at 18:30

## 2024-06-27 RX ADMIN — INSULIN LISPRO 2 UNITS: 100 INJECTION, SOLUTION INTRAVENOUS; SUBCUTANEOUS at 11:26

## 2024-06-27 RX ADMIN — MAGNESIUM SULFATE HEPTAHYDRATE 4 G: 40 INJECTION, SOLUTION INTRAVENOUS at 11:26

## 2024-06-27 RX ADMIN — METHOCARBAMOL 500 MG: 500 TABLET ORAL at 05:11

## 2024-06-27 RX ADMIN — GABAPENTIN 100 MG: 100 CAPSULE ORAL at 16:14

## 2024-06-27 RX ADMIN — ACETAMINOPHEN 975 MG: 325 TABLET, FILM COATED ORAL at 21:59

## 2024-06-27 RX ADMIN — POTASSIUM CHLORIDE 40 MEQ: 1500 TABLET, EXTENDED RELEASE ORAL at 11:26

## 2024-06-27 RX ADMIN — FLUTICASONE FUROATE AND VILANTEROL TRIFENATATE 1 PUFF: 200; 25 POWDER RESPIRATORY (INHALATION) at 08:08

## 2024-06-27 RX ADMIN — ENOXAPARIN SODIUM 40 MG: 40 INJECTION SUBCUTANEOUS at 14:01

## 2024-06-27 RX ADMIN — INSULIN LISPRO 2 UNITS: 100 INJECTION, SOLUTION INTRAVENOUS; SUBCUTANEOUS at 08:07

## 2024-06-27 RX ADMIN — METHOCARBAMOL 500 MG: 500 TABLET ORAL at 11:26

## 2024-06-27 RX ADMIN — ACETAMINOPHEN 975 MG: 325 TABLET, FILM COATED ORAL at 05:12

## 2024-06-27 RX ADMIN — OXYCODONE HYDROCHLORIDE 10 MG: 10 TABLET ORAL at 16:14

## 2024-06-27 RX ADMIN — ACETAMINOPHEN 975 MG: 325 TABLET, FILM COATED ORAL at 14:01

## 2024-06-27 RX ADMIN — ONDANSETRON 4 MG: 2 INJECTION INTRAMUSCULAR; INTRAVENOUS at 12:30

## 2024-06-27 RX ADMIN — AZELASTINE HYDROCHLORIDE 1 SPRAY: 137 SPRAY, METERED NASAL at 18:07

## 2024-06-27 RX ADMIN — HEPARIN SODIUM 5000 UNITS: 5000 INJECTION INTRAVENOUS; SUBCUTANEOUS at 05:12

## 2024-06-27 RX ADMIN — DEXTROSE, SODIUM CHLORIDE, AND POTASSIUM CHLORIDE 84 ML/HR: 5; .45; .15 INJECTION INTRAVENOUS at 18:07

## 2024-06-27 NOTE — OCCUPATIONAL THERAPY NOTE
Occupational Therapy Evaluation     Patient Name: Jr Mendoza  Today's Date: 6/27/2024  Problem List  Active Problems:  There are no active Hospital Problems.    Past Medical History  Past Medical History:   Diagnosis Date    Asthma     Cholangitis 05/16/2022    Closed extra-articular fracture of distal end of right tibia 01/28/2020    Closed nondisplaced oblique fracture of shaft of right fibula 01/28/2020    CPAP (continuous positive airway pressure) dependence     Diabetes mellitus (HCC)     Foot fracture     Hyperlipidemia     Hypertension     Sleep apnea      Past Surgical History  Past Surgical History:   Procedure Laterality Date    CHOLECYSTECTOMY LAPAROSCOPIC N/A 05/18/2022    Procedure: CHOLECYSTECTOMY LAPAROSCOPIC;  Surgeon: Zachary Kern MD;  Location: [facility];  Service: General    COLONOSCOPY      HEMICOLOECTOMY W/ ANASTOMOSIS N/A 6/26/2024    Procedure: LAPARSCOPIC HAND ASSISTED SIGMOIDECTOMY, LAPARSCOPIC MOBILIZATION OF SPLENIC FLEXURE, INTRAOP SPY ANGIOOGRAPHY , FLEXIBLE SIGMOIDOSCOPY;  Surgeon: Ann Perez MD;  Location: [facility];  Service: Colorectal             06/27/24 1158   OT Last Visit   OT Visit Date 06/27/24   Note Type   Note type Evaluation   Pain Assessment   Pain Assessment Tool 0-10   Pain Score 6   Pain Location/Orientation Location: Abdomen;Orientation: Left;Location: Shoulder;Location: Arm   Pain Radiating Towards L arm   Pain Onset/Description Onset: Ongoing;Frequency: Constant/Continuous;Descriptor: Aching   Effect of Pain on Daily Activities Increased time for functional activities   Patient's Stated Pain Goal No pain   Hospital Pain Intervention(s) Repositioned;Ambulation/increased activity;Emotional support   Restrictions/Precautions   Weight Bearing Precautions Per Order No   Other Precautions Chair Alarm;Bed Alarm;Multiple lines;Fall Risk;O2;Pain  (PCA, 4L NC)   Home Living   Type of Home House   Home Layout Two level;Performs ADLs on one  "level;Able to live on main level with bedroom/bathroom  (0STE)   Bathroom Shower/Tub Tub/shower unit   Bathroom Toilet Standard   Bathroom Equipment Grab bars in shower   Home Equipment Walker;Cane  (not used PTA)   Additional Comments 2SH, 0STE, FFSU available.   Prior Function   Level of [place] Independent with ADLs;Independent with functional mobility;Independent with IADLS   Lives With Spouse   Receives Help From Family   IADLs Independent with driving;Independent with meal prep;Independent with medication management   Falls in the last 6 months 0   Vocational Retired   Comments Supportive spouse is a retired RN.   Lifestyle   Autonomy IND PTA with all ADLs/IADLs. No DME used. +.   Reciprocal Relationships Supportive spouse (retired RN)   Service to Others Retired   Intrinsic Gratification Fishing   General   Family/Caregiver Present No   Subjective   Subjective \"I can go further\"   ADL   Where Assessed Edge of bed   Eating Assistance 5  Supervision/Setup   Grooming Assistance 5  Supervision/Setup   UB Bathing Assistance 4  Minimal Assistance   LB Bathing Assistance 3  Moderate Assistance   UB Dressing Assistance 4  Minimal Assistance   LB Dressing Assistance 3  Moderate Assistance   Toileting Assistance  4  Minimal Assistance   Functional Assistance 4  Minimal Assistance   Additional Comments Assist 2/2 pain, fatigue, stiffness.   Bed Mobility   Supine to Sit 3  Moderate assistance   Additional items Assist x 1;HOB elevated;Bedrails;Increased time required;Verbal cues;LE management   Sit to Supine Unable to assess   Additional Comments Pt supine in bed upon arrival. O2 removed per RN, Pt desat to 85% on RA seated EOB. Placed back on 4L NC with improved sats to 92%+. Seated OOB in recliner post session, all needs met, call bell within reach. +chair alarm   Transfers   Sit to Stand 3  Moderate assistance   Additional items Assist x 1;Increased time required;Verbal cues   Stand to Sit 3  Moderate " assistance   Additional items Assist x 1;Increased time required;Verbal cues   Additional Comments RW in stance.   Functional Mobility   Functional Mobility 4  Minimal assistance   Additional Comments Ax1, household distances, increased time, RW   Additional items Rolling walker   Balance   Static Sitting Fair +   Dynamic Sitting Fair   Static Standing Fair -   Dynamic Standing Poor +   Ambulatory Poor +   Activity Tolerance   Activity Tolerance Patient limited by fatigue;Patient limited by pain   Medical Staff Made Aware PT Velma, co-eval 2/2 increased medical complexity and comorbidities.   Nurse Made Aware RN cleared for therapy   RUE Assessment   RUE Assessment WFL   LUE Assessment   LUE Assessment X  (grossly WFL. Pt reporting L upper arm and shoulder pain upon arrival. Also pain at LUE IV insertion site following RN flushing IV. Pt states the pain in both locations improved with functional mobility & frequent burping.)   Hand Function   Gross Motor Coordination Functional   Fine Motor Coordination Functional   Cognition   Overall Cognitive Status WFL   Arousal/Participation Alert;Cooperative   Attention Attends with cues to redirect   Orientation Level Oriented X4   Memory Within functional limits   Following Commands Follows one step commands without difficulty   Comments Pt pleasant and cooperative. Motivated to participate. Pt required min VCs for safety awareness.   Assessment   Limitation Decreased ADL status;Decreased endurance;Decreased self-care trans;Decreased high-level ADLs   Prognosis Good   Assessment 58 year old pt seen today for an OT evaluation following admission to [facility] 2/2 malignant neoplasm of descending colon, now s/p laparoscopic hand assisted L colon resection with present symptoms significant for pain, fatigue, weakness, decreased ADL status, decreased functional mobility. Pt  has a past medical history of Asthma, Cholangitis, Closed extra-articular fracture of  distal end of right tibia, Closed nondisplaced oblique fracture of shaft of right fibula, CPAP (continuous positive airway pressure) dependence, Diabetes mellitus (HCC), Foot fracture, Hyperlipidemia, Hypertension, and Sleep apnea. Pt reported living with supportive spouse in a 2SH, 0STE with FFSU available. Pt reports being IND with all ADLs/IADLS PTA with no DME used. +. Retired. Enjoys fishing. Pt very pleasant and cooperative t/o session. Pt completed functional bed mobility with mod A. Mod A for functional STS txfs with RW. Min A for functional mobility to go household distances with RW. Pt was Min A for UB ADLs and  Mod A for LB ADLs. The patient's raw score on the AM-PAC Daily Activity Inpatient Short Form is 17. A raw score of less than 19 suggests the patient may benefit from discharge to post-acute rehabilitation services. Please refer to the recommendation of the Occupational Therapist for safe discharge planning. Pt is functioning below baseline level of function and will continue to benefit from skilled acute OT to promote increased independence and return to PLOF. At this time, current OT recommendation is Level 3 resources - pending progress. Anticipate improve to home with no needs pending improved pain management. Will continue to follow and assess.   Goals   Patient Goals to walk   LTG Time Frame 10-14   Long Term Goal #1 See below for goals   Plan   Treatment Interventions ADL retraining;Functional transfer training;UE strengthening/ROM;Endurance training;Cognitive reorientation;Patient/family training;Equipment evaluation/education;Fine motor coordination activities;Compensatory technique education;Continued evaluation;Energy conservation;Activityengagement   Goal Expiration Date 07/11/24   OT Frequency 2-3x/wk   Discharge Recommendation   Rehab Resource Intensity Level, OT III (Minimum Resource Intensity)  (pending progress. Anticipate improve to home with no needs with increased pain  management)   AM-PAC Daily Activity Inpatient   Lower Body Dressing 2   Bathing 2   Toileting 3   Upper Body Dressing 3   Grooming 3   Eating 4   Daily Activity Raw Score 17   Daily Activity Standardized Score (Calc for Raw Score >=11) 37.26   AM-PAC Applied Cognition Inpatient   Following a Speech/Presentation 3   Understanding Ordinary Conversation 4   Taking Medications 4   Remembering Where Things Are Placed or Put Away 4   Remembering List of 4-5 Errands 4   Taking Care of Complicated Tasks 4   Applied Cognition Raw Score 23   Applied Cognition Standardized Score 53.08   End of Consult   Education Provided Yes   Patient Position at End of Consult Bedside chair;Bed/Chair alarm activated;All needs within reach   Nurse Communication Nurse aware of consult         Occupational Therapy Goals    Pt will be Mod I with bed mobility with good sitting balance/tolerance to engage in self care tasks within this plan of care.      Pt will be Mod I for UB dressing and bathing with use of assistive devices PRN within this plan of care.     Pt will be Mod I for LB dressing and bathing with use of assistive devices PRN within this plan of care.     Pt will demonstrate standing tolerance of 2-3 minutes increase independence for toileting ADLs/tasks with use of assistive devices PRN within this plan of care.     Pt will completed functional transfers with mod I, with use of appropriate assistive devices within this plan of care.     Pt will demonstrate good carryover of safety awareness with use of appropriate assistive devices during functional tasks within this plan of care.     Pt will demonstrated increased activity tolerance to 30 mins for participation in ADLs and functional tasks within this plan of care.     Pt will complete further functional cognitive assessment with good attention/participation to assist with safe d/c planning recommendations.        Trevin Olmos, MS, OTR/L     M[facility] CERTIFIED RATER ID  [id number]

## 2024-06-27 NOTE — PLAN OF CARE
Problem: PHYSICAL THERAPY ADULT  Goal: Performs mobility at highest level of function for planned discharge setting.  See evaluation for individualized goals.  Description: Treatment/Interventions: Functional transfer training, LE strengthening/ROM, Elevations, Therapeutic exercise, Endurance training, Patient/family training, Bed mobility, Gait training, Spoke to nursing, OT          See flowsheet documentation for full assessment, interventions and recommendations.  Note: Prognosis: Good  Problem List: Decreased strength, Decreased endurance, Impaired balance, Decreased mobility, Obesity, Pain  Assessment: Pt. 58 y.o.male presents for elective urology procedure. Past medical hx includes asthma, CPAP, DM, hyperlipidemia, HTN. 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 S/p  Pre-op stents - removal, laparoscopic hand assisted left colon resection 6/26 . Pt referred to PT for functional mobility evaluation & D/C planning w/ orders of ambulate and up as tolerated . PTA, pt reports being I w/o AD. Personal factors affecting pt at time of IE include: decreased independence in ADLs/IADLs and inc use of supplemental oxygen. During evaluation, deficits included dec mobility, balance, ambulation. Pain 6/10 in L arm and abdomen. Currently on 4 L O2 NC. Required modAx1 for supine to sit, and sit<>stand. MinAx1 for ambulation. Use of RA for supine to sit transfers with dec SpO2 to 85%. 4 L O2 NC placed on pt with improved SpO2 to 92%. Initial use of HHA for sit to stand transfer however progressed to use of RW in standing to alleviate abdominal pain. Pt able to ambulate 4 steps forward with WBOS and short stride. Inc pain with all functional mobility. See treatment assessment for further functional mobility. Pt demonstrated dec endurance and tolerance to activity. Pt was educated on fall precautions and reinforced w/ good understanding. Pt would benefit from continued PT to address deficits as  defined above and maximize level of independence with functional mobility and safety. Based on pt presentation and impaired function, pt would benefit from level III, (minimum resource intensity) at D/C pending progress with functional mobility 2* pain. The patient's AM-PAC Basic Mobility Inpatient Short Form Raw Score is 14. A Raw score of less than or equal to 16 suggests the patient may benefit from discharge to post-acute rehabilitation services. Please also refer to the recommendation of the Physical Therapist for safe discharge planning. Co-eval performed to complete this PT evaluation for the pts best interest given pts medical complexity and functional level.        Rehab Resource Intensity Level, PT: III (Minimum Resource Intensity) (pending progress with functional mobility 2* pain)    See flowsheet documentation for full assessment.

## 2024-06-27 NOTE — QUICK NOTE
Post Op Check:    Patient complaining of some incisional pain. No nausea or vomiting. Patients pain is well controlled. They denied any nausea, chest pain, or shortness of breath.     General: NAD  HENT: NCAT MMM  Neck: supple, no JVD  CV: nl rate  Lungs: nl wob. No resp distress  ABD: Soft, apporpiately tender, nondistended, incisiosn c/d/i  Extrem: No CCE  Neuro: AAOx3     Plan:  Diet Clear Liquid  Continue to monitor  Pain and nausea control PRN    Brandon Phan, DO  Surgery, PGY-4

## 2024-06-27 NOTE — UTILIZATION REVIEW
Initial Clinical Review    Elective IP surgical procedure  Age/Sex: 58 y.o. male  Surgery Date: 6/26/2024    Procedure:   Urology:  1) Cystoscopy  2) bilateral ureteral catheter placement    Colorectal:  Bilateral - INSERTION URETERAL CATHETERS PREOP  LAPARSCOPIC HAND ASSISTED SIGMOIDECTOMY. LAPARSCOPIC MOBILIZATION OF SPLENIC FLEXURE. INTRAOP SPY ANGIOOGRAPHY . FLEXIBLE SIGMOIDOSCOPY    Anesthesia: General    Operative Findings:   Tattoo x2 noted at proximal sigmoid colon  Tumor palpated in between tattoo  High ligation of NICKO pedicle  SPY angiography performed showing well perfused descending colon  Primary end-to-end coloproctostomy matured with 29 EEA stapler  Flexible sigmoidoscopy showing intact anastomosis, negative leak test       POD#1 Progress Note: No acute events overnight. Marshal clears, denies n/v. Pain controlled with PCA. Adequate uop in bianchi, but still bloody tinged. (-) flatus, (-) bms yet.  Abdomen soft, appropriately tender, distended, protuberant abdomen. Afebrile, VSS, on 4L NC. Advance to clears/toast/crackers. Transition to mIVF @ 125 ml/hr. Keep bianchi. Continue PCA, prn pain/antinausea meds. SCDs. Encourage IS, OOB, ambulation. PT/OT    Admission Orders: Date/Time/Statement:   Admission Orders (From admission, onward)       Ordered        06/26/24 1442  Inpatient Admission  Once                          Orders Placed This Encounter   Procedures    Inpatient Admission     Standing Status:   Standing     Number of Occurrences:   1     Order Specific Question:   Level of Care     Answer:   Level 2 Stepdown / HOT [14]     Order Specific Question:   Estimated length of stay     Answer:   More than 2 Midnights     Order Specific Question:   Certification     Answer:   I certify that inpatient services are medically necessary for this patient for a duration of greater than two midnights. See H&P and MD Progress Notes for additional information about the patient's course of treatment.       Vital  Signs (last 3 days)       Date/Time Temp Pulse Resp BP MAP (mmHg) SpO2 Calculated FIO2 (%) - Nasal Cannula O2 Flow Rate (L/min) Nasal Cannula O2 Flow Rate (L/min) O2 Device Cardiac (WDL) Imer Coma Scale Score Pain    06/27/24 07:33:08 98.2 °F (36.8 °C) 85 20 131/77 95 92 % -- -- -- -- -- -- --    06/27/24 0512 -- -- -- -- -- -- -- -- -- -- -- -- No Pain    06/27/24 03:01:45 98.5 °F (36.9 °C) 91 20 131/79 96 91 % -- -- -- -- -- -- --    06/27/24 0300 -- -- -- -- -- -- -- -- -- -- -- 15 No Pain    06/26/24 22:25:58 97.7 °F (36.5 °C) 93 20 133/82 99 94 % -- -- -- -- -- -- No Pain    06/26/24 2200 -- -- -- -- -- -- -- -- -- -- -- -- No Pain    06/26/24 21:42:45 98.2 °F (36.8 °C) 94 -- 102/53 69 94 % -- -- -- -- -- -- --    06/26/24 20:27:16 98.2 °F (36.8 °C) 95 -- 128/73 91 85 % -- -- -- -- -- -- --    06/26/24 19:03:30 98.7 °F (37.1 °C) 99 -- 135/78 97 92 % -- -- -- -- -- -- --    06/26/24 1901 -- -- -- -- -- -- -- -- -- -- -- 15 No Pain    06/26/24 18:39:03 97.5 °F (36.4 °C) 95 -- 129/78 95 91 % -- -- -- -- -- -- --    06/26/24 1830 -- -- -- -- -- -- 36 -- 4 L/min -- -- 15 8 06/26/24 1800 -- 96 19 154/70 101 93 % 36 -- 4 L/min Nasal cannula -- -- --    06/26/24 1745 -- 96 22 -- -- 93 % 36 -- 4 L/min Nasal cannula -- -- --    06/26/24 1730 -- 100 20 157/71 102 90 % 44 -- 6 L/min Nasal cannula -- -- 8 06/26/24 1700 -- 97 20 156/79 106 91 % 44 -- 6 L/min Simple mask -- -- 8 06/26/24 1638 -- 97 57 -- -- 92 % 44 -- 6 L/min Nasal cannula -- -- --    06/26/24 1630 -- 96 23 141/83 99 91 % 60 -- 10 L/min Mid flow nasal cannula -- -- 9 06/26/24 1615 -- 97 25 142/74 97 90 % 44 -- 6 L/min Nasal cannula -- -- 9 06/26/24 1612 -- 95 32 143/73 101 91 % 44 -- 6 L/min Nasal cannula -- -- 9 06/26/24 1605 -- 95 14 143/73 101 91 % 44 -- 6 L/min Nasal cannula -- -- 9 06/26/24 1600 -- 98 21 143/73 101 90 % 44 -- 6 L/min Nasal cannula WDL -- 9 06/26/24 1545 -- 98 23 135/66 93 92 % 44 -- 6 L/min Nasal cannula -- --  9    06/26/24 1530 -- 98 22 166/82 114 92 % -- 6 L/min -- Simple mask -- -- 10 - Worst Possible Pain    06/26/24 1517 -- -- -- -- -- -- -- -- -- -- -- -- 10 - Worst Possible Pain    06/26/24 1515 -- 92 20 156/78 111 90 % -- 6 L/min -- Simple mask -- -- --    06/26/24 1500 -- 91 22 149/78 104 92 % 44 -- 6 L/min Nasal cannula -- -- --    06/26/24 1459 -- 92 22 149/78 104 91 % -- -- -- -- -- -- 9    06/26/24 1445 -- 94 20 157/76 104 93 % 44 -- 6 L/min Nasal cannula -- -- No Pain    06/26/24 1435 97.6 °F (36.4 °C) 93 20 141/70 99 95 % -- 6 L/min -- Simple mask WDL 15 No Pain    06/26/24 0658 98.1 °F (36.7 °C) 85 20 145/77 -- 96 % -- -- -- None (Room air) -- -- No Pain          Weight (last 2 days)       Date/Time Weight    06/26/24 0658 116 (256)            Pertinent Labs/Diagnostic Test Results:     Results from last 7 days   Lab Units 06/27/24  0906 06/26/24  1355 06/26/24  1311 06/26/24  1208   WBC Thousand/uL 11.45*  --   --   --    HEMOGLOBIN g/dL 14.0  --   --   --    I STAT HEMOGLOBIN g/dl  --  12.9 12.9 13.3   HEMATOCRIT % 40.8  --   --   --    HEMATOCRIT, ISTAT %  --  38 38 39   PLATELETS Thousands/uL 233  --   --   --    TOTAL NEUT ABS Thousands/µL 9.21*  --   --   --      Results from last 7 days   Lab Units 06/27/24  0906 06/26/24  1355 06/26/24  1311 06/26/24  1208   SODIUM mmol/L 134*  --   --   --    POTASSIUM mmol/L 3.6  --   --   --    CHLORIDE mmol/L 101  --   --   --    CO2 mmol/L 26  --   --   --    CO2, I-STAT mmol/L  --  25 28 27   ANION GAP mmol/L 7  --   --   --    BUN mg/dL 6  --   --   --    CREATININE mg/dL 0.63  --   --   --    EGFR ml/min/1.73sq m 107  --   --   --    CALCIUM mg/dL 8.2*  --   --   --    CALCIUM, IONIZED, ISTAT mmol/L  --  1.10* 1.11* 1.16   MAGNESIUM mg/dL 1.5*  --   --   --    PHOSPHORUS mg/dL 2.7  --   --   --      Results from last 7 days   Lab Units 06/27/24  0731 06/26/24  1443 06/26/24  0711   POC GLUCOSE mg/dl 150* 219* 164*     Results from last 7 days   Lab Units  06/27/24  0906   GLUCOSE RANDOM mg/dL 214*     Results from last 7 days   Lab Units 06/20/24  1132   HEMOGLOBIN A1C % 6.5*   EAG mg/dl 140     Results from last 7 days   Lab Units 06/26/24  1355 06/26/24  1311 06/26/24  1208   I STAT BASE EXC mmol/L -3* -1 -3*   I STAT O2 SAT % 97* 93* 97*   ISTAT PH ART  7.289* 7.294* 7.248*   I STAT ART PCO2 mm HG 49.5* 53.6* 56.9*   I STAT ART PO2 mm HG 97.0 76.0 111.0   I STAT ART HCO3 mmol/L 23.7 26.0 24.9     Results from last 7 days   Lab Units 06/26/24  0710   PROTIME seconds 15.0*   INR  1.19   PTT seconds 29         Scheduled Medications:  acetaminophen, 975 mg, Oral, Q8H MANPREET  atorvastatin, 20 mg, Oral, Daily With Dinner  azelastine, 1 spray, Each Nare, BID  fluticasone, 1 spray, Each Nare, Daily  fluticasone-vilanterol, 1 puff, Inhalation, Daily  gabapentin, 100 mg, Oral, TID  heparin (porcine), 5,000 Units, Subcutaneous, Q8H MANPREET  insulin lispro, 2-12 Units, Subcutaneous, TID AC  methocarbamol, 500 mg, Oral, Q6H MANPREET  tamsulosin, 0.4 mg, Oral, Daily With Dinner    Continuous IV Infusions:  dextrose 5 % and sodium chloride 0.45 % with KCl 20 mEq/L, 84 mL/hr, Intravenous, Continuous  HYDROmorphone, , Intravenous, Continuous    PRN Meds:  albuterol, 2 puff, Inhalation, Q6H PRN  HYDROmorphone, 0.5 mg, Intravenous, Q4H PRN  labetalol, 10 mg, Intravenous, Q4H PRN  lactated ringers, 1,000 mL, Intravenous, Once PRN  naloxone, 0.04 mg, Intravenous, Q1MIN PRN  ondansetron, 4 mg, Intravenous, Q4H PRN  oxyCODONE, 10 mg, Oral, Q4H PRN  oxyCODONE, 5 mg, Oral, Q4H PRN  sodium chloride, 1,000 mL, Intravenous, Once PRN        Network Utilization Review Department  ATTENTION: Please call with any questions or concerns to [phone] and carefully listen to the prompts so that you are directed to the right person. All voicemails are confidential.   For Discharge needs, contact Care Management DC Support Team at [phone] opt. [phone]  Send all requests for admission clinical reviews, approved  or denied determinations and any other requests to dedicated fax number below belonging to the campus where the patient is receiving treatment. List of dedicated fax numbers for the Facilities:  FACILITY NAME UR FAX NUMBER   ADMISSION DENIALS (Administrative/Medical Necessity) [phone]   DISCHARGE SUPPORT TEAM (NETWORK) [phone]   PARENT CHILD HEALTH (Maternity/NICU/Pediatrics) [phone]   [facility] [phone]   [facility] [phone]   [facility] [phone]   [facility] [phone]   [facility] [phone]   [facility] [phone]   [facility] [phone]   [facility] [phone]   [facility] [phone]   [facility] [phone]   [facility] [phone]   [facility] [phone]

## 2024-06-27 NOTE — PLAN OF CARE
Problem: PAIN - ADULT  Goal: Verbalizes/displays adequate comfort level or baseline comfort level  Description: Interventions:  - Encourage patient to monitor pain and request assistance  - Assess pain using appropriate pain scale  - Administer analgesics based on type and severity of pain and evaluate response  - Implement non-pharmacological measures as appropriate and evaluate response  - Consider cultural and social influences on pain and pain management  - Notify physician/advanced practitioner if interventions unsuccessful or patient reports new pain  Outcome: Progressing     Problem: INFECTION - ADULT  Goal: Absence or prevention of progression during hospitalization  Description: INTERVENTIONS:  - Assess and monitor for signs and symptoms of infection  - Monitor lab/diagnostic results  - Monitor all insertion sites, i.e. indwelling lines, tubes, and drains  - Monitor endotracheal if appropriate and nasal secretions for changes in amount and color  - [place] appropriate cooling/warming therapies per order  - Administer medications as ordered  - Instruct and encourage patient and family to use good hand hygiene technique  - Identify and instruct in appropriate isolation precautions for identified infection/condition  Outcome: Progressing  Goal: Absence of fever/infection during neutropenic period  Description: INTERVENTIONS:  - Monitor WBC    Outcome: Progressing     Problem: DISCHARGE PLANNING  Goal: Discharge to home or other facility with appropriate resources  Description: INTERVENTIONS:  - Identify barriers to discharge w/patient and caregiver  - Arrange for needed discharge resources and transportation as appropriate  - Identify discharge learning needs (meds, wound care, etc.)  - Arrange for interpretive services to assist at discharge as needed  - Refer to Case Management Department for coordinating discharge planning if the patient needs post-hospital services based on physician/advanced  practitioner order or complex needs related to functional status, cognitive ability, or social support system  Outcome: Progressing     Problem: Knowledge Deficit  Goal: Patient/family/caregiver demonstrates understanding of disease process, treatment plan, medications, and discharge instructions  Description: Complete learning assessment and assess knowledge base.  Interventions:  - Provide teaching at level of understanding  - Provide teaching via preferred learning methods  Outcome: Progressing

## 2024-06-27 NOTE — PLAN OF CARE
Problem: OCCUPATIONAL THERAPY ADULT  Goal: Performs self-care activities at highest level of function for planned discharge setting.  See evaluation for individualized goals.  Description: Treatment Interventions: ADL retraining, Functional transfer training, UE strengthening/ROM, Endurance training, Cognitive reorientation, Patient/family training, Equipment evaluation/education, Fine motor coordination activities, Compensatory technique education, Continued evaluation, Energy conservation, Activityengagement          See flowsheet documentation for full assessment, interventions and recommendations.   Note: Limitation: Decreased ADL status, Decreased endurance, Decreased self-care trans, Decreased high-level ADLs  Prognosis: Good  Assessment: 58 year old pt seen today for an OT evaluation following admission to [facility] 2/2 malignant neoplasm of descending colon, now s/p laparoscopic hand assisted L colon resection with present symptoms significant for pain, fatigue, weakness, decreased ADL status, decreased functional mobility. Pt  has a past medical history of Asthma, Cholangitis, Closed extra-articular fracture of distal end of right tibia, Closed nondisplaced oblique fracture of shaft of right fibula, CPAP (continuous positive airway pressure) dependence, Diabetes mellitus (HCC), Foot fracture, Hyperlipidemia, Hypertension, and Sleep apnea. Pt reported living with supportive spouse in a 2SH, 0STE with FFSU available. Pt reports being IND with all ADLs/IADLS PTA with no DME used. +. Retired. Enjoys fishing. Pt very pleasant and cooperative t/o session. Pt completed functional bed mobility with mod A. Mod A for functional STS txfs with RW. Min A for functional mobility to go household distances with RW. Pt was Min A for UB ADLs and  Mod A for LB ADLs. The patient's raw score on the AM-PAC Daily Activity Inpatient Short Form is 17. A raw score of less than 19 suggests the patient may  benefit from discharge to post-acute rehabilitation services. Please refer to the recommendation of the Occupational Therapist for safe discharge planning. Pt is functioning below baseline level of function and will continue to benefit from skilled acute OT to promote increased independence and return to PLOF. At this time, current OT recommendation is Level 3 resources - pending progress. Anticipate improve to home with no needs pending improved pain management. Will continue to follow and assess.     Rehab Resource Intensity Level, OT: III (Minimum Resource Intensity) (pending progress. Anticipate improve to home with no needs with increased pain management)

## 2024-06-27 NOTE — PROGRESS NOTES
"Progress Note - Colorectal Surgery  Jr Mendoza 58 y.o. male MRN: [id number]  Unit/Bed#: [facility] 817-01 Encounter: [id number]    Assessment:  59 yo male now s/p 6/26 Pre-op stents - removal, laparoscopic hand assisted left colon resection    Afebrile.VSS.  On 4L of NC  UOP 3830 cc  AM labs pending    Plan:  Advance to clears/toast/crackers  Transition to MIVF@125  Keep bianchi  Keep PCA  PRN pain meds  PRN anti nausea meds  DVT prophylaxis  Encourage IS  Encourage out of bed and ambulation  PT/OT    Subjective/Objective     Subjective:   No acute events overnight. Patient denies having nausea, vomiting, fevers, chills, chest pain, shortness of breath. Pain controlled with PCA. Adequate urine output in bianchi, but still bloody tinged. Tolerating PO intake. No bowel movements and no flatus yet.    Objective:     Blood pressure 131/79, pulse 91, temperature 98.5 °F (36.9 °C), resp. rate 20, height 5' 10\" (1.778 m), weight 116 kg (256 lb), SpO2 91%.,Body mass index is 36.73 kg/m².      Intake/Output Summary (Last 24 hours) at 6/27/2024 0617  Last data filed at 6/27/2024 0301  Gross per 24 hour   Intake 3816.58 ml   Output 3865 ml   Net -48.42 ml       Invasive Devices       Peripheral Intravenous Line  Duration             Peripheral IV 06/26/24 Dorsal (posterior);Right Wrist <1 day    Peripheral IV 06/26/24 Left Hand <1 day    Peripheral IV 06/26/24 Left Wrist <1 day              Drain  Duration             Urethral Catheter Latex 18 Fr. <1 day                    General: NAD  HENT: NCAT MMM  Neck: supple, no JVD  CV: nl rate  Lungs: nl wob. No resp distress  ABD: Soft, appropriately tender, distended, protuberant abdomen.  Extrem: No CCE  Neuro: AAOx3       Scheduled Meds:  Current Facility-Administered Medications   Medication Dose Route Frequency Provider Last Rate    acetaminophen  975 mg Oral Q8H MANPREET Brandon P Allsbrook, DO      albuterol  2 puff Inhalation Q6H PRN Brandon P Allsbrook, DO      atorvastatin  20 mg " Oral Daily With Dinner Brandon P Allsbrook, DO      azelastine  1 spray Each Nare BID Brandon P Allsbrook, DO      fluticasone  1 spray Each Nare Daily Brandon P Allsbrook, DO      fluticasone-vilanterol  1 puff Inhalation Daily Brandon P Allsbrook, DO      gabapentin  100 mg Oral TID Brandon P Allsbrook, DO      heparin (porcine)  5,000 Units Subcutaneous Q8H MANPREET Brandon P Allsbrook, DO      HYDROmorphone   Intravenous Continuous Brandon P Allsbrook, DO      HYDROmorphone  0.5 mg Intravenous Q4H PRN Brandon P Allsbrook, DO      insulin lispro  2-12 Units Subcutaneous TID AC Brandon P Allsbrook, DO      labetalol  10 mg Intravenous Q4H PRN Brandon P Allsbrook, DO      lactated ringers  1,000 mL Intravenous Once PRN Brandon P Allsbrook, DO      And    lactated ringers  1,000 mL Intravenous Once PRN Brandon P Allsbrook, DO      lactated ringers  125 mL/hr Intravenous Continuous Brandon P Allsbrook,  mL/hr (06/27/24 0048)    methocarbamol  500 mg Oral Q6H MANPREET Brandon P Allsbrook, DO      naloxone  0.04 mg Intravenous Q1MIN PRN Brandon P Allsbrook, DO      ondansetron  4 mg Intravenous Q4H PRN Brandon P Allsbrook, DO      sodium chloride  1,000 mL Intravenous Once PRN Brandon P Allsbrook, DO      And    sodium chloride  1,000 mL Intravenous Once PRN Brandon P Allsbrook, DO      tamsulosin  0.4 mg Oral Daily With Dinner Brandon P Allsbrook, DO       Continuous Infusions:HYDROmorphone,   lactated ringers, 125 mL/hr, Last Rate: 125 mL/hr (06/27/24 0048)      PRN Meds:.  albuterol    HYDROmorphone    labetalol    lactated ringers **AND** lactated ringers    naloxone    ondansetron    sodium chloride **AND** sodium chloride    Labs:        COAG - PT/INR/PTT: 15.0*/1.19/29 (06/26 0710)       Lab, Imaging and other studies:I have personally reviewed pertinent lab results.    VTE Pharmacologic Prophylaxis: Heparin  VTE Mechanical Prophylaxis: sequential compression device      Nikhil Marcelino MD  6/27/2024 6:17 AM

## 2024-06-27 NOTE — CASE MANAGEMENT
Case Management Assessment & Discharge Planning Note    Patient name Jr Mendoza  Location [facility] 817/[facility] 817-01 MRN [id number]  : 1965 Date 2024       Current Admission Date: 2024  Current Admission Diagnosis:Malignant neoplasm of descending colon (HCC)   Patient Active Problem List    Diagnosis Date Noted Date Diagnosed    Abnormal CT of the chest 2024     Nocturnal hypoxemia 2024     Erectile dysfunction 2024     BPH with obstruction/lower urinary tract symptoms 2024     Nasal obstruction 2024     DONG (obstructive sleep apnea) 2024     Moderate persistent asthma without complication 2023     Excessive daytime sleepiness 2023     Chronic cough      Acute bronchitis      Benign essential HTN 2022     Prediabetes 2022     Elevated LDL cholesterol level 10/09/2020     Alcohol use disorder, mild, abuse 10/09/2020     Class 2 obesity with body mass index (BMI) of 37.0 to 37.9 in adult 2020       LOS (days): 1  Geometric Mean LOS (GMLOS) (days): 2.9  Days to GMLOS:1.9     OBJECTIVE:    Risk of Unplanned Readmission Score: 14.96         Current admission status: Inpatient       Preferred Pharmacy:   [facility]/pharmacy #[id number] - [place] - [address]  [address]  [place] [address]  Phone: [phone] Fax: [phone]    [facility] - [place] - [address]  [address]  [place] [address]  Phone: [phone] Fax: [phone]    Primary Care Provider: CELI Bowling    Primary Insurance: BLUE CROSS  Secondary Insurance:     ASSESSMENT:  Active Health Care Proxies       Mandi Holguin Health Care Representative - Significant Other   Primary Phone: [phone] (Mobile)                 Advance Directives  Does patient have a Health Care POA?: Yes  Does patient have Advance Directives?: Yes  Advance Directives: Living will, Power of  for health  care  Primary Contact: Mandi Rosas         Readmission Root Cause  30 Day Readmission: No    Patient Information  Admitted from:: Home  Mental Status: Alert  During Assessment patient was accompanied by: Not accompanied during assessment  Assessment information provided by:: Patient  Primary Caregiver: Self  Support Systems: Spouse/significant other  County of Residence: [facility] do you live in?: [place]  Home entry access options. Select all that apply.: No steps to enter home  Type of Current Residence: 2 story home  Upon entering residence, is there a bedroom on the main floor (no further steps)?: Yes  Upon entering residence, is there a bathroom on the main floor (no further steps)?: Yes  Living Arrangements: Lives w/ Spouse/significant other  Is patient a ?: No    Activities of Daily Living Prior to Admission  Functional Status: Independent  Completes ADLs independently?: Yes  Ambulates independently?: Yes  Does patient use assisted devices?: Yes  Assisted Devices (DME) used: Straight Cane, Wheelchair  Does patient have a history of Outpatient Therapy (PT/OT)?: Yes ([facility] PT/OT)  Does the patient have a history of Short-Term Rehab?: No  Does patient have a history of HHC?: No  Does patient currently have HHC?: No         Patient Information Continued  Income Source: Unemployed  Does patient have prescription coverage?: Yes  Does patient receive dialysis treatments?: No  Does patient have a history of substance abuse?: No  Does patient have a history of Mental Health Diagnosis?: No         Means of Transportation  Means of Transport to Appts:: Family transport      Social Determinants of Health (SDOH)      Flowsheet Row Most Recent Value   Housing Stability    In the last 12 months, was there a time when you were not able to pay the mortgage or rent on time? N   In the past 12 months, how many times have you moved where you were living? 0   At any time in the past 12 months,  were you homeless or living in a shelter (including now)? N   Transportation Needs    In the past 12 months, has lack of transportation kept you from medical appointments or from getting medications? no   In the past 12 months, has lack of transportation kept you from meetings, work, or from getting things needed for daily living? No   Food Insecurity    Within the past 12 months, you worried that your food would run out before you got the money to buy more. Never true   Within the past 12 months, the food you bought just didn't last and you didn't have money to get more. Never true   Utilities    In the past 12 months has the electric, gas, oil, or water company threatened to shut off services in your home? No            DISCHARGE DETAILS:    Discharge planning discussed with:: Patient  Freedom of Choice: Yes  Comments - Freedom of Choice: FOC discussed.  Pt would like to do OP PT/OT if therapy is recommended.  PT/OT rec OP.  Pt in agreement.     Other Referral/Resources/Interventions Provided:  Interventions: Outpatient OT, Outpatient PT  Referral Comments: PT/OT recommend OP PT/OT.  Pt in agreement.  He would like to return to [facility] OP PT/OT at [facility].  Pt is on 4L NC. No home O2.  CM following for needs.     CM met with pt at bedside.  Introduced myself and explained my role.  Pt lives in a 2 story home, No [address], first floor set up with his wife Mandi.  She is MPOA.  Pt is recommended for OP PT/OT, he is in agreement.   Pt is currently on 4L NC.  No home 02, CM following for O2/discharge needs.  Pt wife Mandi will provide transportation at discharge.

## 2024-06-27 NOTE — RESPIRATORY THERAPY NOTE
RT Protocol Note  Jr Mendoza 58 y.o. male MRN: [id number]  [facility] Encounter: [id number]    Assessment    Active Problems:  There are no active Hospital Problems.      Home Pulmonary Medications:    Home Devices/Therapy: Other (Comment)    Past Medical History:   Diagnosis Date    Asthma     Cholangitis 2022    Closed extra-articular fracture of distal end of right tibia 2020    Closed nondisplaced oblique fracture of shaft of right fibula 2020    CPAP (continuous positive airway pressure) dependence     Diabetes mellitus (HCC)     Foot fracture     Hyperlipidemia     Hypertension     Sleep apnea      Social History     Socioeconomic History    Marital status: Single     Spouse name: None    Number of children: None    Years of education: None    Highest education level: None   Occupational History    Occupation: Unemployed   Tobacco Use    Smoking status: Former     Current packs/day: 0.00     Average packs/day: 0.3 packs/day for 10.0 years (2.5 ttl pk-yrs)     Types: Cigarettes     Start date:      Quit date:      Years since quittin.4    Smokeless tobacco: Never   Vaping Use    Vaping status: Never Used   Substance and Sexual Activity    Alcohol use: Yes     Alcohol/week: 7.0 standard drinks of alcohol     Types: 7 Glasses of wine per week     Comment: 3 beers daily    Drug use: Not Currently    Sexual activity: None   Other Topics Concern    None   Social History Narrative    ** Merged History Encounter **          Social Determinants of Health     Financial Resource Strain: Not on file   Food Insecurity: No Food Insecurity (2024)    Hunger Vital Sign     Worried About Running Out of Food in the Last Year: Never true     Ran Out of Food in the Last Year: Never true   Transportation Needs: No Transportation Needs (2024)    PRAPARE - Transportation     Lack of Transportation (Medical): No     Lack of Transportation (Non-Medical): No   Physical Activity:  "Not on file   Stress: Not on file   Social Connections: Not on file   Intimate Partner Violence: Not on file   Housing Stability: Low Risk  (6/27/2024)    Housing Stability Vital Sign     Unable to Pay for Housing in the Last Year: No     Number of Times Moved in the Last Year: 0     Homeless in the Last Year: No       Subjective         Objective    Physical Exam:   Assessment Type: Assess only  General Appearance: Awake, Alert  Respiratory Pattern: Normal  Chest Assessment: Chest expansion symmetrical  Bilateral Breath Sounds: Diminished    Vitals:  Blood pressure 127/74, pulse 81, temperature 98.8 °F (37.1 °C), resp. rate 20, height 5' 10\" (1.778 m), weight 116 kg (256 lb), SpO2 93%.          Imaging and other studies: I have personally reviewed pertinent reports.            Plan    Respiratory Plan: Discontinue Protocol        Resp Comments: (P) Pt admitted for neoplasm of descending colon. Pt assess per respiratory protocol at this time. Pt has history of acute bronchitis, moderate persistent asthma without complication, DONG. Pt takes albuterol mdi Q6 prn for wheezing and shortness of breath, pt's home regimen already ordered. Will d/c respiratory protocol and continue to monitor for the hs cpap.   "

## 2024-06-27 NOTE — RESTORATIVE TECHNICIAN NOTE
Restorative Technician Note      Patient Name: Jr Mendoza     Restorative Tech Visit Date: 06/27/24  Note Type: Mobility  Patient Position Upon Consult: Bedside chair  Activity Performed: Ambulated  Assistive Device: Standard walker  Education Provided: Yes  Patient Position at End of Consult: Supine; All needs within reach; Bed/Chair alarm activated    Jazz Kevin  DPT, Restorative Technician

## 2024-06-27 NOTE — PHYSICAL THERAPY NOTE
PT EVALUATION and TREATMENT NOTE    Pt. Name: Jr Mendoza  Pt. Age: 58 y.o.  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]    Past Medical History:   Diagnosis Date    Asthma     Cholangitis 05/16/2022    Closed extra-articular fracture of distal end of right tibia 01/28/2020    Closed nondisplaced oblique fracture of shaft of right fibula 01/28/2020    CPAP (continuous positive airway pressure) dependence     Diabetes mellitus (HCC)     Foot fracture     Hyperlipidemia     Hypertension     Sleep apnea        Past Surgical History:   Procedure Laterality Date    CHOLECYSTECTOMY LAPAROSCOPIC N/A 05/18/2022    Procedure: CHOLECYSTECTOMY LAPAROSCOPIC;  Surgeon: Zachary Kern MD;  Location: [facility];  Service: General    COLONOSCOPY         Imaging Studies:  No orders to display        06/27/24 1159   PT Last Visit   PT Visit Date 06/27/24   Note Type   Note type Evaluation  (and treatment)   Pain Assessment   Pain Assessment Tool 0-10   Pain Score 6   Pain Location/Orientation Orientation: Left;Location: Arm;Location: Abdomen   Hospital Pain Intervention(s) Repositioned;Ambulation/increased activity;Elevated;Emotional support;Rest  (PCA pump)   Restrictions/Precautions   Weight Bearing Precautions Per Order No   Other Precautions Multiple lines;O2;Fall Risk;Pain  (PCA pump, 4 L O2 NC)   Home Living   Type of  Home House   Home Layout Two level;Performs ADLs on one level;Able to live on main level with bedroom/bathroom  (0 ARTURO)   Bathroom Shower/Tub Tub/shower unit   Bathroom Toilet Standard   Bathroom Equipment Grab bars in shower   Home Equipment Walker;Cane   Prior Function   Level of [place] Independent with ADLs;Independent with functional mobility;Independent with IADLS  (w/o AD)   Lives With Spouse   Receives Help From Family   IADLs Independent with driving;Independent with meal prep;Independent with medication management   Falls in the last 6 months 0   Vocational Retired   Comments Pt lives with his spouse who is a retired nurse and will be able to help pt at home if needed. Denies home O2 use at baseline.   General   Family/Caregiver Present No   Cognition   Overall Cognitive Status WFL   Arousal/Participation Alert   Orientation Level Oriented X4   Following Commands Follows all commands and directions without difficulty   Comments Cooperative and motivated.   Subjective   Subjective My left arm hurts from when they wrapped me during surgery.   RUE Assessment   RUE Assessment   (refer to OT)   LUE Assessment   LUE Assessment   (refer to OT)   RLE Assessment   RLE Assessment WFL  (4+/5 grossly)   LLE Assessment   LLE Assessment WFL  (4+/5 grossly)   Bed Mobility   Supine to Sit 3  Moderate assistance   Additional items Assist x 1;HOB elevated;Bedrails;Increased time required;Verbal cues;LE management   Sit to Supine Unable to assess   Additional Comments Pt greeted in supine. Performed transfer on RA, SpO2 dec to 85% at EOB 2* significant pain. Placed pt back on 4 L O2 NC with improved SpO2 to 92%. Pt OOB in chair at end of session.   Transfers   Sit to Stand 3  Moderate assistance   Additional items Assist x 1;Bedrails;Increased time required;Verbal cues  (w/ HHA)   Stand to Sit 3  Moderate assistance   Additional items Assist x 1;Armrests;Increased time required;Verbal cues  (w/ RW)   Additional Comments  cues for hand placement; inc pain with all functional mobility. continued use of 4 L O2 NC   Ambulation/Elevation   Gait pattern Improper Weight shift;Wide ROSALIE;Decreased foot clearance;Short stride;Excessively slow;Step through pattern   Gait Assistance 4  Minimal assist   Additional items Assist x 1;Verbal cues   Assistive Device Rolling walker   Distance 4 forward steps; 120'x1   Ambulation/Elevation Additional Comments use of 4 L O2 during ambulation with SpO2 >90% throughout ambulation.   Balance   Static Sitting Good   Dynamic Sitting Fair   Static Standing Fair -  (w/ RW)   Dynamic Standing Poor +  (w/ RW)   Ambulatory Poor +  (w/ RW)   Endurance Deficit   Endurance Deficit Yes   Endurance Deficit Description pain and fatigue   Activity Tolerance   Activity Tolerance Patient limited by pain;Patient tolerated treatment well   Medical Staff Made Aware OT Trevin   Nurse Made Aware RN yes   Assessment   Prognosis Good   Problem List Decreased strength;Decreased endurance;Impaired balance;Decreased mobility;Obesity;Pain   Assessment Pt. 58 y.o.male presents for elective urology procedure. Past medical hx includes asthma, CPAP, DM, hyperlipidemia, HTN. 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 S/p  Pre-op stents - removal, laparoscopic hand assisted left colon resection 6/26 . Pt referred to PT for functional mobility evaluation & D/C planning w/ orders of ambulate and up as tolerated . PTA, pt reports being I w/o AD. Personal factors affecting pt at time of IE include: decreased independence in ADLs/IADLs and inc use of supplemental oxygen. During evaluation, deficits included dec mobility, balance, ambulation. Pain 6/10 in L arm and abdomen. Currently on 4 L O2 NC. Required modAx1 for supine to sit, and sit<>stand. MinAx1 for ambulation. Use of RA for supine to sit transfers with dec SpO2 to 85%. 4 L O2 NC placed on pt with improved SpO2 to 92%. Initial use of HHA for sit to stand  transfer however progressed to use of RW in standing to alleviate abdominal pain. Pt able to ambulate 4 steps forward with WBOS and short stride. Inc pain with all functional mobility. See treatment assessment for further functional mobility. Pt demonstrated dec endurance and tolerance to activity. Pt was educated on fall precautions and reinforced w/ good understanding. Pt would benefit from continued PT to address deficits as defined above and maximize level of independence with functional mobility and safety. Based on pt presentation and impaired function, pt would benefit from level III, (minimum resource intensity) at D/C pending progress with functional mobility 2* pain. The patient's AM-PAC Basic Mobility Inpatient Short Form Raw Score is 14. A Raw score of less than or equal to 16 suggests the patient may benefit from discharge to post-acute rehabilitation services. Please also refer to the recommendation of the Physical Therapist for safe discharge planning. Co-león performed to complete this PT evaluation for the pts best interest given pts medical complexity and functional level.   Goals   Patient Goals to walk   STG Expiration Date 07/11/24   Short Term Goal #1 1) Inc overall LE strength by 1/2 MMT grade to improve functional mobility; 2) Pt will demonstrate improved bed mobility with mod I to dec caregiver burden; 3) Pt will demonstrate improved transfers w/ mod I for inc safety; 4) Pt will be able to amb w/ mod I >150' w/ RW for household distances to inc safety and dec caregiver burden; 5) Pt will be able to navigate stairs with mod I to dec caregiver burden and inc safety with functional mobility; 6) Improve general balance by 1 grade to inc safety; 7) PT for ongoing patient and caregiver education   PT Treatment Day 0   Plan   Treatment/Interventions Functional transfer training;LE strengthening/ROM;Elevations;Therapeutic exercise;Endurance training;Patient/family training;Bed mobility;Gait  training;Spoke to nursing;OT   PT Frequency 4-6x/wk   Discharge Recommendation   Rehab Resource Intensity Level, PT III (Minimum Resource Intensity)  (pending progress with functional mobility 2* pain)   AM-PAC Basic Mobility Inpatient   Turning in Flat Bed Without Bedrails 2   Lying on Back to Sitting on Edge of Flat Bed Without Bedrails 2   Moving Bed to Chair 3   Standing Up From Chair Using Arms 2   Walk in Room 3   Climb 3-5 Stairs With Railing 2   Basic Mobility Inpatient Raw Score 14   Basic Mobility Standardized Score 35.55   [facility] Highest Level Of Mobility   -[facility] Goal 4: Move to chair/commode   -HL Achieved 7: Walk 25 feet or more   Additional Treatment Session   Start Time 1148   End Time 1159   Treatment Assessment Following initial evaluation, pt able to tolerate further functional mobility. Pt able to ambulate 120' with RW and minAx1 in unit. Use of 4 L O2 NC with SpO2 maintained >90% throughout. Dec gait speed with step through gait and WBOS. No gross LOB noted. Required modAx1 for stand to sit with cues for hand placement and RW management during transfers. Inc pain throughout all mobility further limiting pt at this time. Educated pt on importance of ambulation to prevent decline in function and promote healing with good understanding. Improved L shoulder pain post session. Pt in chair at end of session with all needs within reach. RN notified.   End of Consult   Patient Position at End of Consult Bedside chair;All needs within reach       Hx/personal factors: co-morbidities, mutliple lines, use of AD, pain, fall risk, obesity, and O2, coping styles, social background, past experience, behavior pattern  Examination: dec mobility, dec balance, dec endurance, dec amb, risk for falls, pain, assessed body system, balance, endurance, amb, D/C disposition & fall risk, impairements in locomotion, musculoskeletal, balance, endurance, posture, coordination, assessed cognition, impairments in  systems including multiple body structures involved; musculoskeletal (ROM, strength, posture, BMI), neuromuscular (balance,locomotion, gait, transfers, motor control and learning, sensation), joint integrity, integumentary (skin integrity, presence of scars or wounds), cardiopulmonary (vitals, edema); activity limitations (difficulties executing an action); participation restrictions (problems associated w involvement in life situations)  Clinical: unpredictable (ongoing medical status, abnormal lab values, risk for falls, and pain mgt)  Complexity: high      Velma Villanueva, PT

## 2024-06-27 NOTE — PLAN OF CARE
Problem: PAIN - ADULT  Goal: Verbalizes/displays adequate comfort level or baseline comfort level  Description: Interventions:  - Encourage patient to monitor pain and request assistance  - Assess pain using appropriate pain scale  - Administer analgesics based on type and severity of pain and evaluate response  - Implement non-pharmacological measures as appropriate and evaluate response  - Consider cultural and social influences on pain and pain management  - Notify physician/advanced practitioner if interventions unsuccessful or patient reports new pain  Outcome: Progressing     Problem: INFECTION - ADULT  Goal: Absence or prevention of progression during hospitalization  Description: INTERVENTIONS:  - Assess and monitor for signs and symptoms of infection  - Monitor lab/diagnostic results  - Monitor all insertion sites, i.e. indwelling lines, tubes, and drains  - Monitor endotracheal if appropriate and nasal secretions for changes in amount and color  - [place] appropriate cooling/warming therapies per order  - Administer medications as ordered  - Instruct and encourage patient and family to use good hand hygiene technique  - Identify and instruct in appropriate isolation precautions for identified infection/condition  Outcome: Progressing  Goal: Absence of fever/infection during neutropenic period  Description: INTERVENTIONS:  - Monitor WBC    Outcome: Progressing     Problem: SAFETY ADULT  Goal: Patient will remain free of falls  Description: INTERVENTIONS:  - Educate patient/family on patient safety including physical limitations  - Instruct patient to call for assistance with activity   - Consult OT/PT to assist with strengthening/mobility   - Keep Call bell within reach  - Keep bed low and locked with side rails adjusted as appropriate  - Keep care items and personal belongings within reach  - Initiate and maintain comfort rounds  - Make Fall Risk Sign visible to staff  - Offer Toileting every  Hours,  in advance of need  - Initiate/Maintain alarm  - Obtain necessary fall risk management equipment:   - Apply yellow socks and bracelet for high fall risk patients  - Consider moving patient to room near nurses station  Outcome: Progressing  Goal: Maintain or return to baseline ADL function  Description: INTERVENTIONS:  -  Assess patient's ability to carry out ADLs; assess patient's baseline for ADL function and identify physical deficits which impact ability to perform ADLs (bathing, care of mouth/teeth, toileting, grooming, dressing, etc.)  - Assess/evaluate cause of self-care deficits   - Assess range of motion  - Assess patient's mobility; develop plan if impaired  - Assess patient's need for assistive devices and provide as appropriate  - Encourage maximum independence but intervene and supervise when necessary  - Involve family in performance of ADLs  - Assess for home care needs following discharge   - Consider OT consult to assist with ADL evaluation and planning for discharge  - Provide patient education as appropriate  Outcome: Progressing  Goal: Maintains/Returns to pre admission functional level  Description: INTERVENTIONS:  - Perform AM-PAC 6 Click Basic Mobility/ Daily Activity assessment daily.  - Set and communicate daily mobility goal to care team and patient/family/caregiver.   - Collaborate with rehabilitation services on mobility goals if consulted  - Perform Range of Motion  times a day.  - Reposition patient every  hours.  - Dangle patient  times a day  - Stand patient  times a day  - Ambulate patient  times a day  - Out of bed to chair  times a day   - Out of bed for meals  times a day  - Out of bed for toileting  - Record patient progress and toleration of activity level   Outcome: Progressing     Problem: DISCHARGE PLANNING  Goal: Discharge to home or other facility with appropriate resources  Description: INTERVENTIONS:  - Identify barriers to discharge w/patient and caregiver  - Arrange for  needed discharge resources and transportation as appropriate  - Identify discharge learning needs (meds, wound care, etc.)  - Arrange for interpretive services to assist at discharge as needed  - Refer to Case Management Department for coordinating discharge planning if the patient needs post-hospital services based on physician/advanced practitioner order or complex needs related to functional status, cognitive ability, or social support system  Outcome: Progressing     Problem: Knowledge Deficit  Goal: Patient/family/caregiver demonstrates understanding of disease process, treatment plan, medications, and discharge instructions  Description: Complete learning assessment and assess knowledge base.  Interventions:  - Provide teaching at level of understanding  - Provide teaching via preferred learning methods  Outcome: Progressing

## 2024-06-28 LAB
ANION GAP SERPL CALCULATED.3IONS-SCNC: 8 MMOL/L (ref 4–13)
BASOPHILS # BLD MANUAL: 0.16 THOUSAND/UL (ref 0–0.1)
BASOPHILS NFR MAR MANUAL: 1 % (ref 0–1)
BUN SERPL-MCNC: 14 MG/DL (ref 5–25)
CALCIUM SERPL-MCNC: 8.4 MG/DL (ref 8.4–10.2)
CHLORIDE SERPL-SCNC: 97 MMOL/L (ref 96–108)
CO2 SERPL-SCNC: 27 MMOL/L (ref 21–32)
CREAT SERPL-MCNC: 1.29 MG/DL (ref 0.6–1.3)
EOSINOPHIL # BLD MANUAL: 0 THOUSAND/UL (ref 0–0.4)
EOSINOPHIL NFR BLD MANUAL: 0 % (ref 0–6)
ERYTHROCYTE [DISTWIDTH] IN BLOOD BY AUTOMATED COUNT: 13.2 % (ref 11.6–15.1)
GFR SERPL CREATININE-BSD FRML MDRD: 59 ML/MIN/1.73SQ M
GLUCOSE SERPL-MCNC: 141 MG/DL (ref 65–140)
GLUCOSE SERPL-MCNC: 154 MG/DL (ref 65–140)
GLUCOSE SERPL-MCNC: 170 MG/DL (ref 65–140)
GLUCOSE SERPL-MCNC: 182 MG/DL (ref 65–140)
GLUCOSE SERPL-MCNC: 194 MG/DL (ref 65–140)
HCT VFR BLD AUTO: 33.6 % (ref 36.5–49.3)
HGB BLD-MCNC: 11 G/DL (ref 12–17)
LYMPHOCYTES # BLD AUTO: 0.96 THOUSAND/UL (ref 0.6–4.47)
LYMPHOCYTES # BLD AUTO: 4 % (ref 14–44)
MAGNESIUM SERPL-MCNC: 2.1 MG/DL (ref 1.9–2.7)
MCH RBC QN AUTO: 30.9 PG (ref 26.8–34.3)
MCHC RBC AUTO-ENTMCNC: 32.7 G/DL (ref 31.4–37.4)
MCV RBC AUTO: 94 FL (ref 82–98)
MONOCYTES # BLD AUTO: 0.8 THOUSAND/UL (ref 0–1.22)
MONOCYTES NFR BLD: 5 % (ref 4–12)
NEUTROPHILS # BLD MANUAL: 14.09 THOUSAND/UL (ref 1.85–7.62)
NEUTS BAND NFR BLD MANUAL: 11 % (ref 0–8)
NEUTS SEG NFR BLD AUTO: 77 % (ref 43–75)
PLATELET # BLD AUTO: 249 THOUSANDS/UL (ref 149–390)
PLATELET BLD QL SMEAR: ADEQUATE
PMV BLD AUTO: 11.3 FL (ref 8.9–12.7)
POTASSIUM SERPL-SCNC: 4.5 MMOL/L (ref 3.5–5.3)
RBC # BLD AUTO: 3.56 MILLION/UL (ref 3.88–5.62)
SODIUM SERPL-SCNC: 132 MMOL/L (ref 135–147)
VARIANT LYMPHS # BLD AUTO: 2 %
WBC # BLD AUTO: 16.01 THOUSAND/UL (ref 4.31–10.16)

## 2024-06-28 PROCEDURE — 0D9670Z DRAINAGE OF STOMACH WITH DRAINAGE DEVICE, VIA NATURAL OR ARTIFICIAL OPENING: ICD-10-PCS | Performed by: SURGERY

## 2024-06-28 PROCEDURE — 82948 REAGENT STRIP/BLOOD GLUCOSE: CPT

## 2024-06-28 PROCEDURE — 85007 BL SMEAR W/DIFF WBC COUNT: CPT | Performed by: PHYSICIAN ASSISTANT

## 2024-06-28 PROCEDURE — 99024 POSTOP FOLLOW-UP VISIT: CPT | Performed by: SURGERY

## 2024-06-28 PROCEDURE — 85027 COMPLETE CBC AUTOMATED: CPT | Performed by: PHYSICIAN ASSISTANT

## 2024-06-28 PROCEDURE — 83735 ASSAY OF MAGNESIUM: CPT | Performed by: PHYSICIAN ASSISTANT

## 2024-06-28 PROCEDURE — 80048 BASIC METABOLIC PNL TOTAL CA: CPT | Performed by: PHYSICIAN ASSISTANT

## 2024-06-28 RX ORDER — METHOCARBAMOL 100 MG/ML
500 INJECTION, SOLUTION INTRAMUSCULAR; INTRAVENOUS EVERY 8 HOURS SCHEDULED
Status: DISCONTINUED | OUTPATIENT
Start: 2024-06-28 | End: 2024-07-01

## 2024-06-28 RX ORDER — HYDROMORPHONE HCL/PF 1 MG/ML
0.5 SYRINGE (ML) INJECTION
Status: DISCONTINUED | OUTPATIENT
Start: 2024-06-28 | End: 2024-07-12

## 2024-06-28 RX ORDER — ACETAMINOPHEN 10 MG/ML
1000 INJECTION, SOLUTION INTRAVENOUS EVERY 6 HOURS SCHEDULED
Status: DISCONTINUED | OUTPATIENT
Start: 2024-06-28 | End: 2024-07-09

## 2024-06-28 RX ORDER — INSULIN LISPRO 100 [IU]/ML
2-12 INJECTION, SOLUTION INTRAVENOUS; SUBCUTANEOUS EVERY 6 HOURS
Status: DISCONTINUED | OUTPATIENT
Start: 2024-06-28 | End: 2024-07-12 | Stop reason: HOSPADM

## 2024-06-28 RX ORDER — CALCIUM CARBONATE 500 MG/1
1000 TABLET, CHEWABLE ORAL 3 TIMES DAILY PRN
Status: DISCONTINUED | OUTPATIENT
Start: 2024-06-28 | End: 2024-07-12 | Stop reason: HOSPADM

## 2024-06-28 RX ADMIN — SODIUM CHLORIDE 1000 ML: 0.9 INJECTION, SOLUTION INTRAVENOUS at 06:25

## 2024-06-28 RX ADMIN — ACETAMINOPHEN 1000 MG: 10 INJECTION INTRAVENOUS at 12:08

## 2024-06-28 RX ADMIN — METHOCARBAMOL 500 MG: 100 INJECTION INTRAMUSCULAR; INTRAVENOUS at 14:29

## 2024-06-28 RX ADMIN — INSULIN LISPRO 2 UNITS: 100 INJECTION, SOLUTION INTRAVENOUS; SUBCUTANEOUS at 08:24

## 2024-06-28 RX ADMIN — INSULIN LISPRO 2 UNITS: 100 INJECTION, SOLUTION INTRAVENOUS; SUBCUTANEOUS at 23:48

## 2024-06-28 RX ADMIN — ONDANSETRON 4 MG: 2 INJECTION INTRAMUSCULAR; INTRAVENOUS at 03:37

## 2024-06-28 RX ADMIN — ENOXAPARIN SODIUM 40 MG: 40 INJECTION SUBCUTANEOUS at 14:41

## 2024-06-28 RX ADMIN — FLUTICASONE FUROATE AND VILANTEROL TRIFENATATE 1 PUFF: 200; 25 POWDER RESPIRATORY (INHALATION) at 08:24

## 2024-06-28 RX ADMIN — DEXTROSE, SODIUM CHLORIDE, AND POTASSIUM CHLORIDE 125 ML/HR: 5; .45; .15 INJECTION INTRAVENOUS at 15:40

## 2024-06-28 RX ADMIN — DEXTROSE, SODIUM CHLORIDE, AND POTASSIUM CHLORIDE 125 ML/HR: 5; .45; .15 INJECTION INTRAVENOUS at 21:22

## 2024-06-28 RX ADMIN — DEXTROSE, SODIUM CHLORIDE, AND POTASSIUM CHLORIDE 84 ML/HR: 5; .45; .15 INJECTION INTRAVENOUS at 05:53

## 2024-06-28 RX ADMIN — METHOCARBAMOL 500 MG: 100 INJECTION INTRAMUSCULAR; INTRAVENOUS at 21:27

## 2024-06-28 RX ADMIN — ACETAMINOPHEN 1000 MG: 10 INJECTION INTRAVENOUS at 18:24

## 2024-06-28 RX ADMIN — ACETAMINOPHEN 1000 MG: 10 INJECTION INTRAVENOUS at 23:48

## 2024-06-28 RX ADMIN — INSULIN LISPRO 2 UNITS: 100 INJECTION, SOLUTION INTRAVENOUS; SUBCUTANEOUS at 12:05

## 2024-06-28 RX ADMIN — CALCIUM CARBONATE (ANTACID) CHEW TAB 500 MG 1000 MG: 500 CHEW TAB at 05:54

## 2024-06-28 NOTE — QUICK NOTE
Patient now nauseated. Still distended and tympanic.  Will have NGT inserted, NPO, Increased IV fluids to 125/hr

## 2024-06-28 NOTE — PLAN OF CARE
Problem: PAIN - ADULT  Goal: Verbalizes/displays adequate comfort level or baseline comfort level  Description: Interventions:  - Encourage patient to monitor pain and request assistance  - Assess pain using appropriate pain scale  - Administer analgesics based on type and severity of pain and evaluate response  - Implement non-pharmacological measures as appropriate and evaluate response  - Consider cultural and social influences on pain and pain management  - Notify physician/advanced practitioner if interventions unsuccessful or patient reports new pain  Outcome: Progressing     Problem: INFECTION - ADULT  Goal: Absence or prevention of progression during hospitalization  Description: INTERVENTIONS:  - Assess and monitor for signs and symptoms of infection  - Monitor lab/diagnostic results  - Monitor all insertion sites, i.e. indwelling lines, tubes, and drains  - Monitor endotracheal if appropriate and nasal secretions for changes in amount and color  - [place] appropriate cooling/warming therapies per order  - Administer medications as ordered  - Instruct and encourage patient and family to use good hand hygiene technique  - Identify and instruct in appropriate isolation precautions for identified infection/condition  Outcome: Progressing  Goal: Absence of fever/infection during neutropenic period  Description: INTERVENTIONS:  - Monitor WBC    Outcome: Progressing     Problem: SAFETY ADULT  Goal: Patient will remain free of falls  Description: INTERVENTIONS:  - Educate patient/family on patient safety including physical limitations  - Instruct patient to call for assistance with activity   - Consult OT/PT to assist with strengthening/mobility   - Keep Call bell within reach  - Keep bed low and locked with side rails adjusted as appropriate  - Keep care items and personal belongings within reach  - Initiate and maintain comfort rounds  - Make Fall Risk Sign visible to staff  - Offer Toileting every  Hours,  in advance of need  - Initiate/Maintain alarm  - Obtain necessary fall risk management equipment:   - Apply yellow socks and bracelet for high fall risk patients  - Consider moving patient to room near nurses station  Outcome: Progressing  Goal: Maintain or return to baseline ADL function  Description: INTERVENTIONS:  -  Assess patient's ability to carry out ADLs; assess patient's baseline for ADL function and identify physical deficits which impact ability to perform ADLs (bathing, care of mouth/teeth, toileting, grooming, dressing, etc.)  - Assess/evaluate cause of self-care deficits   - Assess range of motion  - Assess patient's mobility; develop plan if impaired  - Assess patient's need for assistive devices and provide as appropriate  - Encourage maximum independence but intervene and supervise when necessary  - Involve family in performance of ADLs  - Assess for home care needs following discharge   - Consider OT consult to assist with ADL evaluation and planning for discharge  - Provide patient education as appropriate  Outcome: Progressing  Goal: Maintains/Returns to pre admission functional level  Description: INTERVENTIONS:  - Perform AM-PAC 6 Click Basic Mobility/ Daily Activity assessment daily.  - Set and communicate daily mobility goal to care team and patient/family/caregiver.   - Collaborate with rehabilitation services on mobility goals if consulted  - Perform Range of Motion  times a day.  - Reposition patient every  hours.  - Dangle patient  times a day  - Stand patient  times a day  - Ambulate patient  times a day  - Out of bed to chair  times a day   - Out of bed for meals  times a day  - Out of bed for toileting  - Record patient progress and toleration of activity level   Outcome: Progressing     Problem: DISCHARGE PLANNING  Goal: Discharge to home or other facility with appropriate resources  Description: INTERVENTIONS:  - Identify barriers to discharge w/patient and caregiver  - Arrange for  needed discharge resources and transportation as appropriate  - Identify discharge learning needs (meds, wound care, etc.)  - Arrange for interpretive services to assist at discharge as needed  - Refer to Case Management Department for coordinating discharge planning if the patient needs post-hospital services based on physician/advanced practitioner order or complex needs related to functional status, cognitive ability, or social support system  Outcome: Progressing     Problem: Knowledge Deficit  Goal: Patient/family/caregiver demonstrates understanding of disease process, treatment plan, medications, and discharge instructions  Description: Complete learning assessment and assess knowledge base.  Interventions:  - Provide teaching at level of understanding  - Provide teaching via preferred learning methods  Outcome: Progressing

## 2024-06-28 NOTE — PROGRESS NOTES
"Progress Note - Colorectal   Jr Mendoza 58 y.o. male MRN: [id number]  [facility] Encounter: [id number]      Objective: Patient states feels fine this morning, had a lousy day yesterday due to pain and ambulating the halls.  He is tolerating a diabetic clear liquid diet with toast and crackers.  Blood sugars however are 163 194 150.  Patient takes metformin 500 mg twice daily with meals at home.  We do have patient on sliding scale with algorithm level 4.  Patient was out of bed and ambulated the halls twice yesterday.  Patient is belching while examining him in the room this morning.  No flatus as of yet, no bowel movements.  States she feels bloated.  Urinating well on his own, only 320 mL of urine during the night..  [place] catheter was removed yesterday.  Bladder scan this morning is only 50 mL.    620 p.o. intake    1620 UA    Blood pressure 98/66, pulse (!) 109, temperature 99.2 °F (37.3 °C), resp. rate 16, height 5' 10\" (1.778 m), weight 116 kg (256 lb), SpO2 96%.,Body mass index is 36.73 kg/m².      Intake/Output Summary (Last 24 hours) at 6/28/2024 0525  Last data filed at 6/27/2024 2202  Gross per 24 hour   Intake 622 ml   Output 1420 ml   Net -798 ml       Invasive Devices       Peripheral Intravenous Line  Duration             Peripheral IV 06/26/24 Dorsal (posterior);Right Wrist 1 day    Peripheral IV 06/26/24 Left Hand 1 day    Peripheral IV 06/26/24 Left Wrist 1 day                    Physical Exam:   Patient is awake and alert, he is orientated x 3, he is on 4 L of nasal cannula and satting at 96%.  Does not appear in any acute respiratory or abdominal distress.  Abdomen: Appears distended, patient has a protuberant abdomen, firm abdomen, no bowel sounds audible, trocar sites are clean and dry, minimal abdominal tenderness on exam  Extremities: His Venodynes are on and functioning, there is no bilateral pedal edema, no calf tenderness bilaterally    Lab, Imaging and other studies: " Pending  VTE Pharmacologic Prophylaxis: Enoxaparin (Lovenox)  VTE Mechanical Prophylaxis: sequential compression device    Assessment:  POD # 2 laparoscopic hand-assisted left colon resection    Plan:  Would continue just to clears toast and crackers for now and not advance diet  To be out of bed and ambulating more throughout the day  Continue the diabetic clears toast and crackers  Monitor urine output  Check a.m. labs  Continue to work with incentive spirometry  Continue Lovenox for DVT prophylaxis

## 2024-06-28 NOTE — PLAN OF CARE
Problem: PAIN - ADULT  Goal: Verbalizes/displays adequate comfort level or baseline comfort level  Description: Interventions:  - Encourage patient to monitor pain and request assistance  - Assess pain using appropriate pain scale  - Administer analgesics based on type and severity of pain and evaluate response  - Implement non-pharmacological measures as appropriate and evaluate response  - Consider cultural and social influences on pain and pain management  - Notify physician/advanced practitioner if interventions unsuccessful or patient reports new pain  Outcome: Progressing     Problem: INFECTION - ADULT  Goal: Absence or prevention of progression during hospitalization  Description: INTERVENTIONS:  - Assess and monitor for signs and symptoms of infection  - Monitor lab/diagnostic results  - Monitor all insertion sites, i.e. indwelling lines, tubes, and drains  - Monitor endotracheal if appropriate and nasal secretions for changes in amount and color  - [place] appropriate cooling/warming therapies per order  - Administer medications as ordered  - Instruct and encourage patient and family to use good hand hygiene technique  - Identify and instruct in appropriate isolation precautions for identified infection/condition  Outcome: Progressing  Goal: Absence of fever/infection during neutropenic period  Description: INTERVENTIONS:  - Monitor WBC    Outcome: Progressing     Problem: SAFETY ADULT  Goal: Patient will remain free of falls  Description: INTERVENTIONS:  - Educate patient/family on patient safety including physical limitations  - Instruct patient to call for assistance with activity   - Consult OT/PT to assist with strengthening/mobility   - Keep Call bell within reach  - Keep bed low and locked with side rails adjusted as appropriate  - Keep care items and personal belongings within reach  - Initiate and maintain comfort rounds  - Make Fall Risk Sign visible to staff  - Offer Toileting every  Hours,  in advance of need  - Initiate/Maintain alarm  - Obtain necessary fall risk management equipment:   - Apply yellow socks and bracelet for high fall risk patients  - Consider moving patient to room near nurses station  Outcome: Progressing  Goal: Maintain or return to baseline ADL function  Description: INTERVENTIONS:  -  Assess patient's ability to carry out ADLs; assess patient's baseline for ADL function and identify physical deficits which impact ability to perform ADLs (bathing, care of mouth/teeth, toileting, grooming, dressing, etc.)  - Assess/evaluate cause of self-care deficits   - Assess range of motion  - Assess patient's mobility; develop plan if impaired  - Assess patient's need for assistive devices and provide as appropriate  - Encourage maximum independence but intervene and supervise when necessary  - Involve family in performance of ADLs  - Assess for home care needs following discharge   - Consider OT consult to assist with ADL evaluation and planning for discharge  - Provide patient education as appropriate  Outcome: Progressing  Goal: Maintains/Returns to pre admission functional level  Description: INTERVENTIONS:  - Perform AM-PAC 6 Click Basic Mobility/ Daily Activity assessment daily.  - Set and communicate daily mobility goal to care team and patient/family/caregiver.   - Collaborate with rehabilitation services on mobility goals if consulted  - Perform Range of Motion  times a day.  - Reposition patient every  hours.  - Dangle patient  times a day  - Stand patient  times a day  - Ambulate patient  times a day  - Out of bed to chair  times a day   - Out of bed for meals  times a day  - Out of bed for toileting  - Record patient progress and toleration of activity level   Outcome: Progressing     Problem: DISCHARGE PLANNING  Goal: Discharge to home or other facility with appropriate resources  Description: INTERVENTIONS:  - Identify barriers to discharge w/patient and caregiver  - Arrange for  needed discharge resources and transportation as appropriate  - Identify discharge learning needs (meds, wound care, etc.)  - Arrange for interpretive services to assist at discharge as needed  - Refer to Case Management Department for coordinating discharge planning if the patient needs post-hospital services based on physician/advanced practitioner order or complex needs related to functional status, cognitive ability, or social support system  Outcome: Progressing     Problem: Knowledge Deficit  Goal: Patient/family/caregiver demonstrates understanding of disease process, treatment plan, medications, and discharge instructions  Description: Complete learning assessment and assess knowledge base.  Interventions:  - Provide teaching at level of understanding  - Provide teaching via preferred learning methods  Outcome: Progressing

## 2024-06-29 LAB
ANION GAP SERPL CALCULATED.3IONS-SCNC: 6 MMOL/L (ref 4–13)
BASOPHILS # BLD AUTO: 0.04 THOUSANDS/ÂΜL (ref 0–0.1)
BASOPHILS NFR BLD AUTO: 0 % (ref 0–1)
BUN SERPL-MCNC: 12 MG/DL (ref 5–25)
CALCIUM SERPL-MCNC: 8 MG/DL (ref 8.4–10.2)
CHLORIDE SERPL-SCNC: 98 MMOL/L (ref 96–108)
CO2 SERPL-SCNC: 30 MMOL/L (ref 21–32)
CREAT SERPL-MCNC: 0.83 MG/DL (ref 0.6–1.3)
EOSINOPHIL # BLD AUTO: 0.15 THOUSAND/ÂΜL (ref 0–0.61)
EOSINOPHIL NFR BLD AUTO: 1 % (ref 0–6)
ERYTHROCYTE [DISTWIDTH] IN BLOOD BY AUTOMATED COUNT: 13.1 % (ref 11.6–15.1)
GFR SERPL CREATININE-BSD FRML MDRD: 95 ML/MIN/1.73SQ M
GLUCOSE SERPL-MCNC: 160 MG/DL (ref 65–140)
GLUCOSE SERPL-MCNC: 163 MG/DL (ref 65–140)
GLUCOSE SERPL-MCNC: 173 MG/DL (ref 65–140)
GLUCOSE SERPL-MCNC: 176 MG/DL (ref 65–140)
GLUCOSE SERPL-MCNC: 319 MG/DL (ref 65–140)
HCT VFR BLD AUTO: 29.4 % (ref 36.5–49.3)
HGB BLD-MCNC: 9.4 G/DL (ref 12–17)
IMM GRANULOCYTES # BLD AUTO: 0.06 THOUSAND/UL (ref 0–0.2)
IMM GRANULOCYTES NFR BLD AUTO: 1 % (ref 0–2)
LYMPHOCYTES # BLD AUTO: 1.65 THOUSANDS/ÂΜL (ref 0.6–4.47)
LYMPHOCYTES NFR BLD AUTO: 15 % (ref 14–44)
MAGNESIUM SERPL-MCNC: 2 MG/DL (ref 1.9–2.7)
MCH RBC QN AUTO: 30.7 PG (ref 26.8–34.3)
MCHC RBC AUTO-ENTMCNC: 32 G/DL (ref 31.4–37.4)
MCV RBC AUTO: 96 FL (ref 82–98)
MONOCYTES # BLD AUTO: 1.7 THOUSAND/ÂΜL (ref 0.17–1.22)
MONOCYTES NFR BLD AUTO: 15 % (ref 4–12)
NEUTROPHILS # BLD AUTO: 7.69 THOUSANDS/ÂΜL (ref 1.85–7.62)
NEUTS SEG NFR BLD AUTO: 68 % (ref 43–75)
NRBC BLD AUTO-RTO: 0 /100 WBCS
PHOSPHATE SERPL-MCNC: 1.7 MG/DL (ref 2.3–4.1)
PLATELET # BLD AUTO: 199 THOUSANDS/UL (ref 149–390)
PMV BLD AUTO: 10.9 FL (ref 8.9–12.7)
POTASSIUM SERPL-SCNC: 3.7 MMOL/L (ref 3.5–5.3)
RBC # BLD AUTO: 3.06 MILLION/UL (ref 3.88–5.62)
SODIUM SERPL-SCNC: 134 MMOL/L (ref 135–147)
WBC # BLD AUTO: 11.29 THOUSAND/UL (ref 4.31–10.16)

## 2024-06-29 PROCEDURE — 94760 N-INVAS EAR/PLS OXIMETRY 1: CPT

## 2024-06-29 PROCEDURE — 80048 BASIC METABOLIC PNL TOTAL CA: CPT | Performed by: PHYSICIAN ASSISTANT

## 2024-06-29 PROCEDURE — 84100 ASSAY OF PHOSPHORUS: CPT | Performed by: PHYSICIAN ASSISTANT

## 2024-06-29 PROCEDURE — 82948 REAGENT STRIP/BLOOD GLUCOSE: CPT

## 2024-06-29 PROCEDURE — 83735 ASSAY OF MAGNESIUM: CPT | Performed by: PHYSICIAN ASSISTANT

## 2024-06-29 PROCEDURE — 99024 POSTOP FOLLOW-UP VISIT: CPT | Performed by: COLON & RECTAL SURGERY

## 2024-06-29 PROCEDURE — 85025 COMPLETE CBC W/AUTO DIFF WBC: CPT | Performed by: PHYSICIAN ASSISTANT

## 2024-06-29 RX ORDER — POTASSIUM CHLORIDE 14.9 MG/ML
20 INJECTION INTRAVENOUS ONCE
Status: COMPLETED | OUTPATIENT
Start: 2024-06-29 | End: 2024-06-29

## 2024-06-29 RX ORDER — PANTOPRAZOLE SODIUM 40 MG/10ML
40 INJECTION, POWDER, LYOPHILIZED, FOR SOLUTION INTRAVENOUS
Status: DISCONTINUED | OUTPATIENT
Start: 2024-06-30 | End: 2024-07-12 | Stop reason: HOSPADM

## 2024-06-29 RX ADMIN — INSULIN LISPRO 2 UNITS: 100 INJECTION, SOLUTION INTRAVENOUS; SUBCUTANEOUS at 05:09

## 2024-06-29 RX ADMIN — DEXTROSE, SODIUM CHLORIDE, AND POTASSIUM CHLORIDE 125 ML/HR: 5; .45; .15 INJECTION INTRAVENOUS at 21:38

## 2024-06-29 RX ADMIN — POTASSIUM PHOSPHATE, MONOBASIC POTASSIUM PHOSPHATE, DIBASIC 21 MMOL: 224; 236 INJECTION, SOLUTION, CONCENTRATE INTRAVENOUS at 11:07

## 2024-06-29 RX ADMIN — ACETAMINOPHEN 1000 MG: 10 INJECTION INTRAVENOUS at 16:52

## 2024-06-29 RX ADMIN — Medication: at 08:26

## 2024-06-29 RX ADMIN — METHOCARBAMOL 500 MG: 100 INJECTION INTRAMUSCULAR; INTRAVENOUS at 05:04

## 2024-06-29 RX ADMIN — DEXTROSE, SODIUM CHLORIDE, AND POTASSIUM CHLORIDE 125 ML/HR: 5; .45; .15 INJECTION INTRAVENOUS at 05:13

## 2024-06-29 RX ADMIN — FLUTICASONE FUROATE AND VILANTEROL TRIFENATATE 1 PUFF: 200; 25 POWDER RESPIRATORY (INHALATION) at 08:19

## 2024-06-29 RX ADMIN — ENOXAPARIN SODIUM 40 MG: 40 INJECTION SUBCUTANEOUS at 13:03

## 2024-06-29 RX ADMIN — METHOCARBAMOL 500 MG: 100 INJECTION INTRAMUSCULAR; INTRAVENOUS at 22:41

## 2024-06-29 RX ADMIN — ACETAMINOPHEN 1000 MG: 10 INJECTION INTRAVENOUS at 05:04

## 2024-06-29 RX ADMIN — METHOCARBAMOL 500 MG: 100 INJECTION INTRAMUSCULAR; INTRAVENOUS at 13:03

## 2024-06-29 RX ADMIN — POTASSIUM CHLORIDE 20 MEQ: 14.9 INJECTION, SOLUTION INTRAVENOUS at 08:26

## 2024-06-29 RX ADMIN — DEXTROSE, SODIUM CHLORIDE, AND POTASSIUM CHLORIDE 125 ML/HR: 5; .45; .15 INJECTION INTRAVENOUS at 13:04

## 2024-06-29 RX ADMIN — INSULIN LISPRO 2 UNITS: 100 INJECTION, SOLUTION INTRAVENOUS; SUBCUTANEOUS at 16:54

## 2024-06-29 RX ADMIN — INSULIN LISPRO 8 UNITS: 100 INJECTION, SOLUTION INTRAVENOUS; SUBCUTANEOUS at 11:07

## 2024-06-29 NOTE — PLAN OF CARE
Problem: PAIN - ADULT  Goal: Verbalizes/displays adequate comfort level or baseline comfort level  Description: Interventions:  - Encourage patient to monitor pain and request assistance  - Assess pain using appropriate pain scale  - Administer analgesics based on type and severity of pain and evaluate response  - Implement non-pharmacological measures as appropriate and evaluate response  - Consider cultural and social influences on pain and pain management  - Notify physician/advanced practitioner if interventions unsuccessful or patient reports new pain  Outcome: Progressing     Problem: INFECTION - ADULT  Goal: Absence or prevention of progression during hospitalization  Description: INTERVENTIONS:  - Assess and monitor for signs and symptoms of infection  - Monitor lab/diagnostic results  - Monitor all insertion sites, i.e. indwelling lines, tubes, and drains  - Monitor endotracheal if appropriate and nasal secretions for changes in amount and color  - [place] appropriate cooling/warming therapies per order  - Administer medications as ordered  - Instruct and encourage patient and family to use good hand hygiene technique  - Identify and instruct in appropriate isolation precautions for identified infection/condition  Outcome: Progressing     Problem: SAFETY ADULT  Goal: Patient will remain free of falls  Description: INTERVENTIONS:  - Educate patient/family on patient safety including physical limitations  - Instruct patient to call for assistance with activity   - Consult OT/PT to assist with strengthening/mobility   - Keep Call bell within reach  - Keep bed low and locked with side rails adjusted as appropriate  - Keep care items and personal belongings within reach  - Initiate and maintain comfort rounds  - Make Fall Risk Sign visible to staff  - Offer Toileting every  Hours, in advance of need  - Initiate/Maintain alarm  - Obtain necessary fall risk management equipment:   - Apply yellow socks and  bracelet for high fall risk patients  - Consider moving patient to room near nurses station  Outcome: Progressing     Problem: DISCHARGE PLANNING  Goal: Discharge to home or other facility with appropriate resources  Description: INTERVENTIONS:  - Identify barriers to discharge w/patient and caregiver  - Arrange for needed discharge resources and transportation as appropriate  - Identify discharge learning needs (meds, wound care, etc.)  - Arrange for interpretive services to assist at discharge as needed  - Refer to Case Management Department for coordinating discharge planning if the patient needs post-hospital services based on physician/advanced practitioner order or complex needs related to functional status, cognitive ability, or social support system  Outcome: Progressing     Problem: Knowledge Deficit  Goal: Patient/family/caregiver demonstrates understanding of disease process, treatment plan, medications, and discharge instructions  Description: Complete learning assessment and assess knowledge base.  Interventions:  - Provide teaching at level of understanding  - Provide teaching via preferred learning methods  Outcome: Progressing     Problem: GASTROINTESTINAL - ADULT  Goal: Minimal or absence of nausea and/or vomiting  Description: INTERVENTIONS:  - Administer IV fluids if ordered to ensure adequate hydration  - Maintain NPO status until nausea and vomiting are resolved  - Nasogastric tube if ordered  - Administer ordered antiemetic medications as needed  - Provide nonpharmacologic comfort measures as appropriate  - Advance diet as tolerated, if ordered  - Consider nutrition services referral to assist patient with adequate nutrition and appropriate food choices  Outcome: Progressing     Problem: METABOLIC, FLUID AND ELECTROLYTES - ADULT  Goal: Electrolytes maintained within normal limits  Description: INTERVENTIONS:  - Monitor labs and assess patient for signs and symptoms of electrolyte imbalances  -  Administer electrolyte replacement as ordered  - Monitor response to electrolyte replacements, including repeat lab results as appropriate  - Instruct patient on fluid and nutrition as appropriate  Outcome: Progressing     Problem: METABOLIC, FLUID AND ELECTROLYTES - ADULT  Goal: Fluid balance maintained  Description: INTERVENTIONS:  - Monitor labs   - Monitor I/O and WT  - Instruct patient on fluid and nutrition as appropriate  - Assess for signs & symptoms of volume excess or deficit  Outcome: Progressing     Problem: SKIN/TISSUE INTEGRITY - ADULT  Goal: Incision(s), wounds(s) or drain site(s) healing without S/S of infection  Description: INTERVENTIONS  - Assess and document dressing, incision, wound bed, drain sites and surrounding tissue  - Provide patient and family education    Problem: HEMATOLOGIC - ADULT  Goal: Maintains hematologic stability  Description: INTERVENTIONS  - Assess for signs and symptoms of bleeding or hemorrhage  - Monitor labs  - Administer supportive blood products/factors as ordered and appropriate  Outcome: Progressing     Outcome: Progressing

## 2024-06-29 NOTE — PLAN OF CARE
Problem: PAIN - ADULT  Goal: Verbalizes/displays adequate comfort level or baseline comfort level  Description: Interventions:  - Encourage patient to monitor pain and request assistance  - Assess pain using appropriate pain scale  - Administer analgesics based on type and severity of pain and evaluate response  - Implement non-pharmacological measures as appropriate and evaluate response  - Consider cultural and social influences on pain and pain management  - Notify physician/advanced practitioner if interventions unsuccessful or patient reports new pain  Outcome: Progressing     Problem: INFECTION - ADULT  Goal: Absence or prevention of progression during hospitalization  Description: INTERVENTIONS:  - Assess and monitor for signs and symptoms of infection  - Monitor lab/diagnostic results  - Monitor all insertion sites, i.e. indwelling lines, tubes, and drains  - Monitor endotracheal if appropriate and nasal secretions for changes in amount and color  - [place] appropriate cooling/warming therapies per order  - Administer medications as ordered  - Instruct and encourage patient and family to use good hand hygiene technique  - Identify and instruct in appropriate isolation precautions for identified infection/condition  Outcome: Progressing  Goal: Absence of fever/infection during neutropenic period  Description: INTERVENTIONS:  - Monitor WBC    Outcome: Progressing     Problem: SAFETY ADULT  Goal: Patient will remain free of falls  Description: INTERVENTIONS:  - Educate patient/family on patient safety including physical limitations  - Instruct patient to call for assistance with activity   - Consult OT/PT to assist with strengthening/mobility   - Keep Call bell within reach  - Keep bed low and locked with side rails adjusted as appropriate  - Keep care items and personal belongings within reach  - Initiate and maintain comfort rounds  - Make Fall Risk Sign visible to staff  - Offer Toileting every  Hours,  in advance of need  - Initiate/Maintain alarm  - Obtain necessary fall risk management equipment:   - Apply yellow socks and bracelet for high fall risk patients  - Consider moving patient to room near nurses station  Outcome: Progressing  Goal: Maintain or return to baseline ADL function  Description: INTERVENTIONS:  -  Assess patient's ability to carry out ADLs; assess patient's baseline for ADL function and identify physical deficits which impact ability to perform ADLs (bathing, care of mouth/teeth, toileting, grooming, dressing, etc.)  - Assess/evaluate cause of self-care deficits   - Assess range of motion  - Assess patient's mobility; develop plan if impaired  - Assess patient's need for assistive devices and provide as appropriate  - Encourage maximum independence but intervene and supervise when necessary  - Involve family in performance of ADLs  - Assess for home care needs following discharge   - Consider OT consult to assist with ADL evaluation and planning for discharge  - Provide patient education as appropriate  Outcome: Progressing  Goal: Maintains/Returns to pre admission functional level  Description: INTERVENTIONS:  - Perform AM-PAC 6 Click Basic Mobility/ Daily Activity assessment daily.  - Set and communicate daily mobility goal to care team and patient/family/caregiver.   - Collaborate with rehabilitation services on mobility goals if consulted  - Perform Range of Motion  times a day.  - Reposition patient every  hours.  - Dangle patient  times a day  - Stand patient  times a day  - Ambulate patient  times a day  - Out of bed to chair  times a day   - Out of bed for meals  times a day  - Out of bed for toileting  - Record patient progress and toleration of activity level   Outcome: Progressing     Problem: DISCHARGE PLANNING  Goal: Discharge to home or other facility with appropriate resources  Description: INTERVENTIONS:  - Identify barriers to discharge w/patient and caregiver  - Arrange for  needed discharge resources and transportation as appropriate  - Identify discharge learning needs (meds, wound care, etc.)  - Arrange for interpretive services to assist at discharge as needed  - Refer to Case Management Department for coordinating discharge planning if the patient needs post-hospital services based on physician/advanced practitioner order or complex needs related to functional status, cognitive ability, or social support system  Outcome: Progressing     Problem: Knowledge Deficit  Goal: Patient/family/caregiver demonstrates understanding of disease process, treatment plan, medications, and discharge instructions  Description: Complete learning assessment and assess knowledge base.  Interventions:  - Provide teaching at level of understanding  - Provide teaching via preferred learning methods  Outcome: Progressing

## 2024-06-29 NOTE — PROGRESS NOTES
"Progress Note - Colorectal Surgery  Jr Mendoza 58 y.o. male MRN: [id number]  Unit/Bed#: [facility] 817-01 Encounter: [id number]    Assessment:  57 yo male now s/p 6/26 Pre-op stents - removal, laparoscopic hand assisted left colon resection now w/ postoperative ileus    AVSS on 2L NC    UOP 700cc  NGT 2L    WBC 11.2 from 16  Hgb 9.4 from 11  Creatinine 0.83 from 1.29  Plan:  NPO  Continue NGT  Consider replacing NGT losses 0.5:1   Transition to MIVF@125  Keep PCA  PRN pain meds  PRN anti nausea meds  DVT prophylaxis  Encourage IS  Encourage out of bed and ambulation  PT/OT    Subjective/Objective     Subjective:   Patient seen and examined.  NAEO.  Patient denies pain at this time.  He denies N/V.  States that his distention and nausea feels better today than yesterday.  Has ambulated without issue.  Denies any bowel function.    Objective:     Blood pressure 120/70, pulse 87, temperature 97.9 °F (36.6 °C), resp. rate 20, height 5' 10\" (1.778 m), weight 116 kg (256 lb), SpO2 91%.,Body mass index is 36.73 kg/m².      Intake/Output Summary (Last 24 hours) at 6/29/2024 0628  Last data filed at 6/29/2024 0514  Gross per 24 hour   Intake 3921.23 ml   Output 2750 ml   Net 1171.23 ml       Invasive Devices       Peripheral Intravenous Line  Duration             Peripheral IV 06/28/24 Right;Ventral (anterior) Forearm <1 day    Peripheral IV 06/29/24 Left;Ventral (anterior) Forearm <1 day              Drain  Duration             NG/OG/Enteral Tube Nasogastric 18 Fr Left nare <1 day                    General: NAD  HENT: NCAT MMM  Neck: supple, no JVD  CV: nl rate  Lungs: nl wob. No resp distress  ABD: Soft, appropriately tender, markedly distended and protuberant  Extrem: No CCE  Neuro: AAOx3       Scheduled Meds:  Current Facility-Administered Medications   Medication Dose Route Frequency Provider Last Rate    acetaminophen  1,000 mg Intravenous Q6H [facility] Smitha Saha PA-C 1,000 mg (06/29/24 0504)    albuterol  2 " puff Inhalation Q6H PRN Brandon Phan, DO      azelastine  1 spray Each Nare BID Brandon Phan, DO      calcium carbonate  1,000 mg Oral TID PRN Smitha Saha PA-C      dextrose 5 % and sodium chloride 0.45 % with KCl 20 mEq/L  125 mL/hr Intravenous Continuous Smitha Saha PA-C 125 mL/hr (06/29/24 0513)    enoxaparin  40 mg Subcutaneous Q24H [facility] Smitha Saha PA-C      fluticasone  1 spray Each Nare Daily Brandon Phan, DO      fluticasone-vilanterol  1 puff Inhalation Daily Brandon Phan, DO      HYDROmorphone   Intravenous Continuous Brandon P [place], DO      HYDROmorphone  0.5 mg Intravenous Q3H PRN Smitha Saha PA-C      insulin lispro  2-12 Units Subcutaneous Q6H Smitha Saha PA-C      labetalol  10 mg Intravenous Q4H PRN Brandon Phan, DO      methocarbamol  500 mg Intravenous Q8H [facility] Smitha Saha PA-C      naloxone  0.04 mg Intravenous Q1MIN PRN Brandon P Emilie, DO      ondansetron  4 mg Intravenous Q4H PRN Brandon P Emilie, DO      tamsulosin  0.4 mg Oral Daily With Dinner Brandon Phan, DO       Continuous Infusions:dextrose 5 % and sodium chloride 0.45 % with KCl 20 mEq/L, 125 mL/hr, Last Rate: 125 mL/hr (06/29/24 0513)  HYDROmorphone,       PRN Meds:.  albuterol    calcium carbonate    HYDROmorphone    labetalol    naloxone    ondansetron    Labs:  CBC - WBC/Hgb/Hct/Plts: 11.29*/9.4*/29.4*/199 (06/29 0315)  CHEM - BUN/Cr/glu/ALT/AST/amyl/lip:12/0.83/--/--/--/--/-- (06/29 0300)     LYTES - Na/K/Cl/CO2: 134*/3.7/98/30 (06/29 0300)    Lab, Imaging and other studies:I have personally reviewed pertinent lab results.    VTE Pharmacologic Prophylaxis: Heparin  VTE Mechanical Prophylaxis: sequential compression device      Jose Carlos Moreno DO  6/29/2024 6:28 AM

## 2024-06-30 ENCOUNTER — APPOINTMENT (INPATIENT)
Dept: RADIOLOGY | Facility: HOSPITAL | Age: 59
DRG: 330 | End: 2024-06-30
Payer: COMMERCIAL

## 2024-06-30 LAB
ANION GAP SERPL CALCULATED.3IONS-SCNC: 6 MMOL/L (ref 4–13)
BUN SERPL-MCNC: 8 MG/DL (ref 5–25)
CALCIUM SERPL-MCNC: 8.1 MG/DL (ref 8.4–10.2)
CHLORIDE SERPL-SCNC: 98 MMOL/L (ref 96–108)
CO2 SERPL-SCNC: 31 MMOL/L (ref 21–32)
CREAT SERPL-MCNC: 0.59 MG/DL (ref 0.6–1.3)
ERYTHROCYTE [DISTWIDTH] IN BLOOD BY AUTOMATED COUNT: 13.1 % (ref 11.6–15.1)
GFR SERPL CREATININE-BSD FRML MDRD: 110 ML/MIN/1.73SQ M
GLUCOSE SERPL-MCNC: 141 MG/DL (ref 65–140)
GLUCOSE SERPL-MCNC: 146 MG/DL (ref 65–140)
GLUCOSE SERPL-MCNC: 149 MG/DL (ref 65–140)
GLUCOSE SERPL-MCNC: 153 MG/DL (ref 65–140)
GLUCOSE SERPL-MCNC: 165 MG/DL (ref 65–140)
HCT VFR BLD AUTO: 29.5 % (ref 36.5–49.3)
HGB BLD-MCNC: 9.5 G/DL (ref 12–17)
MCH RBC QN AUTO: 31.1 PG (ref 26.8–34.3)
MCHC RBC AUTO-ENTMCNC: 32.2 G/DL (ref 31.4–37.4)
MCV RBC AUTO: 97 FL (ref 82–98)
PHOSPHATE SERPL-MCNC: 3.2 MG/DL (ref 2.3–4.1)
PLATELET # BLD AUTO: 240 THOUSANDS/UL (ref 149–390)
PMV BLD AUTO: 10.3 FL (ref 8.9–12.7)
POTASSIUM SERPL-SCNC: 3.7 MMOL/L (ref 3.5–5.3)
RBC # BLD AUTO: 3.05 MILLION/UL (ref 3.88–5.62)
SODIUM SERPL-SCNC: 135 MMOL/L (ref 135–147)
WBC # BLD AUTO: 9.99 THOUSAND/UL (ref 4.31–10.16)

## 2024-06-30 PROCEDURE — 85027 COMPLETE CBC AUTOMATED: CPT

## 2024-06-30 PROCEDURE — C9113 INJ PANTOPRAZOLE SODIUM, VIA: HCPCS

## 2024-06-30 PROCEDURE — 84100 ASSAY OF PHOSPHORUS: CPT

## 2024-06-30 PROCEDURE — 99024 POSTOP FOLLOW-UP VISIT: CPT | Performed by: COLON & RECTAL SURGERY

## 2024-06-30 PROCEDURE — 82948 REAGENT STRIP/BLOOD GLUCOSE: CPT

## 2024-06-30 PROCEDURE — 74018 RADEX ABDOMEN 1 VIEW: CPT

## 2024-06-30 PROCEDURE — 80048 BASIC METABOLIC PNL TOTAL CA: CPT

## 2024-06-30 RX ADMIN — INSULIN LISPRO 2 UNITS: 100 INJECTION, SOLUTION INTRAVENOUS; SUBCUTANEOUS at 00:03

## 2024-06-30 RX ADMIN — ENOXAPARIN SODIUM 40 MG: 40 INJECTION SUBCUTANEOUS at 13:30

## 2024-06-30 RX ADMIN — DEXTROSE, SODIUM CHLORIDE, AND POTASSIUM CHLORIDE 125 ML/HR: 5; .45; .15 INJECTION INTRAVENOUS at 13:33

## 2024-06-30 RX ADMIN — ACETAMINOPHEN 1000 MG: 10 INJECTION INTRAVENOUS at 11:14

## 2024-06-30 RX ADMIN — ACETAMINOPHEN 1000 MG: 10 INJECTION INTRAVENOUS at 17:10

## 2024-06-30 RX ADMIN — FLUTICASONE FUROATE AND VILANTEROL TRIFENATATE 1 PUFF: 200; 25 POWDER RESPIRATORY (INHALATION) at 08:06

## 2024-06-30 RX ADMIN — INSULIN LISPRO 2 UNITS: 100 INJECTION, SOLUTION INTRAVENOUS; SUBCUTANEOUS at 06:41

## 2024-06-30 RX ADMIN — ACETAMINOPHEN 1000 MG: 10 INJECTION INTRAVENOUS at 00:04

## 2024-06-30 RX ADMIN — DEXTROSE, SODIUM CHLORIDE, AND POTASSIUM CHLORIDE 125 ML/HR: 5; .45; .15 INJECTION INTRAVENOUS at 06:08

## 2024-06-30 RX ADMIN — DEXTROSE, SODIUM CHLORIDE, AND POTASSIUM CHLORIDE 125 ML/HR: 5; .45; .15 INJECTION INTRAVENOUS at 23:51

## 2024-06-30 RX ADMIN — METHOCARBAMOL 500 MG: 100 INJECTION INTRAMUSCULAR; INTRAVENOUS at 06:00

## 2024-06-30 RX ADMIN — METHOCARBAMOL 500 MG: 100 INJECTION INTRAMUSCULAR; INTRAVENOUS at 13:30

## 2024-06-30 RX ADMIN — METHOCARBAMOL 500 MG: 100 INJECTION INTRAMUSCULAR; INTRAVENOUS at 21:17

## 2024-06-30 RX ADMIN — ACETAMINOPHEN 1000 MG: 10 INJECTION INTRAVENOUS at 05:59

## 2024-06-30 RX ADMIN — PANTOPRAZOLE SODIUM 40 MG: 40 INJECTION, POWDER, FOR SOLUTION INTRAVENOUS at 00:03

## 2024-06-30 NOTE — RESPIRATORY THERAPY NOTE
06/29/24 3605   Respiratory Assessment   Resp Comments NGT still in place, patient prefers low-flow oxygen for h.s.   O2 Device NC   Oxygen Therapy/Pulse Ox   O2 Device Nasal cannula   O2 Therapy Oxygen humidified   Nasal Cannula O2 Flow Rate (L/min) 3 L/min   Calculated FIO2 (%) - Nasal Cannula 32   SpO2 Activity At Rest   $ Pulse Oximetry Spot Check Charge Completed

## 2024-06-30 NOTE — PLAN OF CARE
Problem: PAIN - ADULT  Goal: Verbalizes/displays adequate comfort level or baseline comfort level  Description: Interventions:  - Encourage patient to monitor pain and request assistance  - Assess pain using appropriate pain scale  - Administer analgesics based on type and severity of pain and evaluate response  - Implement non-pharmacological measures as appropriate and evaluate response  - Consider cultural and social influences on pain and pain management  - Notify physician/advanced practitioner if interventions unsuccessful or patient reports new pain  Outcome: Progressing     Problem: INFECTION - ADULT  Goal: Absence or prevention of progression during hospitalization  Description: INTERVENTIONS:  - Assess and monitor for signs and symptoms of infection  - Monitor lab/diagnostic results  - Monitor all insertion sites, i.e. indwelling lines, tubes, and drains  - Monitor endotracheal if appropriate and nasal secretions for changes in amount and color  - [place] appropriate cooling/warming therapies per order  - Administer medications as ordered  - Instruct and encourage patient and family to use good hand hygiene technique  - Identify and instruct in appropriate isolation precautions for identified infection/condition  Outcome: Progressing     Problem: SAFETY ADULT  Goal: Patient will remain free of falls  Description: INTERVENTIONS:  - Educate patient/family on patient safety including physical limitations  - Instruct patient to call for assistance with activity   - Consult OT/PT to assist with strengthening/mobility   - Keep Call bell within reach  - Keep bed low and locked with side rails adjusted as appropriate  - Keep care items and personal belongings within reach  - Initiate and maintain comfort rounds  - Make Fall Risk Sign visible to staff  - Offer Toileting every  Hours, in advance of need  - Initiate/Maintain alarm  - Obtain necessary fall risk management equipment:   - Apply yellow socks and  bracelet for high fall risk patients  - Consider moving patient to room near nurses station  Outcome: Progressing  Goal: Maintain or return to baseline ADL function  Description: INTERVENTIONS:  -  Assess patient's ability to carry out ADLs; assess patient's baseline for ADL function and identify physical deficits which impact ability to perform ADLs (bathing, care of mouth/teeth, toileting, grooming, dressing, etc.)  - Assess/evaluate cause of self-care deficits   - Assess range of motion  - Assess patient's mobility; develop plan if impaired  - Assess patient's need for assistive devices and provide as appropriate  - Encourage maximum independence but intervene and supervise when necessary  - Involve family in performance of ADLs  - Assess for home care needs following discharge   - Consider OT consult to assist with ADL evaluation and planning for discharge  - Provide patient education as appropriate  Outcome: Progressing  Goal: Maintains/Returns to pre admission functional level  Description: INTERVENTIONS:  - Perform AM-PAC 6 Click Basic Mobility/ Daily Activity assessment daily.  - Set and communicate daily mobility goal to care team and patient/family/caregiver.   - Collaborate with rehabilitation services on mobility goals if consulted  - Perform Range of Motion  times a day.  - Reposition patient every  hours.  - Dangle patient  times a day  - Stand patient  times a day  - Ambulate patient  times a day  - Out of bed to chair  times a day   - Out of bed for meals  times a day  - Out of bed for toileting  - Record patient progress and toleration of activity level   Outcome: Progressing

## 2024-06-30 NOTE — PLAN OF CARE
Problem: PAIN - ADULT  Goal: Verbalizes/displays adequate comfort level or baseline comfort level  Description: Interventions:  - Encourage patient to monitor pain and request assistance  - Assess pain using appropriate pain scale  - Administer analgesics based on type and severity of pain and evaluate response  - Implement non-pharmacological measures as appropriate and evaluate response  - Consider cultural and social influences on pain and pain management  - Notify physician/advanced practitioner if interventions unsuccessful or patient reports new pain  Outcome: Progressing     Problem: INFECTION - ADULT  Goal: Absence or prevention of progression during hospitalization  Description: INTERVENTIONS:  - Assess and monitor for signs and symptoms of infection  - Monitor lab/diagnostic results  - Monitor all insertion sites, i.e. indwelling lines, tubes, and drains  - Monitor endotracheal if appropriate and nasal secretions for changes in amount and color  - [place] appropriate cooling/warming therapies per order  - Administer medications as ordered  - Instruct and encourage patient and family to use good hand hygiene technique  - Identify and instruct in appropriate isolation precautions for identified infection/condition  Outcome: Progressing     Problem: SAFETY ADULT  Goal: Patient will remain free of falls  Description: INTERVENTIONS:  - Educate patient/family on patient safety including physical limitations  - Instruct patient to call for assistance with activity   - Consult OT/PT to assist with strengthening/mobility   - Keep Call bell within reach  - Keep bed low and locked with side rails adjusted as appropriate  - Keep care items and personal belongings within reach  - Initiate and maintain comfort rounds  - Make Fall Risk Sign visible to staff  - Offer Toileting every  Hours, in advance of need  - Initiate/Maintain alarm  - Obtain necessary fall risk management equipment:   - Apply yellow socks and  bracelet for high fall risk patients  - Consider moving patient to room near nurses station  Outcome: Progressing     Problem: DISCHARGE PLANNING  Goal: Discharge to home or other facility with appropriate resources  Description: INTERVENTIONS:  - Identify barriers to discharge w/patient and caregiver  - Arrange for needed discharge resources and transportation as appropriate  - Identify discharge learning needs (meds, wound care, etc.)  - Arrange for interpretive services to assist at discharge as needed  - Refer to Case Management Department for coordinating discharge planning if the patient needs post-hospital services based on physician/advanced practitioner order or complex needs related to functional status, cognitive ability, or social support system  Outcome: Progressing     Problem: Knowledge Deficit  Goal: Patient/family/caregiver demonstrates understanding of disease process, treatment plan, medications, and discharge instructions  Description: Complete learning assessment and assess knowledge base.  Interventions:  - Provide teaching at level of understanding  - Provide teaching via preferred learning methods  Outcome: Progressing     Problem: GASTROINTESTINAL - ADULT  Goal: Minimal or absence of nausea and/or vomiting  Description: INTERVENTIONS:  - Administer IV fluids if ordered to ensure adequate hydration  - Maintain NPO status until nausea and vomiting are resolved  - Nasogastric tube if ordered  - Administer ordered antiemetic medications as needed  - Provide nonpharmacologic comfort measures as appropriate  - Advance diet as tolerated, if ordered  - Consider nutrition services referral to assist patient with adequate nutrition and appropriate food choices  Outcome: Progressing     Problem: METABOLIC, FLUID AND ELECTROLYTES - ADULT  Goal: Electrolytes maintained within normal limits  Description: INTERVENTIONS:  - Monitor labs and assess patient for signs and symptoms of electrolyte imbalances  -  Administer electrolyte replacement as ordered  - Monitor response to electrolyte replacements, including repeat lab results as appropriate  - Instruct patient on fluid and nutrition as appropriate  Outcome: Progressing     Problem: METABOLIC, FLUID AND ELECTROLYTES - ADULT  Goal: Fluid balance maintained  Description: INTERVENTIONS:  - Monitor labs   - Monitor I/O and WT  - Instruct patient on fluid and nutrition as appropriate  - Assess for signs & symptoms of volume excess or deficit  Outcome: Progressing     Problem: SKIN/TISSUE INTEGRITY - ADULT  Goal: Incision(s), wounds(s) or drain site(s) healing without S/S of infection  Description: INTERVENTIONS  - Assess and document dressing, incision, wound bed, drain sites and surrounding tissue  - Provide patient and family education    Problem: HEMATOLOGIC - ADULT  Goal: Maintains hematologic stability  Description: INTERVENTIONS  - Assess for signs and symptoms of bleeding or hemorrhage  - Monitor labs  - Administer supportive blood products/factors as ordered and appropriate  Outcome: Progressing     Outcome: Progressing

## 2024-07-01 LAB
ANION GAP SERPL CALCULATED.3IONS-SCNC: 6 MMOL/L (ref 4–13)
BASOPHILS # BLD AUTO: 0.03 THOUSANDS/ÂΜL (ref 0–0.1)
BASOPHILS NFR BLD AUTO: 0 % (ref 0–1)
BUN SERPL-MCNC: 9 MG/DL (ref 5–25)
CALCIUM SERPL-MCNC: 8 MG/DL (ref 8.4–10.2)
CHLORIDE SERPL-SCNC: 98 MMOL/L (ref 96–108)
CO2 SERPL-SCNC: 31 MMOL/L (ref 21–32)
CREAT SERPL-MCNC: 0.62 MG/DL (ref 0.6–1.3)
EOSINOPHIL # BLD AUTO: 0.31 THOUSAND/ÂΜL (ref 0–0.61)
EOSINOPHIL NFR BLD AUTO: 3 % (ref 0–6)
ERYTHROCYTE [DISTWIDTH] IN BLOOD BY AUTOMATED COUNT: 13 % (ref 11.6–15.1)
GFR SERPL CREATININE-BSD FRML MDRD: 107 ML/MIN/1.73SQ M
GLUCOSE SERPL-MCNC: 131 MG/DL (ref 65–140)
GLUCOSE SERPL-MCNC: 141 MG/DL (ref 65–140)
GLUCOSE SERPL-MCNC: 149 MG/DL (ref 65–140)
GLUCOSE SERPL-MCNC: 149 MG/DL (ref 65–140)
GLUCOSE SERPL-MCNC: 168 MG/DL (ref 65–140)
HCT VFR BLD AUTO: 28.5 % (ref 36.5–49.3)
HGB BLD-MCNC: 9.1 G/DL (ref 12–17)
IMM GRANULOCYTES # BLD AUTO: 0.06 THOUSAND/UL (ref 0–0.2)
IMM GRANULOCYTES NFR BLD AUTO: 1 % (ref 0–2)
LYMPHOCYTES # BLD AUTO: 1.39 THOUSANDS/ÂΜL (ref 0.6–4.47)
LYMPHOCYTES NFR BLD AUTO: 15 % (ref 14–44)
MCH RBC QN AUTO: 30.5 PG (ref 26.8–34.3)
MCHC RBC AUTO-ENTMCNC: 31.9 G/DL (ref 31.4–37.4)
MCV RBC AUTO: 96 FL (ref 82–98)
MONOCYTES # BLD AUTO: 1.26 THOUSAND/ÂΜL (ref 0.17–1.22)
MONOCYTES NFR BLD AUTO: 14 % (ref 4–12)
NEUTROPHILS # BLD AUTO: 6.17 THOUSANDS/ÂΜL (ref 1.85–7.62)
NEUTS SEG NFR BLD AUTO: 67 % (ref 43–75)
NRBC BLD AUTO-RTO: 0 /100 WBCS
PLATELET # BLD AUTO: 255 THOUSANDS/UL (ref 149–390)
PMV BLD AUTO: 10.4 FL (ref 8.9–12.7)
POTASSIUM SERPL-SCNC: 3.4 MMOL/L (ref 3.5–5.3)
RBC # BLD AUTO: 2.98 MILLION/UL (ref 3.88–5.62)
SODIUM SERPL-SCNC: 135 MMOL/L (ref 135–147)
WBC # BLD AUTO: 9.22 THOUSAND/UL (ref 4.31–10.16)

## 2024-07-01 PROCEDURE — 99024 POSTOP FOLLOW-UP VISIT: CPT | Performed by: SURGERY

## 2024-07-01 PROCEDURE — 80048 BASIC METABOLIC PNL TOTAL CA: CPT | Performed by: SURGERY

## 2024-07-01 PROCEDURE — 97530 THERAPEUTIC ACTIVITIES: CPT

## 2024-07-01 PROCEDURE — 82948 REAGENT STRIP/BLOOD GLUCOSE: CPT

## 2024-07-01 PROCEDURE — 85025 COMPLETE CBC W/AUTO DIFF WBC: CPT | Performed by: SURGERY

## 2024-07-01 PROCEDURE — 97535 SELF CARE MNGMENT TRAINING: CPT

## 2024-07-01 PROCEDURE — C9113 INJ PANTOPRAZOLE SODIUM, VIA: HCPCS

## 2024-07-01 PROCEDURE — 97116 GAIT TRAINING THERAPY: CPT

## 2024-07-01 RX ORDER — METHOCARBAMOL 100 MG/ML
500 INJECTION, SOLUTION INTRAMUSCULAR; INTRAVENOUS EVERY 8 HOURS SCHEDULED
Status: COMPLETED | OUTPATIENT
Start: 2024-07-01 | End: 2024-07-03

## 2024-07-01 RX ORDER — POTASSIUM CHLORIDE 14.9 MG/ML
20 INJECTION INTRAVENOUS
Status: COMPLETED | OUTPATIENT
Start: 2024-07-01 | End: 2024-07-01

## 2024-07-01 RX ADMIN — FLUTICASONE PROPIONATE 1 SPRAY: 50 SPRAY, METERED NASAL at 09:15

## 2024-07-01 RX ADMIN — METHOCARBAMOL 500 MG: 100 INJECTION INTRAMUSCULAR; INTRAVENOUS at 09:30

## 2024-07-01 RX ADMIN — ACETAMINOPHEN 1000 MG: 10 INJECTION INTRAVENOUS at 06:03

## 2024-07-01 RX ADMIN — PANTOPRAZOLE SODIUM 40 MG: 40 INJECTION, POWDER, FOR SOLUTION INTRAVENOUS at 06:03

## 2024-07-01 RX ADMIN — AZELASTINE HYDROCHLORIDE 1 SPRAY: 137 SPRAY, METERED NASAL at 09:15

## 2024-07-01 RX ADMIN — METHOCARBAMOL 500 MG: 100 INJECTION INTRAMUSCULAR; INTRAVENOUS at 22:49

## 2024-07-01 RX ADMIN — INSULIN LISPRO 2 UNITS: 100 INJECTION, SOLUTION INTRAVENOUS; SUBCUTANEOUS at 11:37

## 2024-07-01 RX ADMIN — FLUTICASONE FUROATE AND VILANTEROL TRIFENATATE 1 PUFF: 200; 25 POWDER RESPIRATORY (INHALATION) at 09:15

## 2024-07-01 RX ADMIN — DEXTROSE, SODIUM CHLORIDE, AND POTASSIUM CHLORIDE 125 ML/HR: 5; .45; .15 INJECTION INTRAVENOUS at 19:58

## 2024-07-01 RX ADMIN — DEXTROSE, SODIUM CHLORIDE, AND POTASSIUM CHLORIDE 125 ML/HR: 5; .45; .15 INJECTION INTRAVENOUS at 09:06

## 2024-07-01 RX ADMIN — ENOXAPARIN SODIUM 40 MG: 40 INJECTION SUBCUTANEOUS at 13:05

## 2024-07-01 RX ADMIN — METHOCARBAMOL 500 MG: 100 INJECTION INTRAMUSCULAR; INTRAVENOUS at 13:46

## 2024-07-01 RX ADMIN — POTASSIUM CHLORIDE 20 MEQ: 14.9 INJECTION, SOLUTION INTRAVENOUS at 09:21

## 2024-07-01 RX ADMIN — ACETAMINOPHEN 1000 MG: 10 INJECTION INTRAVENOUS at 00:17

## 2024-07-01 RX ADMIN — POTASSIUM CHLORIDE 20 MEQ: 14.9 INJECTION, SOLUTION INTRAVENOUS at 12:10

## 2024-07-01 NOTE — PLAN OF CARE
Problem: PHYSICAL THERAPY ADULT  Goal: Performs mobility at highest level of function for planned discharge setting.  See evaluation for individualized goals.  Description: Treatment/Interventions: Functional transfer training, LE strengthening/ROM, Elevations, Therapeutic exercise, Endurance training, Patient/family training, Bed mobility, Gait training, Spoke to nursing, OT          See flowsheet documentation for full assessment, interventions and recommendations.  Outcome: Progressing  Note: Prognosis: Good  Problem List: Decreased endurance, Pain  Assessment: Pt seen for PT treatment session this date. Pt cooperative and pleasant . Pt demonstrates improvement in mobility level and able to complete multiple transfer with CS. Pt tolerates increased ambulation distance with RW , slow gait speed 2* pain and fatigue but completes with supervision . Pt making steady progress toward goals. Pt was left back in bed at the end of PT session with all needs in reach. Pt would benefit from continued PT services while in hospital to address remaining limitations. The patient's AM-PAC Basic Mobility Inpatient Short Form Raw Score is 18. A Raw score of greater than 16 suggests the patient may benefit from discharge to home. Please also refer to the recommendation of the Physical Therapist for safe discharge planning.Post dc recommendation is Level 3 at this time.        Rehab Resource Intensity Level, PT: III (Minimum Resource Intensity)    See flowsheet documentation for full assessment.

## 2024-07-01 NOTE — OCCUPATIONAL THERAPY NOTE
"  Occupational Therapy Progress Note     Patient Name: Jr Mendoza  Today's Date: 7/1/2024  Problem List  Active Problems:  There are no active Hospital Problems.              07/01/24 1100   OT Last Visit   OT Visit Date 07/01/24   Note Type   Note Type Treatment   Pain Assessment   Pain Assessment Tool 0-10   Pain Score 6   Pain Location/Orientation Location: Abdomen   Pain Onset/Description Onset: Ongoing;Frequency: Intermittent   Effect of Pain on Daily Activities increased time for functional activities   Patient's Stated Pain Goal No pain   Hospital Pain Intervention(s) Repositioned;Ambulation/increased activity;Emotional support   Restrictions/Precautions   Weight Bearing Precautions Per Order No   Other Precautions Chair Alarm;Bed Alarm;Multiple lines;Fall Risk;Pain  (NGT, IV)   Lifestyle   Autonomy IND PTA with all ADLs/IADLs. No DME used. +.   Reciprocal Relationships Supportive spouse (retired RN)   Service to Others Retired   Intrinsic Gratification Fishing   ADL   Where Assessed Chair   Grooming Assistance 5  Supervision/Setup   Grooming Deficit Wash/dry hands;Wash/dry face   UB Dressing Assistance 4  Minimal Assistance   UB Dressing Deficit Pull around back   LB Dressing Assistance 3  Moderate Assistance   LB Dressing Deficit Don/doff R sock;Don/doff L sock   Bed Mobility   Supine to Sit Unable to assess   Sit to Supine Unable to assess   Additional Comments Pt in chair upon arrival. Left in care of PT post session, seated on toilet.   Transfers   Sit to Stand 5  Supervision   Additional items Increased time required   Stand to Sit 5  Supervision   Additional items Increased time required   Toilet transfer 5  Supervision   Additional items Standard toilet  (gbs)   Additional Comments Close SUP for multiple txfs, RW in stance   Functional Mobility   Functional Mobility 5  Supervision   Additional Comments household distances. RW   Additional items Rolling walker   Subjective   Subjective \"It " "hits me at different times\"   Cognition   Overall Cognitive Status WFL   Arousal/Participation Alert;Cooperative   Attention Within functional limits   Orientation Level Oriented X4   Memory Within functional limits   Following Commands Follows all commands and directions without difficulty   Comments Pt very pleasant and cooperative, motivated to participate.   Activity Tolerance   Activity Tolerance Patient limited by fatigue;Patient limited by pain   Medical Staff Made Aware RN cleared for therapy   Assessment   Assessment Patient participated in Skilled OT session this date with interventions consisting of ADL re training with the use of correct body mechnaics, Energy Conservation techniques, safety awareness and fall prevention techniques,  therapeutic activities to: increase activity tolerance, and increase OOB/ sitting tolerance . Patient agreeable to OT treatment session, upon arrival patient was found seated OOB to Chair, alert, responsive , and in no apparent distress.  In comparison to previous session, patient with improvements in ADL completion, functional mobility and txfs. Patient requiring frequent rest periods and ocassional safety reminders. Pt completed functional STS txfs and mobility with SUP, RW in stance. Pt required Min A for UB dressing and gown management. Mod A for LB dressing. SUP for grooming. Patient continues to be functioning below baseline level, occupational performance remains limited secondary to factors listed above and increased risk for falls and injury. The patient's raw score on the AM-PAC Daily Activity Inpatient Short Form is 18. A raw score of less than 19 suggests the patient may benefit from discharge to post-acute rehabilitation services. Please refer to the recommendation of the Occupational Therapist for safe discharge planning. From OT standpoint, recommendation at time of d/c would be Level 3 resources. Pt reports spouse can assist PRN for ADLs. Patient to benefit " from continued Occupational Therapy treatment while in the hospital to address deficits as defined above and maximize level of functional independence with ADLs and functional mobility.   Plan   Treatment Interventions ADL retraining;Functional transfer training;Endurance training;Patient/family training;Compensatory technique education;Continued evaluation;Energy conservation;Activityengagement   Goal Expiration Date 07/11/24   OT Treatment Day 1   OT Frequency 2-3x/wk   Discharge Recommendation   Rehab Resource Intensity Level, OT III (Minimum Resource Intensity)  (Pt reports supportive wife can assist PRN)   AM-PAC Daily Activity Inpatient   Lower Body Dressing 3   Bathing 3   Toileting 3   Upper Body Dressing 3   Grooming 3   Eating 3   Daily Activity Raw Score 18   Daily Activity Standardized Score (Calc for Raw Score >=11) 38.66   AM-PAC Applied Cognition Inpatient   Following a Speech/Presentation 3   Understanding Ordinary Conversation 4   Taking Medications 4   Remembering Where Things Are Placed or Put Away 4   Remembering List of 4-5 Errands 4   Taking Care of Complicated Tasks 4   Applied Cognition Raw Score 23   Applied Cognition Standardized Score 53.08   End of Consult   Education Provided Yes   Patient Position at End of Consult Other (comment)  (Pt left in care of PT, seated on toilet.)   Nurse Communication Nurse aware of consult       Trevin Olmos MS, OTR/L     [facility] CERTIFIED RATER ID [id number]

## 2024-07-01 NOTE — PROGRESS NOTES
Progress Note - Colorectal Surgery  Jr Mendoza 58 y.o. male MRN: [id number]  Unit/Bed#: [facility] 817-01 Encounter: [id number]      Assessment:  58 y.o. male now s/p 6/26 laparoscopic hand assisted left colon resection c/b postoperative ileus. Afebrile, HDS on room air.    WBC 9 from 10  Hgb 9.1 from 9.5  Cr 0.62 from 0.59    UOP: 750cc  NGT: 1150cc  Stool:2x    Plan:  - NPO  - NGT to LCWS, continue for now, reassess later today for removal  - mIVF  - Encourage OOB, ambulation, IS  - Discontinue PCA, not being used, PRN IV analgesia  - DVT ppx with Lovenox  - Continue PPI  - PT/OT        Subjective: No acute events overnight. Afebrile, hemodynamically stable. No nausea, or vomiting, fevers or chills. Pain controlled. Had 2 bowel movements, no flatus. Says he feels slightly less bloated but remains distended and tympanitic.        Objective:   Temp:  [98.2 °F (36.8 °C)-100.1 °F (37.8 °C)] 98.2 °F (36.8 °C)  HR:  [79-94] 80  Resp:  [16-20] 18  BP: (126-137)/(74-78) 130/74    Physical Exam:  General: No acute distress, alert and oriented  CV: Well perfused, regular rate and rhythm  Lungs: Normal work of breathing, no increased respiratory effort  Abdomen: Soft, distended, tympanic, non-tender, no rebound or guarding  Extremities: No clubbing or cyanosis  Skin: Warm, dry      I/O         06/29 0701  06/30 0700 06/30 0701  07/01 0700    P.O. 0     I.V. (mL/kg) 3102.9 (26.7) 1408.7 (12.1)    NG/GT 40     IV Piggyback 450 100    Total Intake(mL/kg) 3592.9 (31) 1508.7 (13)    Urine (mL/kg/hr) 1150 (0.4) 750 (0.3)    Emesis/NG output 2100 1150    Stool  0    Total Output 3250 1900    Net +342.9 -391.3          Unmeasured Urine Occurrence  2 x    Unmeasured Stool Occurrence  2 x            Lab, Imaging and other studies: I have personally reviewed pertinent reports.  , CBC with diff:   Lab Results   Component Value Date    WBC 9.22 07/01/2024    HGB 9.1 (L) 07/01/2024    HCT 28.5 (L) 07/01/2024    MCV 96 07/01/2024    PLT  255 07/01/2024    RBC 2.98 (L) 07/01/2024    MCH 30.5 07/01/2024    MCHC 31.9 07/01/2024    RDW 13.0 07/01/2024    MPV 10.4 07/01/2024    NRBC 0 07/01/2024   , BMP/CMP:   Lab Results   Component Value Date    SODIUM 135 07/01/2024    K 3.4 (L) 07/01/2024    CL 98 07/01/2024    CO2 31 07/01/2024    BUN 9 07/01/2024    CREATININE 0.62 07/01/2024    CALCIUM 8.0 (L) 07/01/2024    EGFR 107 07/01/2024           Kevyn Hatfield MD  General Surgery   07/01/24

## 2024-07-01 NOTE — RESTORATIVE TECHNICIAN NOTE
Restorative Technician Note      Patient Name: Jr Mendoza     Restorative Tech Visit Date: 07/01/24  Note Type: Mobility  Patient Position Upon Consult: Bedside chair  Activity Performed: Ambulated  Assistive Device: Standard walker; Other (Comment) (chair follow)  Education Provided: Yes  Patient Position at End of Consult: All needs within reach; Other (comment) (pt in BR left in the care of nursing student to get washed up)    Jazz Kevin  DPT, Restorative Technician

## 2024-07-01 NOTE — PLAN OF CARE
Problem: PAIN - ADULT  Goal: Verbalizes/displays adequate comfort level or baseline comfort level  Description: Interventions:  - Encourage patient to monitor pain and request assistance  - Assess pain using appropriate pain scale  - Administer analgesics based on type and severity of pain and evaluate response  - Implement non-pharmacological measures as appropriate and evaluate response  - Consider cultural and social influences on pain and pain management  - Notify physician/advanced practitioner if interventions unsuccessful or patient reports new pain  Outcome: Progressing     Problem: INFECTION - ADULT  Goal: Absence or prevention of progression during hospitalization  Description: INTERVENTIONS:  - Assess and monitor for signs and symptoms of infection  - Monitor lab/diagnostic results  - Monitor all insertion sites, i.e. indwelling lines, tubes, and drains  - Monitor endotracheal if appropriate and nasal secretions for changes in amount and color  - [place] appropriate cooling/warming therapies per order  - Administer medications as ordered  - Instruct and encourage patient and family to use good hand hygiene technique  - Identify and instruct in appropriate isolation precautions for identified infection/condition  Outcome: Progressing

## 2024-07-01 NOTE — PLAN OF CARE
Problem: OCCUPATIONAL THERAPY ADULT  Goal: Performs self-care activities at highest level of function for planned discharge setting.  See evaluation for individualized goals.  Description: Treatment Interventions: ADL retraining, Functional transfer training, UE strengthening/ROM, Endurance training, Cognitive reorientation, Patient/family training, Equipment evaluation/education, Fine motor coordination activities, Compensatory technique education, Continued evaluation, Energy conservation, Activityengagement          See flowsheet documentation for full assessment, interventions and recommendations.   Note: Limitation: Decreased ADL status, Decreased endurance, Decreased self-care trans, Decreased high-level ADLs  Prognosis: Good  Assessment: Patient participated in Skilled OT session this date with interventions consisting of ADL re training with the use of correct body mechnaics, Energy Conservation techniques, safety awareness and fall prevention techniques,  therapeutic activities to: increase activity tolerance, and increase OOB/ sitting tolerance . Patient agreeable to OT treatment session, upon arrival patient was found seated OOB to Chair, alert, responsive , and in no apparent distress.  In comparison to previous session, patient with improvements in ADL completion, functional mobility and txfs. Patient requiring frequent rest periods and ocassional safety reminders. Pt completed functional STS txfs and mobility with SUP, RW in stance. Pt required Min A for UB dressing and gown management. Mod A for LB dressing. SUP for grooming. Patient continues to be functioning below baseline level, occupational performance remains limited secondary to factors listed above and increased risk for falls and injury. The patient's raw score on the -PAC Daily Activity Inpatient Short Form is 18. A raw score of less than 19 suggests the patient may benefit from discharge to post-acute rehabilitation services. Please  refer to the recommendation of the Occupational Therapist for safe discharge planning. From OT standpoint, recommendation at time of d/c would be Level 3 resources. Pt reports spouse can assist PRN for ADLs. Patient to benefit from continued Occupational Therapy treatment while in the hospital to address deficits as defined above and maximize level of functional independence with ADLs and functional mobility.     Rehab Resource Intensity Level, OT: III (Minimum Resource Intensity) (Pt reports supportive wife can assist PRN)

## 2024-07-01 NOTE — PHYSICAL THERAPY NOTE
PHYSICAL THERAPY NOTE          Patient Name: Jr Mendoza  Today's Date: 7/1/2024 07/01/24 1116   PT Last Visit   PT Visit Date 07/01/24   Note Type   Note Type Treatment   Education   Education Provided Yes   Pain Assessment   Pain Assessment Tool 0-10   Pain Score 6   Pain Location/Orientation Location: Abdomen  (L Hand)   Pain Onset/Description Frequency: Intermittent   Effect of Pain on Daily Activities Increased time for activity   Patient's Stated Pain Goal No pain   Hospital Pain Intervention(s) Ambulation/increased activity;Repositioned   Restrictions/Precautions   Weight Bearing Precautions Per Order No   Other Precautions Chair Alarm;Bed Alarm;Multiple lines;Fall Risk;Pain  (NGT, IV)   General   Chart Reviewed Yes   Family/Caregiver Present No   Cognition   Overall Cognitive Status WFL   Arousal/Participation Alert;Responsive   Attention Within functional limits   Orientation Level Oriented X4   Following Commands Follows one step commands without difficulty   Comments Pt cooperative and pleasant during session   Subjective   Subjective Agreeable to mobilize   Bed Mobility   Supine to Sit Unable to assess   Sit to Supine 4  Minimal assistance   Additional items Increased time required;Verbal cues;LE management   Additional Comments Pt in chair upon arrival, assisted back to bed post session   Transfers   Sit to Stand 5  Supervision   Additional items Increased time required   Stand to Sit 5  Supervision   Additional items Increased time required   Toilet transfer 5  Supervision   Additional items Standard toilet  (grab bars)   Additional Comments Pt completes multiple functional transfers with Close S , uses RW   Ambulation/Elevation   Gait pattern Wide ROSALIE;Short stride   Gait Assistance 5  Supervision   Additional items Verbal cues   Assistive Device Rolling walker   Distance 150ft   Ambulation/Elevation Additional  Comments Completes ambulation with RW, with multiple standing rest breaks. Limited 2* pain and fatigue   Balance   Static Sitting Fair +   Dynamic Sitting Fair   Static Standing Fair -   Dynamic Standing Fair -   Ambulatory Fair -   Endurance Deficit   Endurance Deficit Yes   Activity Tolerance   Activity Tolerance Patient limited by fatigue;Patient limited by pain   Medical Staff Made Aware OT Trevin present for part of session   Nurse Made Aware RN cleared pt   Assessment   Prognosis Good   Problem List Decreased endurance;Pain   Assessment Pt seen for PT treatment session this date. Pt cooperative and pleasant . Pt demonstrates improvement in mobility level and able to complete multiple transfer with CS. Pt tolerates increased ambulation distance with RW , slow gait speed 2* pain and fatigue but completes with supervision . Pt making steady progress toward goals. Pt was left back in bed at the end of PT session with all needs in reach. Pt would benefit from continued PT services while in hospital to address remaining limitations. The patient's AM-formerly [facility] Basic Mobility Inpatient Short Form Raw Score is 18. A Raw score of greater than 16 suggests the patient may benefit from discharge to home. Please also refer to the recommendation of the Physical Therapist for safe discharge planning.Post dc recommendation is Level 3 at this time.   Goals   Patient Goals to walk   STG Expiration Date 07/11/24   PT Treatment Day 1   Plan   Treatment/Interventions OT;Spoke to case management;Spoke to nursing;Gait training;Bed mobility;Elevations;Functional transfer training   Progress Progressing toward goals   PT Frequency 2-3x/wk   Discharge Recommendation   Rehab Resource Intensity Level, PT III (Minimum Resource Intensity)   Equipment Recommended Walker   AM-PAC Basic Mobility Inpatient   Turning in Flat Bed Without Bedrails 3   Lying on Back to Sitting on Edge of Flat Bed Without Bedrails 3   Moving Bed to Chair 3   Standing Up  From Chair Using Arms 3   Walk in Room 3   Climb 3-5 Stairs With Railing 3   Basic Mobility Inpatient Raw Score 18   Basic Mobility Standardized Score 41.05   [facility] Highest Level Of Mobility   -HLM Goal 6: Walk 10 steps or more   -HLM Achieved 7: Walk 25 feet or more   Education   Education Provided Mobility training   Patient Demonstrates verbal understanding   End of Consult   Patient Position at End of Consult All needs within reach;Supine;Bed/Chair alarm activated  (\)   Elisa Rivera PT DPT

## 2024-07-01 NOTE — PLAN OF CARE
Problem: PAIN - ADULT  Goal: Verbalizes/displays adequate comfort level or baseline comfort level  Description: Interventions:  - Encourage patient to monitor pain and request assistance  - Assess pain using appropriate pain scale  - Administer analgesics based on type and severity of pain and evaluate response  - Implement non-pharmacological measures as appropriate and evaluate response  - Consider cultural and social influences on pain and pain management  - Notify physician/advanced practitioner if interventions unsuccessful or patient reports new pain  Outcome: Progressing     Problem: INFECTION - ADULT  Goal: Absence or prevention of progression during hospitalization  Description: INTERVENTIONS:  - Assess and monitor for signs and symptoms of infection  - Monitor lab/diagnostic results  - Monitor all insertion sites, i.e. indwelling lines, tubes, and drains  - Monitor endotracheal if appropriate and nasal secretions for changes in amount and color  - [place] appropriate cooling/warming therapies per order  - Administer medications as ordered  - Instruct and encourage patient and family to use good hand hygiene technique  - Identify and instruct in appropriate isolation precautions for identified infection/condition  Outcome: Progressing     Problem: SAFETY ADULT  Goal: Patient will remain free of falls  Description: INTERVENTIONS:  - Educate patient/family on patient safety including physical limitations  - Instruct patient to call for assistance with activity   - Consult OT/PT to assist with strengthening/mobility   - Keep Call bell within reach  - Keep bed low and locked with side rails adjusted as appropriate  - Keep care items and personal belongings within reach  - Initiate and maintain comfort rounds  - Make Fall Risk Sign visible to staff  - Offer Toileting every  Hours, in advance of need  - Initiate/Maintain alarm  - Obtain necessary fall risk management equipment:   - Apply yellow socks and  bracelet for high fall risk patients  - Consider moving patient to room near nurses station  Outcome: Progressing  Goal: Maintain or return to baseline ADL function  Description: INTERVENTIONS:  -  Assess patient's ability to carry out ADLs; assess patient's baseline for ADL function and identify physical deficits which impact ability to perform ADLs (bathing, care of mouth/teeth, toileting, grooming, dressing, etc.)  - Assess/evaluate cause of self-care deficits   - Assess range of motion  - Assess patient's mobility; develop plan if impaired  - Assess patient's need for assistive devices and provide as appropriate  - Encourage maximum independence but intervene and supervise when necessary  - Involve family in performance of ADLs  - Assess for home care needs following discharge   - Consider OT consult to assist with ADL evaluation and planning for discharge  - Provide patient education as appropriate  Outcome: Progressing  Goal: Maintains/Returns to pre admission functional level  Description: INTERVENTIONS:  - Perform AM-PAC 6 Click Basic Mobility/ Daily Activity assessment daily.  - Set and communicate daily mobility goal to care team and patient/family/caregiver.   - Collaborate with rehabilitation services on mobility goals if consulted  - Perform Range of Motion  times a day.  - Reposition patient every  hours.  - Dangle patient  times a day  - Stand patient  times a day  - Ambulate patient  times a day  - Out of bed to chair  times a day   - Out of bed for meals  times a day  - Out of bed for toileting  - Record patient progress and toleration of activity level   Outcome: Progressing     Problem: DISCHARGE PLANNING  Goal: Discharge to home or other facility with appropriate resources  Description: INTERVENTIONS:  - Identify barriers to discharge w/patient and caregiver  - Arrange for needed discharge resources and transportation as appropriate  - Identify discharge learning needs (meds, wound care, etc.)  -  Arrange for interpretive services to assist at discharge as needed  - Refer to Case Management Department for coordinating discharge planning if the patient needs post-hospital services based on physician/advanced practitioner order or complex needs related to functional status, cognitive ability, or social support system  Outcome: Progressing

## 2024-07-02 LAB
ANION GAP SERPL CALCULATED.3IONS-SCNC: 6 MMOL/L (ref 4–13)
BUN SERPL-MCNC: 7 MG/DL (ref 5–25)
CALCIUM SERPL-MCNC: 7.7 MG/DL (ref 8.4–10.2)
CHLORIDE SERPL-SCNC: 101 MMOL/L (ref 96–108)
CO2 SERPL-SCNC: 26 MMOL/L (ref 21–32)
CREAT SERPL-MCNC: 0.52 MG/DL (ref 0.6–1.3)
GFR SERPL CREATININE-BSD FRML MDRD: 115 ML/MIN/1.73SQ M
GLUCOSE SERPL-MCNC: 103 MG/DL (ref 65–140)
GLUCOSE SERPL-MCNC: 140 MG/DL (ref 65–140)
GLUCOSE SERPL-MCNC: 149 MG/DL (ref 65–140)
GLUCOSE SERPL-MCNC: 150 MG/DL (ref 65–140)
GLUCOSE SERPL-MCNC: 154 MG/DL (ref 65–140)
POTASSIUM SERPL-SCNC: 3.4 MMOL/L (ref 3.5–5.3)
SODIUM SERPL-SCNC: 133 MMOL/L (ref 135–147)

## 2024-07-02 PROCEDURE — 94760 N-INVAS EAR/PLS OXIMETRY 1: CPT

## 2024-07-02 PROCEDURE — 80048 BASIC METABOLIC PNL TOTAL CA: CPT

## 2024-07-02 PROCEDURE — 99024 POSTOP FOLLOW-UP VISIT: CPT | Performed by: SURGERY

## 2024-07-02 PROCEDURE — 82948 REAGENT STRIP/BLOOD GLUCOSE: CPT

## 2024-07-02 RX ORDER — POTASSIUM CHLORIDE 20 MEQ/1
40 TABLET, EXTENDED RELEASE ORAL ONCE
Status: COMPLETED | OUTPATIENT
Start: 2024-07-02 | End: 2024-07-02

## 2024-07-02 RX ORDER — POTASSIUM CHLORIDE 14.9 MG/ML
20 INJECTION INTRAVENOUS 2 TIMES DAILY
Status: DISCONTINUED | OUTPATIENT
Start: 2024-07-02 | End: 2024-07-02

## 2024-07-02 RX ORDER — SODIUM CHLORIDE AND POTASSIUM CHLORIDE 150; 900 MG/100ML; MG/100ML
40 INJECTION, SOLUTION INTRAVENOUS CONTINUOUS
Status: DISCONTINUED | OUTPATIENT
Start: 2024-07-02 | End: 2024-07-07

## 2024-07-02 RX ADMIN — ACETAMINOPHEN 1000 MG: 10 INJECTION INTRAVENOUS at 17:17

## 2024-07-02 RX ADMIN — METHOCARBAMOL 500 MG: 100 INJECTION INTRAMUSCULAR; INTRAVENOUS at 05:31

## 2024-07-02 RX ADMIN — METHOCARBAMOL 500 MG: 100 INJECTION INTRAMUSCULAR; INTRAVENOUS at 13:57

## 2024-07-02 RX ADMIN — ACETAMINOPHEN 1000 MG: 10 INJECTION INTRAVENOUS at 00:47

## 2024-07-02 RX ADMIN — ENOXAPARIN SODIUM 40 MG: 40 INJECTION SUBCUTANEOUS at 13:57

## 2024-07-02 RX ADMIN — SODIUM CHLORIDE AND POTASSIUM CHLORIDE 100 ML/HR: .9; .15 SOLUTION INTRAVENOUS at 13:49

## 2024-07-02 RX ADMIN — METHOCARBAMOL 500 MG: 100 INJECTION INTRAMUSCULAR; INTRAVENOUS at 22:25

## 2024-07-02 RX ADMIN — POTASSIUM CHLORIDE 20 MEQ: 14.9 INJECTION, SOLUTION INTRAVENOUS at 11:31

## 2024-07-02 RX ADMIN — TAMSULOSIN HYDROCHLORIDE 0.4 MG: 0.4 CAPSULE ORAL at 17:17

## 2024-07-02 RX ADMIN — PANTOPRAZOLE SODIUM 40 MG: 40 INJECTION, POWDER, FOR SOLUTION INTRAVENOUS at 05:04

## 2024-07-02 RX ADMIN — ACETAMINOPHEN 1000 MG: 10 INJECTION INTRAVENOUS at 05:26

## 2024-07-02 RX ADMIN — INSULIN LISPRO 2 UNITS: 100 INJECTION, SOLUTION INTRAVENOUS; SUBCUTANEOUS at 11:31

## 2024-07-02 RX ADMIN — POTASSIUM CHLORIDE 40 MEQ: 1500 TABLET, EXTENDED RELEASE ORAL at 20:46

## 2024-07-02 RX ADMIN — FLUTICASONE FUROATE AND VILANTEROL TRIFENATATE 1 PUFF: 200; 25 POWDER RESPIRATORY (INHALATION) at 07:38

## 2024-07-02 RX ADMIN — HYDROMORPHONE HYDROCHLORIDE 0.5 MG: 1 INJECTION, SOLUTION INTRAMUSCULAR; INTRAVENOUS; SUBCUTANEOUS at 05:05

## 2024-07-02 RX ADMIN — ACETAMINOPHEN 1000 MG: 10 INJECTION INTRAVENOUS at 11:33

## 2024-07-02 RX ADMIN — INSULIN LISPRO 2 UNITS: 100 INJECTION, SOLUTION INTRAVENOUS; SUBCUTANEOUS at 05:34

## 2024-07-02 RX ADMIN — DEXTROSE, SODIUM CHLORIDE, AND POTASSIUM CHLORIDE 125 ML/HR: 5; .45; .15 INJECTION INTRAVENOUS at 05:08

## 2024-07-02 NOTE — PLAN OF CARE
Problem: PAIN - ADULT  Goal: Verbalizes/displays adequate comfort level or baseline comfort level  Description: Interventions:  - Encourage patient to monitor pain and request assistance  - Assess pain using appropriate pain scale  - Administer analgesics based on type and severity of pain and evaluate response  - Implement non-pharmacological measures as appropriate and evaluate response  - Consider cultural and social influences on pain and pain management  - Notify physician/advanced practitioner if interventions unsuccessful or patient reports new pain  Outcome: Progressing     Problem: INFECTION - ADULT  Goal: Absence or prevention of progression during hospitalization  Description: INTERVENTIONS:  - Assess and monitor for signs and symptoms of infection  - Monitor lab/diagnostic results  - Monitor all insertion sites, i.e. indwelling lines, tubes, and drains  - Monitor endotracheal if appropriate and nasal secretions for changes in amount and color  - [place] appropriate cooling/warming therapies per order  - Administer medications as ordered  - Instruct and encourage patient and family to use good hand hygiene technique  - Identify and instruct in appropriate isolation precautions for identified infection/condition  Outcome: Progressing     Problem: SAFETY ADULT  Goal: Patient will remain free of falls  Description: INTERVENTIONS:  - Educate patient/family on patient safety including physical limitations  - Instruct patient to call for assistance with activity   - Consult OT/PT to assist with strengthening/mobility   - Keep Call bell within reach  - Keep bed low and locked with side rails adjusted as appropriate  - Keep care items and personal belongings within reach  - Initiate and maintain comfort rounds  - Make Fall Risk Sign visible to staff  - Offer Toileting every  Hours, in advance of need  - Initiate/Maintain alarm  - Obtain necessary fall risk management equipment:   - Apply yellow socks and  bracelet for high fall risk patients  - Consider moving patient to room near nurses station  Outcome: Progressing     Problem: DISCHARGE PLANNING  Goal: Discharge to home or other facility with appropriate resources  Description: INTERVENTIONS:  - Identify barriers to discharge w/patient and caregiver  - Arrange for needed discharge resources and transportation as appropriate  - Identify discharge learning needs (meds, wound care, etc.)  - Arrange for interpretive services to assist at discharge as needed  - Refer to Case Management Department for coordinating discharge planning if the patient needs post-hospital services based on physician/advanced practitioner order or complex needs related to functional status, cognitive ability, or social support system  Outcome: Progressing     Problem: Knowledge Deficit  Goal: Patient/family/caregiver demonstrates understanding of disease process, treatment plan, medications, and discharge instructions  Description: Complete learning assessment and assess knowledge base.  Interventions:  - Provide teaching at level of understanding  - Provide teaching via preferred learning methods  Outcome: Progressing     Problem: GASTROINTESTINAL - ADULT  Goal: Minimal or absence of nausea and/or vomiting  Description: INTERVENTIONS:  - Administer IV fluids if ordered to ensure adequate hydration  - Maintain NPO status until nausea and vomiting are resolved  - Nasogastric tube if ordered  - Administer ordered antiemetic medications as needed  - Provide nonpharmacologic comfort measures as appropriate  - Advance diet as tolerated, if ordered  - Consider nutrition services referral to assist patient with adequate nutrition and appropriate food choices  Outcome: Progressing     Problem: METABOLIC, FLUID AND ELECTROLYTES - ADULT  Goal: Electrolytes maintained within normal limits  Description: INTERVENTIONS:  - Monitor labs and assess patient for signs and symptoms of electrolyte imbalances  -  Administer electrolyte replacement as ordered  - Monitor response to electrolyte replacements, including repeat lab results as appropriate  - Instruct patient on fluid and nutrition as appropriate  Outcome: Progressing     Problem: METABOLIC, FLUID AND ELECTROLYTES - ADULT  Goal: Fluid balance maintained  Description: INTERVENTIONS:  - Monitor labs   - Monitor I/O and WT  - Instruct patient on fluid and nutrition as appropriate  - Assess for signs & symptoms of volume excess or deficit  Outcome: Progressing     Problem: SKIN/TISSUE INTEGRITY - ADULT  Goal: Incision(s), wounds(s) or drain site(s) healing without S/S of infection  Description: INTERVENTIONS  - Assess and document dressing, incision, wound bed, drain sites and surrounding tissue  - Provide patient and family education    Problem: HEMATOLOGIC - ADULT  Goal: Maintains hematologic stability  Description: INTERVENTIONS  - Assess for signs and symptoms of bleeding or hemorrhage  - Monitor labs  - Administer supportive blood products/factors as ordered and appropriate  Outcome: Progressing     Outcome: Progressing

## 2024-07-02 NOTE — RESTORATIVE TECHNICIAN NOTE
Restorative Technician Note      Patient Name: Jr Mendoza     Restorative Tech Visit Date: 07/02/24  Note Type: Mobility  Patient Position Upon Consult: Bedside chair  Activity Performed: Ambulated  Assistive Device: Standard walker  Education Provided: Yes  Patient Position at End of Consult: Bedside chair; All needs within reach; Bed/Chair alarm activated    Jazz Kevin  DPT, Restorative Technician

## 2024-07-02 NOTE — PROGRESS NOTES
75 ml out from ngt from clamping trial; dr bliss aware; orders rec'd to dc ngt ; ngt dc'd without complication

## 2024-07-02 NOTE — PROGRESS NOTES
Progress Note - General Surgery   Jr Mendoza 58 y.o. male MRN: [id number]  Unit/Bed#: [facility] 817-01 Encounter: [id number]    Assessment:  58 y.o male now s/p laparoscopic hand assisted left colon resection on 6/26 with post-op ileus. Afebrile, HDS on room air.    WBC: from 9  Hgb: from 9.1  Cr: 0.62    UOP:125cc  NGT: 900cc green(400cc overnight)  Stool: 3x    Plan:  -NPO w/sips  -NGT clamp trial for potential removal   -decrease mIVF  -encourage OOB, ambulation, IS  -continue PRN IV analgesia  -DVT ppx: lovenox  -continue PPI  -PT/OT    Subjective/Objective     Subjective: No acute events overnight. Afebrile, HDS. No nausea or vomiting, fever, or chills. Reports pain is adequately controlled but required PRN dilaudid 2/2 9 out of 10 pain. Had 3 small bowel movements yesterday, but reports no flatus. Reports still feeling bloated    Objective:   Vitals:    07/02/24 0514   BP: 141/82   Pulse: 77   Resp: 20   Temp: 98 °F (36.7 °C)   SpO2: 93%        Intake/Output Summary (Last 24 hours) at 7/2/2024 0556  Last data filed at 7/2/2024 0504  Gross per 24 hour   Intake 3317.92 ml   Output 1025 ml   Net 2292.92 ml          Invasive Devices       Peripheral Intravenous Line  Duration             Peripheral IV 07/01/24 Dorsal (posterior);Left Hand <1 day              Drain  Duration             NG/OG/Enteral Tube Nasogastric 18 Fr Left nare 3 days                    Physical Exam:   General: No acute distress, alert and oriented  CV: Well perfused, regular rate and rhythm  Lungs: Normal work of breathing, no increased respiratory effort  Abdomen: Soft, moderately distended, tympanic, non-tender, no rebound or guarding. NGT OP with green fluid  Extremities: No clubbing or cyanosis  Skin: Warm, dry    Lab, Imaging and other studies:I have personally reviewed pertinent lab results.    VTE Pharmacologic Prophylaxis: Enoxaparin (Lovenox)  VTE Mechanical Prophylaxis: sequential compression device

## 2024-07-03 LAB
ANION GAP SERPL CALCULATED.3IONS-SCNC: 9 MMOL/L (ref 4–13)
BASOPHILS # BLD AUTO: 0.03 THOUSANDS/ÂΜL (ref 0–0.1)
BASOPHILS NFR BLD AUTO: 0 % (ref 0–1)
BUN SERPL-MCNC: 8 MG/DL (ref 5–25)
CALCIUM SERPL-MCNC: 8.2 MG/DL (ref 8.4–10.2)
CHLORIDE SERPL-SCNC: 101 MMOL/L (ref 96–108)
CO2 SERPL-SCNC: 22 MMOL/L (ref 21–32)
CREAT SERPL-MCNC: 0.54 MG/DL (ref 0.6–1.3)
EOSINOPHIL # BLD AUTO: 0.11 THOUSAND/ÂΜL (ref 0–0.61)
EOSINOPHIL NFR BLD AUTO: 1 % (ref 0–6)
ERYTHROCYTE [DISTWIDTH] IN BLOOD BY AUTOMATED COUNT: 13.4 % (ref 11.6–15.1)
GFR SERPL CREATININE-BSD FRML MDRD: 114 ML/MIN/1.73SQ M
GLUCOSE SERPL-MCNC: 105 MG/DL (ref 65–140)
GLUCOSE SERPL-MCNC: 117 MG/DL (ref 65–140)
GLUCOSE SERPL-MCNC: 117 MG/DL (ref 65–140)
GLUCOSE SERPL-MCNC: 122 MG/DL (ref 65–140)
GLUCOSE SERPL-MCNC: 134 MG/DL (ref 65–140)
GLUCOSE SERPL-MCNC: 98 MG/DL (ref 65–140)
HCT VFR BLD AUTO: 31.7 % (ref 36.5–49.3)
HGB BLD-MCNC: 10.3 G/DL (ref 12–17)
IMM GRANULOCYTES # BLD AUTO: 0.11 THOUSAND/UL (ref 0–0.2)
IMM GRANULOCYTES NFR BLD AUTO: 1 % (ref 0–2)
LYMPHOCYTES # BLD AUTO: 1.22 THOUSANDS/ÂΜL (ref 0.6–4.47)
LYMPHOCYTES NFR BLD AUTO: 10 % (ref 14–44)
MCH RBC QN AUTO: 31 PG (ref 26.8–34.3)
MCHC RBC AUTO-ENTMCNC: 32.5 G/DL (ref 31.4–37.4)
MCV RBC AUTO: 96 FL (ref 82–98)
MONOCYTES # BLD AUTO: 1.43 THOUSAND/ÂΜL (ref 0.17–1.22)
MONOCYTES NFR BLD AUTO: 12 % (ref 4–12)
NEUTROPHILS # BLD AUTO: 9.07 THOUSANDS/ÂΜL (ref 1.85–7.62)
NEUTS SEG NFR BLD AUTO: 76 % (ref 43–75)
NRBC BLD AUTO-RTO: 0 /100 WBCS
PLATELET # BLD AUTO: 310 THOUSANDS/UL (ref 149–390)
PMV BLD AUTO: 10 FL (ref 8.9–12.7)
POTASSIUM SERPL-SCNC: 3.9 MMOL/L (ref 3.5–5.3)
RBC # BLD AUTO: 3.32 MILLION/UL (ref 3.88–5.62)
SODIUM SERPL-SCNC: 132 MMOL/L (ref 135–147)
WBC # BLD AUTO: 11.97 THOUSAND/UL (ref 4.31–10.16)

## 2024-07-03 PROCEDURE — 88309 TISSUE EXAM BY PATHOLOGIST: CPT | Performed by: PATHOLOGY

## 2024-07-03 PROCEDURE — 97530 THERAPEUTIC ACTIVITIES: CPT

## 2024-07-03 PROCEDURE — 88342 IMHCHEM/IMCYTCHM 1ST ANTB: CPT | Performed by: PATHOLOGY

## 2024-07-03 PROCEDURE — 88304 TISSUE EXAM BY PATHOLOGIST: CPT | Performed by: PATHOLOGY

## 2024-07-03 PROCEDURE — 82948 REAGENT STRIP/BLOOD GLUCOSE: CPT

## 2024-07-03 PROCEDURE — 88341 IMHCHEM/IMCYTCHM EA ADD ANTB: CPT | Performed by: PATHOLOGY

## 2024-07-03 PROCEDURE — 80048 BASIC METABOLIC PNL TOTAL CA: CPT

## 2024-07-03 PROCEDURE — 97116 GAIT TRAINING THERAPY: CPT

## 2024-07-03 PROCEDURE — 88344 IMHCHEM/IMCYTCHM EA MLT ANTB: CPT | Performed by: PATHOLOGY

## 2024-07-03 PROCEDURE — 99024 POSTOP FOLLOW-UP VISIT: CPT | Performed by: SURGERY

## 2024-07-03 PROCEDURE — 85025 COMPLETE CBC W/AUTO DIFF WBC: CPT

## 2024-07-03 PROCEDURE — 94660 CPAP INITIATION&MGMT: CPT

## 2024-07-03 RX ORDER — METOCLOPRAMIDE HYDROCHLORIDE 5 MG/ML
10 INJECTION INTRAMUSCULAR; INTRAVENOUS EVERY 6 HOURS SCHEDULED
Status: DISCONTINUED | OUTPATIENT
Start: 2024-07-03 | End: 2024-07-12 | Stop reason: HOSPADM

## 2024-07-03 RX ADMIN — AZELASTINE HYDROCHLORIDE 1 SPRAY: 137 SPRAY, METERED NASAL at 16:36

## 2024-07-03 RX ADMIN — METOCLOPRAMIDE HYDROCHLORIDE 10 MG: 5 INJECTION INTRAMUSCULAR; INTRAVENOUS at 17:12

## 2024-07-03 RX ADMIN — PANTOPRAZOLE SODIUM 40 MG: 40 INJECTION, POWDER, FOR SOLUTION INTRAVENOUS at 06:07

## 2024-07-03 RX ADMIN — FLUTICASONE FUROATE AND VILANTEROL TRIFENATATE 1 PUFF: 200; 25 POWDER RESPIRATORY (INHALATION) at 09:38

## 2024-07-03 RX ADMIN — SODIUM CHLORIDE AND POTASSIUM CHLORIDE 100 ML/HR: .9; .15 SOLUTION INTRAVENOUS at 21:36

## 2024-07-03 RX ADMIN — SODIUM CHLORIDE AND POTASSIUM CHLORIDE 100 ML/HR: .9; .15 SOLUTION INTRAVENOUS at 10:13

## 2024-07-03 RX ADMIN — FLUTICASONE PROPIONATE 1 SPRAY: 50 SPRAY, METERED NASAL at 09:38

## 2024-07-03 RX ADMIN — SODIUM CHLORIDE AND POTASSIUM CHLORIDE 100 ML/HR: .9; .15 SOLUTION INTRAVENOUS at 00:05

## 2024-07-03 RX ADMIN — AZELASTINE HYDROCHLORIDE 1 SPRAY: 137 SPRAY, METERED NASAL at 09:38

## 2024-07-03 RX ADMIN — ENOXAPARIN SODIUM 40 MG: 40 INJECTION SUBCUTANEOUS at 14:01

## 2024-07-03 RX ADMIN — ONDANSETRON 4 MG: 2 INJECTION INTRAMUSCULAR; INTRAVENOUS at 16:35

## 2024-07-03 RX ADMIN — ACETAMINOPHEN 1000 MG: 10 INJECTION INTRAVENOUS at 23:47

## 2024-07-03 RX ADMIN — ONDANSETRON 4 MG: 2 INJECTION INTRAMUSCULAR; INTRAVENOUS at 01:31

## 2024-07-03 RX ADMIN — METHOCARBAMOL 500 MG: 100 INJECTION INTRAMUSCULAR; INTRAVENOUS at 14:03

## 2024-07-03 RX ADMIN — ONDANSETRON 4 MG: 2 INJECTION INTRAMUSCULAR; INTRAVENOUS at 20:30

## 2024-07-03 RX ADMIN — METHOCARBAMOL 500 MG: 100 INJECTION INTRAMUSCULAR; INTRAVENOUS at 06:08

## 2024-07-03 RX ADMIN — METOCLOPRAMIDE HYDROCHLORIDE 10 MG: 5 INJECTION INTRAMUSCULAR; INTRAVENOUS at 11:28

## 2024-07-03 RX ADMIN — ACETAMINOPHEN 1000 MG: 10 INJECTION INTRAVENOUS at 00:06

## 2024-07-03 RX ADMIN — METOCLOPRAMIDE HYDROCHLORIDE 10 MG: 5 INJECTION INTRAMUSCULAR; INTRAVENOUS at 23:47

## 2024-07-03 RX ADMIN — METOCLOPRAMIDE HYDROCHLORIDE 10 MG: 5 INJECTION INTRAMUSCULAR; INTRAVENOUS at 07:14

## 2024-07-03 RX ADMIN — METHOCARBAMOL 500 MG: 100 INJECTION INTRAMUSCULAR; INTRAVENOUS at 21:38

## 2024-07-03 NOTE — CASE MANAGEMENT
Case Management Progress Note    Patient name Jr Mendoza  Location [facility] 817/[facility] 817-01 MRN [id number]  : 1965 Date 7/3/2024       LOS (days): 7  Geometric Mean LOS (GMLOS) (days): 5.1  Days to GMLOS:-1.9        OBJECTIVE:        Current admission status: Inpatient  Preferred Pharmacy:   [facility]/pharmacy #[id number] - [place] - [address]  [address]  [place] [address]  Phone: [phone] Fax: [phone]    [facility] - [place], [place] - [address]  [address]  [place] [address]  Phone: [phone] Fax: [phone]    Primary Care Provider: CELI Bowling    Primary Insurance: BLUE CROSS  Secondary Insurance:     PROGRESS NOTE:    Pt is still not medically cleared for DC at this time.   CM team to continue following for DC needs.

## 2024-07-03 NOTE — PLAN OF CARE
Problem: PAIN - ADULT  Goal: Verbalizes/displays adequate comfort level or baseline comfort level  Description: Interventions:  - Encourage patient to monitor pain and request assistance  - Assess pain using appropriate pain scale  - Administer analgesics based on type and severity of pain and evaluate response  - Implement non-pharmacological measures as appropriate and evaluate response  - Consider cultural and social influences on pain and pain management  - Notify physician/advanced practitioner if interventions unsuccessful or patient reports new pain  Outcome: Progressing     Problem: INFECTION - ADULT  Goal: Absence or prevention of progression during hospitalization  Description: INTERVENTIONS:  - Assess and monitor for signs and symptoms of infection  - Monitor lab/diagnostic results  - Monitor all insertion sites, i.e. indwelling lines, tubes, and drains  - Monitor endotracheal if appropriate and nasal secretions for changes in amount and color  - [place] appropriate cooling/warming therapies per order  - Administer medications as ordered  - Instruct and encourage patient and family to use good hand hygiene technique  - Identify and instruct in appropriate isolation precautions for identified infection/condition  Outcome: Progressing

## 2024-07-03 NOTE — PLAN OF CARE
Problem: PAIN - ADULT  Goal: Verbalizes/displays adequate comfort level or baseline comfort level  Description: Interventions:  - Encourage patient to monitor pain and request assistance  - Assess pain using appropriate pain scale  - Administer analgesics based on type and severity of pain and evaluate response  - Implement non-pharmacological measures as appropriate and evaluate response  - Consider cultural and social influences on pain and pain management  - Notify physician/advanced practitioner if interventions unsuccessful or patient reports new pain  Outcome: Progressing     Problem: INFECTION - ADULT  Goal: Absence or prevention of progression during hospitalization  Description: INTERVENTIONS:  - Assess and monitor for signs and symptoms of infection  - Monitor lab/diagnostic results  - Monitor all insertion sites, i.e. indwelling lines, tubes, and drains  - Monitor endotracheal if appropriate and nasal secretions for changes in amount and color  - [place] appropriate cooling/warming therapies per order  - Administer medications as ordered  - Instruct and encourage patient and family to use good hand hygiene technique  - Identify and instruct in appropriate isolation precautions for identified infection/condition  Outcome: Progressing     Problem: DISCHARGE PLANNING  Goal: Discharge to home or other facility with appropriate resources  Description: INTERVENTIONS:  - Identify barriers to discharge w/patient and caregiver  - Arrange for needed discharge resources and transportation as appropriate  - Identify discharge learning needs (meds, wound care, etc.)  - Arrange for interpretive services to assist at discharge as needed  - Refer to Case Management Department for coordinating discharge planning if the patient needs post-hospital services based on physician/advanced practitioner order or complex needs related to functional status, cognitive ability, or social support system  Outcome: Progressing      Problem: Knowledge Deficit  Goal: Patient/family/caregiver demonstrates understanding of disease process, treatment plan, medications, and discharge instructions  Description: Complete learning assessment and assess knowledge base.  Interventions:  - Provide teaching at level of understanding  - Provide teaching via preferred learning methods  Outcome: Progressing

## 2024-07-03 NOTE — PHYSICAL THERAPY NOTE
PHYSICAL THERAPY NOTE          Patient Name: Jr Mendoza  Today's Date: 7/3/2024     07/03/24 1347   PT Last Visit   PT Visit Date 07/03/24   Note Type   Note Type Treatment   Education   Education Provided Yes   Pain Assessment   Pain Assessment Tool 0-10   Pain Score 3   Pain Location/Orientation Location: Abdomen   Pain Onset/Description Frequency: Intermittent   Effect of Pain on Daily Activities Increased time   Patient's Stated Pain Goal No pain   Hospital Pain Intervention(s) Repositioned;Ambulation/increased activity   Restrictions/Precautions   Weight Bearing Precautions Per Order No   Other Precautions Multiple lines;Fall Risk;Pain   General   Chart Reviewed Yes   Family/Caregiver Present No   Cognition   Overall Cognitive Status WFL   Arousal/Participation Alert   Attention Within functional limits   Orientation Level Oriented X4   Memory Within functional limits   Following Commands Follows all commands and directions without difficulty   Comments Pt cooperative during session   Subjective   Subjective Agreeable to mobilize   Bed Mobility   Supine to Sit Unable to assess   Sit to Supine Unable to assess   Additional Comments Pt OOB in chair upon arrival.   Transfers   Sit to Stand 5  Supervision   Stand to Sit 5  Supervision   Additional Comments w/RW   Ambulation/Elevation   Gait pattern Wide ROSALIE   Gait Assistance 5  Supervision   Assistive Device Rolling walker   Distance 250 ft   Ambulation/Elevation Additional Comments Pt ambulates with RW , completes with supervision . Limited 2* drainage from incision site, RN aware.   Balance   Static Sitting Good   Dynamic Sitting Good   Static Standing Fair +   Dynamic Standing Fair +   Ambulatory Fair +   Activity Tolerance   Activity Tolerance Patient limited by pain   Nurse Made Aware RN cleared pt for therapy   Assessment   Prognosis Good   Problem List Pain;Decreased  endurance   Assessment Pt seen for PT treatment session this date. Pt cooperative and motivated to get better. Pt completes transfers and ambulation with RW as detailed in above flowsheet. Pt demonstrates significant improvement with mobility level ,no LOB noted , steady gait . Pt states lives in [facility] , [facility], owns RW . Pt was left in chair at the end of PT session with all needs in reach. The patient's AM-PAC Basic Mobility Inpatient Short Form Raw Score is 18. A Raw score of greater than 16 suggests the patient may benefit from discharge to home. Please also refer to the recommendation of the Physical Therapist for safe discharge planning. Post dc recommendation is Level 3 at this time. Encourage continued mobility with staff and restorative team while in hospital, no further skilled PT services indicated.   Goals   Patient Goals to get better   PT Treatment Day 2   Plan   Treatment/Interventions Spoke to nursing;Spoke to case management   Progress Discontinue PT   Discharge Recommendation   Rehab Resource Intensity Level, PT III (Minimum Resource Intensity)   Equipment Recommended Walker  (Pt owns)   AM-PAC Basic Mobility Inpatient   Turning in Flat Bed Without Bedrails 4   Lying on Back to Sitting on Edge of Flat Bed Without Bedrails 3   Moving Bed to Chair 3   Standing Up From Chair Using Arms 3   Walk in Room 3   Climb 3-5 Stairs With Railing 2   Basic Mobility Inpatient Raw Score 18   Basic Mobility Standardized Score 41.05   [facility] Highest Level Of Mobility   -HLM Goal 6: Walk 10 steps or more   -HLM Achieved 8: Walk 250 feet ot more   Education   Education Provided Mobility training;Assistive device   Patient Demonstrates verbal understanding   End of Consult   Patient Position at End of Consult All needs within reach;Bedside chair   Elisa Rivera PT DPT

## 2024-07-03 NOTE — PROGRESS NOTES
"Progress Note - General Surgery   Jr Mendoza 58 y.o. male MRN: [id number]  Unit/Bed#: [facility] 817-01 Encounter: [id number]    Assessment:  57yo M s/p laparoscopic hand assisted left colon resection on 6/26, complicated by post op ileus.    AVSS on RA  Passed NGT clamp trial yesterday.  2 episodes of bilious emesis overnight (100cc+ 200cc.  UOP: 1.2L (875cc overnight)  +BM   Pain well controlled on tylenol  PND AM labs    Plan:  - Keep NPO   -low threshold for NGT  -mIVF  - encourage OOB, ambulation, IS  -DVT ppx: lovenox  -cont. PPI  -PT/OT    Subjective/Objective     Subjective: 2 episodes of bilious emesis overnight. Reports feeling better after vomiting. Afeb, HDS. Denies fever, chills, SOB. Pain controlled with tylenol. Stooling and voiding spontaneously. Reports feeling less bloated.    Objective:     Blood pressure 146/74, pulse 97, temperature 98.1 °F (36.7 °C), resp. rate 16, height 5' 10\" (1.778 m), weight 120 kg (264 lb 9.6 oz), SpO2 95%.,Body mass index is 37.97 kg/m².      Intake/Output Summary (Last 24 hours) at 7/3/2024 0623  Last data filed at 7/3/2024 0419  Gross per 24 hour   Intake 2312.09 ml   Output 1550 ml   Net 762.09 ml       Invasive Devices       Peripheral Intravenous Line  Duration             Peripheral IV 07/01/24 Dorsal (posterior);Left Hand 1 day                    Physical Exam: /74   Pulse 97   Temp 98.1 °F (36.7 °C)   Resp 16   Ht 5' 10\" (1.778 m)   Wt 120 kg (264 lb 9.6 oz)   SpO2 95%   BMI 37.97 kg/m²     General Appearance:    Alert, cooperative, no distress, appears stated age   Head:    Normocephalic, without obvious abnormality, atraumatic   Eyes:    PERRL, conjunctiva/corneas clear, EOM's intact, fundi     benign, both eyes        Ears:    Normal TM's and external ear canals, both ears   Nose:   Nares normal, septum midline, mucosa normal, no drainage    or sinus tenderness       Neck:   Supple, symmetrical, trachea midline, no adenopathy;        thyroid:  No " enlargement/tenderness/nodules; no carotid    bruit or JVD   Back:     Symmetric, no curvature, ROM normal, no CVA tenderness   Lungs:     Clear to auscultation bilaterally, respirations unlabored   Chest wall:    No tenderness or deformity   Heart:    Regular rate and rhythm, S1 and S2 normal, no murmur, rub   or gallop   Abdomen:     Soft, distended, non-tender, bowel sounds active all four quadrants,     no masses, no organomegaly           Extremities:   Extremities normal, atraumatic, no cyanosis or edema   Pulses:   2+ and symmetric all extremities   Skin:   Skin color, texture, turgor normal, no rashes or lesions               Lab, Imaging and other studies:I have personally reviewed pertinent lab results.  PND AM labs  VTE Pharmacologic Prophylaxis: Enoxaparin (Lovenox)  VTE Mechanical Prophylaxis: sequential compression device

## 2024-07-03 NOTE — PLAN OF CARE
Problem: PHYSICAL THERAPY ADULT  Goal: Performs mobility at highest level of function for planned discharge setting.  See evaluation for individualized goals.  Description: Treatment/Interventions: Functional transfer training, LE strengthening/ROM, Elevations, Therapeutic exercise, Endurance training, Patient/family training, Bed mobility, Gait training, Spoke to nursing, OT          See flowsheet documentation for full assessment, interventions and recommendations.  Outcome: Adequate for Discharge  Note: Prognosis: Good  Problem List: Pain, Decreased endurance  Assessment: Pt seen for PT treatment session this date. Pt cooperative and motivated to get better. Pt completes transfers and ambulation with RW as detailed in above flowsheet. Pt demonstrates significant improvement with mobility level ,no LOB noted , steady gait . Pt states lives in [facility] , [facility], owns RW . Pt was left in chair at the end of PT session with all needs in reach. The patient's AM-PAC Basic Mobility Inpatient Short Form Raw Score is 18. A Raw score of greater than 16 suggests the patient may benefit from discharge to home. Please also refer to the recommendation of the Physical Therapist for safe discharge planning. Post dc recommendation is Level 3 at this time. Encourage continued mobility with staff and restorative team while in hospital, no further skilled PT services indicated.        Rehab Resource Intensity Level, PT: III (Minimum Resource Intensity)    See flowsheet documentation for full assessment.

## 2024-07-03 NOTE — RESTORATIVE TECHNICIAN NOTE
Restorative Technician Note      Patient Name: Jr Mendoza     Restorative Tech Visit Date: 07/03/24  Note Type: Mobility  Patient Position Upon Consult: Bedside chair  Activity Performed: Ambulated  Assistive Device: Standard walker  Education Provided: Yes  Patient Position at End of Consult: Bedside chair; All needs within reach    Jazz Kevin  DPT, Restorative Technician

## 2024-07-04 ENCOUNTER — APPOINTMENT (INPATIENT)
Dept: RADIOLOGY | Facility: HOSPITAL | Age: 59
DRG: 330 | End: 2024-07-04
Payer: COMMERCIAL

## 2024-07-04 LAB
ANION GAP SERPL CALCULATED.3IONS-SCNC: 13 MMOL/L (ref 4–13)
BASOPHILS # BLD AUTO: 0.03 THOUSANDS/ÂΜL (ref 0–0.1)
BASOPHILS NFR BLD AUTO: 0 % (ref 0–1)
BUN SERPL-MCNC: 8 MG/DL (ref 5–25)
CALCIUM SERPL-MCNC: 8.4 MG/DL (ref 8.4–10.2)
CHLORIDE SERPL-SCNC: 101 MMOL/L (ref 96–108)
CO2 SERPL-SCNC: 21 MMOL/L (ref 21–32)
CREAT SERPL-MCNC: 0.52 MG/DL (ref 0.6–1.3)
EOSINOPHIL # BLD AUTO: 0.05 THOUSAND/ÂΜL (ref 0–0.61)
EOSINOPHIL NFR BLD AUTO: 0 % (ref 0–6)
ERYTHROCYTE [DISTWIDTH] IN BLOOD BY AUTOMATED COUNT: 13.3 % (ref 11.6–15.1)
GFR SERPL CREATININE-BSD FRML MDRD: 115 ML/MIN/1.73SQ M
GLUCOSE SERPL-MCNC: 118 MG/DL (ref 65–140)
GLUCOSE SERPL-MCNC: 125 MG/DL (ref 65–140)
GLUCOSE SERPL-MCNC: 135 MG/DL (ref 65–140)
GLUCOSE SERPL-MCNC: 150 MG/DL (ref 65–140)
HCT VFR BLD AUTO: 29.7 % (ref 36.5–49.3)
HGB BLD-MCNC: 9.5 G/DL (ref 12–17)
IMM GRANULOCYTES # BLD AUTO: 0.19 THOUSAND/UL (ref 0–0.2)
IMM GRANULOCYTES NFR BLD AUTO: 2 % (ref 0–2)
LYMPHOCYTES # BLD AUTO: 1.06 THOUSANDS/ÂΜL (ref 0.6–4.47)
LYMPHOCYTES NFR BLD AUTO: 9 % (ref 14–44)
MCH RBC QN AUTO: 29.9 PG (ref 26.8–34.3)
MCHC RBC AUTO-ENTMCNC: 32 G/DL (ref 31.4–37.4)
MCV RBC AUTO: 93 FL (ref 82–98)
MONOCYTES # BLD AUTO: 1.34 THOUSAND/ÂΜL (ref 0.17–1.22)
MONOCYTES NFR BLD AUTO: 12 % (ref 4–12)
NEUTROPHILS # BLD AUTO: 8.88 THOUSANDS/ÂΜL (ref 1.85–7.62)
NEUTS SEG NFR BLD AUTO: 77 % (ref 43–75)
NRBC BLD AUTO-RTO: 0 /100 WBCS
PLATELET # BLD AUTO: 331 THOUSANDS/UL (ref 149–390)
PMV BLD AUTO: 9.7 FL (ref 8.9–12.7)
POTASSIUM SERPL-SCNC: 3.9 MMOL/L (ref 3.5–5.3)
RBC # BLD AUTO: 3.18 MILLION/UL (ref 3.88–5.62)
SODIUM SERPL-SCNC: 135 MMOL/L (ref 135–147)
WBC # BLD AUTO: 11.55 THOUSAND/UL (ref 4.31–10.16)

## 2024-07-04 PROCEDURE — 82948 REAGENT STRIP/BLOOD GLUCOSE: CPT

## 2024-07-04 PROCEDURE — 80048 BASIC METABOLIC PNL TOTAL CA: CPT

## 2024-07-04 PROCEDURE — 99024 POSTOP FOLLOW-UP VISIT: CPT | Performed by: COLON & RECTAL SURGERY

## 2024-07-04 PROCEDURE — 74177 CT ABD & PELVIS W/CONTRAST: CPT

## 2024-07-04 PROCEDURE — 85025 COMPLETE CBC W/AUTO DIFF WBC: CPT

## 2024-07-04 PROCEDURE — 71045 X-RAY EXAM CHEST 1 VIEW: CPT

## 2024-07-04 RX ORDER — METHOCARBAMOL 100 MG/ML
500 INJECTION, SOLUTION INTRAMUSCULAR; INTRAVENOUS EVERY 8 HOURS SCHEDULED
Status: DISPENSED | OUTPATIENT
Start: 2024-07-04 | End: 2024-07-07

## 2024-07-04 RX ADMIN — METOCLOPRAMIDE HYDROCHLORIDE 10 MG: 5 INJECTION INTRAMUSCULAR; INTRAVENOUS at 05:00

## 2024-07-04 RX ADMIN — PANTOPRAZOLE SODIUM 40 MG: 40 INJECTION, POWDER, FOR SOLUTION INTRAVENOUS at 05:00

## 2024-07-04 RX ADMIN — FLUTICASONE PROPIONATE 1 SPRAY: 50 SPRAY, METERED NASAL at 08:09

## 2024-07-04 RX ADMIN — METOCLOPRAMIDE HYDROCHLORIDE 10 MG: 5 INJECTION INTRAMUSCULAR; INTRAVENOUS at 23:22

## 2024-07-04 RX ADMIN — METOCLOPRAMIDE HYDROCHLORIDE 10 MG: 5 INJECTION INTRAMUSCULAR; INTRAVENOUS at 11:53

## 2024-07-04 RX ADMIN — ONDANSETRON 4 MG: 2 INJECTION INTRAMUSCULAR; INTRAVENOUS at 00:39

## 2024-07-04 RX ADMIN — AZELASTINE HYDROCHLORIDE 1 SPRAY: 137 SPRAY, METERED NASAL at 08:09

## 2024-07-04 RX ADMIN — AZELASTINE HYDROCHLORIDE 1 SPRAY: 137 SPRAY, METERED NASAL at 17:50

## 2024-07-04 RX ADMIN — INSULIN LISPRO 2 UNITS: 100 INJECTION, SOLUTION INTRAVENOUS; SUBCUTANEOUS at 05:34

## 2024-07-04 RX ADMIN — IOHEXOL 100 ML: 350 INJECTION, SOLUTION INTRAVENOUS at 10:58

## 2024-07-04 RX ADMIN — METHOCARBAMOL 500 MG: 100 INJECTION INTRAMUSCULAR; INTRAVENOUS at 23:22

## 2024-07-04 RX ADMIN — METOCLOPRAMIDE HYDROCHLORIDE 10 MG: 5 INJECTION INTRAMUSCULAR; INTRAVENOUS at 17:50

## 2024-07-04 RX ADMIN — SODIUM CHLORIDE AND POTASSIUM CHLORIDE 100 ML/HR: .9; .15 SOLUTION INTRAVENOUS at 08:08

## 2024-07-04 RX ADMIN — SODIUM CHLORIDE AND POTASSIUM CHLORIDE 100 ML/HR: .9; .15 SOLUTION INTRAVENOUS at 18:28

## 2024-07-04 RX ADMIN — ACETAMINOPHEN 1000 MG: 10 INJECTION INTRAVENOUS at 17:50

## 2024-07-04 RX ADMIN — METHOCARBAMOL 500 MG: 100 INJECTION INTRAMUSCULAR; INTRAVENOUS at 17:51

## 2024-07-04 RX ADMIN — ACETAMINOPHEN 1000 MG: 10 INJECTION INTRAVENOUS at 23:22

## 2024-07-04 RX ADMIN — ACETAMINOPHEN 1000 MG: 10 INJECTION INTRAVENOUS at 11:53

## 2024-07-04 RX ADMIN — ONDANSETRON 4 MG: 2 INJECTION INTRAMUSCULAR; INTRAVENOUS at 05:35

## 2024-07-04 RX ADMIN — FLUTICASONE FUROATE AND VILANTEROL TRIFENATATE 1 PUFF: 200; 25 POWDER RESPIRATORY (INHALATION) at 08:09

## 2024-07-04 RX ADMIN — ENOXAPARIN SODIUM 40 MG: 40 INJECTION SUBCUTANEOUS at 14:25

## 2024-07-04 RX ADMIN — METHOCARBAMOL 500 MG: 100 INJECTION INTRAMUSCULAR; INTRAVENOUS at 08:30

## 2024-07-04 RX ADMIN — TRIMETHOBENZAMIDE HYDROCHLORIDE 200 MG: 100 INJECTION INTRAMUSCULAR at 02:29

## 2024-07-04 NOTE — PROGRESS NOTES
"Progress Note - Surgical Oncology  Jr Mendoza 58 y.o. male MRN: [id number]  Unit/Bed#: [facility] 817-01 Encounter: [id number]    Assessment:  Jr Mendoza is a 58 y.o. male s/p laparoscopic hand assisted left colon resection w/ Dr. Perez on . Post-op course c/b ileus.    Afebrile, VSS, however borderline tachycardic.  Approximately 1.2L bilious emesis overnight.    UOP: 1420 + 5x unmeasured  BM: 2x stools    WBC: 11.55, from 11.97 yesterday  HB.5, from 10.5 yesterday      Plan:  - strict NPO  - place NGT to LIWS, confirm with KUB  - NS w/ Kcl @100  - await ROBF  - discuss plan with possible scan  - Encourage ambulation, OOB, and IS  - CTM left hip ecchymosis  - DVT px:  Enoxaparin (Lovenox); sequential compression device  - PT/OT: Level 3    Dispo: awaiting ROBF, may require further imaging if ileus doesn't improve    Thanh John MD  General Surgery  24  6:50 AM      Subjective/Objective   Interval History:   NAEON. AVSS. Events as noted above.    Objective:     Blood pressure 154/80, pulse 100, temperature 98.2 °F (36.8 °C), resp. rate 16, height 5' 10\" (1.778 m), weight 120 kg (264 lb 9.6 oz), SpO2 95%.,Body mass index is 37.97 kg/m².      Intake/Output Summary (Last 24 hours) at 2024 0650  Last data filed at 2024 0556  Gross per 24 hour   Intake 2149.99 ml   Output 3270 ml   Net -1120.01 ml       Invasive Devices       Peripheral Intravenous Line  Duration             Peripheral IV 24 Dorsal (posterior);Left Hand 2 days              Drain  Duration             NG/OG/Enteral Tube Left nare <1 day                    Physical Exam:   General: well-developed, well nourished male in NAD, appears mildly toxic with belches, standing in bathroom  Skin: Warm, dry, anicteric. No rash, erythema, or abrasions.   HEENT: Normocephalic, atraumatic. EOMI. Normal external nose and ears. MMM.  Neck: supple, soft; trachea midline. Full ROM  CV: borderline tachycardic, grossly well " perfused  Pulm: no increased WOB, symmetric chest rise/fall with respirations  Abd: distended and mildly firm however non-tender, incisions with c/d/i dressings  MSK: Symmetric, no edema, no tenderness, no deformities observed. Left hip ecchymosis without underlying fluctuance or induration without ROM restriction.  Neuro: AOx3, GCS 15    Lab, Imaging and other studies:I have personally reviewed pertinent lab results.  , CBC:   Lab Results   Component Value Date    WBC 11.55 (H) 07/04/2024    HGB 9.5 (L) 07/04/2024    HCT 29.7 (L) 07/04/2024    MCV 93 07/04/2024     07/04/2024    RBC 3.18 (L) 07/04/2024    MCH 29.9 07/04/2024    MCHC 32.0 07/04/2024    RDW 13.3 07/04/2024    MPV 9.7 07/04/2024    NRBC 0 07/04/2024   , CMP:   Lab Results   Component Value Date    SODIUM 135 07/04/2024    K 3.9 07/04/2024     07/04/2024    CO2 21 07/04/2024    BUN 8 07/04/2024    CREATININE 0.52 (L) 07/04/2024    CALCIUM 8.4 07/04/2024    EGFR 115 07/04/2024

## 2024-07-04 NOTE — PLAN OF CARE
Problem: PAIN - ADULT  Goal: Verbalizes/displays adequate comfort level or baseline comfort level  Description: Interventions:  - Encourage patient to monitor pain and request assistance  - Assess pain using appropriate pain scale  - Administer analgesics based on type and severity of pain and evaluate response  - Implement non-pharmacological measures as appropriate and evaluate response  - Consider cultural and social influences on pain and pain management  - Notify physician/advanced practitioner if interventions unsuccessful or patient reports new pain  Outcome: Progressing     Problem: INFECTION - ADULT  Goal: Absence or prevention of progression during hospitalization  Description: INTERVENTIONS:  - Assess and monitor for signs and symptoms of infection  - Monitor lab/diagnostic results  - Monitor all insertion sites, i.e. indwelling lines, tubes, and drains  - Monitor endotracheal if appropriate and nasal secretions for changes in amount and color  - [place] appropriate cooling/warming therapies per order  - Administer medications as ordered  - Instruct and encourage patient and family to use good hand hygiene technique  - Identify and instruct in appropriate isolation precautions for identified infection/condition  Outcome: Progressing     Problem: DISCHARGE PLANNING  Goal: Discharge to home or other facility with appropriate resources  Description: INTERVENTIONS:  - Identify barriers to discharge w/patient and caregiver  - Arrange for needed discharge resources and transportation as appropriate  - Identify discharge learning needs (meds, wound care, etc.)  - Arrange for interpretive services to assist at discharge as needed  - Refer to Case Management Department for coordinating discharge planning if the patient needs post-hospital services based on physician/advanced practitioner order or complex needs related to functional status, cognitive ability, or social support system  Outcome: Progressing

## 2024-07-04 NOTE — PLAN OF CARE
Problem: PAIN - ADULT  Goal: Verbalizes/displays adequate comfort level or baseline comfort level  Description: Interventions:  - Encourage patient to monitor pain and request assistance  - Assess pain using appropriate pain scale  - Administer analgesics based on type and severity of pain and evaluate response  - Implement non-pharmacological measures as appropriate and evaluate response  - Consider cultural and social influences on pain and pain management  - Notify physician/advanced practitioner if interventions unsuccessful or patient reports new pain  Outcome: Progressing     Problem: INFECTION - ADULT  Goal: Absence or prevention of progression during hospitalization  Description: INTERVENTIONS:  - Assess and monitor for signs and symptoms of infection  - Monitor lab/diagnostic results  - Monitor all insertion sites, i.e. indwelling lines, tubes, and drains  - Monitor endotracheal if appropriate and nasal secretions for changes in amount and color  - [place] appropriate cooling/warming therapies per order  - Administer medications as ordered  - Instruct and encourage patient and family to use good hand hygiene technique  - Identify and instruct in appropriate isolation precautions for identified infection/condition  Outcome: Progressing     Problem: DISCHARGE PLANNING  Goal: Discharge to home or other facility with appropriate resources  Description: INTERVENTIONS:  - Identify barriers to discharge w/patient and caregiver  - Arrange for needed discharge resources and transportation as appropriate  - Identify discharge learning needs (meds, wound care, etc.)  - Arrange for interpretive services to assist at discharge as needed  - Refer to Case Management Department for coordinating discharge planning if the patient needs post-hospital services based on physician/advanced practitioner order or complex needs related to functional status, cognitive ability, or social support system  Outcome: Progressing      Problem: Knowledge Deficit  Goal: Patient/family/caregiver demonstrates understanding of disease process, treatment plan, medications, and discharge instructions  Description: Complete learning assessment and assess knowledge base.  Interventions:  - Provide teaching at level of understanding  - Provide teaching via preferred learning methods  Outcome: Progressing     Problem: GASTROINTESTINAL - ADULT  Goal: Minimal or absence of nausea and/or vomiting  Description: INTERVENTIONS:  - Administer IV fluids if ordered to ensure adequate hydration  - Maintain NPO status until nausea and vomiting are resolved  - Nasogastric tube if ordered  - Administer ordered antiemetic medications as needed  - Provide nonpharmacologic comfort measures as appropriate  - Advance diet as tolerated, if ordered  - Consider nutrition services referral to assist patient with adequate nutrition and appropriate food choices  Outcome: Progressing     Problem: METABOLIC, FLUID AND ELECTROLYTES - ADULT  Goal: Electrolytes maintained within normal limits  Description: INTERVENTIONS:  - Monitor labs and assess patient for signs and symptoms of electrolyte imbalances  - Administer electrolyte replacement as ordered  - Monitor response to electrolyte replacements, including repeat lab results as appropriate  - Instruct patient on fluid and nutrition as appropriate  Outcome: Progressing  Goal: Fluid balance maintained  Description: INTERVENTIONS:  - Monitor labs   - Monitor I/O and WT  - Instruct patient on fluid and nutrition as appropriate  - Assess for signs & symptoms of volume excess or deficit  Outcome: Progressing     Problem: HEMATOLOGIC - ADULT  Goal: Maintains hematologic stability  Description: INTERVENTIONS  - Assess for signs and symptoms of bleeding or hemorrhage  - Monitor labs  - Administer supportive blood products/factors as ordered and appropriate  Outcome: Progressing     Problem: Nutrition/Hydration-ADULT  Goal:  Nutrient/Hydration intake appropriate for improving, restoring or maintaining nutritional needs  Description: Monitor and assess patient's nutrition/hydration status for malnutrition. Collaborate with interdisciplinary team and initiate plan and interventions as ordered.  Monitor patient's weight and dietary intake as ordered or per policy. Utilize nutrition screening tool and intervene as necessary. Determine patient's food preferences and provide high-protein, high-caloric foods as appropriate.     INTERVENTIONS:  - Monitor oral intake, urinary output, labs, and treatment plans  - Assess nutrition and hydration status and recommend course of action  - Evaluate amount of meals eaten  - Assist patient with eating if necessary   - Allow adequate time for meals  - Recommend/ encourage appropriate diets, oral nutritional supplements, and vitamin/mineral supplements  - Order, calculate, and assess calorie counts as needed  - Recommend, monitor, and adjust tube feedings and TPN/PPN based on assessed needs  - Assess need for intravenous fluids  - Provide specific nutrition/hydration education as appropriate  - Include patient/family/caregiver in decisions related to nutrition  Outcome: Progressing

## 2024-07-05 DIAGNOSIS — C18.7 ADENOCARCINOMA OF SIGMOID COLON (HCC): Primary | ICD-10-CM

## 2024-07-05 LAB
ALBUMIN SERPL BCG-MCNC: 3.2 G/DL (ref 3.5–5)
ALP SERPL-CCNC: 65 U/L (ref 34–104)
ALT SERPL W P-5'-P-CCNC: 12 U/L (ref 7–52)
ANION GAP SERPL CALCULATED.3IONS-SCNC: 9 MMOL/L (ref 4–13)
AST SERPL W P-5'-P-CCNC: 14 U/L (ref 13–39)
BASOPHILS # BLD AUTO: 0.02 THOUSANDS/ÂΜL (ref 0–0.1)
BASOPHILS NFR BLD AUTO: 0 % (ref 0–1)
BILIRUB SERPL-MCNC: 1.34 MG/DL (ref 0.2–1)
BUN SERPL-MCNC: 6 MG/DL (ref 5–25)
CALCIUM ALBUM COR SERPL-MCNC: 8.6 MG/DL (ref 8.3–10.1)
CALCIUM SERPL-MCNC: 8 MG/DL (ref 8.4–10.2)
CHLORIDE SERPL-SCNC: 104 MMOL/L (ref 96–108)
CO2 SERPL-SCNC: 23 MMOL/L (ref 21–32)
CREAT SERPL-MCNC: 0.5 MG/DL (ref 0.6–1.3)
EOSINOPHIL # BLD AUTO: 0.2 THOUSAND/ÂΜL (ref 0–0.61)
EOSINOPHIL NFR BLD AUTO: 2 % (ref 0–6)
ERYTHROCYTE [DISTWIDTH] IN BLOOD BY AUTOMATED COUNT: 13.5 % (ref 11.6–15.1)
GFR SERPL CREATININE-BSD FRML MDRD: 117 ML/MIN/1.73SQ M
GLUCOSE SERPL-MCNC: 106 MG/DL (ref 65–140)
GLUCOSE SERPL-MCNC: 113 MG/DL (ref 65–140)
GLUCOSE SERPL-MCNC: 117 MG/DL (ref 65–140)
GLUCOSE SERPL-MCNC: 117 MG/DL (ref 65–140)
GLUCOSE SERPL-MCNC: 96 MG/DL (ref 65–140)
HCT VFR BLD AUTO: 28.8 % (ref 36.5–49.3)
HGB BLD-MCNC: 9.2 G/DL (ref 12–17)
IMM GRANULOCYTES # BLD AUTO: 0.08 THOUSAND/UL (ref 0–0.2)
IMM GRANULOCYTES NFR BLD AUTO: 1 % (ref 0–2)
LYMPHOCYTES # BLD AUTO: 1.35 THOUSANDS/ÂΜL (ref 0.6–4.47)
LYMPHOCYTES NFR BLD AUTO: 12 % (ref 14–44)
MAGNESIUM SERPL-MCNC: 1.8 MG/DL (ref 1.9–2.7)
MCH RBC QN AUTO: 30.1 PG (ref 26.8–34.3)
MCHC RBC AUTO-ENTMCNC: 31.9 G/DL (ref 31.4–37.4)
MCV RBC AUTO: 94 FL (ref 82–98)
MONOCYTES # BLD AUTO: 1.31 THOUSAND/ÂΜL (ref 0.17–1.22)
MONOCYTES NFR BLD AUTO: 12 % (ref 4–12)
NEUTROPHILS # BLD AUTO: 7.96 THOUSANDS/ÂΜL (ref 1.85–7.62)
NEUTS SEG NFR BLD AUTO: 73 % (ref 43–75)
NRBC BLD AUTO-RTO: 0 /100 WBCS
PHOSPHATE SERPL-MCNC: 3 MG/DL (ref 2.3–4.1)
PLATELET # BLD AUTO: 318 THOUSANDS/UL (ref 149–390)
PMV BLD AUTO: 9.3 FL (ref 8.9–12.7)
POTASSIUM SERPL-SCNC: 3.6 MMOL/L (ref 3.5–5.3)
PROT SERPL-MCNC: 6 G/DL (ref 6.4–8.4)
RBC # BLD AUTO: 3.06 MILLION/UL (ref 3.88–5.62)
SODIUM SERPL-SCNC: 136 MMOL/L (ref 135–147)
TRIGL SERPL-MCNC: 119 MG/DL
WBC # BLD AUTO: 10.92 THOUSAND/UL (ref 4.31–10.16)

## 2024-07-05 PROCEDURE — 82948 REAGENT STRIP/BLOOD GLUCOSE: CPT

## 2024-07-05 PROCEDURE — 85025 COMPLETE CBC W/AUTO DIFF WBC: CPT

## 2024-07-05 PROCEDURE — 84478 ASSAY OF TRIGLYCERIDES: CPT

## 2024-07-05 PROCEDURE — 83735 ASSAY OF MAGNESIUM: CPT

## 2024-07-05 PROCEDURE — 80053 COMPREHEN METABOLIC PANEL: CPT

## 2024-07-05 PROCEDURE — 99024 POSTOP FOLLOW-UP VISIT: CPT | Performed by: SURGERY

## 2024-07-05 PROCEDURE — 3E0436Z INTRODUCTION OF NUTRITIONAL SUBSTANCE INTO CENTRAL VEIN, PERCUTANEOUS APPROACH: ICD-10-PCS | Performed by: SURGERY

## 2024-07-05 PROCEDURE — C1751 CATH, INF, PER/CENT/MIDLINE: HCPCS

## 2024-07-05 PROCEDURE — 84100 ASSAY OF PHOSPHORUS: CPT

## 2024-07-05 PROCEDURE — 02HV33Z INSERTION OF INFUSION DEVICE INTO SUPERIOR VENA CAVA, PERCUTANEOUS APPROACH: ICD-10-PCS | Performed by: SURGERY

## 2024-07-05 PROCEDURE — 36569 INSJ PICC 5 YR+ W/O IMAGING: CPT

## 2024-07-05 RX ORDER — MAGNESIUM SULFATE HEPTAHYDRATE 40 MG/ML
2 INJECTION, SOLUTION INTRAVENOUS ONCE
Status: COMPLETED | OUTPATIENT
Start: 2024-07-05 | End: 2024-07-05

## 2024-07-05 RX ORDER — POTASSIUM CHLORIDE 14.9 MG/ML
20 INJECTION INTRAVENOUS 2 TIMES DAILY
Status: CANCELLED | OUTPATIENT
Start: 2024-07-05 | End: 2024-07-06

## 2024-07-05 RX ORDER — POTASSIUM CHLORIDE 14.9 MG/ML
20 INJECTION INTRAVENOUS 2 TIMES DAILY
Status: COMPLETED | OUTPATIENT
Start: 2024-07-05 | End: 2024-07-05

## 2024-07-05 RX ORDER — ONDANSETRON 2 MG/ML
4 INJECTION INTRAMUSCULAR; INTRAVENOUS EVERY 4 HOURS PRN
Status: DISCONTINUED | OUTPATIENT
Start: 2024-07-05 | End: 2024-07-12 | Stop reason: HOSPADM

## 2024-07-05 RX ORDER — MAGNESIUM SULFATE HEPTAHYDRATE 40 MG/ML
2 INJECTION, SOLUTION INTRAVENOUS CONTINUOUS
Status: CANCELLED | OUTPATIENT
Start: 2024-07-05 | End: 2024-07-05

## 2024-07-05 RX ADMIN — POTASSIUM CHLORIDE 20 MEQ: 14.9 INJECTION, SOLUTION INTRAVENOUS at 14:43

## 2024-07-05 RX ADMIN — METOCLOPRAMIDE HYDROCHLORIDE 10 MG: 5 INJECTION INTRAMUSCULAR; INTRAVENOUS at 17:54

## 2024-07-05 RX ADMIN — Medication: at 21:01

## 2024-07-05 RX ADMIN — METOCLOPRAMIDE HYDROCHLORIDE 10 MG: 5 INJECTION INTRAMUSCULAR; INTRAVENOUS at 05:47

## 2024-07-05 RX ADMIN — METHOCARBAMOL 500 MG: 100 INJECTION INTRAMUSCULAR; INTRAVENOUS at 17:54

## 2024-07-05 RX ADMIN — ACETAMINOPHEN 1000 MG: 10 INJECTION INTRAVENOUS at 17:54

## 2024-07-05 RX ADMIN — SODIUM CHLORIDE AND POTASSIUM CHLORIDE 100 ML/HR: .9; .15 SOLUTION INTRAVENOUS at 07:56

## 2024-07-05 RX ADMIN — SODIUM CHLORIDE AND POTASSIUM CHLORIDE 100 ML/HR: .9; .15 SOLUTION INTRAVENOUS at 16:58

## 2024-07-05 RX ADMIN — METHOCARBAMOL 500 MG: 100 INJECTION INTRAMUSCULAR; INTRAVENOUS at 07:56

## 2024-07-05 RX ADMIN — PANTOPRAZOLE SODIUM 40 MG: 40 INJECTION, POWDER, FOR SOLUTION INTRAVENOUS at 05:47

## 2024-07-05 RX ADMIN — ENOXAPARIN SODIUM 40 MG: 40 INJECTION SUBCUTANEOUS at 14:47

## 2024-07-05 RX ADMIN — MAGNESIUM SULFATE HEPTAHYDRATE 2 G: 40 INJECTION, SOLUTION INTRAVENOUS at 09:53

## 2024-07-05 RX ADMIN — ACETAMINOPHEN 1000 MG: 10 INJECTION INTRAVENOUS at 05:47

## 2024-07-05 RX ADMIN — POTASSIUM CHLORIDE 20 MEQ: 14.9 INJECTION, SOLUTION INTRAVENOUS at 21:03

## 2024-07-05 NOTE — PLAN OF CARE
Problem: PAIN - ADULT  Goal: Verbalizes/displays adequate comfort level or baseline comfort level  Description: Interventions:  - Encourage patient to monitor pain and request assistance  - Assess pain using appropriate pain scale  - Administer analgesics based on type and severity of pain and evaluate response  - Implement non-pharmacological measures as appropriate and evaluate response  - Consider cultural and social influences on pain and pain management  - Notify physician/advanced practitioner if interventions unsuccessful or patient reports new pain  Outcome: Progressing     Problem: INFECTION - ADULT  Goal: Absence or prevention of progression during hospitalization  Description: INTERVENTIONS:  - Assess and monitor for signs and symptoms of infection  - Monitor lab/diagnostic results  - Monitor all insertion sites, i.e. indwelling lines, tubes, and drains  - Monitor endotracheal if appropriate and nasal secretions for changes in amount and color  - [place] appropriate cooling/warming therapies per order  - Administer medications as ordered  - Instruct and encourage patient and family to use good hand hygiene technique  - Identify and instruct in appropriate isolation precautions for identified infection/condition  Outcome: Progressing

## 2024-07-05 NOTE — PROCEDURES
Insert Complex Venous Access Line    Date/Time: 7/5/2024 1:40 PM    Performed by: Gilda Madsen RN  Authorized by: Kevyn Hatfield MD    Patient location:  Bedside  Other Assisting Provider: Yes (comment) (rafael)    Consent:     Consent obtained:  Written    Consent given by:  Patient    Risks discussed:  Arterial puncture, bleeding, infection, incorrect placement, pneumothorax and nerve damage    Alternatives discussed:  No treatment and delayed treatment  Universal protocol:     Procedure explained and questions answered to patient or proxy's satisfaction: yes      Immediately prior to procedure, a time out was called: yes      Site/side marked: yes      Patient identity confirmed:  Verbally with patient, arm band, provided demographic data and hospital-assigned identification number  Pre-procedure details:     Hand hygiene: Hand hygiene performed prior to insertion      Sterile barrier technique: All elements of maximal sterile technique followed      Skin preparation:  ChloraPrep    Skin preparation agent: Skin preparation agent completely dried prior to procedure    Procedure details:     Complex Venous Access Line Type: PICC      Complex Venous Access Line Indications: total parenteral nutrition      Catheter tip vessel location: atriocaval junction      Orientation:  Right    Location:  [place]    Procedural supplies:  Double lumen    Catheter size:  5 Fr ([id number] ex 5-)    Total catheter length (cm):  47    Catheter out on skin (cm):  1    Max flow rate:  999    Arm circumference:  34    Patient evaluated for contraindications to access (i.e. fistula, thrombosis, etc): Yes      Site selection rationale:  Largest vein    Approach: percutaneous technique used      Patient position:  Flat    Ultrasound image availability:  Not saved    Sterile ultrasound techniques: Sterile gel and sterile probe covers were used      Number of attempts:  1    Successful placement: yes      Landmarks  identified: yes      Vessel of catheter tip end:  [place] 3CG confirmed  Anesthesia (see MAR for exact dosages):     Anesthesia method:  Local infiltration    Local anesthetic:  Lidocaine 2% w/o epi (2 mls)  Post-procedure details:     Post-procedure:  Dressing applied and securement device placed    Assessment:  Blood return through all ports and free fluid flow    Post-procedure complications: none      Patient tolerance of procedure:  Tolerated well, no immediate complications

## 2024-07-05 NOTE — PROGRESS NOTES
"Progress Note - Surgical Oncology  Jr Mendoza 58 y.o. male MRN: [id number]  Unit/Bed#: [facility] 817-01 Encounter: [id number]    Assessment:  Jr Mendoza is a 58 y.o. male s/p laparoscopic hand assisted left colon resection w/ Dr. Perez on 6/26. Post-op course c/b ileus. Repeat CT scan 7/4 demonstrated two points of narrowing suggestive of post-op ileus vs partial obstruction. Patient continues to pass flatus and will continue await for ROBF.    Afebrile, VSS. NGT with continued bilious drainage however nausea resolved. Patient reports improvement in abdominal distension.    NGT: 1.5Lcc of lightening output, from 1.8L yesterday.    UOP: 500cc    Am labs pending      Plan:  - NPO -- okay for ice chips q6hrs and water sponges   - PICC + TPN today  - NGT to LIWS  - NS w/ KCl @100  - await ROBF  - Encourage ambulation, OOB, and IS  - left hip ecchymosis stable, CTM and supportively treat  - DVT px:  Enoxaparin (Lovenox); sequential compression device  - PT/OT: Level 3    Dispo: awaiting ROBF    Thanh John MD  General Surgery  07/05/24  6:54 AM      Subjective/Objective   Interval History:   NAEON. AVSS. Events as noted above.    Objective:     Blood pressure 145/75, pulse 95, temperature 97.6 °F (36.4 °C), resp. rate 19, height 5' 10\" (1.778 m), weight 120 kg (264 lb 9.6 oz), SpO2 92%.,Body mass index is 37.97 kg/m².      Intake/Output Summary (Last 24 hours) at 7/5/2024 0654  Last data filed at 7/5/2024 0401  Gross per 24 hour   Intake 1000 ml   Output 2025 ml   Net -1025 ml       Invasive Devices       Peripheral Intravenous Line  Duration             Peripheral IV 07/04/24 Distal;Left;Upper;Ventral (anterior) Arm <1 day    Peripheral IV 07/05/24 Right;Ventral (anterior) Forearm <1 day              Drain  Duration             NG/OG/Enteral Tube Left nare 1 day                    Physical Exam:   General: well-developed, well nourished male in NAD, appears mildly toxic with catherine, standing in " "bathroom  Skin: Warm, dry, anicteric. No rash, erythema, or abrasions.   HEENT: Normocephalic, atraumatic. EOMI. Normal external nose and ears. MMM. NGT secured to nare.  Neck: supple, soft; trachea midline. Full ROM  CV: regular rate, grossly well perfused  Pulm: no increased WOB, symmetric chest rise/fall with respirations  Abd: decreased distension from yesterday with improved softness, appropriately tender, incisions with c/d/i dressings  MSK: Symmetric, no edema, no tenderness, no deformities observed. Left hip ecchymosis without underlying fluctuance or induration without ROM restriction.  Neuro: AOx3, GCS 15    Lab, Imaging and other studies:I have personally reviewed pertinent lab results.  , CBC:   No results found for: \"WBC\", \"HGB\", \"HCT\", \"MCV\", \"PLT\", \"ADJUSTEDWBC\", \"RBC\", \"MCH\", \"MCHC\", \"RDW\", \"MPV\", \"NRBC\"  , CMP:   No results found for: \"SODIUM\", \"K\", \"CL\", \"CO2\", \"ANIONGAP\", \"BUN\", \"CREATININE\", \"GLUCOSE\", \"CALCIUM\", \"AST\", \"ALT\", \"ALKPHOS\", \"PROT\", \"BILITOT\", \"EGFR\"    "

## 2024-07-06 LAB
BASOPHILS # BLD AUTO: 0.03 THOUSANDS/ÂΜL (ref 0–0.1)
BASOPHILS NFR BLD AUTO: 0 % (ref 0–1)
EOSINOPHIL # BLD AUTO: 0.22 THOUSAND/ÂΜL (ref 0–0.61)
EOSINOPHIL NFR BLD AUTO: 2 % (ref 0–6)
ERYTHROCYTE [DISTWIDTH] IN BLOOD BY AUTOMATED COUNT: 13.5 % (ref 11.6–15.1)
GLUCOSE SERPL-MCNC: 140 MG/DL (ref 65–140)
GLUCOSE SERPL-MCNC: 145 MG/DL (ref 65–140)
GLUCOSE SERPL-MCNC: 156 MG/DL (ref 65–140)
HCT VFR BLD AUTO: 28.1 % (ref 36.5–49.3)
HGB BLD-MCNC: 8.9 G/DL (ref 12–17)
IMM GRANULOCYTES # BLD AUTO: 0.1 THOUSAND/UL (ref 0–0.2)
IMM GRANULOCYTES NFR BLD AUTO: 1 % (ref 0–2)
LYMPHOCYTES # BLD AUTO: 1.27 THOUSANDS/ÂΜL (ref 0.6–4.47)
LYMPHOCYTES NFR BLD AUTO: 13 % (ref 14–44)
MCH RBC QN AUTO: 31 PG (ref 26.8–34.3)
MCHC RBC AUTO-ENTMCNC: 31.7 G/DL (ref 31.4–37.4)
MCV RBC AUTO: 98 FL (ref 82–98)
MONOCYTES # BLD AUTO: 1.14 THOUSAND/ÂΜL (ref 0.17–1.22)
MONOCYTES NFR BLD AUTO: 12 % (ref 4–12)
NEUTROPHILS # BLD AUTO: 7.18 THOUSANDS/ÂΜL (ref 1.85–7.62)
NEUTS SEG NFR BLD AUTO: 72 % (ref 43–75)
NRBC BLD AUTO-RTO: 0 /100 WBCS
PLATELET # BLD AUTO: 325 THOUSANDS/UL (ref 149–390)
PMV BLD AUTO: 10.1 FL (ref 8.9–12.7)
RBC # BLD AUTO: 2.87 MILLION/UL (ref 3.88–5.62)
WBC # BLD AUTO: 9.94 THOUSAND/UL (ref 4.31–10.16)

## 2024-07-06 PROCEDURE — 85025 COMPLETE CBC W/AUTO DIFF WBC: CPT | Performed by: PHYSICIAN ASSISTANT

## 2024-07-06 PROCEDURE — 82948 REAGENT STRIP/BLOOD GLUCOSE: CPT

## 2024-07-06 PROCEDURE — 99024 POSTOP FOLLOW-UP VISIT: CPT | Performed by: COLON & RECTAL SURGERY

## 2024-07-06 RX ORDER — POTASSIUM CHLORIDE 29.8 MG/ML
40 INJECTION INTRAVENOUS ONCE
Status: COMPLETED | OUTPATIENT
Start: 2024-07-06 | End: 2024-07-06

## 2024-07-06 RX ORDER — MAGNESIUM SULFATE HEPTAHYDRATE 40 MG/ML
2 INJECTION, SOLUTION INTRAVENOUS ONCE
Status: COMPLETED | OUTPATIENT
Start: 2024-07-06 | End: 2024-07-06

## 2024-07-06 RX ADMIN — PANTOPRAZOLE SODIUM 40 MG: 40 INJECTION, POWDER, FOR SOLUTION INTRAVENOUS at 05:54

## 2024-07-06 RX ADMIN — METHOCARBAMOL 500 MG: 100 INJECTION INTRAMUSCULAR; INTRAVENOUS at 08:01

## 2024-07-06 RX ADMIN — ACETAMINOPHEN 1000 MG: 10 INJECTION INTRAVENOUS at 00:17

## 2024-07-06 RX ADMIN — INSULIN LISPRO 2 UNITS: 100 INJECTION, SOLUTION INTRAVENOUS; SUBCUTANEOUS at 11:42

## 2024-07-06 RX ADMIN — MAGNESIUM SULFATE HEPTAHYDRATE 2 G: 40 INJECTION, SOLUTION INTRAVENOUS at 09:49

## 2024-07-06 RX ADMIN — METOCLOPRAMIDE HYDROCHLORIDE 10 MG: 5 INJECTION INTRAMUSCULAR; INTRAVENOUS at 05:54

## 2024-07-06 RX ADMIN — ACETAMINOPHEN 1000 MG: 10 INJECTION INTRAVENOUS at 17:34

## 2024-07-06 RX ADMIN — METOCLOPRAMIDE HYDROCHLORIDE 10 MG: 5 INJECTION INTRAMUSCULAR; INTRAVENOUS at 17:35

## 2024-07-06 RX ADMIN — METOCLOPRAMIDE HYDROCHLORIDE 10 MG: 5 INJECTION INTRAMUSCULAR; INTRAVENOUS at 00:17

## 2024-07-06 RX ADMIN — FLUTICASONE PROPIONATE 1 SPRAY: 50 SPRAY, METERED NASAL at 08:02

## 2024-07-06 RX ADMIN — AZELASTINE HYDROCHLORIDE 1 SPRAY: 137 SPRAY, METERED NASAL at 08:02

## 2024-07-06 RX ADMIN — METHOCARBAMOL 500 MG: 100 INJECTION INTRAMUSCULAR; INTRAVENOUS at 17:34

## 2024-07-06 RX ADMIN — FLUTICASONE FUROATE AND VILANTEROL TRIFENATATE 1 PUFF: 200; 25 POWDER RESPIRATORY (INHALATION) at 08:02

## 2024-07-06 RX ADMIN — Medication: at 20:59

## 2024-07-06 RX ADMIN — SODIUM CHLORIDE AND POTASSIUM CHLORIDE 40 ML/HR: .9; .15 SOLUTION INTRAVENOUS at 17:35

## 2024-07-06 RX ADMIN — METOCLOPRAMIDE HYDROCHLORIDE 10 MG: 5 INJECTION INTRAMUSCULAR; INTRAVENOUS at 11:42

## 2024-07-06 RX ADMIN — ENOXAPARIN SODIUM 40 MG: 40 INJECTION SUBCUTANEOUS at 14:00

## 2024-07-06 RX ADMIN — POTASSIUM CHLORIDE 40 MEQ: 29.8 INJECTION, SOLUTION INTRAVENOUS at 11:34

## 2024-07-06 RX ADMIN — METOCLOPRAMIDE HYDROCHLORIDE 10 MG: 5 INJECTION INTRAMUSCULAR; INTRAVENOUS at 23:04

## 2024-07-06 RX ADMIN — ACETAMINOPHEN 1000 MG: 10 INJECTION INTRAVENOUS at 23:04

## 2024-07-06 RX ADMIN — METHOCARBAMOL 500 MG: 100 INJECTION INTRAMUSCULAR; INTRAVENOUS at 23:04

## 2024-07-06 NOTE — NUTRITION
07/05/24 1231   Recommendations/Interventions   Summary goal TPN regimen  is 15% AA in 700 mL, SMOFlipid in 300 mL, 30% dextrose in 960 mL. Provides a total of 1999 kcal, 105 g protein, 288g CHO ( GIR= 2.6mg/kg IBW/min),  60 g fat (lipid infusion rate 0.78g/kg IBW/day). Do not advance to this regimen if glycemic control is not established and if electrolytes are not stable   Recommendations to Provider Start standard TPN bag. Provide thiamine supplementation prior to initiation of TPN and daily moving forward. Check BMP, Mg, P , LFT, TG and Ionized Ca prior to starting nutrition support. RD to follow up with goal TPN regimen once tolerance of standard is established.

## 2024-07-06 NOTE — PROGRESS NOTES
"Progress Note - General Surgery   Jr Mendoza 58 y.o. male MRN: [id number]  Unit/Bed#: [facility] 817-01 Encounter: [id number]    Assessment:  57yo M POD 10 laparoscopic sigmoidectomy, now with recurrent ileus.    NAEON. AVSS of RA.  Denies N/V/SOB/CP  NGT OP: 1.3L brown  UOP: 300cc   PICC/TPN yesterday    Hgb 8.9 (9.2)  PND BMP    Plan:  -NPO  -NGT to cont suction  -monitor TBili  -f/u nutrition recs for TPN  -mIVF @40  -encourage OOB/ambulation, IS  - PT/OT: level 3  Subjective/Objective     Subjective: No acute events overnight. Pain controlled, denies nausea/vomiting. Asking when the NGT can come out.    Objective:     Blood pressure 134/80, pulse 92, temperature 98.7 °F (37.1 °C), resp. rate 18, height 5' 10\" (1.778 m), weight 120 kg (264 lb 9.6 oz), SpO2 95%.,Body mass index is 37.97 kg/m².      Intake/Output Summary (Last 24 hours) at 7/6/2024 0639  Last data filed at 7/6/2024 0206  Gross per 24 hour   Intake 900 ml   Output 1700 ml   Net -800 ml       Invasive Devices       Peripherally Inserted Central Catheter Line  Duration             PICC Line 07/05/24 Right Basilic <1 day              Peripheral Intravenous Line  Duration             Peripheral IV 07/04/24 Distal;Left;Upper;Ventral (anterior) Arm 1 day    Peripheral IV 07/05/24 Right;Ventral (anterior) Forearm 1 day              Drain  Duration             NG/OG/Enteral Tube Left nare 1 day                    Physical Exam:   General: well-developed, well nourished male in NAD, appears mildly toxic with belches, standing in bathroom  Skin: Warm, dry, anicteric. No rash, erythema, or abrasions.   HEENT: Normocephalic, atraumatic. EOMI. Normal external nose and ears. MMM. NGT secured to nare.  Neck: supple, soft; trachea midline. Full ROM  CV: regular rate, grossly well perfused  Pulm: no increased WOB, symmetric chest rise/fall with respirations  Abd: decreased distension from yesterday with improved softness, tympanic, appropriately tender, incisions " with c/d/i dressings  MSK: Symmetric, no edema, no tenderness, no deformities observed. Left hip ecchymosis without underlying fluctuance or induration without ROM restriction.  Neuro: AOx3, GCS 15    Lab, Imaging and other studies:I have personally reviewed pertinent lab results.    VTE Pharmacologic Prophylaxis: Enoxaparin (Lovenox)  VTE Mechanical Prophylaxis: sequential compression device

## 2024-07-06 NOTE — PLAN OF CARE
Problem: PAIN - ADULT  Goal: Verbalizes/displays adequate comfort level or baseline comfort level  Description: Interventions:  - Encourage patient to monitor pain and request assistance  - Assess pain using appropriate pain scale  - Administer analgesics based on type and severity of pain and evaluate response  - Implement non-pharmacological measures as appropriate and evaluate response  - Consider cultural and social influences on pain and pain management  - Notify physician/advanced practitioner if interventions unsuccessful or patient reports new pain  Outcome: Progressing     Problem: INFECTION - ADULT  Goal: Absence or prevention of progression during hospitalization  Description: INTERVENTIONS:  - Assess and monitor for signs and symptoms of infection  - Monitor lab/diagnostic results  - Monitor all insertion sites, i.e. indwelling lines, tubes, and drains  - Monitor endotracheal if appropriate and nasal secretions for changes in amount and color  - [place] appropriate cooling/warming therapies per order  - Administer medications as ordered  - Instruct and encourage patient and family to use good hand hygiene technique  - Identify and instruct in appropriate isolation precautions for identified infection/condition  Outcome: Progressing     Problem: SAFETY ADULT  Goal: Patient will remain free of falls  Description: INTERVENTIONS:  - Educate patient/family on patient safety including physical limitations  - Instruct patient to call for assistance with activity   - Consult OT/PT to assist with strengthening/mobility   - Keep Call bell within reach  - Keep bed low and locked with side rails adjusted as appropriate  - Keep care items and personal belongings within reach  - Initiate and maintain comfort rounds  - Make Fall Risk Sign visible to staff  - Offer Toileting every  Hours, in advance of need  - Initiate/Maintain alarm  - Obtain necessary fall risk management equipment:   - Apply yellow socks and  bracelet for high fall risk patients  - Consider moving patient to room near nurses station  Outcome: Progressing  Goal: Maintain or return to baseline ADL function  Description: INTERVENTIONS:  -  Assess patient's ability to carry out ADLs; assess patient's baseline for ADL function and identify physical deficits which impact ability to perform ADLs (bathing, care of mouth/teeth, toileting, grooming, dressing, etc.)  - Assess/evaluate cause of self-care deficits   - Assess range of motion  - Assess patient's mobility; develop plan if impaired  - Assess patient's need for assistive devices and provide as appropriate  - Encourage maximum independence but intervene and supervise when necessary  - Involve family in performance of ADLs  - Assess for home care needs following discharge   - Consider OT consult to assist with ADL evaluation and planning for discharge  - Provide patient education as appropriate  Outcome: Progressing  Goal: Maintains/Returns to pre admission functional level  Description: INTERVENTIONS:  - Perform AM-PAC 6 Click Basic Mobility/ Daily Activity assessment daily.  - Set and communicate daily mobility goal to care team and patient/family/caregiver.   - Collaborate with rehabilitation services on mobility goals if consulted  - Perform Range of Motion  times a day.  - Reposition patient every  hours.  - Dangle patient  times a day  - Stand patient  times a day  - Ambulate patient  times a day  - Out of bed to chair  times a day   - Out of bed for meals  times a day  - Out of bed for toileting  - Record patient progress and toleration of activity level   Outcome: Progressing     Problem: DISCHARGE PLANNING  Goal: Discharge to home or other facility with appropriate resources  Description: INTERVENTIONS:  - Identify barriers to discharge w/patient and caregiver  - Arrange for needed discharge resources and transportation as appropriate  - Identify discharge learning needs (meds, wound care, etc.)  -  Arrange for interpretive services to assist at discharge as needed  - Refer to Case Management Department for coordinating discharge planning if the patient needs post-hospital services based on physician/advanced practitioner order or complex needs related to functional status, cognitive ability, or social support system  Outcome: Progressing     Problem: Knowledge Deficit  Goal: Patient/family/caregiver demonstrates understanding of disease process, treatment plan, medications, and discharge instructions  Description: Complete learning assessment and assess knowledge base.  Interventions:  - Provide teaching at level of understanding  - Provide teaching via preferred learning methods  Outcome: Progressing     Problem: GASTROINTESTINAL - ADULT  Goal: Minimal or absence of nausea and/or vomiting  Description: INTERVENTIONS:  - Administer IV fluids if ordered to ensure adequate hydration  - Maintain NPO status until nausea and vomiting are resolved  - Nasogastric tube if ordered  - Administer ordered antiemetic medications as needed  - Provide nonpharmacologic comfort measures as appropriate  - Advance diet as tolerated, if ordered  - Consider nutrition services referral to assist patient with adequate nutrition and appropriate food choices  Outcome: Progressing     Problem: METABOLIC, FLUID AND ELECTROLYTES - ADULT  Goal: Electrolytes maintained within normal limits  Description: INTERVENTIONS:  - Monitor labs and assess patient for signs and symptoms of electrolyte imbalances  - Administer electrolyte replacement as ordered  - Monitor response to electrolyte replacements, including repeat lab results as appropriate  - Instruct patient on fluid and nutrition as appropriate  Outcome: Progressing  Goal: Fluid balance maintained  Description: INTERVENTIONS:  - Monitor labs   - Monitor I/O and WT  - Instruct patient on fluid and nutrition as appropriate  - Assess for signs & symptoms of volume excess or  deficit  Outcome: Progressing     Problem: SKIN/TISSUE INTEGRITY - ADULT  Goal: Incision(s), wounds(s) or drain site(s) healing without S/S of infection  Description: INTERVENTIONS  - Assess and document dressing, incision, wound bed, drain sites and surrounding tissue  - Provide patient and family education  - Perform skin care/dressing changes every shift  Outcome: Progressing     Problem: HEMATOLOGIC - ADULT  Goal: Maintains hematologic stability  Description: INTERVENTIONS  - Assess for signs and symptoms of bleeding or hemorrhage  - Monitor labs  - Administer supportive blood products/factors as ordered and appropriate  Outcome: Progressing     Problem: Nutrition/Hydration-ADULT  Goal: Nutrient/Hydration intake appropriate for improving, restoring or maintaining nutritional needs  Description: Monitor and assess patient's nutrition/hydration status for malnutrition. Collaborate with interdisciplinary team and initiate plan and interventions as ordered.  Monitor patient's weight and dietary intake as ordered or per policy. Utilize nutrition screening tool and intervene as necessary. Determine patient's food preferences and provide high-protein, high-caloric foods as appropriate.     INTERVENTIONS:  - Monitor oral intake, urinary output, labs, and treatment plans  - Assess nutrition and hydration status and recommend course of action  - Evaluate amount of meals eaten  - Assist patient with eating if necessary   - Allow adequate time for meals  - Recommend/ encourage appropriate diets, oral nutritional supplements, and vitamin/mineral supplements  - Order, calculate, and assess calorie counts as needed  - Recommend, monitor, and adjust tube feedings and TPN/PPN based on assessed needs  - Assess need for intravenous fluids  - Provide specific nutrition/hydration education as appropriate  - Include patient/family/caregiver in decisions related to nutrition  Outcome: Progressing

## 2024-07-07 LAB
ANION GAP SERPL CALCULATED.3IONS-SCNC: 7 MMOL/L (ref 4–13)
BASOPHILS # BLD AUTO: 0.03 THOUSANDS/ÂΜL (ref 0–0.1)
BASOPHILS NFR BLD AUTO: 0 % (ref 0–1)
BUN SERPL-MCNC: 6 MG/DL (ref 5–25)
CALCIUM SERPL-MCNC: 8.1 MG/DL (ref 8.4–10.2)
CHLORIDE SERPL-SCNC: 102 MMOL/L (ref 96–108)
CO2 SERPL-SCNC: 27 MMOL/L (ref 21–32)
CREAT SERPL-MCNC: 0.46 MG/DL (ref 0.6–1.3)
EOSINOPHIL # BLD AUTO: 0.23 THOUSAND/ÂΜL (ref 0–0.61)
EOSINOPHIL NFR BLD AUTO: 2 % (ref 0–6)
ERYTHROCYTE [DISTWIDTH] IN BLOOD BY AUTOMATED COUNT: 13.5 % (ref 11.6–15.1)
GFR SERPL CREATININE-BSD FRML MDRD: 121 ML/MIN/1.73SQ M
GLUCOSE SERPL-MCNC: 156 MG/DL (ref 65–140)
GLUCOSE SERPL-MCNC: 157 MG/DL (ref 65–140)
GLUCOSE SERPL-MCNC: 157 MG/DL (ref 65–140)
GLUCOSE SERPL-MCNC: 160 MG/DL (ref 65–140)
GLUCOSE SERPL-MCNC: 168 MG/DL (ref 65–140)
GLUCOSE SERPL-MCNC: 175 MG/DL (ref 65–140)
GLUCOSE SERPL-MCNC: 183 MG/DL (ref 65–140)
HCT VFR BLD AUTO: 28.8 % (ref 36.5–49.3)
HGB BLD-MCNC: 9.1 G/DL (ref 12–17)
IMM GRANULOCYTES # BLD AUTO: 0.08 THOUSAND/UL (ref 0–0.2)
IMM GRANULOCYTES NFR BLD AUTO: 1 % (ref 0–2)
LYMPHOCYTES # BLD AUTO: 1.24 THOUSANDS/ÂΜL (ref 0.6–4.47)
LYMPHOCYTES NFR BLD AUTO: 11 % (ref 14–44)
MAGNESIUM SERPL-MCNC: 1.9 MG/DL (ref 1.9–2.7)
MCH RBC QN AUTO: 29.8 PG (ref 26.8–34.3)
MCHC RBC AUTO-ENTMCNC: 31.6 G/DL (ref 31.4–37.4)
MCV RBC AUTO: 94 FL (ref 82–98)
MONOCYTES # BLD AUTO: 1.17 THOUSAND/ÂΜL (ref 0.17–1.22)
MONOCYTES NFR BLD AUTO: 11 % (ref 4–12)
NEUTROPHILS # BLD AUTO: 8.2 THOUSANDS/ÂΜL (ref 1.85–7.62)
NEUTS SEG NFR BLD AUTO: 75 % (ref 43–75)
NRBC BLD AUTO-RTO: 0 /100 WBCS
PHOSPHATE SERPL-MCNC: 2.9 MG/DL (ref 2.3–4.1)
PLATELET # BLD AUTO: 337 THOUSANDS/UL (ref 149–390)
PMV BLD AUTO: 9.9 FL (ref 8.9–12.7)
POTASSIUM SERPL-SCNC: 3.5 MMOL/L (ref 3.5–5.3)
RBC # BLD AUTO: 3.05 MILLION/UL (ref 3.88–5.62)
SODIUM SERPL-SCNC: 136 MMOL/L (ref 135–147)
WBC # BLD AUTO: 10.95 THOUSAND/UL (ref 4.31–10.16)

## 2024-07-07 PROCEDURE — 85025 COMPLETE CBC W/AUTO DIFF WBC: CPT | Performed by: STUDENT IN AN ORGANIZED HEALTH CARE EDUCATION/TRAINING PROGRAM

## 2024-07-07 PROCEDURE — 80048 BASIC METABOLIC PNL TOTAL CA: CPT | Performed by: STUDENT IN AN ORGANIZED HEALTH CARE EDUCATION/TRAINING PROGRAM

## 2024-07-07 PROCEDURE — 83735 ASSAY OF MAGNESIUM: CPT | Performed by: STUDENT IN AN ORGANIZED HEALTH CARE EDUCATION/TRAINING PROGRAM

## 2024-07-07 PROCEDURE — 99024 POSTOP FOLLOW-UP VISIT: CPT | Performed by: COLON & RECTAL SURGERY

## 2024-07-07 PROCEDURE — 82948 REAGENT STRIP/BLOOD GLUCOSE: CPT

## 2024-07-07 PROCEDURE — 84100 ASSAY OF PHOSPHORUS: CPT | Performed by: STUDENT IN AN ORGANIZED HEALTH CARE EDUCATION/TRAINING PROGRAM

## 2024-07-07 RX ORDER — POTASSIUM CHLORIDE 14.9 MG/ML
20 INJECTION INTRAVENOUS ONCE
Status: COMPLETED | OUTPATIENT
Start: 2024-07-07 | End: 2024-07-07

## 2024-07-07 RX ADMIN — METOCLOPRAMIDE HYDROCHLORIDE 10 MG: 5 INJECTION INTRAMUSCULAR; INTRAVENOUS at 11:55

## 2024-07-07 RX ADMIN — AZELASTINE HYDROCHLORIDE 1 SPRAY: 137 SPRAY, METERED NASAL at 08:19

## 2024-07-07 RX ADMIN — ACETAMINOPHEN 1000 MG: 10 INJECTION INTRAVENOUS at 11:55

## 2024-07-07 RX ADMIN — METOCLOPRAMIDE HYDROCHLORIDE 10 MG: 5 INJECTION INTRAMUSCULAR; INTRAVENOUS at 17:42

## 2024-07-07 RX ADMIN — INSULIN LISPRO 2 UNITS: 100 INJECTION, SOLUTION INTRAVENOUS; SUBCUTANEOUS at 11:55

## 2024-07-07 RX ADMIN — METOCLOPRAMIDE HYDROCHLORIDE 10 MG: 5 INJECTION INTRAMUSCULAR; INTRAVENOUS at 05:34

## 2024-07-07 RX ADMIN — ENOXAPARIN SODIUM 40 MG: 40 INJECTION SUBCUTANEOUS at 13:54

## 2024-07-07 RX ADMIN — INSULIN LISPRO 2 UNITS: 100 INJECTION, SOLUTION INTRAVENOUS; SUBCUTANEOUS at 06:57

## 2024-07-07 RX ADMIN — FLUTICASONE PROPIONATE 1 SPRAY: 50 SPRAY, METERED NASAL at 08:19

## 2024-07-07 RX ADMIN — INSULIN LISPRO 2 UNITS: 100 INJECTION, SOLUTION INTRAVENOUS; SUBCUTANEOUS at 17:42

## 2024-07-07 RX ADMIN — Medication: at 21:08

## 2024-07-07 RX ADMIN — PANTOPRAZOLE SODIUM 40 MG: 40 INJECTION, POWDER, FOR SOLUTION INTRAVENOUS at 05:34

## 2024-07-07 RX ADMIN — INSULIN LISPRO 2 UNITS: 100 INJECTION, SOLUTION INTRAVENOUS; SUBCUTANEOUS at 00:10

## 2024-07-07 RX ADMIN — POTASSIUM CHLORIDE 20 MEQ: 14.9 INJECTION, SOLUTION INTRAVENOUS at 10:47

## 2024-07-07 RX ADMIN — ACETAMINOPHEN 1000 MG: 10 INJECTION INTRAVENOUS at 17:42

## 2024-07-07 RX ADMIN — ACETAMINOPHEN 1000 MG: 10 INJECTION INTRAVENOUS at 05:33

## 2024-07-07 NOTE — PROGRESS NOTES
"Progress Note - General Surgery   Jr Mendoza 58 y.o. male MRN: [id number]  Unit/Bed#: [facility] 817-01 Encounter: [id number]    Assessment:  57yo M POD 11 laparoscopic sigmoidectomy, now with recurrent ileus.     WILLIAMS MARIN on RA.  Doing well this morning. No N/V  Abdomen still distended and tympanic.  Feels stronger, OOB/ambulating more yesterday  +gas/+BM    NGT: 1.6L    WBC 10.95 (9.9)  Hgb 9.1 (8.9)  PND BMP, LFTs/Tbili    Plan:  -NGT to cont suction  -monitor TBili  -Adv TPN: f/u nutrition recs   -mIVF @40  -encourage OOB/ambulation, IS  - PT/OT: level 3    Subjective/Objective     Subjective: doing well this morning. No N/V, pain controlled.     Objective:     Blood pressure 141/76, pulse 90, temperature 98.1 °F (36.7 °C), resp. rate 19, height 5' 10\" (1.778 m), weight 120 kg (264 lb 9.6 oz), SpO2 95%.,Body mass index is 37.97 kg/m².      Intake/Output Summary (Last 24 hours) at 7/7/2024 0647  Last data filed at 7/7/2024 0301  Gross per 24 hour   Intake 1000 ml   Output 1700 ml   Net -700 ml       Invasive Devices       Peripherally Inserted Central Catheter Line  Duration             PICC Line 07/05/24 Right Basilic 1 day              Peripheral Intravenous Line  Duration             Peripheral IV 07/04/24 Distal;Left;Upper;Ventral (anterior) Arm 2 days    Peripheral IV 07/05/24 Right;Ventral (anterior) Forearm 2 days              Drain  Duration             NG/OG/Enteral Tube Left nare 2 days                    Physical Exam:   General: well-developed, well nourished male in NAD, appears mildly toxic with belches, standing in bathroom  Skin: Warm, dry, anicteric. No rash, erythema, or abrasions.   HEENT: Normocephalic, atraumatic. EOMI. Normal external nose and ears. MMM. NGT secured to nare.  Neck: supple, soft; trachea midline. Full ROM  CV: regular rate, grossly well perfused  Pulm: no increased WOB, symmetric chest rise/fall with respirations  Abd: distended, tympanic, appropriately tender, incisions " with c/d/i dressings  MSK: Symmetric, no edema, no tenderness, no deformities observed. Left hip ecchymosis without underlying fluctuance or induration without ROM restriction.  Neuro: AOx3, GCS 15    Lab, Imaging and other studies:I have personally reviewed pertinent lab results.    VTE Pharmacologic Prophylaxis: Enoxaparin (Lovenox)  VTE Mechanical Prophylaxis: sequential compression device

## 2024-07-07 NOTE — PLAN OF CARE
Problem: PAIN - ADULT  Goal: Verbalizes/displays adequate comfort level or baseline comfort level  Description: Interventions:  - Encourage patient to monitor pain and request assistance  - Assess pain using appropriate pain scale  - Administer analgesics based on type and severity of pain and evaluate response  - Implement non-pharmacological measures as appropriate and evaluate response  - Consider cultural and social influences on pain and pain management  - Notify physician/advanced practitioner if interventions unsuccessful or patient reports new pain  Outcome: Progressing     Problem: INFECTION - ADULT  Goal: Absence or prevention of progression during hospitalization  Description: INTERVENTIONS:  - Assess and monitor for signs and symptoms of infection  - Monitor lab/diagnostic results  - Monitor all insertion sites, i.e. indwelling lines, tubes, and drains  - Monitor endotracheal if appropriate and nasal secretions for changes in amount and color  - [place] appropriate cooling/warming therapies per order  - Administer medications as ordered  - Instruct and encourage patient and family to use good hand hygiene technique  - Identify and instruct in appropriate isolation precautions for identified infection/condition  Outcome: Progressing

## 2024-07-07 NOTE — PLAN OF CARE
Problem: PAIN - ADULT  Goal: Verbalizes/displays adequate comfort level or baseline comfort level  Description: Interventions:  - Encourage patient to monitor pain and request assistance  - Assess pain using appropriate pain scale  - Administer analgesics based on type and severity of pain and evaluate response  - Implement non-pharmacological measures as appropriate and evaluate response  - Consider cultural and social influences on pain and pain management  - Notify physician/advanced practitioner if interventions unsuccessful or patient reports new pain  7/7/2024 1034 by Cher Tidwell RN  Outcome: Progressing  7/7/2024 1033 by Cher Tidwell RN  Outcome: Progressing     Problem: INFECTION - ADULT  Goal: Absence or prevention of progression during hospitalization  Description: INTERVENTIONS:  - Assess and monitor for signs and symptoms of infection  - Monitor lab/diagnostic results  - Monitor all insertion sites, i.e. indwelling lines, tubes, and drains  - Monitor endotracheal if appropriate and nasal secretions for changes in amount and color  - [place] appropriate cooling/warming therapies per order  - Administer medications as ordered  - Instruct and encourage patient and family to use good hand hygiene technique  - Identify and instruct in appropriate isolation precautions for identified infection/condition  7/7/2024 1034 by Cher Tidwell RN  Outcome: Progressing  7/7/2024 1033 by Cher Tidwell RN  Outcome: Progressing     Problem: SAFETY ADULT  Goal: Patient will remain free of falls  Description: INTERVENTIONS:  - Educate patient/family on patient safety including physical limitations  - Instruct patient to call for assistance with activity   - Consult OT/PT to assist with strengthening/mobility   - Keep Call bell within reach  - Keep bed low and locked with side rails adjusted as appropriate  - Keep care items and personal belongings within reach  - Initiate and maintain comfort  rounds  - Make Fall Risk Sign visible to staff  - Offer Toileting every  Hours, in advance of need  - Initiate/Maintain alarm  - Obtain necessary fall risk management equipment:   - Apply yellow socks and bracelet for high fall risk patients  - Consider moving patient to room near nurses station  7/7/2024 1034 by Cher Tidwell RN  Outcome: Progressing  7/7/2024 1033 by Cher Tidwell RN  Outcome: Progressing  Goal: Maintain or return to baseline ADL function  Description: INTERVENTIONS:  -  Assess patient's ability to carry out ADLs; assess patient's baseline for ADL function and identify physical deficits which impact ability to perform ADLs (bathing, care of mouth/teeth, toileting, grooming, dressing, etc.)  - Assess/evaluate cause of self-care deficits   - Assess range of motion  - Assess patient's mobility; develop plan if impaired  - Assess patient's need for assistive devices and provide as appropriate  - Encourage maximum independence but intervene and supervise when necessary  - Involve family in performance of ADLs  - Assess for home care needs following discharge   - Consider OT consult to assist with ADL evaluation and planning for discharge  - Provide patient education as appropriate  7/7/2024 1034 by Cher Tidwell RN  Outcome: Progressing  7/7/2024 1033 by Cher Tidwell RN  Outcome: Progressing  Goal: Maintains/Returns to pre admission functional level  Description: INTERVENTIONS:  - Perform AM-PAC 6 Click Basic Mobility/ Daily Activity assessment daily.  - Set and communicate daily mobility goal to care team and patient/family/caregiver.   - Collaborate with rehabilitation services on mobility goals if consulted  - Perform Range of Motion  times a day.  - Reposition patient every  hours.  - Dangle patient  times a day  - Stand patient  times a day  - Ambulate patient  times a day  - Out of bed to chair  times a day   - Out of bed for meals  times a day  - Out of bed for  toileting  - Record patient progress and toleration of activity level   7/7/2024 1034 by Cher Tidwell RN  Outcome: Progressing  7/7/2024 1033 by Cher Tidwell RN  Outcome: Progressing     Problem: DISCHARGE PLANNING  Goal: Discharge to home or other facility with appropriate resources  Description: INTERVENTIONS:  - Identify barriers to discharge w/patient and caregiver  - Arrange for needed discharge resources and transportation as appropriate  - Identify discharge learning needs (meds, wound care, etc.)  - Arrange for interpretive services to assist at discharge as needed  - Refer to Case Management Department for coordinating discharge planning if the patient needs post-hospital services based on physician/advanced practitioner order or complex needs related to functional status, cognitive ability, or social support system  7/7/2024 1034 by Cher Tidwell RN  Outcome: Progressing  7/7/2024 1033 by Cher Tidwell RN  Outcome: Progressing     Problem: Knowledge Deficit  Goal: Patient/family/caregiver demonstrates understanding of disease process, treatment plan, medications, and discharge instructions  Description: Complete learning assessment and assess knowledge base.  Interventions:  - Provide teaching at level of understanding  - Provide teaching via preferred learning methods  7/7/2024 1034 by Cher Tidwell RN  Outcome: Progressing  7/7/2024 1033 by Cher Tidwell RN  Outcome: Progressing     Problem: GASTROINTESTINAL - ADULT  Goal: Minimal or absence of nausea and/or vomiting  Description: INTERVENTIONS:  - Administer IV fluids if ordered to ensure adequate hydration  - Maintain NPO status until nausea and vomiting are resolved  - Nasogastric tube if ordered  - Administer ordered antiemetic medications as needed  - Provide nonpharmacologic comfort measures as appropriate  - Advance diet as tolerated, if ordered  - Consider nutrition services referral to assist patient with  adequate nutrition and appropriate food choices  7/7/2024 1034 by Cher Tidwell RN  Outcome: Progressing  7/7/2024 1033 by Cher Tidwell RN  Outcome: Progressing     Problem: METABOLIC, FLUID AND ELECTROLYTES - ADULT  Goal: Electrolytes maintained within normal limits  Description: INTERVENTIONS:  - Monitor labs and assess patient for signs and symptoms of electrolyte imbalances  - Administer electrolyte replacement as ordered  - Monitor response to electrolyte replacements, including repeat lab results as appropriate  - Instruct patient on fluid and nutrition as appropriate  7/7/2024 1034 by Cher Tidwell RN  Outcome: Progressing  7/7/2024 1033 by Cher Tidwell RN  Outcome: Not Progressing  Goal: Fluid balance maintained  Description: INTERVENTIONS:  - Monitor labs   - Monitor I/O and WT  - Instruct patient on fluid and nutrition as appropriate  - Assess for signs & symptoms of volume excess or deficit  7/7/2024 1034 by Cher Tidwell RN  Outcome: Progressing  7/7/2024 1033 by Cher Tidwell RN  Outcome: Not Progressing     Problem: SKIN/TISSUE INTEGRITY - ADULT  Goal: Incision(s), wounds(s) or drain site(s) healing without S/S of infection  Description: INTERVENTIONS  - Assess and document dressing, incision, wound bed, drain sites and surrounding tissue  - Provide patient and family education  - Perform skin care/dressing changes every shift  7/7/2024 1034 by Cher Tidwell RN  Outcome: Progressing  7/7/2024 1033 by Cher Tidwell RN  Outcome: Not Progressing     Problem: HEMATOLOGIC - ADULT  Goal: Maintains hematologic stability  Description: INTERVENTIONS  - Assess for signs and symptoms of bleeding or hemorrhage  - Monitor labs  - Administer supportive blood products/factors as ordered and appropriate  7/7/2024 1034 by Cher Tidwell RN  Outcome: Progressing  7/7/2024 1033 by Cher Tidwell RN  Outcome: Not Progressing     Problem:  Nutrition/Hydration-ADULT  Goal: Nutrient/Hydration intake appropriate for improving, restoring or maintaining nutritional needs  Description: Monitor and assess patient's nutrition/hydration status for malnutrition. Collaborate with interdisciplinary team and initiate plan and interventions as ordered.  Monitor patient's weight and dietary intake as ordered or per policy. Utilize nutrition screening tool and intervene as necessary. Determine patient's food preferences and provide high-protein, high-caloric foods as appropriate.     INTERVENTIONS:  - Monitor oral intake, urinary output, labs, and treatment plans  - Assess nutrition and hydration status and recommend course of action  - Evaluate amount of meals eaten  - Assist patient with eating if necessary   - Allow adequate time for meals  - Recommend/ encourage appropriate diets, oral nutritional supplements, and vitamin/mineral supplements  - Order, calculate, and assess calorie counts as needed  - Recommend, monitor, and adjust tube feedings and TPN/PPN based on assessed needs  - Assess need for intravenous fluids  - Provide specific nutrition/hydration education as appropriate  - Include patient/family/caregiver in decisions related to nutrition  7/7/2024 1034 by Cher Tidwell RN  Outcome: Progressing  7/7/2024 1033 by Cher Tidwell RN  Outcome: Not Progressing

## 2024-07-08 LAB
ALBUMIN SERPL BCG-MCNC: 3.7 G/DL (ref 3.5–5)
ALP SERPL-CCNC: 78 U/L (ref 34–104)
ALT SERPL W P-5'-P-CCNC: 8 U/L (ref 7–52)
ANION GAP SERPL CALCULATED.3IONS-SCNC: 8 MMOL/L (ref 4–13)
AST SERPL W P-5'-P-CCNC: 16 U/L (ref 13–39)
BASOPHILS # BLD AUTO: 0.03 THOUSANDS/ÂΜL (ref 0–0.1)
BASOPHILS NFR BLD AUTO: 0 % (ref 0–1)
BILIRUB DIRECT SERPL-MCNC: 0.2 MG/DL (ref 0–0.2)
BILIRUB SERPL-MCNC: 0.79 MG/DL (ref 0.2–1)
BUN SERPL-MCNC: 7 MG/DL (ref 5–25)
CALCIUM SERPL-MCNC: 8.5 MG/DL (ref 8.4–10.2)
CHLORIDE SERPL-SCNC: 99 MMOL/L (ref 96–108)
CO2 SERPL-SCNC: 27 MMOL/L (ref 21–32)
CREAT SERPL-MCNC: 0.47 MG/DL (ref 0.6–1.3)
EOSINOPHIL # BLD AUTO: 0.29 THOUSAND/ÂΜL (ref 0–0.61)
EOSINOPHIL NFR BLD AUTO: 3 % (ref 0–6)
ERYTHROCYTE [DISTWIDTH] IN BLOOD BY AUTOMATED COUNT: 13.7 % (ref 11.6–15.1)
GFR SERPL CREATININE-BSD FRML MDRD: 120 ML/MIN/1.73SQ M
GLUCOSE SERPL-MCNC: 154 MG/DL (ref 65–140)
GLUCOSE SERPL-MCNC: 154 MG/DL (ref 65–140)
GLUCOSE SERPL-MCNC: 171 MG/DL (ref 65–140)
GLUCOSE SERPL-MCNC: 171 MG/DL (ref 65–140)
GLUCOSE SERPL-MCNC: 173 MG/DL (ref 65–140)
HCT VFR BLD AUTO: 30.9 % (ref 36.5–49.3)
HGB BLD-MCNC: 9.8 G/DL (ref 12–17)
IMM GRANULOCYTES # BLD AUTO: 0.07 THOUSAND/UL (ref 0–0.2)
IMM GRANULOCYTES NFR BLD AUTO: 1 % (ref 0–2)
LYMPHOCYTES # BLD AUTO: 1.26 THOUSANDS/ÂΜL (ref 0.6–4.47)
LYMPHOCYTES NFR BLD AUTO: 11 % (ref 14–44)
MCH RBC QN AUTO: 30.2 PG (ref 26.8–34.3)
MCHC RBC AUTO-ENTMCNC: 31.7 G/DL (ref 31.4–37.4)
MCV RBC AUTO: 95 FL (ref 82–98)
MONOCYTES # BLD AUTO: 1.14 THOUSAND/ÂΜL (ref 0.17–1.22)
MONOCYTES NFR BLD AUTO: 10 % (ref 4–12)
NEUTROPHILS # BLD AUTO: 8.64 THOUSANDS/ÂΜL (ref 1.85–7.62)
NEUTS SEG NFR BLD AUTO: 75 % (ref 43–75)
NRBC BLD AUTO-RTO: 0 /100 WBCS
PLATELET # BLD AUTO: 338 THOUSANDS/UL (ref 149–390)
PMV BLD AUTO: 9.7 FL (ref 8.9–12.7)
POTASSIUM SERPL-SCNC: 3.6 MMOL/L (ref 3.5–5.3)
PROT SERPL-MCNC: 7 G/DL (ref 6.4–8.4)
RBC # BLD AUTO: 3.25 MILLION/UL (ref 3.88–5.62)
SODIUM SERPL-SCNC: 134 MMOL/L (ref 135–147)
TRIGL SERPL-MCNC: 225 MG/DL
WBC # BLD AUTO: 11.43 THOUSAND/UL (ref 4.31–10.16)

## 2024-07-08 PROCEDURE — 94760 N-INVAS EAR/PLS OXIMETRY 1: CPT

## 2024-07-08 PROCEDURE — 99024 POSTOP FOLLOW-UP VISIT: CPT | Performed by: SURGERY

## 2024-07-08 PROCEDURE — 85025 COMPLETE CBC W/AUTO DIFF WBC: CPT

## 2024-07-08 PROCEDURE — 82948 REAGENT STRIP/BLOOD GLUCOSE: CPT

## 2024-07-08 PROCEDURE — 80076 HEPATIC FUNCTION PANEL: CPT | Performed by: STUDENT IN AN ORGANIZED HEALTH CARE EDUCATION/TRAINING PROGRAM

## 2024-07-08 PROCEDURE — 80048 BASIC METABOLIC PNL TOTAL CA: CPT

## 2024-07-08 PROCEDURE — 97535 SELF CARE MNGMENT TRAINING: CPT

## 2024-07-08 PROCEDURE — 84478 ASSAY OF TRIGLYCERIDES: CPT | Performed by: STUDENT IN AN ORGANIZED HEALTH CARE EDUCATION/TRAINING PROGRAM

## 2024-07-08 RX ORDER — ERYTHROMYCIN ETHYLSUCCINATE 200 MG/5ML
400 SUSPENSION ORAL
Status: DISCONTINUED | OUTPATIENT
Start: 2024-07-08 | End: 2024-07-08

## 2024-07-08 RX ADMIN — METOCLOPRAMIDE HYDROCHLORIDE 10 MG: 5 INJECTION INTRAMUSCULAR; INTRAVENOUS at 06:22

## 2024-07-08 RX ADMIN — Medication: at 20:47

## 2024-07-08 RX ADMIN — AZELASTINE HYDROCHLORIDE 1 SPRAY: 137 SPRAY, METERED NASAL at 09:09

## 2024-07-08 RX ADMIN — ERYTHROMYCIN 400 MG: 200 SUSPENSION ORAL at 11:37

## 2024-07-08 RX ADMIN — ACETAMINOPHEN 1000 MG: 10 INJECTION INTRAVENOUS at 13:26

## 2024-07-08 RX ADMIN — METOCLOPRAMIDE HYDROCHLORIDE 10 MG: 5 INJECTION INTRAMUSCULAR; INTRAVENOUS at 00:07

## 2024-07-08 RX ADMIN — PANTOPRAZOLE SODIUM 40 MG: 40 INJECTION, POWDER, FOR SOLUTION INTRAVENOUS at 06:22

## 2024-07-08 RX ADMIN — ACETAMINOPHEN 1000 MG: 10 INJECTION INTRAVENOUS at 23:20

## 2024-07-08 RX ADMIN — METOCLOPRAMIDE HYDROCHLORIDE 10 MG: 5 INJECTION INTRAMUSCULAR; INTRAVENOUS at 11:36

## 2024-07-08 RX ADMIN — INSULIN LISPRO 2 UNITS: 100 INJECTION, SOLUTION INTRAVENOUS; SUBCUTANEOUS at 06:22

## 2024-07-08 RX ADMIN — AZELASTINE HYDROCHLORIDE 1 SPRAY: 137 SPRAY, METERED NASAL at 17:37

## 2024-07-08 RX ADMIN — ACETAMINOPHEN 1000 MG: 10 INJECTION INTRAVENOUS at 06:22

## 2024-07-08 RX ADMIN — INSULIN LISPRO 2 UNITS: 100 INJECTION, SOLUTION INTRAVENOUS; SUBCUTANEOUS at 23:34

## 2024-07-08 RX ADMIN — INSULIN LISPRO 2 UNITS: 100 INJECTION, SOLUTION INTRAVENOUS; SUBCUTANEOUS at 00:07

## 2024-07-08 RX ADMIN — INSULIN LISPRO 2 UNITS: 100 INJECTION, SOLUTION INTRAVENOUS; SUBCUTANEOUS at 11:37

## 2024-07-08 RX ADMIN — METOCLOPRAMIDE HYDROCHLORIDE 10 MG: 5 INJECTION INTRAMUSCULAR; INTRAVENOUS at 17:37

## 2024-07-08 RX ADMIN — ACETAMINOPHEN 1000 MG: 10 INJECTION INTRAVENOUS at 00:07

## 2024-07-08 RX ADMIN — ENOXAPARIN SODIUM 40 MG: 40 INJECTION SUBCUTANEOUS at 13:26

## 2024-07-08 RX ADMIN — FLUTICASONE PROPIONATE 1 SPRAY: 50 SPRAY, METERED NASAL at 09:09

## 2024-07-08 RX ADMIN — METOCLOPRAMIDE HYDROCHLORIDE 10 MG: 5 INJECTION INTRAMUSCULAR; INTRAVENOUS at 23:20

## 2024-07-08 RX ADMIN — FLUTICASONE FUROATE AND VILANTEROL TRIFENATATE 1 PUFF: 200; 25 POWDER RESPIRATORY (INHALATION) at 09:10

## 2024-07-08 RX ADMIN — INSULIN LISPRO 2 UNITS: 100 INJECTION, SOLUTION INTRAVENOUS; SUBCUTANEOUS at 17:41

## 2024-07-08 NOTE — OCCUPATIONAL THERAPY NOTE
Occupational Therapy Progress Note     Patient Name: Jr Mendoza  Today's Date: 7/8/2024  Problem List  Active Problems:  There are no active Hospital Problems.              07/08/24 1232   OT Last Visit   OT Visit Date 07/08/24   Note Type   Note Type Treatment   Pain Assessment   Pain Assessment Tool 0-10   Pain Score No Pain   Restrictions/Precautions   Weight Bearing Precautions Per Order No   Other Precautions Multiple lines;Fall Risk  (NGT)   Lifestyle   Autonomy IND PTA with all ADLs/IADLs. No DME used. +.   Reciprocal Relationships Supportive spouse (retired RN)   Service to Others Retired   Intrinsic Gratification Fishing   ADL   Where Assessed Chair   Grooming Assistance 5  Supervision/Setup   Grooming Deficit Wash/dry hands;Wash/dry face   UB Dressing Assistance 5  Supervision/Setup   UB Dressing Deficit Pull around back   UB Dressing Comments gown management   LB Dressing Assistance 5  Supervision/Setup   LB Dressing Deficit Don/doff R sock;Don/doff L sock;Use of adaptive equipment;Other (Comment)  (use of sock aid, reacher to don/doff socks)   LB Dressing Comments Pt educated on use of LHAE for LB dressing, with good carryover of learning. Able to don/doff socks with SUP with use of LHAE   Toileting Comments Pt reporting continued difficulty completing sonya care following BM. Pt reports that he has no concerns for this as his wife can assist him PRN. Pt reports spouse is a retired RN and can assist whenever needed. No further concerns   Bed Mobility   Supine to Sit Unable to assess   Sit to Supine Unable to assess   Additional Comments Pt OOB in recliner pre/post session, all needs met, call bell within reach.   Transfers   Sit to Stand 5  Supervision   Additional items Increased time required   Stand to Sit 5  Supervision   Additional items Increased time required   Additional Comments No AD   Functional Mobility   Functional Mobility 5  Supervision   Additional Comments community  "distances, no AD   Subjective   Subjective \"I want to get out of here\"   Cognition   Overall Cognitive Status WFL   Arousal/Participation Cooperative;Alert   Attention Within functional limits   Orientation Level Oriented X4   Memory Within functional limits   Following Commands Follows all commands and directions without difficulty   Comments Pt pleasant and cooperative, motivated to participate. VCs for safety awareness.   Activity Tolerance   Activity Tolerance Patient tolerated treatment well;Patient limited by fatigue   Medical Staff Made Aware RN cleared for therapy   Assessment   Assessment Patient participated in Skilled OT session this date with interventions consisting of ADL re training with the use of correct body mechnaics, Energy Conservation techniques,  long handle equipment,  therapeutic activities to: increase activity tolerance, and increase OOB/ sitting tolerance. Patient agreeable to OT treatment session, upon arrival patient was found seated OOB to Chair, alert, responsive , and in no apparent distress.  In comparison to previous session, patient with improvements in ADL completion with LHAE, functional mobility. Patient requiring frequent rest periods and ocassional safety reminders. Patient has attained all treatment goals and is now demonstrating ability to complete UB/LB ADLs at supervision, with the use of  sock aid and reacher .  Pt reporting that spouse is a retired RN and can assist PRN with all ADLs/IADLs. Pt educated on LHAE and LB ADLs with good carryover of learning, patient verbalized understanding and demonstrated recommended plan correctly.  Patient appears to be functioning at baseline. No further acute OT needs identified at this time to warrant continuation of services. D/C OT services. From OT standpoint, recommendation at time of d/c would be Level 3 resources - & increased assist PRN from spouse upon d/c. Will remain available if skilled acute OT needs arise.   Plan "   Treatment Interventions ADL retraining;Functional transfer training;Endurance training;Cognitive reorientation;Patient/family training;Compensatory technique education;Continued evaluation;Energy conservation;Activityengagement   Goal Expiration Date 07/11/24   OT Treatment Day 2   OT Frequency 2-3x/wk   Discharge Recommendation   Rehab Resource Intensity Level, OT III (Minimum Resource Intensity)  (with increased assist PRN from spouse)   Equipment Recommended Hip Kit ($);Reacher ($);Sock aid ($)   AM-PAC Daily Activity Inpatient   Lower Body Dressing 3   Bathing 3   Toileting 2   Upper Body Dressing 3   Grooming 4   Eating 4   Daily Activity Raw Score 19   Daily Activity Standardized Score (Calc for Raw Score >=11) 40.22   AM-PAC Applied Cognition Inpatient   Following a Speech/Presentation 3   Understanding Ordinary Conversation 4   Taking Medications 4   Remembering Where Things Are Placed or Put Away 4   Remembering List of 4-5 Errands 4   Taking Care of Complicated Tasks 4   Applied Cognition Raw Score 23   Applied Cognition Standardized Score 53.08   End of Consult   Education Provided Yes   Patient Position at End of Consult Bedside chair;All needs within reach   Nurse Communication Nurse aware of consult           Trevin Olmos MS, OTR/L     MOCA CERTIFIED RATER ID [id number]

## 2024-07-08 NOTE — PLAN OF CARE
Problem: PAIN - ADULT  Goal: Verbalizes/displays adequate comfort level or baseline comfort level  Description: Interventions:  - Encourage patient to monitor pain and request assistance  - Assess pain using appropriate pain scale  - Administer analgesics based on type and severity of pain and evaluate response  - Implement non-pharmacological measures as appropriate and evaluate response  - Consider cultural and social influences on pain and pain management  - Notify physician/advanced practitioner if interventions unsuccessful or patient reports new pain  7/8/2024 0041 by Dipak Campbell RN  Outcome: Progressing  7/7/2024 2352 by Dipak Campbell RN  Outcome: Progressing     Problem: INFECTION - ADULT  Goal: Absence or prevention of progression during hospitalization  Description: INTERVENTIONS:  - Assess and monitor for signs and symptoms of infection  - Monitor lab/diagnostic results  - Monitor all insertion sites, i.e. indwelling lines, tubes, and drains  - Monitor endotracheal if appropriate and nasal secretions for changes in amount and color  - [place] appropriate cooling/warming therapies per order  - Administer medications as ordered  - Instruct and encourage patient and family to use good hand hygiene technique  - Identify and instruct in appropriate isolation precautions for identified infection/condition  7/8/2024 0041 by Dipak Campbell RN  Outcome: Progressing  7/7/2024 2352 by Dipak Campbell RN  Outcome: Progressing

## 2024-07-08 NOTE — RESTORATIVE TECHNICIAN NOTE
Restorative Technician Note      Patient Name: Jr Mendoza     Restorative Tech Visit Date: 07/08/24  Note Type: Mobility  Patient Position Upon Consult: Supine  Activity Performed: Ambulated  Assistive Device: Other (Comment) (no AD)  Education Provided: Yes  Patient Position at End of Consult: Bedside chair; All needs within reach    Jazz Kevin  DPT, Restorative Technician

## 2024-07-08 NOTE — PLAN OF CARE
Problem: OCCUPATIONAL THERAPY ADULT  Goal: Performs self-care activities at highest level of function for planned discharge setting.  See evaluation for individualized goals.  Description: Treatment Interventions: ADL retraining, Functional transfer training, UE strengthening/ROM, Endurance training, Cognitive reorientation, Patient/family training, Equipment evaluation/education, Fine motor coordination activities, Compensatory technique education, Continued evaluation, Energy conservation, Activityengagement          See flowsheet documentation for full assessment, interventions and recommendations.   Outcome: Completed  Note: Limitation: Decreased ADL status, Decreased endurance, Decreased self-care trans, Decreased high-level ADLs  Prognosis: Good  Assessment: Patient participated in Skilled OT session this date with interventions consisting of ADL re training with the use of correct body mechnaics, Energy Conservation techniques,  long handle equipment,  therapeutic activities to: increase activity tolerance, and increase OOB/ sitting tolerance. Patient agreeable to OT treatment session, upon arrival patient was found seated OOB to Chair, alert, responsive , and in no apparent distress.  In comparison to previous session, patient with improvements in ADL completion with LHAE, functional mobility. Patient requiring frequent rest periods and ocassional safety reminders. Patient has attained all treatment goals and is now demonstrating ability to complete UB/LB ADLs at supervision, with the use of  sock aid and reacher .  Pt reporting that spouse is a retired RN and can assist PRN with all ADLs/IADLs. Pt educated on LHAE and LB ADLs with good carryover of learning, patient verbalized understanding and demonstrated recommended plan correctly.  Patient appears to be functioning at baseline. No further acute OT needs identified at this time to warrant continuation of services. D/C OT services. From OT standpoint,  recommendation at time of d/c would be Level 3 resources - & increased assist PRN from spouse upon d/c. Will remain available if skilled acute OT needs arise.     Rehab Resource Intensity Level, OT: III (Minimum Resource Intensity) (with increased assist PRN from spouse)

## 2024-07-08 NOTE — PROGRESS NOTES
"Progress Note - General Surgery   Jr Mendoza 58 y.o. male MRN: [id number]  Unit/Bed#: [facility] 817-01 Encounter: [id number]    Assessment:  57yo M POD 12 laparoscopic sigmoidectomy, now with recurrent ileus.     NAEON. AVSS on RA  Feels well this morning. No N/V  Abdomen still distended, but improved from prior exams.  +gas/+BM    NGT: 1.6L    WBC: 11.4 (10.95)  Hgb: 9.8 (9.1)  T  Cr. 0.47 (0.46)    Plan:  Given high NGT output, hold off on clamp trial  Keep NPO, NGT to suction  TPN at goal  mIVF @40  Encourage OOB/ambulation  PT/OT: level 3     Subjective/Objective     Subjective: reports feeling much better this AM. Walked around in the halls and says he feels stronger.    Objective:     Blood pressure 149/82, pulse 97, temperature (!) 97.3 °F (36.3 °C), resp. rate 16, height 5' 10\" (1.778 m), weight 120 kg (264 lb 9.6 oz), SpO2 95%.,Body mass index is 37.97 kg/m².      Intake/Output Summary (Last 24 hours) at 2024 0545  Last data filed at 2024 0207  Gross per 24 hour   Intake --   Output 1900 ml   Net -1900 ml       Invasive Devices       Peripherally Inserted Central Catheter Line  Duration             PICC Line 24 Right Basilic 2 days              Peripheral Intravenous Line  Duration             Peripheral IV 24 Distal;Left;Upper;Ventral (anterior) Arm 3 days    Peripheral IV 24 Right;Ventral (anterior) Forearm 2 days              Drain  Duration             NG/OG/Enteral Tube Left nare 3 days                    Physical Exam:   General: well-developed, well nourished male in NAD, appears mildly toxic with belches, standing in bathroom  Skin: Warm, dry, anicteric. No rash, erythema, or abrasions.   HEENT: Normocephalic, atraumatic. EOMI. Normal external nose and ears. MMM. NGT secured to nare.  Neck: supple, soft; trachea midline. Full ROM  CV: regular rate, grossly well perfused  Pulm: no increased WOB, symmetric chest rise/fall with respirations  Abd: distended, tympanic, " nontender, incisions with c/d/i dressings  MSK: Symmetric, no edema, no tenderness, no deformities observed. Left hip ecchymosis without underlying fluctuance or induration without ROM restriction.  Neuro: AOx3, GCS 15    Lab, Imaging and other studies:I have personally reviewed pertinent lab results.    VTE Pharmacologic Prophylaxis: Enoxaparin (Lovenox)  VTE Mechanical Prophylaxis: sequential compression device

## 2024-07-08 NOTE — PLAN OF CARE
Problem: PAIN - ADULT  Goal: Verbalizes/displays adequate comfort level or baseline comfort level  Description: Interventions:  - Encourage patient to monitor pain and request assistance  - Assess pain using appropriate pain scale  - Administer analgesics based on type and severity of pain and evaluate response  - Implement non-pharmacological measures as appropriate and evaluate response  - Consider cultural and social influences on pain and pain management  - Notify physician/advanced practitioner if interventions unsuccessful or patient reports new pain  Outcome: Progressing     Problem: INFECTION - ADULT  Goal: Absence or prevention of progression during hospitalization  Description: INTERVENTIONS:  - Assess and monitor for signs and symptoms of infection  - Monitor lab/diagnostic results  - Monitor all insertion sites, i.e. indwelling lines, tubes, and drains  - Monitor endotracheal if appropriate and nasal secretions for changes in amount and color  - [place] appropriate cooling/warming therapies per order  - Administer medications as ordered  - Instruct and encourage patient and family to use good hand hygiene technique  - Identify and instruct in appropriate isolation precautions for identified infection/condition  Outcome: Progressing

## 2024-07-09 ENCOUNTER — APPOINTMENT (INPATIENT)
Dept: RADIOLOGY | Facility: HOSPITAL | Age: 59
DRG: 330 | End: 2024-07-09
Payer: COMMERCIAL

## 2024-07-09 LAB
ANION GAP SERPL CALCULATED.3IONS-SCNC: 8 MMOL/L (ref 4–13)
BASOPHILS # BLD AUTO: 0.02 THOUSANDS/ÂΜL (ref 0–0.1)
BASOPHILS NFR BLD AUTO: 0 % (ref 0–1)
BUN SERPL-MCNC: 9 MG/DL (ref 5–25)
CALCIUM SERPL-MCNC: 8.6 MG/DL (ref 8.4–10.2)
CHLORIDE SERPL-SCNC: 98 MMOL/L (ref 96–108)
CO2 SERPL-SCNC: 28 MMOL/L (ref 21–32)
CREAT SERPL-MCNC: 0.43 MG/DL (ref 0.6–1.3)
DME PARACHUTE DELIVERY DATE REQUESTED: NORMAL
DME PARACHUTE ITEM DESCRIPTION: NORMAL
DME PARACHUTE ORDER STATUS: NORMAL
DME PARACHUTE SUPPLIER NAME: NORMAL
DME PARACHUTE SUPPLIER PHONE: NORMAL
EOSINOPHIL # BLD AUTO: 0.27 THOUSAND/ÂΜL (ref 0–0.61)
EOSINOPHIL NFR BLD AUTO: 2 % (ref 0–6)
ERYTHROCYTE [DISTWIDTH] IN BLOOD BY AUTOMATED COUNT: 13.7 % (ref 11.6–15.1)
GFR SERPL CREATININE-BSD FRML MDRD: 125 ML/MIN/1.73SQ M
GLUCOSE SERPL-MCNC: 144 MG/DL (ref 65–140)
GLUCOSE SERPL-MCNC: 174 MG/DL (ref 65–140)
GLUCOSE SERPL-MCNC: 180 MG/DL (ref 65–140)
GLUCOSE SERPL-MCNC: 188 MG/DL (ref 65–140)
HCT VFR BLD AUTO: 31.4 % (ref 36.5–49.3)
HGB BLD-MCNC: 9.7 G/DL (ref 12–17)
IMM GRANULOCYTES # BLD AUTO: 0.07 THOUSAND/UL (ref 0–0.2)
IMM GRANULOCYTES NFR BLD AUTO: 1 % (ref 0–2)
LYMPHOCYTES # BLD AUTO: 1.5 THOUSANDS/ÂΜL (ref 0.6–4.47)
LYMPHOCYTES NFR BLD AUTO: 11 % (ref 14–44)
MAGNESIUM SERPL-MCNC: 1.9 MG/DL (ref 1.9–2.7)
MCH RBC QN AUTO: 29.8 PG (ref 26.8–34.3)
MCHC RBC AUTO-ENTMCNC: 30.9 G/DL (ref 31.4–37.4)
MCV RBC AUTO: 96 FL (ref 82–98)
MONOCYTES # BLD AUTO: 1.4 THOUSAND/ÂΜL (ref 0.17–1.22)
MONOCYTES NFR BLD AUTO: 11 % (ref 4–12)
NEUTROPHILS # BLD AUTO: 10.11 THOUSANDS/ÂΜL (ref 1.85–7.62)
NEUTS SEG NFR BLD AUTO: 75 % (ref 43–75)
NRBC BLD AUTO-RTO: 0 /100 WBCS
PHOSPHATE SERPL-MCNC: 3.8 MG/DL (ref 2.3–4.1)
PLATELET # BLD AUTO: 365 THOUSANDS/UL (ref 149–390)
PMV BLD AUTO: 10.1 FL (ref 8.9–12.7)
POTASSIUM SERPL-SCNC: 3.6 MMOL/L (ref 3.5–5.3)
RBC # BLD AUTO: 3.26 MILLION/UL (ref 3.88–5.62)
SODIUM SERPL-SCNC: 134 MMOL/L (ref 135–147)
WBC # BLD AUTO: 13.37 THOUSAND/UL (ref 4.31–10.16)

## 2024-07-09 PROCEDURE — 85025 COMPLETE CBC W/AUTO DIFF WBC: CPT

## 2024-07-09 PROCEDURE — 80048 BASIC METABOLIC PNL TOTAL CA: CPT

## 2024-07-09 PROCEDURE — 84100 ASSAY OF PHOSPHORUS: CPT

## 2024-07-09 PROCEDURE — 83735 ASSAY OF MAGNESIUM: CPT

## 2024-07-09 PROCEDURE — 99024 POSTOP FOLLOW-UP VISIT: CPT | Performed by: SURGERY

## 2024-07-09 PROCEDURE — 74177 CT ABD & PELVIS W/CONTRAST: CPT

## 2024-07-09 PROCEDURE — 94760 N-INVAS EAR/PLS OXIMETRY 1: CPT

## 2024-07-09 PROCEDURE — 82948 REAGENT STRIP/BLOOD GLUCOSE: CPT

## 2024-07-09 RX ORDER — MAGNESIUM SULFATE 1 G/100ML
1 INJECTION INTRAVENOUS ONCE
Status: COMPLETED | OUTPATIENT
Start: 2024-07-09 | End: 2024-07-09

## 2024-07-09 RX ORDER — ACETAMINOPHEN 10 MG/ML
1000 INJECTION, SOLUTION INTRAVENOUS EVERY 6 HOURS PRN
Status: DISCONTINUED | OUTPATIENT
Start: 2024-07-09 | End: 2024-07-12

## 2024-07-09 RX ORDER — POTASSIUM CHLORIDE 14.9 MG/ML
20 INJECTION INTRAVENOUS
Status: COMPLETED | OUTPATIENT
Start: 2024-07-09 | End: 2024-07-09

## 2024-07-09 RX ORDER — TAMSULOSIN HYDROCHLORIDE 0.4 MG/1
0.4 CAPSULE ORAL
Status: DISCONTINUED | OUTPATIENT
Start: 2024-07-09 | End: 2024-07-12 | Stop reason: HOSPADM

## 2024-07-09 RX ADMIN — METOCLOPRAMIDE HYDROCHLORIDE 10 MG: 5 INJECTION INTRAMUSCULAR; INTRAVENOUS at 11:22

## 2024-07-09 RX ADMIN — POTASSIUM CHLORIDE 20 MEQ: 14.9 INJECTION, SOLUTION INTRAVENOUS at 11:05

## 2024-07-09 RX ADMIN — INSULIN LISPRO 2 UNITS: 100 INJECTION, SOLUTION INTRAVENOUS; SUBCUTANEOUS at 05:17

## 2024-07-09 RX ADMIN — PANTOPRAZOLE SODIUM 40 MG: 40 INJECTION, POWDER, FOR SOLUTION INTRAVENOUS at 05:09

## 2024-07-09 RX ADMIN — FLUTICASONE PROPIONATE 1 SPRAY: 50 SPRAY, METERED NASAL at 09:33

## 2024-07-09 RX ADMIN — AZELASTINE HYDROCHLORIDE 1 SPRAY: 137 SPRAY, METERED NASAL at 09:33

## 2024-07-09 RX ADMIN — ACETAMINOPHEN 1000 MG: 10 INJECTION INTRAVENOUS at 05:08

## 2024-07-09 RX ADMIN — ENOXAPARIN SODIUM 40 MG: 40 INJECTION SUBCUTANEOUS at 14:25

## 2024-07-09 RX ADMIN — INSULIN LISPRO 2 UNITS: 100 INJECTION, SOLUTION INTRAVENOUS; SUBCUTANEOUS at 12:17

## 2024-07-09 RX ADMIN — FLUTICASONE FUROATE AND VILANTEROL TRIFENATATE 1 PUFF: 200; 25 POWDER RESPIRATORY (INHALATION) at 09:34

## 2024-07-09 RX ADMIN — ACETAMINOPHEN 1000 MG: 10 INJECTION INTRAVENOUS at 11:24

## 2024-07-09 RX ADMIN — METOCLOPRAMIDE HYDROCHLORIDE 10 MG: 5 INJECTION INTRAMUSCULAR; INTRAVENOUS at 17:21

## 2024-07-09 RX ADMIN — METOCLOPRAMIDE HYDROCHLORIDE 10 MG: 5 INJECTION INTRAMUSCULAR; INTRAVENOUS at 05:09

## 2024-07-09 RX ADMIN — AZELASTINE HYDROCHLORIDE 1 SPRAY: 137 SPRAY, METERED NASAL at 17:47

## 2024-07-09 RX ADMIN — Medication: at 21:50

## 2024-07-09 RX ADMIN — POTASSIUM CHLORIDE 20 MEQ: 14.9 INJECTION, SOLUTION INTRAVENOUS at 09:23

## 2024-07-09 RX ADMIN — IOHEXOL 50 ML: 240 INJECTION, SOLUTION INTRATHECAL; INTRAVASCULAR; INTRAVENOUS; ORAL at 11:21

## 2024-07-09 RX ADMIN — MAGNESIUM SULFATE HEPTAHYDRATE 1 G: 1 INJECTION, SOLUTION INTRAVENOUS at 09:28

## 2024-07-09 RX ADMIN — TAMSULOSIN HYDROCHLORIDE 0.4 MG: 0.4 CAPSULE ORAL at 21:50

## 2024-07-09 RX ADMIN — IOHEXOL 100 ML: 350 INJECTION, SOLUTION INTRAVENOUS at 14:09

## 2024-07-09 NOTE — CASE MANAGEMENT
Case Management Progress Note    Patient name Jr Mendoza  Location [facility] 817/[facility] 817-01 MRN [id number]  : 1965 Date 2024       LOS (days): 13  Geometric Mean LOS (GMLOS) (days): 5.1  Days to GMLOS:-7.7        OBJECTIVE:        Current admission status: Inpatient  Preferred Pharmacy:   [facility] #[id number] - [place] - [address]  [address]  [place] [address]  Phone: [phone] Fax: [phone]    [facility] - [place], [place] - [address]  [address]  [place] [address]  Phone: [phone] Fax: [phone]    Primary Care Provider: CELI Bowling    Primary Insurance: BLUE CROSS  Secondary Insurance:     PROGRESS NOTE:    Pt is still not medically cleared for DC at this time.  CM team continuing to follow for DC planning needs.

## 2024-07-09 NOTE — RESTORATIVE TECHNICIAN NOTE
Restorative Technician Note      Patient Name: Jr Mendoza     Restorative Tech Visit Date: 07/09/24  Note Type: Mobility  Patient Position Upon Consult: Supine  Activity Performed: Ambulated  Assistive Device: Other (Comment) (no AD)  Education Provided: Yes  Patient Position at End of Consult: Bedside chair; All needs within reach    Jazz Kevin  DPT, Restorative Technician

## 2024-07-09 NOTE — PLAN OF CARE
Problem: PAIN - ADULT  Goal: Verbalizes/displays adequate comfort level or baseline comfort level  Description: Interventions:  - Encourage patient to monitor pain and request assistance  - Assess pain using appropriate pain scale  - Administer analgesics based on type and severity of pain and evaluate response  - Implement non-pharmacological measures as appropriate and evaluate response  - Consider cultural and social influences on pain and pain management  - Notify physician/advanced practitioner if interventions unsuccessful or patient reports new pain  Outcome: Progressing     Problem: INFECTION - ADULT  Goal: Absence or prevention of progression during hospitalization  Description: INTERVENTIONS:  - Assess and monitor for signs and symptoms of infection  - Monitor lab/diagnostic results  - Monitor all insertion sites, i.e. indwelling lines, tubes, and drains  - Monitor endotracheal if appropriate and nasal secretions for changes in amount and color  - [place] appropriate cooling/warming therapies per order  - Administer medications as ordered  - Instruct and encourage patient and family to use good hand hygiene technique  - Identify and instruct in appropriate isolation precautions for identified infection/condition  Outcome: Progressing     Problem: SAFETY ADULT  Goal: Patient will remain free of falls  Description: INTERVENTIONS:  - Educate patient/family on patient safety including physical limitations  - Instruct patient to call for assistance with activity   - Consult OT/PT to assist with strengthening/mobility   - Keep Call bell within reach  - Keep bed low and locked with side rails adjusted as appropriate  - Keep care items and personal belongings within reach  - Initiate and maintain comfort rounds  - Make Fall Risk Sign visible to staff  - Offer Toileting every  Hours, in advance of need  - Initiate/Maintain alarm  - Obtain necessary fall risk management equipment:   - Apply yellow socks and  bracelet for high fall risk patients  - Consider moving patient to room near nurses station  Outcome: Progressing  Goal: Maintain or return to baseline ADL function  Description: INTERVENTIONS:  -  Assess patient's ability to carry out ADLs; assess patient's baseline for ADL function and identify physical deficits which impact ability to perform ADLs (bathing, care of mouth/teeth, toileting, grooming, dressing, etc.)  - Assess/evaluate cause of self-care deficits   - Assess range of motion  - Assess patient's mobility; develop plan if impaired  - Assess patient's need for assistive devices and provide as appropriate  - Encourage maximum independence but intervene and supervise when necessary  - Involve family in performance of ADLs  - Assess for home care needs following discharge   - Consider OT consult to assist with ADL evaluation and planning for discharge  - Provide patient education as appropriate  Outcome: Progressing  Goal: Maintains/Returns to pre admission functional level  Description: INTERVENTIONS:  - Perform AM-PAC 6 Click Basic Mobility/ Daily Activity assessment daily.  - Set and communicate daily mobility goal to care team and patient/family/caregiver.   - Collaborate with rehabilitation services on mobility goals if consulted  - Perform Range of Motion  times a day.  - Reposition patient every  hours.  - Dangle patient  times a day  - Stand patient  times a day  - Ambulate patient  times a day  - Out of bed to chair  times a day   - Out of bed for meals  times a day  - Out of bed for toileting  - Record patient progress and toleration of activity level   Outcome: Progressing     Problem: DISCHARGE PLANNING  Goal: Discharge to home or other facility with appropriate resources  Description: INTERVENTIONS:  - Identify barriers to discharge w/patient and caregiver  - Arrange for needed discharge resources and transportation as appropriate  - Identify discharge learning needs (meds, wound care, etc.)  -  Arrange for interpretive services to assist at discharge as needed  - Refer to Case Management Department for coordinating discharge planning if the patient needs post-hospital services based on physician/advanced practitioner order or complex needs related to functional status, cognitive ability, or social support system  Outcome: Progressing     Problem: Knowledge Deficit  Goal: Patient/family/caregiver demonstrates understanding of disease process, treatment plan, medications, and discharge instructions  Description: Complete learning assessment and assess knowledge base.  Interventions:  - Provide teaching at level of understanding  - Provide teaching via preferred learning methods  Outcome: Progressing     Problem: GASTROINTESTINAL - ADULT  Goal: Minimal or absence of nausea and/or vomiting  Description: INTERVENTIONS:  - Administer IV fluids if ordered to ensure adequate hydration  - Maintain NPO status until nausea and vomiting are resolved  - Nasogastric tube if ordered  - Administer ordered antiemetic medications as needed  - Provide nonpharmacologic comfort measures as appropriate  - Advance diet as tolerated, if ordered  - Consider nutrition services referral to assist patient with adequate nutrition and appropriate food choices  Outcome: Progressing     Problem: METABOLIC, FLUID AND ELECTROLYTES - ADULT  Goal: Electrolytes maintained within normal limits  Description: INTERVENTIONS:  - Monitor labs and assess patient for signs and symptoms of electrolyte imbalances  - Administer electrolyte replacement as ordered  - Monitor response to electrolyte replacements, including repeat lab results as appropriate  - Instruct patient on fluid and nutrition as appropriate  Outcome: Progressing  Goal: Fluid balance maintained  Description: INTERVENTIONS:  - Monitor labs   - Monitor I/O and WT  - Instruct patient on fluid and nutrition as appropriate  - Assess for signs & symptoms of volume excess or  deficit  Outcome: Progressing     Problem: SKIN/TISSUE INTEGRITY - ADULT  Goal: Incision(s), wounds(s) or drain site(s) healing without S/S of infection  Description: INTERVENTIONS  - Assess and document dressing, incision, wound bed, drain sites and surrounding tissue  - Provide patient and family education  - Perform skin care/dressing changes   Outcome: Progressing     Problem: HEMATOLOGIC - ADULT  Goal: Maintains hematologic stability  Description: INTERVENTIONS  - Assess for signs and symptoms of bleeding or hemorrhage  - Monitor labs  - Administer supportive blood products/factors as ordered and appropriate  Outcome: Progressing     Problem: Nutrition/Hydration-ADULT  Goal: Nutrient/Hydration intake appropriate for improving, restoring or maintaining nutritional needs  Description: Monitor and assess patient's nutrition/hydration status for malnutrition. Collaborate with interdisciplinary team and initiate plan and interventions as ordered.  Monitor patient's weight and dietary intake as ordered or per policy. Utilize nutrition screening tool and intervene as necessary. Determine patient's food preferences and provide high-protein, high-caloric foods as appropriate.     INTERVENTIONS:  - Monitor oral intake, urinary output, labs, and treatment plans  - Assess nutrition and hydration status and recommend course of action  - Evaluate amount of meals eaten  - Assist patient with eating if necessary   - Allow adequate time for meals  - Recommend/ encourage appropriate diets, oral nutritional supplements, and vitamin/mineral supplements  - Order, calculate, and assess calorie counts as needed  - Recommend, monitor, and adjust tube feedings and TPN/PPN based on assessed needs  - Assess need for intravenous fluids  - Provide specific nutrition/hydration education as appropriate  - Include patient/family/caregiver in decisions related to nutrition  Outcome: Progressing

## 2024-07-09 NOTE — PLAN OF CARE
Problem: PAIN - ADULT  Goal: Verbalizes/displays adequate comfort level or baseline comfort level  Description: Interventions:  - Encourage patient to monitor pain and request assistance  - Assess pain using appropriate pain scale  - Administer analgesics based on type and severity of pain and evaluate response  - Implement non-pharmacological measures as appropriate and evaluate response  - Consider cultural and social influences on pain and pain management  - Notify physician/advanced practitioner if interventions unsuccessful or patient reports new pain  Outcome: Progressing     Problem: INFECTION - ADULT  Goal: Absence or prevention of progression during hospitalization  Description: INTERVENTIONS:  - Assess and monitor for signs and symptoms of infection  - Monitor lab/diagnostic results  - Monitor all insertion sites, i.e. indwelling lines, tubes, and drains  - Monitor endotracheal if appropriate and nasal secretions for changes in amount and color  - [place] appropriate cooling/warming therapies per order  - Administer medications as ordered  - Instruct and encourage patient and family to use good hand hygiene technique  - Identify and instruct in appropriate isolation precautions for identified infection/condition  Outcome: Progressing     Problem: SAFETY ADULT  Goal: Patient will remain free of falls  Description: INTERVENTIONS:  - Educate patient/family on patient safety including physical limitations  - Instruct patient to call for assistance with activity   - Consult OT/PT to assist with strengthening/mobility   - Keep Call bell within reach  - Keep bed low and locked with side rails adjusted as appropriate  - Keep care items and personal belongings within reach  - Initiate and maintain comfort rounds  - Make Fall Risk Sign visible to staff  - Offer Toileting every  Hours, in advance of need  - Initiate/Maintain alarm  - Obtain necessary fall risk management equipment:   - Apply yellow socks and  bracelet for high fall risk patients  - Consider moving patient to room near nurses station  Outcome: Progressing  Goal: Maintain or return to baseline ADL function  Description: INTERVENTIONS:  -  Assess patient's ability to carry out ADLs; assess patient's baseline for ADL function and identify physical deficits which impact ability to perform ADLs (bathing, care of mouth/teeth, toileting, grooming, dressing, etc.)  - Assess/evaluate cause of self-care deficits   - Assess range of motion  - Assess patient's mobility; develop plan if impaired  - Assess patient's need for assistive devices and provide as appropriate  - Encourage maximum independence but intervene and supervise when necessary  - Involve family in performance of ADLs  - Assess for home care needs following discharge   - Consider OT consult to assist with ADL evaluation and planning for discharge  - Provide patient education as appropriate  Outcome: Progressing  Goal: Maintains/Returns to pre admission functional level  Description: INTERVENTIONS:  - Perform AM-PAC 6 Click Basic Mobility/ Daily Activity assessment daily.  - Set and communicate daily mobility goal to care team and patient/family/caregiver.   - Collaborate with rehabilitation services on mobility goals if consulted  - Perform Range of Motion  times a day.  - Reposition patient every  hours.  - Dangle patient  times a day  - Stand patient  times a day  - Ambulate patient  times a day  - Out of bed to chair  times a day   - Out of bed for meals  times a day  - Out of bed for toileting  - Record patient progress and toleration of activity level   Outcome: Progressing     Problem: DISCHARGE PLANNING  Goal: Discharge to home or other facility with appropriate resources  Description: INTERVENTIONS:  - Identify barriers to discharge w/patient and caregiver  - Arrange for needed discharge resources and transportation as appropriate  - Identify discharge learning needs (meds, wound care, etc.)  -  Arrange for interpretive services to assist at discharge as needed  - Refer to Case Management Department for coordinating discharge planning if the patient needs post-hospital services based on physician/advanced practitioner order or complex needs related to functional status, cognitive ability, or social support system  Outcome: Progressing     Problem: Knowledge Deficit  Goal: Patient/family/caregiver demonstrates understanding of disease process, treatment plan, medications, and discharge instructions  Description: Complete learning assessment and assess knowledge base.  Interventions:  - Provide teaching at level of understanding  - Provide teaching via preferred learning methods  Outcome: Progressing     Problem: GASTROINTESTINAL - ADULT  Goal: Minimal or absence of nausea and/or vomiting  Description: INTERVENTIONS:  - Administer IV fluids if ordered to ensure adequate hydration  - Maintain NPO status until nausea and vomiting are resolved  - Nasogastric tube if ordered  - Administer ordered antiemetic medications as needed  - Provide nonpharmacologic comfort measures as appropriate  - Advance diet as tolerated, if ordered  - Consider nutrition services referral to assist patient with adequate nutrition and appropriate food choices  Outcome: Progressing     Problem: METABOLIC, FLUID AND ELECTROLYTES - ADULT  Goal: Electrolytes maintained within normal limits  Description: INTERVENTIONS:  - Monitor labs and assess patient for signs and symptoms of electrolyte imbalances  - Administer electrolyte replacement as ordered  - Monitor response to electrolyte replacements, including repeat lab results as appropriate  - Instruct patient on fluid and nutrition as appropriate  Outcome: Progressing  Goal: Fluid balance maintained  Description: INTERVENTIONS:  - Monitor labs   - Monitor I/O and WT  - Instruct patient on fluid and nutrition as appropriate  - Assess for signs & symptoms of volume excess or  deficit  Outcome: Progressing     Problem: SKIN/TISSUE INTEGRITY - ADULT  Goal: Incision(s), wounds(s) or drain site(s) healing without S/S of infection  Description: INTERVENTIONS  - Assess and document dressing, incision, wound bed, drain sites and surrounding tissue  - Provide patient and family education  Outcome: Progressing     Problem: HEMATOLOGIC - ADULT  Goal: Maintains hematologic stability  Description: INTERVENTIONS  - Assess for signs and symptoms of bleeding or hemorrhage  - Monitor labs  - Administer supportive blood products/factors as ordered and appropriate  Outcome: Progressing     Problem: Nutrition/Hydration-ADULT  Goal: Nutrient/Hydration intake appropriate for improving, restoring or maintaining nutritional needs  Description: Monitor and assess patient's nutrition/hydration status for malnutrition. Collaborate with interdisciplinary team and initiate plan and interventions as ordered.  Monitor patient's weight and dietary intake as ordered or per policy. Utilize nutrition screening tool and intervene as necessary. Determine patient's food preferences and provide high-protein, high-caloric foods as appropriate.     INTERVENTIONS:  - Monitor oral intake, urinary output, labs, and treatment plans  - Assess nutrition and hydration status and recommend course of action  - Evaluate amount of meals eaten  - Assist patient with eating if necessary   - Allow adequate time for meals  - Recommend/ encourage appropriate diets, oral nutritional supplements, and vitamin/mineral supplements  - Order, calculate, and assess calorie counts as needed  - Recommend, monitor, and adjust tube feedings and TPN/PPN based on assessed needs  - Assess need for intravenous fluids  - Provide specific nutrition/hydration education as appropriate  - Include patient/family/caregiver in decisions related to nutrition  Outcome: Progressing

## 2024-07-09 NOTE — PROGRESS NOTES
"Progress Note - Colorectal Surgery   Jr Mendoza 58 y.o. male MRN: [id number]  Unit/Bed#: [facility] 817-01 Encounter: [id number]    Assessment:  57yo M s/p laparoscopic sigmoidectomy on 6/26, now with recurrent ileus.      NG output: 1700    Plan:  -Clamp trial  -Continue TPN @ goal  -f/u T bili levels  -NPO  -Encourage OOB/ambulation  -PT/OT: level 3    Subjective/Objective     Subjective:   No acute events overnight. Patient reports minimal to no pain. NG tube in place. Denies N/V, fever, chills, SOB, chest pain. Passing flatus, had multiple BM overnight.     Objective:     Blood pressure 153/81, pulse 90, temperature 98.4 °F (36.9 °C), resp. rate 16, height 5' 10\" (1.778 m), weight 120 kg (264 lb 9.6 oz), SpO2 94%.,Body mass index is 37.97 kg/m².      Intake/Output Summary (Last 24 hours) at 7/8/2024 2026  Last data filed at 7/8/2024 1734  Gross per 24 hour   Intake 100 ml   Output 2550 ml   Net -2450 ml       Invasive Devices       Peripherally Inserted Central Catheter Line  Duration             PICC Line 07/05/24 Right Basilic 3 days              Peripheral Intravenous Line  Duration             Peripheral IV 07/05/24 Right;Ventral (anterior) Forearm 3 days              Drain  Duration             NG/OG/Enteral Tube Left nare 4 days                    Physical Exam:   General: no acute distress, resting comfortably in bed  Skin: Warm, dry, anicteric.  HEENT: Normocephalic, atraumatic. NG tube in place.  CV: regular rate, grossly well perfused  Pulm: no increased WOB, symmetric chest rise/fall with respirations  Abd: distended, tympanic, nontender, incisions with c/d/i dressings. Old Bruising located in the periumbilical and hypogastric region  MSK: Symmetric, no tenderness, no deformities observed.   Neuro: AOx3, GCS 15    Lab, Imaging and other studies:I have personally reviewed pertinent lab results.    VTE Pharmacologic Prophylaxis: Enoxaparin (Lovenox)  VTE Mechanical Prophylaxis: sequential compression " device

## 2024-07-10 LAB
ANION GAP SERPL CALCULATED.3IONS-SCNC: 9 MMOL/L (ref 4–13)
BASOPHILS # BLD AUTO: 0.03 THOUSANDS/ÂΜL (ref 0–0.1)
BASOPHILS NFR BLD AUTO: 0 % (ref 0–1)
BUN SERPL-MCNC: 9 MG/DL (ref 5–25)
CALCIUM SERPL-MCNC: 8.7 MG/DL (ref 8.4–10.2)
CHLORIDE SERPL-SCNC: 97 MMOL/L (ref 96–108)
CO2 SERPL-SCNC: 28 MMOL/L (ref 21–32)
CREAT SERPL-MCNC: 0.5 MG/DL (ref 0.6–1.3)
EOSINOPHIL # BLD AUTO: 0.33 THOUSAND/ÂΜL (ref 0–0.61)
EOSINOPHIL NFR BLD AUTO: 3 % (ref 0–6)
ERYTHROCYTE [DISTWIDTH] IN BLOOD BY AUTOMATED COUNT: 13.8 % (ref 11.6–15.1)
GFR SERPL CREATININE-BSD FRML MDRD: 117 ML/MIN/1.73SQ M
GLUCOSE SERPL-MCNC: 122 MG/DL (ref 65–140)
GLUCOSE SERPL-MCNC: 156 MG/DL (ref 65–140)
GLUCOSE SERPL-MCNC: 174 MG/DL (ref 65–140)
GLUCOSE SERPL-MCNC: 176 MG/DL (ref 65–140)
GLUCOSE SERPL-MCNC: 182 MG/DL (ref 65–140)
GLUCOSE SERPL-MCNC: 204 MG/DL (ref 65–140)
HCT VFR BLD AUTO: 30.2 % (ref 36.5–49.3)
HGB BLD-MCNC: 9.4 G/DL (ref 12–17)
IMM GRANULOCYTES # BLD AUTO: 0.08 THOUSAND/UL (ref 0–0.2)
IMM GRANULOCYTES NFR BLD AUTO: 1 % (ref 0–2)
LYMPHOCYTES # BLD AUTO: 1.3 THOUSANDS/ÂΜL (ref 0.6–4.47)
LYMPHOCYTES NFR BLD AUTO: 12 % (ref 14–44)
MAGNESIUM SERPL-MCNC: 2 MG/DL (ref 1.9–2.7)
MCH RBC QN AUTO: 29.7 PG (ref 26.8–34.3)
MCHC RBC AUTO-ENTMCNC: 31.1 G/DL (ref 31.4–37.4)
MCV RBC AUTO: 95 FL (ref 82–98)
MONOCYTES # BLD AUTO: 1.32 THOUSAND/ÂΜL (ref 0.17–1.22)
MONOCYTES NFR BLD AUTO: 12 % (ref 4–12)
NEUTROPHILS # BLD AUTO: 8.13 THOUSANDS/ÂΜL (ref 1.85–7.62)
NEUTS SEG NFR BLD AUTO: 72 % (ref 43–75)
NRBC BLD AUTO-RTO: 0 /100 WBCS
PHOSPHATE SERPL-MCNC: 3.9 MG/DL (ref 2.3–4.1)
PLATELET # BLD AUTO: 336 THOUSANDS/UL (ref 149–390)
PMV BLD AUTO: 9.7 FL (ref 8.9–12.7)
POTASSIUM SERPL-SCNC: 3.9 MMOL/L (ref 3.5–5.3)
RBC # BLD AUTO: 3.17 MILLION/UL (ref 3.88–5.62)
SODIUM SERPL-SCNC: 134 MMOL/L (ref 135–147)
WBC # BLD AUTO: 11.19 THOUSAND/UL (ref 4.31–10.16)

## 2024-07-10 PROCEDURE — 85025 COMPLETE CBC W/AUTO DIFF WBC: CPT

## 2024-07-10 PROCEDURE — 80048 BASIC METABOLIC PNL TOTAL CA: CPT

## 2024-07-10 PROCEDURE — 83735 ASSAY OF MAGNESIUM: CPT

## 2024-07-10 PROCEDURE — 82948 REAGENT STRIP/BLOOD GLUCOSE: CPT

## 2024-07-10 PROCEDURE — 84100 ASSAY OF PHOSPHORUS: CPT

## 2024-07-10 PROCEDURE — 99024 POSTOP FOLLOW-UP VISIT: CPT | Performed by: SURGERY

## 2024-07-10 RX ORDER — LOSARTAN POTASSIUM 50 MG/1
50 TABLET ORAL DAILY
Status: DISCONTINUED | OUTPATIENT
Start: 2024-07-10 | End: 2024-07-12 | Stop reason: HOSPADM

## 2024-07-10 RX ORDER — AMLODIPINE BESYLATE 5 MG/1
5 TABLET ORAL DAILY
Status: DISCONTINUED | OUTPATIENT
Start: 2024-07-10 | End: 2024-07-12 | Stop reason: HOSPADM

## 2024-07-10 RX ORDER — ATORVASTATIN CALCIUM 20 MG/1
20 TABLET, FILM COATED ORAL
Status: DISCONTINUED | OUTPATIENT
Start: 2024-07-10 | End: 2024-07-12 | Stop reason: HOSPADM

## 2024-07-10 RX ADMIN — INSULIN LISPRO 2 UNITS: 100 INJECTION, SOLUTION INTRAVENOUS; SUBCUTANEOUS at 06:21

## 2024-07-10 RX ADMIN — METOCLOPRAMIDE HYDROCHLORIDE 10 MG: 5 INJECTION INTRAMUSCULAR; INTRAVENOUS at 00:07

## 2024-07-10 RX ADMIN — METOCLOPRAMIDE HYDROCHLORIDE 10 MG: 5 INJECTION INTRAMUSCULAR; INTRAVENOUS at 12:03

## 2024-07-10 RX ADMIN — FLUTICASONE PROPIONATE 1 SPRAY: 50 SPRAY, METERED NASAL at 09:21

## 2024-07-10 RX ADMIN — LOSARTAN POTASSIUM 50 MG: 50 TABLET, FILM COATED ORAL at 16:10

## 2024-07-10 RX ADMIN — AZELASTINE HYDROCHLORIDE 1 SPRAY: 137 SPRAY, METERED NASAL at 09:20

## 2024-07-10 RX ADMIN — INSULIN LISPRO 2 UNITS: 100 INJECTION, SOLUTION INTRAVENOUS; SUBCUTANEOUS at 00:07

## 2024-07-10 RX ADMIN — ATORVASTATIN CALCIUM 20 MG: 20 TABLET, FILM COATED ORAL at 16:10

## 2024-07-10 RX ADMIN — ENOXAPARIN SODIUM 40 MG: 40 INJECTION SUBCUTANEOUS at 13:14

## 2024-07-10 RX ADMIN — METOCLOPRAMIDE HYDROCHLORIDE 10 MG: 5 INJECTION INTRAMUSCULAR; INTRAVENOUS at 06:21

## 2024-07-10 RX ADMIN — PANTOPRAZOLE SODIUM 40 MG: 40 INJECTION, POWDER, FOR SOLUTION INTRAVENOUS at 06:23

## 2024-07-10 RX ADMIN — FLUTICASONE FUROATE AND VILANTEROL TRIFENATATE 1 PUFF: 200; 25 POWDER RESPIRATORY (INHALATION) at 09:20

## 2024-07-10 RX ADMIN — AMLODIPINE BESYLATE 5 MG: 5 TABLET ORAL at 16:10

## 2024-07-10 RX ADMIN — TAMSULOSIN HYDROCHLORIDE 0.4 MG: 0.4 CAPSULE ORAL at 16:10

## 2024-07-10 RX ADMIN — INSULIN LISPRO 2 UNITS: 100 INJECTION, SOLUTION INTRAVENOUS; SUBCUTANEOUS at 12:04

## 2024-07-10 RX ADMIN — METOCLOPRAMIDE HYDROCHLORIDE 10 MG: 5 INJECTION INTRAMUSCULAR; INTRAVENOUS at 17:26

## 2024-07-10 RX ADMIN — Medication: at 21:55

## 2024-07-10 NOTE — RESTORATIVE TECHNICIAN NOTE
Restorative Technician Note      Patient Name: Jr Mendoza     Note Type: Mobility  Patient Position Upon Consult: Supine  Activity Performed: Ambulated  Assistive Device: Other (Comment) (no AD)  Education Provided: Yes  Patient Position at End of Consult: All needs within reach; Bedside chair    Jazz Kevin  DPT, Restorative Technician

## 2024-07-10 NOTE — PLAN OF CARE
Problem: PAIN - ADULT  Goal: Verbalizes/displays adequate comfort level or baseline comfort level  Description: Interventions:  - Encourage patient to monitor pain and request assistance  - Assess pain using appropriate pain scale  - Administer analgesics based on type and severity of pain and evaluate response  - Implement non-pharmacological measures as appropriate and evaluate response  - Consider cultural and social influences on pain and pain management  - Notify physician/advanced practitioner if interventions unsuccessful or patient reports new pain  Outcome: Progressing     Problem: INFECTION - ADULT  Goal: Absence or prevention of progression during hospitalization  Description: INTERVENTIONS:  - Assess and monitor for signs and symptoms of infection  - Monitor lab/diagnostic results  - Monitor all insertion sites, i.e. indwelling lines, tubes, and drains  - Monitor endotracheal if appropriate and nasal secretions for changes in amount and color  - [place] appropriate cooling/warming therapies per order  - Administer medications as ordered  - Instruct and encourage patient and family to use good hand hygiene technique  - Identify and instruct in appropriate isolation precautions for identified infection/condition  Outcome: Progressing     Problem: SAFETY ADULT  Goal: Patient will remain free of falls  Description: INTERVENTIONS:  - Educate patient/family on patient safety including physical limitations  - Instruct patient to call for assistance with activity   - Consult OT/PT to assist with strengthening/mobility   - Keep Call bell within reach  - Keep bed low and locked with side rails adjusted as appropriate  - Keep care items and personal belongings within reach  - Initiate and maintain comfort rounds  - Make Fall Risk Sign visible to staff  - Offer Toileting every  Hours, in advance of need  - Initiate/Maintain alarm  - Obtain necessary fall risk management equipment:   - Apply yellow socks and  bracelet for high fall risk patients  - Consider moving patient to room near nurses station  Outcome: Progressing  Goal: Maintain or return to baseline ADL function  Description: INTERVENTIONS:  -  Assess patient's ability to carry out ADLs; assess patient's baseline for ADL function and identify physical deficits which impact ability to perform ADLs (bathing, care of mouth/teeth, toileting, grooming, dressing, etc.)  - Assess/evaluate cause of self-care deficits   - Assess range of motion  - Assess patient's mobility; develop plan if impaired  - Assess patient's need for assistive devices and provide as appropriate  - Encourage maximum independence but intervene and supervise when necessary  - Involve family in performance of ADLs  - Assess for home care needs following discharge   - Consider OT consult to assist with ADL evaluation and planning for discharge  - Provide patient education as appropriate  Outcome: Progressing  Goal: Maintains/Returns to pre admission functional level  Description: INTERVENTIONS:  - Perform AM-PAC 6 Click Basic Mobility/ Daily Activity assessment daily.  - Set and communicate daily mobility goal to care team and patient/family/caregiver.   - Collaborate with rehabilitation services on mobility goals if consulted  - Perform Range of Motion  times a day.  - Reposition patient every  hours.  - Dangle patient  times a day  - Stand patient  times a day  - Ambulate patient  times a day  - Out of bed to chair  times a day   - Out of bed for meals  times a day  - Out of bed for toileting  - Record patient progress and toleration of activity level   Outcome: Progressing     Problem: DISCHARGE PLANNING  Goal: Discharge to home or other facility with appropriate resources  Description: INTERVENTIONS:  - Identify barriers to discharge w/patient and caregiver  - Arrange for needed discharge resources and transportation as appropriate  - Identify discharge learning needs (meds, wound care, etc.)  -  Arrange for interpretive services to assist at discharge as needed  - Refer to Case Management Department for coordinating discharge planning if the patient needs post-hospital services based on physician/advanced practitioner order or complex needs related to functional status, cognitive ability, or social support system  Outcome: Progressing     Problem: Knowledge Deficit  Goal: Patient/family/caregiver demonstrates understanding of disease process, treatment plan, medications, and discharge instructions  Description: Complete learning assessment and assess knowledge base.  Interventions:  - Provide teaching at level of understanding  - Provide teaching via preferred learning methods  Outcome: Progressing     Problem: GASTROINTESTINAL - ADULT  Goal: Minimal or absence of nausea and/or vomiting  Description: INTERVENTIONS:  - Administer IV fluids if ordered to ensure adequate hydration  - Maintain NPO status until nausea and vomiting are resolved  - Nasogastric tube if ordered  - Administer ordered antiemetic medications as needed  - Provide nonpharmacologic comfort measures as appropriate  - Advance diet as tolerated, if ordered  - Consider nutrition services referral to assist patient with adequate nutrition and appropriate food choices  Outcome: Progressing     Problem: METABOLIC, FLUID AND ELECTROLYTES - ADULT  Goal: Electrolytes maintained within normal limits  Description: INTERVENTIONS:  - Monitor labs and assess patient for signs and symptoms of electrolyte imbalances  - Administer electrolyte replacement as ordered  - Monitor response to electrolyte replacements, including repeat lab results as appropriate  - Instruct patient on fluid and nutrition as appropriate  Outcome: Progressing  Goal: Fluid balance maintained  Description: INTERVENTIONS:  - Monitor labs   - Monitor I/O and WT  - Instruct patient on fluid and nutrition as appropriate  - Assess for signs & symptoms of volume excess or  deficit  Outcome: Progressing     Problem: SKIN/TISSUE INTEGRITY - ADULT  Goal: Incision(s), wounds(s) or drain site(s) healing without S/S of infection  Description: INTERVENTIONS  - Assess and document dressing, incision, wound bed, drain sites and surrounding tissue  - Provide patient and family education  Outcome: Progressing     Problem: HEMATOLOGIC - ADULT  Goal: Maintains hematologic stability  Description: INTERVENTIONS  - Assess for signs and symptoms of bleeding or hemorrhage  - Monitor labs  - Administer supportive blood products/factors as ordered and appropriate  Outcome: Progressing     Problem: Nutrition/Hydration-ADULT  Goal: Nutrient/Hydration intake appropriate for improving, restoring or maintaining nutritional needs  Description: Monitor and assess patient's nutrition/hydration status for malnutrition. Collaborate with interdisciplinary team and initiate plan and interventions as ordered.  Monitor patient's weight and dietary intake as ordered or per policy. Utilize nutrition screening tool and intervene as necessary. Determine patient's food preferences and provide high-protein, high-caloric foods as appropriate.     INTERVENTIONS:  - Monitor oral intake, urinary output, labs, and treatment plans  - Assess nutrition and hydration status and recommend course of action  - Evaluate amount of meals eaten  - Assist patient with eating if necessary   - Allow adequate time for meals  - Recommend/ encourage appropriate diets, oral nutritional supplements, and vitamin/mineral supplements  - Order, calculate, and assess calorie counts as needed  - Recommend, monitor, and adjust tube feedings and TPN/PPN based on assessed needs  - Assess need for intravenous fluids  - Provide specific nutrition/hydration education as appropriate  - Include patient/family/caregiver in decisions related to nutrition  Outcome: Progressing     Problem: PAIN - ADULT  Goal: Verbalizes/displays adequate comfort level or baseline  comfort level  Description: Interventions:  - Encourage patient to monitor pain and request assistance  - Assess pain using appropriate pain scale  - Administer analgesics based on type and severity of pain and evaluate response  - Implement non-pharmacological measures as appropriate and evaluate response  - Consider cultural and social influences on pain and pain management  - Notify physician/advanced practitioner if interventions unsuccessful or patient reports new pain  Outcome: Progressing     Problem: INFECTION - ADULT  Goal: Absence or prevention of progression during hospitalization  Description: INTERVENTIONS:  - Assess and monitor for signs and symptoms of infection  - Monitor lab/diagnostic results  - Monitor all insertion sites, i.e. indwelling lines, tubes, and drains  - Monitor endotracheal if appropriate and nasal secretions for changes in amount and color  - [place] appropriate cooling/warming therapies per order  - Administer medications as ordered  - Instruct and encourage patient and family to use good hand hygiene technique  - Identify and instruct in appropriate isolation precautions for identified infection/condition  Outcome: Progressing     Problem: SAFETY ADULT  Goal: Patient will remain free of falls  Description: INTERVENTIONS:  - Educate patient/family on patient safety including physical limitations  - Instruct patient to call for assistance with activity   - Consult OT/PT to assist with strengthening/mobility   - Keep Call bell within reach  - Keep bed low and locked with side rails adjusted as appropriate  - Keep care items and personal belongings within reach  - Initiate and maintain comfort rounds  - Make Fall Risk Sign visible to staff  - Offer Toileting every  Hours, in advance of need  - Initiate/Maintain alarm  - Obtain necessary fall risk management equipment:   - Apply yellow socks and bracelet for high fall risk patients  - Consider moving patient to room near nurses  station  Outcome: Progressing  Goal: Maintain or return to baseline ADL function  Description: INTERVENTIONS:  -  Assess patient's ability to carry out ADLs; assess patient's baseline for ADL function and identify physical deficits which impact ability to perform ADLs (bathing, care of mouth/teeth, toileting, grooming, dressing, etc.)  - Assess/evaluate cause of self-care deficits   - Assess range of motion  - Assess patient's mobility; develop plan if impaired  - Assess patient's need for assistive devices and provide as appropriate  - Encourage maximum independence but intervene and supervise when necessary  - Involve family in performance of ADLs  - Assess for home care needs following discharge   - Consider OT consult to assist with ADL evaluation and planning for discharge  - Provide patient education as appropriate  Outcome: Progressing  Goal: Maintains/Returns to pre admission functional level  Description: INTERVENTIONS:  - Perform AM-PAC 6 Click Basic Mobility/ Daily Activity assessment daily.  - Set and communicate daily mobility goal to care team and patient/family/caregiver.   - Collaborate with rehabilitation services on mobility goals if consulted  - Perform Range of Motion  times a day.  - Reposition patient every  hours.  - Dangle patient  times a day  - Stand patient  times a day  - Ambulate patient  times a day  - Out of bed to chair  times a day   - Out of bed for meals  times a day  - Out of bed for toileting  - Record patient progress and toleration of activity level   Outcome: Progressing     Problem: DISCHARGE PLANNING  Goal: Discharge to home or other facility with appropriate resources  Description: INTERVENTIONS:  - Identify barriers to discharge w/patient and caregiver  - Arrange for needed discharge resources and transportation as appropriate  - Identify discharge learning needs (meds, wound care, etc.)  - Arrange for interpretive services to assist at discharge as needed  - Refer to Case  Management Department for coordinating discharge planning if the patient needs post-hospital services based on physician/advanced practitioner order or complex needs related to functional status, cognitive ability, or social support system  Outcome: Progressing     Problem: Knowledge Deficit  Goal: Patient/family/caregiver demonstrates understanding of disease process, treatment plan, medications, and discharge instructions  Description: Complete learning assessment and assess knowledge base.  Interventions:  - Provide teaching at level of understanding  - Provide teaching via preferred learning methods  Outcome: Progressing     Problem: GASTROINTESTINAL - ADULT  Goal: Minimal or absence of nausea and/or vomiting  Description: INTERVENTIONS:  - Administer IV fluids if ordered to ensure adequate hydration  - Maintain NPO status until nausea and vomiting are resolved  - Nasogastric tube if ordered  - Administer ordered antiemetic medications as needed  - Provide nonpharmacologic comfort measures as appropriate  - Advance diet as tolerated, if ordered  - Consider nutrition services referral to assist patient with adequate nutrition and appropriate food choices  Outcome: Progressing     Problem: METABOLIC, FLUID AND ELECTROLYTES - ADULT  Goal: Electrolytes maintained within normal limits  Description: INTERVENTIONS:  - Monitor labs and assess patient for signs and symptoms of electrolyte imbalances  - Administer electrolyte replacement as ordered  - Monitor response to electrolyte replacements, including repeat lab results as appropriate  - Instruct patient on fluid and nutrition as appropriate  Outcome: Progressing  Goal: Fluid balance maintained  Description: INTERVENTIONS:  - Monitor labs   - Monitor I/O and WT  - Instruct patient on fluid and nutrition as appropriate  - Assess for signs & symptoms of volume excess or deficit  Outcome: Progressing     Problem: SKIN/TISSUE INTEGRITY - ADULT  Goal: Incision(s),  wounds(s) or drain site(s) healing without S/S of infection  Description: INTERVENTIONS  - Assess and document dressing, incision, wound bed, drain sites and surrounding tissue  Outcome: Progressing     Problem: HEMATOLOGIC - ADULT  Goal: Maintains hematologic stability  Description: INTERVENTIONS  - Assess for signs and symptoms of bleeding or hemorrhage  - Monitor labs  - Administer supportive blood products/factors as ordered and appropriate  Outcome: Progressing     Problem: Nutrition/Hydration-ADULT  Goal: Nutrient/Hydration intake appropriate for improving, restoring or maintaining nutritional needs  Description: Monitor and assess patient's nutrition/hydration status for malnutrition. Collaborate with interdisciplinary team and initiate plan and interventions as ordered.  Monitor patient's weight and dietary intake as ordered or per policy. Utilize nutrition screening tool and intervene as necessary. Determine patient's food preferences and provide high-protein, high-caloric foods as appropriate.     INTERVENTIONS:  - Monitor oral intake, urinary output, labs, and treatment plans  - Assess nutrition and hydration status and recommend course of action  - Evaluate amount of meals eaten  - Assist patient with eating if necessary   - Allow adequate time for meals  - Recommend/ encourage appropriate diets, oral nutritional supplements, and vitamin/mineral supplements  - Order, calculate, and assess calorie counts as needed  - Recommend, monitor, and adjust tube feedings and TPN/PPN based on assessed needs  - Assess need for intravenous fluids  - Provide specific nutrition/hydration education as appropriate  - Include patient/family/caregiver in decisions related to nutrition  Outcome: Progressing

## 2024-07-10 NOTE — PROGRESS NOTES
"Progress Note - General Surgery   Jr Mendoza 58 y.o. male MRN: [id number]  Unit/Bed#: [facility] 817-01 Encounter: [id number]    Assessment:  59yo M s/p laparoscopic sigmoidectomy on 6/26, now with recurrent ileus.       7/9 CT scan: stable abdominal fluid collection, likely hematoma around the anastomosis site. No evidence of leak.    NGT removed yesterday. Denies N/V  +gas/+BM  Abdominal still distended, tympanic but nontender  UOP: 425cc (200cc overnight)    Plan:  -advance to clears (small volume)  -close monitoring for nausea, vomiting  -PRN antiemetics  -TPN  -OOB, ambulate      Subjective/Objective     Subjective: NAEON. No N/V. Passing gas and stooling    Objective:     Blood pressure 160/75, pulse 87, temperature 97.9 °F (36.6 °C), resp. rate 14, height 5' 10\" (1.778 m), weight 114 kg (250 lb 7.1 oz), SpO2 95%.,Body mass index is 35.93 kg/m².      Intake/Output Summary (Last 24 hours) at 7/10/2024 0553  Last data filed at 7/10/2024 0201  Gross per 24 hour   Intake 40 ml   Output 625 ml   Net -585 ml       Invasive Devices       Peripherally Inserted Central Catheter Line  Duration             PICC Line 07/05/24 Right Basilic 4 days                    Physical Exam:   General: no acute distress, resting comfortably in bed  Skin: Warm, dry, anicteric.  HEENT: Normocephalic, atraumatic. NG tube in place.  CV: regular rate, grossly well perfused  Pulm: no increased WOB, symmetric chest rise/fall with respirations  Abd: distended, tympanic, nontender, incisions with c/d/i dressings. Old Bruising located in the periumbilical and hypogastric region  MSK: Symmetric, no tenderness, no deformities observed.   Neuro: AOx3, GCS 15    Lab, Imaging and other studies:I have personally reviewed pertinent lab results.    VTE Pharmacologic Prophylaxis: Enoxaparin (Lovenox)  VTE Mechanical Prophylaxis: sequential compression device  "

## 2024-07-11 ENCOUNTER — DOCUMENTATION (OUTPATIENT)
Dept: HEMATOLOGY ONCOLOGY | Facility: CLINIC | Age: 59
End: 2024-07-11

## 2024-07-11 LAB
GLUCOSE SERPL-MCNC: 168 MG/DL (ref 65–140)
GLUCOSE SERPL-MCNC: 169 MG/DL (ref 65–140)
GLUCOSE SERPL-MCNC: 189 MG/DL (ref 65–140)
GLUCOSE SERPL-MCNC: 190 MG/DL (ref 65–140)

## 2024-07-11 PROCEDURE — 99024 POSTOP FOLLOW-UP VISIT: CPT | Performed by: SURGERY

## 2024-07-11 PROCEDURE — 94760 N-INVAS EAR/PLS OXIMETRY 1: CPT

## 2024-07-11 PROCEDURE — 82948 REAGENT STRIP/BLOOD GLUCOSE: CPT

## 2024-07-11 RX ADMIN — FLUTICASONE FUROATE AND VILANTEROL TRIFENATATE 1 PUFF: 200; 25 POWDER RESPIRATORY (INHALATION) at 08:56

## 2024-07-11 RX ADMIN — INSULIN LISPRO 2 UNITS: 100 INJECTION, SOLUTION INTRAVENOUS; SUBCUTANEOUS at 12:08

## 2024-07-11 RX ADMIN — METOCLOPRAMIDE HYDROCHLORIDE 10 MG: 5 INJECTION INTRAMUSCULAR; INTRAVENOUS at 06:00

## 2024-07-11 RX ADMIN — Medication: at 21:33

## 2024-07-11 RX ADMIN — METOCLOPRAMIDE HYDROCHLORIDE 10 MG: 5 INJECTION INTRAMUSCULAR; INTRAVENOUS at 23:56

## 2024-07-11 RX ADMIN — INSULIN LISPRO 2 UNITS: 100 INJECTION, SOLUTION INTRAVENOUS; SUBCUTANEOUS at 06:00

## 2024-07-11 RX ADMIN — ENOXAPARIN SODIUM 40 MG: 40 INJECTION SUBCUTANEOUS at 17:39

## 2024-07-11 RX ADMIN — ATORVASTATIN CALCIUM 20 MG: 20 TABLET, FILM COATED ORAL at 17:39

## 2024-07-11 RX ADMIN — FLUTICASONE PROPIONATE 1 SPRAY: 50 SPRAY, METERED NASAL at 08:56

## 2024-07-11 RX ADMIN — INSULIN LISPRO 2 UNITS: 100 INJECTION, SOLUTION INTRAVENOUS; SUBCUTANEOUS at 17:51

## 2024-07-11 RX ADMIN — TAMSULOSIN HYDROCHLORIDE 0.4 MG: 0.4 CAPSULE ORAL at 17:39

## 2024-07-11 RX ADMIN — AMLODIPINE BESYLATE 5 MG: 5 TABLET ORAL at 08:56

## 2024-07-11 RX ADMIN — METOCLOPRAMIDE HYDROCHLORIDE 10 MG: 5 INJECTION INTRAMUSCULAR; INTRAVENOUS at 12:08

## 2024-07-11 RX ADMIN — METOCLOPRAMIDE HYDROCHLORIDE 10 MG: 5 INJECTION INTRAMUSCULAR; INTRAVENOUS at 17:39

## 2024-07-11 RX ADMIN — METOCLOPRAMIDE HYDROCHLORIDE 10 MG: 5 INJECTION INTRAMUSCULAR; INTRAVENOUS at 00:09

## 2024-07-11 RX ADMIN — LOSARTAN POTASSIUM 50 MG: 50 TABLET, FILM COATED ORAL at 08:56

## 2024-07-11 RX ADMIN — PANTOPRAZOLE SODIUM 40 MG: 40 INJECTION, POWDER, FOR SOLUTION INTRAVENOUS at 06:00

## 2024-07-11 RX ADMIN — INSULIN LISPRO 4 UNITS: 100 INJECTION, SOLUTION INTRAVENOUS; SUBCUTANEOUS at 00:35

## 2024-07-11 RX ADMIN — AZELASTINE HYDROCHLORIDE 1 SPRAY: 137 SPRAY, METERED NASAL at 08:56

## 2024-07-11 RX ADMIN — AZELASTINE HYDROCHLORIDE 1 SPRAY: 137 SPRAY, METERED NASAL at 17:39

## 2024-07-11 NOTE — PLAN OF CARE
Problem: PAIN - ADULT  Goal: Verbalizes/displays adequate comfort level or baseline comfort level  Description: Interventions:  - Encourage patient to monitor pain and request assistance  - Assess pain using appropriate pain scale  - Administer analgesics based on type and severity of pain and evaluate response  - Implement non-pharmacological measures as appropriate and evaluate response  - Consider cultural and social influences on pain and pain management  - Notify physician/advanced practitioner if interventions unsuccessful or patient reports new pain  Outcome: Progressing     Problem: INFECTION - ADULT  Goal: Absence or prevention of progression during hospitalization  Description: INTERVENTIONS:  - Assess and monitor for signs and symptoms of infection  - Monitor lab/diagnostic results  - Monitor all insertion sites, i.e. indwelling lines, tubes, and drains  - Monitor endotracheal if appropriate and nasal secretions for changes in amount and color  - [place] appropriate cooling/warming therapies per order  - Administer medications as ordered  - Instruct and encourage patient and family to use good hand hygiene technique  - Identify and instruct in appropriate isolation precautions for identified infection/condition  Outcome: Progressing     Problem: SAFETY ADULT  Goal: Patient will remain free of falls  Description: INTERVENTIONS:  - Educate patient/family on patient safety including physical limitations  - Instruct patient to call for assistance with activity   - Consult OT/PT to assist with strengthening/mobility   - Keep Call bell within reach  - Keep bed low and locked with side rails adjusted as appropriate  - Keep care items and personal belongings within reach  - Initiate and maintain comfort rounds  - Make Fall Risk Sign visible to staff  - Offer Toileting every  Hours, in advance of need  - Initiate/Maintain alarm  - Obtain necessary fall risk management equipment:   - Apply yellow socks and  bracelet for high fall risk patients  - Consider moving patient to room near nurses station  Outcome: Progressing  Goal: Maintain or return to baseline ADL function  Description: INTERVENTIONS:  -  Assess patient's ability to carry out ADLs; assess patient's baseline for ADL function and identify physical deficits which impact ability to perform ADLs (bathing, care of mouth/teeth, toileting, grooming, dressing, etc.)  - Assess/evaluate cause of self-care deficits   - Assess range of motion  - Assess patient's mobility; develop plan if impaired  - Assess patient's need for assistive devices and provide as appropriate  - Encourage maximum independence but intervene and supervise when necessary  - Involve family in performance of ADLs  - Assess for home care needs following discharge   - Consider OT consult to assist with ADL evaluation and planning for discharge  - Provide patient education as appropriate  Outcome: Progressing  Goal: Maintains/Returns to pre admission functional level  Description: INTERVENTIONS:  - Perform AM-PAC 6 Click Basic Mobility/ Daily Activity assessment daily.  - Set and communicate daily mobility goal to care team and patient/family/caregiver.   - Collaborate with rehabilitation services on mobility goals if consulted  - Perform Range of Motion  times a day.  - Reposition patient every  hours.  - Dangle patient  times a day  - Stand patient  times a day  - Ambulate patient  times a day  - Out of bed to chair  times a day   - Out of bed for meals  times a day  - Out of bed for toileting  - Record patient progress and toleration of activity level   Outcome: Progressing     Problem: DISCHARGE PLANNING  Goal: Discharge to home or other facility with appropriate resources  Description: INTERVENTIONS:  - Identify barriers to discharge w/patient and caregiver  - Arrange for needed discharge resources and transportation as appropriate  - Identify discharge learning needs (meds, wound care, etc.)  -  Arrange for interpretive services to assist at discharge as needed  - Refer to Case Management Department for coordinating discharge planning if the patient needs post-hospital services based on physician/advanced practitioner order or complex needs related to functional status, cognitive ability, or social support system  Outcome: Progressing     Problem: Knowledge Deficit  Goal: Patient/family/caregiver demonstrates understanding of disease process, treatment plan, medications, and discharge instructions  Description: Complete learning assessment and assess knowledge base.  Interventions:  - Provide teaching at level of understanding  - Provide teaching via preferred learning methods  Outcome: Progressing     Problem: GASTROINTESTINAL - ADULT  Goal: Minimal or absence of nausea and/or vomiting  Description: INTERVENTIONS:  - Administer IV fluids if ordered to ensure adequate hydration  - Maintain NPO status until nausea and vomiting are resolved  - Nasogastric tube if ordered  - Administer ordered antiemetic medications as needed  - Provide nonpharmacologic comfort measures as appropriate  - Advance diet as tolerated, if ordered  - Consider nutrition services referral to assist patient with adequate nutrition and appropriate food choices  Outcome: Progressing     Problem: METABOLIC, FLUID AND ELECTROLYTES - ADULT  Goal: Electrolytes maintained within normal limits  Description: INTERVENTIONS:  - Monitor labs and assess patient for signs and symptoms of electrolyte imbalances  - Administer electrolyte replacement as ordered  - Monitor response to electrolyte replacements, including repeat lab results as appropriate  - Instruct patient on fluid and nutrition as appropriate  Outcome: Progressing  Goal: Fluid balance maintained  Description: INTERVENTIONS:  - Monitor labs   - Monitor I/O and WT  - Instruct patient on fluid and nutrition as appropriate  - Assess for signs & symptoms of volume excess or  deficit  Outcome: Progressing     Problem: SKIN/TISSUE INTEGRITY - ADULT  Goal: Incision(s), wounds(s) or drain site(s) healing without S/S of infection  Description: INTERVENTIONS  - Assess and document dressing, incision, wound bed, drain sites and surrounding tissue  - Provide patient and family education  Outcome: Progressing     Problem: HEMATOLOGIC - ADULT  Goal: Maintains hematologic stability  Description: INTERVENTIONS  - Assess for signs and symptoms of bleeding or hemorrhage  - Monitor labs  - Administer supportive blood products/factors as ordered and appropriate  Outcome: Progressing     Problem: Nutrition/Hydration-ADULT  Goal: Nutrient/Hydration intake appropriate for improving, restoring or maintaining nutritional needs  Description: Monitor and assess patient's nutrition/hydration status for malnutrition. Collaborate with interdisciplinary team and initiate plan and interventions as ordered.  Monitor patient's weight and dietary intake as ordered or per policy. Utilize nutrition screening tool and intervene as necessary. Determine patient's food preferences and provide high-protein, high-caloric foods as appropriate.     INTERVENTIONS:  - Monitor oral intake, urinary output, labs, and treatment plans  - Assess nutrition and hydration status and recommend course of action  - Evaluate amount of meals eaten  - Assist patient with eating if necessary   - Allow adequate time for meals  - Recommend/ encourage appropriate diets, oral nutritional supplements, and vitamin/mineral supplements  - Order, calculate, and assess calorie counts as needed  - Recommend, monitor, and adjust tube feedings and TPN/PPN based on assessed needs  - Assess need for intravenous fluids  - Provide specific nutrition/hydration education as appropriate  - Include patient/family/caregiver in decisions related to nutrition  Outcome: Progressing

## 2024-07-11 NOTE — PLAN OF CARE
Problem: PAIN - ADULT  Goal: Verbalizes/displays adequate comfort level or baseline comfort level  Description: Interventions:  - Encourage patient to monitor pain and request assistance  - Assess pain using appropriate pain scale  - Administer analgesics based on type and severity of pain and evaluate response  - Implement non-pharmacological measures as appropriate and evaluate response  - Consider cultural and social influences on pain and pain management  - Notify physician/advanced practitioner if interventions unsuccessful or patient reports new pain  Outcome: Progressing     Problem: INFECTION - ADULT  Goal: Absence or prevention of progression during hospitalization  Description: INTERVENTIONS:  - Assess and monitor for signs and symptoms of infection  - Monitor lab/diagnostic results  - Monitor all insertion sites, i.e. indwelling lines, tubes, and drains  - Monitor endotracheal if appropriate and nasal secretions for changes in amount and color  - [place] appropriate cooling/warming therapies per order  - Administer medications as ordered  - Instruct and encourage patient and family to use good hand hygiene technique  - Identify and instruct in appropriate isolation precautions for identified infection/condition  Outcome: Progressing     Problem: SAFETY ADULT  Goal: Patient will remain free of falls  Description: INTERVENTIONS:  - Educate patient/family on patient safety including physical limitations  - Instruct patient to call for assistance with activity   - Consult OT/PT to assist with strengthening/mobility   - Keep Call bell within reach  - Keep bed low and locked with side rails adjusted as appropriate  - Keep care items and personal belongings within reach  - Initiate and maintain comfort rounds  - Make Fall Risk Sign visible to staff  - Offer Toileting every  Hours, in advance of need  - Initiate/Maintain alarm  - Obtain necessary fall risk management equipment:   - Apply yellow socks and  bracelet for high fall risk patients  - Consider moving patient to room near nurses station  Outcome: Progressing  Goal: Maintain or return to baseline ADL function  Description: INTERVENTIONS:  -  Assess patient's ability to carry out ADLs; assess patient's baseline for ADL function and identify physical deficits which impact ability to perform ADLs (bathing, care of mouth/teeth, toileting, grooming, dressing, etc.)  - Assess/evaluate cause of self-care deficits   - Assess range of motion  - Assess patient's mobility; develop plan if impaired  - Assess patient's need for assistive devices and provide as appropriate  - Encourage maximum independence but intervene and supervise when necessary  - Involve family in performance of ADLs  - Assess for home care needs following discharge   - Consider OT consult to assist with ADL evaluation and planning for discharge  - Provide patient education as appropriate  Outcome: Progressing  Goal: Maintains/Returns to pre admission functional level  Description: INTERVENTIONS:  - Perform AM-PAC 6 Click Basic Mobility/ Daily Activity assessment daily.  - Set and communicate daily mobility goal to care team and patient/family/caregiver.   - Collaborate with rehabilitation services on mobility goals if consulted  - Perform Range of Motion  times a day.  - Reposition patient every  hours.  - Dangle patient  times a day  - Stand patient  times a day  - Ambulate patient  times a day  - Out of bed to chair  times a day   - Out of bed for meals  times a day  - Out of bed for toileting  - Record patient progress and toleration of activity level   Outcome: Progressing     Problem: DISCHARGE PLANNING  Goal: Discharge to home or other facility with appropriate resources  Description: INTERVENTIONS:  - Identify barriers to discharge w/patient and caregiver  - Arrange for needed discharge resources and transportation as appropriate  - Identify discharge learning needs (meds, wound care, etc.)  -  Arrange for interpretive services to assist at discharge as needed  - Refer to Case Management Department for coordinating discharge planning if the patient needs post-hospital services based on physician/advanced practitioner order or complex needs related to functional status, cognitive ability, or social support system  Outcome: Progressing     Problem: Knowledge Deficit  Goal: Patient/family/caregiver demonstrates understanding of disease process, treatment plan, medications, and discharge instructions  Description: Complete learning assessment and assess knowledge base.  Interventions:  - Provide teaching at level of understanding  - Provide teaching via preferred learning methods  Outcome: Progressing     Problem: GASTROINTESTINAL - ADULT  Goal: Minimal or absence of nausea and/or vomiting  Description: INTERVENTIONS:  - Administer IV fluids if ordered to ensure adequate hydration  - Maintain NPO status until nausea and vomiting are resolved  - Nasogastric tube if ordered  - Administer ordered antiemetic medications as needed  - Provide nonpharmacologic comfort measures as appropriate  - Advance diet as tolerated, if ordered  - Consider nutrition services referral to assist patient with adequate nutrition and appropriate food choices  Outcome: Progressing     Problem: METABOLIC, FLUID AND ELECTROLYTES - ADULT  Goal: Electrolytes maintained within normal limits  Description: INTERVENTIONS:  - Monitor labs and assess patient for signs and symptoms of electrolyte imbalances  - Administer electrolyte replacement as ordered  - Monitor response to electrolyte replacements, including repeat lab results as appropriate  - Instruct patient on fluid and nutrition as appropriate  Outcome: Progressing  Goal: Fluid balance maintained  Description: INTERVENTIONS:  - Monitor labs   - Monitor I/O and WT  - Instruct patient on fluid and nutrition as appropriate  - Assess for signs & symptoms of volume excess or  deficit  Outcome: Progressing     Problem: SKIN/TISSUE INTEGRITY - ADULT  Goal: Incision(s), wounds(s) or drain site(s) healing without S/S of infection  Description: INTERVENTIONS  - Assess and document dressing, incision, wound bed, drain sites and surrounding tissue  - Provide patient and family education  Outcome: Progressing     Problem: HEMATOLOGIC - ADULT  Goal: Maintains hematologic stability  Description: INTERVENTIONS  - Assess for signs and symptoms of bleeding or hemorrhage  - Monitor labs  - Administer supportive blood products/factors as ordered and appropriate  Outcome: Progressing     Problem: Nutrition/Hydration-ADULT  Goal: Nutrient/Hydration intake appropriate for improving, restoring or maintaining nutritional needs  Description: Monitor and assess patient's nutrition/hydration status for malnutrition. Collaborate with interdisciplinary team and initiate plan and interventions as ordered.  Monitor patient's weight and dietary intake as ordered or per policy. Utilize nutrition screening tool and intervene as necessary. Determine patient's food preferences and provide high-protein, high-caloric foods as appropriate.     INTERVENTIONS:  - Monitor oral intake, urinary output, labs, and treatment plans  - Assess nutrition and hydration status and recommend course of action  - Evaluate amount of meals eaten  - Assist patient with eating if necessary   - Allow adequate time for meals  - Recommend/ encourage appropriate diets, oral nutritional supplements, and vitamin/mineral supplements  - Order, calculate, and assess calorie counts as needed  - Recommend, monitor, and adjust tube feedings and TPN/PPN based on assessed needs  - Assess need for intravenous fluids  - Provide specific nutrition/hydration education as appropriate  - Include patient/family/caregiver in decisions related to nutrition  Outcome: Progressing     Problem: Prexisting or High Potential for Compromised Skin Integrity  Goal: Skin  integrity is maintained or improved  Description: INTERVENTIONS:  - Identify patients at risk for skin breakdown  - Assess and monitor skin integrity  - Assess and monitor nutrition and hydration status  - Monitor labs   - Assess for incontinence   - Turn and reposition patient  - Assist with mobility/ambulation  - Relieve pressure over bony prominences  - Avoid friction and shearing  - Provide appropriate hygiene as needed including keeping skin clean and dry  - Evaluate need for skin moisturizer/barrier cream  - Collaborate with interdisciplinary team   - Patient/family teaching  - Consider wound care consult   Outcome: Progressing

## 2024-07-11 NOTE — PROGRESS NOTES
"Progress Note - General Surgery   Jr Mendoza 58 y.o. male MRN: [id number]  Unit/Bed#: [facility] 817-01 Encounter: [id number]    Assessment:  59yo M s/p laparoscopic sigmoidectomy on 6/26, now with recurrent ileus.      7/9 CT scan: stable abdominal fluid collection, likely hematoma around the anastomosis site. No evidence of leak.    Tolerating liquids without N/V  +gas/+BM  Abdominal still distended, tympanic but nontender    Plan:  -advance to clears w/ toast, crackers  -OOB, ambulate  -TPN    Subjective/Objective     Subjective: NAEON. No N/V. Passing gas and stooling     Objective:     Blood pressure 139/76, pulse 104, temperature 98.2 °F (36.8 °C), resp. rate 19, height 5' 10\" (1.778 m), weight 114 kg (250 lb 7.1 oz), SpO2 91%.,Body mass index is 35.93 kg/m².      Intake/Output Summary (Last 24 hours) at 7/11/2024 0533  Last data filed at 7/11/2024 0437  Gross per 24 hour   Intake 620 ml   Output 975 ml   Net -355 ml       Invasive Devices       Peripherally Inserted Central Catheter Line  Duration             PICC Line 07/05/24 Right Basilic 5 days                    Physical Exam:   General: no acute distress, resting comfortably in bed  Skin: Warm, dry, anicteric.  HEENT: Normocephalic, atraumatic. NG tube in place.  CV: regular rate, grossly well perfused  Pulm: no increased WOB, symmetric chest rise/fall with respirations  Abd: distended, tympanic, nontender, incisions with c/d/i dressings. Old Bruising located in the periumbilical and hypogastric region  MSK: Symmetric, no tenderness, no deformities observed.   Neuro: AOx3, GCS 15    Lab, Imaging and other studies:I have personally reviewed pertinent lab results.    VTE Pharmacologic Prophylaxis: Enoxaparin (Lovenox)  VTE Mechanical Prophylaxis: sequential compression device  "

## 2024-07-11 NOTE — RESTORATIVE TECHNICIAN NOTE
Restorative Technician Note      Patient Name: Jr Mendoza     Restorative Tech Visit Date: 07/11/24  Note Type: Mobility  Patient Position Upon Consult: Bedside chair  Activity Performed: Ambulated  Assistive Device: Other (Comment) (no AD)  Education Provided: Yes  Patient Position at End of Consult: Bedside chair; All needs within reach    Jazz Kevin  DPT, Restorative Technician

## 2024-07-12 VITALS
DIASTOLIC BLOOD PRESSURE: 78 MMHG | RESPIRATION RATE: 16 BRPM | WEIGHT: 257.28 LBS | HEIGHT: 70 IN | TEMPERATURE: 98.2 F | OXYGEN SATURATION: 94 % | SYSTOLIC BLOOD PRESSURE: 142 MMHG | BODY MASS INDEX: 36.83 KG/M2 | HEART RATE: 95 BPM

## 2024-07-12 PROBLEM — C18.6 MALIGNANT NEOPLASM OF DESCENDING COLON (HCC): Status: ACTIVE | Noted: 2024-07-12

## 2024-07-12 LAB
ANION GAP SERPL CALCULATED.3IONS-SCNC: 8 MMOL/L (ref 4–13)
BASOPHILS # BLD AUTO: 0.04 THOUSANDS/ÂΜL (ref 0–0.1)
BASOPHILS NFR BLD AUTO: 0 % (ref 0–1)
BUN SERPL-MCNC: 10 MG/DL (ref 5–25)
CALCIUM SERPL-MCNC: 8.8 MG/DL (ref 8.4–10.2)
CHLORIDE SERPL-SCNC: 97 MMOL/L (ref 96–108)
CO2 SERPL-SCNC: 25 MMOL/L (ref 21–32)
CREAT SERPL-MCNC: 0.49 MG/DL (ref 0.6–1.3)
EOSINOPHIL # BLD AUTO: 0.31 THOUSAND/ÂΜL (ref 0–0.61)
EOSINOPHIL NFR BLD AUTO: 3 % (ref 0–6)
ERYTHROCYTE [DISTWIDTH] IN BLOOD BY AUTOMATED COUNT: 13.9 % (ref 11.6–15.1)
GFR SERPL CREATININE-BSD FRML MDRD: 118 ML/MIN/1.73SQ M
GLUCOSE SERPL-MCNC: 173 MG/DL (ref 65–140)
GLUCOSE SERPL-MCNC: 185 MG/DL (ref 65–140)
HCT VFR BLD AUTO: 30.5 % (ref 36.5–49.3)
HGB BLD-MCNC: 9.3 G/DL (ref 12–17)
IMM GRANULOCYTES # BLD AUTO: 0.06 THOUSAND/UL (ref 0–0.2)
IMM GRANULOCYTES NFR BLD AUTO: 1 % (ref 0–2)
LYMPHOCYTES # BLD AUTO: 1.37 THOUSANDS/ÂΜL (ref 0.6–4.47)
LYMPHOCYTES NFR BLD AUTO: 12 % (ref 14–44)
MAGNESIUM SERPL-MCNC: 2 MG/DL (ref 1.9–2.7)
MCH RBC QN AUTO: 29.5 PG (ref 26.8–34.3)
MCHC RBC AUTO-ENTMCNC: 30.5 G/DL (ref 31.4–37.4)
MCV RBC AUTO: 97 FL (ref 82–98)
MONOCYTES # BLD AUTO: 1.54 THOUSAND/ÂΜL (ref 0.17–1.22)
MONOCYTES NFR BLD AUTO: 14 % (ref 4–12)
NEUTROPHILS # BLD AUTO: 7.83 THOUSANDS/ÂΜL (ref 1.85–7.62)
NEUTS SEG NFR BLD AUTO: 70 % (ref 43–75)
NRBC BLD AUTO-RTO: 0 /100 WBCS
PHOSPHATE SERPL-MCNC: 3.7 MG/DL (ref 2.3–4.1)
PLATELET # BLD AUTO: 366 THOUSANDS/UL (ref 149–390)
PMV BLD AUTO: 10.1 FL (ref 8.9–12.7)
POTASSIUM SERPL-SCNC: 4.1 MMOL/L (ref 3.5–5.3)
RBC # BLD AUTO: 3.15 MILLION/UL (ref 3.88–5.62)
SODIUM SERPL-SCNC: 130 MMOL/L (ref 135–147)
WBC # BLD AUTO: 11.15 THOUSAND/UL (ref 4.31–10.16)

## 2024-07-12 PROCEDURE — 84100 ASSAY OF PHOSPHORUS: CPT | Performed by: PHYSICIAN ASSISTANT

## 2024-07-12 PROCEDURE — 83735 ASSAY OF MAGNESIUM: CPT | Performed by: PHYSICIAN ASSISTANT

## 2024-07-12 PROCEDURE — 80048 BASIC METABOLIC PNL TOTAL CA: CPT | Performed by: PHYSICIAN ASSISTANT

## 2024-07-12 PROCEDURE — 99024 POSTOP FOLLOW-UP VISIT: CPT | Performed by: SURGERY

## 2024-07-12 PROCEDURE — 85025 COMPLETE CBC W/AUTO DIFF WBC: CPT | Performed by: PHYSICIAN ASSISTANT

## 2024-07-12 PROCEDURE — 82948 REAGENT STRIP/BLOOD GLUCOSE: CPT

## 2024-07-12 PROCEDURE — NC001 PR NO CHARGE: Performed by: SURGERY

## 2024-07-12 RX ORDER — OXYCODONE HYDROCHLORIDE 10 MG/1
10 TABLET ORAL EVERY 4 HOURS PRN
Status: DISCONTINUED | OUTPATIENT
Start: 2024-07-12 | End: 2024-07-12 | Stop reason: HOSPADM

## 2024-07-12 RX ORDER — OXYCODONE HYDROCHLORIDE 5 MG/1
5 TABLET ORAL EVERY 6 HOURS PRN
Qty: 16 TABLET | Refills: 0 | Status: SHIPPED | OUTPATIENT
Start: 2024-07-12 | End: 2024-07-16

## 2024-07-12 RX ORDER — ACETAMINOPHEN 325 MG/1
975 TABLET ORAL EVERY 8 HOURS SCHEDULED
Qty: 63 TABLET | Refills: 0 | Status: SHIPPED | OUTPATIENT
Start: 2024-07-12 | End: 2024-07-19

## 2024-07-12 RX ORDER — ACETAMINOPHEN 325 MG/1
975 TABLET ORAL EVERY 8 HOURS SCHEDULED
Status: DISCONTINUED | OUTPATIENT
Start: 2024-07-12 | End: 2024-07-12 | Stop reason: HOSPADM

## 2024-07-12 RX ORDER — OXYCODONE HYDROCHLORIDE 5 MG/1
5 TABLET ORAL EVERY 4 HOURS PRN
Status: DISCONTINUED | OUTPATIENT
Start: 2024-07-12 | End: 2024-07-12 | Stop reason: HOSPADM

## 2024-07-12 RX ORDER — ENOXAPARIN SODIUM 100 MG/ML
40 INJECTION SUBCUTANEOUS
Qty: 4.8 ML | Refills: 0 | Status: SHIPPED | OUTPATIENT
Start: 2024-07-12 | End: 2024-07-24

## 2024-07-12 RX ADMIN — LOSARTAN POTASSIUM 50 MG: 50 TABLET, FILM COATED ORAL at 10:27

## 2024-07-12 RX ADMIN — INSULIN LISPRO 2 UNITS: 100 INJECTION, SOLUTION INTRAVENOUS; SUBCUTANEOUS at 00:00

## 2024-07-12 RX ADMIN — METOCLOPRAMIDE HYDROCHLORIDE 10 MG: 5 INJECTION INTRAMUSCULAR; INTRAVENOUS at 05:58

## 2024-07-12 RX ADMIN — INSULIN LISPRO 2 UNITS: 100 INJECTION, SOLUTION INTRAVENOUS; SUBCUTANEOUS at 05:58

## 2024-07-12 RX ADMIN — ACETAMINOPHEN 975 MG: 325 TABLET, FILM COATED ORAL at 10:27

## 2024-07-12 RX ADMIN — AZELASTINE HYDROCHLORIDE 1 SPRAY: 137 SPRAY, METERED NASAL at 10:28

## 2024-07-12 RX ADMIN — FLUTICASONE PROPIONATE 1 SPRAY: 50 SPRAY, METERED NASAL at 10:28

## 2024-07-12 RX ADMIN — AMLODIPINE BESYLATE 5 MG: 5 TABLET ORAL at 10:27

## 2024-07-12 RX ADMIN — PANTOPRAZOLE SODIUM 40 MG: 40 INJECTION, POWDER, FOR SOLUTION INTRAVENOUS at 05:58

## 2024-07-12 NOTE — PROGRESS NOTES
"Progress Note - General Surgery   Jr Mendoza 58 y.o. male MRN: [id number]  Unit/Bed#: [facility] 817-01 Encounter: [id number]    Assessment:  59yo M s/p laparoscopic sigmoidectomy on , now with recurrent ileus.      CT scan: stable abdominal fluid collection, likely hematoma around the anastomosis site. No evidence of leak.    Tolerating low fiber diet w/o nausea, vomiting  +gas/+bm    Na+ 130 (134)  WBC 11.1 (11.2)  Hgb 9.3 (9.4)    Plan:  -d/c TPN, let   -OOB, ambulate  -discharge planning  -lovenox until , needs teaching    Subjective/Objective     Subjective: NAEON. Patient feels well. Passing gas and stooling. -N/-V.    Objective:     Blood pressure 138/78, pulse 95, temperature 98.5 °F (36.9 °C), resp. rate 20, height 5' 10\" (1.778 m), weight 117 kg (257 lb 4.4 oz), SpO2 94%.,Body mass index is 36.92 kg/m².      Intake/Output Summary (Last 24 hours) at 2024 0603  Last data filed at 2024 0337  Gross per 24 hour   Intake 1090 ml   Output 725 ml   Net 365 ml       Invasive Devices       Peripherally Inserted Central Catheter Line  Duration             PICC Line 24 Right Basilic 6 days                    Physical Exam:   General: no acute distress, resting comfortably in bed  Skin: Warm, dry, anicteric.  HEENT: Normocephalic, atraumatic. NG tube in place.  CV: regular rate, grossly well perfused  Pulm: no increased WOB, symmetric chest rise/fall with respirations  Abd: distended, tympanic, nontender, incisions with c/d/i dressings. Old Bruising located in the periumbilical and hypogastric region  MSK: Symmetric, no tenderness, no deformities observed.   Neuro: AOx3, GCS 15    Lab, Imaging and other studies:I have personally reviewed pertinent lab results.    VTE Pharmacologic Prophylaxis: Enoxaparin (Lovenox)  VTE Mechanical Prophylaxis: sequential compression device  "

## 2024-07-12 NOTE — PROGRESS NOTES
RECTAL/GI MULTIDISCIPLINARY CASE REVIEW    DATE: 7/11/2024      PRESENTING DOCTOR: Dr. Perez      DIAGNOSIS: Colon cancer      Jr Kleinmagdicalvin was presented at the Rectal/GI Multidisciplinary Conference today.      PHYSICIAN RECOMMENDED PLAN:    -Recommend follow up chest CT, scheduled on 9/18/2024.  -Patient will be referred to medical oncology. He is currently inpatient.     Team agreed to plan.    The final treatment plan will be left to the discretion of the patient and the treating physician.     DISCLAIMERS:  TO THE TREATING PHYSICIAN:  This conference is a meeting of clinicians from various specialty areas who evaluate and discuss patients for whom a multidisciplinary treatment approach is being considered. Please note that the above opinion was a consensus of the conference attendees and is intended only to assist in quality care of the discussed patient.  The responsibility for follow up on the input given during the conference, along with any final decisions regarding plan of care, is that of the patient and the patient's provider. Accordingly, appointments have only been recommended based on this information and have NOT been scheduled unless otherwise noted.      TO THE PATIENT:  This summary is a brief record of major aspects of your cancer treatment. You may choose to share a copy with any of your doctors or nurses. However, this is not a detailed or comprehensive record of your care.      NCCN guidelines were readily available for review at this discussion

## 2024-07-12 NOTE — DISCHARGE INSTR - AVS FIRST PAGE
DISCHARGE INSTRUCTIONS    Follow Up: Follow up with Dr. Simeon on 8/7 at 1:20PM  -Lovenox 40 mg injection daily until 7/24    Diet: You may resume a diabetic diet    Pain: Tylenol 975 mg every 8 hours scheduled for 7 days.  Oxycodone 5 mg as needed for moderate/severe pain every 6 hours.    Shower: You may shower over the wound. Do not bathe or use a pool or hot tub until cleared by the physician.    Activity: As tolerated. You may go up and down stairs, walk as much as you are comfortable, but walk at least 3 times each day. Do not lift anything heavier than 15 pounds for at least 2-4 weeks, unless cleared by the doctor.    Driving: Do not drive or make any important decisions while on narcotic pain medication. Generally, you may drive when your off all narcotic pain medications.    Medications: Resume all of your previous medications, unless told otherwise by the doctor. You do not need to take the narcotic pain medications unless you are having significant pain and discomfort.    Call the office: If you are experiencing any of the following, fevers above 101.5° or chills, significant nausea or vomiting, increase in abdominal pain, if the wound develops drainage and/or is excessive redness around the wound, or if you have significant diarrhea or other worsening symptoms.

## 2024-07-12 NOTE — INCIDENTAL FINDINGS
The following findings require follow up:  Radiographic finding   Findin/4 CTAP:  IMPRESSION:     New moderate dilation of proximal jejunal loops in the anterior abdomen with a smooth transition point in the left lower quadrant, which is just anterior to the surgical resection bed. Findings can represent postsurgical ileus or partial small bowel   obstruction     Interval sigmoid resection. Small volume intermediate attenuation ascites and 5.5 cm loculated intermediate attenuation fluid in the left lower quadrant. In absence of pneumoperitoneum, this likely represents postsurgical proteinaceous or hemorrhagic   fluid. However a subtle small bowel leak can have similar imaging appearance and clinical correlation is recommended to exclude this possibility.     Stable prominent right cardiophrenic lymph node measuring 1.3 cm in short axis. Attention on follow-up imaging is recommended to assess for stability.    Treated conservatively with bowel rest, n.p.o., NG tube, IV fluid resuscitation, slow advancement of diet throughout hospital stay.     Follow up required: Follow-up with Dr. Perez in 2 to 3 weeks as scheduled.  Follow-up with PCP in 2 to 3 weeks to review hospital stay.     Follow up should be done within 2-3 week(s)    Please notify the following clinician to assist with the follow up:   Dr. Perez as scheduled.  Follow-up with PCP in 2 to 3 weeks to discuss hospital stay.    Incidental finding results were discussed with the Patient by Deyanira Ordaz PA-C on 24.   They expressed understanding and all questions answered.

## 2024-07-12 NOTE — RESTORATIVE TECHNICIAN NOTE
Restorative Technician Note      Patient Name: Jr Mendoza     Restorative Tech Visit Date: 07/12/24  Note Type: Mobility  Patient Position Upon Consult: Standing  Activity Performed: Ambulated  Assistive Device: Other (Comment) (no AD)  Education Provided: Yes  Patient Position at End of Consult: All needs within reach; Seated edge of bed    Jazz Kevin  DPT, Restorative Technician

## 2024-07-12 NOTE — PLAN OF CARE
Problem: PAIN - ADULT  Goal: Verbalizes/displays adequate comfort level or baseline comfort level  Description: Interventions:  - Encourage patient to monitor pain and request assistance  - Assess pain using appropriate pain scale  - Administer analgesics based on type and severity of pain and evaluate response  - Implement non-pharmacological measures as appropriate and evaluate response  - Consider cultural and social influences on pain and pain management  - Notify physician/advanced practitioner if interventions unsuccessful or patient reports new pain  Outcome: Progressing     Problem: INFECTION - ADULT  Goal: Absence or prevention of progression during hospitalization  Description: INTERVENTIONS:  - Assess and monitor for signs and symptoms of infection  - Monitor lab/diagnostic results  - Monitor all insertion sites, i.e. indwelling lines, tubes, and drains  - Monitor endotracheal if appropriate and nasal secretions for changes in amount and color  - [place] appropriate cooling/warming therapies per order  - Administer medications as ordered  - Instruct and encourage patient and family to use good hand hygiene technique  - Identify and instruct in appropriate isolation precautions for identified infection/condition  Outcome: Progressing     Problem: SAFETY ADULT  Goal: Patient will remain free of falls  Description: INTERVENTIONS:  - Educate patient/family on patient safety including physical limitations  - Instruct patient to call for assistance with activity   - Consult OT/PT to assist with strengthening/mobility   - Keep Call bell within reach  - Keep bed low and locked with side rails adjusted as appropriate  - Keep care items and personal belongings within reach  - Initiate and maintain comfort rounds  - Make Fall Risk Sign visible to staff  - Offer Toileting every  Hours, in advance of need  - Initiate/Maintain alarm  - Obtain necessary fall risk management equipment:   - Apply yellow socks and  bracelet for high fall risk patients  - Consider moving patient to room near nurses station  Outcome: Progressing  Goal: Maintain or return to baseline ADL function  Description: INTERVENTIONS:  -  Assess patient's ability to carry out ADLs; assess patient's baseline for ADL function and identify physical deficits which impact ability to perform ADLs (bathing, care of mouth/teeth, toileting, grooming, dressing, etc.)  - Assess/evaluate cause of self-care deficits   - Assess range of motion  - Assess patient's mobility; develop plan if impaired  - Assess patient's need for assistive devices and provide as appropriate  - Encourage maximum independence but intervene and supervise when necessary  - Involve family in performance of ADLs  - Assess for home care needs following discharge   - Consider OT consult to assist with ADL evaluation and planning for discharge  - Provide patient education as appropriate  Outcome: Progressing  Goal: Maintains/Returns to pre admission functional level  Description: INTERVENTIONS:  - Perform AM-PAC 6 Click Basic Mobility/ Daily Activity assessment daily.  - Set and communicate daily mobility goal to care team and patient/family/caregiver.   - Collaborate with rehabilitation services on mobility goals if consulted  - Perform Range of Motion  times a day.  - Reposition patient every  hours.  - Dangle patient  times a day  - Stand patient  times a day  - Ambulate patient  times a day  - Out of bed to chair  times a day   - Out of bed for meals  times a day  - Out of bed for toileting  - Record patient progress and toleration of activity level   Outcome: Progressing     Problem: DISCHARGE PLANNING  Goal: Discharge to home or other facility with appropriate resources  Description: INTERVENTIONS:  - Identify barriers to discharge w/patient and caregiver  - Arrange for needed discharge resources and transportation as appropriate  - Identify discharge learning needs (meds, wound care, etc.)  -  Arrange for interpretive services to assist at discharge as needed  - Refer to Case Management Department for coordinating discharge planning if the patient needs post-hospital services based on physician/advanced practitioner order or complex needs related to functional status, cognitive ability, or social support system  Outcome: Progressing     Problem: Knowledge Deficit  Goal: Patient/family/caregiver demonstrates understanding of disease process, treatment plan, medications, and discharge instructions  Description: Complete learning assessment and assess knowledge base.  Interventions:  - Provide teaching at level of understanding  - Provide teaching via preferred learning methods  Outcome: Progressing     Problem: GASTROINTESTINAL - ADULT  Goal: Minimal or absence of nausea and/or vomiting  Description: INTERVENTIONS:  - Administer IV fluids if ordered to ensure adequate hydration  - Maintain NPO status until nausea and vomiting are resolved  - Nasogastric tube if ordered  - Administer ordered antiemetic medications as needed  - Provide nonpharmacologic comfort measures as appropriate  - Advance diet as tolerated, if ordered  - Consider nutrition services referral to assist patient with adequate nutrition and appropriate food choices  Outcome: Progressing     Problem: METABOLIC, FLUID AND ELECTROLYTES - ADULT  Goal: Electrolytes maintained within normal limits  Description: INTERVENTIONS:  - Monitor labs and assess patient for signs and symptoms of electrolyte imbalances  - Administer electrolyte replacement as ordered  - Monitor response to electrolyte replacements, including repeat lab results as appropriate  - Instruct patient on fluid and nutrition as appropriate  Outcome: Progressing  Goal: Fluid balance maintained  Description: INTERVENTIONS:  - Monitor labs   - Monitor I/O and WT  - Instruct patient on fluid and nutrition as appropriate  - Assess for signs & symptoms of volume excess or  deficit  Outcome: Progressing     Problem: SKIN/TISSUE INTEGRITY - ADULT  Goal: Incision(s), wounds(s) or drain site(s) healing without S/S of infection  Description: INTERVENTIONS  - Assess and document dressing, incision, wound bed, drain sites and surrounding tissue  - Provide patient and family education  Outcome: Progressing     Problem: HEMATOLOGIC - ADULT  Goal: Maintains hematologic stability  Description: INTERVENTIONS  - Assess for signs and symptoms of bleeding or hemorrhage  - Monitor labs  - Administer supportive blood products/factors as ordered and appropriate  Outcome: Progressing     Problem: Nutrition/Hydration-ADULT  Goal: Nutrient/Hydration intake appropriate for improving, restoring or maintaining nutritional needs  Description: Monitor and assess patient's nutrition/hydration status for malnutrition. Collaborate with interdisciplinary team and initiate plan and interventions as ordered.  Monitor patient's weight and dietary intake as ordered or per policy. Utilize nutrition screening tool and intervene as necessary. Determine patient's food preferences and provide high-protein, high-caloric foods as appropriate.     INTERVENTIONS:  - Monitor oral intake, urinary output, labs, and treatment plans  - Assess nutrition and hydration status and recommend course of action  - Evaluate amount of meals eaten  - Assist patient with eating if necessary   - Allow adequate time for meals  - Recommend/ encourage appropriate diets, oral nutritional supplements, and vitamin/mineral supplements  - Order, calculate, and assess calorie counts as needed  - Recommend, monitor, and adjust tube feedings and TPN/PPN based on assessed needs  - Assess need for intravenous fluids  - Provide specific nutrition/hydration education as appropriate  - Include patient/family/caregiver in decisions related to nutrition  Outcome: Progressing     Problem: Prexisting or High Potential for Compromised Skin Integrity  Goal: Skin  integrity is maintained or improved  Description: INTERVENTIONS:  - Identify patients at risk for skin breakdown  - Assess and monitor skin integrity  - Assess and monitor nutrition and hydration status  - Monitor labs   - Assess for incontinence   - Turn and reposition patient  - Assist with mobility/ambulation  - Relieve pressure over bony prominences  - Avoid friction and shearing  - Provide appropriate hygiene as needed including keeping skin clean and dry  - Evaluate need for skin moisturizer/barrier cream  - Collaborate with interdisciplinary team   - Patient/family teaching  - Consider wound care consult   Outcome: Progressing      Cholecystitis, acute

## 2024-07-12 NOTE — DISCHARGE SUMMARY
Discharge Summary - Colorectal Surgery   Jr Mendoza 58 y.o. male MRN: [id number]  Unit/Bed#: [facility] 817-01 Encounter: [id number]    Admission Date: 6/26/2024     Discharge Date: 7/12/2024    Admitting Diagnosis: Malignant neoplasm of descending colon (HCC) [C18.6]    Discharge Diagnosis: Sigmoid: CA status post laparoscopic hand-assisted left colon resection with preoperative ureteral stents by Dr. Perez    Attending and Service: Dr. Perez, colorectal surgical Services.    Consulting Physician(s): None    Imaging and Procedures Performed: No orders of the defined types were placed in this encounter.      XR abdomen 1 view kub    Result Date: 7/1/2024  Impression: Diffuse gaseous distention of small bowel with gas also seen in the colon. Given recent surgery, this is likely postoperative adynamic ileus. Workstation performed: [id number]     Pathology:   Addendum   MMRINTACT     RESULTS OF IMMUNOHISTOCHEMICAL ANALYSIS FOR MISMATCH REPAIR PROTEIN LOSS  INTERPRETATION (block A7): No loss of nuclear expression of MMR proteins: low probability of MSI-H        RESULTS:  Antibody            Clone                 Description                     Results  MLH1                 M1                     Mismatch repair protein  Intact nuclear expression  MSH2                [id number]        Mismatch repair protein  Intact nuclear expression  MSH6                SP93                  Mismatch repair protein  Intact nuclear expression  PMS2                 A16                    Mismatch repair protein  Intact nuclear expression     Note: A positive control for each antibody have been reviewed and accepted.       These tests were developed, and their performance characteristics determined by [facility].  They may not be cleared or approved by the U.S. Food and Drug Administration.  The FDA determined that such clearance or approval is not necessary.  These tests are used for  clinical purposes.  They should not be regarded as investigational or for research.  This laboratory has been approved by [place] [id number], designated as a high-complexity laboratory and is qualified to perform these tests.      Addendum electronically signed by Anu Roach MD on 7/11/2024 at 1414   Final Diagnosis   A. Sigmoid colon, sigmoidectomy:  -   Invasive adenocarcinoma of sigmoid colon, moderately differentiated, with mucinous features.  -   Lymphovascular invasion (small vessel and large vessel) is identified.  -   MMR (MLH1, MSH2, MSH6 and PMS2) studies by IHC on A7 are pending.   -   A8 is sent to [employer] for MI Profile Testing.   -   See comment and synoptic report.     B. Anastomatic donuts, excision:  -   Segments of colon, negative for dysplasia and carcinoma.     Comment: CD31/CKC multiplex immunostain on A10 is positive for lymphovascular invasion.  There is venous (large vessel) invasion, confirmed with positive desmin immunostain on A10 surrounding endothelium-lined spaces with tumor invasion.     Select slide A7 is reviewed by Dr. Nicole Russell who concurs with the diagnosis.     Interpretation performed at [facility] [address] [place], [place] [id number]        Hospital Course: Jr Mendoza is a 58-year-old male w/ PMHx of asthma, DM2, HTN, and L colon Ca who presented for elective surgical intervention including laparoscopic hand-assisted left colon resection by Dr. Perez.    Patient underwent surgical intervention without complications.  Postoperatively he was transferred to the medical surgical floor where he was started on include liquid diet, had a PCA pump for pain control, as needed antiemetics, multimodal pain regimen, was encouraged to be out of bed and ambulating as well as utilizing his incentive spirometer, and was started on DVT prophylaxis.    Throughout his hospital stay his diet was slowly advanced, unfortunately patient developed a postoperative ileus  for which required multiple replacements of NG tube and initiation of TPN for nutritional support.  With eventual removal on postoperative day 13 and successful transition of diet.  Patient's TPN was weaned off on postop day 15 as he was tolerating his diet without any nausea or vomiting or difficulties.    By postoperative day 16 patient was cleared for discharge from a surgical standpoint.  He was tolerating diet without nausea or vomiting, out of bed and ambulating without any assistance, urinating without any difficulties, utilizing his incentive spirometer, and his pain remained controlled on a p.o. pain control regimen.    All discharge instructions as well as postoperative follow-up appointments were discussed with the patient at bedside as well as his wife, all questions were answered.    Patient was discharged on Lovenox 40 mg injections daily until 7/26.  Patient was also discharged with oxycodone 5 mg as needed for moderate/severe pain, 16 tablets, no refills.    On discharge, the patient is instructed to follow-up with the patient's primary care provider within the next 2 weeks to review the events of the recent hospitalization. The patient is instructed to follow-up with Dr. Perez as scheduled. The patient is instructed to follow the provided discharge instructions.     Condition at Discharge: good     Discharge instructions/Information to patient and family:   See after visit summary for information provided to patient and family.      Provisions for Follow-Up Care:  See after visit summary for information related to follow-up care and any pertinent home health orders.      Disposition: Home    Planned Readmission: No    Discharge Statement   I spent 30 minutes discharging the patient. This time was spent on the day of discharge. I had direct contact with the patient on the day of discharge. Additional documentation is required if more than 30 minutes were spent on discharge.     Discharge  Medications:  See after visit summary for reconciled discharge medications provided to patient and family.    Deyanira Ordaz PA-C  7/12/2024  4:13 PM

## 2024-07-14 LAB
CARIS GENOMIC LOH - EXOME: NORMAL
CARIS HER2/NEU: NEGATIVE
CARIS HLA-A: NORMAL
CARIS HLA-B: NORMAL
CARIS HLA-C: NORMAL
CARIS MISMATCH REPAIR STATUS: NORMAL
CARIS MLH1: NORMAL
CARIS MSH2: NORMAL
CARIS MSH6: NORMAL
CARIS MSI - EXOME: NORMAL
CARIS PD-L1 (SP142): NEGATIVE
CARIS PMS2: NORMAL
CARIS TMB - EXOME: NORMAL

## 2024-07-18 DIAGNOSIS — C18.6 MALIGNANT NEOPLASM OF DESCENDING COLON (HCC): Primary | ICD-10-CM

## 2024-07-19 ENCOUNTER — TELEPHONE (OUTPATIENT)
Dept: HEMATOLOGY ONCOLOGY | Facility: CLINIC | Age: 59
End: 2024-07-19

## 2024-07-19 ENCOUNTER — PATIENT OUTREACH (OUTPATIENT)
Dept: HEMATOLOGY ONCOLOGY | Facility: CLINIC | Age: 59
End: 2024-07-19

## 2024-07-19 NOTE — TELEPHONE ENCOUNTER
I phoned the patient on his cell phone and home phone and both went to . I then phoned again on his cell phone and left a  message indicating that I was calling from [facility] regarding his follow up appointment based on his recent hospitalization. I explained that we were able to schedule this appointment for him on 7/25 with Dr. Murphy at 1520 in the [place] office. I provided the office address and location, as well as the [facility] number as the office contact. Additionally, I provided my direct phone number and asked that the patient return my call to confirm receipt of the  message and that this appointment will work for him.

## 2024-07-19 NOTE — TELEPHONE ENCOUNTER
"Soft Intake Form   Patient Details   Jr Mendoza     1965     Reason For Appointment   Who is Calling? Mena Loco   If not patient, Name? NA    DID YOU CONFIRM INSURANCE WITH PATIENT? E verified, Routed to finance   Who is the Referring Doctor? Dr. Perez   What is the diagnosis? Malignant neoplasm of descending colon    Has this diagnosis been confirmed by a biopsy/surgery?    If yes, what is the date it was done? Sigmoid colon bx taken on 6/26   Biopsy done at [facility]?  If not, where was it done? Yes   Was imaging done, and was it done at [facility]?  If not, where was it done? Yes-H   Have you been seen by another Oncologist?  If so, who and where (name of facility, city and state) No   For 2nd Opinions Only: Are you currently undergoing treatment, or are you scheduled to start treatment?  If yes, name of facility, city and state  NA   For \"History Of\" only: Have you completed treatment?  NA   Have you had Genetic Testing done in the past?  If so, advise to bring test results to their visit NA   Record Gathering Information   Did you advise to have records faxed to [phone]?  NA   Did you advise to have disks sent to the proper address with imaging? (\"History of\" Patients)  5 years of imaging for breast patients-Mammos, US etc NA   Scheduling Information   Did you send new patient paperwork?  Email or mail? NA   What is the best call back number?   (If the [facility] is calling, please use their number) [phone]    Miscellaneous Information      The patient is scheduled for his consult with Dr. Murphy on 7/25 at 1520 in the [facility].      "

## 2024-07-19 NOTE — PROGRESS NOTES
NN initial phone outreach, no answer, LVM stating my name title and reason for call. Provided my phone number and waiting for call back.

## 2024-07-22 ENCOUNTER — TRANSITIONAL CARE MANAGEMENT (OUTPATIENT)
Dept: FAMILY MEDICINE CLINIC | Facility: CLINIC | Age: 59
End: 2024-07-22

## 2024-07-22 ENCOUNTER — PATIENT OUTREACH (OUTPATIENT)
Dept: HEMATOLOGY ONCOLOGY | Facility: CLINIC | Age: 59
End: 2024-07-22

## 2024-07-22 ENCOUNTER — OFFICE VISIT (OUTPATIENT)
Dept: FAMILY MEDICINE CLINIC | Facility: CLINIC | Age: 59
End: 2024-07-22
Payer: COMMERCIAL

## 2024-07-22 VITALS
SYSTOLIC BLOOD PRESSURE: 118 MMHG | DIASTOLIC BLOOD PRESSURE: 68 MMHG | HEART RATE: 95 BPM | BODY MASS INDEX: 35.53 KG/M2 | TEMPERATURE: 97.5 F | HEIGHT: 70 IN | OXYGEN SATURATION: 100 % | WEIGHT: 248.2 LBS

## 2024-07-22 DIAGNOSIS — I10 BENIGN ESSENTIAL HTN: ICD-10-CM

## 2024-07-22 DIAGNOSIS — R73.03 PREDIABETES: ICD-10-CM

## 2024-07-22 DIAGNOSIS — Z23 ENCOUNTER FOR IMMUNIZATION: ICD-10-CM

## 2024-07-22 DIAGNOSIS — Z76.89 ENCOUNTER FOR SUPPORT AND COORDINATION OF TRANSITION OF CARE: Primary | ICD-10-CM

## 2024-07-22 DIAGNOSIS — E66.09 CLASS 2 OBESITY DUE TO EXCESS CALORIES WITH BODY MASS INDEX (BMI) OF 37.0 TO 37.9 IN ADULT, UNSPECIFIED WHETHER SERIOUS COMORBIDITY PRESENT: ICD-10-CM

## 2024-07-22 LAB

## 2024-07-22 PROCEDURE — 90750 HZV VACC RECOMBINANT IM: CPT

## 2024-07-22 PROCEDURE — 99495 TRANSJ CARE MGMT MOD F2F 14D: CPT | Performed by: NURSE PRACTITIONER

## 2024-07-22 PROCEDURE — 90471 IMMUNIZATION ADMIN: CPT

## 2024-07-22 NOTE — PROGRESS NOTES
Transition of Care  Follow-up After Hospitalization    Jr Mendoza 58 y.o. male   Date:  7/22/2024    TCM Call     Date and time call was made  6/13/2024 10:00 AM    Hospital care reviewed  Records reviewed    Patient was hospitialized at  [facility]    Date of Admission  06/26/24    Date of discharge  07/12/24    Diagnosis  Malignant neoplasm of descending colon    Disposition  Home      TCM Call     Post hospital issues  Poor ADL (Activities of Daily Living) performance; Poor medication adherence; Reduced activity; Aftercare intervention    Should patient be enrolled in anticoag monitoring?  No    Scheduled for follow up?  Yes    Did you obtain your prescribed medications  Yes    Do you need help managing your prescriptions or medications  No    Is transportation to your appointment needed  No    I have advised the patient to call PCP with any new or worsening symptoms  ALFONZO Morejon    Living Arrangements  Spouse or Significiant other    Are you recieving any outpatient services  No    Are you recieving home care services  No    Are you using any community resources  No    Current waiver services  No    Have you fallen in the last 12 months  No    Interperter language line needed  No    Counseling  Patient; Family          Hospital records were reviewed. Medications upon discharge reviewed/updated.   Medication Changes: Lovenox 40 mg until July 26 and a short course of oxycodone 5 mg.  Imaging: CXR, CT abdomen and pelvis with contrast  Consults: none  Follow up visits with other specialists: Oncologist 7/25, 8/7 Colorectal Surgeon      Assessment and Plan:    Jr was seen today for transition of care management.    Diagnoses and all orders for this visit:    Encounter for support and coordination of transition of care    Encounter for immunization  -     Zoster Vaccine Recombinant IM    Prediabetes  -     Hemoglobin A1C; Future    Class 2 obesity due to excess calories with body mass index (BMI) of  37.0 to 37.9 in adult, unspecified whether serious comorbidity present    Benign essential HTN        Jr Mendoza present for TCM visit s/p hospitalization for malignant neoplasm of descending colon.  He had presented to the hospital for elective surgical intervention and laparoscopic left colon resection.  He was through surgery without complications.  Patient developed a postoperative ileus patient developed a postoperative ileus requiring an NG tube and TPN for supplemental nutrition.  He was successfully transition to a traditional diet on day 13 postop and weaned off of TPN on day 15 postop.  He was discharged on Lovenox 40 mg injections until July 26 and oxycodone 5 mg as needed for mild to his severe pain.    ROS: Review of Systems   All other systems reviewed and are negative.      Past Medical History:   Diagnosis Date   • Allergic    • Asthma    • Cholangitis 05/16/2022   • Closed extra-articular fracture of distal end of right tibia 01/28/2020   • Closed nondisplaced oblique fracture of shaft of right fibula 01/28/2020   • CPAP (continuous positive airway pressure) dependence    • Diabetes mellitus (HCC)    • Foot fracture    • Hyperlipidemia    • Hypertension    • Sleep apnea        Past Surgical History:   Procedure Laterality Date   • CHOLECYSTECTOMY     • CHOLECYSTECTOMY LAPAROSCOPIC N/A 05/18/2022    Procedure: CHOLECYSTECTOMY LAPAROSCOPIC;  Surgeon: Zachary Kern MD;  [facility];  Service: General   • COLONOSCOPY     • HEMICOLOECTOMY W/ ANASTOMOSIS N/A 06/26/2024    Procedure: LAPARSCOPIC HAND ASSISTED SIGMOIDECTOMY, LAPARSCOPIC MOBILIZATION OF SPLENIC FLEXURE, INTRAOP SPY ANGIOOGRAPHY , FLEXIBLE SIGMOIDOSCOPY;  Surgeon: Ann Perez MD;  Location: [facility];  Service: Colorectal       Social History     Socioeconomic History   • Marital status: Single     Spouse name: None   • Number of children: None   • Years of education: None   • Highest education level: None    Occupational History   • Occupation: Unemployed   Tobacco Use   • Smoking status: Former     Current packs/day: 0.00     Average packs/day: 0.3 packs/day for 10.0 years (2.5 ttl pk-yrs)     Types: Cigarettes     Start date: 2007     Quit date: 2017     Years since quittin.5   • Smokeless tobacco: Never   Vaping Use   • Vaping status: Never Used   Substance and Sexual Activity   • Alcohol use: Yes     Alcohol/week: 7.0 standard drinks of alcohol     Types: 7 Glasses of wine per week     Comment: 3 beers daily   • Drug use: Not Currently   • Sexual activity: Not Currently     Partners: Female   Other Topics Concern   • None   Social History Narrative    ** Merged History Encounter **          Social Determinants of Health     Financial Resource Strain: Not on file   Food Insecurity: No Food Insecurity (2024)    Hunger Vital Sign    • Worried About Running Out of Food in the Last Year: Never true    • Ran Out of Food in the Last Year: Never true   Transportation Needs: No Transportation Needs (2024)    PRAPARE - Transportation    • Lack of Transportation (Medical): No    • Lack of Transportation (Non-Medical): No   Physical Activity: Not on file   Stress: Not on file   Social Connections: Not on file   Intimate Partner Violence: Not on file   Housing Stability: Low Risk  (2024)    Housing Stability Vital Sign    • Unable to Pay for Housing in the Last Year: No    • Number of Times Moved in the Last Year: 0    • Homeless in the Last Year: No       Family History   Problem Relation Age of Onset   • ALS Mother    • Pneumonia Father    • No Known Problems Sister        Allergies   Allergen Reactions   • Pollen Extract Allergic Rhinitis         Current Outpatient Medications:   •  albuterol (Ventolin HFA) 90 mcg/act inhaler, Inhale 2 puffs every 6 (six) hours as needed for wheezing or shortness of breath, Disp: 18 g, Rfl: 3  •  amLODIPine (NORVASC) 5 mg tablet, TAKE 1 TABLET (5 MG TOTAL) BY MOUTH  "DAILY., Disp: 90 tablet, Rfl: 1  •  atorvastatin (LIPITOR) 20 mg tablet, TAKE 1 TABLET BY MOUTH EVERY DAY, Disp: 90 tablet, Rfl: 1  •  azelastine (ASTELIN) 0.1 % nasal spray, 1 spray into each nostril 2 (two) times a day Use in each nostril as directed, Disp: 30 mL, Rfl: 5  •  ciclopirox (PENLAC) 8 % solution, Apply topically daily at bedtime Apply Penlac at bedtime to the affected toenails daily, wipe off the Penlac from the toenails with acetone or rubbing alcohol on the 7th day and restart the cycle., Disp: 6 mL, Rfl: 3  •  enoxaparin (LOVENOX) 40 mg/0.4 mL, Inject 0.4 mL (40 mg total) under the skin every 24 hours for 12 days, Disp: 4.8 mL, Rfl: 0  •  fluticasone (FLONASE) 50 mcg/act nasal spray, SPRAY 1 SPRAY INTO EACH NOSTRIL EVERY DAY, Disp: 48 mL, Rfl: 1  •  metFORMIN (GLUCOPHAGE) 500 mg tablet, Take 1 tablet (500 mg total) by mouth 2 (two) times a day with meals, Disp: 180 tablet, Rfl: 0  •  mometasone-formoterol (Dulera) 200-5 MCG/ACT inhaler, Inhale 2 puffs 2 (two) times a day Rinse mouth after use., Disp: 13 g, Rfl: 5  •  sildenafil (VIAGRA) 25 MG tablet, TAKE 1 TABLET BY MOUTH DAILY AS NEEDED FOR ERECTILE DYSFUNCTION., Disp: 3 tablet, Rfl: 3  •  tamsulosin (FLOMAX) 0.4 mg, Take 1 capsule (0.4 mg total) by mouth daily with dinner, Disp: 90 capsule, Rfl: 3  •  terbinafine (LamISIL) 250 mg tablet, Take 250 mg by mouth daily, Disp: , Rfl:   •  valsartan (DIOVAN) 160 mg tablet, TAKE 1 TABLET BY MOUTH EVERY DAY, Disp: 90 tablet, Rfl: 1      Physical Exam:  /68   Pulse 95   Temp 97.5 °F (36.4 °C) (Tympanic)   Ht 5' 10\" (1.778 m)   Wt 113 kg (248 lb 3.2 oz)   SpO2 100%   BMI 35.61 kg/m²     Physical Exam  Vitals and nursing note reviewed.   Constitutional:       Appearance: He is well-developed. He is obese.   HENT:      Head: Normocephalic and atraumatic.      Right Ear: Tympanic membrane, ear canal and external ear normal.      Left Ear: Tympanic membrane, ear canal and external ear normal.      " Nose: Nose normal.      Mouth/Throat:      Mouth: Mucous membranes are moist.      Pharynx: Uvula midline.   Eyes:      General: Lids are normal.      Conjunctiva/sclera: Conjunctivae normal.      Pupils: Pupils are equal, round, and reactive to light.   Neck:      Thyroid: No thyroid mass.      Vascular: No JVD.      Trachea: Trachea and phonation normal.   Cardiovascular:      Rate and Rhythm: Normal rate and regular rhythm.      Pulses: Normal pulses.      Heart sounds: Normal heart sounds, S1 normal and S2 normal. No murmur heard.     No friction rub. No gallop.   Pulmonary:      Effort: Pulmonary effort is normal.      Breath sounds: Normal breath sounds.   Abdominal:      General: Bowel sounds are normal.      Palpations: Abdomen is soft.      Tenderness: There is no abdominal tenderness.   Genitourinary:     Comments: Deferred  Musculoskeletal:         General: Normal range of motion.      Cervical back: Full passive range of motion without pain, normal range of motion and neck supple.      Right lower leg: No edema.      Left lower leg: No edema.   Lymphadenopathy:      Head:      Right side of head: No submental, submandibular, tonsillar, preauricular, posterior auricular or occipital adenopathy.      Left side of head: No submental, submandibular, tonsillar, preauricular, posterior auricular or occipital adenopathy.      Cervical: No cervical adenopathy.   Skin:     General: Skin is warm and dry.      Capillary Refill: Capillary refill takes less than 2 seconds.   Neurological:      General: No focal deficit present.      Mental Status: He is alert and oriented to person, place, and time.      Sensory: Sensation is intact.      Motor: Motor function is intact.      Coordination: Coordination is intact.      Gait: Gait is intact.   Psychiatric:         Attention and Perception: Attention and perception normal.         Mood and Affect: Mood and affect normal.         Speech: Speech normal.         Behavior:  "Behavior normal. Behavior is cooperative.         Thought Content: Thought content normal.         Cognition and Memory: Cognition normal.         Judgment: Judgment normal.             Labs:  Lab Results   Component Value Date    WBC 11.15 (H) 07/12/2024    HGB 9.3 (L) 07/12/2024    HCT 30.5 (L) 07/12/2024    MCV 97 07/12/2024     07/12/2024     Lab Results   Component Value Date    K 4.1 07/12/2024    CL 97 07/12/2024    CO2 25 07/12/2024    BUN 10 07/12/2024    CREATININE 0.49 (L) 07/12/2024    GLUCOSE 217 (H) 06/26/2024    GLUF 163 (H) 06/13/2024    CALCIUM 8.8 07/12/2024    CORRECTEDCA 8.6 07/05/2024    AST 16 07/08/2024    ALT 8 07/08/2024    ALKPHOS 78 07/08/2024    EGFR 118 07/12/2024             Portions of the record may have been created with voice recognition software. Occasional wrong word or \"sound a like\" substitutions may have occurred due to the inherent limitations of voice recognition software. Read the chart carefully and recognize, using context, where substitutions have occurred. Contact me with any questions.            "

## 2024-07-25 ENCOUNTER — OFFICE VISIT (OUTPATIENT)
Dept: HEMATOLOGY ONCOLOGY | Facility: CLINIC | Age: 59
End: 2024-07-25
Payer: COMMERCIAL

## 2024-07-25 VITALS
SYSTOLIC BLOOD PRESSURE: 120 MMHG | WEIGHT: 245 LBS | HEART RATE: 96 BPM | TEMPERATURE: 98 F | OXYGEN SATURATION: 95 % | RESPIRATION RATE: 18 BRPM | DIASTOLIC BLOOD PRESSURE: 60 MMHG | HEIGHT: 70 IN | BODY MASS INDEX: 35.07 KG/M2

## 2024-07-25 DIAGNOSIS — C18.6 MALIGNANT NEOPLASM OF DESCENDING COLON (HCC): ICD-10-CM

## 2024-07-25 DIAGNOSIS — R91.8 PULMONARY NODULES: ICD-10-CM

## 2024-07-25 DIAGNOSIS — D50.0 IRON DEFICIENCY ANEMIA DUE TO CHRONIC BLOOD LOSS: Primary | ICD-10-CM

## 2024-07-25 DIAGNOSIS — D72.829 LEUKOCYTOSIS, UNSPECIFIED TYPE: ICD-10-CM

## 2024-07-25 PROCEDURE — 99205 OFFICE O/P NEW HI 60 MIN: CPT | Performed by: INTERNAL MEDICINE

## 2024-07-25 NOTE — PROGRESS NOTES
Jr Reji  1965  [facility] [place]  [address] [address] [phone]    Chief Complaint   Patient presents with    Consult           Oncology History    No history exists.       History of Present Illness:  June 6, 2024 patient had colonoscopy showing malignant appearing lesion in the sigmoid colon.  2 polyps removed, subcentimeter.  Pathology indicated benign polyps.  At least intramucosal carcinoma arising from a tubulovillous adenoma.  June 18, 2024 CT chest ab pelvis showed right pulmonary nodules measuring up to 5 mm.  Soft tissue focus in the right lower lobe bronchus, suspect secretion.  Periportal lymphadenopathy.  Possible chronic hepatocellular disease.  Right cardiophrenic lymph node, slightly enlarged since 2022.  June 26, 2024 patient underwent sigmoid colectomy.  Pathology indicates T2, N0 (0/21), G2 adenocarcinoma.  Lymphovascular invasion noted. KRAS G12C, PIK3CA likely pathogenic variant C378 F.  MSI stable, TMB 4.  WBC 11.1, hemoglobin 9.3, MCV 97, platelet count 366.  Differential near normal.    Review of Systems   Constitutional:  Negative for chills and fever.   HENT:  Negative for nosebleeds.    Eyes:  Negative for discharge.   Respiratory:  Negative for cough and shortness of breath.    Cardiovascular:  Negative for chest pain.   Gastrointestinal:  Negative for abdominal pain, constipation and diarrhea.   Endocrine: Negative for polydipsia.   Genitourinary:  Negative for hematuria.   Musculoskeletal:  Negative for arthralgias.   Skin:  Negative for color change.   Allergic/Immunologic: Negative for immunocompromised state.   Neurological:  Negative for dizziness and headaches.   Hematological:  Negative for adenopathy.   Psychiatric/Behavioral:  Negative for agitation.        Patient Active Problem List   Diagnosis    Class 2 obesity with body mass index (BMI) of 37.0 to 37.9 in adult    Elevated LDL  cholesterol level    Alcohol use disorder, mild, abuse    Benign essential HTN    Prediabetes    Chronic cough    Acute bronchitis    Moderate persistent asthma without complication    Excessive daytime sleepiness    DONG (obstructive sleep apnea)    Nasal obstruction    Erectile dysfunction    BPH with obstruction/lower urinary tract symptoms    Nocturnal hypoxemia    Abnormal CT of the chest    Malignant neoplasm of descending colon (HCC)     Past Medical History:   Diagnosis Date    Allergic     Asthma     Cholangitis 05/16/2022    Closed extra-articular fracture of distal end of right tibia 01/28/2020    Closed nondisplaced oblique fracture of shaft of right fibula 01/28/2020    CPAP (continuous positive airway pressure) dependence     Diabetes mellitus (HCC)     Foot fracture     Hyperlipidemia     Hypertension     Sleep apnea      Past Surgical History:   Procedure Laterality Date    CHOLECYSTECTOMY      CHOLECYSTECTOMY LAPAROSCOPIC N/A 05/18/2022    Procedure: CHOLECYSTECTOMY LAPAROSCOPIC;  Surgeon: Zachary Kern MD;  [facility];  Service: General    COLONOSCOPY      HEMICOLOECTOMY W/ ANASTOMOSIS N/A 06/26/2024    Procedure: LAPARSCOPIC HAND ASSISTED SIGMOIDECTOMY, LAPARSCOPIC MOBILIZATION OF SPLENIC FLEXURE, INTRAOP SPY ANGIOOGRAPHY , FLEXIBLE SIGMOIDOSCOPY;  Surgeon: Ann Perez MD;  Location: [facility];  Service: Colorectal     Family History   Problem Relation Age of Onset    ALS Mother     Pneumonia Father     No Known Problems Sister      Social History     Socioeconomic History    Marital status: Single     Spouse name: Not on file    Number of children: Not on file    Years of education: Not on file    Highest education level: Not on file   Occupational History    Occupation: Unemployed   Tobacco Use    Smoking status: Former     Current packs/day: 0.00     Average packs/day: 0.3 packs/day for 10.0 years (2.5 ttl pk-yrs)     Types: Cigarettes     Start date: 1/1/2007      Quit date: 2017     Years since quittin.5    Smokeless tobacco: Never   Vaping Use    Vaping status: Never Used   Substance and Sexual Activity    Alcohol use: Yes     Alcohol/week: 7.0 standard drinks of alcohol     Types: 7 Glasses of wine per week     Comment: 3 beers daily    Drug use: Not Currently    Sexual activity: Not Currently     Partners: Female   Other Topics Concern    Not on file   Social History Narrative    ** Merged History Encounter **          Social Determinants of Health     Financial Resource Strain: Not on file   Food Insecurity: No Food Insecurity (2024)    Hunger Vital Sign     Worried About Running Out of Food in the Last Year: Never true     Ran Out of Food in the Last Year: Never true   Transportation Needs: No Transportation Needs (2024)    PRAPARE - Transportation     Lack of Transportation (Medical): No     Lack of Transportation (Non-Medical): No   Physical Activity: Not on file   Stress: Not on file   Social Connections: Not on file   Intimate Partner Violence: Not on file   Housing Stability: Low Risk  (2024)    Housing Stability Vital Sign     Unable to Pay for Housing in the Last Year: No     Number of Times Moved in the Last Year: 0     Homeless in the Last Year: No       Current Outpatient Medications:     albuterol (Ventolin HFA) 90 mcg/act inhaler, Inhale 2 puffs every 6 (six) hours as needed for wheezing or shortness of breath, Disp: 18 g, Rfl: 3    amLODIPine (NORVASC) 5 mg tablet, TAKE 1 TABLET (5 MG TOTAL) BY MOUTH DAILY., Disp: 90 tablet, Rfl: 1    atorvastatin (LIPITOR) 20 mg tablet, TAKE 1 TABLET BY MOUTH EVERY DAY, Disp: 90 tablet, Rfl: 1    azelastine (ASTELIN) 0.1 % nasal spray, 1 spray into each nostril 2 (two) times a day Use in each nostril as directed, Disp: 30 mL, Rfl: 5    ciclopirox (PENLAC) 8 % solution, Apply topically daily at bedtime Apply Penlac at bedtime to the affected toenails daily, wipe off the Penlac from the toenails with  acetone or rubbing alcohol on the 7th day and restart the cycle., Disp: 6 mL, Rfl: 3    enoxaparin (LOVENOX) 40 mg/0.4 mL, Inject 0.4 mL (40 mg total) under the skin every 24 hours for 12 days, Disp: 4.8 mL, Rfl: 0    fluticasone (FLONASE) 50 mcg/act nasal spray, SPRAY 1 SPRAY INTO EACH NOSTRIL EVERY DAY, Disp: 48 mL, Rfl: 1    metFORMIN (GLUCOPHAGE) 500 mg tablet, Take 1 tablet (500 mg total) by mouth 2 (two) times a day with meals, Disp: 180 tablet, Rfl: 0    mometasone-formoterol (Dulera) 200-5 MCG/ACT inhaler, Inhale 2 puffs 2 (two) times a day Rinse mouth after use., Disp: 13 g, Rfl: 5    sildenafil (VIAGRA) 25 MG tablet, TAKE 1 TABLET BY MOUTH DAILY AS NEEDED FOR ERECTILE DYSFUNCTION., Disp: 3 tablet, Rfl: 3    tamsulosin (FLOMAX) 0.4 mg, Take 1 capsule (0.4 mg total) by mouth daily with dinner, Disp: 90 capsule, Rfl: 3    valsartan (DIOVAN) 160 mg tablet, TAKE 1 TABLET BY MOUTH EVERY DAY, Disp: 90 tablet, Rfl: 1  Allergies   Allergen Reactions    Pollen Extract Allergic Rhinitis     Vitals:    07/25/24 1523   BP: 120/60   Pulse: 96   Resp: 18   Temp: 98 °F (36.7 °C)   SpO2: 95%       Physical Exam  Constitutional:       Appearance: He is well-developed.   HENT:      Head: Normocephalic and atraumatic.      Mouth/Throat:      Mouth: Mucous membranes are moist.   Eyes:      Pupils: Pupils are equal, round, and reactive to light.   Cardiovascular:      Rate and Rhythm: Normal rate and regular rhythm.      Heart sounds: No murmur heard.  Pulmonary:      Breath sounds: Normal breath sounds. No wheezing or rales.   Abdominal:      Palpations: Abdomen is soft.      Tenderness: There is no abdominal tenderness.   Musculoskeletal:         General: No tenderness. Normal range of motion.      Cervical back: Neck supple.   Lymphadenopathy:      Cervical: No cervical adenopathy.   Skin:     Findings: No erythema or rash.   Neurological:      Mental Status: He is alert and oriented to person, place, and time.      Cranial  Nerves: No cranial nerve deficit.      Deep Tendon Reflexes: Reflexes are normal and symmetric.   Psychiatric:         Behavior: Behavior normal.           Labs:  CBC, Coags, BMP, Mg, Phos     Imaging  See above.    Discussion/Summary: In summary, this is a 58-year-old male with a history of recently resected T2, N0 adenocarcinoma of the sigmoid colon.  + Lymphovascular invasion.  No role for adjuvant chemotherapy.  Anemia likely secondary to iron deficiency.  I suggested oral iron replacement.  I asked him to call if abdominal pain or constipation occur.  Pivoted to parenteral iron administration.  He has persistent mature myeloid predominant leukocytosis on and off over the past 15 months.  Given normalcy of differential observation is favored.  I suspect reactive.  Pulmonary nodules,  ?  Related to his colon cancer.  Repeat chest CT in 6 months.  Similar comments regarding cardiophrenic lymph node.  I reviewed the above considerations with the patient and his wife.  They voiced understanding and agreement.

## 2024-07-25 NOTE — LETTER
July 25, 2024     Ann Perez MD  [address] [facility] [address]    Patient: Jr Mendoza   YOB: 1965   Date of Visit: 7/25/2024       Dear Dr. Perez:    Thank you for referring Jr Mendoza to me for evaluation. Below are my notes for this consultation.    If you have questions, please do not hesitate to call me. I look forward to following your patient along with you.         Sincerely,        Jose E Murphy,         CC: CELI Bowling DO  7/25/2024  5:26 PM  Sign when Signing Visit    Jr Mendoza  1965  [facility] [address] [address]  [phone]    Chief Complaint   Patient presents with   • Consult           Oncology History    No history exists.       History of Present Illness:  June 6, 2024 patient had colonoscopy showing malignant appearing lesion in the sigmoid colon.  2 polyps removed, subcentimeter.  Pathology indicated benign polyps.  At least intramucosal carcinoma arising from a tubulovillous adenoma.  June 18, 2024 CT chest ab pelvis showed right pulmonary nodules measuring up to 5 mm.  Soft tissue focus in the right lower lobe bronchus, suspect secretion.  Periportal lymphadenopathy.  Possible chronic hepatocellular disease.  Right cardiophrenic lymph node, slightly enlarged since 2022.  June 26, 2024 patient underwent sigmoid colectomy.  Pathology indicates T2, N0 (0/21), G2 adenocarcinoma.  Lymphovascular invasion noted. KRAS G12C, PIK3CA likely pathogenic variant C378 F.  MSI stable, TMB 4.  WBC 11.1, hemoglobin 9.3, MCV 97, platelet count 366.  Differential near normal.    Review of Systems   Constitutional:  Negative for chills and fever.   HENT:  Negative for nosebleeds.    Eyes:  Negative for discharge.   Respiratory:  Negative for cough and shortness of breath.    Cardiovascular:  Negative for chest pain.    Gastrointestinal:  Negative for abdominal pain, constipation and diarrhea.   Endocrine: Negative for polydipsia.   Genitourinary:  Negative for hematuria.   Musculoskeletal:  Negative for arthralgias.   Skin:  Negative for color change.   Allergic/Immunologic: Negative for immunocompromised state.   Neurological:  Negative for dizziness and headaches.   Hematological:  Negative for adenopathy.   Psychiatric/Behavioral:  Negative for agitation.        Patient Active Problem List   Diagnosis   • Class 2 obesity with body mass index (BMI) of 37.0 to 37.9 in adult   • Elevated LDL cholesterol level   • Alcohol use disorder, mild, abuse   • Benign essential HTN   • Prediabetes   • Chronic cough   • Acute bronchitis   • Moderate persistent asthma without complication   • Excessive daytime sleepiness   • DONG (obstructive sleep apnea)   • Nasal obstruction   • Erectile dysfunction   • BPH with obstruction/lower urinary tract symptoms   • Nocturnal hypoxemia   • Abnormal CT of the chest   • Malignant neoplasm of descending colon (HCC)     Past Medical History:   Diagnosis Date   • Allergic    • Asthma    • Cholangitis 05/16/2022   • Closed extra-articular fracture of distal end of right tibia 01/28/2020   • Closed nondisplaced oblique fracture of shaft of right fibula 01/28/2020   • CPAP (continuous positive airway pressure) dependence    • Diabetes mellitus (HCC)    • Foot fracture    • Hyperlipidemia    • Hypertension    • Sleep apnea      Past Surgical History:   Procedure Laterality Date   • CHOLECYSTECTOMY     • CHOLECYSTECTOMY LAPAROSCOPIC N/A 05/18/2022    Procedure: CHOLECYSTECTOMY LAPAROSCOPIC;  Surgeon: Zachary Kern MD;  [facility];  Service: General   • COLONOSCOPY     • HEMICOLOECTOMY W/ ANASTOMOSIS N/A 06/26/2024    Procedure: LAPARSCOPIC HAND ASSISTED SIGMOIDECTOMY, LAPARSCOPIC MOBILIZATION OF SPLENIC FLEXURE, INTRAOP SPY ANGIOOGRAPHY , FLEXIBLE SIGMOIDOSCOPY;  Surgeon: Ann Conway  MD Chris;  Location: [facility];  Service: Colorectal     Family History   Problem Relation Age of Onset   • ALS Mother    • Pneumonia Father    • No Known Problems Sister      Social History     Socioeconomic History   • Marital status: Single     Spouse name: Not on file   • Number of children: Not on file   • Years of education: Not on file   • Highest education level: Not on file   Occupational History   • Occupation: Unemployed   Tobacco Use   • Smoking status: Former     Current packs/day: 0.00     Average packs/day: 0.3 packs/day for 10.0 years (2.5 ttl pk-yrs)     Types: Cigarettes     Start date: 2007     Quit date:      Years since quittin.5   • Smokeless tobacco: Never   Vaping Use   • Vaping status: Never Used   Substance and Sexual Activity   • Alcohol use: Yes     Alcohol/week: 7.0 standard drinks of alcohol     Types: 7 Glasses of wine per week     Comment: 3 beers daily   • Drug use: Not Currently   • Sexual activity: Not Currently     Partners: Female   Other Topics Concern   • Not on file   Social History Narrative    ** Merged History Encounter **          Social Determinants of Health     Financial Resource Strain: Not on file   Food Insecurity: No Food Insecurity (2024)    Hunger Vital Sign    • Worried About Running Out of Food in the Last Year: Never true    • Ran Out of Food in the Last Year: Never true   Transportation Needs: No Transportation Needs (2024)    PRAPARE - Transportation    • Lack of Transportation (Medical): No    • Lack of Transportation (Non-Medical): No   Physical Activity: Not on file   Stress: Not on file   Social Connections: Not on file   Intimate Partner Violence: Not on file   Housing Stability: Low Risk  (2024)    Housing Stability Vital Sign    • Unable to Pay for Housing in the Last Year: No    • Number of Times Moved in the Last Year: 0    • Homeless in the Last Year: No       Current Outpatient Medications:   •  albuterol (Ventolin  HFA) 90 mcg/act inhaler, Inhale 2 puffs every 6 (six) hours as needed for wheezing or shortness of breath, Disp: 18 g, Rfl: 3  •  amLODIPine (NORVASC) 5 mg tablet, TAKE 1 TABLET (5 MG TOTAL) BY MOUTH DAILY., Disp: 90 tablet, Rfl: 1  •  atorvastatin (LIPITOR) 20 mg tablet, TAKE 1 TABLET BY MOUTH EVERY DAY, Disp: 90 tablet, Rfl: 1  •  azelastine (ASTELIN) 0.1 % nasal spray, 1 spray into each nostril 2 (two) times a day Use in each nostril as directed, Disp: 30 mL, Rfl: 5  •  ciclopirox (PENLAC) 8 % solution, Apply topically daily at bedtime Apply Penlac at bedtime to the affected toenails daily, wipe off the Penlac from the toenails with acetone or rubbing alcohol on the 7th day and restart the cycle., Disp: 6 mL, Rfl: 3  •  enoxaparin (LOVENOX) 40 mg/0.4 mL, Inject 0.4 mL (40 mg total) under the skin every 24 hours for 12 days, Disp: 4.8 mL, Rfl: 0  •  fluticasone (FLONASE) 50 mcg/act nasal spray, SPRAY 1 SPRAY INTO EACH NOSTRIL EVERY DAY, Disp: 48 mL, Rfl: 1  •  metFORMIN (GLUCOPHAGE) 500 mg tablet, Take 1 tablet (500 mg total) by mouth 2 (two) times a day with meals, Disp: 180 tablet, Rfl: 0  •  mometasone-formoterol (Dulera) 200-5 MCG/ACT inhaler, Inhale 2 puffs 2 (two) times a day Rinse mouth after use., Disp: 13 g, Rfl: 5  •  sildenafil (VIAGRA) 25 MG tablet, TAKE 1 TABLET BY MOUTH DAILY AS NEEDED FOR ERECTILE DYSFUNCTION., Disp: 3 tablet, Rfl: 3  •  tamsulosin (FLOMAX) 0.4 mg, Take 1 capsule (0.4 mg total) by mouth daily with dinner, Disp: 90 capsule, Rfl: 3  •  valsartan (DIOVAN) 160 mg tablet, TAKE 1 TABLET BY MOUTH EVERY DAY, Disp: 90 tablet, Rfl: 1  Allergies   Allergen Reactions   • Pollen Extract Allergic Rhinitis     Vitals:    07/25/24 1523   BP: 120/60   Pulse: 96   Resp: 18   Temp: 98 °F (36.7 °C)   SpO2: 95%       Physical Exam  Constitutional:       Appearance: He is well-developed.   HENT:      Head: Normocephalic and atraumatic.      Mouth/Throat:      Mouth: Mucous membranes are moist.   Eyes:       Pupils: Pupils are equal, round, and reactive to light.   Cardiovascular:      Rate and Rhythm: Normal rate and regular rhythm.      Heart sounds: No murmur heard.  Pulmonary:      Breath sounds: Normal breath sounds. No wheezing or rales.   Abdominal:      Palpations: Abdomen is soft.      Tenderness: There is no abdominal tenderness.   Musculoskeletal:         General: No tenderness. Normal range of motion.      Cervical back: Neck supple.   Lymphadenopathy:      Cervical: No cervical adenopathy.   Skin:     Findings: No erythema or rash.   Neurological:      Mental Status: He is alert and oriented to person, place, and time.      Cranial Nerves: No cranial nerve deficit.      Deep Tendon Reflexes: Reflexes are normal and symmetric.   Psychiatric:         Behavior: Behavior normal.           Labs:  CBC, Coags, BMP, Mg, Phos     Imaging  See above.    Discussion/Summary: In summary, this is a 58-year-old male with a history of recently resected T2, N0 adenocarcinoma of the sigmoid colon.  + Lymphovascular invasion.  No role for adjuvant chemotherapy.  Anemia likely secondary to iron deficiency.  I suggested oral iron replacement.  I asked him to call if abdominal pain or constipation occur.  Pivoted to parenteral iron administration.  He has persistent mature myeloid predominant leukocytosis on and off over the past 15 months.  Given normalcy of differential observation is favored.  I suspect reactive.  Pulmonary nodules,  ?  Related to his colon cancer.  Repeat chest CT in 6 months.  Similar comments regarding cardiophrenic lymph node.  I reviewed the above considerations with the patient and his wife.  They voiced understanding and agreement.

## 2024-07-26 ENCOUNTER — PATIENT OUTREACH (OUTPATIENT)
Dept: HEMATOLOGY ONCOLOGY | Facility: CLINIC | Age: 59
End: 2024-07-26

## 2024-07-26 ENCOUNTER — VBI (OUTPATIENT)
Dept: ADMINISTRATIVE | Facility: OTHER | Age: 59
End: 2024-07-26

## 2024-07-26 LAB
LEFT EYE DIABETIC RETINOPATHY: NORMAL
RIGHT EYE DIABETIC RETINOPATHY: NORMAL

## 2024-07-26 PROCEDURE — 2023F DILAT RTA XM W/O RTNOPTHY: CPT | Performed by: INTERNAL MEDICINE

## 2024-07-26 NOTE — PROGRESS NOTES
I reached out to Jr  to complete the Distress Thermometer, review for any barriers to care and to offer any supportive services that may be needed. I left VM with the reason for my call including my direct phone number .

## 2024-07-29 ENCOUNTER — TELEPHONE (OUTPATIENT)
Age: 59
End: 2024-07-29

## 2024-07-29 NOTE — TELEPHONE ENCOUNTER
Medication: Terbinafine listed under inactive meds    Dose/Frequency: 250mg  Take 1 tablet daily    Quantity: 30    Pharmacy: [facility]/pharmacy #[id number] - [place] - [address]      Office:   [] PCP/Provider -   [x] Speciality/Provider - Peter Diaz- pod    Does the patient have enough for 3 days?   [] Yes   [x] No - Send as HP to POD

## 2024-08-05 ENCOUNTER — TELEPHONE (OUTPATIENT)
Age: 59
End: 2024-08-05

## 2024-08-05 NOTE — PROGRESS NOTES
Ambulatory Visit  Name: Jr Mendoza      : 1965      MRN: [id number]  Encounter Provider: Ann Perez MD  Encounter Date: 2024   Encounter department: [facility] COLON AND RECTAL SURGERY [place]    Assessment & Plan   1. Malignant neoplasm of descending colon (HCC)  Comments:  s/p laparoscopic sigmoidectomy 2024  Stage I pT2N0 with lymphovascular invasion identified  Patient is doing well, with 1-2 bowel movements per day and no postoperative pain  He may resume a regular high-fiber diet  He has no lifting restrictions  We discussed post operative cancer surveillance, including history and physical every 3 months, CEA every 6 months, and annual CT chest abdomen and pelvis  He will be due for colonoscopy next year  We discussed high risk screening for first-degree family members  He will follow-up in the office in 3 months    History of Present Illness       rJ Mendoza is a 58 y.o. male who presents for a post operative evaluation.     The patient is status post laparoscopic hand assisted sigmoidectomy, laparoscopic mobilization of splenic flexure, intra-op SPY angiography, flexible sigmoidoscopy on 24 for malignant neoplasm of the descending colon.    Operative Findings:  Tattoo x2 noted at proximal sigmoid colon  Tumor palpated in between tattoo  High ligation of NICKO pedicle  SPY angiography performed showing well perfused descending colon  Primary end-to-end coloproctostomy matured with 29 EEA stapler  Flexible sigmoidoscopy showing intact anastomosis, negative leak test    Final Diagnosis:  A. Sigmoid colon, sigmoidectomy:  -   Invasive adenocarcinoma of sigmoid colon, moderately differentiated, with mucinous features.  -   Lymphovascular invasion (small vessel and large vessel) is identified.  -   A8 is sent to [facility] for MI Profile Testing.   -   See comment and synoptic report.  B. Anastomatic donuts, excision:  -   Segments of colon, negative  for dysplasia and carcinoma.  Comment: CD31/CKC multiplex immunostain on A10 is positive for lymphovascular invasion.  There is venous (large vessel) invasion, confirmed with positive desmin immunostain on A10 surrounding endothelium-lined spaces with tumor invasion.    RESULTS OF IMMUNOHISTOCHEMICAL ANALYSIS FOR MISMATCH REPAIR PROTEIN LOSS  INTERPRETATION (block A7): No loss of nuclear expression of MMR proteins: low probability of MSI-H     RESULTS:  Antibody            Clone                 Description                     Results  MLH1                 M1                    Mismatch repair protein  Intact nuclear expression  MSH2                [id number]        Mismatch repair protein  Intact nuclear expression  MSH6                SP93                 Mismatch repair protein  Intact nuclear expression  PMS2                 A16                   Mismatch repair protein  Intact nuclear expression     Note: A positive control for each antibody have been reviewed and accepted.       pTNM classification: pT2N0    The patient was seen by Dr. Murphy ([facility]) on 7/25/24.   He has a follow up chest CT scheduled on 9/18/2024.     The patient reports that he is doing well since his surgery. He has had improvement with pain and discomfort.     He is having daily bowel movements.     His appetite and diet are back to normal.       Lab Results   Component Value Date    CEA 2.5 06/13/2024     Lab Results   Component Value Date    WBC 11.15 (H) 07/12/2024    HGB 9.3 (L) 07/12/2024    HCT 30.5 (L) 07/12/2024    MCV 97 07/12/2024     07/12/2024     Lab Results   Component Value Date    SODIUM 130 (L) 07/12/2024    K 4.1 07/12/2024    CL 97 07/12/2024    CO2 25 07/12/2024    AGAP 8 07/12/2024    BUN 10 07/12/2024    CREATININE 0.49 (L) 07/12/2024    GLUC 185 (H) 07/12/2024    CALCIUM 8.8 07/12/2024    AST 16 07/08/2024    ALT 8 07/08/2024    ALKPHOS 78 07/08/2024    TP 7.0 07/08/2024    TBILI 0.79 07/08/2024    EGFR 118  07/12/2024     Review of Systems   Constitutional:  Negative for chills and fever.   HENT:  Negative for ear pain and sore throat.    Eyes:  Negative for pain and visual disturbance.   Respiratory:  Negative for cough and shortness of breath.    Cardiovascular:  Negative for chest pain and palpitations.   Gastrointestinal:  Negative for abdominal pain and vomiting.   Genitourinary:  Negative for dysuria and hematuria.   Musculoskeletal:  Negative for arthralgias and back pain.   Skin:  Negative for color change and rash.   Neurological:  Negative for seizures and syncope.   All other systems reviewed and are negative.    Past Medical History   Past Medical History:   Diagnosis Date    Allergic     Asthma     Cholangitis 05/16/2022    Closed extra-articular fracture of distal end of right tibia 01/28/2020    Closed nondisplaced oblique fracture of shaft of right fibula 01/28/2020    CPAP (continuous positive airway pressure) dependence     Diabetes mellitus (HCC)     Foot fracture     Hyperlipidemia     Hypertension     Sleep apnea      Past Surgical History:   Procedure Laterality Date    CHOLECYSTECTOMY      CHOLECYSTECTOMY LAPAROSCOPIC N/A 05/18/2022    Procedure: CHOLECYSTECTOMY LAPAROSCOPIC;  Surgeon: Zachary Kern MD;  [facility];  Service: General    COLONOSCOPY      HEMICOLOECTOMY W/ ANASTOMOSIS N/A 06/26/2024    Procedure: LAPARSCOPIC HAND ASSISTED SIGMOIDECTOMY, LAPARSCOPIC MOBILIZATION OF SPLENIC FLEXURE, INTRAOP SPY ANGIOOGRAPHY , FLEXIBLE SIGMOIDOSCOPY;  Surgeon: Ann Perez MD;  Location: [facility];  Service: Colorectal     Family History   Problem Relation Age of Onset    ALS Mother     Pneumonia Father     No Known Problems Sister      Current Outpatient Medications on File Prior to Visit   Medication Sig Dispense Refill    albuterol (Ventolin HFA) 90 mcg/act inhaler Inhale 2 puffs every 6 (six) hours as needed for wheezing or shortness of breath 18 g 3    amLODIPine  (NORVASC) 5 mg tablet TAKE 1 TABLET (5 MG TOTAL) BY MOUTH DAILY. 90 tablet 1    atorvastatin (LIPITOR) 20 mg tablet TAKE 1 TABLET BY MOUTH EVERY DAY 90 tablet 1    azelastine (ASTELIN) 0.1 % nasal spray 1 spray into each nostril 2 (two) times a day Use in each nostril as directed 30 mL 5    ciclopirox (PENLAC) 8 % solution Apply topically daily at bedtime Apply Penlac at bedtime to the affected toenails daily, wipe off the Penlac from the toenails with acetone or rubbing alcohol on the 7th day and restart the cycle. 6 mL 3    fluticasone (FLONASE) 50 mcg/act nasal spray SPRAY 1 SPRAY INTO EACH NOSTRIL EVERY DAY 48 mL 1    metFORMIN (GLUCOPHAGE) 500 mg tablet Take 1 tablet (500 mg total) by mouth 2 (two) times a day with meals 180 tablet 0    mometasone-formoterol (Dulera) 200-5 MCG/ACT inhaler Inhale 2 puffs 2 (two) times a day Rinse mouth after use. 13 g 5    sildenafil (VIAGRA) 25 MG tablet TAKE 1 TABLET BY MOUTH DAILY AS NEEDED FOR ERECTILE DYSFUNCTION. 3 tablet 3    tamsulosin (FLOMAX) 0.4 mg Take 1 capsule (0.4 mg total) by mouth daily with dinner 90 capsule 3    valsartan (DIOVAN) 160 mg tablet TAKE 1 TABLET BY MOUTH EVERY DAY 90 tablet 1    [DISCONTINUED] enoxaparin (LOVENOX) 40 mg/0.4 mL Inject 0.4 mL (40 mg total) under the skin every 24 hours for 12 days 4.8 mL 0     No current facility-administered medications on file prior to visit.     Allergies   Allergen Reactions    Pollen Extract Allergic Rhinitis      Objective     There were no vitals taken for this visit.    Physical Exam  Vitals and nursing note reviewed.   Constitutional:       General: He is not in acute distress.     Appearance: He is well-developed.   HENT:      Head: Normocephalic and atraumatic.   Eyes:      Conjunctiva/sclera: Conjunctivae normal.   Cardiovascular:      Rate and Rhythm: Normal rate and regular rhythm.      Heart sounds: No murmur heard.  Pulmonary:      Effort: Pulmonary effort is normal. No respiratory distress.       Breath sounds: Normal breath sounds.   Abdominal:      Palpations: Abdomen is soft.      Tenderness: There is no abdominal tenderness.      Comments: Incisions well-healed   Musculoskeletal:         General: No swelling.      Cervical back: Neck supple.   Skin:     General: Skin is warm and dry.      Capillary Refill: Capillary refill takes less than 2 seconds.   Neurological:      Mental Status: He is alert.   Psychiatric:         Mood and Affect: Mood normal.       Administrative Statements   I have spent a total time of 14 minutes in caring for this patient on the day of the visit/encounter including Diagnostic results, Prognosis, Risks and benefits of tx options, Instructions for management, Patient and family education, Importance of tx compliance, Risk factor reductions, Impressions, Counseling / Coordination of care, Documenting in the medical record, Reviewing / ordering tests, medicine, procedures  , Obtaining or reviewing history  , and Communicating with other healthcare professionals .

## 2024-08-05 NOTE — TELEPHONE ENCOUNTER
Patient called the RX Refill Line. Message is being forwarded to the office.     Patient is requesting a refill of   terbinafine (LamISIL) 250 mg tablet. Would like that sent to [facility] [address]. Out of medication. Not on active med list to que up.      Please contact patient at [phone] if any issues.

## 2024-08-07 ENCOUNTER — OFFICE VISIT (OUTPATIENT)
Age: 59
End: 2024-08-07

## 2024-08-07 VITALS
DIASTOLIC BLOOD PRESSURE: 76 MMHG | BODY MASS INDEX: 34.93 KG/M2 | HEIGHT: 70 IN | SYSTOLIC BLOOD PRESSURE: 118 MMHG | WEIGHT: 244 LBS

## 2024-08-07 DIAGNOSIS — C18.6 MALIGNANT NEOPLASM OF DESCENDING COLON (HCC): Primary | ICD-10-CM

## 2024-08-07 PROCEDURE — 99024 POSTOP FOLLOW-UP VISIT: CPT | Performed by: SURGERY

## 2024-08-07 NOTE — PATIENT INSTRUCTIONS
High fiber diet/increased hydration, 20-30 grams fiber per day, increased fruits/vegetables    Aim to drink at least 64 oz of water per day      High Fiber Diet   AMBULATORY CARE:   A high-fiber diet  includes foods that have a high amount of fiber. Fiber is the part of fruits, vegetables, and grains that is not broken down by your body. Fiber keeps your bowel movements regular. Fiber can also help lower your cholesterol level, control blood sugar in people with diabetes, and relieve constipation. Fiber can also help you control your weight because it helps you feel full faster. Most adults should eat 25 to 35 grams of fiber each day. Talk to your dietitian or healthcare provider about the amount of fiber you need.  Good sources of fiber:       Foods with at least 4 grams of fiber per serving:      ? to ½ cup of high-fiber cereal (check the nutrition label on the box)    ½ cup of blackberries or raspberries    4 dried prunes    1 cooked artichoke    ½ cup of cooked legumes, such as lentils, or red, kidney, and kaur beans    Foods with 1 to 3 grams of fiber per servin slice of whole-wheat, pumpernickel, or rye bread    ½ cup of cooked brown rice    4 whole-wheat crackers    1 cup of oatmeal    ½ cup of cereal with 1 to 3 grams of fiber per serving (check the nutrition label on the box)    1 small piece of fruit, such as an apple, banana, pear, kiwi, or orange    3 dates    ½ cup of canned apricots, fruit cocktail, peaches, or pears    ½ cup of raw or cooked vegetables, such as carrots, cauliflower, cabbage, spinach, squash, or corn  Ways that you can increase fiber in your diet:   Choose brown or wild rice instead of white rice.     Use whole wheat flour in recipes instead of white or all-purpose flour.     Add beans and peas to casseroles or soups.     Choose fresh fruit and vegetables with peels or skins on instead of juices.    Other diet guidelines to follow:   Add fiber to your diet slowly.  You may  have abdominal discomfort, bloating, and gas if you add fiber to your diet too quickly.     Drink plenty of liquids as you add fiber to your diet.  You may have nausea or develop constipation if you do not drink enough water. Ask how much liquid to drink each day and which liquids are best for you.    © Copyright [employer] 2023 Information is for End User's use only and may not be sold, redistributed or otherwise used for commercial purposes.  The above information is an  only. It is not intended as medical advice for individual conditions or treatments. Talk to your doctor, nurse or pharmacist before following any medical regimen to see if it is safe and effective for you.

## 2024-08-09 DIAGNOSIS — L60.8 OTHER NAIL DISORDERS: Primary | ICD-10-CM

## 2024-08-09 RX ORDER — TERBINAFINE HYDROCHLORIDE 250 MG/1
250 TABLET ORAL DAILY
Qty: 90 TABLET | Refills: 0 | Status: SHIPPED | OUTPATIENT
Start: 2024-08-09 | End: 2024-11-07

## 2024-08-13 ENCOUNTER — OFFICE VISIT (OUTPATIENT)
Dept: PODIATRY | Facility: CLINIC | Age: 59
End: 2024-08-13
Payer: COMMERCIAL

## 2024-08-13 VITALS
WEIGHT: 242 LBS | SYSTOLIC BLOOD PRESSURE: 142 MMHG | HEIGHT: 70 IN | DIASTOLIC BLOOD PRESSURE: 72 MMHG | BODY MASS INDEX: 34.65 KG/M2 | HEART RATE: 98 BPM

## 2024-08-13 DIAGNOSIS — E11.9 CONTROLLED TYPE 2 DIABETES MELLITUS WITHOUT COMPLICATION, WITHOUT LONG-TERM CURRENT USE OF INSULIN (HCC): ICD-10-CM

## 2024-08-13 DIAGNOSIS — B35.1 ONYCHOMYCOSIS: Primary | ICD-10-CM

## 2024-08-13 PROCEDURE — 99212 OFFICE O/P EST SF 10 MIN: CPT | Performed by: PODIATRIST

## 2024-08-13 NOTE — PROGRESS NOTES
Ambulatory Visit  Name: Jr Mendoza      : 1965      MRN: [id number]  Encounter Provider: Ryan Nowak DPM  Encounter Date: 2024   Encounter department: [facility] PODIAT[facility] [place]    Assessment & Plan   1. Onychomycosis  2. Controlled type 2 diabetes mellitus without complication, without long-term current use of insulin (HCC)      History of Present Illness     Jr Mendoza is a 58 y.o. male who presents with chief complaint of painful thick nails.  He just refilled is terbinafine for the 4th month.    Review of Systems  Medical History Reviewed by provider this encounter:  Tobacco  Allergies  Meds  Problems  Med Hx  Surg Hx  Fam Hx       Current Outpatient Medications on File Prior to Visit   Medication Sig Dispense Refill    albuterol (Ventolin HFA) 90 mcg/act inhaler Inhale 2 puffs every 6 (six) hours as needed for wheezing or shortness of breath 18 g 3    amLODIPine (NORVASC) 5 mg tablet TAKE 1 TABLET (5 MG TOTAL) BY MOUTH DAILY. 90 tablet 1    atorvastatin (LIPITOR) 20 mg tablet TAKE 1 TABLET BY MOUTH EVERY DAY 90 tablet 1    azelastine (ASTELIN) 0.1 % nasal spray 1 spray into each nostril 2 (two) times a day Use in each nostril as directed 30 mL 5    ciclopirox (PENLAC) 8 % solution Apply topically daily at bedtime Apply Penlac at bedtime to the affected toenails daily, wipe off the Penlac from the toenails with acetone or rubbing alcohol on the 7th day and restart the cycle. 6 mL 3    fluticasone (FLONASE) 50 mcg/act nasal spray SPRAY 1 SPRAY INTO EACH NOSTRIL EVERY DAY 48 mL 1    metFORMIN (GLUCOPHAGE) 500 mg tablet Take 1 tablet (500 mg total) by mouth 2 (two) times a day with meals 180 tablet 0    mometasone-formoterol (Dulera) 200-5 MCG/ACT inhaler Inhale 2 puffs 2 (two) times a day Rinse mouth after use. 13 g 5    sildenafil (VIAGRA) 25 MG tablet TAKE 1 TABLET BY MOUTH DAILY AS NEEDED FOR ERECTILE DYSFUNCTION. 3 tablet 3    tamsulosin (FLOMAX) 0.4 mg Take 1  "capsule (0.4 mg total) by mouth daily with dinner 90 capsule 3    terbinafine (LamISIL) 250 mg tablet Take 1 tablet (250 mg total) by mouth daily 90 tablet 0    valsartan (DIOVAN) 160 mg tablet TAKE 1 TABLET BY MOUTH EVERY DAY 90 tablet 1     No current facility-administered medications on file prior to visit.      Social History     Tobacco Use    Smoking status: Former     Current packs/day: 0.00     Average packs/day: 0.3 packs/day for 10.0 years (2.5 ttl pk-yrs)     Types: Cigarettes     Start date: 2007     Quit date:      Years since quittin.6    Smokeless tobacco: Never   Vaping Use    Vaping status: Never Used   Substance and Sexual Activity    Alcohol use: Yes     Alcohol/week: 7.0 standard drinks of alcohol     Types: 7 Glasses of wine per week     Comment: 3 beers daily    Drug use: Not Currently    Sexual activity: Not Currently     Partners: Female     Objective     /72 (BP Location: Right arm, Patient Position: Sitting, Cuff Size: Standard)   Pulse 98   Ht 5' 10\" (1.778 m)   Wt 110 kg (242 lb)   BMI 34.72 kg/m²     Physical Exam  Cardiovascular:      Pulses:           Dorsalis pedis pulses are 2+ on the right side and 2+ on the left side.        Posterior tibial pulses are 1+ on the right side and 1+ on the left side.   Feet:      Right foot:      Skin integrity: Dry skin present.      Toenail Condition: Right toenails are abnormally thick and long. Fungal disease present.     Left foot:      Skin integrity: Dry skin present.      Toenail Condition: Left toenails are abnormally thick and long. Fungal disease present.     Comments: There was proximal clearing on all nails.The distal aspect of the nails have a yellow thick discoloration, brittle, elongated      Administrative Statements   I have spent a total time of 15 minutes in caring for this patient on the day of the visit/encounter including Importance of tx compliance and Counseling / Coordination of care.    Debride all " nails and smoothed with the edges with a dermal.      Pt was instructed to use lotion twice a day on both feet such as cerave, Cetaphil or similar.and to wear socks to bed.     Patient was explained the two forms of treatment for mycotic nails.  The topical (Penlac, OTC antifungal) needs to be applied once/twice a day for then next 6 to 9 months if missed it will prolong the treatment. They were told that with any of the these treatments it will take care of the fungal it will not necessary change the  thickness, coloration, and shape. He was told to finish his current bottle of medication then stop it since it will be 4 months of total treatment.    Return in about 9 weeks (around 10/15/2024).

## 2024-09-09 ENCOUNTER — OFFICE VISIT (OUTPATIENT)
Dept: FAMILY MEDICINE CLINIC | Facility: CLINIC | Age: 59
End: 2024-09-09
Payer: COMMERCIAL

## 2024-09-09 VITALS
OXYGEN SATURATION: 96 % | HEART RATE: 102 BPM | SYSTOLIC BLOOD PRESSURE: 138 MMHG | WEIGHT: 243.4 LBS | HEIGHT: 67 IN | TEMPERATURE: 97.8 F | BODY MASS INDEX: 38.2 KG/M2 | DIASTOLIC BLOOD PRESSURE: 68 MMHG

## 2024-09-09 DIAGNOSIS — G47.33 OSA (OBSTRUCTIVE SLEEP APNEA): ICD-10-CM

## 2024-09-09 DIAGNOSIS — G62.9 NEUROPATHY: ICD-10-CM

## 2024-09-09 DIAGNOSIS — F10.10 ALCOHOL USE DISORDER, MILD, ABUSE: ICD-10-CM

## 2024-09-09 DIAGNOSIS — E78.00 ELEVATED LDL CHOLESTEROL LEVEL: ICD-10-CM

## 2024-09-09 DIAGNOSIS — N52.9 ERECTILE DYSFUNCTION, UNSPECIFIED ERECTILE DYSFUNCTION TYPE: ICD-10-CM

## 2024-09-09 DIAGNOSIS — I10 BENIGN ESSENTIAL HTN: ICD-10-CM

## 2024-09-09 DIAGNOSIS — J45.40 MODERATE PERSISTENT ASTHMA WITHOUT COMPLICATION: ICD-10-CM

## 2024-09-09 DIAGNOSIS — E11.69 TYPE 2 DIABETES MELLITUS WITH OTHER SPECIFIED COMPLICATION, WITHOUT LONG-TERM CURRENT USE OF INSULIN (HCC): ICD-10-CM

## 2024-09-09 DIAGNOSIS — Z23 ENCOUNTER FOR IMMUNIZATION: Primary | ICD-10-CM

## 2024-09-09 LAB
LEFT EYE DIABETIC RETINOPATHY: NORMAL
LEFT EYE IMAGE QUALITY: NORMAL
LEFT EYE MACULAR EDEMA: NORMAL
LEFT EYE OTHER RETINOPATHY: NORMAL
RIGHT EYE DIABETIC RETINOPATHY: NORMAL
RIGHT EYE IMAGE QUALITY: NORMAL
RIGHT EYE MACULAR EDEMA: NORMAL
RIGHT EYE OTHER RETINOPATHY: NORMAL
SEVERITY (EYE EXAM): NORMAL

## 2024-09-09 PROCEDURE — 90471 IMMUNIZATION ADMIN: CPT | Performed by: STUDENT IN AN ORGANIZED HEALTH CARE EDUCATION/TRAINING PROGRAM

## 2024-09-09 PROCEDURE — 90677 PCV20 VACCINE IM: CPT | Performed by: STUDENT IN AN ORGANIZED HEALTH CARE EDUCATION/TRAINING PROGRAM

## 2024-09-09 PROCEDURE — 99215 OFFICE O/P EST HI 40 MIN: CPT | Performed by: STUDENT IN AN ORGANIZED HEALTH CARE EDUCATION/TRAINING PROGRAM

## 2024-09-09 NOTE — PROGRESS NOTES
Ambulatory Visit  Name: Jr Mendoza      : 1965      MRN: [id number]  Encounter Provider: Steven Quigley MD  Encounter Date: 2024   Encounter department: [facility] PRIMARY CARE    Assessment & Plan   1. Encounter for immunization  -     Pneumococcal Conjugate Vaccine 20-valent (Pcv20)  2. Benign essential HTN  Assessment & Plan:  Stable   On amlodipine and valsartan  3. Elevated LDL cholesterol level  Assessment & Plan:  On lipitor 20mg   Stable continue    Orders:  -     Pneumococcal Conjugate Vaccine 20-valent (Pcv20)  -     Comprehensive metabolic panel; Future  -     Lipid Panel with Direct LDL reflex; Future  -     Hemoglobin A1C; Future  -     TSH, 3rd generation with Free T4 reflex; Future  -     CBC and differential; Future  -     Albumin / creatinine urine ratio; Future  -     Ambulatory Referral to Nutrition Services; Future  4. Erectile dysfunction, unspecified erectile dysfunction type  Assessment & Plan:  Stable on flomax and viagra    5. Type 2 diabetes mellitus with other specified complication, without long-term current use of insulin (HCC)  Assessment & Plan:    Lab Results   Component Value Date    HGBA1C 6.5 (H) 2024     Currently on metformin   On statin, and ace     Orders:  -     Pneumococcal Conjugate Vaccine 20-valent (Pcv20)  -     Comprehensive metabolic panel; Future  -     Lipid Panel with Direct LDL reflex; Future  -     Hemoglobin A1C; Future  -     TSH, 3rd generation with Free T4 reflex; Future  -     CBC and differential; Future  -     Albumin / creatinine urine ratio; Future  -     Ambulatory Referral to Nutrition Services; Future  -     IRIS Diabetic eye exam  6. Moderate persistent asthma without complication  Assessment & Plan:  Follows with Pulmonary   On dulera  Known lung nodules  7. DONG (obstructive sleep apnea)  Assessment & Plan:  Uses CPAP    8. Alcohol use disorder, mild, abuse  -     Vitamin B12; Future  -     Folate; Future  9.  Neuropathy  Assessment & Plan:  Suspect this likely secondary to alcohol use versus diabetes  Will order B12 and folate to complete assessment.  Diabetes well-controlled however he has complete loss of sensation in the bilateral lower extremities and concerned about his ability to drive  Does follow with podiatry but there is no note or mention of the neuropathy, sent a message to his podiatrist to discuss    Discussed with patient my concerns about his ability to safely drive, advised him that he should stop driving at this time and that we would reassess in 30 days to see if there is any improvement or underlying reversible causes to explain his neuropathy.       History of Present Illness     HPI    This a 58-year-old male presents the office for establishment of care.  Reports overall he is feeling well reports no new issues or concerns.    Review of Systems   Constitutional:  Negative for activity change, appetite change, chills, fatigue and fever.   HENT:  Negative for congestion, dental problem, drooling, ear discharge, ear pain, facial swelling, postnasal drip, rhinorrhea and sinus pain.    Eyes:  Negative for photophobia, pain, discharge and itching.   Respiratory:  Negative for apnea, cough, chest tightness and shortness of breath.    Cardiovascular:  Negative for chest pain and leg swelling.   Gastrointestinal:  Negative for abdominal distention, abdominal pain, anal bleeding, constipation, diarrhea and nausea.   Endocrine: Negative for cold intolerance, heat intolerance and polydipsia.   Genitourinary:  Negative for difficulty urinating.   Musculoskeletal:  Negative for arthralgias, gait problem, joint swelling and myalgias.   Skin:  Negative for color change and pallor.   Allergic/Immunologic: Negative for immunocompromised state.   Neurological:  Negative for dizziness, seizures, facial asymmetry, weakness, light-headedness, numbness and headaches.   Psychiatric/Behavioral:  Negative for agitation,  "behavioral problems, confusion, decreased concentration and dysphoric mood.    All other systems reviewed and are negative.      Objective     /68 (BP Location: Left arm, Patient Position: Sitting, Cuff Size: Large)   Pulse 102   Temp 97.8 °F (36.6 °C) (Tympanic)   Ht 5' 7\" (1.702 m)   Wt 110 kg (243 lb 6.4 oz)   SpO2 96%   BMI 38.12 kg/m²     Physical Exam  Constitutional:       Appearance: He is well-developed. He is obese.   HENT:      Head: Normocephalic.   Eyes:      Pupils: Pupils are equal, round, and reactive to light.   Cardiovascular:      Rate and Rhythm: Normal rate and regular rhythm.      Pulses: Pulses are weak.           Dorsalis pedis pulses are 2+ on the right side and 2+ on the left side.        Posterior tibial pulses are 2+ on the right side and 2+ on the left side.   Pulmonary:      Effort: Pulmonary effort is normal.      Breath sounds: Normal breath sounds.   Abdominal:      General: Bowel sounds are normal.      Palpations: Abdomen is soft.   Musculoskeletal:         General: Normal range of motion.      Cervical back: Normal range of motion and neck supple.   Feet:      Right foot:      Skin integrity: No ulcer, skin breakdown, erythema, warmth, callus or dry skin.      Left foot:      Skin integrity: No ulcer, skin breakdown, erythema, warmth, callus or dry skin.   Skin:     General: Skin is warm.     Patient's shoes and socks removed.    Right Foot/Ankle   Right Foot Inspection  Skin Exam: skin normal and skin intact. No dry skin, no warmth, no callus, no erythema, no maceration, no abnormal color, no pre-ulcer, no ulcer and no callus.     Sensory   Vibration: absent  Proprioception: absent  Monofilament testing: absent    Vascular  Capillary refills: < 3 seconds  The right DP pulse is 2+. The right PT pulse is 2+.     Left Foot/Ankle  Left Foot Inspection  Skin Exam: skin normal and skin intact. No dry skin, no warmth, no erythema, no maceration, normal color, no pre-ulcer, " no ulcer and no callus.     Sensory   Vibration: absent  Proprioception: absent  Monofilament testing: absent    Vascular  Capillary refills: < 3 seconds  The left DP pulse is 2+. The left PT pulse is 2+.     Assign Risk Category  No deformity present  Loss of protective sensation  Weak pulses  Risk: 2        Administrative Statements

## 2024-09-09 NOTE — ASSESSMENT & PLAN NOTE
Suspect this likely secondary to alcohol use versus diabetes  Will order B12 and folate to complete assessment.  Diabetes well-controlled however he has complete loss of sensation in the bilateral lower extremities and concerned about his ability to drive  Does follow with podiatry but there is no note or mention of the neuropathy, sent a message to his podiatrist to discuss    Discussed with patient my concerns about his ability to safely drive, advised him that he should stop driving at this time and that we would reassess in 30 days to see if there is any improvement or underlying reversible causes to explain his neuropathy.

## 2024-09-09 NOTE — ASSESSMENT & PLAN NOTE
Lab Results   Component Value Date    HGBA1C 6.5 (H) 06/20/2024     Currently on metformin   On statin, and ace

## 2024-09-16 ENCOUNTER — LAB (OUTPATIENT)
Dept: LAB | Facility: CLINIC | Age: 59
End: 2024-09-16
Payer: COMMERCIAL

## 2024-09-16 DIAGNOSIS — E11.69 TYPE 2 DIABETES MELLITUS WITH OTHER SPECIFIED COMPLICATION, WITHOUT LONG-TERM CURRENT USE OF INSULIN (HCC): ICD-10-CM

## 2024-09-16 DIAGNOSIS — E78.00 ELEVATED LDL CHOLESTEROL LEVEL: ICD-10-CM

## 2024-09-16 DIAGNOSIS — F10.10 ALCOHOL USE DISORDER, MILD, ABUSE: ICD-10-CM

## 2024-09-16 LAB
ALBUMIN SERPL BCG-MCNC: 4.4 G/DL (ref 3.5–5)
ALP SERPL-CCNC: 83 U/L (ref 34–104)
ALT SERPL W P-5'-P-CCNC: 12 U/L (ref 7–52)
ANION GAP SERPL CALCULATED.3IONS-SCNC: 7 MMOL/L (ref 4–13)
ANISOCYTOSIS BLD QL SMEAR: PRESENT
AST SERPL W P-5'-P-CCNC: 16 U/L (ref 13–39)
BASOPHILS # BLD MANUAL: 0.15 THOUSAND/UL (ref 0–0.1)
BASOPHILS NFR MAR MANUAL: 2 % (ref 0–1)
BILIRUB SERPL-MCNC: 0.59 MG/DL (ref 0.2–1)
BUN SERPL-MCNC: 8 MG/DL (ref 5–25)
CALCIUM SERPL-MCNC: 9.6 MG/DL (ref 8.4–10.2)
CHLORIDE SERPL-SCNC: 99 MMOL/L (ref 96–108)
CHOLEST SERPL-MCNC: 141 MG/DL
CO2 SERPL-SCNC: 29 MMOL/L (ref 21–32)
CREAT SERPL-MCNC: 0.63 MG/DL (ref 0.6–1.3)
CREAT UR-MCNC: 61.7 MG/DL
EOSINOPHIL # BLD MANUAL: 0.59 THOUSAND/UL (ref 0–0.4)
EOSINOPHIL NFR BLD MANUAL: 8 % (ref 0–6)
ERYTHROCYTE [DISTWIDTH] IN BLOOD BY AUTOMATED COUNT: 15.2 % (ref 11.6–15.1)
EST. AVERAGE GLUCOSE BLD GHB EST-MCNC: 128 MG/DL
FOLATE SERPL-MCNC: 6 NG/ML
GFR SERPL CREATININE-BSD FRML MDRD: 106 ML/MIN/1.73SQ M
GLUCOSE P FAST SERPL-MCNC: 136 MG/DL (ref 65–99)
HBA1C MFR BLD: 6.1 %
HCT VFR BLD AUTO: 46.3 % (ref 36.5–49.3)
HDLC SERPL-MCNC: 53 MG/DL
HGB BLD-MCNC: 14.2 G/DL (ref 12–17)
LDLC SERPL CALC-MCNC: 70 MG/DL (ref 0–100)
LYMPHOCYTES # BLD AUTO: 2.15 THOUSAND/UL (ref 0.6–4.47)
LYMPHOCYTES # BLD AUTO: 25 % (ref 14–44)
MACROCYTES BLD QL AUTO: PRESENT
MCH RBC QN AUTO: 27.5 PG (ref 26.8–34.3)
MCHC RBC AUTO-ENTMCNC: 30.7 G/DL (ref 31.4–37.4)
MCV RBC AUTO: 90 FL (ref 82–98)
MICROALBUMIN UR-MCNC: <7 MG/L
MONOCYTES # BLD AUTO: 0.67 THOUSAND/UL (ref 0–1.22)
MONOCYTES NFR BLD: 9 % (ref 4–12)
NEUTROPHILS # BLD MANUAL: 3.86 THOUSAND/UL (ref 1.85–7.62)
NEUTS BAND NFR BLD MANUAL: 1 % (ref 0–8)
NEUTS SEG NFR BLD AUTO: 51 % (ref 43–75)
PLASMA CELLS NFR BLD: 2 % (ref 0–0)
PLATELET # BLD AUTO: 213 THOUSANDS/UL (ref 149–390)
PLATELET BLD QL SMEAR: ADEQUATE
PMV BLD AUTO: 10.5 FL (ref 8.9–12.7)
POIKILOCYTOSIS BLD QL SMEAR: PRESENT
POLYCHROMASIA BLD QL SMEAR: PRESENT
POTASSIUM SERPL-SCNC: 4.4 MMOL/L (ref 3.5–5.3)
PROT SERPL-MCNC: 7.9 G/DL (ref 6.4–8.4)
RBC # BLD AUTO: 5.17 MILLION/UL (ref 3.88–5.62)
RBC MORPH BLD: PRESENT
SODIUM SERPL-SCNC: 135 MMOL/L (ref 135–147)
TRIGL SERPL-MCNC: 92 MG/DL
TSH SERPL DL<=0.05 MIU/L-ACNC: 1.52 UIU/ML (ref 0.45–4.5)
VARIANT LYMPHS # BLD AUTO: 2 %
VIT B12 SERPL-MCNC: 114 PG/ML (ref 180–914)
WBC # BLD AUTO: 7.42 THOUSAND/UL (ref 4.31–10.16)

## 2024-09-16 PROCEDURE — 85007 BL SMEAR W/DIFF WBC COUNT: CPT

## 2024-09-16 PROCEDURE — 84443 ASSAY THYROID STIM HORMONE: CPT

## 2024-09-16 PROCEDURE — 85027 COMPLETE CBC AUTOMATED: CPT

## 2024-09-16 PROCEDURE — 80053 COMPREHEN METABOLIC PANEL: CPT

## 2024-09-16 PROCEDURE — 82746 ASSAY OF FOLIC ACID SERUM: CPT

## 2024-09-16 PROCEDURE — 82607 VITAMIN B-12: CPT

## 2024-09-16 PROCEDURE — 82043 UR ALBUMIN QUANTITATIVE: CPT

## 2024-09-16 PROCEDURE — 80061 LIPID PANEL: CPT

## 2024-09-16 PROCEDURE — 36415 COLL VENOUS BLD VENIPUNCTURE: CPT

## 2024-09-16 PROCEDURE — 83036 HEMOGLOBIN GLYCOSYLATED A1C: CPT

## 2024-09-16 PROCEDURE — 82570 ASSAY OF URINE CREATININE: CPT

## 2024-09-17 ENCOUNTER — TELEPHONE (OUTPATIENT)
Dept: FAMILY MEDICINE CLINIC | Facility: CLINIC | Age: 59
End: 2024-09-17

## 2024-09-17 ENCOUNTER — TELEPHONE (OUTPATIENT)
Age: 59
End: 2024-09-17

## 2024-09-17 NOTE — TELEPHONE ENCOUNTER
Devorah Estradah1 hour ago (12:21 PM)     ML   Patient returned call. He prefers to take the vitamin B12 rather instead of the injection for his deficiency.        Medciation can be sent to [facility] in [place]

## 2024-09-17 NOTE — TELEPHONE ENCOUNTER
----- Message from Steven Quigley MD sent at 9/17/2024  9:45 AM EDT -----  Patient with significant vitamin B12 deficiency likely secondary to his alcohol consumption.  Worker recommend supplementation B12 at this time.  He is needed be ordered as a daily tablet or he can come to the office for injections.  Please let me kn  ow what he would prefer.

## 2024-09-17 NOTE — TELEPHONE ENCOUNTER
Patient returned call. He prefers to take the vitamin B12 rather instead of the injection for his deficiency.       Medciation can be sent to [facility] in [place]

## 2024-09-18 ENCOUNTER — HOSPITAL ENCOUNTER (OUTPATIENT)
Dept: CT IMAGING | Facility: HOSPITAL | Age: 59
Discharge: HOME | End: 2024-09-18
Payer: COMMERCIAL

## 2024-09-18 DIAGNOSIS — R93.89 ABNORMAL CT OF THE CHEST: ICD-10-CM

## 2024-09-18 DIAGNOSIS — E53.8 B12 DEFICIENCY: Primary | ICD-10-CM

## 2024-09-18 PROCEDURE — 71250 CT THORAX DX C-: CPT

## 2024-09-18 RX ORDER — PYRIDOXINE HCL (VITAMIN B6) 50 MG
50 TABLET ORAL DAILY
Qty: 90 TABLET | Refills: 0 | Status: SHIPPED | OUTPATIENT
Start: 2024-09-18

## 2024-09-25 ENCOUNTER — OFFICE VISIT (OUTPATIENT)
Dept: PULMONOLOGY | Facility: CLINIC | Age: 59
End: 2024-09-25
Payer: COMMERCIAL

## 2024-09-25 VITALS
HEART RATE: 84 BPM | OXYGEN SATURATION: 97 % | WEIGHT: 244.3 LBS | SYSTOLIC BLOOD PRESSURE: 116 MMHG | HEIGHT: 67 IN | BODY MASS INDEX: 38.34 KG/M2 | DIASTOLIC BLOOD PRESSURE: 70 MMHG

## 2024-09-25 DIAGNOSIS — E66.09 CLASS 2 OBESITY DUE TO EXCESS CALORIES WITH BODY MASS INDEX (BMI) OF 37.0 TO 37.9 IN ADULT, UNSPECIFIED WHETHER SERIOUS COMORBIDITY PRESENT: ICD-10-CM

## 2024-09-25 DIAGNOSIS — R93.89 ABNORMAL CT OF THE CHEST: ICD-10-CM

## 2024-09-25 DIAGNOSIS — E66.812 CLASS 2 OBESITY DUE TO EXCESS CALORIES WITH BODY MASS INDEX (BMI) OF 37.0 TO 37.9 IN ADULT, UNSPECIFIED WHETHER SERIOUS COMORBIDITY PRESENT: ICD-10-CM

## 2024-09-25 DIAGNOSIS — G47.33 OSA (OBSTRUCTIVE SLEEP APNEA): ICD-10-CM

## 2024-09-25 DIAGNOSIS — J45.40 MODERATE PERSISTENT ASTHMA WITHOUT COMPLICATION: Primary | ICD-10-CM

## 2024-09-25 PROCEDURE — 99213 OFFICE O/P EST LOW 20 MIN: CPT

## 2024-09-25 NOTE — PATIENT INSTRUCTIONS
CT chest in January  Continue BiPAP nightly  Continue Dulera inhaler and albuterol  F/u in January after CT

## 2024-09-25 NOTE — PROGRESS NOTES
Pulmonary Follow Up Note  Jr Mendoza 59 y.o. male MRN: [id number]  9/25/2024    Assessment:    Moderate persistent asthma without complication  -Symptoms controlled.  No recent exacerbations, not needing rescue inhaler  -Lungs are clear on exam today with SpO2 97% on room air  -He will continue Dulera 200-5 mcg 2 puffs twice daily, rinse mouth after and albuterol HFA every 6 hours as needed for shortness of breath or wheezing  -Follow-up in 4 months or sooner if needed    DONG (obstructive sleep apnea)  -Patient received new BiPAP machine 1 to 2 months ago.  Reviewed compliance data on phone arturo.  Over the past 30 days he has been compliant with an average nightly use of 7 hours and 12 minutes.  Residual AHI 5.19, average mask leakage 33L/min.  Patient is sleeping well and has no issues with his mask or machine.  Feels well rested in the mornings, less tired during the day.  -Patient's new machine needs to be linked to download compliance reports, advised him to take SD card to [employer] for upload.  Can review full compliance report at next office visit  -He will continue wearing nightly    Abnormal CT of the chest  -Reviewed recent CT chest from 9/18/2024.  The RML subpleural nodules are stable.  However, there is a persistent 6 x 3 x 3 mm RLL segmental endobronchial lesion or focus of chronic mucous plugging  -Recommend follow-up CT chest in 3 to 4 months.  He has a CT chest planned to be performed in January ordered by Dr. Marrufo.  We will review the status of this RLL lesion on that CT    Class 2 obesity with body mass index (BMI) of 37.0 to 37.9 in adult  -Encouraged weight loss.  Patient plans on increasing his exercise now that he is fully recovered from his abdominal surgery in June.          Plan:    Diagnoses and all orders for this visit:    Moderate persistent asthma without complication    DONG (obstructive sleep apnea)    Abnormal CT of the chest    Class 2 obesity due to excess calories with body  mass index (BMI) of 37.0 to 37.9 in adult, unspecified whether serious comorbidity present                Return in about 4 months (around 1/25/2025).      History of Present Illness     Chief Complaint:   Chief Complaint   Patient presents with    Follow-up     CT       Patient ID: Jr is a 59 y.o. y.o. male has a past medical history of Allergic, Asthma, Cancer (HCC), Cholangitis (05/16/2022), Closed extra-articular fracture of distal end of right tibia (01/28/2020), Closed nondisplaced oblique fracture of shaft of right fibula (01/28/2020), CPAP (continuous positive airway pressure) dependence, Diabetes mellitus (HCC), Foot fracture, Hyperlipidemia, Hypertension, and Sleep apnea.      9/25/2024  HPI: Jr Mendoza is a 59 y.o. male with a past medical history significant for hypertension, prediabetes, obesity, DONG, mild alcohol abuse, and former brief tobacco use who is here today for a follow-up visit.   He is accompanied by his wife.  Patient was previously seen in the office 3 months ago.  Maintained on Dulera 200 and albuterol as needed.  Also on BiPAP nightly.  He had a CT chest for lung nodule and intrapleural lymph node follow-up.  This showed unchanged nodules and a persistent RLL segmental endobronchial lesion or chronic mucous plugging.    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  He will be having surveillance imaging for this.  Patient tells me his breathing is stable with no exacerbations since his office visit.  Denies any cough, mucus, wheezing, or shortness of breath.  Has mild nasal congestion with the change in weather.  Using nasal sprays for this.  Not using rescue inhaler.  He got a new BiPAP machine 1 to 2 months ago and is wearing nightly without any issues with his mask or machine.  Sleeping well and feels well rested in the mornings.      Review of Systems   Constitutional:  Negative for activity  change, appetite change, chills, fever and unexpected weight change.   HENT:  Positive for sneezing. Negative for congestion, ear pain, postnasal drip, rhinorrhea, sore throat and trouble swallowing.    Respiratory:  Negative for cough, chest tightness, shortness of breath and wheezing.    Cardiovascular:  Negative for chest pain, palpitations and leg swelling.   Musculoskeletal:  Negative for myalgias.   Allergic/Immunologic: Negative.    Neurological:  Negative for headaches.       Historical Information   Past Medical History:   Diagnosis Date    Allergic     Asthma     Cancer (HCC)     Cholangitis 05/16/2022    Closed extra-articular fracture of distal end of right tibia 01/28/2020    Closed nondisplaced oblique fracture of shaft of right fibula 01/28/2020    CPAP (continuous positive airway pressure) dependence     Diabetes mellitus (HCC)     Foot fracture     Hyperlipidemia     Hypertension     Sleep apnea      Past Surgical History:   Procedure Laterality Date    CHOLECYSTECTOMY      CHOLECYSTECTOMY LAPAROSCOPIC N/A 05/18/2022    Procedure: CHOLECYSTECTOMY LAPAROSCOPIC;  Surgeon: Zachary Kern MD;  Location: [facility];  Service: General    COLONOSCOPY      HEMICOLOECTOMY W/ ANASTOMOSIS N/A 06/26/2024    Procedure: LAPARSCOPIC HAND ASSISTED SIGMOIDECTOMY, LAPARSCOPIC MOBILIZATION OF SPLENIC FLEXURE, INTRAOP SPY ANGIOOGRAPHY , FLEXIBLE SIGMOIDOSCOPY;  Surgeon: Ann Perez MD;  Location: [facility];  Service: Colorectal     Family History   Problem Relation Age of Onset    ALS Mother     Pneumonia Father     No Known Problems Sister        Smoking history: He reports that he quit smoking about 7 years ago. His smoking use included cigarettes. He started smoking about 17 years ago. He has a 2.5 pack-year smoking history. He has never used smokeless tobacco.    Occupational History:     Immunization History   Administered Date(s) Administered    COVID-19 MODERNA VACC 0.5 ML IM 04/30/2021,  05/28/2021    INFLUENZA 10/14/2022    Influenza, injectable, quadrivalent, preservative free 0.5 mL 10/31/2023    Influenza, recombinant, quadrivalent,injectable, preservative free 09/17/2020, 01/11/2022, 10/14/2022    Pneumococcal Conjugate Vaccine 20-valent (Pcv20), Polysace 09/09/2024    Tdap 03/14/2024    Zoster Vaccine Recombinant 03/14/2024, 07/22/2024       Meds/Allergies     Current Outpatient Medications:     albuterol (Ventolin HFA) 90 mcg/act inhaler, Inhale 2 puffs every 6 (six) hours as needed for wheezing or shortness of breath, Disp: 18 g, Rfl: 3    amLODIPine (NORVASC) 5 mg tablet, TAKE 1 TABLET (5 MG TOTAL) BY MOUTH DAILY., Disp: 90 tablet, Rfl: 1    atorvastatin (LIPITOR) 20 mg tablet, TAKE 1 TABLET BY MOUTH EVERY DAY, Disp: 90 tablet, Rfl: 1    azelastine (ASTELIN) 0.1 % nasal spray, 1 spray into each nostril 2 (two) times a day Use in each nostril as directed, Disp: 30 mL, Rfl: 5    fluticasone (FLONASE) 50 mcg/act nasal spray, SPRAY 1 SPRAY INTO EACH NOSTRIL EVERY DAY, Disp: 48 mL, Rfl: 1    metFORMIN (GLUCOPHAGE) 500 mg tablet, Take 1 tablet (500 mg total) by mouth 2 (two) times a day with meals, Disp: 180 tablet, Rfl: 0    mometasone-formoterol (Dulera) 200-5 MCG/ACT inhaler, Inhale 2 puffs 2 (two) times a day Rinse mouth after use., Disp: 13 g, Rfl: 5    sildenafil (VIAGRA) 25 MG tablet, TAKE 1 TABLET BY MOUTH DAILY AS NEEDED FOR ERECTILE DYSFUNCTION., Disp: 3 tablet, Rfl: 3    tamsulosin (FLOMAX) 0.4 mg, Take 1 capsule (0.4 mg total) by mouth daily with dinner, Disp: 90 capsule, Rfl: 3    terbinafine (LamISIL) 250 mg tablet, Take 1 tablet (250 mg total) by mouth daily, Disp: 90 tablet, Rfl: 0    valsartan (DIOVAN) 160 mg tablet, TAKE 1 TABLET BY MOUTH EVERY DAY, Disp: 90 tablet, Rfl: 1    vitamin B-12 (CYANOCOBALAMIN) 100 MCG TABS, Take 0.5 tablets (50 mcg total) by mouth daily, Disp: 90 tablet, Rfl: 0    ciclopirox (PENLAC) 8 % solution, Apply topically daily at bedtime Apply Penlac at  "bedtime to the affected toenails daily, wipe off the Penlac from the toenails with acetone or rubbing alcohol on the 7th day and restart the cycle. (Patient not taking: Reported on 9/9/2024), Disp: 6 mL, Rfl: 3  Allergies:   Allergies   Allergen Reactions    Pollen Extract Allergic Rhinitis         Vitals:  Vitals:    09/25/24 1322   BP: 116/70   BP Location: Right arm   Patient Position: Sitting   Cuff Size: Adult   Pulse: 84   SpO2: 97%   Weight: 111 kg (244 lb 4.8 oz)   Height: 5' 7\" (1.702 m)       Oxygen Therapy  SpO2: 97 %  .  Wt Readings from Last 3 Encounters:   09/25/24 111 kg (244 lb 4.8 oz)   09/09/24 110 kg (243 lb 6.4 oz)   08/13/24 110 kg (242 lb)     Body mass index is 38.26 kg/m².    Physical Exam  Vitals and nursing note reviewed.   Constitutional:       General: He is not in acute distress.     Appearance: Normal appearance. He is obese.      Comments: Large neck   Cardiovascular:      Rate and Rhythm: Normal rate and regular rhythm.      Heart sounds: Normal heart sounds. No murmur heard.  Pulmonary:      Effort: Pulmonary effort is normal. No respiratory distress.      Breath sounds: No decreased breath sounds, wheezing, rhonchi or rales.   Musculoskeletal:         General: No swelling.      Right lower leg: No edema.      Left lower leg: No edema.   Psychiatric:         Mood and Affect: Mood and affect normal.         Behavior: Behavior normal. Behavior is cooperative.           Labs: I have personally reviewed pertinent lab results.  Lab Results   Component Value Date    WBC 7.42 09/16/2024    HGB 14.2 09/16/2024    HCT 46.3 09/16/2024    MCV 90 09/16/2024     09/16/2024     Lab Results   Component Value Date    GLUCOSE 217 (H) 06/26/2024    CALCIUM 9.6 09/16/2024    K 4.4 09/16/2024    CO2 29 09/16/2024    CL 99 09/16/2024    BUN 8 09/16/2024    CREATININE 0.63 09/16/2024     No results found for: \"IGE\"  Lab Results   Component Value Date    ALT 12 09/16/2024    AST 16 09/16/2024    " ALKPHOS 83 09/16/2024       Imaging and other studies: I have personally reviewed pertinent reports and I have personally reviewed pertinent films in PACS     CT chest 9/18/2024  Unchanged triangular shaped right upper and right middle lobe perifissural subpleural nodules characteristic of benign intrapulmonary nodes. No suspicious solid parenchymal nodules.  Persistent 6 x 3 x 3 mm right lower lobe, segmental segmental endobronchial lesion or focus of chronic mucous plugging; follow-up with pulmonology consultation.    CT chest abdomen pelvis 6/18/2024  Right pulmonary nodules, the larger 5 mm, favoring intrapleural lymph nodes.  Given reported colonic mass, recommend follow-up in 3 months to confirm benignity.  Soft tissue focus in one of the subsegmental right lower lobe bronchi, likely secretions.  This, too, can be reassessed at 3 months follow-up.      No findings of intra-abdominal metastases.    CPAP Study 5/29/24  IMPRESSION: 1. This was an ineffective PAP titration study (despite the use of both CPAP and BiPAP) as obstructive events occurred at lower pressures and central events occurred at higher pressures. As an optimal setting was not identified, there is no pressure that can be recommended based on these results. 2. Normal baseline oxygen saturation with use of PAP on this test. 3. Sleep efficiency was low due to prolonged sleep latency and sleep fragmentation (improved at higher PAP pressures). A high normal amount percentage of Stage N1 sleep was observed. Stage N3 sleep (deep sleep) percentage was decreased. REM sleep percentage was markedly increased. 4. Markedly increased number of periodic limb movements (PLM) during sleep.       RECOMMENDATION: Recommend set bilevel PAP (EPAP 13 cm H2O and IPAP 17 cm H2O). Close follow-up recommended and if there are a high number of central events on compliance report that persist for 6 months then consider an ASV titration study. Due to the frequency of  periodic limb movements during sleep, clinical correlation is recommended. Assessment of an iron panel to exclude iron deficiency should be considered. Follow-up with ordering provider or sleep medicine in 1-3 months for a PAP compliance visit.     Home sleep study 3/5/2024  Severe obstructive sleep apnea with an SHANTEL of 89.3 and event related desaturations.  The lowest SpO2 recorded 63%.  Periods of bradycardia noted.  A CPAP titration study is recommended due to sleep apnea and hypoxemia.    Echo 9/14/2023  Normal left ventricular cavity size.  Wall thickness increased.  Moderate concentric hypertrophy.  The left ventricular ejection fraction is 65 to 70%.  Systolic function is normal.  Wall motion is normal.  Diastolic function is mildly abnormal, consistent with grade 1 relaxation.  Mitral valve annular calcification.  Aortic root is mildly dilated.  Ascending aorta is normal in size.  The aortic root is 3.70 cm.  The ascending aorta is 3.4 cm.      Pulmonary function testing:     Pulmonary Functions Testing Results: 9/14/23    FEV1/FVC ratio 67%    FEV1 49% predicted  FVC 56% predicted  Significant response to bronchodilators  TLC 79 % predicted   % predicted  DLCO corrected for hemoglobin 89 % predicted    Impression: Reduced total lung capacity at 79% predicted with normal residual volume and thoracic gas volume.  Moderate airflow limitation.  Significant improvement in airflow and vital capacity following the administration of bronchodilators.  Normal diffusion capacity.  Increased airway resistance as indicated by the specific airway conductance.

## 2024-09-25 NOTE — ASSESSMENT & PLAN NOTE
-Reviewed recent CT chest from 9/18/2024.  The RML subpleural nodules are stable.  However, there is a persistent 6 x 3 x 3 mm RLL segmental endobronchial lesion or focus of chronic mucous plugging  -Recommend follow-up CT chest in 3 to 4 months.  He has a CT chest planned to be performed in January ordered by Dr. Marrufo.  We will review the status of this RLL lesion on that CT

## 2024-09-25 NOTE — ASSESSMENT & PLAN NOTE
-Symptoms controlled.  No recent exacerbations, not needing rescue inhaler  -Lungs are clear on exam today with SpO2 97% on room air  -He will continue Dulera 200-5 mcg 2 puffs twice daily, rinse mouth after and albuterol HFA every 6 hours as needed for shortness of breath or wheezing  -Follow-up in 4 months or sooner if needed

## 2024-09-25 NOTE — ASSESSMENT & PLAN NOTE
-Encouraged weight loss.  Patient plans on increasing his exercise now that he is fully recovered from his abdominal surgery in June.

## 2024-09-25 NOTE — ASSESSMENT & PLAN NOTE
-Patient received new BiPAP machine 1 to 2 months ago.  Reviewed compliance data on phone arturo.  Over the past 30 days he has been compliant with an average nightly use of 7 hours and 12 minutes.  Residual AHI 5.19, average mask leakage 33L/min.  Patient is sleeping well and has no issues with his mask or machine.  Feels well rested in the mornings, less tired during the day.  -Patient's new machine needs to be linked to download compliance reports, advised him to take SD card to [facility] for upload.  Can review full compliance report at next office visit  -He will continue wearing nightly

## 2024-10-18 DIAGNOSIS — E11.9 TYPE 2 DIABETES MELLITUS WITHOUT COMPLICATION, WITHOUT LONG-TERM CURRENT USE OF INSULIN (HCC): ICD-10-CM

## 2024-10-18 NOTE — TELEPHONE ENCOUNTER
Patient is upset regarding refill request of valium and norco. How soon can patient request a refill of these medications? Please see [place] message.     Routing to provider to review and advise.     Marleen Juarez RN, BSN  [facility]     Reason for call:   [x] Refill   [] Prior Auth  [] Other:     Office:   [x] PCP/Provider - [facility]  Authorized By: CELI Bowling  [] Specialty/Provider -     Medication: metFORMIN (GLUCOPHAGE) 500 mg tablet     Dose/Frequency: Take 1 tablet (500 mg total) by mouth 2 (two) times a day with meals,     Quantity: 180 tablet     Pharmacy: [facility] #[id number] [place] - [id number]     Does the patient have enough for 3 days?   [x] Yes   [] No - Send as HP to POD

## 2024-10-22 ENCOUNTER — OFFICE VISIT (OUTPATIENT)
Dept: PODIATRY | Facility: CLINIC | Age: 59
End: 2024-10-22
Payer: COMMERCIAL

## 2024-10-22 VITALS
DIASTOLIC BLOOD PRESSURE: 95 MMHG | BODY MASS INDEX: 38.3 KG/M2 | HEIGHT: 67 IN | HEART RATE: 89 BPM | WEIGHT: 244 LBS | SYSTOLIC BLOOD PRESSURE: 142 MMHG

## 2024-10-22 DIAGNOSIS — B35.1 ONYCHOMYCOSIS: Primary | ICD-10-CM

## 2024-10-22 DIAGNOSIS — E11.42 CONTROLLED TYPE 2 DIABETES MELLITUS WITH DIABETIC POLYNEUROPATHY, WITHOUT LONG-TERM CURRENT USE OF INSULIN (HCC): ICD-10-CM

## 2024-10-22 DIAGNOSIS — L60.0 INGROWN LEFT BIG TOENAIL: ICD-10-CM

## 2024-10-22 PROCEDURE — 11721 DEBRIDE NAIL 6 OR MORE: CPT | Performed by: PODIATRIST

## 2024-10-22 PROCEDURE — RECHECK: Performed by: PODIATRIST

## 2024-10-22 NOTE — PROGRESS NOTES
Ambulatory Visit  Name: Jr Mendoza      : 1965      MRN: [id number]  Encounter Provider: Ryan Nowak DPM  Encounter Date: 10/22/2024   Encounter department: [facility] [place]    Assessment & Plan  Onychomycosis       Debride mycotic nails and thin the nail plates x 10 with the use of a nail nipper manually and an electric Dremel bur was used to reduce the thickness of the nail beds and smoothed the distal aspect of the nails.  Patient tolerated the procedure well  Controlled type 2 diabetes mellitus with diabetic polyneuropathy, without long-term current use of insulin ([facility])    Lab Results   Component Value Date    HGBA1C 6.1 (H) 2024          Briefly touched on different medications that could be prescribed for than her neuropathy.  Ingrown left big toenail       A formal timeout including patient identification, laterality and existing allergies [facility] was conducted. I recommend a partial temporary nail avulsion and informed consent obtained.     After cleansing skin with alcohol sprayed etoh chloride on the left hallux nail lateral border.  I carefully did a partially avulsion of the offending nail border with a small straight nail nipper and flushed with sterile saline. I dressed the site with bacitracin ointment and a DSD to be left intact x 24 hours. The patient may then remove the dressing, shower, and redress with antibiotic ointment and a band-aid daily.      Discussed the permanent procedure with the patient and his wife it was explained that that would be done if the nail becomes ingrown again in the near future.    Return in about 10 weeks (around 2024).     History of Present Illness     Jr Mendoza is a 59 y.o. male who presents chief complaint of painful thick nails on both feet and a painful ingrown nail on his left big toe.  He is a diabetic who has peripheral neuropathy.  Patient relates that he feels like he is walking on marbles  or stones.    History obtained from : patient  Review of Systems  Medical History Reviewed by provider this encounter:       Current Outpatient Medications on File Prior to Visit   Medication Sig Dispense Refill    albuterol (Ventolin HFA) 90 mcg/act inhaler Inhale 2 puffs every 6 (six) hours as needed for wheezing or shortness of breath 18 g 3    amLODIPine (NORVASC) 5 mg tablet TAKE 1 TABLET (5 MG TOTAL) BY MOUTH DAILY. 90 tablet 1    atorvastatin (LIPITOR) 20 mg tablet TAKE 1 TABLET BY MOUTH EVERY DAY 90 tablet 1    azelastine (ASTELIN) 0.1 % nasal spray 1 spray into each nostril 2 (two) times a day Use in each nostril as directed 30 mL 5    fluticasone (FLONASE) 50 mcg/act nasal spray SPRAY 1 SPRAY INTO EACH NOSTRIL EVERY DAY 48 mL 1    metFORMIN (GLUCOPHAGE) 500 mg tablet Take 1 tablet (500 mg total) by mouth 2 (two) times a day with meals 180 tablet 1    mometasone-formoterol (Dulera) 200-5 MCG/ACT inhaler Inhale 2 puffs 2 (two) times a day Rinse mouth after use. 13 g 5    sildenafil (VIAGRA) 25 MG tablet TAKE 1 TABLET BY MOUTH DAILY AS NEEDED FOR ERECTILE DYSFUNCTION. 3 tablet 3    tamsulosin (FLOMAX) 0.4 mg Take 1 capsule (0.4 mg total) by mouth daily with dinner 90 capsule 3    terbinafine (LamISIL) 250 mg tablet Take 1 tablet (250 mg total) by mouth daily 90 tablet 0    valsartan (DIOVAN) 160 mg tablet TAKE 1 TABLET BY MOUTH EVERY DAY 90 tablet 1    vitamin B-12 (CYANOCOBALAMIN) 100 MCG TABS Take 0.5 tablets (50 mcg total) by mouth daily 90 tablet 0    ciclopirox (PENLAC) 8 % solution Apply topically daily at bedtime Apply Penlac at bedtime to the affected toenails daily, wipe off the Penlac from the toenails with acetone or rubbing alcohol on the 7th day and restart the cycle. (Patient not taking: Reported on 9/9/2024) 6 mL 3     No current facility-administered medications on file prior to visit.      Social History     Tobacco Use    Smoking status: Former     Current packs/day: 0.00     Average  "packs/day: 0.3 packs/day for 10.0 years (2.5 ttl pk-yrs)     Types: Cigarettes     Start date: 2007     Quit date: 2017     Years since quittin.8    Smokeless tobacco: Never   Vaping Use    Vaping status: Never Used   Substance and Sexual Activity    Alcohol use: Yes     Alcohol/week: 7.0 standard drinks of alcohol     Types: 7 Glasses of wine per week     Comment: 3 beers daily    Drug use: Not Currently    Sexual activity: Not Currently     Partners: Female         Objective     /95 (BP Location: Right arm, Patient Position: Sitting, Cuff Size: Large)   Pulse 89   Ht 5' 7\" (1.702 m)   Wt 111 kg (244 lb)   BMI 38.22 kg/m²     Physical Exam  Vascular status is 1/4 DP PT negative digital hair normal distal cooling immediate capillary refill with slight edema present bilaterally.  The edema is +1 and the capillary refill is approximately 2 to 3 seconds.    Derm nails are brittle elongated hypertrophic white-yellow discoloration with subungual debris x 10.  There is an increased thickness and the nails are approximately 1 to 2 mm.  The mycosis is mostly present on the distal aspect of the nails.  The left hallux nail is incurvated along the lateral border with hypertrophic tissue and erythema present along with blanching to the distal aspect of the skin.  No signs of any infection.  There is shininess to the skin bilaterally.    Neuro light touch is intact and equal bilaterally 0.5 monofilament test was diminished in the toes and arch area but present in the left heel and the right heel was slightly diminished    Ortho is unchanged from last visit.    Administrative Statements   I have spent a total time of 15 minutes in caring for this patient on the day of the visit/encounter including Risks and benefits of tx options, Instructions for management, Patient and family education, Importance of tx compliance, Risk factor reductions, Counseling / Coordination of care, and Documenting in the medical " record.

## 2024-11-07 NOTE — PROGRESS NOTES
Ambulatory Visit  Name: Jr Mendoza      : 1965      MRN: [id number]  Encounter Provider: Ann Perez MD  Encounter Date: 2024   Encounter department: [facility] [place]    Assessment & Plan  Malignant neoplasm of descending colon (HCC)  Patient is doing well following the sigmoid colon resection  Final pathology showed MMR proficient pT2N0 adenocarcinoma with lymphovascular invasion and intermediate tumor budding score He had seen hematology oncology and does not require adjuvant chemotherapy  He has had follow-up CT chest for pulmonary nodules, and he is actively following with pulmonology  Additionally, staging CT showed periportal lymphadenopathy of the liver, for which she follows with Dr. Haley  He will be due for colonoscopy 6 to 12 months after colon resection, and he is planning this procedure with Dr. Haley  Follow-up with me in 4 months         History of Present Illness     Jr Mendoza is a 59 y.o. male who presents for a 3 month follow up for malignant neoplasm of descending colon. Last seen in office 2024.    Patient last seen Dr. Jose E Murphy ([facility]) on 24.     CT chest - 2024  Impression:  Unchanged triangular shaped right upper and right middle lobe perifissural subpleural nodules characteristic of benign intrapulmonary nodes. No suspicious solid parenchymal nodules.   Persistent 6 x 3 x 3 mm right lower lobe, segmental segmental endobronchial lesion or focus of chronic mucous plugging; follow-up with pulmonology consultation.    Patient reports he is feeling good no complaints. Appetite is good.     Patient has 2 bowel movements a day. Stool Consistency soft and formed.    Lab Results   Component Value Date    CEA 2.5 2024     Lab Results   Component Value Date    SODIUM 135 2024    K 4.4 2024    CL 99 2024    CO2 29 2024    AGAP 7 2024    BUN 8 2024    CREATININE 0.63  09/16/2024    GLUC 185 (H) 07/12/2024    GLUF 136 (H) 09/16/2024    CALCIUM 9.6 09/16/2024    AST 16 09/16/2024    ALT 12 09/16/2024    ALKPHOS 83 09/16/2024    TP 7.9 09/16/2024    TBILI 0.59 09/16/2024    EGFR 106 09/16/2024     Lab Results   Component Value Date    WBC 7.42 09/16/2024    HGB 14.2 09/16/2024    HCT 46.3 09/16/2024    MCV 90 09/16/2024     09/16/2024         History obtained from : patient  Review of Systems   Constitutional:  Negative for chills and fever.   HENT:  Negative for ear pain and sore throat.    Eyes:  Negative for pain and visual disturbance.   Respiratory:  Negative for cough and shortness of breath.    Cardiovascular:  Negative for chest pain and palpitations.   Gastrointestinal:  Negative for abdominal pain and vomiting.   Genitourinary:  Negative for dysuria and hematuria.   Musculoskeletal:  Negative for arthralgias and back pain.   Skin:  Negative for color change and rash.   Neurological:  Negative for seizures and syncope.   All other systems reviewed and are negative.    Past Medical History   Past Medical History:   Diagnosis Date    Allergic     Asthma     Cancer (HCC)     Cholangitis 05/16/2022    Closed extra-articular fracture of distal end of right tibia 01/28/2020    Closed nondisplaced oblique fracture of shaft of right fibula 01/28/2020    CPAP (continuous positive airway pressure) dependence     Diabetes mellitus (HCC)     Foot fracture     Hyperlipidemia     Hypertension     Sleep apnea      Past Surgical History:   Procedure Laterality Date    CHOLECYSTECTOMY      CHOLECYSTECTOMY LAPAROSCOPIC N/A 05/18/2022    Procedure: CHOLECYSTECTOMY LAPAROSCOPIC;  Surgeon: Zachary Kern MD;  Location: [facility];  Service: General    COLONOSCOPY      HEMICOLOECTOMY W/ ANASTOMOSIS N/A 06/26/2024    Procedure: LAPARSCOPIC HAND ASSISTED SIGMOIDECTOMY, LAPARSCOPIC MOBILIZATION OF SPLENIC FLEXURE, INTRAOP SPY ANGIOOGRAPHY , FLEXIBLE SIGMOIDOSCOPY;  Surgeon: Ann  Zoraida Perez MD;  Location: [facility];  Service: Colorectal     Family History   Problem Relation Age of Onset    ALS Mother     Pneumonia Father     No Known Problems Sister      Current Outpatient Medications on File Prior to Visit   Medication Sig Dispense Refill    albuterol (Ventolin HFA) 90 mcg/act inhaler Inhale 2 puffs every 6 (six) hours as needed for wheezing or shortness of breath 18 g 3    amLODIPine (NORVASC) 5 mg tablet TAKE 1 TABLET (5 MG TOTAL) BY MOUTH DAILY. 90 tablet 1    atorvastatin (LIPITOR) 20 mg tablet TAKE 1 TABLET BY MOUTH EVERY DAY 90 tablet 1    azelastine (ASTELIN) 0.1 % nasal spray 1 spray into each nostril 2 (two) times a day Use in each nostril as directed 30 mL 5    fluticasone (FLONASE) 50 mcg/act nasal spray SPRAY 1 SPRAY INTO EACH NOSTRIL EVERY DAY 48 mL 1    metFORMIN (GLUCOPHAGE) 500 mg tablet Take 1 tablet (500 mg total) by mouth 2 (two) times a day with meals 180 tablet 1    mometasone-formoterol (Dulera) 200-5 MCG/ACT inhaler Inhale 2 puffs 2 (two) times a day Rinse mouth after use. 13 g 5    sildenafil (VIAGRA) 25 MG tablet TAKE 1 TABLET BY MOUTH DAILY AS NEEDED FOR ERECTILE DYSFUNCTION. 3 tablet 3    tamsulosin (FLOMAX) 0.4 mg Take 1 capsule (0.4 mg total) by mouth daily with dinner 90 capsule 3    valsartan (DIOVAN) 160 mg tablet TAKE 1 TABLET BY MOUTH EVERY DAY 90 tablet 1    vitamin B-12 (CYANOCOBALAMIN) 100 MCG TABS Take 0.5 tablets (50 mcg total) by mouth daily 90 tablet 0    ciclopirox (PENLAC) 8 % solution Apply topically daily at bedtime Apply Penlac at bedtime to the affected toenails daily, wipe off the Penlac from the toenails with acetone or rubbing alcohol on the 7th day and restart the cycle. (Patient not taking: Reported on 9/9/2024) 6 mL 3     No current facility-administered medications on file prior to visit.     Allergies   Allergen Reactions    Pollen Extract Allergic Rhinitis          Objective     /70 (BP Location: Left arm, Patient  "Position: Sitting, Cuff Size: Large)   Ht 5' 7\" (1.702 m)   Wt 112 kg (248 lb)   BMI 38.84 kg/m²     Physical Exam  Vitals and nursing note reviewed.   Constitutional:       General: He is not in acute distress.     Appearance: He is well-developed.   HENT:      Head: Normocephalic and atraumatic.   Eyes:      Conjunctiva/sclera: Conjunctivae normal.   Cardiovascular:      Rate and Rhythm: Normal rate and regular rhythm.      Heart sounds: No murmur heard.  Pulmonary:      Effort: Pulmonary effort is normal. No respiratory distress.      Breath sounds: Normal breath sounds.   Abdominal:      Palpations: Abdomen is soft.      Tenderness: There is no abdominal tenderness.   Musculoskeletal:         General: No swelling.      Cervical back: Neck supple.   Skin:     General: Skin is warm and dry.      Capillary Refill: Capillary refill takes less than 2 seconds.   Neurological:      Mental Status: He is alert.   Psychiatric:         Mood and Affect: Mood normal.       Administrative Statements   I have spent a total time of 16 minutes in caring for this patient on the day of the visit/encounter including Diagnostic results, Prognosis, Risks and benefits of tx options, Instructions for management, Patient and family education, Importance of tx compliance, Risk factor reductions, Impressions, Counseling / Coordination of care, Documenting in the medical record, Reviewing / ordering tests, medicine, procedures  , Obtaining or reviewing history  , and Communicating with other healthcare professionals .  "

## 2024-11-12 ENCOUNTER — OFFICE VISIT (OUTPATIENT)
Age: 59
End: 2024-11-12
Payer: COMMERCIAL

## 2024-11-12 VITALS
WEIGHT: 248 LBS | SYSTOLIC BLOOD PRESSURE: 122 MMHG | HEIGHT: 67 IN | DIASTOLIC BLOOD PRESSURE: 70 MMHG | BODY MASS INDEX: 38.92 KG/M2

## 2024-11-12 DIAGNOSIS — C18.6 MALIGNANT NEOPLASM OF DESCENDING COLON (HCC): Primary | ICD-10-CM

## 2024-11-12 PROCEDURE — 99212 OFFICE O/P EST SF 10 MIN: CPT | Performed by: SURGERY

## 2024-11-12 NOTE — ASSESSMENT & PLAN NOTE
Patient is doing well following the sigmoid colon resection  Final pathology showed MMR proficient pT2N0 adenocarcinoma with lymphovascular invasion and intermediate tumor budding score He had seen hematology oncology and does not require adjuvant chemotherapy  He has had follow-up CT chest for pulmonary nodules, and he is actively following with pulmonology  Additionally, staging CT showed periportal lymphadenopathy of the liver, for which she follows with Dr. Haley  He will be due for colonoscopy 6 to 12 months after colon resection, and he is planning this procedure with Dr. Haley  Follow-up with me in 4 months

## 2024-11-19 DIAGNOSIS — I10 PRIMARY HYPERTENSION: ICD-10-CM

## 2024-11-19 NOTE — TELEPHONE ENCOUNTER
Assessment/Plan:    1  Elevated WBCs  Her WBCs were highly elevated at 19 4, with her lymphocytes elevated at 5859, and monocytes elevated at 1048  She had a CBC and differential with pathologist review drawn yesterday, but the results will be back in 3 days  We will call her back and speak to her about these results  To see   oncology  2  Lightheadedness and dizziness She reports that last  Sunday, she was sitting outside and had a severe episode of vertigo and a little while later, she felt a headache and then vomited her dinner before she went to sleep  The next day, she felt a headache intermittently through the day  She denies near syncope at this visit and states this was nothing like what she experienced previously  At her last visit, she reported near-syncopal episodes of light headedness occasionally in April and May  She will have an MRI of the head to assess these recurrent episodes  3  Cardiac Murmur  We talked to her cardiologists office, Dr Bryan Guy, and they will call her back for an appointment in the next 2 weeks  She had her cardiac workup completed at his office recently  4  Incontinence  She reports urgency and incontinence which she has followed with Dr Hiro Bazan, urologist  She has no control and wears pads  Medications have not worked and she does not want Botox injections in her bladder  She states she does not want any further treatments for this  5  Health Maintenance  She will follow up with us in 4 weeks or as needed  Diagnoses and all orders for this visit:    Fear of flying  -     LORazepam (ATIVAN) 0 5 mg tablet; Take 1 tablet (0 5 mg total) by mouth daily as needed (use when traveling by plane )    Other orders  -     Cyclic citrul peptide antibody, IgG; Future  -     MRI brain wo contrast; Future          Subjective:      Patient ID: Lupe Small is a 69 y o  female      Yolanda Hathwaay is a 69year old female who presents today for a 2 month follow up regarding her Reason for call:   [x] Refill   [] Prior Auth  [] Other:     Office:   [x] PCP/Provider -   [] Specialty/Provider -     Medication: valsartan (DIOVAN) 160 mg     Dose/Frequency: TAKE 1 TABLET BY MOUTH EVERY DAY     Quantity: 90    Pharmacy: [facility]/pharmacy #[id number] - [place] - [id number]     Does the patient have enough for 3 days?   [x] Yes   [] No - Send as HP to POD     near syncope, lightheadedness, dizziness, and abnormal bloodwork  She reports that last  Sunday, she was sitting outside and had a severe episode of vertigo and a little while later, she felt a headache and then vomited her dinner before she went to sleep  The next day, she felt a headache intermittently through the day  She reports urgency and incontinence which she has followed with Dr Fabi Muller, urologist  She has no control and wears pads  Medications have not worked and she does not want Botox injections in her bladder  She reports that she has had knee pain which has been diagnosed as osteoarthritis  She also notes cervical neck pain  The following portions of the patient's history were reviewed and updated as appropriate: allergies, current medications, past family history, past medical history, past social history, past surgical history and problem list     Review of Systems   Constitutional: Negative for chills, diaphoresis, fatigue and fever  HENT: Negative for congestion, facial swelling, hearing loss, nosebleeds, postnasal drip, rhinorrhea, sinus pain, sinus pressure, sneezing, sore throat and tinnitus  Respiratory: Positive for cough  Negative for chest tightness, shortness of breath, wheezing and stridor  Cardiovascular: Negative for chest pain, palpitations and leg swelling  Gastrointestinal: Negative for abdominal distention, abdominal pain, anal bleeding, blood in stool, constipation and diarrhea  Genitourinary: Positive for urgency  Negative for difficulty urinating and hematuria  Incontinent   Musculoskeletal: Positive for arthralgias  Skin: Negative  Neurological: Positive for dizziness, seizures and light-headedness  Negative for syncope, speech difficulty, weakness and numbness  Near syncope   Psychiatric/Behavioral: Negative            Objective:      /70 (BP Location: Left arm, Patient Position: Sitting, Cuff Size: Standard)   Pulse 70   Temp 98 6 °F (37 °C) (Oral)   Ht 5' 1 5" (1 562 m)   Wt 80 7 kg (178 lb)   SpO2 98%   BMI 33 09 kg/m²          Physical Exam   Constitutional: She is oriented to person, place, and time  She appears well-developed and well-nourished  HENT:   Head: Normocephalic and atraumatic  Mouth/Throat: Oropharynx is clear and moist    Eyes: Conjunctivae and EOM are normal  Right eye exhibits no discharge  Left eye exhibits no discharge  No scleral icterus  Neck: Normal range of motion  Neck supple  No JVD present  No tracheal deviation present  No thyromegaly present  Cardiovascular: Normal rate, regular rhythm, normal heart sounds and intact distal pulses  Exam reveals no friction rub  No murmur heard  Pulmonary/Chest: Effort normal and breath sounds normal  No stridor  No respiratory distress  She has no wheezes  She has no rales  Abdominal: Soft  Bowel sounds are normal  She exhibits no distension and no mass  There is no tenderness  There is no rebound and no guarding  No splenomegaly , no peripheral lymph notes    Musculoskeletal: Normal range of motion  She exhibits no edema, tenderness or deformity  Neurological: She is alert and oriented to person, place, and time  She has normal reflexes  She displays normal reflexes  No cranial nerve deficit  She exhibits normal muscle tone  Coordination normal    cerebellar testing is normal, strength is normal gait is normal   Skin: No rash noted  No erythema  No pallor  Psychiatric: She has a normal mood and affect  Her behavior is normal  Thought content normal          Scribe Signature and Attestation:  By signing my name below, Mandeep TANG attest that this documentation has been prepared under the direction and in the presence of Dr Melanie Garcia MD  Electronically signed: Karen Nick  6/28/18    Provider Attestation:  Dr Melanie TANG, personally performed the services described in this documentation   All medical record entries made by the scribe were at my direction and in my presence  I have reviewed the chart and discharge instructions (if applicable) and agree that the record reflects my personal performance and is accurate and complete  Dr Ariela Moser MD  6/28/18

## 2024-11-20 ENCOUNTER — DOCUMENTATION (OUTPATIENT)
Dept: GASTROENTEROLOGY | Facility: CLINIC | Age: 59
End: 2024-11-20

## 2024-11-20 RX ORDER — VALSARTAN 160 MG/1
160 TABLET ORAL DAILY
Qty: 90 TABLET | Refills: 1 | Status: SHIPPED | OUTPATIENT
Start: 2024-11-20

## 2024-11-21 ENCOUNTER — OFFICE VISIT (OUTPATIENT)
Dept: GASTROENTEROLOGY | Facility: CLINIC | Age: 59
End: 2024-11-21
Payer: COMMERCIAL

## 2024-11-21 VITALS
OXYGEN SATURATION: 97 % | SYSTOLIC BLOOD PRESSURE: 120 MMHG | HEART RATE: 76 BPM | TEMPERATURE: 97.9 F | DIASTOLIC BLOOD PRESSURE: 70 MMHG | WEIGHT: 247 LBS | BODY MASS INDEX: 38.77 KG/M2 | HEIGHT: 67 IN

## 2024-11-21 DIAGNOSIS — Z86.0100 HISTORY OF COLON POLYPS: ICD-10-CM

## 2024-11-21 DIAGNOSIS — C18.6 MALIGNANT NEOPLASM OF DESCENDING COLON (HCC): ICD-10-CM

## 2024-11-21 DIAGNOSIS — Z85.038 PERSONAL HISTORY OF COLON CANCER, STAGE I: Primary | ICD-10-CM

## 2024-11-21 DIAGNOSIS — E53.8 VITAMIN B12 DEFICIENCY: ICD-10-CM

## 2024-11-21 DIAGNOSIS — R93.2 ABNORMAL CT OF LIVER: ICD-10-CM

## 2024-11-21 PROCEDURE — 99215 OFFICE O/P EST HI 40 MIN: CPT | Performed by: INTERNAL MEDICINE

## 2024-11-21 RX ORDER — SODIUM CHLORIDE, SODIUM LACTATE, POTASSIUM CHLORIDE, CALCIUM CHLORIDE 600; 310; 30; 20 MG/100ML; MG/100ML; MG/100ML; MG/100ML
125 INJECTION, SOLUTION INTRAVENOUS CONTINUOUS
OUTPATIENT
Start: 2024-11-21

## 2024-11-21 NOTE — ASSESSMENT & PLAN NOTE
CAT scan of the abdomen done June 18, 2024 suggested an undulating contour of the liver and hypertrophy of the caudate lobe.  Several other imaging studies did not report this.  Given that he has type 2 diabetes, hyperlipidemia, high body mass index of 38.69 it is quite possible he has metabolic dysfunction associated steatotic liver disease.  To further evaluate this abnormality I did recommend ultrasound elastography.  Fib 4 was calculated at 1.32 which is indeterminant.  I did recommend that he continue to try to lose weight to bring his body mass index down to more acceptable level.  At this point in time he should stop drinking all alcohol/beer.  He may be a candidate for GLP-1 especially if he has fatty liver disease and advanced fibrosis.  Check hepatitis A, B, and C serologies.  Vaccinate for hepatitis A and B if negative.  Check iron studies  Check LISANDRA  Check alpha-1 antitrypsin level and phenotype

## 2024-11-21 NOTE — ASSESSMENT & PLAN NOTE
Patient's vitamin B12 level was low at 114.  He is currently receiving vitamin B12 orally.  I did suggest that he  vitamin B12 supplement 1000 mcg.  If he can find the under the tongue formula this would be best.  He can take 1000 mcg sublingually daily.  Check parietal cell antibody and intrinsic factor antibody.  May need to consider EGD at some point in time to evaluate for atrophic gastritis.  I will hold off on this for now.

## 2024-11-21 NOTE — ASSESSMENT & PLAN NOTE
Chilo is noted to have a ulcerated fungating mass at about 32 cm.  This was an adenocarcinoma.  He did undergo hand-assisted laparoscopic sigmoidectomy on June 26, 2024.  Final pathology was T2 N0 (0/21) G2 adenocarcinoma.  There was lymphovascular invasion noted.  MIS was stable.  Baseline CEA was normal.  He did see Dr. Marrufo of hematology oncology and was told he did not need adjuvant chemotherapy.  He will be scheduled for a surveillance colonoscopy in 2025.    He will receive a Colyte preparation and split dose with the second dose completed 3 hours prior to arrival.  2 bisacodyl tablets  I explained to the patient the procedure of colonoscopy as well as its potential risks which are approximately 3 in 1000 chance of bleeding, infection, and perforation.  Perforation may require a surgeon to repair it.  I also explained the small but significant chance of missing lesions with colonoscopy which has been reported to be about 5-10%.

## 2024-11-21 NOTE — LETTER
November 21, 2024     Ann Perez MD  [address]  [address]  [place] [address]    Patient: Jr Mendoza   YOB: 1965   Date of Visit: 11/21/2024       Dear Dr. Perez:    Thank you for referring Jr Mendoza to me for evaluation. Below are my notes for this consultation.    If you have questions, please do not hesitate to call me. I look forward to following your patient along with you.         Sincerely,        Satish Haley, DO        CC: Jose E Murphy, DO Satish Haley DO  11/21/2024  3:32 PM  Incomplete  [facility]  Gastroenterology Outpatient Follow up  Patient Jr Mendoza   Age 59 y.o.   Gender male   MRN: [id number]  [facility] [id number]     ASSESSMENT AND PLAN:   Problem List Items Addressed This Visit          Digestive    Malignant neoplasm of descending colon (HCC)    Chilo is noted to have a ulcerated fungating mass at about 32 cm.  This was an adenocarcinoma.  He did undergo hand-assisted laparoscopic sigmoidectomy on June 26, 2024.  Final pathology was T2 N0 (0/21) G2 adenocarcinoma.  There was lymphovascular invasion noted.  MIS was stable.  Baseline CEA was normal.  He did see Dr. Marrufo of hematology oncology and was told he did not need adjuvant chemotherapy.  He will be scheduled for a surveillance colonoscopy in 2025.    He will receive a Colyte preparation and split dose with the second dose completed 3 hours prior to arrival.  2 bisacodyl tablets  I explained to the patient the procedure of colonoscopy as well as its potential risks which are approximately 3 in 1000 chance of bleeding, infection, and perforation.  Perforation may require a surgeon to repair it.  I also explained the small but significant chance of missing lesions with colonoscopy which has been reported to be about 5-10%.              Oncology    RESOLVED: Personal history of colon cancer, stage I - Primary       Other    History of colon polyps     In addition to the colon malignancy at 32 cm Chilo was noted to have 2 polyps measuring 8 to 10 mm in the rectum removed by cold snare.  Pathology revealed hyperplastic polyps.  He did have a 6 mm polyp in the cecum this was a tubular adenoma.  He also had moderate diverticulosis.  He is being scheduled for colonoscopy for 2025         Abnormal CT of liver    CAT scan of the abdomen done June 18, 2024 suggested an undulating contour of the liver and hypertrophy of the caudate lobe.  Several other imaging studies did not report this.  Given that he has type 2 diabetes, hyperlipidemia, high body mass index of 38.69 it is quite possible he has metabolic dysfunction associated steatotic liver disease.  To further evaluate this abnormality I did recommend ultrasound elastography.  Fib 4 was calculated at 1.32 which is indeterminant.  I did recommend that he continue to try to lose weight to bring his body mass index down to more acceptable level.  At this point in time he should stop drinking all alcohol/beer.  He may be a candidate for GLP-1 especially if he has fatty liver disease and advanced fibrosis.  Check hepatitis A, B, and C serologies.  Vaccinate for hepatitis A and B if negative.  Check iron studies  Check LISANDRA  Check alpha-1 antitrypsin level and phenotype           Relevant Orders    Hepatitis A antibody, total    Hepatitis B core antibody, total    Hepatitis B surface antibody    Hepatitis B surface antigen    Hepatitis C antibody    Protime-INR    AFP tumor marker    Alpha-1-antitrypsin    LISANDRA Screen w/ Reflex to Titer/Pattern    Iron Panel (Includes Ferritin, Iron Sat%, Iron, and TIBC)    US elastography/UGAP    Vitamin B12 deficiency    Patient's vitamin B12 level was low at 114.  He is currently receiving vitamin B12 orally.  I did suggest that he  vitamin B12 supplement 1000 mcg.  If he can find the under the tongue formula this would be best.  He can take 1000 mcg sublingually daily.  Check  parietal cell antibody and intrinsic factor antibody.  May need to consider EGD at some point in time to evaluate for atrophic gastritis.  I will hold off on this for now.           Relevant Orders    Vitamin B12    Folate      _____________________________________________________________    HPI:     Chilo is a delightful 59-year-old gentleman whom seen in the office in follow-up for recent diagnosis of colorectal cancer and history of colon polyps.  In addition he was noted to have nodular appearing liver on imaging studies.  He underwent a sigmoid colon resection back in June 2024.  Final pathology T2 N0 (0/21), G2 adenocarcinoma.  Lymphovascular invasion noted.  MIS stable.  He did see Dr. Marrufo of hematology and was advised he does not require any adjuvant treatment.    His bowel habits have been very regular.  Denies any diarrhea, constipation, bleeding or black stools.  From an upper GI standpoint denies any heartburn, indigestion, dysphagia, nausea, vomiting.    He has been trying to lose weight.  He started going to the gym and is exercising more.    Dad had colon polyps.  He does not believe any other family members had colon cancer.  He denies any family history of esophageal or gastric cancer    He does not smoke tobacco.  He does have a few beers a few days a week.  He used to drink daily.    Fibrosis-4 (FIB-4) Score is: 1.32    Indication for testing Absence of advanced fibrosis Presence of advanced fibrosis Indeterminate result   NAFLD <1.30 >2.67 1.30-2.67   Hepatitis C <1.45 >3.25 1.45-3.25   Hepatitis B <1.00 >2.65 1.00-2.65     FIB-4 Score Component Values:  Component Value Date   Age: 59 y.o.     AST: 16 U/L 9/16/2024   Platelet: 213 Thousands/uL 9/16/2024   ALT: 12 U/L 9/16/2024 9/16/2024 laboratory data  Sodium 135  Potassium 4.4  BUN 8  Creatinine 0.63  Glucose 136  AST 16  ALT 12  Alk phos 83  T. bili 0.59  Triglycerides 92  Cholesterol 141  HDL 53  LDL 70  Vitamin B12 114  WBC 7.42 Hgb 14.2  (up from 9.3) HCT 46.3 MCV 90 platelet count 213,002% atypical lymphocytes 2% plasma cells  Hemoglobin A1c 6.1  TSH 1.52    9/18/2024 CT scan chest  Unchanged triangular-shaped right upper and right middle lobe perfusion subpleural nodule characteristic of benign intrapulmonary nodes.  No suspicious solid parenchymal nodules.  Persistent 6 x 3 x 3 mm right lower lobe segmental endobronchial lesion or focus of chronic mucous plugging.    7/9/2024 CT scan abdomen pelvis done as an inpatient  Improving slightly distended loops of small bowel without evidence of obstruction.  Colon unremarkable  Large lower abdominal loculated high density collection measuring 13.6 x 14.5 previously 13.6 x 14.7.  Density appears decreased compared to prior study though the collection is more discretely defined.  There is a separate loculation more superiorly measuring 2.5 x 7.3 previously 2.0 x 7 cm demonstrating a lower attenuation suggesting evolving hematoma.  No pneumoperitoneum or lymphadenopathy    6/26/2024 hand-assisted laparoscopic sigmoidectomy  Hospitalized 6/26/2024 through 7/12/2024  Surgery complicated by postop ileus requiring TPN  Final pathology T2 N0 (0/21), G2 adenocarcinoma.  Lymphovascular invasion noted.  MIS stable  No loss of nuclear expression of MMR proteins  Invasive adenocarcinoma sigmoid colon, moderately differentiated with mucinous features.  Lymphovascular invasion (small vessel and large vessel is identified.    6/18/2024 CT scan chest abdomen pelvis  5 mm right middle lobe nodule and 3 mm right upper lobe nodule noted.  Focal soft tissue density in 1 of subsegmental right lower lobe bronchi suggestive of secretions but indeterminant  Several lymph nodes not exceeding 10 mm in the mediastinum and antwan  Slightly undulating contour to the liver and caudate lobe hypertrophy may indicate chronic hepatocellular disease  Gallbladder absent  Soft tissue density adjacent to the wall in the lumen of the proximal  sigmoid colon approximately 2 cm  Mild periportal lymphadenopathy largest measuring 17 mm unchanged from 2022 mild diffuse wall thickening of the bladder    6/13/2024 CEA 2.5    6/6/2024 colonoscopy  2 polyps measuring 8 to 10 mm in the rectum status post cold snare polypectomy.  Single clip placed.  Pathology revealed hyperplastic polyps  6 mm polyp cecum status post cold snare polypectomy.  Tubular adenoma  Moderate diverticulosis sigmoid colon  Malignant appearing friable fungating ulcerated mass measuring 5 x 4 cm at 32 cm.  At least intramucosal carcinoma arising from a tubulovillous adenoma    5/16/2022 ERCP done for choledocholithiasis    Allergies   Allergen Reactions   • Pollen Extract Allergic Rhinitis     Current Outpatient Medications   Medication Sig Dispense Refill   • albuterol (Ventolin HFA) 90 mcg/act inhaler Inhale 2 puffs every 6 (six) hours as needed for wheezing or shortness of breath 18 g 3   • amLODIPine (NORVASC) 5 mg tablet TAKE 1 TABLET (5 MG TOTAL) BY MOUTH DAILY. 90 tablet 1   • atorvastatin (LIPITOR) 20 mg tablet TAKE 1 TABLET BY MOUTH EVERY DAY 90 tablet 1   • azelastine (ASTELIN) 0.1 % nasal spray 1 spray into each nostril 2 (two) times a day Use in each nostril as directed 30 mL 5   • fluticasone (FLONASE) 50 mcg/act nasal spray SPRAY 1 SPRAY INTO EACH NOSTRIL EVERY DAY 48 mL 1   • metFORMIN (GLUCOPHAGE) 500 mg tablet Take 1 tablet (500 mg total) by mouth 2 (two) times a day with meals 180 tablet 1   • mometasone-formoterol (Dulera) 200-5 MCG/ACT inhaler Inhale 2 puffs 2 (two) times a day Rinse mouth after use. 13 g 5   • sildenafil (VIAGRA) 25 MG tablet TAKE 1 TABLET BY MOUTH DAILY AS NEEDED FOR ERECTILE DYSFUNCTION. 3 tablet 3   • tamsulosin (FLOMAX) 0.4 mg Take 1 capsule (0.4 mg total) by mouth daily with dinner 90 capsule 3   • valsartan (DIOVAN) 160 mg tablet Take 1 tablet (160 mg total) by mouth daily 90 tablet 1   • vitamin B-12 (CYANOCOBALAMIN) 100 MCG TABS Take 0.5 tablets  "(50 mcg total) by mouth daily 90 tablet 0     No current facility-administered medications for this visit.     MEDICAL HISTORY:  Past Medical History:   Diagnosis Date   • Allergic    • Asthma    • Cancer (HCC)    • Cholangitis 2022   • Closed extra-articular fracture of distal end of right tibia 2020   • Closed nondisplaced oblique fracture of shaft of right fibula 2020   • CPAP (continuous positive airway pressure) dependence    • Diabetes mellitus (HCC)    • Foot fracture    • Hyperlipidemia    • Hypertension    • Sleep apnea      Past Surgical History:   Procedure Laterality Date   • CHOLECYSTECTOMY     • CHOLECYSTECTOMY LAPAROSCOPIC N/A 2022    Procedure: CHOLECYSTECTOMY LAPAROSCOPIC;  Surgeon: Zachary Kern MD;  Location: [facility];  Service: General   • COLONOSCOPY     • HEMICOLOECTOMY W/ ANASTOMOSIS N/A 2024    Procedure: LAPARSCOPIC HAND ASSISTED SIGMOIDECTOMY, LAPARSCOPIC MOBILIZATION OF SPLENIC FLEXURE, INTRAOP SPY ANGIOOGRAPHY , FLEXIBLE SIGMOIDOSCOPY;  Surgeon: Ann Perez MD;  Location: [facility];  Service: Colorectal     Social History     Substance and Sexual Activity   Alcohol Use Yes   • Alcohol/week: 7.0 standard drinks of alcohol   • Types: 7 Glasses of wine per week    Comment: 3 beers daily     Social History     Substance and Sexual Activity   Drug Use Not Currently     Social History     Tobacco Use   Smoking Status Former   • Current packs/day: 0.00   • Average packs/day: 0.3 packs/day for 10.0 years (2.5 ttl pk-yrs)   • Types: Cigarettes   • Start date: 2007   • Quit date:    • Years since quittin.8   Smokeless Tobacco Never     Family History   Problem Relation Age of Onset   • ALS Mother    • Pneumonia Father    • No Known Problems Sister            Objective  Blood pressure 120/70, pulse 76, temperature 97.9 °F (36.6 °C), temperature source Temporal, height 5' 7\" (1.702 m), weight 112 kg (247 lb), SpO2 97%. Body mass index " is 38.69 kg/m².    PHYSICAL EXAM:   General Appearance: Alert, cooperative, no distress  HEENT: Normocephalic, atraumatic, anicteric.   Neck: Supple, symmetrical, trachea midline  Lungs: Clear to auscultation bilaterally; no rales, rhonchi or wheezing; respirations unlabored   Heart: Regular rate and rhythm; no murmur, rub, or gallop.  Abdomen: Soft, bowel sounds normal, non-tender, non-distended; no masses, there is no hepatosplenomegaly. No spider angiomas. Obese abdomen  Genitalia: Deferred   Rectal: Deferred   Extremities: No cyanosis, clubbing or edema   Skin: No jaundice, rashes, or lesions   Lymph nodes: No palpable cervical lymphadenopathy   Lab Results:     Radiology Results:   No results found.  Satish Haley DO   11/21/24   Cc:    Satish Haley DO  11/20/2024  8:43 PM  Sign when Signing Visit  [facility]  Gastroenterology Outpatient Follow up  Patient Jr Mendoza   Age 59 y.o.   Gender male   MRN: [id number]  [facility] [id number]     ASSESSMENT AND PLAN:   Problem List Items Addressed This Visit    None     _____________________________________________________________    HPI: Please see complete note in epic    Fibrosis-4 (FIB-4) Score is: 1.32    Indication for testing Absence of advanced fibrosis Presence of advanced fibrosis Indeterminate result   NAFLD <1.30 >2.67 1.30-2.67   Hepatitis C <1.45 >3.25 1.45-3.25   Hepatitis B <1.00 >2.65 1.00-2.65     FIB-4 Score Component Values:  Component Value Date   Age: 59 y.o.     AST: 16 U/L 9/16/2024   Platelet: 213 Thousands/uL 9/16/2024   ALT: 12 U/L 9/16/2024 9/16/2024 laboratory data  Sodium 135  Potassium 4.4  BUN 8  Creatinine 0.63  Glucose 136  AST 16  ALT 12  Alk phos 83  T. bili 0.59  Triglycerides 92  Cholesterol 141  HDL 53  LDL 70  Vitamin B12 114  WBC 7.42 Hgb 14.2 (up from 9.3) HCT 46.3 MCV 90 platelet count 213,002% atypical lymphocytes 2% plasma cells  Hemoglobin A1c 6.1  TSH 1.52    9/18/2024 CT scan  chest  Unchanged triangular-shaped right upper and right middle lobe perfusion subpleural nodule characteristic of benign intrapulmonary nodes.  No suspicious solid parenchymal nodules.  Persistent 6 x 3 x 3 mm right lower lobe segmental endobronchial lesion or focus of chronic mucous plugging.    7/9/2024 CT scan abdomen pelvis done as an inpatient  Improving slightly distended loops of small bowel without evidence of obstruction.  Colon unremarkable  Large lower abdominal loculated high density collection measuring 13.6 x 14.5 previously 13.6 x 14.7.  Density appears decreased compared to prior study though the collection is more discretely defined.  There is a separate loculation more superiorly measuring 2.5 x 7.3 previously 2.0 x 7 cm demonstrating a lower attenuation suggesting evolving hematoma.  No pneumoperitoneum or lymphadenopathy    6/26/2024 hand-assisted laparoscopic sigmoidectomy  Hospitalized 6/26/2024 through 7/12/2024  Surgery complicated by postop ileus requiring TPN  Final pathology T2 N0 (0/21), G2 adenocarcinoma.  Lymphovascular invasion noted.  MIS stable  No loss of nuclear expression of MMR proteins  Invasive adenocarcinoma sigmoid colon, moderately differentiated with mucinous features.  Lymphovascular invasion (small vessel and large vessel is identified.    6/18/2024 CT scan chest abdomen pelvis  5 mm right middle lobe nodule and 3 mm right upper lobe nodule noted.  Focal soft tissue density in 1 of subsegmental right lower lobe bronchi suggestive of secretions but indeterminant  Several lymph nodes not exceeding 10 mm in the mediastinum and antwan  Slightly undulating contour to the liver and caudate lobe hypertrophy may indicate chronic hepatocellular disease  Gallbladder absent  Soft tissue density adjacent to the wall in the lumen of the proximal sigmoid colon approximately 2 cm  Mild periportal lymphadenopathy largest measuring 17 mm unchanged from 2022 mild diffuse wall thickening of  the bladder    6/13/2024 CEA 2.5    6/6/2024 colonoscopy  2 polyps measuring 8 to 10 mm in the rectum status post cold snare polypectomy.  Single clip placed.  Pathology revealed hyperplastic polyps  6 mm polyp cecum status post cold snare polypectomy.  Tubular adenoma  Moderate diverticulosis sigmoid colon  Malignant appearing friable fungating ulcerated mass measuring 5 x 4 cm at 32 cm.  At least intramucosal carcinoma arising from a tubulovillous adenoma    5/16/2022 ERCP done for choledocholithiasis    Allergies   Allergen Reactions   • Pollen Extract Allergic Rhinitis     Current Outpatient Medications   Medication Sig Dispense Refill   • albuterol (Ventolin HFA) 90 mcg/act inhaler Inhale 2 puffs every 6 (six) hours as needed for wheezing or shortness of breath 18 g 3   • amLODIPine (NORVASC) 5 mg tablet TAKE 1 TABLET (5 MG TOTAL) BY MOUTH DAILY. 90 tablet 1   • atorvastatin (LIPITOR) 20 mg tablet TAKE 1 TABLET BY MOUTH EVERY DAY 90 tablet 1   • azelastine (ASTELIN) 0.1 % nasal spray 1 spray into each nostril 2 (two) times a day Use in each nostril as directed 30 mL 5   • ciclopirox (PENLAC) 8 % solution Apply topically daily at bedtime Apply Penlac at bedtime to the affected toenails daily, wipe off the Penlac from the toenails with acetone or rubbing alcohol on the 7th day and restart the cycle. (Patient not taking: Reported on 9/9/2024) 6 mL 3   • fluticasone (FLONASE) 50 mcg/act nasal spray SPRAY 1 SPRAY INTO EACH NOSTRIL EVERY DAY 48 mL 1   • metFORMIN (GLUCOPHAGE) 500 mg tablet Take 1 tablet (500 mg total) by mouth 2 (two) times a day with meals 180 tablet 1   • mometasone-formoterol (Dulera) 200-5 MCG/ACT inhaler Inhale 2 puffs 2 (two) times a day Rinse mouth after use. 13 g 5   • sildenafil (VIAGRA) 25 MG tablet TAKE 1 TABLET BY MOUTH DAILY AS NEEDED FOR ERECTILE DYSFUNCTION. 3 tablet 3   • tamsulosin (FLOMAX) 0.4 mg Take 1 capsule (0.4 mg total) by mouth daily with dinner 90 capsule 3   • valsartan  (DIOVAN) 160 mg tablet Take 1 tablet (160 mg total) by mouth daily 90 tablet 1   • vitamin B-12 (CYANOCOBALAMIN) 100 MCG TABS Take 0.5 tablets (50 mcg total) by mouth daily 90 tablet 0     No current facility-administered medications for this visit.     MEDICAL HISTORY:  Past Medical History:   Diagnosis Date   • Allergic    • Asthma    • Cancer (HCC)    • Cholangitis 2022   • Closed extra-articular fracture of distal end of right tibia 2020   • Closed nondisplaced oblique fracture of shaft of right fibula 2020   • CPAP (continuous positive airway pressure) dependence    • Diabetes mellitus (HCC)    • Foot fracture    • Hyperlipidemia    • Hypertension    • Sleep apnea      Past Surgical History:   Procedure Laterality Date   • CHOLECYSTECTOMY     • CHOLECYSTECTOMY LAPAROSCOPIC N/A 2022    Procedure: CHOLECYSTECTOMY LAPAROSCOPIC;  Surgeon: Zachary Kern MD;  [facility];  Service: General   • COLONOSCOPY     • HEMICOLOECTOMY W/ ANASTOMOSIS N/A 2024    Procedure: LAPARSCOPIC HAND ASSISTED SIGMOIDECTOMY, LAPARSCOPIC MOBILIZATION OF SPLENIC FLEXURE, INTRAOP SPY ANGIOOGRAPHY , FLEXIBLE SIGMOIDOSCOPY;  Surgeon: Ann Perez MD;  Location: [facility];  Service: Colorectal     Social History     Substance and Sexual Activity   Alcohol Use Yes   • Alcohol/week: 7.0 standard drinks of alcohol   • Types: 7 Glasses of wine per week    Comment: 3 beers daily     Social History     Substance and Sexual Activity   Drug Use Not Currently     Social History     Tobacco Use   Smoking Status Former   • Current packs/day: 0.00   • Average packs/day: 0.3 packs/day for 10.0 years (2.5 ttl pk-yrs)   • Types: Cigarettes   • Start date: 2007   • Quit date:    • Years since quittin.8   Smokeless Tobacco Never     Family History   Problem Relation Age of Onset   • ALS Mother    • Pneumonia Father    • No Known Problems Sister        REVIEW OF SYSTEMS:  CONSTITUTIONAL:  Denies any fever, chills, rigors, and weight loss.  HEENT: No earache or tinnitus. Denies hearing loss or visual disturbances.  CARDIOVASCULAR: No chest pain or palpitations.   RESPIRATORY: Denies any cough, hemoptysis, shortness of breath or dyspnea on exertion.  GASTROINTESTINAL: As noted in the History of Present Illness.   GENITOURINARY: No problems with urination. Denies any hematuria or dysuria.  NEUROLOGIC: No dizziness or vertigo, denies headaches.   MUSCULOSKELETAL: Denies any muscle or joint pain.   SKIN: Denies skin rashes or itching.   ENDOCRINE: Denies excessive thirst. Denies intolerance to heat or cold.  PSYCHOSOCIAL: Denies depression or anxiety. Denies any recent memory loss.     Objective  There were no vitals taken for this visit. There is no height or weight on file to calculate BMI.    PHYSICAL EXAM:   General Appearance: Alert, cooperative, no distress  HEENT: Normocephalic, atraumatic, anicteric.   Neck: Supple, symmetrical, trachea midline  Lungs: Clear to auscultation bilaterally; no rales, rhonchi or wheezing; respirations unlabored   Heart: Regular rate and rhythm; no murmur, rub, or gallop.  Abdomen: Soft, bowel sounds normal, non-tender, non-distended; no masses, there is no hepatosplenomegaly. No spider angiomas  Genitalia: Deferred   Rectal: Deferred   Extremities: No cyanosis, clubbing or edema   Skin: No jaundice, rashes, or lesions   Lymph nodes: No palpable cervical lymphadenopathy   Lab Results:   No visits with results within 2 Month(s) from this visit.   Latest known visit with results is:   Lab on 09/16/2024   Component Date Value   • Sodium 09/16/2024 135    • Potassium 09/16/2024 4.4    • Chloride 09/16/2024 99    • CO2 09/16/2024 29    • ANION GAP 09/16/2024 7    • BUN 09/16/2024 8    • Creatinine 09/16/2024 0.63    • Glucose, Fasting 09/16/2024 136 (H)    • Calcium 09/16/2024 9.6    • AST 09/16/2024 16    • ALT 09/16/2024 12    • Alkaline Phosphatase 09/16/2024 83    •  Total Protein 09/16/2024 7.9    • Albumin 09/16/2024 4.4    • Total Bilirubin 09/16/2024 0.59    • eGFR 09/16/2024 106    • Cholesterol 09/16/2024 141    • Triglycerides 09/16/2024 92    • HDL, Direct 09/16/2024 53    • LDL Calculated 09/16/2024 70    • Hemoglobin A1C 09/16/2024 6.1 (H)    • EAG 09/16/2024 128    • TSH 3RD GENERATON 09/16/2024 1.523    • WBC 09/16/2024 7.42    • RBC 09/16/2024 5.17    • Hemoglobin 09/16/2024 14.2    • Hematocrit 09/16/2024 46.3    • MCV 09/16/2024 90    • MCH 09/16/2024 27.5    • MCHC 09/16/2024 30.7 (L)    • RDW 09/16/2024 15.2 (H)    • MPV 09/16/2024 10.5    • Platelets 09/16/2024 213    • Creatinine, Ur 09/16/2024 61.7    • Albumin,U,Random 09/16/2024 <7.0    • Albumin Creat Ratio 09/16/2024     • Vitamin B-12 09/16/2024 114 (L)    • Folate 09/16/2024 6.0    • Segmented % 09/16/2024 51    • Bands % 09/16/2024 1    • Lymphocytes % 09/16/2024 25    • Monocytes % 09/16/2024 9    • Eosinophils % 09/16/2024 8 (H)    • Basophils % 09/16/2024 2 (H)    • Atypical Lymphocytes % 09/16/2024 2 (H)    • Plasma Cells % 09/16/2024 2 (H)    • Absolute Neutrophils 09/16/2024 3.86    • Absolute Lymphocytes 09/16/2024 2.15    • Absolute Monocytes 09/16/2024 0.67    • Absolute Eosinophils 09/16/2024 0.59 (H)    • Absolute Basophils 09/16/2024 0.15 (H)    • RBC Morphology 09/16/2024 Present    • Platelet Estimate 09/16/2024 Adequate    • Anisocytosis 09/16/2024 Present    • Macrocytes 09/16/2024 Present    • Poikilocytes 09/16/2024 Present    • Polychromasia 09/16/2024 Present      Radiology Results:   No results found.  Satish Haley DO   11/20/24   Cc:

## 2024-11-21 NOTE — PROGRESS NOTES
[facility] Gastroenterology  Gastroenterology Outpatient Follow up  Patient Jr Mendoza   Age 59 y.o.   Gender male   MRN: [id number]  [facility] [id number]     ASSESSMENT AND PLAN:   Problem List Items Addressed This Visit          Digestive    Malignant neoplasm of descending colon (HCC)    Chilo is noted to have a ulcerated fungating mass at about 32 cm.  This was an adenocarcinoma.  He did undergo hand-assisted laparoscopic sigmoidectomy on June 26, 2024.  Final pathology was T2 N0 (0/21) G2 adenocarcinoma.  There was lymphovascular invasion noted.  MIS was stable.  Baseline CEA was normal.  He did see Dr. Marrufo of hematology oncology and was told he did not need adjuvant chemotherapy.  He will be scheduled for a surveillance colonoscopy in 2025.    He will receive a Colyte preparation and split dose with the second dose completed 3 hours prior to arrival.  2 bisacodyl tablets  I explained to the patient the procedure of colonoscopy as well as its potential risks which are approximately 3 in 1000 chance of bleeding, infection, and perforation.  Perforation may require a surgeon to repair it.  I also explained the small but significant chance of missing lesions with colonoscopy which has been reported to be about 5-10%.              Oncology    RESOLVED: Personal history of colon cancer, stage I - Primary    Relevant Medications    polyethylene glycol (COLYTE) 4000 mL solution    Other Relevant Orders    Colonoscopy       Other    History of colon polyps    In addition to the colon malignancy at 32 cm Chilo was noted to have 2 polyps measuring 8 to 10 mm in the rectum removed by cold snare.  Pathology revealed hyperplastic polyps.  He did have a 6 mm polyp in the cecum this was a tubular adenoma.  He also had moderate diverticulosis.  He is being scheduled for colonoscopy for 2025         Relevant Medications    polyethylene glycol (COLYTE) 4000 mL solution    Other Relevant Orders    Colonoscopy    Abnormal CT of  liver    CAT scan of the abdomen done June 18, 2024 suggested an undulating contour of the liver and hypertrophy of the caudate lobe.  Several other imaging studies did not report this.  Given that he has type 2 diabetes, hyperlipidemia, high body mass index of 38.69 it is quite possible he has metabolic dysfunction associated steatotic liver disease.  To further evaluate this abnormality I did recommend ultrasound elastography.  Fib 4 was calculated at 1.32 which is indeterminant.  I did recommend that he continue to try to lose weight to bring his body mass index down to more acceptable level.  At this point in time he should stop drinking all alcohol/beer.  He may be a candidate for GLP-1 especially if he has fatty liver disease and advanced fibrosis.  Check hepatitis A, B, and C serologies.  Vaccinate for hepatitis A and B if negative.  Check iron studies  Check LISANDRA  Check alpha-1 antitrypsin level and phenotype           Relevant Orders    Hepatitis A antibody, total    Hepatitis B core antibody, total    Hepatitis B surface antibody    Hepatitis B surface antigen    Hepatitis C antibody    Protime-INR    AFP tumor marker    Alpha-1-antitrypsin    LISANDRA Screen w/ Reflex to Titer/Pattern    Iron Panel (Includes Ferritin, Iron Sat%, Iron, and TIBC)    US elastography/UGAP    Vitamin B12 deficiency    Patient's vitamin B12 level was low at 114.  He is currently receiving vitamin B12 orally.  I did suggest that he  vitamin B12 supplement 1000 mcg.  If he can find the under the tongue formula this would be best.  He can take 1000 mcg sublingually daily.  Check parietal cell antibody and intrinsic factor antibody.  May need to consider EGD at some point in time to evaluate for atrophic gastritis.  I will hold off on this for now.           Relevant Orders    Vitamin B12    Folate      _____________________________________________________________    HPI:     Chilo is a delightful 59-year-old gentleman whom seen in  the office in follow-up for recent diagnosis of colorectal cancer and history of colon polyps.  In addition he was noted to have nodular appearing liver on imaging studies.  He underwent a sigmoid colon resection back in June 2024.  Final pathology T2 N0 (0/21), G2 adenocarcinoma.  Lymphovascular invasion noted.  MIS stable.  He did see Dr. Marrufo of hematology and was advised he does not require any adjuvant treatment.    His bowel habits have been very regular.  Denies any diarrhea, constipation, bleeding or black stools.  From an upper GI standpoint denies any heartburn, indigestion, dysphagia, nausea, vomiting.    He has been trying to lose weight.  He started going to the gym and is exercising more.    Dad had colon polyps.  He does not believe any other family members had colon cancer.  He denies any family history of esophageal or gastric cancer    He does not smoke tobacco.  He does have a few beers a few days a week.  He used to drink daily.    Fibrosis-4 (FIB-4) Score is: 1.32    Indication for testing Absence of advanced fibrosis Presence of advanced fibrosis Indeterminate result   NAFLD <1.30 >2.67 1.30-2.67   Hepatitis C <1.45 >3.25 1.45-3.25   Hepatitis B <1.00 >2.65 1.00-2.65     FIB-4 Score Component Values:  Component Value Date   Age: 59 y.o.     AST: 16 U/L 9/16/2024   Platelet: 213 Thousands/uL 9/16/2024   ALT: 12 U/L 9/16/2024 9/16/2024 laboratory data  Sodium 135  Potassium 4.4  BUN 8  Creatinine 0.63  Glucose 136  AST 16  ALT 12  Alk phos 83  T. bili 0.59  Triglycerides 92  Cholesterol 141  HDL 53  LDL 70  Vitamin B12 114  WBC 7.42 Hgb 14.2 (up from 9.3) HCT 46.3 MCV 90 platelet count 213,002% atypical lymphocytes 2% plasma cells  Hemoglobin A1c 6.1  TSH 1.52    9/18/2024 CT scan chest  Unchanged triangular-shaped right upper and right middle lobe perfusion subpleural nodule characteristic of benign intrapulmonary nodes.  No suspicious solid parenchymal nodules.  Persistent 6 x 3 x 3 mm  right lower lobe segmental endobronchial lesion or focus of chronic mucous plugging.    7/9/2024 CT scan abdomen pelvis done as an inpatient  Improving slightly distended loops of small bowel without evidence of obstruction.  Colon unremarkable  Large lower abdominal loculated high density collection measuring 13.6 x 14.5 previously 13.6 x 14.7.  Density appears decreased compared to prior study though the collection is more discretely defined.  There is a separate loculation more superiorly measuring 2.5 x 7.3 previously 2.0 x 7 cm demonstrating a lower attenuation suggesting evolving hematoma.  No pneumoperitoneum or lymphadenopathy    6/26/2024 hand-assisted laparoscopic sigmoidectomy  Hospitalized 6/26/2024 through 7/12/2024  Surgery complicated by postop ileus requiring TPN  Final pathology T2 N0 (0/21), G2 adenocarcinoma.  Lymphovascular invasion noted.  MIS stable  No loss of nuclear expression of MMR proteins  Invasive adenocarcinoma sigmoid colon, moderately differentiated with mucinous features.  Lymphovascular invasion (small vessel and large vessel is identified.    6/18/2024 CT scan chest abdomen pelvis  5 mm right middle lobe nodule and 3 mm right upper lobe nodule noted.  Focal soft tissue density in 1 of subsegmental right lower lobe bronchi suggestive of secretions but indeterminant  Several lymph nodes not exceeding 10 mm in the mediastinum and antwan  Slightly undulating contour to the liver and caudate lobe hypertrophy may indicate chronic hepatocellular disease  Gallbladder absent  Soft tissue density adjacent to the wall in the lumen of the proximal sigmoid colon approximately 2 cm  Mild periportal lymphadenopathy largest measuring 17 mm unchanged from 2022 mild diffuse wall thickening of the bladder    6/13/2024 CEA 2.5    6/6/2024 colonoscopy  2 polyps measuring 8 to 10 mm in the rectum status post cold snare polypectomy.  Single clip placed.  Pathology revealed hyperplastic polyps  6 mm polyp  cecum status post cold snare polypectomy.  Tubular adenoma  Moderate diverticulosis sigmoid colon  Malignant appearing friable fungating ulcerated mass measuring 5 x 4 cm at 32 cm.  At least intramucosal carcinoma arising from a tubulovillous adenoma    5/16/2022 ERCP done for choledocholithiasis    Allergies   Allergen Reactions    Pollen Extract Allergic Rhinitis     Current Outpatient Medications   Medication Sig Dispense Refill    albuterol (Ventolin HFA) 90 mcg/act inhaler Inhale 2 puffs every 6 (six) hours as needed for wheezing or shortness of breath 18 g 3    amLODIPine (NORVASC) 5 mg tablet TAKE 1 TABLET (5 MG TOTAL) BY MOUTH DAILY. 90 tablet 1    atorvastatin (LIPITOR) 20 mg tablet TAKE 1 TABLET BY MOUTH EVERY DAY 90 tablet 1    azelastine (ASTELIN) 0.1 % nasal spray 1 spray into each nostril 2 (two) times a day Use in each nostril as directed 30 mL 5    fluticasone (FLONASE) 50 mcg/act nasal spray SPRAY 1 SPRAY INTO EACH NOSTRIL EVERY DAY 48 mL 1    metFORMIN (GLUCOPHAGE) 500 mg tablet Take 1 tablet (500 mg total) by mouth 2 (two) times a day with meals 180 tablet 1    mometasone-formoterol (Dulera) 200-5 MCG/ACT inhaler Inhale 2 puffs 2 (two) times a day Rinse mouth after use. 13 g 5    polyethylene glycol (COLYTE) 4000 mL solution Take 4,000 mL by mouth once for 1 dose Take 4000 mL by mouth once for 1 dose. Use as directed 4000 mL 0    sildenafil (VIAGRA) 25 MG tablet TAKE 1 TABLET BY MOUTH DAILY AS NEEDED FOR ERECTILE DYSFUNCTION. 3 tablet 3    tamsulosin (FLOMAX) 0.4 mg Take 1 capsule (0.4 mg total) by mouth daily with dinner 90 capsule 3    valsartan (DIOVAN) 160 mg tablet Take 1 tablet (160 mg total) by mouth daily 90 tablet 1    vitamin B-12 (CYANOCOBALAMIN) 100 MCG TABS Take 0.5 tablets (50 mcg total) by mouth daily 90 tablet 0     No current facility-administered medications for this visit.     MEDICAL HISTORY:  Past Medical History:   Diagnosis Date    Allergic     Asthma     Cancer (HCC)      "Cholangitis 2022    Closed extra-articular fracture of distal end of right tibia 2020    Closed nondisplaced oblique fracture of shaft of right fibula 2020    CPAP (continuous positive airway pressure) dependence     Diabetes mellitus (HCC)     Foot fracture     Hyperlipidemia     Hypertension     Sleep apnea      Past Surgical History:   Procedure Laterality Date    CHOLECYSTECTOMY      CHOLECYSTECTOMY LAPAROSCOPIC N/A 2022    Procedure: CHOLECYSTECTOMY LAPAROSCOPIC;  Surgeon: Zachary Kern MD;  Location: [facility];  Service: General    COLONOSCOPY      HEMICOLOECTOMY W/ ANASTOMOSIS N/A 2024    Procedure: LAPARSCOPIC HAND ASSISTED SIGMOIDECTOMY, LAPARSCOPIC MOBILIZATION OF SPLENIC FLEXURE, INTRAOP SPY ANGIOOGRAPHY , FLEXIBLE SIGMOIDOSCOPY;  Surgeon: Ann Perez MD;  Location: [facility];  Service: Colorectal     Social History     Substance and Sexual Activity   Alcohol Use Yes    Alcohol/week: 7.0 standard drinks of alcohol    Types: 7 Glasses of wine per week    Comment: 3 beers daily     Social History     Substance and Sexual Activity   Drug Use Not Currently     Social History     Tobacco Use   Smoking Status Former    Current packs/day: 0.00    Average packs/day: 0.3 packs/day for 10.0 years (2.5 ttl pk-yrs)    Types: Cigarettes    Start date: 2007    Quit date:     Years since quittin.8   Smokeless Tobacco Never     Family History   Problem Relation Age of Onset    ALS Mother     Pneumonia Father     No Known Problems Sister            Objective   Blood pressure 120/70, pulse 76, temperature 97.9 °F (36.6 °C), temperature source Temporal, height 5' 7\" (1.702 m), weight 112 kg (247 lb), SpO2 97%. Body mass index is 38.69 kg/m².    PHYSICAL EXAM:   General Appearance: Alert, cooperative, no distress  HEENT: Normocephalic, atraumatic, anicteric.   Neck: Supple, symmetrical, trachea midline  Lungs: Clear to auscultation bilaterally; no rales, " rhonchi or wheezing; respirations unlabored   Heart: Regular rate and rhythm; no murmur, rub, or gallop.  Abdomen: Soft, bowel sounds normal, non-tender, non-distended; no masses, there is no hepatosplenomegaly. No spider angiomas. Obese abdomen  Genitalia: Deferred   Rectal: Deferred   Extremities: No cyanosis, clubbing or edema   Skin: No jaundice, rashes, or lesions   Lymph nodes: No palpable cervical lymphadenopathy   Lab Results:     Radiology Results:   I have spent a total time of 55 minutes in caring for this patient on the day of the visit/encounter including Patient and family education, Risk factor reductions, Impressions, Counseling / Coordination of care, Documenting in the medical record, Reviewing / ordering tests, medicine, procedures  , and Obtaining or reviewing history  .   No results found.  Satish Haley,    11/21/24   Cc:

## 2024-11-21 NOTE — PATIENT INSTRUCTIONS
Malignant neoplasm of descending colon (HCC)  Chilo is noted to have a ulcerated fungating mass at about 32 cm.  This was an adenocarcinoma.  He did undergo hand-assisted laparoscopic sigmoidectomy on June 26, 2024.  Final pathology was T2 N0 (0/21) G2 adenocarcinoma.  There was lymphovascular invasion noted.  MIS was stable.  Baseline CEA was normal.  He did see Dr. Marrufo of hematology oncology and was told he did not need adjuvant chemotherapy.  He will be scheduled for a surveillance colonoscopy in 2025.    He will receive a Colyte preparation and split dose with the second dose completed 3 hours prior to arrival.  2 bisacodyl tablets  I explained to the patient the procedure of colonoscopy as well as its potential risks which are approximately 3 in 1000 chance of bleeding, infection, and perforation.  Perforation may require a surgeon to repair it.  I also explained the small but significant chance of missing lesions with colonoscopy which has been reported to be about 5-10%.      History of colon polyps  In addition to the colon malignancy at 32 cm Chilo was noted to have 2 polyps measuring 8 to 10 mm in the rectum removed by cold snare.  Pathology revealed hyperplastic polyps.  He did have a 6 mm polyp in the cecum this was a tubular adenoma.  He also had moderate diverticulosis.  He is being scheduled for colonoscopy for 2025    Abnormal CT of liver  CAT scan of the abdomen done June 18, 2024 suggested an undulating contour of the liver and hypertrophy of the caudate lobe.  Several other imaging studies did not report this.  Given that he has type 2 diabetes, hyperlipidemia, high body mass index of 38.69 it is quite possible he has metabolic dysfunction associated steatotic liver disease.  To further evaluate this abnormality I did recommend ultrasound elastography.  Fib 4 was calculated at 1.32 which is indeterminant.  I did recommend that he continue to try to lose weight to bring his body mass index down to  more acceptable level.  At this point in time he should stop drinking all alcohol/beer.  He may be a candidate for GLP-1 especially if he has fatty liver disease and advanced fibrosis.  Check hepatitis A, B, and C serologies.  Vaccinate for hepatitis A and B if negative.  Check iron studies  Check LISANDRA  Check alpha-1 antitrypsin level and phenotype      Vitamin B12 deficiency  Patient's vitamin B12 level was low at 114.  He is currently receiving vitamin B12 orally.  I did suggest that he  vitamin B12 supplement 1000 mcg.  If he can find the under the tongue formula this would be best.  He can take 1000 mcg sublingually daily.  Check parietal cell antibody and intrinsic factor antibody.  May need to consider EGD at some point in time to evaluate for atrophic gastritis.  I will hold off on this for now.

## 2024-11-21 NOTE — PROGRESS NOTES
Records reviewed and outlined below in preparation for office visit 11/21/2024;    Fibrosis-4 (FIB-4) Score is: 1.32    Indication for testing Absence of advanced fibrosis Presence of advanced fibrosis Indeterminate result   NAFLD <1.30 >2.67 1.30-2.67   Hepatitis C <1.45 >3.25 1.45-3.25   Hepatitis B <1.00 >2.65 1.00-2.65     FIB-4 Score Component Values:  Component Value Date   Age: 59 y.o.     AST: 16 U/L 9/16/2024   Platelet: 213 Thousands/uL 9/16/2024   ALT: 12 U/L 9/16/2024 9/16/2024 laboratory data  Sodium 135  Potassium 4.4  BUN 8  Creatinine 0.63  Glucose 136  AST 16  ALT 12  Alk phos 83  T. bili 0.59  Triglycerides 92  Cholesterol 141  HDL 53  LDL 70  Vitamin B12 114  WBC 7.42 Hgb 14.2 (up from 9.3) HCT 46.3 MCV 90 platelet count 213,002% atypical lymphocytes 2% plasma cells  Hemoglobin A1c 6.1  TSH 1.52    9/18/2024 CT scan chest  Unchanged triangular-shaped right upper and right middle lobe perfusion subpleural nodule characteristic of benign intrapulmonary nodes.  No suspicious solid parenchymal nodules.  Persistent 6 x 3 x 3 mm right lower lobe segmental endobronchial lesion or focus of chronic mucous plugging.    7/9/2024 CT scan abdomen pelvis done as an inpatient  Improving slightly distended loops of small bowel without evidence of obstruction.  Colon unremarkable  Large lower abdominal loculated high density collection measuring 13.6 x 14.5 previously 13.6 x 14.7.  Density appears decreased compared to prior study though the collection is more discretely defined.  There is a separate loculation more superiorly measuring 2.5 x 7.3 previously 2.0 x 7 cm demonstrating a lower attenuation suggesting evolving hematoma.  No pneumoperitoneum or lymphadenopathy    6/26/2024 hand-assisted laparoscopic sigmoidectomy  Hospitalized 6/26/2024 through 7/12/2024  Surgery complicated by postop ileus requiring TPN  Final pathology T2 N0 (0/21), G2 adenocarcinoma.  Lymphovascular invasion noted.  MIS stable  No  loss of nuclear expression of MMR proteins  Invasive adenocarcinoma sigmoid colon, moderately differentiated with mucinous features.  Lymphovascular invasion (small vessel and large vessel is identified.    6/18/2024 CT scan chest abdomen pelvis  5 mm right middle lobe nodule and 3 mm right upper lobe nodule noted.  Focal soft tissue density in 1 of subsegmental right lower lobe bronchi suggestive of secretions but indeterminant  Several lymph nodes not exceeding 10 mm in the mediastinum and antwan  Slightly undulating contour to the liver and caudate lobe hypertrophy may indicate chronic hepatocellular disease  Gallbladder absent  Soft tissue density adjacent to the wall in the lumen of the proximal sigmoid colon approximately 2 cm  Mild periportal lymphadenopathy largest measuring 17 mm unchanged from 2022 mild diffuse wall thickening of the bladder    6/13/2024 CEA 2.5    6/6/2024 colonoscopy  2 polyps measuring 8 to 10 mm in the rectum status post cold snare polypectomy.  Single clip placed.  Pathology revealed hyperplastic polyps  6 mm polyp cecum status post cold snare polypectomy.  Tubular adenoma  Moderate diverticulosis sigmoid colon  Malignant appearing friable fungating ulcerated mass measuring 5 x 4 cm at 32 cm.  At least intramucosal carcinoma arising from a tubulovillous adenoma    5/16/2022 ERCP done for choledocholithiasis

## 2024-11-21 NOTE — ASSESSMENT & PLAN NOTE
In addition to the colon malignancy at 32 cm Chilo was noted to have 2 polyps measuring 8 to 10 mm in the rectum removed by cold snare.  Pathology revealed hyperplastic polyps.  He did have a 6 mm polyp in the cecum this was a tubular adenoma.  He also had moderate diverticulosis.  He is being scheduled for colonoscopy for 2025

## 2024-11-26 ENCOUNTER — OFFICE VISIT (OUTPATIENT)
Dept: PODIATRY | Facility: CLINIC | Age: 59
End: 2024-11-26
Payer: COMMERCIAL

## 2024-11-26 VITALS
BODY MASS INDEX: 35.22 KG/M2 | DIASTOLIC BLOOD PRESSURE: 79 MMHG | WEIGHT: 246 LBS | HEIGHT: 70 IN | HEART RATE: 99 BPM | SYSTOLIC BLOOD PRESSURE: 150 MMHG

## 2024-11-26 DIAGNOSIS — M79.675 PAIN IN TOES OF BOTH FEET: ICD-10-CM

## 2024-11-26 DIAGNOSIS — M79.674 PAIN IN TOES OF BOTH FEET: ICD-10-CM

## 2024-11-26 DIAGNOSIS — L60.0 INGROWN LEFT BIG TOENAIL: ICD-10-CM

## 2024-11-26 DIAGNOSIS — E11.42 CONTROLLED TYPE 2 DIABETES MELLITUS WITH DIABETIC POLYNEUROPATHY, WITHOUT LONG-TERM CURRENT USE OF INSULIN (HCC): Primary | ICD-10-CM

## 2024-11-26 PROCEDURE — 99212 OFFICE O/P EST SF 10 MIN: CPT | Performed by: PODIATRIST

## 2024-11-26 PROCEDURE — 11721 DEBRIDE NAIL 6 OR MORE: CPT | Performed by: PODIATRIST

## 2024-11-26 NOTE — PROGRESS NOTES
Name: Jr Mendoza      : 1965      MRN: [id number]  Encounter Provider: Ryan Nowak DPM  Encounter Date: 2024   Encounter department: [facility] PODIATRY [place]  :  Assessment & Plan  Controlled type 2 diabetes mellitus with diabetic polyneuropathy, without long-term current use of insulin ([facility])    Lab Results   Component Value Date    HGBA1C 6.1 (H) 2024            Ingrown left big toenail       A formal timeout including patient identification, laterality and existing allergies using [facility] was conducted. I recommend a partial temporary nail avulsion and informed consent obtained.     After cleansing skin with alcohol sprayed etoh chloride on the left medial and lateral borders of the hallux.  I carefully did a partially avulsion of the offending nail border with a small straight nail nipper and flushed with sterile saline. I dressed the site with bacitracin ointment and a DSD to be left intact x 24 hours. The patient may then remove the dressing, shower, and redress with antibiotic ointment and a band-aid daily.    Pain in toes of both feet         Discussed proper shoe gear, daily inspections of feet, and general foot health with patient. Patient has Q8  findings and is recommended for at risk foot care every 9-10 weeks.    Patients most recent complete clinical foot exam was on: 2024      Return in about 10 weeks (around 2025).     Return in about 10 weeks (around 2025).   History of Present Illness     HPI  Jr Mendoza is a 59 y.o. male who presents chief complaint of painful ingrown nail on his left big toe.  He is a diabetic who sugar is stable and has peripheral neuropathy.  His opinion was present in the room for today's visit.  History obtained from: patient    Review of Systems  Medical History Reviewed by provider this encounter:     .  Current Outpatient Medications on File Prior to Visit   Medication Sig Dispense Refill    albuterol  (Ventolin HFA) 90 mcg/act inhaler Inhale 2 puffs every 6 (six) hours as needed for wheezing or shortness of breath 18 g 3    amLODIPine (NORVASC) 5 mg tablet TAKE 1 TABLET (5 MG TOTAL) BY MOUTH DAILY. 90 tablet 1    atorvastatin (LIPITOR) 20 mg tablet TAKE 1 TABLET BY MOUTH EVERY DAY 90 tablet 1    azelastine (ASTELIN) 0.1 % nasal spray 1 spray into each nostril 2 (two) times a day Use in each nostril as directed 30 mL 5    fluticasone (FLONASE) 50 mcg/act nasal spray SPRAY 1 SPRAY INTO EACH NOSTRIL EVERY DAY 48 mL 1    metFORMIN (GLUCOPHAGE) 500 mg tablet Take 1 tablet (500 mg total) by mouth 2 (two) times a day with meals 180 tablet 1    mometasone-formoterol (Dulera) 200-5 MCG/ACT inhaler Inhale 2 puffs 2 (two) times a day Rinse mouth after use. 13 g 5    polyethylene glycol (COLYTE) 4000 mL solution Take 4,000 mL by mouth once for 1 dose Take 4000 mL by mouth once for 1 dose. Use as directed 4000 mL 0    sildenafil (VIAGRA) 25 MG tablet TAKE 1 TABLET BY MOUTH DAILY AS NEEDED FOR ERECTILE DYSFUNCTION. 3 tablet 3    tamsulosin (FLOMAX) 0.4 mg Take 1 capsule (0.4 mg total) by mouth daily with dinner 90 capsule 3    valsartan (DIOVAN) 160 mg tablet Take 1 tablet (160 mg total) by mouth daily 90 tablet 1    vitamin B-12 (CYANOCOBALAMIN) 100 MCG TABS Take 0.5 tablets (50 mcg total) by mouth daily 90 tablet 0     No current facility-administered medications on file prior to visit.      Social History     Tobacco Use    Smoking status: Former     Current packs/day: 0.00     Average packs/day: 0.3 packs/day for 10.0 years (2.5 ttl pk-yrs)     Types: Cigarettes     Start date: 2007     Quit date: 2017     Years since quittin.9    Smokeless tobacco: Never   Vaping Use    Vaping status: Never Used   Substance and Sexual Activity    Alcohol use: Yes     Alcohol/week: 7.0 standard drinks of alcohol     Types: 7 Glasses of wine per week     Comment: 3 beers daily    Drug use: Not Currently    Sexual activity: Not  "Currently     Partners: Male        Objective   /79 (BP Location: Left arm, Patient Position: Sitting, Cuff Size: Large)   Pulse 99   Ht 5' 10\" (1.778 m)   Wt 112 kg (246 lb)   BMI 35.30 kg/m²      Physical Exam  Vascular status is 1/4 DP PT negative digital hair normal distal cooling immediate capillary refill bilaterally.  Slight edema is present bilaterally.  Capillary refill is approximately 2 to 3 seconds with edema of +1 to +2 pitting    Derm left hallux nail is incurvated along both borders with the medial border being worse than the lateral.  There is erythema present blanching on the distal aspect of the nail and pain upon palpation.  No signs of any infection there is hypertrophic tissue present in the nail groove.    Neuro is unchanged from his last visit 10/22/2024  Administrative Statements   I have spent a total time of 15 minutes in caring for this patient on the day of the visit/encounter including Risks and benefits of tx options, Instructions for management, Patient and family education, Importance of tx compliance, Risk factor reductions, Counseling / Coordination of care, and Documenting in the medical record.   "

## 2024-12-16 ENCOUNTER — APPOINTMENT (OUTPATIENT)
Dept: LAB | Facility: HOSPITAL | Age: 59
End: 2024-12-16
Payer: COMMERCIAL

## 2024-12-16 ENCOUNTER — HOSPITAL ENCOUNTER (OUTPATIENT)
Dept: ULTRASOUND IMAGING | Facility: HOSPITAL | Age: 59
Discharge: HOME | End: 2024-12-16
Attending: INTERNAL MEDICINE
Payer: COMMERCIAL

## 2024-12-16 DIAGNOSIS — R93.2 ABNORMAL CT OF LIVER: ICD-10-CM

## 2024-12-16 DIAGNOSIS — E53.8 VITAMIN B12 DEFICIENCY: ICD-10-CM

## 2024-12-16 LAB
AFP-TM SERPL-MCNC: 3.76 NG/ML (ref 0–9)
FERRITIN SERPL-MCNC: 82 NG/ML (ref 24–336)
FOLATE SERPL-MCNC: 10.4 NG/ML
HAV AB SER QL IA: NORMAL
HBV CORE AB SER QL: NORMAL
HBV SURFACE AB SER-ACNC: <3 MIU/ML
HBV SURFACE AG SER QL: NORMAL
HCV AB SER QL: NORMAL
INR PPP: 1.05 (ref 0.85–1.19)
PROTHROMBIN TIME: 14.2 SECONDS (ref 12.3–15)
VIT B12 SERPL-MCNC: 298 PG/ML (ref 180–914)

## 2024-12-16 PROCEDURE — 36415 COLL VENOUS BLD VENIPUNCTURE: CPT

## 2024-12-16 PROCEDURE — 86704 HEP B CORE ANTIBODY TOTAL: CPT

## 2024-12-16 PROCEDURE — 86706 HEP B SURFACE ANTIBODY: CPT

## 2024-12-16 PROCEDURE — 82103 ALPHA-1-ANTITRYPSIN TOTAL: CPT

## 2024-12-16 PROCEDURE — 83550 IRON BINDING TEST: CPT | Performed by: INTERNAL MEDICINE

## 2024-12-16 PROCEDURE — 76981 USE PARENCHYMA: CPT

## 2024-12-16 PROCEDURE — 82746 ASSAY OF FOLIC ACID SERUM: CPT

## 2024-12-16 PROCEDURE — 86225 DNA ANTIBODY NATIVE: CPT

## 2024-12-16 PROCEDURE — 86038 ANTINUCLEAR ANTIBODIES: CPT

## 2024-12-16 PROCEDURE — 87340 HEPATITIS B SURFACE AG IA: CPT

## 2024-12-16 PROCEDURE — 82728 ASSAY OF FERRITIN: CPT

## 2024-12-16 PROCEDURE — 82607 VITAMIN B-12: CPT

## 2024-12-16 PROCEDURE — 86708 HEPATITIS A ANTIBODY: CPT

## 2024-12-16 PROCEDURE — 85610 PROTHROMBIN TIME: CPT

## 2024-12-16 PROCEDURE — 86803 HEPATITIS C AB TEST: CPT

## 2024-12-16 PROCEDURE — 82105 ALPHA-FETOPROTEIN SERUM: CPT

## 2024-12-16 PROCEDURE — 83540 ASSAY OF IRON: CPT | Performed by: INTERNAL MEDICINE

## 2024-12-17 ENCOUNTER — LAB REQUISITION (OUTPATIENT)
Dept: LAB | Facility: HOSPITAL | Age: 59
End: 2024-12-17
Payer: COMMERCIAL

## 2024-12-17 DIAGNOSIS — R93.2 ABNORMAL FINDINGS ON DIAGNOSTIC IMAGING OF LIVER AND BILIARY TRACT: ICD-10-CM

## 2024-12-17 LAB
A1AT SERPL-MCNC: 140 MG/DL (ref 101–187)
DSDNA IGG SERPL IA-ACNC: 4.9 IU/ML (ref ?–15)
IRON SATN MFR SERPL: 27 % (ref 15–50)
IRON SERPL-MCNC: 92 UG/DL (ref 50–212)
NUCLEAR IGG SER IA-RTO: 0.2 RATIO (ref ?–1)
TIBC SERPL-MCNC: 338.8 UG/DL (ref 250–450)
TRANSFERRIN SERPL-MCNC: 242 MG/DL (ref 203–362)
UIBC SERPL-MCNC: 247 UG/DL (ref 155–355)

## 2024-12-19 ENCOUNTER — RESULTS FOLLOW-UP (OUTPATIENT)
Dept: GASTROENTEROLOGY | Facility: CLINIC | Age: 59
End: 2024-12-19

## 2024-12-19 DIAGNOSIS — K74.00 FIBROSIS OF LIVER: Primary | ICD-10-CM

## 2024-12-19 NOTE — RESULT ENCOUNTER NOTE
I sent patient a [employer] message stating that;    i Chilo, I am writing to review recent laboratory results.  LISANDRA, test for autoimmune disease is negative.  Other blood test to evaluate for metabolic liver disease are negative.  Hepatitis B is negative and you are not immune to hepatitis B.  Hepatitis C is negative.  Hepatitis A is negative.  You should follow-up with your primary care provider to consider vaccination for both hepatitis A and hepatitis B.  Your vitamin B12 level is improved from 114 up to 298.  Please continue taking vitamin B12 supplementation.  Folate was normal.  Ferritin is normal.  INR, test of how the blood clots was also normal.  Please feel free to reach out if you have any questions or concerns.  Best wishes for a happy holiday season.  Sincerely,  Dr. Haley

## 2024-12-23 NOTE — RESULT ENCOUNTER NOTE
I have placed an order for hepatology consultation.    I reviewed results with patient's wife.  I informed her that the elastography did reveal significant scarring the liver, F3 fibrosis.  The quality of the study was not excellent as the IQR medium was 19.7.  I did learn that he stopped drinking alcohol after his office visit.  He should continue with the attempting to lose weight.  They are happy to see a hepatologist as well therefore I will place an order for hepatology consultation.  Thank you

## 2025-01-06 ENCOUNTER — OFFICE VISIT (OUTPATIENT)
Dept: FAMILY MEDICINE CLINIC | Facility: CLINIC | Age: 60
End: 2025-01-06
Payer: COMMERCIAL

## 2025-01-06 VITALS
BODY MASS INDEX: 36.13 KG/M2 | TEMPERATURE: 97 F | OXYGEN SATURATION: 96 % | DIASTOLIC BLOOD PRESSURE: 74 MMHG | WEIGHT: 252.4 LBS | HEIGHT: 70 IN | SYSTOLIC BLOOD PRESSURE: 118 MMHG | HEART RATE: 85 BPM

## 2025-01-06 DIAGNOSIS — C18.6 MALIGNANT NEOPLASM OF DESCENDING COLON (HCC): ICD-10-CM

## 2025-01-06 DIAGNOSIS — G62.9 NEUROPATHY: ICD-10-CM

## 2025-01-06 DIAGNOSIS — L60.0 INGROWN TOENAIL OF LEFT FOOT: ICD-10-CM

## 2025-01-06 DIAGNOSIS — K62.5 RECTAL BLEED: ICD-10-CM

## 2025-01-06 DIAGNOSIS — E11.42 CONTROLLED TYPE 2 DIABETES MELLITUS WITH DIABETIC POLYNEUROPATHY, WITHOUT LONG-TERM CURRENT USE OF INSULIN (HCC): Primary | ICD-10-CM

## 2025-01-06 PROBLEM — J45.40 MODERATE PERSISTENT ASTHMA WITHOUT COMPLICATION: Status: RESOLVED | Noted: 2023-09-19 | Resolved: 2025-01-06

## 2025-01-06 LAB — SL AMB POCT HEMOGLOBIN AIC: 6.3 (ref ?–6.5)

## 2025-01-06 PROCEDURE — 83036 HEMOGLOBIN GLYCOSYLATED A1C: CPT | Performed by: STUDENT IN AN ORGANIZED HEALTH CARE EDUCATION/TRAINING PROGRAM

## 2025-01-06 PROCEDURE — 99214 OFFICE O/P EST MOD 30 MIN: CPT | Performed by: STUDENT IN AN ORGANIZED HEALTH CARE EDUCATION/TRAINING PROGRAM

## 2025-01-06 RX ORDER — MUPIROCIN CALCIUM 20 MG/G
CREAM TOPICAL 3 TIMES DAILY
Qty: 30 G | Refills: 0 | Status: SHIPPED | OUTPATIENT
Start: 2025-01-06

## 2025-01-06 NOTE — ASSESSMENT & PLAN NOTE
I had conversation with patient and patient's wife that given his profound neuropathy and the fact that he is now failed 2 sequential foot examinations with no improvement that per [place] state law I need to report this abnormality to the state as I am concerned about his ability to drive given he cannot feel his foot.  I recommend immediate cessation of driving.  [place] DMV form filled out and sent

## 2025-01-06 NOTE — PROGRESS NOTES
Name: Jr Mendoza      : 1965      MRN: [id number]  Encounter Provider: Steven Quigley MD  Encounter Date: 2025   Encounter department: [facility] PRIMARY CARE  :  Assessment & Plan  Controlled type 2 diabetes mellitus with diabetic polyneuropathy, without long-term current use of insulin (HCC)    Lab Results   Component Value Date    HGBA1C 6.3 2025       Hba1c 6.3 today   Continue with current medications     Orders:    POCT hemoglobin A1c    Malignant neoplasm of descending colon (HCC)  Follows with GI   No f/u needed        Rectal bleed  Reports single episode bright red blood per rectum on Friday following working at the gym   No abdominal pain now  Suspect hemmorhoidal bleed  Increase water take  Utilize stool soften such as miralax              Ingrown toenail of left foot  Follows with Podiatry   Podiatry removed this nail approx 1 month  Reports about 1 week ago reports issues with the left 1st toe now has purulent d/c   Not obviously infected will send mupirocin   Orders:    mupirocin (BACTROBAN) 2 % cream; Apply topically 3 (three) times a day    Neuropathy  I had conversation with patient and patient's wife that given his profound neuropathy and the fact that he is now failed 2 sequential foot examinations with no improvement that per [place] state law I need to report this abnormality to the state as I am concerned about his ability to drive given he cannot feel his foot.  I recommend immediate cessation of driving.  [place] form filled out and sent          History of Present Illness     HPI    Patient presents for 3 months f/u.  Issues has noted above     Review of Systems   Constitutional:  Negative for activity change, appetite change, chills, fatigue and fever.   HENT:  Negative for congestion, dental problem, drooling, ear discharge, ear pain, facial swelling, postnasal drip, rhinorrhea and sinus pain.    Eyes:  Negative for photophobia, pain, discharge and itching.  "  Respiratory:  Negative for apnea, cough, chest tightness and shortness of breath.    Cardiovascular:  Negative for chest pain and leg swelling.   Gastrointestinal:  Negative for abdominal distention, abdominal pain, anal bleeding, constipation, diarrhea and nausea.   Endocrine: Negative for cold intolerance, heat intolerance and polydipsia.   Genitourinary:  Negative for difficulty urinating.   Musculoskeletal:  Negative for arthralgias, gait problem, joint swelling and myalgias.   Skin:  Positive for wound. Negative for color change and pallor.   Allergic/Immunologic: Negative for immunocompromised state.   Neurological:  Negative for dizziness, seizures, facial asymmetry, weakness, light-headedness, numbness and headaches.   Psychiatric/Behavioral:  Negative for agitation, behavioral problems, confusion, decreased concentration and dysphoric mood.    All other systems reviewed and are negative.      Objective   /74 (BP Location: Left arm, Patient Position: Sitting, Cuff Size: Adult)   Pulse 85   Temp (!) 97 °F (36.1 °C) (Tympanic)   Ht 5' 10\" (1.778 m)   Wt 114 kg (252 lb 6.4 oz)   SpO2 96%   BMI 36.22 kg/m²      Physical Exam  Constitutional:       Appearance: He is well-developed. He is obese.   HENT:      Head: Normocephalic.   Eyes:      Pupils: Pupils are equal, round, and reactive to light.   Cardiovascular:      Rate and Rhythm: Normal rate and regular rhythm.      Pulses: Pulses are weak.           Dorsalis pedis pulses are 1+ on the right side and 1+ on the left side.        Posterior tibial pulses are 1+ on the right side and 1+ on the left side.   Pulmonary:      Effort: Pulmonary effort is normal.      Breath sounds: Normal breath sounds.   Abdominal:      General: Bowel sounds are normal.      Palpations: Abdomen is soft.   Musculoskeletal:         General: Normal range of motion.      Cervical back: Normal range of motion and neck supple.        Feet:       Comments: Mild amount of " purulent discharge from the left great toe medial nail fold corresponding with recent nail removal.  No obvious paronychia   Feet:      Right foot:      Skin integrity: Dry skin present. No ulcer, skin breakdown, erythema, warmth or callus.      Left foot:      Skin integrity: Dry skin present. No ulcer, skin breakdown, erythema, warmth or callus.   Skin:     General: Skin is warm.   Neurological:      Motor: Weakness present.      Gait: Gait abnormal.     Patient's shoes and socks removed.    Right Foot/Ankle   Right Foot Inspection  Skin Exam: skin normal, skin intact and dry skin. No warmth, no callus, no erythema, no maceration, no abnormal color, no pre-ulcer, no ulcer and no callus.     Toe Exam: No swelling, no tenderness, erythema and  no right toe deformity    Sensory   Vibration: absent  Proprioception: absent  Monofilament testing: absent    Vascular  Capillary refills: < 3 seconds  The right DP pulse is 1+. The right PT pulse is 1+.     Left Foot/Ankle  Left Foot Inspection  Skin Exam: skin normal, skin intact and dry skin. No warmth, no erythema, no maceration, normal color, no pre-ulcer, no ulcer and no callus.     Sensory   Vibration: absent  Proprioception: absent  Monofilament testing: absent    Vascular  Capillary refills: < 3 seconds  The left DP pulse is 1+. The left PT pulse is 1+.     Assign Risk Category  No deformity present  Loss of protective sensation  Weak pulses  Risk: 2

## 2025-01-17 ENCOUNTER — OFFICE VISIT (OUTPATIENT)
Dept: URGENT CARE | Facility: CLINIC | Age: 60
End: 2025-01-17
Payer: COMMERCIAL

## 2025-01-17 ENCOUNTER — HOSPITAL ENCOUNTER (OUTPATIENT)
Dept: CT IMAGING | Facility: HOSPITAL | Age: 60
End: 2025-01-17
Attending: INTERNAL MEDICINE
Payer: COMMERCIAL

## 2025-01-17 VITALS
SYSTOLIC BLOOD PRESSURE: 118 MMHG | OXYGEN SATURATION: 97 % | HEART RATE: 72 BPM | RESPIRATION RATE: 20 BRPM | TEMPERATURE: 98.2 F | DIASTOLIC BLOOD PRESSURE: 75 MMHG

## 2025-01-17 DIAGNOSIS — L03.032 CELLULITIS OF GREAT TOE OF LEFT FOOT: Primary | ICD-10-CM

## 2025-01-17 DIAGNOSIS — C18.6 MALIGNANT NEOPLASM OF DESCENDING COLON (HCC): ICD-10-CM

## 2025-01-17 DIAGNOSIS — L60.0 INGROWN LEFT GREATER TOENAIL: ICD-10-CM

## 2025-01-17 DIAGNOSIS — Z86.39 HISTORY OF DIABETES MELLITUS: ICD-10-CM

## 2025-01-17 PROCEDURE — 71250 CT THORAX DX C-: CPT

## 2025-01-17 PROCEDURE — 99214 OFFICE O/P EST MOD 30 MIN: CPT | Performed by: NURSE PRACTITIONER

## 2025-01-17 RX ORDER — GINSENG 100 MG
1 CAPSULE ORAL ONCE
Status: DISCONTINUED | OUTPATIENT
Start: 2025-01-17 | End: 2025-01-17

## 2025-01-17 RX ORDER — CEPHALEXIN 500 MG/1
500 CAPSULE ORAL EVERY 6 HOURS SCHEDULED
Qty: 28 CAPSULE | Refills: 0 | Status: SHIPPED | OUTPATIENT
Start: 2025-01-17 | End: 2025-01-24

## 2025-01-17 NOTE — PATIENT INSTRUCTIONS
You have been prescribed cephalexin for cellulitis toe infection. Take all medication as prescribed.  You are to apply the bacitracin once a day and wrap toe.  Let open to air in the evenings and elevate your foot.  You may apply warm compresses or do warm soaks.  You need to follow up with your PCP.  Follow up with your PCP in 3-5 days  Go to the ED if symptoms worsen

## 2025-01-17 NOTE — PROGRESS NOTES
[facility]        NAME: Jr Mendoza is a 59 y.o. male  : 1965    MRN: [id number]  DATE: 2025  TIME: 3:03 PM    Assessment and Plan   Cellulitis of great toe of left foot [L03.032]  1. Cellulitis of great toe of left foot  cephalexin (KEFLEX) 500 mg capsule    DISCONTINUED: bacitracin topical ointment 1 large application      2. Ingrown left greater toenail  cephalexin (KEFLEX) 500 mg capsule    DISCONTINUED: bacitracin topical ointment 1 large application      3. History of diabetes mellitus  cephalexin (KEFLEX) 500 mg capsule    DISCONTINUED: bacitracin topical ointment 1 large application            Patient Instructions       Follow up with PCP in 3-5 days.  Proceed to  ER if symptoms worsen.    If tests have been performed at [facility], our office will contact you with results if changes need to be made to the care plan discussed with you at the visit.  You can review your full results on [facility] MyChart.      You have been prescribed cephalexin for cellulitis toe infection. Take all medication as prescribed.  You are to apply the bacitracin once a day and wrap toe.  Let open to air in the evenings and elevate your foot.  You may apply warm compresses or do warm soaks.  You need to follow up with your PCP.  Follow up with your PCP in 3-5 days  Go to the ED if symptoms worsen       Chief Complaint     Chief Complaint   Patient presents with    Wound Infection     Left tor redness, hx diabetic         History of Present Illness       This is a 59 year old male who comes to care now with his wife and has c/o left great toe redness, pain x 1 week. He called podiatrist today and they could not seem him today. He states he has hx of ingrown toenail there and it has grown back and is ingrown again with redness and swelling. Denies any open areas at this time. States is diabetic.  Denies fevers.  He has not wrapped the toe since it became red. He has been applying mupirocin ointment  but denies hx of MRSA.   PMH is listed and reviewed.         Review of Systems   Review of Systems   Constitutional: Negative.    HENT: Negative.     Eyes: Negative.    Respiratory: Negative.     Cardiovascular: Negative.    Gastrointestinal: Negative.    Endocrine: Negative.    Genitourinary: Negative.    Musculoskeletal: Negative.    Skin:  Positive for color change and wound.   Allergic/Immunologic: Negative.    Neurological: Negative.    Hematological: Negative.    Psychiatric/Behavioral: Negative.           Current Medications       Current Outpatient Medications:     albuterol (Ventolin HFA) 90 mcg/act inhaler, Inhale 2 puffs every 6 (six) hours as needed for wheezing or shortness of breath, Disp: 18 g, Rfl: 3    amLODIPine (NORVASC) 5 mg tablet, TAKE 1 TABLET (5 MG TOTAL) BY MOUTH DAILY., Disp: 90 tablet, Rfl: 1    atorvastatin (LIPITOR) 20 mg tablet, TAKE 1 TABLET BY MOUTH EVERY DAY, Disp: 90 tablet, Rfl: 1    azelastine (ASTELIN) 0.1 % nasal spray, 1 spray into each nostril 2 (two) times a day Use in each nostril as directed, Disp: 30 mL, Rfl: 5    cephalexin (KEFLEX) 500 mg capsule, Take 1 capsule (500 mg total) by mouth every 6 (six) hours for 7 days, Disp: 28 capsule, Rfl: 0    fluticasone (FLONASE) 50 mcg/act nasal spray, SPRAY 1 SPRAY INTO EACH NOSTRIL EVERY DAY, Disp: 48 mL, Rfl: 1    metFORMIN (GLUCOPHAGE) 500 mg tablet, Take 1 tablet (500 mg total) by mouth 2 (two) times a day with meals, Disp: 180 tablet, Rfl: 1    mometasone-formoterol (Dulera) 200-5 MCG/ACT inhaler, Inhale 2 puffs 2 (two) times a day Rinse mouth after use., Disp: 13 g, Rfl: 5    mupirocin (BACTROBAN) 2 % cream, Apply topically 3 (three) times a day, Disp: 30 g, Rfl: 0    sildenafil (VIAGRA) 25 MG tablet, TAKE 1 TABLET BY MOUTH DAILY AS NEEDED FOR ERECTILE DYSFUNCTION., Disp: 3 tablet, Rfl: 3    tamsulosin (FLOMAX) 0.4 mg, Take 1 capsule (0.4 mg total) by mouth daily with dinner, Disp: 90 capsule, Rfl: 3    valsartan (DIOVAN) 160  mg tablet, Take 1 tablet (160 mg total) by mouth daily, Disp: 90 tablet, Rfl: 1    vitamin B-12 (CYANOCOBALAMIN) 100 MCG TABS, Take 0.5 tablets (50 mcg total) by mouth daily, Disp: 90 tablet, Rfl: 0  No current facility-administered medications for this visit.    Current Allergies     Allergies as of 01/17/2025 - Reviewed 01/17/2025   Allergen Reaction Noted    Pollen extract Allergic Rhinitis 12/06/2022            The following portions of the patient's history were reviewed and updated as appropriate: allergies, current medications, past family history, past medical history, past social history, past surgical history and problem list.     Past Medical History:   Diagnosis Date    Allergic     Asthma     Cancer (HCC)     Cholangitis 05/16/2022    Closed extra-articular fracture of distal end of right tibia 01/28/2020    Closed nondisplaced oblique fracture of shaft of right fibula 01/28/2020    CPAP (continuous positive airway pressure) dependence     Diabetes mellitus (HCC)     Foot fracture     Hyperlipidemia     Hypertension     Sleep apnea        Past Surgical History:   Procedure Laterality Date    CHOLECYSTECTOMY      CHOLECYSTECTOMY LAPAROSCOPIC N/A 05/18/2022    Procedure: CHOLECYSTECTOMY LAPAROSCOPIC;  Surgeon: Zachary Kern MD;  Location: [facility];  Service: General    COLONOSCOPY      HEMICOLOECTOMY W/ ANASTOMOSIS N/A 06/26/2024    Procedure: LAPARSCOPIC HAND ASSISTED SIGMOIDECTOMY, LAPARSCOPIC MOBILIZATION OF SPLENIC FLEXURE, INTRAOP SPY ANGIOOGRAPHY , FLEXIBLE SIGMOIDOSCOPY;  Surgeon: Ann Perez MD;  Location: [facility];  Service: Colorectal       Family History   Problem Relation Age of Onset    ALS Mother     Pneumonia Father     No Known Problems Sister          Medications have been verified.        Objective   /75   Pulse 72   Temp 98.2 °F (36.8 °C)   Resp 20   SpO2 97%   No LMP for male patient.       Physical Exam     Physical Exam  Vitals and nursing note  reviewed.   Constitutional:       General: He is not in acute distress.     Appearance: Normal appearance. He is obese. He is not ill-appearing, toxic-appearing or diaphoretic.   HENT:      Head: Normocephalic and atraumatic.      Nose: Nose normal.      Mouth/Throat:      Mouth: Mucous membranes are moist.   Eyes:      Extraocular Movements: Extraocular movements intact.   Cardiovascular:      Rate and Rhythm: Normal rate.      Pulses: Normal pulses.   Pulmonary:      Effort: Pulmonary effort is normal.   Musculoskeletal:         General: Normal range of motion.      Cervical back: Normal range of motion.   Skin:     General: Skin is warm and dry.      Capillary Refill: Capillary refill takes less than 2 seconds.      Findings: Erythema present.      Comments: Left great toe:  erythema with swelling entire dip joint dorsal and plantar skin and around toenail. Toenail appears ingrown on lateral side.  TTP. No open areas. Skin is red and wet (putting Bactroban on it).  Skin warm + pedal pulse.  NVI    Neurological:      General: No focal deficit present.      Mental Status: He is alert and oriented to person, place, and time.   Psychiatric:         Mood and Affect: Mood normal.         Behavior: Behavior normal.         Thought Content: Thought content normal.         Judgment: Judgment normal.         Bactroban washed off toe by RN Natalie   Toe dressed with small amount bacitracin to ingrown toenail, telfa and marcella dressing.  Education provided to patient and wife by RN.    Pt tolerated procedure.

## 2025-01-20 DIAGNOSIS — E11.42 CONTROLLED TYPE 2 DIABETES MELLITUS WITH DIABETIC POLYNEUROPATHY, WITHOUT LONG-TERM CURRENT USE OF INSULIN (HCC): Primary | ICD-10-CM

## 2025-01-27 ENCOUNTER — RESULTS FOLLOW-UP (OUTPATIENT)
Age: 60
End: 2025-01-27

## 2025-01-27 RX ORDER — SODIUM CHLORIDE, SODIUM LACTATE, POTASSIUM CHLORIDE, CALCIUM CHLORIDE 600; 310; 30; 20 MG/100ML; MG/100ML; MG/100ML; MG/100ML
125 INJECTION, SOLUTION INTRAVENOUS CONTINUOUS
OUTPATIENT
Start: 2025-01-27

## 2025-02-04 ENCOUNTER — OFFICE VISIT (OUTPATIENT)
Dept: PODIATRY | Facility: CLINIC | Age: 60
End: 2025-02-04
Payer: COMMERCIAL

## 2025-02-04 VITALS — BODY MASS INDEX: 36.08 KG/M2 | HEIGHT: 70 IN | WEIGHT: 252 LBS

## 2025-02-04 DIAGNOSIS — E11.42 CONTROLLED TYPE 2 DIABETES MELLITUS WITH DIABETIC POLYNEUROPATHY, WITHOUT LONG-TERM CURRENT USE OF INSULIN (HCC): ICD-10-CM

## 2025-02-04 DIAGNOSIS — M79.675 PAIN IN TOES OF BOTH FEET: ICD-10-CM

## 2025-02-04 DIAGNOSIS — M79.674 PAIN IN TOES OF BOTH FEET: ICD-10-CM

## 2025-02-04 DIAGNOSIS — B35.1 ONYCHOMYCOSIS: ICD-10-CM

## 2025-02-04 DIAGNOSIS — L60.0 INGROWN LEFT BIG TOENAIL: Primary | ICD-10-CM

## 2025-02-04 PROCEDURE — RECHECK: Performed by: PODIATRIST

## 2025-02-04 PROCEDURE — 11730 AVULSION NAIL PLATE SIMPLE 1: CPT | Performed by: PODIATRIST

## 2025-02-04 RX ORDER — AMOXICILLIN 500 MG/1
CAPSULE ORAL
COMMUNITY
Start: 2025-01-27 | End: 2025-02-11 | Stop reason: ALTCHOICE

## 2025-02-04 NOTE — PROGRESS NOTES
"Name: Jr Mendoza      : 1965      MRN: [id number]  Encounter Provider: Ryan Nowak DPM  Encounter Date: 2025   Encounter department: [facility] [place]  :  Assessment & Plan  Ingrown left big toenail       Explained to the patient the procedure that could be done today is to remove the medial portion of the nail allow the infection to be cleared and allow the nail to return.  If he gets another ingrown nail within the next 6 to 8 months on the same side it was recommended to catch it before it becomes infected and do a permanent procedure so that portion of the nail does not return.  Controlled type 2 diabetes mellitus with diabetic polyneuropathy, without long-term current use of insulin (Formerly [facility])    Lab Results   Component Value Date    HGBA1C 6.3 2025            Onychomycosis       Debride mycotic nails and thin the nail plates x 6 with the use of a nail nipper manually and an electric Dremel bur was used to reduce the thickness of the nail beds and smoothed the distal aspect of the nails.   Pain in toes of both feet         Nail removal    Date/Time: 2025 3:45 PM    Performed by: Ryan Nowak DPM  Authorized by: Ryan Nowak DPM    Patient location:  Clinic  Indications / Diagnosis:  Ingrown nail  Universal Protocol:  procedure performed by consultantConsent: Verbal consent obtained.  Risks and benefits: risks, benefits and alternatives were discussed  Consent given by: patient  Time out: Immediately prior to procedure a \"time out\" was called to verify the correct patient, procedure, equipment, support staff and site/side marked as required.  Patient understanding: patient states understanding of the procedure being performed  Patient consent: the patient's understanding of the procedure matches consent given    Location:     Foot:  L big toe  Pre-procedure details:     Skin preparation:  Betadine    Preparation: Patient was prepped and draped in the " usual sterile fashion    Anesthesia (see MAR for exact dosages):     Anesthesia method:  Nerve block    Block needle gauge:  25 G    Block anesthetic:  Lidocaine 2% w/o epi    Block technique:  Digital Block    Block outcome:  Anesthesia achieved  Nail Removal:     Nail removed:  Partial    Nail side:  Medial    Nail bed sutured: no    Ingrown nail:     Wedge excision of skin: no      Nail matrix removed or ablated:  None  Post-procedure details:     Dressing:  4x4 sterile gauze, antibiotic ointment and gauze roll    Patient tolerance of procedure:  Tolerated well, no immediate complications  Comments:      Discussion with patient was completed today regarding diagnosis and potential etiologies as well as treatment options for this ingrown nail diagnosis.  Discussed how to avoid recurrence and possible treatment options if recurrence does occur.    Antibiotic ointment applied to border with bandage dressing  Pt instructed to keep dressing intact for 24 hours.  After this time use triple antibiotic ointment to the area and a dry dressing changed daily  Return to clinic in about 3 weeks for reevaluation.  If ingrown nail recurs can consider use of phenol at nail matrix.  If notice any redness, swelling, drainage, or excessive pain or signs of infection to notify the office sooner.  Procedure completed without incident.  Do not soak foot.    Procedure in detail: Once the toe was anesthetized, the area was scrubbed and prepped with sterile technique.  A hemostat was used to lift up the involved border of the great toe.  Care was taken to protect the underlying nail bed.  An English anvil was used to cut back corner to the base of the nail.  A hemostat was then used to remove the nail that was ingrown.  This was then checked that the entire ingrown portion was removed.  This was removed from the operative area.  Hemostasis was achieved and dressings were applied.     Patient was given his postop instructions and  understood them.     Return in about 2 weeks (around 2/18/2025).and 10 weeks    History of Present Illness   HPI  Jr Mendoza is a 59 y.o. male who presents with chief complaint of painful thick nails on both feet and a secondary complaint of a painful ingrown nail on his left big toe.  Patient relates that he been on antibiotics because of the infection and had developed and has finished the course.  It was a course of approximately 2 weeks given to him by urgent care.  He is a diabetic who has peripheral neuropathy.  History obtained from: patient    Review of Systems  Medical History Reviewed by provider this encounter:     .  Current Outpatient Medications on File Prior to Visit   Medication Sig Dispense Refill    albuterol (Ventolin HFA) 90 mcg/act inhaler Inhale 2 puffs every 6 (six) hours as needed for wheezing or shortness of breath 18 g 3    amLODIPine (NORVASC) 5 mg tablet TAKE 1 TABLET (5 MG TOTAL) BY MOUTH DAILY. 90 tablet 1    amoxicillin (AMOXIL) 500 mg capsule take 1 capsule by mouth every 8 hours until finished      atorvastatin (LIPITOR) 20 mg tablet TAKE 1 TABLET BY MOUTH EVERY DAY 90 tablet 1    azelastine (ASTELIN) 0.1 % nasal spray 1 spray into each nostril 2 (two) times a day Use in each nostril as directed 30 mL 5    fluticasone (FLONASE) 50 mcg/act nasal spray SPRAY 1 SPRAY INTO EACH NOSTRIL EVERY DAY 48 mL 1    metFORMIN (GLUCOPHAGE) 500 mg tablet Take 1 tablet (500 mg total) by mouth 2 (two) times a day with meals 180 tablet 1    mometasone-formoterol (Dulera) 200-5 MCG/ACT inhaler Inhale 2 puffs 2 (two) times a day Rinse mouth after use. 13 g 5    mupirocin (BACTROBAN) 2 % cream Apply topically 3 (three) times a day 30 g 0    semaglutide, 0.25 or 0.5 mg/dose, (Ozempic, 0.25 or 0.5 MG/DOSE,) 2 mg/3 mL injection pen 0.25 mg under the skin every 7 days for 4 doses (28 days), THEN 0.5 mg under the skin every 7 days 9 mL 0    sildenafil (VIAGRA) 25 MG tablet TAKE 1 TABLET BY MOUTH DAILY AS  "NEEDED FOR ERECTILE DYSFUNCTION. 3 tablet 3    tamsulosin (FLOMAX) 0.4 mg Take 1 capsule (0.4 mg total) by mouth daily with dinner 90 capsule 3    valsartan (DIOVAN) 160 mg tablet Take 1 tablet (160 mg total) by mouth daily 90 tablet 1    vitamin B-12 (CYANOCOBALAMIN) 100 MCG TABS Take 0.5 tablets (50 mcg total) by mouth daily 90 tablet 0     No current facility-administered medications on file prior to visit.      Social History     Tobacco Use    Smoking status: Former     Current packs/day: 0.00     Average packs/day: 0.3 packs/day for 10.0 years (2.5 ttl pk-yrs)     Types: Cigarettes     Start date: 2007     Quit date: 2017     Years since quittin.0     Passive exposure: Past    Smokeless tobacco: Never   Vaping Use    Vaping status: Never Used   Substance and Sexual Activity    Alcohol use: Not Currently     Alcohol/week: 7.0 standard drinks of alcohol     Types: 7 Glasses of wine per week     Comment: 3 beers daily    Drug use: Not Currently    Sexual activity: Not Currently     Partners: Male        Objective   Ht 5' 10\" (1.778 m)   Wt 114 kg (252 lb)   BMI 36.16 kg/m²      Physical Exam  Vascular status is 1/4 DP PT negative digital hair normal distal cooling immediate capillary refill bilaterally.  Capillary refill is approximately 3 to 4 seconds.    Derm nails are brittle elongated hypertrophic yellow-white discoloration with subungual debris x 6 there is an increased thickness and the nails are approximately 2 mm.  The medial border of the left hallux nail is incurvated with erythema localized edema and pain upon palpation.  Slight blanching to the distal aspect of the nail is also noted and there is to start of mild proud flesh in the area as well.    Neuro light touch is intact and equal bilaterally 0.5 monofilament test was diminished in the toes and arch area but present in the left heel and the right heel was slightly diminished       Administrative Statements   I have spent a total time of " 15 minutes in caring for this patient on the day of the visit/encounter including Risks and benefits of tx options, Instructions for management, Patient and family education, Importance of tx compliance, Risk factor reductions, Counseling / Coordination of care, and Documenting in the medical record.

## 2025-02-11 ENCOUNTER — OFFICE VISIT (OUTPATIENT)
Dept: GASTROENTEROLOGY | Facility: CLINIC | Age: 60
End: 2025-02-11
Payer: COMMERCIAL

## 2025-02-11 VITALS
RESPIRATION RATE: 18 BRPM | SYSTOLIC BLOOD PRESSURE: 114 MMHG | DIASTOLIC BLOOD PRESSURE: 78 MMHG | OXYGEN SATURATION: 98 % | BODY MASS INDEX: 35.93 KG/M2 | HEIGHT: 70 IN | WEIGHT: 251 LBS | HEART RATE: 97 BPM

## 2025-02-11 DIAGNOSIS — E88.810 METABOLIC SYNDROME: ICD-10-CM

## 2025-02-11 DIAGNOSIS — F10.91 ALCOHOL USE DISORDER IN REMISSION: ICD-10-CM

## 2025-02-11 DIAGNOSIS — K74.02 ADVANCED HEPATIC FIBROSIS: Primary | ICD-10-CM

## 2025-02-11 DIAGNOSIS — C18.6 MALIGNANT NEOPLASM OF DESCENDING COLON (HCC): ICD-10-CM

## 2025-02-11 PROCEDURE — 99213 OFFICE O/P EST LOW 20 MIN: CPT | Performed by: FAMILY MEDICINE

## 2025-02-11 NOTE — ASSESSMENT & PLAN NOTE
Patient with a history of of colon CA (T2 N0 (0/21) G2 adenocarcinom ) s/p hand-assisted laparoscopic sigmoidectomy on 6/26/2024. His CEA was normal at the time of diagnosis.  He also had a CT chest abdomen and pelvis without evidence of metastasis. There was lymphovascular invasion noted postoperatively. He was seen by medical oncology and did not require adjuvant chemotherapy.      He is scheduled for a surveillance colonoscopy on 2/19/2025.   He is scheduled for follow-up with C&R surgery on 3/11/2025.   He is scheduled for follow-up with Dr. Harper on 7/22/2025.   Continue following with GI and C&R surgery as advised.          Follow-up in June after completion of labs and US with elastography.

## 2025-02-11 NOTE — H&P (VIEW-ONLY)
Name: Jr Mendoza      : 1965      MRN: [id number]  Encounter Provider: Dolores Farris PA-C  Encounter Date: 2025   [facility]  :  Assessment & Plan  Advanced hepatic fibrosis  Patient with PMH significant for colon cancer s/p laparoscopic sigmoidectomy (refer to A/P below) who is ordered for a CT chest abdomen and pelvis for staging purposes and found to have a slightly undulating contour of the liver and caudate lobe hypertrophy possibly indicating chronic hepatocellular disease. Interestingly, this has not been demonstrated on prior or subsequent abdominal imaging.     He's had a partial serologic evaluation which was unremarkable for competing causes of liver disease but does report a history of excessive EtOH use. He's also had a US elastography showed F3 fibrosis (IQR/median 19.7%). His labs reflect normal liver chemistries, normal liver synthetic function and a preserved platelet count. No clinical evidence of chronic liver disease on exam today.    Suspect that he likely has advanced hepatic fibrosis in the setting of ALD. He also has multiple metabolic risk factors for the development of MASLD but this has not been demonstrated on imaging or his elastography. Discussed options to more accurately stage his fibrosis including both and MR elastography vs liver biopsy. Patient prefers a conservative approach and alternatively recommended repeat LFTs as well as a US with elastography x6 months to assess for regression of fibrosis - Also ?over-estimation of LSM w/ concurrent EtOH use. He is aware that, in the meantime, it is imperative that he work towards steady and sustainable weight loss and optimization of his metabolic risk factors in addition to complete EtOH avoidance.     He is technically not a candidate for Rezdiffra given the absence of hepatic steatosis on imaging but will refer to weight management for  consideration of GLP-1 a therapy seeing as these medications have also proven favorable for the treatment of MASH which is highly suspected.     Orders:  •  Ambulatory Referral to Hepatology  •  CBC and differential; Future  •  Comprehensive metabolic panel; Future  •  Protime-INR; Future  •  US abdomen complete; Future  •  US elastography/UGAP; Future  •  Ambulatory Referral to Weight Management; Future    Alcohol use disorder in remission  Refer to A/P above.        Metabolic syndrome  Refer to A/P above.        Malignant neoplasm of descending colon (HCC)  Patient with a history of of colon CA (T2 N0 (0/21) G2 adenocarcinom ) s/p hand-assisted laparoscopic sigmoidectomy on 6/26/2024. His CEA was normal at the time of diagnosis.  He also had a CT chest abdomen and pelvis without evidence of metastasis. There was lymphovascular invasion noted postoperatively. He was seen by medical oncology and did not require adjuvant chemotherapy.      He is scheduled for a surveillance colonoscopy on 2/19/2025.   He is scheduled for follow-up with C&R surgery on 3/11/2025.   He is scheduled for follow-up with Dr. Harper on 7/22/2025.   Continue following with GI and C&R surgery as advised.          Follow-up in June after completion of labs and US with elastography.      History of Present Illness   HPI    Jr Mendoza is a 59 y.o. male with PMH significant for obesity, DM2 with neuropathy, HTN, HLD, INDIGO, BPH, DONG, known pulmonary nodules and colon cancer presents today for consultation regarding abnormal imaging of the liver.    Chilo is familiar with [facility] gastroenterology last seen by Dr. Haley on 11/21/2024. He had a CT chest abdomen and pelvis for staging purposes, after his diagnosis of colon cancer, which noted slightly undulating contour of the liver and caudate lobe hypertrophy possibly indicating chronic hepatocellular disease. He had a US elastography notable for F3 fibrosis (IQR/median 19.7%) his liver  chemistries have been historically within normal limits aside from mild hyperbilirubinemia. He did have abnormal LFTs during a hospitalization for choledocholithiasis in May 2022. Also with a preserved platelet count.    He has had a partial serologic workup which has been negative for viral hepatitis, A1AT deficiency and hemochromatosis. He also had a negative LISANDRA and normal AFP tumor marker.    Denies a personal history of chronic liver disease. Denies a family history of liver disease or liver-related cancers. He stopped drinking in December 2024 but reports a history of heavy EtOH use for about 20+ years prior to this. He would drink ~4 alcohol beverages daily but would also regularly drink more than this according to his wife.     Hx of colon CA s/p sigmoidectomy and is scheduled for a surveillance colonoscopy on 2/19/2025 as well as follow-up with C&R surgery on 3/11/2025.       History obtained from: patient    Review of Systems   All other systems reviewed and are negative.    Pertinent Medical History     Medical History Reviewed by provider this encounter:  Tobacco  Allergies  Meds  Problems  Med Hx  Surg Hx  Fam Hx     .  Past Medical History   Past Medical History:   Diagnosis Date   • Allergic    • Asthma    • Cancer (HCC)    • Cholangitis 05/16/2022   • Closed extra-articular fracture of distal end of right tibia 01/28/2020   • Closed nondisplaced oblique fracture of shaft of right fibula 01/28/2020   • CPAP (continuous positive airway pressure) dependence    • Diabetes mellitus (HCC)    • Foot fracture    • Hyperlipidemia    • Hypertension    • Sleep apnea      Past Surgical History:   Procedure Laterality Date   • CHOLECYSTECTOMY     • CHOLECYSTECTOMY LAPAROSCOPIC N/A 05/18/2022    Procedure: CHOLECYSTECTOMY LAPAROSCOPIC;  Surgeon: Zachary Kern MD;  Location: [facility];  Service: General   • COLONOSCOPY     • HEMICOLOECTOMY W/ ANASTOMOSIS N/A 06/26/2024    Procedure: LAPARSCOPIC HAND  ASSISTED SIGMOIDECTOMY, LAPARSCOPIC MOBILIZATION OF SPLENIC FLEXURE, INTRAOP SPY ANGIOOGRAPHY , FLEXIBLE SIGMOIDOSCOPY;  Surgeon: Ann Perez MD;  Location: [facility];  Service: Colorectal     Family History   Problem Relation Age of Onset   • ALS Mother    • Pneumonia Father    • No Known Problems Sister       reports that he quit smoking about 8 years ago. His smoking use included cigarettes. He started smoking about 18 years ago. He has a 2.5 pack-year smoking history. He has been exposed to tobacco smoke. He has never used smokeless tobacco. He reports that he does not currently use alcohol after a past usage of about 7.0 standard drinks of alcohol per week. He reports that he does not currently use drugs.  Current Outpatient Medications on File Prior to Visit   Medication Sig Dispense Refill   • albuterol (Ventolin HFA) 90 mcg/act inhaler Inhale 2 puffs every 6 (six) hours as needed for wheezing or shortness of breath 18 g 3   • amLODIPine (NORVASC) 5 mg tablet TAKE 1 TABLET (5 MG TOTAL) BY MOUTH DAILY. 90 tablet 1   • atorvastatin (LIPITOR) 20 mg tablet TAKE 1 TABLET BY MOUTH EVERY DAY 90 tablet 1   • azelastine (ASTELIN) 0.1 % nasal spray 1 spray into each nostril 2 (two) times a day Use in each nostril as directed 30 mL 5   • fluticasone (FLONASE) 50 mcg/act nasal spray SPRAY 1 SPRAY INTO EACH NOSTRIL EVERY DAY 48 mL 1   • metFORMIN (GLUCOPHAGE) 500 mg tablet Take 1 tablet (500 mg total) by mouth 2 (two) times a day with meals 180 tablet 1   • mometasone-formoterol (Dulera) 200-5 MCG/ACT inhaler Inhale 2 puffs 2 (two) times a day Rinse mouth after use. 13 g 5   • mupirocin (BACTROBAN) 2 % cream Apply topically 3 (three) times a day 30 g 0   • sildenafil (VIAGRA) 25 MG tablet TAKE 1 TABLET BY MOUTH DAILY AS NEEDED FOR ERECTILE DYSFUNCTION. 3 tablet 3   • tamsulosin (FLOMAX) 0.4 mg Take 1 capsule (0.4 mg total) by mouth daily with dinner 90 capsule 3   • valsartan (DIOVAN) 160 mg tablet Take 1  tablet (160 mg total) by mouth daily 90 tablet 1   • vitamin B-12 (CYANOCOBALAMIN) 100 MCG TABS Take 0.5 tablets (50 mcg total) by mouth daily 90 tablet 0   • semaglutide, 0.25 or 0.5 mg/dose, (Ozempic, 0.25 or 0.5 MG/DOSE,) 2 mg/3 mL injection pen 0.25 mg under the skin every 7 days for 4 doses (28 days), THEN 0.5 mg under the skin every 7 days (Patient not taking: Reported on 2/11/2025) 9 mL 0   • [DISCONTINUED] amoxicillin (AMOXIL) 500 mg capsule take 1 capsule by mouth every 8 hours until finished (Patient not taking: Reported on 2/11/2025)       No current facility-administered medications on file prior to visit.     Allergies   Allergen Reactions   • Pollen Extract Allergic Rhinitis      Current Outpatient Medications on File Prior to Visit   Medication Sig Dispense Refill   • albuterol (Ventolin HFA) 90 mcg/act inhaler Inhale 2 puffs every 6 (six) hours as needed for wheezing or shortness of breath 18 g 3   • amLODIPine (NORVASC) 5 mg tablet TAKE 1 TABLET (5 MG TOTAL) BY MOUTH DAILY. 90 tablet 1   • atorvastatin (LIPITOR) 20 mg tablet TAKE 1 TABLET BY MOUTH EVERY DAY 90 tablet 1   • azelastine (ASTELIN) 0.1 % nasal spray 1 spray into each nostril 2 (two) times a day Use in each nostril as directed 30 mL 5   • fluticasone (FLONASE) 50 mcg/act nasal spray SPRAY 1 SPRAY INTO EACH NOSTRIL EVERY DAY 48 mL 1   • metFORMIN (GLUCOPHAGE) 500 mg tablet Take 1 tablet (500 mg total) by mouth 2 (two) times a day with meals 180 tablet 1   • mometasone-formoterol (Dulera) 200-5 MCG/ACT inhaler Inhale 2 puffs 2 (two) times a day Rinse mouth after use. 13 g 5   • mupirocin (BACTROBAN) 2 % cream Apply topically 3 (three) times a day 30 g 0   • sildenafil (VIAGRA) 25 MG tablet TAKE 1 TABLET BY MOUTH DAILY AS NEEDED FOR ERECTILE DYSFUNCTION. 3 tablet 3   • tamsulosin (FLOMAX) 0.4 mg Take 1 capsule (0.4 mg total) by mouth daily with dinner 90 capsule 3   • valsartan (DIOVAN) 160 mg tablet Take 1 tablet (160 mg total) by mouth  "daily 90 tablet 1   • vitamin B-12 (CYANOCOBALAMIN) 100 MCG TABS Take 0.5 tablets (50 mcg total) by mouth daily 90 tablet 0   • semaglutide, 0.25 or 0.5 mg/dose, (Ozempic, 0.25 or 0.5 MG/DOSE,) 2 mg/3 mL injection pen 0.25 mg under the skin every 7 days for 4 doses (28 days), THEN 0.5 mg under the skin every 7 days (Patient not taking: Reported on 2025) 9 mL 0   • [DISCONTINUED] amoxicillin (AMOXIL) 500 mg capsule take 1 capsule by mouth every 8 hours until finished (Patient not taking: Reported on 2025)       No current facility-administered medications on file prior to visit.      Social History     Tobacco Use   • Smoking status: Former     Current packs/day: 0.00     Average packs/day: 0.3 packs/day for 10.0 years (2.5 ttl pk-yrs)     Types: Cigarettes     Start date: 2007     Quit date: 2017     Years since quittin.1     Passive exposure: Past   • Smokeless tobacco: Never   Vaping Use   • Vaping status: Never Used   Substance and Sexual Activity   • Alcohol use: Not Currently     Alcohol/week: 7.0 standard drinks of alcohol     Types: 7 Glasses of wine per week     Comment: 3 beers daily   • Drug use: Not Currently   • Sexual activity: Not Currently     Partners: Male        Objective   /78 (BP Location: Right arm, Patient Position: Sitting, Cuff Size: Large)   Pulse 97   Resp 18   Ht 5' 10\" (1.778 m)   Wt 114 kg (251 lb)   SpO2 98%   BMI 36.01 kg/m²      Physical Exam  Vitals and nursing note reviewed.   Constitutional:       General: He is not in acute distress.     Appearance: Normal appearance. He is well-developed. He is not ill-appearing or toxic-appearing.   HENT:      Head: Normocephalic and atraumatic.   Eyes:      General: No scleral icterus.     Conjunctiva/sclera: Conjunctivae normal.   Pulmonary:      Effort: Pulmonary effort is normal. No respiratory distress.   Abdominal:      General: There is no distension.      Palpations: Abdomen is soft. There is no mass.      " Tenderness: There is no abdominal tenderness.      Comments: Central adiposity   Musculoskeletal:      Right lower leg: No edema.      Left lower leg: No edema.   Skin:     General: Skin is warm and dry.      Coloration: Skin is not jaundiced.      Findings: No bruising or rash.   Neurological:      Mental Status: He is alert and oriented to person, place, and time. Mental status is at baseline.   Psychiatric:         Mood and Affect: Mood normal.         Behavior: Behavior normal.

## 2025-02-11 NOTE — PROGRESS NOTES
Name: Jr Mendoza      : 1965      MRN: [id number]  Encounter Provider: Dolores Farris PA-C  Encounter Date: 2025   Encounter department[facility]  :  Assessment & Plan  Advanced hepatic fibrosis  Patient with PMH significant for colon cancer s/p laparoscopic sigmoidectomy (refer to A/P below) who is ordered for a CT chest abdomen and pelvis for staging purposes and found to have a slightly undulating contour of the liver and caudate lobe hypertrophy possibly indicating chronic hepatocellular disease. Interestingly, this has not been demonstrated on prior or subsequent abdominal imaging.     He's had a partial serologic evaluation which was unremarkable for competing causes of liver disease but does report a history of excessive EtOH use. He's also had a US elastography showed F3 fibrosis (IQR/median 19.7%). His labs reflect normal liver chemistries, normal liver synthetic function and a preserved platelet count. No clinical evidence of chronic liver disease on exam today.    Suspect that he likely has advanced hepatic fibrosis in the setting of ALD. He also has multiple metabolic risk factors for the development of MASLD but this has not been demonstrated on imaging or his elastography. Discussed options to more accurately stage his fibrosis including both and MR elastography vs liver biopsy. Patient prefers a conservative approach and alternatively recommended repeat LFTs as well as a US with elastography x6 months to assess for regression of fibrosis - Also ?over-estimation of LSM w/ concurrent EtOH use. He is aware that, in the meantime, it is imperative that he work towards steady and sustainable weight loss and optimization of his metabolic risk factors in addition to complete EtOH avoidance.     He is technically not a candidate for Rezdiffra given the absence of hepatic steatosis on imaging but will refer to weight management for  consideration of GLP-1 a therapy seeing as these medications have also proven favorable for the treatment of MASH which is highly suspected.     Orders:  •  Ambulatory Referral to Hepatology  •  CBC and differential; Future  •  Comprehensive metabolic panel; Future  •  Protime-INR; Future  •  US abdomen complete; Future  •  US elastography/UGAP; Future  •  Ambulatory Referral to Weight Management; Future    Alcohol use disorder in remission  Refer to A/P above.        Metabolic syndrome  Refer to A/P above.        Malignant neoplasm of descending colon (HCC)  Patient with a history of of colon CA (T2 N0 (0/21) G2 adenocarcinom ) s/p hand-assisted laparoscopic sigmoidectomy on 6/26/2024. His CEA was normal at the time of diagnosis.  He also had a CT chest abdomen and pelvis without evidence of metastasis. There was lymphovascular invasion noted postoperatively. He was seen by medical oncology and did not require adjuvant chemotherapy.      He is scheduled for a surveillance colonoscopy on 2/19/2025.   He is scheduled for follow-up with C&R surgery on 3/11/2025.   He is scheduled for follow-up with Dr. Harper on 7/22/2025.   Continue following with GI and C&R surgery as advised.          Follow-up in June after completion of labs and US with elastography.      History of Present Illness   HPI    Jr Mendoza is a 59 y.o. male with PMH significant for obesity, DM2 with neuropathy, HTN, HLD, INDIGO, BPH, DONG, known pulmonary nodules and colon cancer presents today for consultation regarding abnormal imaging of the liver.    Chilo is familiar with [facility] gastroenterology last seen by Dr. Haley on 11/21/2024. He had a CT chest abdomen and pelvis for staging purposes, after his diagnosis of colon cancer, which noted slightly undulating contour of the liver and caudate lobe hypertrophy possibly indicating chronic hepatocellular disease. He had a US elastography notable for F3 fibrosis (IQR/median 19.7%) his liver  chemistries have been historically within normal limits aside from mild hyperbilirubinemia. He did have abnormal LFTs during a hospitalization for choledocholithiasis in May 2022. Also with a preserved platelet count.    He has had a partial serologic workup which has been negative for viral hepatitis, A1AT deficiency and hemochromatosis. He also had a negative LISANDRA and normal AFP tumor marker.    Denies a personal history of chronic liver disease. Denies a family history of liver disease or liver-related cancers. He stopped drinking in December 2024 but reports a history of heavy EtOH use for about 20+ years prior to this. He would drink ~4 alcohol beverages daily but would also regularly drink more than this according to his wife.     Hx of colon CA s/p sigmoidectomy and is scheduled for a surveillance colonoscopy on 2/19/2025 as well as follow-up with C&R surgery on 3/11/2025.       History obtained from: patient    Review of Systems   All other systems reviewed and are negative.    Pertinent Medical History     Medical History Reviewed by provider this encounter:  Tobacco  Allergies  Meds  Problems  Med Hx  Surg Hx  Fam Hx     .  Past Medical History   Past Medical History:   Diagnosis Date   • Allergic    • Asthma    • Cancer (HCC)    • Cholangitis 05/16/2022   • Closed extra-articular fracture of distal end of right tibia 01/28/2020   • Closed nondisplaced oblique fracture of shaft of right fibula 01/28/2020   • CPAP (continuous positive airway pressure) dependence    • Diabetes mellitus (HCC)    • Foot fracture    • Hyperlipidemia    • Hypertension    • Sleep apnea      Past Surgical History:   Procedure Laterality Date   • CHOLECYSTECTOMY     • CHOLECYSTECTOMY LAPAROSCOPIC N/A 05/18/2022    Procedure: CHOLECYSTECTOMY LAPAROSCOPIC;  Surgeon: Zachary Kern MD;  Location: [facility];  Service: General   • COLONOSCOPY     • HEMICOLOECTOMY W/ ANASTOMOSIS N/A 06/26/2024    Procedure: LAPARSCOPIC HAND  ASSISTED SIGMOIDECTOMY, LAPARSCOPIC MOBILIZATION OF SPLENIC FLEXURE, INTRAOP SPY ANGIOOGRAPHY , FLEXIBLE SIGMOIDOSCOPY;  Surgeon: Ann Perez MD;  Location: [facility];  Service: Colorectal     Family History   Problem Relation Age of Onset   • ALS Mother    • Pneumonia Father    • No Known Problems Sister       reports that he quit smoking about 8 years ago. His smoking use included cigarettes. He started smoking about 18 years ago. He has a 2.5 pack-year smoking history. He has been exposed to tobacco smoke. He has never used smokeless tobacco. He reports that he does not currently use alcohol after a past usage of about 7.0 standard drinks of alcohol per week. He reports that he does not currently use drugs.  Current Outpatient Medications on File Prior to Visit   Medication Sig Dispense Refill   • albuterol (Ventolin HFA) 90 mcg/act inhaler Inhale 2 puffs every 6 (six) hours as needed for wheezing or shortness of breath 18 g 3   • amLODIPine (NORVASC) 5 mg tablet TAKE 1 TABLET (5 MG TOTAL) BY MOUTH DAILY. 90 tablet 1   • atorvastatin (LIPITOR) 20 mg tablet TAKE 1 TABLET BY MOUTH EVERY DAY 90 tablet 1   • azelastine (ASTELIN) 0.1 % nasal spray 1 spray into each nostril 2 (two) times a day Use in each nostril as directed 30 mL 5   • fluticasone (FLONASE) 50 mcg/act nasal spray SPRAY 1 SPRAY INTO EACH NOSTRIL EVERY DAY 48 mL 1   • metFORMIN (GLUCOPHAGE) 500 mg tablet Take 1 tablet (500 mg total) by mouth 2 (two) times a day with meals 180 tablet 1   • mometasone-formoterol (Dulera) 200-5 MCG/ACT inhaler Inhale 2 puffs 2 (two) times a day Rinse mouth after use. 13 g 5   • mupirocin (BACTROBAN) 2 % cream Apply topically 3 (three) times a day 30 g 0   • sildenafil (VIAGRA) 25 MG tablet TAKE 1 TABLET BY MOUTH DAILY AS NEEDED FOR ERECTILE DYSFUNCTION. 3 tablet 3   • tamsulosin (FLOMAX) 0.4 mg Take 1 capsule (0.4 mg total) by mouth daily with dinner 90 capsule 3   • valsartan (DIOVAN) 160 mg tablet Take 1  tablet (160 mg total) by mouth daily 90 tablet 1   • vitamin B-12 (CYANOCOBALAMIN) 100 MCG TABS Take 0.5 tablets (50 mcg total) by mouth daily 90 tablet 0   • semaglutide, 0.25 or 0.5 mg/dose, (Ozempic, 0.25 or 0.5 MG/DOSE,) 2 mg/3 mL injection pen 0.25 mg under the skin every 7 days for 4 doses (28 days), THEN 0.5 mg under the skin every 7 days (Patient not taking: Reported on 2/11/2025) 9 mL 0   • [DISCONTINUED] amoxicillin (AMOXIL) 500 mg capsule take 1 capsule by mouth every 8 hours until finished (Patient not taking: Reported on 2/11/2025)       No current facility-administered medications on file prior to visit.     Allergies   Allergen Reactions   • Pollen Extract Allergic Rhinitis      Current Outpatient Medications on File Prior to Visit   Medication Sig Dispense Refill   • albuterol (Ventolin HFA) 90 mcg/act inhaler Inhale 2 puffs every 6 (six) hours as needed for wheezing or shortness of breath 18 g 3   • amLODIPine (NORVASC) 5 mg tablet TAKE 1 TABLET (5 MG TOTAL) BY MOUTH DAILY. 90 tablet 1   • atorvastatin (LIPITOR) 20 mg tablet TAKE 1 TABLET BY MOUTH EVERY DAY 90 tablet 1   • azelastine (ASTELIN) 0.1 % nasal spray 1 spray into each nostril 2 (two) times a day Use in each nostril as directed 30 mL 5   • fluticasone (FLONASE) 50 mcg/act nasal spray SPRAY 1 SPRAY INTO EACH NOSTRIL EVERY DAY 48 mL 1   • metFORMIN (GLUCOPHAGE) 500 mg tablet Take 1 tablet (500 mg total) by mouth 2 (two) times a day with meals 180 tablet 1   • mometasone-formoterol (Dulera) 200-5 MCG/ACT inhaler Inhale 2 puffs 2 (two) times a day Rinse mouth after use. 13 g 5   • mupirocin (BACTROBAN) 2 % cream Apply topically 3 (three) times a day 30 g 0   • sildenafil (VIAGRA) 25 MG tablet TAKE 1 TABLET BY MOUTH DAILY AS NEEDED FOR ERECTILE DYSFUNCTION. 3 tablet 3   • tamsulosin (FLOMAX) 0.4 mg Take 1 capsule (0.4 mg total) by mouth daily with dinner 90 capsule 3   • valsartan (DIOVAN) 160 mg tablet Take 1 tablet (160 mg total) by mouth  "daily 90 tablet 1   • vitamin B-12 (CYANOCOBALAMIN) 100 MCG TABS Take 0.5 tablets (50 mcg total) by mouth daily 90 tablet 0   • semaglutide, 0.25 or 0.5 mg/dose, (Ozempic, 0.25 or 0.5 MG/DOSE,) 2 mg/3 mL injection pen 0.25 mg under the skin every 7 days for 4 doses (28 days), THEN 0.5 mg under the skin every 7 days (Patient not taking: Reported on 2025) 9 mL 0   • [DISCONTINUED] amoxicillin (AMOXIL) 500 mg capsule take 1 capsule by mouth every 8 hours until finished (Patient not taking: Reported on 2025)       No current facility-administered medications on file prior to visit.      Social History     Tobacco Use   • Smoking status: Former     Current packs/day: 0.00     Average packs/day: 0.3 packs/day for 10.0 years (2.5 ttl pk-yrs)     Types: Cigarettes     Start date: 2007     Quit date: 2017     Years since quittin.1     Passive exposure: Past   • Smokeless tobacco: Never   Vaping Use   • Vaping status: Never Used   Substance and Sexual Activity   • Alcohol use: Not Currently     Alcohol/week: 7.0 standard drinks of alcohol     Types: 7 Glasses of wine per week     Comment: 3 beers daily   • Drug use: Not Currently   • Sexual activity: Not Currently     Partners: Male        Objective   /78 (BP Location: Right arm, Patient Position: Sitting, Cuff Size: Large)   Pulse 97   Resp 18   Ht 5' 10\" (1.778 m)   Wt 114 kg (251 lb)   SpO2 98%   BMI 36.01 kg/m²      Physical Exam  Vitals and nursing note reviewed.   Constitutional:       General: He is not in acute distress.     Appearance: Normal appearance. He is well-developed. He is not ill-appearing or toxic-appearing.   HENT:      Head: Normocephalic and atraumatic.   Eyes:      General: No scleral icterus.     Conjunctiva/sclera: Conjunctivae normal.   Pulmonary:      Effort: Pulmonary effort is normal. No respiratory distress.   Abdominal:      General: There is no distension.      Palpations: Abdomen is soft. There is no mass.      " Tenderness: There is no abdominal tenderness.      Comments: Central adiposity   Musculoskeletal:      Right lower leg: No edema.      Left lower leg: No edema.   Skin:     General: Skin is warm and dry.      Coloration: Skin is not jaundiced.      Findings: No bruising or rash.   Neurological:      Mental Status: He is alert and oriented to person, place, and time. Mental status is at baseline.   Psychiatric:         Mood and Affect: Mood normal.         Behavior: Behavior normal.

## 2025-02-13 DIAGNOSIS — I10 PRIMARY HYPERTENSION: ICD-10-CM

## 2025-02-13 DIAGNOSIS — E78.00 ELEVATED LDL CHOLESTEROL LEVEL: ICD-10-CM

## 2025-02-13 NOTE — TELEPHONE ENCOUNTER
Reason for call:   [x] Refill   [] Prior Auth  [] Other:     Office:   [x] PCP/Provider - Steven Quigley  [] Specialty/Provider -     Medication:  amLODIPine (NORVASC) 5 mg tablet    Dose/Frequency: TAKE 1 TABLET (5 MG TOTAL) BY MOUTH DAILY.     Quantity: 90    Pharmacy: [facility]/pharmacy #[id number] [address]     Does the patient have enough for 3 days?   [x] Yes   [] No - Send as HP to POD    Reason for call:   [x] Refill   [] Prior Auth  [] Other:     Office:   [x] PCP/Provider - Steven Quigley  [] Specialty/Provider -     Medication: atorvastatin (LIPITOR) 20 mg tablet     Dose/Frequency: TAKE 1 TABLET BY MOUTH EVERY DAY,     Quantity: 90    Pharmacy: [facility]/pharmacy #[id number] [address]     Does the patient have enough for 3 days?   [x] Yes   [] No - Send as HP to POD

## 2025-02-14 DIAGNOSIS — E53.8 B12 DEFICIENCY: ICD-10-CM

## 2025-02-14 RX ORDER — ATORVASTATIN CALCIUM 20 MG/1
20 TABLET, FILM COATED ORAL DAILY
Qty: 90 TABLET | Refills: 0 | Status: SHIPPED | OUTPATIENT
Start: 2025-02-14

## 2025-02-14 RX ORDER — AMLODIPINE BESYLATE 5 MG/1
5 TABLET ORAL DAILY
Qty: 90 TABLET | Refills: 0 | Status: SHIPPED | OUTPATIENT
Start: 2025-02-14

## 2025-02-14 RX ORDER — UBIDECARENONE 75 MG
CAPSULE ORAL
Qty: 90 TABLET | Refills: 1 | Status: SHIPPED | OUTPATIENT
Start: 2025-02-14

## 2025-02-18 ENCOUNTER — TELEPHONE (OUTPATIENT)
Age: 60
End: 2025-02-18

## 2025-02-18 NOTE — TELEPHONE ENCOUNTER
Refaxed form and ov notes to both fax numbers supplied. Tried to call vin to make them aware form was faxed 2/20/25 and I refaxed form and ov notes again today to both fax numbers but was unable to leave any information due to not having patient's ID number.

## 2025-02-18 NOTE — TELEPHONE ENCOUNTER
Patient called in to r/s procedure as he ate food today he is now [facility] 2/26 [employer] message sent with PREP Instructions

## 2025-02-18 NOTE — TELEPHONE ENCOUNTER
[facility] they faxed a form regarding Ozempic. They are requesting office visit notes regarding needs for medication,. She states the 2nd page of the form is most important. Case # [id number]  Form can be returned to either fax listed below:  (f) [phone] (f) [phone]

## 2025-02-21 ENCOUNTER — OFFICE VISIT (OUTPATIENT)
Dept: PODIATRY | Facility: CLINIC | Age: 60
End: 2025-02-21

## 2025-02-21 VITALS — WEIGHT: 251 LBS | HEIGHT: 70 IN | BODY MASS INDEX: 35.93 KG/M2

## 2025-02-21 DIAGNOSIS — L60.0 INGROWN LEFT BIG TOENAIL: Primary | ICD-10-CM

## 2025-02-21 DIAGNOSIS — E11.42 CONTROLLED TYPE 2 DIABETES MELLITUS WITH DIABETIC POLYNEUROPATHY, WITHOUT LONG-TERM CURRENT USE OF INSULIN (HCC): ICD-10-CM

## 2025-02-21 PROCEDURE — RECHECK: Performed by: PODIATRIST

## 2025-02-21 NOTE — PROGRESS NOTES
Name: Jr Mendoza      : 1965      MRN: [id number]  Encounter Provider: Ryan Nowak DPM  Encounter Date: 2025   Encounter department: [facility] PODIATRY [place]  :  Assessment & Plan  Ingrown left big toenail       Patient is to apply a moisturizer or some other form of lotion to the medial groove of the hallux nail to allow the groove to become softer.  Controlled type 2 diabetes mellitus with diabetic polyneuropathy, without long-term current use of insulin ([facility])    Lab Results   Component Value Date    HGBA1C 6.3 2025              Return in about 10 weeks (around 2025).     History of Present Illness   HPI  Jr Mendoza is a 59 y.o. male who presents for follow-up of a permanent nail avulsion on his left big toe.  Patient is a diabetic.  History obtained from: patient    Review of Systems  Medical History Reviewed by provider this encounter:     .  Current Outpatient Medications on File Prior to Visit   Medication Sig Dispense Refill    albuterol (Ventolin HFA) 90 mcg/act inhaler Inhale 2 puffs every 6 (six) hours as needed for wheezing or shortness of breath 18 g 3    amLODIPine (NORVASC) 5 mg tablet Take 1 tablet (5 mg total) by mouth daily 90 tablet 0    atorvastatin (LIPITOR) 20 mg tablet Take 1 tablet (20 mg total) by mouth daily 90 tablet 0    azelastine (ASTELIN) 0.1 % nasal spray 1 spray into each nostril 2 (two) times a day Use in each nostril as directed 30 mL 5    cyanocobalamin (VITAMIN B-12) 100 mcg tablet TAKE 0.5 TABLETS (50 MCG TOTAL) BY MOUTH DAILY 90 tablet 1    fluticasone (FLONASE) 50 mcg/act nasal spray SPRAY 1 SPRAY INTO EACH NOSTRIL EVERY DAY 48 mL 1    metFORMIN (GLUCOPHAGE) 500 mg tablet Take 1 tablet (500 mg total) by mouth 2 (two) times a day with meals 180 tablet 1    mometasone-formoterol (Dulera) 200-5 MCG/ACT inhaler Inhale 2 puffs 2 (two) times a day Rinse mouth after use. 13 g 5    mupirocin (BACTROBAN) 2 % cream Apply topically 3  "(three) times a day 30 g 0    sildenafil (VIAGRA) 25 MG tablet TAKE 1 TABLET BY MOUTH DAILY AS NEEDED FOR ERECTILE DYSFUNCTION. 3 tablet 3    tamsulosin (FLOMAX) 0.4 mg Take 1 capsule (0.4 mg total) by mouth daily with dinner 90 capsule 3    valsartan (DIOVAN) 160 mg tablet Take 1 tablet (160 mg total) by mouth daily 90 tablet 1    semaglutide, 0.25 or 0.5 mg/dose, (Ozempic, 0.25 or 0.5 MG/DOSE,) 2 mg/3 mL injection pen 0.25 mg under the skin every 7 days for 4 doses (28 days), THEN 0.5 mg under the skin every 7 days (Patient not taking: Reported on 2025) 9 mL 0     No current facility-administered medications on file prior to visit.      Social History     Tobacco Use    Smoking status: Former     Current packs/day: 0.00     Average packs/day: 0.3 packs/day for 10.0 years (2.5 ttl pk-yrs)     Types: Cigarettes     Start date: 2007     Quit date:      Years since quittin.1     Passive exposure: Past    Smokeless tobacco: Never   Vaping Use    Vaping status: Never Used   Substance and Sexual Activity    Alcohol use: Not Currently     Alcohol/week: 7.0 standard drinks of alcohol     Types: 7 Glasses of wine per week     Comment: 3 beers daily    Drug use: Not Currently    Sexual activity: Not Currently     Partners: Male        Objective   Ht 5' 10\" (1.778 m)   Wt 114 kg (251 lb)   BMI 36.01 kg/m²      Physical Exam  Vascular status is unchanged from 2 weeks ago.    Derm the medial border of the left hallux has a small amount of hypertrophic tissue secondary to the surgery no signs of any infection and no fibrotic tissue.  Small amount of erythema is noted on the medial groove but no pain with palpation of the area.  All the hypertrophic tissue has resolved and the skin is coapted nicely to the nail groove.    Administrative Statements   I have spent a total time of 15 minutes in caring for this patient on the day of the visit/encounter including Risks and benefits of tx options, Instructions for " management, Patient and family education, Importance of tx compliance, Counseling / Coordination of care, Documenting in the medical record, and Obtaining or reviewing history  .

## 2025-02-25 ENCOUNTER — OFFICE VISIT (OUTPATIENT)
Dept: BARIATRICS | Facility: CLINIC | Age: 60
End: 2025-02-25
Payer: COMMERCIAL

## 2025-02-25 VITALS
TEMPERATURE: 98.5 F | BODY MASS INDEX: 36.16 KG/M2 | WEIGHT: 252.6 LBS | OXYGEN SATURATION: 98 % | SYSTOLIC BLOOD PRESSURE: 130 MMHG | RESPIRATION RATE: 20 BRPM | HEIGHT: 70 IN | DIASTOLIC BLOOD PRESSURE: 72 MMHG | HEART RATE: 79 BPM

## 2025-02-25 DIAGNOSIS — K74.02 ADVANCED HEPATIC FIBROSIS: ICD-10-CM

## 2025-02-25 DIAGNOSIS — G47.33 SEVERE OBSTRUCTIVE SLEEP APNEA: ICD-10-CM

## 2025-02-25 DIAGNOSIS — G47.33 OSA (OBSTRUCTIVE SLEEP APNEA): ICD-10-CM

## 2025-02-25 DIAGNOSIS — I10 BENIGN ESSENTIAL HTN: ICD-10-CM

## 2025-02-25 DIAGNOSIS — E66.01 SEVERE OBESITY (BMI 35.0-39.9) WITH COMORBIDITY (HCC): Primary | ICD-10-CM

## 2025-02-25 DIAGNOSIS — E11.69 TYPE 2 DIABETES MELLITUS WITH OTHER SPECIFIED COMPLICATION, WITHOUT LONG-TERM CURRENT USE OF INSULIN (HCC): ICD-10-CM

## 2025-02-25 PROCEDURE — 99204 OFFICE O/P NEW MOD 45 MIN: CPT | Performed by: PHYSICIAN ASSISTANT

## 2025-02-25 RX ORDER — TIRZEPATIDE 2.5 MG/.5ML
2.5 INJECTION, SOLUTION SUBCUTANEOUS WEEKLY
Qty: 2 ML | Refills: 0 | Status: SHIPPED | OUTPATIENT
Start: 2025-02-25 | End: 2025-03-25

## 2025-02-25 NOTE — PATIENT INSTRUCTIONS
Goals:    Food log (ie.) [email or web address],[email or web address],[email or web address],[employer].[email or web address],etc.   No sugary beverages. At least 64oz of water daily.  Increase physical activity by 10 minutes daily. Gradually increase physical activity to a goal of 5 days per week for 30 minutes of MODERATE intensity PLUS 2 days per week of FULL BODY resistance training  1500 Calories per day  5-10 servings of fruits and vegetables per day  25-35 grams of dietary fiber per day  110 grams of protein per day    Visit [email or web address].[email or web address] for further information/injection instructions. Please eat small frequent meals to help reduce nausea. Lemon water and saltine crackers may help with this. If you experience fever, nausea/vomiting, and pain radiating to your back this may be a sign of pancreatitis. Please have ER evaluation with this occurs.

## 2025-02-25 NOTE — ASSESSMENT & PLAN NOTE
-Discussed options of HealthyCORE-Intensive Lifestyle Intervention Program, Very Low Calorie Diet-VLCD, Conservative Program, Sav-En-Y Gastric Bypass, and Vertical Sleeve Gastrectomy and the role of weight loss medications.  -Initial weight loss goal of 5-10% weight loss for improved health  -Screening labs: reviewed CMP, Lipid panel, TSH, HgbA1c  -Patient is interested in pursuing conservative program  -Calorie goals, sample menu, portion size guidelines, and food logging reviewed with the patient.    -patient is also agreeable to meet with RD for menu planning  -Patient also has interest in AOMs. Discussed the importance of consistent dietary and lifestyle changes for long term success. Will plan to start Zepbound. SE profile reviewed  -denies personal hx pancreatitis or family hx MEN2  -medication agreement signed

## 2025-02-25 NOTE — ASSESSMENT & PLAN NOTE
Lab Results   Component Value Date    HGBA1C 6.3 01/06/2025   -On Metformin 500mg BID  -avoid/limit refined carbohydrates  -Ozempic denied as HgbA1c not high enough

## 2025-02-25 NOTE — ASSESSMENT & PLAN NOTE
-Severe  -Wearing CPAP  -weight loss encouraged  -Discussed recently FDA approval of Zepbound for moderate to severe DONG

## 2025-02-25 NOTE — PROGRESS NOTES
Assessment/Plan:    Severe obesity (BMI 35.0-39.9) with comorbidity (HCC)  -Discussed options of Healthy[facility]-Intensive Lifestyle Intervention Program, Very Low Calorie Diet-VLCD, Conservative Program, Sav-En-Y Gastric Bypass, and Vertical Sleeve Gastrectomy and the role of weight loss medications.  -Initial weight loss goal of 5-10% weight loss for improved health  -Screening labs: reviewed CMP, Lipid panel, TSH, HgbA1c  -Patient is interested in pursuing conservative program  -Calorie goals, sample menu, portion size guidelines, and food logging reviewed with the patient.    -patient is also agreeable to meet with RD for menu planning  -Patient also has interest in AOMs. Discussed the importance of consistent dietary and lifestyle changes for long term success. Will plan to start Zepbound. SE profile reviewed  -denies personal hx pancreatitis or family hx MEN2  -medication agreement signed    Type 2 diabetes mellitus with other specified complication (HCC)    Lab Results   Component Value Date    HGBA1C 6.3 01/06/2025   -On Metformin 500mg BID  -avoid/limit refined carbohydrates  -Ozempic denied as HgbA1c not high enough    DONG (obstructive sleep apnea)  -Severe  -Wearing CPAP  -weight loss encouraged  -Discussed recently FDA approval of Zepbound for moderate to severe DONG    Benign essential HTN  -On Norvasc and Diovan  -weight loss encouraged    Goals:    Food log (ie.) www.[employer].com,sparkpeople.com,loseit.com,Life360.com,etc.   No sugary beverages. At least 64oz of water daily.  Increase physical activity by 10 minutes daily. Gradually increase physical activity to a goal of 5 days per week for 30 minutes of MODERATE intensity PLUS 2 days per week of FULL BODY resistance training  1500 Calories per day  5-10 servings of fruits and vegetables per day  25-35 grams of dietary fiber per day  110 grams of protein per day    Follow up in approximately  4 months  with Non-Surgical Physician/Advanced  Practitioner.    Diagnoses and all orders for this visit:    Severe obesity (BMI 35.0-39.9) with comorbidity (HCC)  -     tirzepatide (Zepbound) 2.5 mg/0.5 mL auto-injector; Inject 0.5 mL (2.5 mg total) under the skin once a week for 28 days    Advanced hepatic fibrosis  Comments:  Following closely with GI  -weight loss encouraged  Orders:  -     Ambulatory Referral to Weight Management    Severe obstructive sleep apnea  -     tirzepatide (Zepbound) 2.5 mg/0.5 mL auto-injector; Inject 0.5 mL (2.5 mg total) under the skin once a week for 28 days    Type 2 diabetes mellitus with other specified complication, without long-term current use of insulin (HCC)    DONG (obstructive sleep apnea)    Benign essential HTN          Subjective:   Chief Complaint   Patient presents with    Consult       Patient ID: Jr Mendoza  is a 59 y.o. male with excess weight/obesity here to pursue weight managment.    Past Medical History:   Diagnosis Date    Allergic     Asthma     Cancer (HCC)     Cholangitis 05/16/2022    Closed extra-articular fracture of distal end of right tibia 01/28/2020    Closed nondisplaced oblique fracture of shaft of right fibula 01/28/2020    CPAP (continuous positive airway pressure) dependence     Diabetes mellitus (HCC)     Foot fracture     Hyperlipidemia     Hypertension     Sleep apnea          HPI:  Obesity/Excess Weight:  Severity: Severe  Onset:  later adult life  Modifiers: Exercise - attending gym, self created diets  Contributing factors:  more sedentary since retirements, ETOH intake  Associated symptoms: fatigue and decreased exercise capacity    Goals: 210 lbs    Weight History  Highest adult weight:: (Patient-Rptd) (P) 252 lbs  Lowest adult weight:: (Patient-Rptd) (P) 180 lbs  What is your goal weight?: (Patient-Rptd) (P) 210 lbs  How long have you struggled with maintaining a healthy weight?: (Patient-Rptd) (P) Adult  What weight loss attempts have you had in the past?: (Patient-Rptd) (P)  Exercise    Food Behaviors  Is there a trouble area of the day when these behaviors are more prominent?: (Patient-Rptd) (P) No    Food History  Provide the time that you typically eat and common foods you eat for each meal/snack: : (Patient-Rptd) (P) Breakfast, Lunch, Dinner  Provide the time and common foods you eat for breakfast:: (Patient-Rptd) (P) 1100  Provide the time and common foods you eat for lunch:: (Patient-Rptd) (P) 200  Provide the time and common foods you eat for : (Patient-Rptd) (P) 600  How often do you go out to eat or have take out?: (Patient-Rptd) (P) Once a week  Daily beverage intake, please select the beverages you consume daily and the amount(s):: (Patient-Rptd) (P) Water, Soda, Juice  How many cups of water?: (Patient-Rptd) (P) 4  How many cups of soda?: (Patient-Rptd) (P) 1  How many cups of juice?: (Patient-Rptd) (P) 1  Do you consume alcohol?: (Patient-Rptd) (P) No    Wakes: 9-10am    B (11am): eggs  + santos or sausage OR pancakes with santos OR cereal + banana  S:  L:  Turkey or ham/cheese or tuna sandwich + chips + pickle  S: skips  D: Salad with chicken + veggie + potato OR steak   S: frozen yogurt    Hydration: 64oz water, 16oz regular soda, 2% milk, 16oz cranberry juice  ETOH: denies, hx abuse  Sleep: wears CPAP, 9 hours  Occupation: retired  Smoking: denies    Physical Activity   How often do you exercise?: (Patient-Rptd) (P) Not recently    Sleep  How many hours of sleep are you getting per night?: (Patient-Rptd) (P) 9  How would you rate your quality of sleep?: (Patient-Rptd) (P) Good  Do you have a history of sleep apnea?: (Patient-Rptd) (P) Yes  Is this being treated?: (Patient-Rptd) (P) Yes    Family/Social History  Do you have a family history of obesity?: (Patient-Rptd) (P) Yes  Who in your family?: (Patient-Rptd) (P) Father    Gynecological History  If of childbearing age, are you using birth control?: (Patient-Rptd) (P) N/A  Menopausal status?: (Patient-Rptd) (P) N/A  "      Colonoscopy: scheduled  tomorrow    The following portions of the patient's history were reviewed and updated as appropriate: allergies, current medications, past family history, past medical history, past social history, past surgical history, and problem list.    Review of Systems   Constitutional:  Negative for chills and fever.   HENT:  Positive for congestion. Negative for sore throat.    Respiratory:  Negative for cough and shortness of breath.    Cardiovascular:  Negative for chest pain and palpitations.   Gastrointestinal:  Negative for abdominal pain, constipation, diarrhea, nausea and vomiting.        Denies GERD   Genitourinary:  Negative for dysuria.   Musculoskeletal:  Negative for arthralgias and back pain.   Skin:  Negative for rash.   Neurological:  Negative for dizziness and headaches.   Psychiatric/Behavioral:  Negative for dysphoric mood.        Objective:    /72 (BP Location: Right arm, Patient Position: Sitting, Cuff Size: Large)   Pulse 79   Temp 98.5 °F (36.9 °C) (Temporal)   Resp 20   Ht 5' 10\" (1.778 m)   Wt 115 kg (252 lb 9.6 oz)   SpO2 98%   BMI 36.24 kg/m²     Physical Exam  Vitals and nursing note reviewed.      Constitutional   General appearance: Abnormal.  well developed and obese.   Eyes No conjunctival pallor.   Ears, Nose, Mouth, and Throat Oral mucosa moist.   Pulmonary   Respiratory effort: No increased work of breathing or signs of respiratory distress.    Auscultation of lungs: scattered expiratory wheezes  Cardiovascular   Auscultation of heart: Normal rate and rhythm, normal S1 and S2, without murmurs.    Examination of extremities for edema and/or varicosities: Normal.  no edema.   Abdomen   Abdomen: Abnormal.  The abdomen was obese. Bowel sounds were normal. The abdomen was soft and nontender.   Musculoskeletal   Gait and station: Normal.    Psychiatric   Orientation to person, place and time: Normal.    Affect: appropriate   "

## 2025-02-26 ENCOUNTER — HOSPITAL ENCOUNTER (OUTPATIENT)
Dept: GASTROENTEROLOGY | Facility: HOSPITAL | Age: 60
Setting detail: OUTPATIENT SURGERY
Discharge: HOME | End: 2025-02-26
Attending: INTERNAL MEDICINE
Payer: COMMERCIAL

## 2025-02-26 ENCOUNTER — ANESTHESIA EVENT (OUTPATIENT)
Dept: GASTROENTEROLOGY | Facility: HOSPITAL | Age: 60
End: 2025-02-26
Payer: COMMERCIAL

## 2025-02-26 ENCOUNTER — ANESTHESIA (OUTPATIENT)
Dept: GASTROENTEROLOGY | Facility: HOSPITAL | Age: 60
End: 2025-02-26
Payer: COMMERCIAL

## 2025-02-26 VITALS
TEMPERATURE: 98.9 F | HEIGHT: 70 IN | HEART RATE: 81 BPM | OXYGEN SATURATION: 94 % | DIASTOLIC BLOOD PRESSURE: 83 MMHG | WEIGHT: 252 LBS | RESPIRATION RATE: 18 BRPM | BODY MASS INDEX: 36.08 KG/M2 | SYSTOLIC BLOOD PRESSURE: 143 MMHG

## 2025-02-26 DIAGNOSIS — Z86.0100 HISTORY OF COLON POLYPS: ICD-10-CM

## 2025-02-26 DIAGNOSIS — Z85.038 PERSONAL HISTORY OF COLON CANCER, STAGE I: ICD-10-CM

## 2025-02-26 LAB — GLUCOSE SERPL-MCNC: 153 MG/DL (ref 65–140)

## 2025-02-26 PROCEDURE — 82948 REAGENT STRIP/BLOOD GLUCOSE: CPT

## 2025-02-26 PROCEDURE — 45385 COLONOSCOPY W/LESION REMOVAL: CPT | Performed by: INTERNAL MEDICINE

## 2025-02-26 PROCEDURE — 88305 TISSUE EXAM BY PATHOLOGIST: CPT | Performed by: STUDENT IN AN ORGANIZED HEALTH CARE EDUCATION/TRAINING PROGRAM

## 2025-02-26 RX ORDER — SODIUM CHLORIDE, SODIUM LACTATE, POTASSIUM CHLORIDE, CALCIUM CHLORIDE 600; 310; 30; 20 MG/100ML; MG/100ML; MG/100ML; MG/100ML
INJECTION, SOLUTION INTRAVENOUS CONTINUOUS PRN
Status: DISCONTINUED | OUTPATIENT
Start: 2025-02-26 | End: 2025-02-26

## 2025-02-26 RX ORDER — PROPOFOL 10 MG/ML
INJECTION, EMULSION INTRAVENOUS AS NEEDED
Status: DISCONTINUED | OUTPATIENT
Start: 2025-02-26 | End: 2025-02-26

## 2025-02-26 RX ORDER — PROPOFOL 10 MG/ML
INJECTION, EMULSION INTRAVENOUS CONTINUOUS PRN
Status: DISCONTINUED | OUTPATIENT
Start: 2025-02-26 | End: 2025-02-26

## 2025-02-26 RX ORDER — LIDOCAINE HYDROCHLORIDE 20 MG/ML
INJECTION, SOLUTION EPIDURAL; INFILTRATION; INTRACAUDAL; PERINEURAL AS NEEDED
Status: DISCONTINUED | OUTPATIENT
Start: 2025-02-26 | End: 2025-02-26

## 2025-02-26 RX ADMIN — PROPOFOL 100 MCG/KG/MIN: 10 INJECTION, EMULSION INTRAVENOUS at 12:16

## 2025-02-26 RX ADMIN — PROPOFOL 100 MG: 10 INJECTION, EMULSION INTRAVENOUS at 12:12

## 2025-02-26 RX ADMIN — SODIUM CHLORIDE, SODIUM LACTATE, POTASSIUM CHLORIDE, AND CALCIUM CHLORIDE: .6; .31; .03; .02 INJECTION, SOLUTION INTRAVENOUS at 11:46

## 2025-02-26 RX ADMIN — LIDOCAINE HYDROCHLORIDE 100 MG: 20 INJECTION, SOLUTION EPIDURAL; INFILTRATION; INTRACAUDAL at 12:12

## 2025-02-26 NOTE — ANESTHESIA PREPROCEDURE EVALUATION
Procedure:  COLONOSCOPY    Relevant Problems   CARDIO   (+) Benign essential HTN      ENDO   (+) Type 2 diabetes mellitus with other specified complication (HCC)      GI/HEPATIC   (+) Malignant neoplasm of descending colon (HCC)      /RENAL   (+) BPH with obstruction/lower urinary tract symptoms      HEMATOLOGY   (+) Iron deficiency anemia due to chronic blood loss      PULMONARY   (+) DONG (obstructive sleep apnea)      NPO 2/25    Physical Exam    Airway    Mallampati score: I         Dental       Cardiovascular      Pulmonary      Other Findings        Anesthesia Plan  ASA Score- 3     Anesthesia Type- IV sedation with anesthesia with ASA Monitors.         Additional Monitors:     Airway Plan:            Plan Factors-Exercise tolerance (METS): >4 METS.    Chart reviewed. EKG reviewed.  Existing labs reviewed. Patient summary reviewed.          Obstructive sleep apnea risk education given perioperatively.        Induction- intravenous.    Postoperative Plan-     Perioperative Resuscitation Plan - Level 1 - Full Code.       Informed Consent- Anesthetic plan and risks discussed with patient and spouse.  I personally reviewed this patient with the CRNA. Discussed and agreed on the Anesthesia Plan with the CRNA..      NPO Status:  Vitals Value Taken Time   Date of last liquid 02/25/25 02/26/25 1112   Time of last liquid 2300 02/26/25 1112   Date of last solid 02/24/25 02/26/25 1112   Time of last solid 1900 02/26/25 1112

## 2025-02-26 NOTE — DISCHARGE INSTR - AVS FIRST PAGE
Next colonoscopy, please be sure to split the dose of the preparation.  The second dose should always be completed about 3 hours prior to arrival.  You may also need to take a little bit of extra MiraLAX for a couple days preceding the colonoscopy.  Would make sure the repeat colonoscopy sometime in the next 9 to 12 months.  Follow-up with hematology oncology  Follow-up with colorectal surgery  Follow-up with hepatology, Dolores Farris  Follow-up with me in July.    -Please call the Gastroenterology office at [phone] for biopsy results if you do  not hear from our office in 14 days.  -Follow-up with primary care doctor as previously scheduled  -Please call gastroenterology physician on call or go to the emergency department if you develop abdominal pain, rectal bleeding greater than 1 tbsp, black stools, fever greater than 100.5, persistent nausea or vomiting.  Gastroenterology office phone number is [phone].

## 2025-02-26 NOTE — INTERVAL H&P NOTE
H&P reviewed. After examining the patient I find no changes in the patients condition since the H&P had been written.  Patient saw hepatology.  Saw weight management yesterday and they did suggest a GLP1 agent, trying to get approval.  He was seen by anesthesia and is cleared for colonoscopy      Vitals:    02/26/25 1111   BP: 147/70   Pulse: 84   Resp: 16   Temp: 98.2 °F (36.8 °C)   SpO2: 93%

## 2025-02-26 NOTE — ANESTHESIA POSTPROCEDURE EVALUATION
Post-Op Assessment Note    Last Filed PACU Vitals:  Vitals Value Taken Time   Temp     Pulse 71    /63    Resp 12    SpO2 97

## 2025-02-28 ENCOUNTER — RESULTS FOLLOW-UP (OUTPATIENT)
Dept: GASTROENTEROLOGY | Facility: CLINIC | Age: 60
End: 2025-02-28

## 2025-02-28 ENCOUNTER — TELEPHONE (OUTPATIENT)
Dept: GASTROENTEROLOGY | Facility: CLINIC | Age: 60
End: 2025-02-28

## 2025-02-28 ENCOUNTER — TELEPHONE (OUTPATIENT)
Age: 60
End: 2025-02-28

## 2025-02-28 PROCEDURE — 88305 TISSUE EXAM BY PATHOLOGIST: CPT | Performed by: STUDENT IN AN ORGANIZED HEALTH CARE EDUCATION/TRAINING PROGRAM

## 2025-02-28 NOTE — TELEPHONE ENCOUNTER
Patient of Queta    Received call from patients wife regarding Zepbound PA. She reiterated that patient has DONG.  Please initiate    Reason for call:   [x] Prior Auth  [] Other:     Caller:  [x] Patient  [] Pharmacy  Name:   Address:   Callback Number:     Medication: Zepbound 2.5mg    Dose/Frequency: Inject 0.5 mL (2.5 mg total) under the skin once a week for 28 days     Quantity: 2mL    Ordering Provider:   [] PCP/Provider -   [x] Speciality/Provider - Queta David PA-C

## 2025-02-28 NOTE — TELEPHONE ENCOUNTER
FOLLOW UP: 07/01   REASON FOR CONVERSATION: Zepbound PA  SYMPTOMS: N/A     OTHER: Pt's wife reports [facility] informed pt a PA for Zepbound is required. Medication was ordered by Weight Management office in [place].  Office information and phone number provided.    [facility] [address] [place], [address], [phone]    DISPOSITION: information only call.

## 2025-03-10 ENCOUNTER — TELEPHONE (OUTPATIENT)
Dept: BARIATRICS | Facility: CLINIC | Age: 60
End: 2025-03-10

## 2025-03-10 NOTE — TELEPHONE ENCOUNTER
NELIDAM to return call to reschedule appt with Laurie on 3/18/25 as she will be out of the office.

## 2025-03-10 NOTE — PROGRESS NOTES
Name: Jr Mendoza      : 1965      MRN: [id number]  Encounter Provider: Ann Perez MD  Encounter Date: 3/11/2025   Encounter department: [facility] [place]  :  Assessment & Plan  Malignant neoplasm of descending colon (HCC)  Patient had a pT2 N0 stage I descending colon cancer status post laparoscopic left hemicolectomy  He is doing well and has no residual symptoms  He underwent surveillance colonoscopy with Dr. Haley and is recommended to repeat in 7 months due to inadequate prep  We discussed continued surveillance with every 6 month office visit  He also is due for CEA and CT chest abdomen and pelvis in   He will follow-up with me in 6 months  Orders:    CEA; Future    CT chest abdomen pelvis w contrast; Future           History of Present Illness   HPI    Jr Mendoza is a 59 y.o. male who presents for a follow up evaluation for Stage I descending colon cancer, pT2N0. He was last seen in the office on 24.    He is status post laparoscopic hand-assisted sigmoidectomy on 24.    The patient underwent recent colonoscopy on 25 with Dr. Satish Haley.    IMPRESSION:  - Normal terminal ileum  - There is a large amount of debris and liquid material scattered throughout the entire colon most noted in the cecum, ascending colon, and transverse colon.  This was aggressively washed and suctioned.  Despite aggressive washing and suctioning the final Dale bowel prep score was 4.  Of note patient did not split his preparation.  - 4 mm sessile polyp distal ascending colon status post cold snare polypectomy.  Not recovered  - 4 mm sessile polyp mid transverse colon status post cold snare polypectomy  - 4 mm polyp at 35 cm status post cold snare polypectomy  - 2 polyps of the rectum measuring 5 to 8 mm status post cold snare polypectomy  - Healthy end-to-end colocolonic anastomosis in the rectosigmoid 24 cm from the anal verge  -  "Scattered diverticulosis of mild severity in the sigmoid colon  - Retroflexion revealed small external hemorrhoids.  Also noted hypertrophied anal papilla    Final Diagnosis:  A. Large Intestine, Transverse Colon, mid transverse polyp, biopsy:  -   Tubular adenoma.  -   Negative for high grade dysplasia and carcinoma.   B. Colon, polyp 35 cm, biopsy:  -   Hyperplastic polyp.   C. Rectum, polyps, biopsy:  -   Fragments of hyperplastic polyp.     Last CT abdomen pelvis with contrast was on 7/9/24.     Lab Results   Component Value Date    CEA 2.5 06/13/2024     Lab Results   Component Value Date    WBC 7.42 09/16/2024    HGB 14.2 09/16/2024    HCT 46.3 09/16/2024    MCV 90 09/16/2024     09/16/2024     Lab Results   Component Value Date    SODIUM 135 09/16/2024    K 4.4 09/16/2024    CL 99 09/16/2024    CO2 29 09/16/2024    AGAP 7 09/16/2024    BUN 8 09/16/2024    CREATININE 0.63 09/16/2024    GLUC 185 (H) 07/12/2024    GLUF 136 (H) 09/16/2024    CALCIUM 9.6 09/16/2024    AST 16 09/16/2024    ALT 12 09/16/2024    ALKPHOS 83 09/16/2024    TP 7.9 09/16/2024    TBILI 0.59 09/16/2024    EGFR 106 09/16/2024     Review of Systems   Constitutional:  Negative for chills and fever.   HENT:  Negative for ear pain and sore throat.    Eyes:  Negative for pain and visual disturbance.   Respiratory:  Negative for cough and shortness of breath.    Cardiovascular:  Negative for chest pain and palpitations.   Gastrointestinal:  Negative for abdominal pain and vomiting.   Genitourinary:  Negative for dysuria and hematuria.   Musculoskeletal:  Negative for arthralgias and back pain.   Skin:  Negative for color change and rash.   Neurological:  Negative for seizures and syncope.   All other systems reviewed and are negative.      Objective   /64   Ht 5' 10\" (1.778 m)   Wt 114 kg (252 lb)   BMI 36.16 kg/m²      Physical Exam  Vitals and nursing note reviewed.   Constitutional:       General: He is not in acute distress.    "  Appearance: He is well-developed.   HENT:      Head: Normocephalic and atraumatic.   Eyes:      Conjunctiva/sclera: Conjunctivae normal.   Cardiovascular:      Rate and Rhythm: Normal rate and regular rhythm.      Heart sounds: No murmur heard.  Pulmonary:      Effort: Pulmonary effort is normal. No respiratory distress.      Breath sounds: Normal breath sounds.   Abdominal:      Palpations: Abdomen is soft.      Tenderness: There is no abdominal tenderness.   Musculoskeletal:         General: No swelling.      Cervical back: Neck supple.   Skin:     General: Skin is warm and dry.      Capillary Refill: Capillary refill takes less than 2 seconds.   Neurological:      Mental Status: He is alert.   Psychiatric:         Mood and Affect: Mood normal.       Administrative Statements   I have spent a total time of 18 minutes in caring for this patient on the day of the visit/encounter including Diagnostic results, Prognosis, Risks and benefits of tx options, Instructions for management, Patient and family education, Importance of tx compliance, Risk factor reductions, Impressions, Counseling / Coordination of care, Documenting in the medical record, Reviewing/placing orders in the medical record (including tests, medications, and/or procedures), Obtaining or reviewing history  , and Communicating with other healthcare professionals .

## 2025-03-11 ENCOUNTER — OFFICE VISIT (OUTPATIENT)
Age: 60
End: 2025-03-11
Payer: COMMERCIAL

## 2025-03-11 VITALS
BODY MASS INDEX: 36.08 KG/M2 | HEIGHT: 70 IN | SYSTOLIC BLOOD PRESSURE: 120 MMHG | WEIGHT: 252 LBS | DIASTOLIC BLOOD PRESSURE: 64 MMHG

## 2025-03-11 DIAGNOSIS — C18.6 MALIGNANT NEOPLASM OF DESCENDING COLON (HCC): Primary | ICD-10-CM

## 2025-03-11 PROCEDURE — 99212 OFFICE O/P EST SF 10 MIN: CPT | Performed by: SURGERY

## 2025-03-11 NOTE — ASSESSMENT & PLAN NOTE
Patient had a pT2 N0 stage I descending colon cancer status post laparoscopic left hemicolectomy  He is doing well and has no residual symptoms  He underwent surveillance colonoscopy with Dr. Haley and is recommended to repeat in 7 months due to inadequate prep  We discussed continued surveillance with every 6 month office visit  He also is due for CEA and CT chest abdomen and pelvis in June  He will follow-up with me in 6 months  Orders:    CEA; Future    CT chest abdomen pelvis w contrast; Future

## 2025-03-28 DIAGNOSIS — E66.01 SEVERE OBESITY (BMI 35.0-39.9) WITH COMORBIDITY (HCC): ICD-10-CM

## 2025-03-28 DIAGNOSIS — G47.33 SEVERE OBSTRUCTIVE SLEEP APNEA: ICD-10-CM

## 2025-04-01 DIAGNOSIS — E66.01 SEVERE OBESITY (BMI 35.0-39.9) WITH COMORBIDITY (HCC): ICD-10-CM

## 2025-04-01 DIAGNOSIS — I10 BENIGN ESSENTIAL HTN: ICD-10-CM

## 2025-04-01 DIAGNOSIS — E11.69 TYPE 2 DIABETES MELLITUS WITH OTHER SPECIFIED COMPLICATION, WITHOUT LONG-TERM CURRENT USE OF INSULIN (HCC): ICD-10-CM

## 2025-04-01 DIAGNOSIS — G47.33 SEVERE OBSTRUCTIVE SLEEP APNEA: Primary | ICD-10-CM

## 2025-04-01 RX ORDER — TIRZEPATIDE 2.5 MG/.5ML
2.5 INJECTION, SOLUTION SUBCUTANEOUS WEEKLY
OUTPATIENT
Start: 2025-04-01 | End: 2025-04-29

## 2025-04-01 RX ORDER — TIRZEPATIDE 5 MG/.5ML
5 INJECTION, SOLUTION SUBCUTANEOUS WEEKLY
Qty: 2 ML | Refills: 2 | Status: SHIPPED | OUTPATIENT
Start: 2025-04-01

## 2025-04-15 ENCOUNTER — OFFICE VISIT (OUTPATIENT)
Dept: FAMILY MEDICINE CLINIC | Facility: CLINIC | Age: 60
End: 2025-04-15
Payer: COMMERCIAL

## 2025-04-15 VITALS
OXYGEN SATURATION: 96 % | WEIGHT: 250 LBS | BODY MASS INDEX: 35.79 KG/M2 | HEIGHT: 70 IN | DIASTOLIC BLOOD PRESSURE: 70 MMHG | SYSTOLIC BLOOD PRESSURE: 116 MMHG | HEART RATE: 79 BPM

## 2025-04-15 DIAGNOSIS — J45.40 MODERATE PERSISTENT ASTHMA WITHOUT COMPLICATION: ICD-10-CM

## 2025-04-15 DIAGNOSIS — G62.9 NEUROPATHY: ICD-10-CM

## 2025-04-15 DIAGNOSIS — I10 BENIGN ESSENTIAL HTN: ICD-10-CM

## 2025-04-15 DIAGNOSIS — Z00.00 ANNUAL PHYSICAL EXAM: Primary | ICD-10-CM

## 2025-04-15 DIAGNOSIS — E11.69 TYPE 2 DIABETES MELLITUS WITH OTHER SPECIFIED COMPLICATION, WITHOUT LONG-TERM CURRENT USE OF INSULIN (HCC): ICD-10-CM

## 2025-04-15 DIAGNOSIS — G47.33 OSA (OBSTRUCTIVE SLEEP APNEA): ICD-10-CM

## 2025-04-15 DIAGNOSIS — F10.10 ALCOHOL USE DISORDER, MILD, ABUSE: ICD-10-CM

## 2025-04-15 LAB — SL AMB POCT HEMOGLOBIN AIC: 6.4 (ref ?–6.5)

## 2025-04-15 PROCEDURE — 83036 HEMOGLOBIN GLYCOSYLATED A1C: CPT | Performed by: STUDENT IN AN ORGANIZED HEALTH CARE EDUCATION/TRAINING PROGRAM

## 2025-04-15 PROCEDURE — 99396 PREV VISIT EST AGE 40-64: CPT | Performed by: STUDENT IN AN ORGANIZED HEALTH CARE EDUCATION/TRAINING PROGRAM

## 2025-04-15 RX ORDER — MOMETASONE FUROATE AND FORMOTEROL FUMARATE DIHYDRATE 200; 5 UG/1; UG/1
2 AEROSOL RESPIRATORY (INHALATION) 2 TIMES DAILY
Qty: 13 G | Refills: 5 | Status: SHIPPED | OUTPATIENT
Start: 2025-04-15

## 2025-04-15 NOTE — ASSESSMENT & PLAN NOTE
Vitals reviewed including trends  BP remains controlled at this time  Patient reports no side effects from current medication regiment  Plan to continue current medication and dosing   Discussed lifestyle modifications that could be beneficial including low salt diet, increase physical activity and weight lose   Continue with interval labs as indicated

## 2025-04-15 NOTE — ASSESSMENT & PLAN NOTE
Lab Results   Component Value Date    HGBA1C 6.4 04/15/2025   -self D/c metfomin us now on 500qd  -avoid/limit refined carbohydrates  - recheck in 3months         Orders:    POCT hemoglobin A1c

## 2025-05-02 ENCOUNTER — PROCEDURE VISIT (OUTPATIENT)
Dept: PODIATRY | Facility: CLINIC | Age: 60
End: 2025-05-02
Payer: COMMERCIAL

## 2025-05-02 VITALS — WEIGHT: 250 LBS | HEIGHT: 70 IN | BODY MASS INDEX: 35.79 KG/M2

## 2025-05-02 DIAGNOSIS — L60.0 INGROWN LEFT BIG TOENAIL: Primary | ICD-10-CM

## 2025-05-02 DIAGNOSIS — E11.42 CONTROLLED TYPE 2 DIABETES MELLITUS WITH DIABETIC POLYNEUROPATHY, WITHOUT LONG-TERM CURRENT USE OF INSULIN (HCC): ICD-10-CM

## 2025-05-02 DIAGNOSIS — M79.675 PAIN OF TOE OF LEFT FOOT: ICD-10-CM

## 2025-05-02 PROCEDURE — 99212 OFFICE O/P EST SF 10 MIN: CPT | Performed by: PODIATRIST

## 2025-05-04 NOTE — PROGRESS NOTES
Name: Jr Mendoza      : 1965      MRN: [id number]  Encounter Provider: Ryan Nowak DPM  Encounter Date: 2025   Encounter department: [facility] [place]  :  Assessment & Plan  Ingrown left big toenail       A formal timeout including patient identification, laterality and existing allergies using [facility] protocol was conducted. I recommend a partial temporary nail avulsion and informed consent obtained.     After cleansing skin with alcohol sprayed etoh chloride on the on the lateral side of the left hallux nail.  I carefully did a partially avulsion of the offending nail border with a small straight nail nipper and flushed with sterile saline. I dressed the site with bacitracin ointment and a DSD to be left intact x 24 hours. The patient may then remove the dressing, shower, and redress with antibiotic ointment and a band-aid daily.    Controlled type 2 diabetes mellitus with diabetic polyneuropathy, without long-term current use of insulin ([facility])    Lab Results   Component Value Date    HGBA1C 6.4 04/15/2025            Pain of toe of left foot         Patient is to continue to apply a form of antibiotic ointment until his procedure date.    Explained to the patient that a permanent nail avulsion is recommended on the lateral border this time.  The last procedure on the left hallux which was performed on 2025 was done on the medial border only since he was having no problems with the lateral border at that time.    Return in about 2 weeks (around 2025).     History of Present Illness   HPI  Jr Mendoza is a 59 y.o. male who presents with chief complaint of a painful ingrown nail on the outside part of his left big toe.  Patient relates that the inside portion of the nail is doing well and he has had no problems since his procedure back on 2025.  He is a diabetic whose A1c is 6.4 performed on 4/15/2025.  History obtained from: patient    Review of  Systems  Medical History Reviewed by provider this encounter:     .  Current Outpatient Medications on File Prior to Visit   Medication Sig Dispense Refill    albuterol (Ventolin HFA) 90 mcg/act inhaler Inhale 2 puffs every 6 (six) hours as needed for wheezing or shortness of breath 18 g 3    amLODIPine (NORVASC) 5 mg tablet Take 1 tablet (5 mg total) by mouth daily 90 tablet 0    atorvastatin (LIPITOR) 20 mg tablet Take 1 tablet (20 mg total) by mouth daily 90 tablet 0    azelastine (ASTELIN) 0.1 % nasal spray 1 spray into each nostril 2 (two) times a day Use in each nostril as directed 30 mL 5    cyanocobalamin (VITAMIN B-12) 100 mcg tablet TAKE 0.5 TABLETS (50 MCG TOTAL) BY MOUTH DAILY 90 tablet 1    fluticasone (FLONASE) 50 mcg/act nasal spray SPRAY 1 SPRAY INTO EACH NOSTRIL EVERY DAY 48 mL 1    metFORMIN (GLUCOPHAGE) 500 mg tablet Take 1 tablet (500 mg total) by mouth 2 (two) times a day with meals 180 tablet 1    mometasone-formoterol (Dulera) 200-5 MCG/ACT inhaler Inhale 2 puffs 2 (two) times a day Rinse mouth after use. 13 g 5    sildenafil (VIAGRA) 25 MG tablet TAKE 1 TABLET BY MOUTH DAILY AS NEEDED FOR ERECTILE DYSFUNCTION. 3 tablet 3    tamsulosin (FLOMAX) 0.4 mg Take 1 capsule (0.4 mg total) by mouth daily with dinner 90 capsule 3    tirzepatide (Zepbound) 5 mg/0.5 mL auto-injector Inject 0.5 mL (5 mg total) under the skin once a week 2 mL 2    valsartan (DIOVAN) 160 mg tablet Take 1 tablet (160 mg total) by mouth daily 90 tablet 1    mupirocin (BACTROBAN) 2 % cream Apply topically 3 (three) times a day (Patient not taking: Reported on 3/11/2025) 30 g 0     No current facility-administered medications on file prior to visit.      Social History     Tobacco Use    Smoking status: Former     Current packs/day: 0.00     Average packs/day: 0.3 packs/day for 10.0 years (2.5 ttl pk-yrs)     Types: Cigarettes     Start date: 2007     Quit date: 2017     Years since quittin.3     Passive exposure: Past  "   Smokeless tobacco: Never   Vaping Use    Vaping status: Never Used   Substance and Sexual Activity    Alcohol use: Not Currently     Alcohol/week: 7.0 standard drinks of alcohol     Types: 7 Glasses of wine per week     Comment: 3 beers daily    Drug use: Not Currently    Sexual activity: Not Currently     Partners: Male        Objective   Ht 5' 10\" (1.778 m)   Wt 113 kg (250 lb)   BMI 35.87 kg/m²      Physical Exam  Vascular status is 1/4 DP PT negative digital hair, normal distal cooling, immediate capillary refill bilaterally.  Capillary refill is approximately 2 to 4 seconds.    Derm the left hallux nail is incurvated along the lateral border with erythema present.  There is blanching to the distal aspect of the lateral nail groove and hypertrophic tissue present within the groove.  No signs of any infection and no increase in warmth is noted.  The distal portion of the nail was digging into the most distal part of the nail groove and the nail itself goes further down beneath the level of the skin.    Administrative Statements   I have spent a total time of 15 minutes in caring for this patient on the day of the visit/encounter including Risks and benefits of tx options, Instructions for management, Patient and family education, Importance of tx compliance, Risk factor reductions, Counseling / Coordination of care, and Documenting in the medical record.  "

## 2025-05-13 ENCOUNTER — OFFICE VISIT (OUTPATIENT)
Dept: PODIATRY | Facility: CLINIC | Age: 60
End: 2025-05-13
Payer: COMMERCIAL

## 2025-05-13 VITALS — BODY MASS INDEX: 35.79 KG/M2 | HEIGHT: 70 IN | WEIGHT: 250 LBS

## 2025-05-13 DIAGNOSIS — E11.42 CONTROLLED TYPE 2 DIABETES MELLITUS WITH DIABETIC POLYNEUROPATHY, WITHOUT LONG-TERM CURRENT USE OF INSULIN (HCC): ICD-10-CM

## 2025-05-13 DIAGNOSIS — L60.0 INGROWN LEFT BIG TOENAIL: Primary | ICD-10-CM

## 2025-05-13 DIAGNOSIS — M79.675 PAIN OF TOE OF LEFT FOOT: ICD-10-CM

## 2025-05-13 PROCEDURE — 11750 EXCISION NAIL&NAIL MATRIX: CPT | Performed by: PODIATRIST

## 2025-05-13 PROCEDURE — RECHECK: Performed by: PODIATRIST

## 2025-05-13 NOTE — PROGRESS NOTES
"Name: Jr Mendoza      : 1965      MRN: [id number]  Encounter Provider: Ryan Nowak DPM  Encounter Date: 2025   Encounter department: [facility] PODIATRY [place]  :  Assessment & Plan  Ingrown left big toenail         Controlled type 2 diabetes mellitus with diabetic polyneuropathy, without long-term current use of insulin (HCC)    Lab Results   Component Value Date    HGBA1C 6.4 04/15/2025            Pain of toe of left foot         Nail removal    Date/Time: 2025 3:00 PM    Performed by: Ryan Nowak DPM  Authorized by: Ryan Nowak DPM    Patient location:  Clinic  Indications / Diagnosis:  Ingrown nail    Universal Protocol:  procedure performed by consultantConsent: Verbal consent obtained.  Risks and benefits: risks, benefits and alternatives were discussed  Consent given by: patient  Time out: Immediately prior to procedure a \"time out\" was called to verify the correct patient, procedure, equipment, support staff and site/side marked as required.  Patient understanding: patient states understanding of the procedure being performed  Patient consent: the patient's understanding of the procedure matches consent given  Patient identity confirmed: verbally with patient    Location:     Foot:  L big toe  Pre-procedure details:     Skin preparation:  Betadine    Preparation: Patient was prepped and draped in the usual sterile fashion    Anesthesia (see MAR for exact dosages):     Anesthesia method:  Nerve block    Block needle gauge:  25 G    Block anesthetic:  Lidocaine 2% w/o epi    Block technique:  Digital Block    Block outcome:  Anesthesia achieved  Nail Removal:     Nail removed:  Partial    Nail removed location: medial and lateral borders.    Nail bed sutured: no    Ingrown nail:     Wedge excision of skin: no      Nail matrix removed or ablated:  Partial  Post-procedure details:     Dressing:  4x4 sterile gauze, antibiotic ointment and gauze roll    Patient " tolerance of procedure:  Tolerated well, no immediate complications  Comments:      Discussion with patient was completed today regarding diagnosis and potential etiologies as well as treatment options for this ingrown nail diagnosis.  Discussed how to avoid recurrence and possible treatment options if recurrence does occur.    Antibiotic ointment applied to border with bandage dressing  Pt instructed to keep dressing intact for 24 hours.  After this time use triple antibiotic ointment to the area and a dry dressing changed daily  Return to clinic in about 2 weeks for reevaluation.  If notice any redness, swelling, drainage, or excessive pain or signs of infection to notify the office sooner.  Procedure completed without incident.  Do not soak foot.    Procedure in detail: Once the toe was anesthetized, the area was scrubbed and prepped with sterile technique.  A Tourni-Cot was used to the level on the toe.  A hemostat was used to lift up the involved border of the great toe.  Care was taken to protect the underlying nail bed.  An English anvil was used to cut back corner to the base of the nail.  A hemostat was then used to remove the nail that was ingrown.  This was then checked that the entire ingrown portion was removed. A currette was used to remove the nail matrix from the base of the nail at the proximal corner, three applications of 90% phenol were applied to the border with cotton swabs with alcohol wash inbetween.   Hemostasis was achieved and dressings were applied.       Return in about 2 weeks (around 5/27/2025).     History of Present Illness   HPI  Jr Mendoza is a 59 y.o. male who presents with chief complaint of a painful ingrown nail on his left big toe.  He is a diabetic.  Last sugar A1c was 6.4 performed on 4/15/2025.    Review of Systems        Past Medical History   Past Medical History:   Diagnosis Date    Allergic     Asthma     Cancer (HCC)     Cholangitis 05/16/2022    Closed  extra-articular fracture of distal end of right tibia 01/28/2020    Closed nondisplaced oblique fracture of shaft of right fibula 01/28/2020    CPAP (continuous positive airway pressure) dependence     Diabetes mellitus (HCC)     Foot fracture     Hyperlipidemia     Hypertension     Sleep apnea      Past Surgical History:   Procedure Laterality Date    CHOLECYSTECTOMY      CHOLECYSTECTOMY LAPAROSCOPIC N/A 05/18/2022    Procedure: CHOLECYSTECTOMY LAPAROSCOPIC;  Surgeon: Zachary Kern MD;  Location: [facility];  Service: General    COLONOSCOPY      HEMICOLOECTOMY W/ ANASTOMOSIS N/A 06/26/2024    Procedure: LAPARSCOPIC HAND ASSISTED SIGMOIDECTOMY, LAPARSCOPIC MOBILIZATION OF SPLENIC FLEXURE, INTRAOP SPY ANGIOOGRAPHY , FLEXIBLE SIGMOIDOSCOPY;  Surgeon: Ann Perez MD;  Location: [facility];  Service: Colorectal     Family History   Problem Relation Age of Onset    ALS Mother     Obesity Father     Pneumonia Father     No Known Problems Sister       reports that he quit smoking about 8 years ago. His smoking use included cigarettes. He started smoking about 18 years ago. He has a 2.5 pack-year smoking history. He has been exposed to tobacco smoke. He has never used smokeless tobacco. He reports that he does not currently use alcohol after a past usage of about 7.0 standard drinks of alcohol per week. He reports that he does not currently use drugs.  Current Outpatient Medications   Medication Instructions    albuterol (Ventolin HFA) 90 mcg/act inhaler 2 puffs, Inhalation, Every 6 hours PRN    amLODIPine (NORVASC) 5 mg, Oral, Daily    atorvastatin (LIPITOR) 20 mg, Oral, Daily    azelastine (ASTELIN) 0.1 % nasal spray 1 spray, Nasal, 2 times daily, Use in each nostril as directed    cyanocobalamin (VITAMIN B-12) 100 mcg tablet TAKE 0.5 TABLETS (50 MCG TOTAL) BY MOUTH DAILY    fluticasone (FLONASE) 50 mcg/act nasal spray SPRAY 1 SPRAY INTO EACH NOSTRIL EVERY DAY    metFORMIN (GLUCOPHAGE) 500 mg, Oral, 2  times daily with meals    mometasone-formoterol (Dulera) 200-5 MCG/ACT inhaler 2 puffs, Inhalation, 2 times daily, Rinse mouth after use.    mupirocin (BACTROBAN) 2 % cream Topical, 3 times daily    sildenafil (VIAGRA) 25 MG tablet TAKE 1 TABLET BY MOUTH DAILY AS NEEDED FOR ERECTILE DYSFUNCTION.    tamsulosin (FLOMAX) 0.4 mg, Oral, Daily with dinner    valsartan (DIOVAN) 160 mg, Oral, Daily    Zepbound 5 mg, Subcutaneous, Weekly     Allergies   Allergen Reactions    Pollen Extract Allergic Rhinitis      Current Outpatient Medications on File Prior to Visit   Medication Sig Dispense Refill    albuterol (Ventolin HFA) 90 mcg/act inhaler Inhale 2 puffs every 6 (six) hours as needed for wheezing or shortness of breath 18 g 3    amLODIPine (NORVASC) 5 mg tablet Take 1 tablet (5 mg total) by mouth daily 90 tablet 0    atorvastatin (LIPITOR) 20 mg tablet Take 1 tablet (20 mg total) by mouth daily 90 tablet 0    azelastine (ASTELIN) 0.1 % nasal spray 1 spray into each nostril 2 (two) times a day Use in each nostril as directed 30 mL 5    cyanocobalamin (VITAMIN B-12) 100 mcg tablet TAKE 0.5 TABLETS (50 MCG TOTAL) BY MOUTH DAILY 90 tablet 1    fluticasone (FLONASE) 50 mcg/act nasal spray SPRAY 1 SPRAY INTO EACH NOSTRIL EVERY DAY 48 mL 1    metFORMIN (GLUCOPHAGE) 500 mg tablet Take 1 tablet (500 mg total) by mouth 2 (two) times a day with meals 180 tablet 1    mometasone-formoterol (Dulera) 200-5 MCG/ACT inhaler Inhale 2 puffs 2 (two) times a day Rinse mouth after use. 13 g 5    sildenafil (VIAGRA) 25 MG tablet TAKE 1 TABLET BY MOUTH DAILY AS NEEDED FOR ERECTILE DYSFUNCTION. 3 tablet 3    tamsulosin (FLOMAX) 0.4 mg Take 1 capsule (0.4 mg total) by mouth daily with dinner 90 capsule 3    tirzepatide (Zepbound) 5 mg/0.5 mL auto-injector Inject 0.5 mL (5 mg total) under the skin once a week 2 mL 2    valsartan (DIOVAN) 160 mg tablet Take 1 tablet (160 mg total) by mouth daily 90 tablet 1    mupirocin (BACTROBAN) 2 % cream Apply  "topically 3 (three) times a day (Patient not taking: Reported on 3/11/2025) 30 g 0     No current facility-administered medications on file prior to visit.         Objective   Ht 5' 10\" (1.778 m)   Wt 113 kg (250 lb)   BMI 35.87 kg/m²      Physical Exam  Vascular status is unchanged from his visit 2/21/2025.    Derm the left hallux nail is incurvated on both borders with the medial and lateral sides having no signs of any infection.  The lateral side is worse than the medial.  There is hypertrophic tissue present in the nail grooves no erythema is noted there is pain upon palpation of the border.  There is proud flesh noted on the distal aspect of the lateral border and lateral bulge is also seen.  .  "

## 2025-05-14 DIAGNOSIS — R09.82 PND (POST-NASAL DRIP): ICD-10-CM

## 2025-05-14 RX ORDER — FLUTICASONE PROPIONATE 50 MCG
1 SPRAY, SUSPENSION (ML) NASAL DAILY
Qty: 48 ML | Refills: 1 | Status: SHIPPED | OUTPATIENT
Start: 2025-05-14

## 2025-05-14 NOTE — TELEPHONE ENCOUNTER
Reason for call:   [x] Refill   [] Prior Auth  [] Other:     Office: [facility]  [x] PCP/Provider - Steven Quigley   [] Specialty/Provider -     Medication: fluticasone     Dose/Frequency: 50 mcg/ act nasal spray     Quantity: 90 day supply     Pharmacy: [facility] in [facility]   Does the patient have enough for 3 days?   [] Yes   [x] No - Send as HP to POD- out completely     Mail Away Pharmacy   Does the patient have enough for 10 days?   [] Yes   [] No - Send as HP to POD

## 2025-05-20 DIAGNOSIS — I10 PRIMARY HYPERTENSION: ICD-10-CM

## 2025-05-20 DIAGNOSIS — N13.8 BPH WITH OBSTRUCTION/LOWER URINARY TRACT SYMPTOMS: ICD-10-CM

## 2025-05-20 DIAGNOSIS — E78.00 ELEVATED LDL CHOLESTEROL LEVEL: ICD-10-CM

## 2025-05-20 DIAGNOSIS — E11.9 TYPE 2 DIABETES MELLITUS WITHOUT COMPLICATION, WITHOUT LONG-TERM CURRENT USE OF INSULIN (HCC): ICD-10-CM

## 2025-05-20 DIAGNOSIS — N40.1 BPH WITH OBSTRUCTION/LOWER URINARY TRACT SYMPTOMS: ICD-10-CM

## 2025-05-20 NOTE — TELEPHONE ENCOUNTER
Reason for call:   [x] Refill   [] Prior Auth  [] Other:     Office:   [x] PCP/Provider - [facility]   [] Specialty/Provider -     amLODIPine (NORVASC) 5 mg tablet   5 mg daily  90    atorvastatin (LIPITOR) 20 mg tablet   20 mg daily  90    metFORMIN (GLUCOPHAGE) 500 mg tablet   500 mg, 2 times daily with meals   180    tamsulosin (FLOMAX) 0.4 mg   0.4 mg, Daily with dinner   90    Pharmacy: [id number]    Local Pharmacy   Does the patient have enough for 3 days?   [x] Yes   [] No - Send as HP to POD    Mail Away Pharmacy   Does the patient have enough for 10 days?   [] Yes   [] No - Send as HP to POD

## 2025-05-21 RX ORDER — ATORVASTATIN CALCIUM 20 MG/1
20 TABLET, FILM COATED ORAL DAILY
Qty: 90 TABLET | Refills: 1 | Status: SHIPPED | OUTPATIENT
Start: 2025-05-21

## 2025-05-21 RX ORDER — AMLODIPINE BESYLATE 5 MG/1
5 TABLET ORAL DAILY
Qty: 90 TABLET | Refills: 1 | Status: SHIPPED | OUTPATIENT
Start: 2025-05-21

## 2025-05-21 RX ORDER — TAMSULOSIN HYDROCHLORIDE 0.4 MG/1
0.4 CAPSULE ORAL
Qty: 90 CAPSULE | Refills: 1 | Status: SHIPPED | OUTPATIENT
Start: 2025-05-21

## 2025-05-27 ENCOUNTER — OFFICE VISIT (OUTPATIENT)
Dept: PODIATRY | Facility: CLINIC | Age: 60
End: 2025-05-27
Payer: COMMERCIAL

## 2025-05-27 VITALS — BODY MASS INDEX: 35.79 KG/M2 | WEIGHT: 250 LBS | HEIGHT: 70 IN

## 2025-05-27 DIAGNOSIS — E11.42 CONTROLLED TYPE 2 DIABETES MELLITUS WITH DIABETIC POLYNEUROPATHY, WITHOUT LONG-TERM CURRENT USE OF INSULIN (HCC): ICD-10-CM

## 2025-05-27 DIAGNOSIS — M79.675 PAIN OF TOE OF LEFT FOOT: ICD-10-CM

## 2025-05-27 DIAGNOSIS — L60.0 INGROWN LEFT BIG TOENAIL: Primary | ICD-10-CM

## 2025-05-27 PROCEDURE — 99212 OFFICE O/P EST SF 10 MIN: CPT | Performed by: PODIATRIST

## 2025-05-27 NOTE — PROGRESS NOTES
"Name: Jr Mendoza      : 1965      MRN: [id number]  Encounter Provider: Ryan Nowak DPM  Encounter Date: 2025   Encounter department: [facility] PODIATRY [place]  :  Assessment & Plan  Ingrown left big toenail       The dry eschar that was present on both borders and the fibrotic tissue that was present underneath both borders is completely removed today with the use of a curette.  The area was covered with triple antibiotic and a Band-Aid and patient was instructed to do the same daily through .  Controlled type 2 diabetes mellitus with diabetic polyneuropathy, without long-term current use of insulin ([facility])    Lab Results   Component Value Date    HGBA1C 6.4 04/15/2025            Pain of toe of left foot         Return in about 2 weeks (around 6/10/2025).     History of Present Illness   HPI  Jr Mendoza is a 59 y.o. male who presents for follow-up of status post permanent nail avulsion 2 weeks ago.  Patient states he had no problems with the treatment or the surgery was concerned a little bit about the discoloration present on the left hallux on both borders.  He is a diabetic.  History obtained from: patient    Review of Systems  Medical History Reviewed by provider this encounter:     .  Medications Ordered Prior to Encounter[1]      Objective   Ht 5' 10\" (1.778 m)   Wt 113 kg (250 lb)   BMI 35.87 kg/m²      Physical Exam  Vascular status is unchanged from 2 weeks ago.    Derm there is dry eschar present on the medial and lateral borders of the left hallux.  Medial borders slightly worse as far as eschar compared to the lateral.  There is small amount of dried blood present underneath the tissue on the medial side going towards the plantar aspect of the foot.  Pink healthy tissue was noted beneath the eschar.  Fibrotic tissue was present in both borders which was dark in coloration.  No signs of any infection was present in the area.  The distal margins of both hallux " nails are healing they are closed up to about the mid toe area.  The most proximal portion near the eponychium is still open but no drainage is seen.         [1]   Current Outpatient Medications on File Prior to Visit   Medication Sig Dispense Refill    albuterol (Ventolin HFA) 90 mcg/act inhaler Inhale 2 puffs every 6 (six) hours as needed for wheezing or shortness of breath 18 g 3    amLODIPine (NORVASC) 5 mg tablet Take 1 tablet (5 mg total) by mouth daily 90 tablet 1    atorvastatin (LIPITOR) 20 mg tablet Take 1 tablet (20 mg total) by mouth daily 90 tablet 1    azelastine (ASTELIN) 0.1 % nasal spray 1 spray into each nostril 2 (two) times a day Use in each nostril as directed 30 mL 5    cyanocobalamin (VITAMIN B-12) 100 mcg tablet TAKE 0.5 TABLETS (50 MCG TOTAL) BY MOUTH DAILY 90 tablet 1    fluticasone (FLONASE) 50 mcg/act nasal spray 1 spray into each nostril daily 48 mL 1    metFORMIN (GLUCOPHAGE) 500 mg tablet Take 1 tablet (500 mg total) by mouth 2 (two) times a day with meals 180 tablet 1    mometasone-formoterol (Dulera) 200-5 MCG/ACT inhaler Inhale 2 puffs 2 (two) times a day Rinse mouth after use. 13 g 5    sildenafil (VIAGRA) 25 MG tablet TAKE 1 TABLET BY MOUTH DAILY AS NEEDED FOR ERECTILE DYSFUNCTION. 3 tablet 3    tamsulosin (FLOMAX) 0.4 mg Take 1 capsule (0.4 mg total) by mouth daily with dinner 90 capsule 1    tirzepatide (Zepbound) 5 mg/0.5 mL auto-injector Inject 0.5 mL (5 mg total) under the skin once a week 2 mL 2    valsartan (DIOVAN) 160 mg tablet Take 1 tablet (160 mg total) by mouth daily 90 tablet 1    mupirocin (BACTROBAN) 2 % cream Apply topically 3 (three) times a day (Patient not taking: Reported on 3/11/2025) 30 g 0     No current facility-administered medications on file prior to visit.

## 2025-05-28 ENCOUNTER — TELEPHONE (OUTPATIENT)
Dept: BARIATRICS | Facility: CLINIC | Age: 60
End: 2025-05-28

## 2025-05-28 ENCOUNTER — TELEPHONE (OUTPATIENT)
Age: 60
End: 2025-05-28

## 2025-05-28 DIAGNOSIS — E66.01 SEVERE OBESITY (BMI 35.0-39.9) WITH COMORBIDITY (HCC): ICD-10-CM

## 2025-05-28 DIAGNOSIS — I10 BENIGN ESSENTIAL HTN: ICD-10-CM

## 2025-05-28 DIAGNOSIS — G47.33 SEVERE OBSTRUCTIVE SLEEP APNEA: ICD-10-CM

## 2025-05-28 DIAGNOSIS — E11.69 TYPE 2 DIABETES MELLITUS WITH OTHER SPECIFIED COMPLICATION, WITHOUT LONG-TERM CURRENT USE OF INSULIN (HCC): ICD-10-CM

## 2025-05-28 RX ORDER — TIRZEPATIDE 5 MG/.5ML
INJECTION, SOLUTION SUBCUTANEOUS
Refills: 2 | OUTPATIENT
Start: 2025-05-28

## 2025-05-28 NOTE — TELEPHONE ENCOUNTER
PT's SO, Mandi, called in requesting that PA for Zepbound be submitted through [email or web address].     PT updated that PA's are completed in the order of which they are received. Advised it may take 7-21 business days for PA to be initiated by PA Team and another 7-21 business days for determination by insurance company. PT verbalized understanding.

## 2025-05-29 DIAGNOSIS — G47.33 SEVERE OBSTRUCTIVE SLEEP APNEA: ICD-10-CM

## 2025-05-29 DIAGNOSIS — E11.69 TYPE 2 DIABETES MELLITUS WITH OTHER SPECIFIED COMPLICATION, WITHOUT LONG-TERM CURRENT USE OF INSULIN (HCC): ICD-10-CM

## 2025-05-29 DIAGNOSIS — I10 BENIGN ESSENTIAL HTN: ICD-10-CM

## 2025-05-29 DIAGNOSIS — E66.01 SEVERE OBESITY (BMI 35.0-39.9) WITH COMORBIDITY (HCC): ICD-10-CM

## 2025-05-29 RX ORDER — TIRZEPATIDE 5 MG/.5ML
INJECTION, SOLUTION SUBCUTANEOUS
Refills: 2 | OUTPATIENT
Start: 2025-05-29

## 2025-05-29 NOTE — TELEPHONE ENCOUNTER
Sent [employer] message to patient in regards to pharmacy benefit info as I am unable to speak to someone at [facility], you have to leave a voicemail and I have not gotten a call back for patients info.

## 2025-05-29 NOTE — TELEPHONE ENCOUNTER
Pt calling in to ask for prior auth to be submitted and to ensure sleep apnea is noted.   Advised about previous message.

## 2025-05-30 ENCOUNTER — TELEPHONE (OUTPATIENT)
Dept: BARIATRICS | Facility: CLINIC | Age: 60
End: 2025-05-30

## 2025-05-30 NOTE — TELEPHONE ENCOUNTER
PT called in. [facility] and requesting that PA be submitted through [employer]. Information in chart.    Statement Selected

## 2025-06-03 DIAGNOSIS — R05.3 CHRONIC COUGH: ICD-10-CM

## 2025-06-03 DIAGNOSIS — I10 PRIMARY HYPERTENSION: ICD-10-CM

## 2025-06-03 DIAGNOSIS — J20.9 ACUTE BRONCHITIS, UNSPECIFIED ORGANISM: ICD-10-CM

## 2025-06-04 RX ORDER — VALSARTAN 160 MG/1
160 TABLET ORAL DAILY
Qty: 90 TABLET | Refills: 1 | Status: SHIPPED | OUTPATIENT
Start: 2025-06-04

## 2025-06-04 RX ORDER — AZELASTINE HYDROCHLORIDE 137 UG/1
SPRAY, METERED NASAL
Qty: 30 ML | Refills: 0 | Status: SHIPPED | OUTPATIENT
Start: 2025-06-04

## 2025-06-05 ENCOUNTER — HOSPITAL ENCOUNTER (OUTPATIENT)
Dept: ULTRASOUND IMAGING | Facility: HOSPITAL | Age: 60
End: 2025-06-05
Payer: COMMERCIAL

## 2025-06-05 DIAGNOSIS — K74.02 ADVANCED HEPATIC FIBROSIS: ICD-10-CM

## 2025-06-05 PROCEDURE — 76981 USE PARENCHYMA: CPT

## 2025-06-05 PROCEDURE — 76700 US EXAM ABDOM COMPLETE: CPT

## 2025-06-09 ENCOUNTER — LAB (OUTPATIENT)
Dept: LAB | Facility: CLINIC | Age: 60
End: 2025-06-09
Payer: COMMERCIAL

## 2025-06-09 DIAGNOSIS — R73.03 PREDIABETES: ICD-10-CM

## 2025-06-09 DIAGNOSIS — K74.02 ADVANCED HEPATIC FIBROSIS: ICD-10-CM

## 2025-06-09 DIAGNOSIS — C18.6 MALIGNANT NEOPLASM OF DESCENDING COLON (HCC): ICD-10-CM

## 2025-06-09 LAB
ABO GROUP BLD: NORMAL
ALBUMIN SERPL BCG-MCNC: 4.7 G/DL (ref 3.5–5)
ALP SERPL-CCNC: 90 U/L (ref 34–104)
ALT SERPL W P-5'-P-CCNC: 15 U/L (ref 7–52)
ANION GAP SERPL CALCULATED.3IONS-SCNC: 8 MMOL/L (ref 4–13)
AST SERPL W P-5'-P-CCNC: 14 U/L (ref 13–39)
BASOPHILS # BLD AUTO: 0.06 THOUSANDS/ÂΜL (ref 0–0.1)
BASOPHILS NFR BLD AUTO: 1 % (ref 0–1)
BILIRUB SERPL-MCNC: 0.66 MG/DL (ref 0.2–1)
BLD GP AB SCN SERPL QL: NEGATIVE
BUN SERPL-MCNC: 10 MG/DL (ref 5–25)
CALCIUM SERPL-MCNC: 9.8 MG/DL (ref 8.4–10.2)
CEA SERPL-MCNC: 2 NG/ML (ref 0–3)
CHLORIDE SERPL-SCNC: 101 MMOL/L (ref 96–108)
CO2 SERPL-SCNC: 29 MMOL/L (ref 21–32)
CREAT SERPL-MCNC: 0.7 MG/DL (ref 0.6–1.3)
EOSINOPHIL # BLD AUTO: 0.37 THOUSAND/ÂΜL (ref 0–0.61)
EOSINOPHIL NFR BLD AUTO: 4 % (ref 0–6)
ERYTHROCYTE [DISTWIDTH] IN BLOOD BY AUTOMATED COUNT: 13.3 % (ref 11.6–15.1)
EST. AVERAGE GLUCOSE BLD GHB EST-MCNC: 128 MG/DL
FERRITIN SERPL-MCNC: 65 NG/ML (ref 30–336)
GFR SERPL CREATININE-BSD FRML MDRD: 101 ML/MIN/1.73SQ M
GLUCOSE P FAST SERPL-MCNC: 112 MG/DL (ref 65–99)
HBA1C MFR BLD: 6.1 %
HCT VFR BLD AUTO: 45.5 % (ref 36.5–49.3)
HGB BLD-MCNC: 14.7 G/DL (ref 12–17)
IMM GRANULOCYTES # BLD AUTO: 0.02 THOUSAND/UL (ref 0–0.2)
IMM GRANULOCYTES NFR BLD AUTO: 0 % (ref 0–2)
INR PPP: 0.96 (ref 0.85–1.19)
IRON SATN MFR SERPL: 15 % (ref 15–50)
IRON SERPL-MCNC: 53 UG/DL (ref 50–212)
LYMPHOCYTES # BLD AUTO: 1.81 THOUSANDS/ÂΜL (ref 0.6–4.47)
LYMPHOCYTES NFR BLD AUTO: 21 % (ref 14–44)
MCH RBC QN AUTO: 28.7 PG (ref 26.8–34.3)
MCHC RBC AUTO-ENTMCNC: 32.3 G/DL (ref 31.4–37.4)
MCV RBC AUTO: 89 FL (ref 82–98)
MONOCYTES # BLD AUTO: 0.81 THOUSAND/ÂΜL (ref 0.17–1.22)
MONOCYTES NFR BLD AUTO: 9 % (ref 4–12)
NEUTROPHILS # BLD AUTO: 5.6 THOUSANDS/ÂΜL (ref 1.85–7.62)
NEUTS SEG NFR BLD AUTO: 65 % (ref 43–75)
NRBC BLD AUTO-RTO: 0 /100 WBCS
PLATELET # BLD AUTO: 230 THOUSANDS/UL (ref 149–390)
PMV BLD AUTO: 11.1 FL (ref 8.9–12.7)
POTASSIUM SERPL-SCNC: 4.4 MMOL/L (ref 3.5–5.3)
PROT SERPL-MCNC: 7.9 G/DL (ref 6.4–8.4)
PROTHROMBIN TIME: 13.1 SECONDS (ref 12.3–15)
RBC # BLD AUTO: 5.12 MILLION/UL (ref 3.88–5.62)
RH BLD: POSITIVE
SODIUM SERPL-SCNC: 138 MMOL/L (ref 135–147)
SPECIMEN EXPIRATION DATE: NORMAL
TIBC SERPL-MCNC: 354.2 UG/DL (ref 250–450)
TRANSFERRIN SERPL-MCNC: 253 MG/DL (ref 203–362)
UIBC SERPL-MCNC: 301 UG/DL (ref 155–355)
WBC # BLD AUTO: 8.67 THOUSAND/UL (ref 4.31–10.16)

## 2025-06-09 PROCEDURE — 83036 HEMOGLOBIN GLYCOSYLATED A1C: CPT

## 2025-06-09 PROCEDURE — 85610 PROTHROMBIN TIME: CPT

## 2025-06-09 PROCEDURE — 82378 CARCINOEMBRYONIC ANTIGEN: CPT

## 2025-06-09 PROCEDURE — 86901 BLOOD TYPING SEROLOGIC RH(D): CPT

## 2025-06-09 PROCEDURE — 83550 IRON BINDING TEST: CPT

## 2025-06-09 PROCEDURE — 86900 BLOOD TYPING SEROLOGIC ABO: CPT

## 2025-06-09 PROCEDURE — 80053 COMPREHEN METABOLIC PANEL: CPT

## 2025-06-09 PROCEDURE — 82728 ASSAY OF FERRITIN: CPT

## 2025-06-09 PROCEDURE — 85025 COMPLETE CBC W/AUTO DIFF WBC: CPT

## 2025-06-09 PROCEDURE — 83540 ASSAY OF IRON: CPT

## 2025-06-09 PROCEDURE — 86850 RBC ANTIBODY SCREEN: CPT

## 2025-06-09 PROCEDURE — 36415 COLL VENOUS BLD VENIPUNCTURE: CPT

## 2025-06-10 DIAGNOSIS — E11.69 TYPE 2 DIABETES MELLITUS WITH OTHER SPECIFIED COMPLICATION, WITHOUT LONG-TERM CURRENT USE OF INSULIN (HCC): Primary | ICD-10-CM

## 2025-06-10 RX ORDER — TIRZEPATIDE 2.5 MG/.5ML
INJECTION, SOLUTION SUBCUTANEOUS
Qty: 2 ML | Refills: 0 | Status: SHIPPED | OUTPATIENT
Start: 2025-06-10

## 2025-06-11 ENCOUNTER — HOSPITAL ENCOUNTER (OUTPATIENT)
Dept: CT IMAGING | Facility: HOSPITAL | Age: 60
Discharge: HOME | End: 2025-06-11
Attending: SURGERY
Payer: COMMERCIAL

## 2025-06-11 DIAGNOSIS — C18.6 MALIGNANT NEOPLASM OF DESCENDING COLON (HCC): ICD-10-CM

## 2025-06-11 PROCEDURE — 74177 CT ABD & PELVIS W/CONTRAST: CPT

## 2025-06-11 PROCEDURE — 71260 CT THORAX DX C+: CPT

## 2025-06-11 RX ADMIN — IOHEXOL 100 ML: 350 INJECTION, SOLUTION INTRAVENOUS at 11:12

## 2025-06-17 ENCOUNTER — OFFICE VISIT (OUTPATIENT)
Dept: PODIATRY | Facility: CLINIC | Age: 60
End: 2025-06-17
Payer: COMMERCIAL

## 2025-06-17 VITALS — BODY MASS INDEX: 35.36 KG/M2 | HEIGHT: 70 IN | WEIGHT: 247 LBS

## 2025-06-17 DIAGNOSIS — E11.42 CONTROLLED TYPE 2 DIABETES MELLITUS WITH DIABETIC POLYNEUROPATHY, WITHOUT LONG-TERM CURRENT USE OF INSULIN (HCC): ICD-10-CM

## 2025-06-17 DIAGNOSIS — M79.675 PAIN OF TOE OF LEFT FOOT: ICD-10-CM

## 2025-06-17 DIAGNOSIS — L60.0 INGROWN LEFT BIG TOENAIL: Primary | ICD-10-CM

## 2025-06-17 PROCEDURE — 99212 OFFICE O/P EST SF 10 MIN: CPT | Performed by: PODIATRIST

## 2025-06-17 NOTE — PROGRESS NOTES
"Name: Jr Mendoza      : 1965      MRN: [id number]  Encounter Provider: Ryan Nowak DPM  Encounter Date: 2025   Encounter department: [facility] PODIATRY [place]  :  Assessment & Plan  Ingrown left big toenail       Remove the fibrotic tissue that was present on the proximal medial border of the left hallux nail.  Applied triple antibiotic and a Band-Aid.  Patient is instructed to do the same for the next several days.  Controlled type 2 diabetes mellitus with diabetic polyneuropathy, without long-term current use of insulin ([facility])    Lab Results   Component Value Date    HGBA1C 6.1 (H) 2025            Pain of toe of left foot         Return in about 4 weeks (around 7/15/2025).     History of Present Illness   HPI  Jr Mendoza is a 59 y.o. male who presents for follow-up of a permanent nail avulsion 4 weeks ago on the left hallux.  He is a diabetic whose most recent A1c was 6.1 drawn on 2025.  History obtained from: patient    Review of Systems  Medical History Reviewed by provider this encounter:     .  Medications Ordered Prior to Encounter[1]   Social History[2]     Objective   Ht 5' 10\" (1.778 m)   Wt 112 kg (247 lb)   BMI 35.44 kg/m²      Physical Exam  Vascular status is 1/4 DP PT negative digital hair, normal distal cooling, immediate capillary refill left extremity. Capillary refill is approximately 3 to 4 seconds     Derm the lateral border of the left hallux has dry eschar present no erythema pain upon palpation or rubor is noted.  On the medial border of the left hallux the distal three quarters of the medial nail groove is completely coapted no signs of any infection no odor drainage or erythema and no eschar is present.  On the proximal margin of the medial border near the eponychium there is a small amount of fibrotic tissue present within the wound.  The wound measures approximately 2 mm x 3 mm.  There is no drainage present.  Good bleeding tissue was " seen upon removal of the fibrotic tissue.         [1]   Current Outpatient Medications on File Prior to Visit   Medication Sig Dispense Refill    albuterol (Ventolin HFA) 90 mcg/act inhaler Inhale 2 puffs every 6 (six) hours as needed for wheezing or shortness of breath 18 g 3    amLODIPine (NORVASC) 5 mg tablet Take 1 tablet (5 mg total) by mouth daily 90 tablet 1    atorvastatin (LIPITOR) 20 mg tablet Take 1 tablet (20 mg total) by mouth daily 90 tablet 1    Azelastine HCl 137 MCG/SPRAY SOLN 1 SPRAY INTO EACH NOSTRIL 2 (TWO) TIMES A DAY USE IN EACH NOSTRIL AS DIRECTED 30 mL 0    cyanocobalamin (VITAMIN B-12) 100 mcg tablet TAKE 0.5 TABLETS (50 MCG TOTAL) BY MOUTH DAILY 90 tablet 1    fluticasone (FLONASE) 50 mcg/act nasal spray 1 spray into each nostril daily 48 mL 1    metFORMIN (GLUCOPHAGE) 500 mg tablet Take 1 tablet (500 mg total) by mouth 2 (two) times a day with meals 180 tablet 1    mometasone-formoterol (Dulera) 200-5 MCG/ACT inhaler Inhale 2 puffs 2 (two) times a day Rinse mouth after use. 13 g 5    sildenafil (VIAGRA) 25 MG tablet TAKE 1 TABLET BY MOUTH DAILY AS NEEDED FOR ERECTILE DYSFUNCTION. 3 tablet 3    tamsulosin (FLOMAX) 0.4 mg Take 1 capsule (0.4 mg total) by mouth daily with dinner 90 capsule 1    Tirzepatide (Mounjaro) 2.5 MG/0.5ML SOAJ Inject 2.5mg subcutaneously once weekly 2 mL 0    valsartan (DIOVAN) 160 mg tablet TAKE 1 TABLET BY MOUTH EVERY DAY 90 tablet 1    mupirocin (BACTROBAN) 2 % cream Apply topically 3 (three) times a day (Patient not taking: Reported on 3/11/2025) 30 g 0     No current facility-administered medications on file prior to visit.   [2]   Social History  Tobacco Use    Smoking status: Former     Current packs/day: 0.00     Average packs/day: 0.3 packs/day for 10.0 years (2.5 ttl pk-yrs)     Types: Cigarettes     Start date: 2007     Quit date: 2017     Years since quittin.4     Passive exposure: Past    Smokeless tobacco: Never   Vaping Use    Vaping status:  Never Used   Substance and Sexual Activity    Alcohol use: Not Currently     Alcohol/week: 7.0 standard drinks of alcohol     Types: 7 Glasses of wine per week     Comment: 3 beers daily    Drug use: Not Currently    Sexual activity: Not Currently     Partners: Male

## 2025-07-01 ENCOUNTER — OFFICE VISIT (OUTPATIENT)
Dept: GASTROENTEROLOGY | Facility: CLINIC | Age: 60
End: 2025-07-01
Payer: COMMERCIAL

## 2025-07-01 VITALS
OXYGEN SATURATION: 97 % | SYSTOLIC BLOOD PRESSURE: 126 MMHG | HEIGHT: 70 IN | DIASTOLIC BLOOD PRESSURE: 58 MMHG | TEMPERATURE: 98.2 F | RESPIRATION RATE: 20 BRPM | WEIGHT: 249.4 LBS | BODY MASS INDEX: 35.71 KG/M2 | HEART RATE: 72 BPM

## 2025-07-01 DIAGNOSIS — C18.6 MALIGNANT NEOPLASM OF DESCENDING COLON (HCC): ICD-10-CM

## 2025-07-01 DIAGNOSIS — E88.810 METABOLIC SYNDROME: ICD-10-CM

## 2025-07-01 DIAGNOSIS — F10.91 ALCOHOL USE DISORDER IN REMISSION: ICD-10-CM

## 2025-07-01 DIAGNOSIS — K74.02 ADVANCED HEPATIC FIBROSIS: Primary | ICD-10-CM

## 2025-07-01 PROCEDURE — 99213 OFFICE O/P EST LOW 20 MIN: CPT | Performed by: FAMILY MEDICINE

## 2025-07-01 NOTE — PROGRESS NOTES
Name: Jr Mendoza      : 1965      MRN: [id number]  Encounter Provider: Dolores Farris PA-C  Encounter Date: 2025   Encounter department: [facility]  :  Assessment & Plan  Advanced hepatic fibrosis  Alcohol use disorder in remission  Metabolic syndrome  Patient with PMH significant for colon cancer s/p laparoscopic sigmoidectomy (refer to A/P below) who is ordered for a CT chest abdomen and pelvis for surveillance and found to have a slightly undulating contour of the liver and caudate lobe hypertrophy possibly indicating chronic hepatocellular disease. Interestingly, this has not been demonstrated on prior or subsequent abdominal imaging.      He's had a partial serologic evaluation which was unremarkable for competing causes of liver disease but does report a history of excessive EtOH use. He's also had a US elastography showing F3 fibrosis (IQR/median 19.7%). His labs reflect normal liver chemistries, normal liver synthetic function and a preserved platelet count. No clinical evidence of chronic liver disease on exam.    It was suspected that he likely has advanced hepatic fibrosis in the setting of ALD. He also has multiple metabolic risk factors for the development of MASLD but this has not been demonstrated on imaging or his elastography.  Since his last appointment, he had updated LFTs which continue to show normal liver chemistries, normal liver synthetic function and preserved platelet count. He also had a US with elastography which showed mild hepatomegaly and F3 fibrosis (IQR 24.2%) respectively. He reports maintained sobriety since January and has lost 3 lbs on Mounjaro managed by weight management. Congratulated his efforts!    While his elastography shows F3 fibrosis, he has no additional clinical, serologic or radiographic evidence of cirrhosis. Therefore, he will continue with efforts towards steady and sustainable weight loss,  optimization of his metabolic risk factors and sobriety. Plan for updated LFTs and a repeat US elastography in 6 months.    We did discuss that if there is ongoing concern for cirrhosis then we could pursue either an MR elastography vs liver biopsy. He is technically not a candidate for Rezdiffra given the absence of hepatic steatosis on imaging.     Orders:  •  US elastography/UGAP; Future  •  CBC and differential; Future  •  Comprehensive metabolic panel; Future  •  Protime-INR; Future    Malignant neoplasm of descending colon (HCC)  Patient with a history of of colon CA (T2 N0 (0/21) G2 adenocarcinom ) s/p hand-assisted laparoscopic sigmoidectomy on 6/26/2024. His CEA was normal at the time of diagnosis.  He also had a CT chest abdomen and pelvis without evidence of metastasis. There was lymphovascular invasion noted postoperatively. He was seen by medical oncology and did not require adjuvant chemotherapy.      Since his last appointment, he underwent a colonoscopy (2/26) with poor prep but notable for a healthy end-to-end colocolonic anastomosis, 5 subcentimeter polyps, scattered diverticulosis and small external hemorrhoids. Repeat x9-12 months. Most recent CEA normal and CT C/A/P without evidence of recurrence or metastasis.    He is scheduled for follow-up with Dr. Haley on 7/22/2025.  He is scheduled for follow-up with colorectal surgery on 9/11/2025.       Follow-up in 6 months or sooner if necessary.      History of Present Illness   HPI    Jr Mendoza is a 59 y.o. male with PMH significant for obesity, DM2 with neuropathy, HTN, HLD, INDIGO, BPH, DONG, known pulmonary nodules and hx of colon CA s/p sigmoidectomy presents today for a follow-up regarding abnormal imaging of the liver.    Interval history  - Last seen 2/11/2025.  - Labs (6/9) with normal LFTs.  - US with elastography (6/5) with mild hepatomegaly and F3 fibrosis (IQR 24.2%) respectively.  - Colonoscopy (2/26) with poor prep but notable  for a healthy end-to-end colocolonic anastomosis, 5 subcentimeter polyps, scattered diverticulosis and small external hemorrhoids. Repeat x9-12 months.  - He has lost 3 lbs on Mounjaro for ~1 month.   - Scheduled with weight management on 7/3/2025.    Extended liver history  Chilo is familiar with [facility] gastroenterology last seen by Dr. Haley on 11/21/2024. He had a CT chest abdomen and pelvis for staging purposes, after his diagnosis of colon cancer, which noted slightly undulating contour of the liver and caudate lobe hypertrophy possibly indicating chronic hepatocellular disease. He had a US elastography notable for F3 fibrosis (IQR/median 19.7%) his liver chemistries have been historically within normal limits aside from mild hyperbilirubinemia. He did have abnormal LFTs during a hospitalization for choledocholithiasis in May 2022. Also with a preserved platelet count.    He has had a partial serologic workup which has been negative for viral hepatitis, A1AT deficiency and hemochromatosis. He also had a negative LISANDRA and normal AFP tumor marker.    Denies a personal history of chronic liver disease. Denies a family history of liver disease or liver-related cancers. He stopped drinking in December 2024 but reports a history of heavy EtOH use for about 20+ years prior to this. He would drink ~4 alcohol beverages daily but would also regularly drink more than this according to his wife.     Hx of colon CA s/p sigmoidectomy and is scheduled for a surveillance colonoscopy on 2/19/2025 as well as follow-up with C&R surgery on 3/11/2025.       History obtained from: patient    Review of Systems   All other systems reviewed and are negative.    Pertinent Medical History     Medical History Reviewed by provider this encounter:  Tobacco  Allergies  Meds  Problems  Med Hx  Surg Hx  Fam Hx     .  Past Medical History   Past Medical History:   Diagnosis Date   • Allergic    • Asthma    • Cancer (HCC)    •  Cholangitis 05/16/2022   • Closed extra-articular fracture of distal end of right tibia 01/28/2020   • Closed nondisplaced oblique fracture of shaft of right fibula 01/28/2020   • CPAP (continuous positive airway pressure) dependence    • Diabetes mellitus (HCC)    • Foot fracture    • Hyperlipidemia    • Hypertension    • Sleep apnea      Past Surgical History:   Procedure Laterality Date   • CHOLECYSTECTOMY     • CHOLECYSTECTOMY LAPAROSCOPIC N/A 05/18/2022    Procedure: CHOLECYSTECTOMY LAPAROSCOPIC;  Surgeon: Zachary Kern MD;  Location: [facility];  Service: General   • COLONOSCOPY     • HEMICOLOECTOMY W/ ANASTOMOSIS N/A 06/26/2024    Procedure: LAPARSCOPIC HAND ASSISTED SIGMOIDECTOMY, LAPARSCOPIC MOBILIZATION OF SPLENIC FLEXURE, INTRAOP SPY ANGIOOGRAPHY , FLEXIBLE SIGMOIDOSCOPY;  Surgeon: Ann Perez MD;  Location: [facility];  Service: Colorectal     Family History   Problem Relation Name Age of Onset   • ALS Mother     • Obesity Father     • Pneumonia Father     • No Known Problems Sister        reports that he quit smoking about 8 years ago. His smoking use included cigarettes. He started smoking about 18 years ago. He has a 2.5 pack-year smoking history. He has been exposed to tobacco smoke. He has never used smokeless tobacco. He reports that he does not currently use alcohol after a past usage of about 7.0 standard drinks of alcohol per week. He reports that he does not currently use drugs.  Current Outpatient Medications on File Prior to Visit   Medication Sig Dispense Refill   • albuterol (Ventolin HFA) 90 mcg/act inhaler Inhale 2 puffs every 6 (six) hours as needed for wheezing or shortness of breath 18 g 3   • amLODIPine (NORVASC) 5 mg tablet Take 1 tablet (5 mg total) by mouth daily 90 tablet 1   • atorvastatin (LIPITOR) 20 mg tablet Take 1 tablet (20 mg total) by mouth daily 90 tablet 1   • Azelastine HCl 137 MCG/SPRAY SOLN 1 SPRAY INTO EACH NOSTRIL 2 (TWO) TIMES A DAY USE IN  EACH NOSTRIL AS DIRECTED 30 mL 0   • cyanocobalamin (VITAMIN B-12) 100 mcg tablet TAKE 0.5 TABLETS (50 MCG TOTAL) BY MOUTH DAILY 90 tablet 1   • fluticasone (FLONASE) 50 mcg/act nasal spray 1 spray into each nostril daily 48 mL 1   • metFORMIN (GLUCOPHAGE) 500 mg tablet Take 1 tablet (500 mg total) by mouth 2 (two) times a day with meals 180 tablet 1   • mometasone-formoterol (Dulera) 200-5 MCG/ACT inhaler Inhale 2 puffs 2 (two) times a day Rinse mouth after use. 13 g 5   • mupirocin (BACTROBAN) 2 % cream Apply topically 3 (three) times a day 30 g 0   • sildenafil (VIAGRA) 25 MG tablet TAKE 1 TABLET BY MOUTH DAILY AS NEEDED FOR ERECTILE DYSFUNCTION. 3 tablet 3   • tamsulosin (FLOMAX) 0.4 mg Take 1 capsule (0.4 mg total) by mouth daily with dinner 90 capsule 1   • Tirzepatide (Mounjaro) 2.5 MG/0.5ML SOAJ Inject 2.5mg subcutaneously once weekly 2 mL 0   • valsartan (DIOVAN) 160 mg tablet TAKE 1 TABLET BY MOUTH EVERY DAY 90 tablet 1     No current facility-administered medications on file prior to visit.     Allergies   Allergen Reactions   • Pollen Extract Allergic Rhinitis      Current Outpatient Medications on File Prior to Visit   Medication Sig Dispense Refill   • albuterol (Ventolin HFA) 90 mcg/act inhaler Inhale 2 puffs every 6 (six) hours as needed for wheezing or shortness of breath 18 g 3   • amLODIPine (NORVASC) 5 mg tablet Take 1 tablet (5 mg total) by mouth daily 90 tablet 1   • atorvastatin (LIPITOR) 20 mg tablet Take 1 tablet (20 mg total) by mouth daily 90 tablet 1   • Azelastine HCl 137 MCG/SPRAY SOLN 1 SPRAY INTO EACH NOSTRIL 2 (TWO) TIMES A DAY USE IN EACH NOSTRIL AS DIRECTED 30 mL 0   • cyanocobalamin (VITAMIN B-12) 100 mcg tablet TAKE 0.5 TABLETS (50 MCG TOTAL) BY MOUTH DAILY 90 tablet 1   • fluticasone (FLONASE) 50 mcg/act nasal spray 1 spray into each nostril daily 48 mL 1   • metFORMIN (GLUCOPHAGE) 500 mg tablet Take 1 tablet (500 mg total) by mouth 2 (two) times a day with meals 180 tablet 1  "  • mometasone-formoterol (Dulera) 200-5 MCG/ACT inhaler Inhale 2 puffs 2 (two) times a day Rinse mouth after use. 13 g 5   • mupirocin (BACTROBAN) 2 % cream Apply topically 3 (three) times a day 30 g 0   • sildenafil (VIAGRA) 25 MG tablet TAKE 1 TABLET BY MOUTH DAILY AS NEEDED FOR ERECTILE DYSFUNCTION. 3 tablet 3   • tamsulosin (FLOMAX) 0.4 mg Take 1 capsule (0.4 mg total) by mouth daily with dinner 90 capsule 1   • Tirzepatide (Mounjaro) 2.5 MG/0.5ML SOAJ Inject 2.5mg subcutaneously once weekly 2 mL 0   • valsartan (DIOVAN) 160 mg tablet TAKE 1 TABLET BY MOUTH EVERY DAY 90 tablet 1     No current facility-administered medications on file prior to visit.      Social History     Tobacco Use   • Smoking status: Former     Current packs/day: 0.00     Average packs/day: 0.3 packs/day for 10.0 years (2.5 ttl pk-yrs)     Types: Cigarettes     Start date: 2007     Quit date: 2017     Years since quittin.5     Passive exposure: Past   • Smokeless tobacco: Never   Vaping Use   • Vaping status: Never Used   Substance and Sexual Activity   • Alcohol use: Not Currently     Alcohol/week: 7.0 standard drinks of alcohol     Types: 7 Glasses of wine per week     Comment: 3 beers daily   • Drug use: Not Currently   • Sexual activity: Not Currently     Partners: Male        Objective   /58 (BP Location: Right arm, Patient Position: Sitting, Cuff Size: Large)   Pulse 72   Temp 98.2 °F (36.8 °C) (Temporal)   Resp 20   Ht 5' 10\" (1.778 m) Comment: per patient  Wt 113 kg (249 lb 6.4 oz)   SpO2 97%   BMI 35.79 kg/m²      Physical Exam  Vitals and nursing note reviewed.   Constitutional:       General: He is not in acute distress.     Appearance: Normal appearance. He is well-developed. He is obese. He is not ill-appearing or toxic-appearing.   HENT:      Head: Normocephalic and atraumatic.     Eyes:      General: No scleral icterus.     Conjunctiva/sclera: Conjunctivae normal.     Pulmonary:      Effort: Pulmonary " effort is normal. No respiratory distress.   Abdominal:      General: There is no distension.      Palpations: Abdomen is soft. There is no mass.      Tenderness: There is no abdominal tenderness.      Comments: Central adiposity     Musculoskeletal:      Right lower leg: No edema.      Left lower leg: No edema.     Skin:     General: Skin is warm and dry.      Coloration: Skin is not jaundiced.      Findings: No bruising or rash.     Neurological:      Mental Status: He is alert and oriented to person, place, and time. Mental status is at baseline.     Psychiatric:         Mood and Affect: Mood normal.         Behavior: Behavior normal.

## 2025-07-01 NOTE — ASSESSMENT & PLAN NOTE
Patient with a history of of colon CA (T2 N0 (0/21) G2 adenocarcinom ) s/p hand-assisted laparoscopic sigmoidectomy on 6/26/2024. His CEA was normal at the time of diagnosis.  He also had a CT chest abdomen and pelvis without evidence of metastasis. There was lymphovascular invasion noted postoperatively. He was seen by medical oncology and did not require adjuvant chemotherapy.      Since his last appointment, he underwent a colonoscopy (2/26) with poor prep but notable for a healthy end-to-end colocolonic anastomosis, 5 subcentimeter polyps, scattered diverticulosis and small external hemorrhoids. Repeat x9-12 months. Most recent CEA normal and CT C/A/P without evidence of recurrence or metastasis.    He is scheduled for follow-up with Dr. Haley on 7/22/2025.  He is scheduled for follow-up with colorectal surgery on 9/11/2025.       Follow-up in 6 months or sooner if necessary.

## 2025-07-03 ENCOUNTER — OFFICE VISIT (OUTPATIENT)
Dept: BARIATRICS | Facility: CLINIC | Age: 60
End: 2025-07-03
Payer: COMMERCIAL

## 2025-07-03 VITALS
SYSTOLIC BLOOD PRESSURE: 130 MMHG | OXYGEN SATURATION: 96 % | BODY MASS INDEX: 35.45 KG/M2 | WEIGHT: 247.6 LBS | HEART RATE: 96 BPM | HEIGHT: 70 IN | DIASTOLIC BLOOD PRESSURE: 76 MMHG | TEMPERATURE: 98.7 F | RESPIRATION RATE: 20 BRPM

## 2025-07-03 DIAGNOSIS — E11.69 TYPE 2 DIABETES MELLITUS WITH OTHER SPECIFIED COMPLICATION, WITHOUT LONG-TERM CURRENT USE OF INSULIN (HCC): ICD-10-CM

## 2025-07-03 DIAGNOSIS — E66.01 SEVERE OBESITY (BMI 35.0-39.9) WITH COMORBIDITY (HCC): Primary | ICD-10-CM

## 2025-07-03 PROCEDURE — 99214 OFFICE O/P EST MOD 30 MIN: CPT | Performed by: PHYSICIAN ASSISTANT

## 2025-07-03 RX ORDER — TIRZEPATIDE 5 MG/.5ML
INJECTION, SOLUTION SUBCUTANEOUS
Qty: 2 ML | Refills: 2 | Status: SHIPPED | OUTPATIENT
Start: 2025-07-03

## 2025-07-03 NOTE — PROGRESS NOTES
Assessment/Plan:    Severe obesity (BMI 35.0-39.9) with comorbidity (HCC)  -Patient is pursuing Conservative Program  -Initial weight loss goal of 5-10% weight loss for improved health  -Screening labs: up to date  -Zepbound was initially started and was on 5mg. His insurance no longer covers this medication. He was switched over to mounjaro. He has been on the 2.5mg dose for 2 weeks. Will plan to increase to 5mg. Will continue 5mg dose for at least 3 months before considering increase  -dietary recall reviewed. Suggestions provided  -Encouraged food logging  -medication agreement signed    Initial: 252.6 lbs  Current: 247.6 lbs  Change: -5 lbs  Goal: 210 lbs    Type 2 diabetes mellitus with other specified complication (HCC)    Lab Results   Component Value Date    HGBA1C 6.1 (H) 06/09/2025   -On Metformin  -Also on Mounjaro 2.5mg dose. Will be increasing to 5mg dose. Will continue 5mg dose for at least 3 months before considering increase  -avoid/limit refined carbohydrates    Goals:    Food log (ie.) www.Geddit.com,sparkpeople.com,loseit.com,calorieking.com,etc.   No sugary beverages. At least 64oz of water daily.  Increase physical activity by 10 minutes daily. Gradually increase physical activity to a goal of 5 days per week for 30 minutes of MODERATE intensity PLUS 2 days per week of FULL BODY resistance training  1500 calories per day  5-10 servings of fruits and vegetables per day  25-35 grams of dietary fiber per day  110 grams of protein per day    Follow up in approximately 4 months with Non-Surgical Physician/Advanced Practitioner.     Diagnoses and all orders for this visit:    Severe obesity (BMI 35.0-39.9) with comorbidity (HCC)    Type 2 diabetes mellitus with other specified complication, without long-term current use of insulin (HCC)  -     Tirzepatide (Mounjaro) 5 MG/0.5ML SOAJ; Inject 5mg subcutaneously once weekly          Subjective:   Chief Complaint   Patient presents with    Follow-up  "       Patient ID: Jr Mendoza  is a 59 y.o. male with excess weight/obesity here to pursue weight managment.  Patient is pursuing Conservative Program.     HPI Patient presents for [facility] follow up. Was on Zepbound 5mg but insurance will no longer cover it.  Has been transitioned to Mounjaro. Currently on 2.5mg dose and tolerating well. Denies adverse SE. Has not been able to exercise recently due to toe surgery. He is just getting back into this    Food logging: denies  Exercise: walking on Treadmill for 20 minutes 3-4x per week  Hydration: meeting water goals, occasional regular ginger ale, cranberry juice    BL HB eggs or raisin bran cereal  L: salad with tomato + cucumber + carrots + varying types of dressing + 1-2 HB eggs  S: skips  D: chicken or fish or steak + veggie +/- starch  S: frozen yogurt  Wt Readings from Last 10 Encounters:   07/03/25 112 kg (247 lb 9.6 oz)   07/01/25 113 kg (249 lb 6.4 oz)   06/17/25 112 kg (247 lb)   05/27/25 113 kg (250 lb)   05/13/25 113 kg (250 lb)   05/02/25 113 kg (250 lb)   04/15/25 113 kg (250 lb)   03/11/25 114 kg (252 lb)   02/26/25 114 kg (252 lb)   02/25/25 115 kg (252 lb 9.6 oz)        The following portions of the patient's history were reviewed and updated as appropriate: allergies, current medications, past family history, past medical history, past social history, past surgical history, and problem list.    Review of Systems   Cardiovascular: Negative.    Gastrointestinal: Negative.        Objective:    /76 (BP Location: Right arm, Patient Position: Sitting, Cuff Size: Large)   Pulse 96   Temp 98.7 °F (37.1 °C) (Temporal)   Resp 20   Ht 5' 10\" (1.778 m)   Wt 112 kg (247 lb 9.6 oz)   SpO2 96%   BMI 35.53 kg/m²      Physical Exam  Vitals and nursing note reviewed.   Constitutional:       General: He is not in acute distress.     Appearance: He is obese. He is not ill-appearing or toxic-appearing.   HENT:      Head: Normocephalic and atraumatic.      " Nose: Nose normal.      Mouth/Throat:      Mouth: Mucous membranes are moist.     Eyes:      General: No scleral icterus.    Pulmonary:      Effort: Pulmonary effort is normal. No respiratory distress.   Abdominal:      Comments: protuberant     Musculoskeletal:         General: Normal range of motion.      Cervical back: Normal range of motion and neck supple.     Skin:     General: Skin is dry.      Coloration: Skin is not jaundiced.     Neurological:      General: No focal deficit present.      Mental Status: He is alert and oriented to person, place, and time. Mental status is at baseline.     Psychiatric:         Mood and Affect: Mood normal.         Behavior: Behavior normal.         Thought Content: Thought content normal.         Judgment: Judgment normal.

## 2025-07-03 NOTE — ASSESSMENT & PLAN NOTE
Lab Results   Component Value Date    HGBA1C 6.1 (H) 06/09/2025   -On Metformin  -Also on Mounjaro 2.5mg dose. Will be increasing to 5mg dose. Will continue 5mg dose for at least 3 months before considering increase  -avoid/limit refined carbohydrates

## 2025-07-03 NOTE — ASSESSMENT & PLAN NOTE
-Patient is pursuing Conservative Program  -Initial weight loss goal of 5-10% weight loss for improved health  -Screening labs: up to date  -Zepbound was initially started and was on 5mg. His insurance no longer covers this medication. He was switched over to mounjaro. He has been on the 2.5mg dose for 2 weeks. Will plan to increase to 5mg. Will continue 5mg dose for at least 3 months before considering increase  -dietary recall reviewed. Suggestions provided  -Encouraged food logging  -medication agreement signed    Initial: 252.6 lbs  Current: 247.6 lbs  Change: -5 lbs  Goal: 210 lbs

## 2025-07-21 ENCOUNTER — DOCUMENTATION (OUTPATIENT)
Dept: GASTROENTEROLOGY | Facility: CLINIC | Age: 60
End: 2025-07-21

## 2025-07-21 NOTE — PROGRESS NOTES
Records reviewed and outlined below in preparation for office visit 7/22/2025;    7/3/2025-weight management follow-up currently on Mounjaro  As that that was not covered.  5 pound weight loss    7/1/2025 follow-up pathology  Advanced hepatic fibrosis-F3  Alcohol use disorder in remission  Metabolic syndrome  While patient does have significant fibrosis, there is no evidence of hepatic steatosis on any imaging study.  Consider MRI elastography versus liver biopsy  Technically not a candidate for Rezdiffra given absence of hepatic steatosis on imaging    6/11/2025 CT scan chest abdomen pelvis with IV contrast  Stable 3 mm nodules right middle lobe and left lower lobe.  No new suspicious pulmonary nodules.  Stable mucous plugging right lower lobe  Stable mildly enlarged portacaval lymph node measuring 1.1 cm.  No retroperitoneal adenopathy  Post operative changes of sigmoid colon anastomosis  1 mm calculi in each kidney  Liver reportedly normal    6/9/2025 laboratory data  Sodium 138  Potassium 4.4  BUN 10  Creatinine 0.7  Glucose 112  AST 14  ALT 50  Alk phos 90  T. bili 0.66  Iron 53  Ferritin 65  Iron saturation 15%  TIBC 354  CEA 2.0  WC 8.67 Hgb 14.7 HCT 45.5 MCV 89 platelet count 230,000  INR 0.96  Hemoglobin A1c 6.1    6/5/2025 ultrasound elastography  IQR/median 24.2%  F3, severe fibrosis  S0    6/5/2025 ultrasound abdomen complete  Mild hepatomegaly with liver measuring 18 cm.  Surface contour is smooth with  Echogenicity and echotexture are within normal limits.  Status postcholecystectomy  Spleen normal    2/6/2025 colonoscopy  Normal terminal ileum  Large amount of debris scattered throughout the colon.  [place] bowel prep score 4  4 mm sessile polyp distal ascending, not recovered  4 mm sessile polyp mid transverse.  Tubular adenoma  4 mm polyp at 35 cm.  Hyperplastic polyp  2 polyps in the rectum measuring 5 to 8 mm.  Hyperplastic polyps  Healthy end-to-end colocolonic anastomosis at 24 cm.  Scattered  diverticulosis  Small external hemorrhoids and hypertrophied anal papilla  Repeat colonoscopy in 9 to 12 months    12/16/2024 ultrasound elastography  IQR/median 19.7%  F3, severe fibrosis  S0    9/16/2024 laboratory data  Sodium 135  Potassium 4.4  BUN 8  Creatinine 0.63  Glucose 136  AST 16  ALT 12  Alk phos 83  T. bili 0.59  Triglycerides 92  Cholesterol 141  HDL 53  LDL 70  Vitamin B12 114  WBC 7.42 Hgb 14.2 (up from 9.3) HCT 46.3 MCV 90 platelet count 213,002% atypical lymphocytes 2% plasma cells  Hemoglobin A1c 6.1  TSH 1.52     9/18/2024 CT scan chest  Unchanged triangular-shaped right upper and right middle lobe perfusion subpleural nodule characteristic of benign intrapulmonary nodes.  No suspicious solid parenchymal nodules.  Persistent 6 x 3 x 3 mm right lower lobe segmental endobronchial lesion or focus of chronic mucous plugging.     7/9/2024 CT scan abdomen pelvis done as an inpatient  Improving slightly distended loops of small bowel without evidence of obstruction.  Colon unremarkable  Large lower abdominal loculated high density collection measuring 13.6 x 14.5 previously 13.6 x 14.7.  Density appears decreased compared to prior study though the collection is more discretely defined.  There is a separate loculation more superiorly measuring 2.5 x 7.3 previously 2.0 x 7 cm demonstrating a lower attenuation suggesting evolving hematoma.  No pneumoperitoneum or lymphadenopathy     6/26/2024 hand-assisted laparoscopic sigmoidectomy  Hospitalized 6/26/2024 through 7/12/2024  Surgery complicated by postop ileus requiring TPN  Final pathology T2 N0 (0/21), G2 adenocarcinoma.  Lymphovascular invasion noted.  MIS stable  No loss of nuclear expression of MMR proteins  Invasive adenocarcinoma sigmoid colon, moderately differentiated with mucinous features.  Lymphovascular invasion (small vessel and large vessel is identified.     6/18/2024 CT scan chest abdomen pelvis  5 mm right middle lobe nodule and 3 mm  right upper lobe nodule noted.  Focal soft tissue density in 1 of subsegmental right lower lobe bronchi suggestive of secretions but indeterminant  Several lymph nodes not exceeding 10 mm in the mediastinum and [place]  Slightly undulating contour to the liver and caudate lobe hypertrophy may indicate chronic hepatocellular disease  Gallbladder absent  Soft tissue density adjacent to the wall in the lumen of the proximal sigmoid colon approximately 2 cm  Mild periportal lymphadenopathy largest measuring 17 mm unchanged from 2022 mild diffuse wall thickening of the bladder     6/13/2024 CEA 2.5     6/6/2024 colonoscopy  2 polyps measuring 8 to 10 mm in the rectum status post cold snare polypectomy.  Single clip placed.  Pathology revealed hyperplastic polyps  6 mm polyp cecum status post cold snare polypectomy.  Tubular adenoma  Moderate diverticulosis sigmoid colon  Malignant appearing friable fungating ulcerated mass measuring 5 x 4 cm at 32 cm.  At least intramucosal carcinoma arising from a tubulovillous adenoma     5/16/2022 ERCP done for choledocholithiasis

## 2025-07-22 ENCOUNTER — PREP FOR PROCEDURE (OUTPATIENT)
Dept: GASTROENTEROLOGY | Facility: CLINIC | Age: 60
End: 2025-07-22

## 2025-07-22 ENCOUNTER — OFFICE VISIT (OUTPATIENT)
Dept: GASTROENTEROLOGY | Facility: CLINIC | Age: 60
End: 2025-07-22
Payer: COMMERCIAL

## 2025-07-22 VITALS
HEART RATE: 68 BPM | RESPIRATION RATE: 18 BRPM | HEIGHT: 70 IN | DIASTOLIC BLOOD PRESSURE: 78 MMHG | SYSTOLIC BLOOD PRESSURE: 138 MMHG | TEMPERATURE: 97.9 F | WEIGHT: 246.8 LBS | BODY MASS INDEX: 35.33 KG/M2 | OXYGEN SATURATION: 97 %

## 2025-07-22 DIAGNOSIS — Z86.0100 HISTORY OF COLON POLYPS: Primary | ICD-10-CM

## 2025-07-22 DIAGNOSIS — C18.6 MALIGNANT NEOPLASM OF DESCENDING COLON (HCC): Primary | ICD-10-CM

## 2025-07-22 DIAGNOSIS — Z86.0100 HISTORY OF COLON POLYPS: ICD-10-CM

## 2025-07-22 DIAGNOSIS — R93.2 ABNORMAL CT OF LIVER: ICD-10-CM

## 2025-07-22 DIAGNOSIS — E53.8 VITAMIN B12 DEFICIENCY: ICD-10-CM

## 2025-07-22 PROCEDURE — 99214 OFFICE O/P EST MOD 30 MIN: CPT | Performed by: INTERNAL MEDICINE

## 2025-07-22 RX ORDER — SODIUM CHLORIDE, SODIUM LACTATE, POTASSIUM CHLORIDE, CALCIUM CHLORIDE 600; 310; 30; 20 MG/100ML; MG/100ML; MG/100ML; MG/100ML
125 INJECTION, SOLUTION INTRAVENOUS CONTINUOUS
OUTPATIENT
Start: 2025-07-22

## 2025-07-22 NOTE — PATIENT INSTRUCTIONS
Assessment & Plan  Malignant neoplasm of descending colon (HCC)  Chilo is noted to have a ulcerated fungating mass at about 32 cm.  This was an adenocarcinoma.  He did undergo hand-assisted laparoscopic sigmoidectomy on June 26, 2024.  Final pathology was T2 N0 (0/21) G2 adenocarcinoma.  There was lymphovascular invasion noted.  MIS was stable.  Baseline CEA was normal.  He did see Dr. Marrufo of hematology oncology and was told he did not need adjuvant chemotherapy.  He should follow-up with hematology/oncology.  He should be sure to follow-up with Dr. Murphy of hematology.    His last colonoscopy was February 2025.  Repeat colonoscopy in 9 to 12 months was recommended at that time thus we can plan on repeating colonoscopy in November or December    His [place] bowel prep score was 4.  Prior to his next colonoscopy he should take a single dose of MiraLAX daily for 5 days prior.  Day prior to the procedure remain on a clear liquid diet.  Take 3 bisacodyl tablets at 3 PM  Then complete a standard 4 L preparation and split dose with the second dose completed 3 hours prior to arrival.    I explained to the patient the procedure of colonoscopy as well as its potential risks which are approximately 3 in 1000 chance of bleeding, infection, and perforation.  Perforation may require a surgeon to repair it.  I also explained the small but significant chance of missing lesions with colonoscopy which has been reported to be about 5-10%.    He will also need to hold his Mounjaro a full week prior to colonoscopy.             Abnormal CT of liver  CAT scan of the abdomen done June 18, 2024 suggested an undulating contour of the liver and hypertrophy of the caudate lobe.  Several other imaging studies did not report this.  Given that he has type 2 diabetes, hyperlipidemia, high body mass index of 38.69 it is quite possible he has metabolic dysfunction associated steatotic liver disease.  However imaging has not revealed any hepatic  steatosis.  He is following with hepatology.  Elastography revealed S0 steatosis but F3 fibrosis    At this time continue with weight reduction and working with weight management.  Currently on Mounjaro.  Follow-up with hepatology in January.  I think the plan was to repeat elastography in 6 months.  Hepatology note reviewed.         History of colon polyps  In addition to the colon malignancy at 32 cm Chilo was noted to have 2 polyps measuring 8 to 10 mm in the rectum removed by cold snare.  Pathology revealed hyperplastic polyps.  He did have a 6 mm polyp in the cecum this was a tubular adenoma.  He also had moderate diverticulosis.    Colonoscopy done February 6, 2025 did reveal4 mm sessile polyp distal ascending colon that was removed but not recovered.  4 mm polyp mid transverse colon-tubular adenoma.  4 mm polyp at 35 cm-hyperplastic 2 rectal polyps measuring 5 to 8 mm are also hyperplastic.  Anastomosis appeared normal.  Please see comments under history of colon cancer.  He will be scheduled for colonoscopy around December.         Vitamin B12 deficiency  Patient's vitamin B12 level was low at 114.  Repeat vitamin B12 is 298.  He is currently receiving vitamin B12 orally.  Continue vitamin B12 1000 mcg daily sublingually.    Check parietal cell antibody and intrinsic factor antibody.  Check parietal cell antibody and intrinsic factor antibody next time he has blood work done.  May need to consider EGD at some point in time to evaluate for atrophic gastritis.  He is open to scheduling EGD.  I explained to the patient the procedure of upper endoscopy as well as its potential risk which are approximately 1 in 1000 chance of bleeding, infection, and perforation.  He must also hold Mounjaro for 1 full week prior to EGD        Orders:    Intrinsic factor blocking antibody; Future    Anti-parietal antibody; Future    Folate; Future    Vitamin B12; Future    EGD; Future

## 2025-07-22 NOTE — ASSESSMENT & PLAN NOTE
Patient's vitamin B12 level was low at 114.  Repeat vitamin B12 is 298.  He is currently receiving vitamin B12 orally.  Continue vitamin B12 1000 mcg daily sublingually.    Check parietal cell antibody and intrinsic factor antibody.  Check parietal cell antibody and intrinsic factor antibody next time he has blood work done.  May need to consider EGD at some point in time to evaluate for atrophic gastritis.  He is open to scheduling EGD.  I explained to the patient the procedure of upper endoscopy as well as its potential risk which are approximately 1 in 1000 chance of bleeding, infection, and perforation.  He must also hold Mounjaro for 1 full week prior to EGD      Orders:  •  Intrinsic factor blocking antibody; Future  •  Anti-parietal antibody; Future  •  Folate; Future  •  Vitamin B12; Future  •  EGD; Future

## 2025-07-22 NOTE — ASSESSMENT & PLAN NOTE
CAT scan of the abdomen done June 18, 2024 suggested an undulating contour of the liver and hypertrophy of the caudate lobe.  Several other imaging studies did not report this.  Given that he has type 2 diabetes, hyperlipidemia, high body mass index of 38.69 it is quite possible he has metabolic dysfunction associated steatotic liver disease.  However imaging has not revealed any hepatic steatosis.  He is following with hepatology.  Elastography revealed S0 steatosis but F3 fibrosis    At this time continue with weight reduction and working with weight management.  Currently on Mounjaro.  Follow-up with hepatology in January.  I think the plan was to repeat elastography in 6 months.  Hepatology note reviewed.

## 2025-07-22 NOTE — ASSESSMENT & PLAN NOTE
Chilo is noted to have a ulcerated fungating mass at about 32 cm.  This was an adenocarcinoma.  He did undergo hand-assisted laparoscopic sigmoidectomy on June 26, 2024.  Final pathology was T2 N0 (0/21) G2 adenocarcinoma.  There was lymphovascular invasion noted.  MIS was stable.  Baseline CEA was normal.  He did see Dr. Marrufo of hematology oncology and was told he did not need adjuvant chemotherapy.  He should follow-up with hematology/oncology.  He should be sure to follow-up with Dr. Murphy of hematology.    His last colonoscopy was February 2025.  Repeat colonoscopy in 9 to 12 months was recommended at that time thus we can plan on repeating colonoscopy in November or December    His [place] bowel prep score was 4.  Prior to his next colonoscopy he should take a single dose of MiraLAX daily for 5 days prior.  Day prior to the procedure remain on a clear liquid diet.  Take 3 bisacodyl tablets at 3 PM  Then complete a standard 4 L preparation and split dose with the second dose completed 3 hours prior to arrival.    I explained to the patient the procedure of colonoscopy as well as its potential risks which are approximately 3 in 1000 chance of bleeding, infection, and perforation.  Perforation may require a surgeon to repair it.  I also explained the small but significant chance of missing lesions with colonoscopy which has been reported to be about 5-10%.    He will also need to hold his Mounjaro a full week prior to colonoscopy.

## 2025-07-22 NOTE — PROGRESS NOTES
Name: Jr Mendoza      : 1965      MRN: [id number]  Encounter Provider: Satish Haley DO  Encounter Date: 2025   Encounter department: [facility] [place]  :  Assessment & Plan  Malignant neoplasm of descending colon (HCC)  Chilo is noted to have a ulcerated fungating mass at about 32 cm.  This was an adenocarcinoma.  He did undergo hand-assisted laparoscopic sigmoidectomy on 2024.  Final pathology was T2 N0 (0/21) G2 adenocarcinoma.  There was lymphovascular invasion noted.  MIS was stable.  Baseline CEA was normal.  He did see Dr. Marrufo of hematology oncology and was told he did not need adjuvant chemotherapy.  He should follow-up with hematology/oncology.  He should be sure to follow-up with Dr. Murphy of hematology.    His last colonoscopy was 2025.  Repeat colonoscopy in 9 to 12 months was recommended at that time thus we can plan on repeating colonoscopy in November or December    His [place] bowel prep score was 4.  Prior to his next colonoscopy he should take a single dose of MiraLAX daily for 5 days prior.  Day prior to the procedure remain on a clear liquid diet.  Take 3 bisacodyl tablets at 3 PM  Then complete a standard 4 L preparation and split dose with the second dose completed 3 hours prior to arrival.    I explained to the patient the procedure of colonoscopy as well as its potential risks which are approximately 3 in 1000 chance of bleeding, infection, and perforation.  Perforation may require a surgeon to repair it.  I also explained the small but significant chance of missing lesions with colonoscopy which has been reported to be about 5-10%.    He will also need to hold his Mounjaro a full week prior to colonoscopy.             Abnormal CT of liver  CAT scan of the abdomen done 2024 suggested an undulating contour of the liver and hypertrophy of the caudate lobe.  Several other imaging studies did not report  this.  Given that he has type 2 diabetes, hyperlipidemia, high body mass index of 38.69 it is quite possible he has metabolic dysfunction associated steatotic liver disease.  However imaging has not revealed any hepatic steatosis.  He is following with hepatology.  Elastography revealed S0 steatosis but F3 fibrosis    At this time continue with weight reduction and working with weight management.  Currently on Mounjaro.  Follow-up with hepatology in January.  I think the plan was to repeat elastography in 6 months.  Hepatology note reviewed.       History of colon polyps  In addition to the colon malignancy at 32 cm Chilo was noted to have 2 polyps measuring 8 to 10 mm in the rectum removed by cold snare.  Pathology revealed hyperplastic polyps.  He did have a 6 mm polyp in the cecum this was a tubular adenoma.  He also had moderate diverticulosis.    Colonoscopy done February 6, 2025 did reveal4 mm sessile polyp distal ascending colon that was removed but not recovered.  4 mm polyp mid transverse colon-tubular adenoma.  4 mm polyp at 35 cm-hyperplastic 2 rectal polyps measuring 5 to 8 mm are also hyperplastic.  Anastomosis appeared normal.  Please see comments under history of colon cancer.  He will be scheduled for colonoscopy around December.       Vitamin B12 deficiency  Patient's vitamin B12 level was low at 114.  Repeat vitamin B12 is 298.  He is currently receiving vitamin B12 orally.  Continue vitamin B12 1000 mcg daily sublingually.    Check parietal cell antibody and intrinsic factor antibody.  Check parietal cell antibody and intrinsic factor antibody next time he has blood work done.  May need to consider EGD at some point in time to evaluate for atrophic gastritis.  He is open to scheduling EGD.  I explained to the patient the procedure of upper endoscopy as well as its potential risk which are approximately 1 in 1000 chance of bleeding, infection, and perforation.  He must also hold Mounjaro for 1 full  week prior to EGD      Orders:  •  Intrinsic factor blocking antibody; Future  •  Anti-parietal antibody; Future  •  Folate; Future  •  Vitamin B12; Future  •  EGD; Future        History of Present Illness   HPI  Jr Mendoza is a 59 y.o. male who presents for follow-up of history of colorectal cancer stage T2 N0 adenocarcinoma of the sigmoid colon with lymphovascular invasion, MIS stable.  History of colon polyps.  He was also noted to have an abnormal liver on imaging and has been noted to have advanced fibrosis on ultrasound elastography.  He is following with hepatology.    He has started going to the gym.  In fact he is going to a [employer] and has been on a regimen recently.  He feels better working out.  He is now able to cut the front and back lawn at 1 time rather than having to take a break like he did previously.    His bowel habits are very regular, denies any diarrhea, constipation, bleeding or black stools.    From an upper GI standpoint he denies any heartburn, indigestion, dysphagia, nausea, vomiting.  He was placed on a GLP-1 agonist around May, and switched from true his appetite to Mounjaro due to insurance purposes.  He is following with weight management.  He is manage lose about 5 pounds to date.    Records reviewed for 10 minutes prior to office visit    7/3/2025-weight management follow-up currently on Mounjaro  As that that was not covered.  5 pound weight loss     7/1/2025 follow-up pathology  Advanced hepatic fibrosis-F3  Alcohol use disorder in remission  Metabolic syndrome  While patient does have significant fibrosis, there is no evidence of hepatic steatosis on any imaging study.  Consider MRI elastography versus liver biopsy  Technically not a candidate for Rezdiffra given absence of hepatic steatosis on imaging     6/11/2025 CT scan chest abdomen pelvis with IV contrast  Stable 3 mm nodules right middle lobe and left lower lobe.  No new suspicious pulmonary nodules.  Stable  mucous plugging right lower lobe  Stable mildly enlarged portacaval lymph node measuring 1.1 cm.  No retroperitoneal adenopathy  Post operative changes of sigmoid colon anastomosis  1 mm calculi in each kidney  Liver reportedly normal     6/9/2025 laboratory data  Sodium 138  Potassium 4.4  BUN 10  Creatinine 0.7  Glucose 112  AST 14  ALT 50  Alk phos 90  T. bili 0.66  Iron 53  Ferritin 65  Iron saturation 15%  TIBC 354  CEA 2.0  WC 8.67 Hgb 14.7 HCT 45.5 MCV 89 platelet count 230,000  INR 0.96  Hemoglobin A1c 6.1     6/5/2025 ultrasound elastography  IQR/median 24.2%  F3, severe fibrosis  S0     6/5/2025 ultrasound abdomen complete  Mild hepatomegaly with liver measuring 18 cm.  Surface contour is smooth with  Echogenicity and echotexture are within normal limits.  Status postcholecystectomy  Spleen normal     2/6/2025 colonoscopy  Normal terminal ileum  Large amount of debris scattered throughout the colon.  [place] bowel prep score 4  4 mm sessile polyp distal ascending, not recovered  4 mm sessile polyp mid transverse.  Tubular adenoma  4 mm polyp at 35 cm.  Hyperplastic polyp  2 polyps in the rectum measuring 5 to 8 mm.  Hyperplastic polyps  Healthy end-to-end colocolonic anastomosis at 24 cm.  Scattered diverticulosis  Small external hemorrhoids and hypertrophied anal papilla  Repeat colonoscopy in 9 to 12 months     12/16/2024 ultrasound elastography  IQR/median 19.7%  F3, severe fibrosis  S0     9/16/2024 laboratory data  Sodium 135  Potassium 4.4  BUN 8  Creatinine 0.63  Glucose 136  AST 16  ALT 12  Alk phos 83  T. bili 0.59  Triglycerides 92  Cholesterol 141  HDL 53  LDL 70  Vitamin B12 114  WBC 7.42 Hgb 14.2 (up from 9.3) HCT 46.3 MCV 90 platelet count 213,002% atypical lymphocytes 2% plasma cells  Hemoglobin A1c 6.1  TSH 1.52     9/18/2024 CT scan chest  Unchanged triangular-shaped right upper and right middle lobe perfusion subpleural nodule characteristic of benign intrapulmonary nodes.  No suspicious  solid parenchymal nodules.  Persistent 6 x 3 x 3 mm right lower lobe segmental endobronchial lesion or focus of chronic mucous plugging.     7/9/2024 CT scan abdomen pelvis done as an inpatient  Improving slightly distended loops of small bowel without evidence of obstruction.  Colon unremarkable  Large lower abdominal loculated high density collection measuring 13.6 x 14.5 previously 13.6 x 14.7.  Density appears decreased compared to prior study though the collection is more discretely defined.  There is a separate loculation more superiorly measuring 2.5 x 7.3 previously 2.0 x 7 cm demonstrating a lower attenuation suggesting evolving hematoma.  No pneumoperitoneum or lymphadenopathy     6/26/2024 hand-assisted laparoscopic sigmoidectomy  Hospitalized 6/26/2024 through 7/12/2024  Surgery complicated by postop ileus requiring TPN  Final pathology T2 N0 (0/21), G2 adenocarcinoma.  Lymphovascular invasion noted.  MIS stable  No loss of nuclear expression of MMR proteins  Invasive adenocarcinoma sigmoid colon, moderately differentiated with mucinous features.  Lymphovascular invasion (small vessel and large vessel is identified.     6/18/2024 CT scan chest abdomen pelvis  5 mm right middle lobe nodule and 3 mm right upper lobe nodule noted.  Focal soft tissue density in 1 of subsegmental right lower lobe bronchi suggestive of secretions but indeterminant  Several lymph nodes not exceeding 10 mm in the mediastinum and [place]  Slightly undulating contour to the liver and caudate lobe hypertrophy may indicate chronic hepatocellular disease  Gallbladder absent  Soft tissue density adjacent to the wall in the lumen of the proximal sigmoid colon approximately 2 cm  Mild periportal lymphadenopathy largest measuring 17 mm unchanged from 2022 mild diffuse wall thickening of the bladder     6/13/2024 CEA 2.5     6/6/2024 colonoscopy  2 polyps measuring 8 to 10 mm in the rectum status post cold snare polypectomy.  Single clip  placed.  Pathology revealed hyperplastic polyps  6 mm polyp cecum status post cold snare polypectomy.  Tubular adenoma  Moderate diverticulosis sigmoid colon  Malignant appearing friable fungating ulcerated mass measuring 5 x 4 cm at 32 cm.  At least intramucosal carcinoma arising from a tubulovillous adenoma     5/16/2022 ERCP done for choledocholithiasis         Review of Systems  Past Medical History   Past Medical History[1]  Past Surgical History[1]  Family History[1]   reports that he quit smoking about 8 years ago. His smoking use included cigarettes. He started smoking about 18 years ago. He has a 2.5 pack-year smoking history. He has been exposed to tobacco smoke. He has never used smokeless tobacco. He reports that he does not currently use alcohol after a past usage of about 7.0 standard drinks of alcohol per week. He reports that he does not currently use drugs.  Current Outpatient Medications   Medication Instructions   • albuterol (Ventolin HFA) 90 mcg/act inhaler 2 puffs, Inhalation, Every 6 hours PRN   • amLODIPine (NORVASC) 5 mg, Oral, Daily   • atorvastatin (LIPITOR) 20 mg, Oral, Daily   • Azelastine HCl 137 MCG/SPRAY SOLN 1 SPRAY INTO EACH NOSTRIL 2 (TWO) TIMES A DAY USE IN EACH NOSTRIL AS DIRECTED   • cyanocobalamin (VITAMIN B-12) 100 mcg tablet TAKE 0.5 TABLETS (50 MCG TOTAL) BY MOUTH DAILY   • fluticasone (FLONASE) 50 mcg/act nasal spray 1 spray, Nasal, Daily   • metFORMIN (GLUCOPHAGE) 500 mg, Oral, 2 times daily with meals   • mometasone-formoterol (Dulera) 200-5 MCG/ACT inhaler 2 puffs, Inhalation, 2 times daily, Rinse mouth after use.   • mupirocin (BACTROBAN) 2 % cream Topical, 3 times daily   • sildenafil (VIAGRA) 25 MG tablet TAKE 1 TABLET BY MOUTH DAILY AS NEEDED FOR ERECTILE DYSFUNCTION.   • tamsulosin (FLOMAX) 0.4 mg, Oral, Daily with dinner   • Tirzepatide (Mounjaro) 5 MG/0.5ML SOAJ Inject 5mg subcutaneously once weekly   • valsartan (DIOVAN) 160 mg, Oral, Daily   Allergies[1]  "  Medications Ordered Prior to Encounter[1]   Social History[1]     Objective   /78 (BP Location: Right arm, Patient Position: Sitting, Cuff Size: Adult)   Pulse 68   Temp 97.9 °F (36.6 °C) (Temporal)   Resp 18   Ht 5' 10\" (1.778 m)   Wt 112 kg (246 lb 12.8 oz)   SpO2 97%   BMI 35.41 kg/m²      Physical Exam    General Appearance: Alert, cooperative, no distress  HEENT: Normocephalic, atraumatic, anicteric.   Neck: Supple, symmetrical, trachea midline  Lungs: Clear to auscultation bilaterally; no rales, rhonchi or wheezing; respirations unlabored   Heart: Regular rate and rhythm; no murmur, rub, or gallop.  Abdomen: Soft, bowel sounds normal, non-tender, non-distended; no masses, there is no hepatosplenomegaly. No spider angiomas  Obese abdomen.   Genitalia: Deferred   Rectal: Deferred   Extremities: No cyanosis, clubbing or edema   Skin: No jaundice, rashes, or lesions   Lymph nodes: No palpable cervical lymphadenopathy                [1]  Past Medical History:  Diagnosis Date   • Allergic    • Asthma    • Cancer (HCC)    • Cholangitis 05/16/2022   • Closed extra-articular fracture of distal end of right tibia 01/28/2020   • Closed nondisplaced oblique fracture of shaft of right fibula 01/28/2020   • CPAP (continuous positive airway pressure) dependence    • Diabetes mellitus (HCC)    • Foot fracture    • Hyperlipidemia    • Hypertension    • Sleep apnea    [1]  Past Surgical History:  Procedure Laterality Date   • CHOLECYSTECTOMY     • CHOLECYSTECTOMY LAPAROSCOPIC N/A 05/18/2022    Procedure: CHOLECYSTECTOMY LAPAROSCOPIC;  Surgeon: Zachary Kern MD;  Location: [facility];  Service: General   • COLONOSCOPY     • HEMICOLOECTOMY W/ ANASTOMOSIS N/A 06/26/2024    Procedure: LAPARSCOPIC HAND ASSISTED SIGMOIDECTOMY, LAPARSCOPIC MOBILIZATION OF SPLENIC FLEXURE, INTRAOP SPY ANGIOOGRAPHY , FLEXIBLE SIGMOIDOSCOPY;  Surgeon: Ann Perez MD;  Location: [facility];  Service: Colorectal "   [1]  Family History  Problem Relation Name Age of Onset   • ALS Mother     • Obesity Father     • Pneumonia Father     • No Known Problems Sister     [1]  Allergies  Allergen Reactions   • Pollen Extract Allergic Rhinitis   [1]  Current Outpatient Medications on File Prior to Visit   Medication Sig Dispense Refill   • albuterol (Ventolin HFA) 90 mcg/act inhaler Inhale 2 puffs every 6 (six) hours as needed for wheezing or shortness of breath 18 g 3   • amLODIPine (NORVASC) 5 mg tablet Take 1 tablet (5 mg total) by mouth daily 90 tablet 1   • atorvastatin (LIPITOR) 20 mg tablet Take 1 tablet (20 mg total) by mouth daily 90 tablet 1   • Azelastine HCl 137 MCG/SPRAY SOLN 1 SPRAY INTO EACH NOSTRIL 2 (TWO) TIMES A DAY USE IN EACH NOSTRIL AS DIRECTED 30 mL 0   • cyanocobalamin (VITAMIN B-12) 100 mcg tablet TAKE 0.5 TABLETS (50 MCG TOTAL) BY MOUTH DAILY 90 tablet 1   • fluticasone (FLONASE) 50 mcg/act nasal spray 1 spray into each nostril daily 48 mL 1   • metFORMIN (GLUCOPHAGE) 500 mg tablet Take 1 tablet (500 mg total) by mouth 2 (two) times a day with meals 180 tablet 1   • mometasone-formoterol (Dulera) 200-5 MCG/ACT inhaler Inhale 2 puffs 2 (two) times a day Rinse mouth after use. 13 g 5   • mupirocin (BACTROBAN) 2 % cream Apply topically 3 (three) times a day 30 g 0   • sildenafil (VIAGRA) 25 MG tablet TAKE 1 TABLET BY MOUTH DAILY AS NEEDED FOR ERECTILE DYSFUNCTION. 3 tablet 3   • tamsulosin (FLOMAX) 0.4 mg Take 1 capsule (0.4 mg total) by mouth daily with dinner 90 capsule 1   • Tirzepatide (Mounjaro) 5 MG/0.5ML SOAJ Inject 5mg subcutaneously once weekly 2 mL 2   • valsartan (DIOVAN) 160 mg tablet TAKE 1 TABLET BY MOUTH EVERY DAY 90 tablet 1     No current facility-administered medications on file prior to visit.   [1]  Social History  Tobacco Use   • Smoking status: Former     Current packs/day: 0.00     Average packs/day: 0.3 packs/day for 10.0 years (2.5 ttl pk-yrs)     Types: Cigarettes     Start date: 1/1/2007      Quit date: 2017     Years since quittin.5     Passive exposure: Past   • Smokeless tobacco: Never   Vaping Use   • Vaping status: Never Used   Substance and Sexual Activity   • Alcohol use: Not Currently     Alcohol/week: 7.0 standard drinks of alcohol     Types: 7 Glasses of wine per week     Comment: 3 beers daily   • Drug use: Not Currently   • Sexual activity: Not Currently     Partners: Male

## 2025-07-22 NOTE — ASSESSMENT & PLAN NOTE
In addition to the colon malignancy at 32 cm Chilo was noted to have 2 polyps measuring 8 to 10 mm in the rectum removed by cold snare.  Pathology revealed hyperplastic polyps.  He did have a 6 mm polyp in the cecum this was a tubular adenoma.  He also had moderate diverticulosis.    Colonoscopy done February 6, 2025 did reveal4 mm sessile polyp distal ascending colon that was removed but not recovered.  4 mm polyp mid transverse colon-tubular adenoma.  4 mm polyp at 35 cm-hyperplastic 2 rectal polyps measuring 5 to 8 mm are also hyperplastic.  Anastomosis appeared normal.  Please see comments under history of colon cancer.  He will be scheduled for colonoscopy around December.

## 2025-07-24 DIAGNOSIS — R05.3 CHRONIC COUGH: ICD-10-CM

## 2025-07-24 DIAGNOSIS — J20.9 ACUTE BRONCHITIS, UNSPECIFIED ORGANISM: ICD-10-CM

## 2025-07-24 DIAGNOSIS — N52.9 ERECTILE DYSFUNCTION, UNSPECIFIED ERECTILE DYSFUNCTION TYPE: ICD-10-CM

## 2025-07-24 RX ORDER — AZELASTINE HYDROCHLORIDE 137 UG/1
1 SPRAY, METERED NASAL 2 TIMES DAILY
Qty: 30 ML | Refills: 0 | Status: SHIPPED | OUTPATIENT
Start: 2025-07-24

## 2025-07-24 RX ORDER — SILDENAFIL 25 MG/1
25 TABLET, FILM COATED ORAL AS NEEDED
Qty: 3 TABLET | Refills: 3 | Status: SHIPPED | OUTPATIENT
Start: 2025-07-24

## 2025-07-24 NOTE — TELEPHONE ENCOUNTER
Reason for call:   [x] Refill   [] Prior Auth  [] Other:     Office:   [] PCP/Provider -   [x] Specialty/Provider - Pulm    Medication: Azelastine HCl 137 MCG/SPRAY SOLN     Dose/Frequency: 1 SPRAY INTO EACH NOSTRIL 2 (TWO) TIMES A DAY     Quantity: 30 mL    Pharmacy: [facility]/pharmacy #[id number] - [place] - [address]     Local Pharmacy   Does the patient have enough for 3 days?   [] Yes   [x] No - Send as HP to POD    Mail Away Pharmacy   Does the patient have enough for 10 days?   [] Yes   [] No - Send as HP to POD

## 2025-07-24 NOTE — TELEPHONE ENCOUNTER
Reason for call:   [x] Refill   [] Prior Auth  [] Other:     Office:   [x] PCP/Provider - Steven Quigley MD   [] Specialty/Provider -     Medication: sildenafil (VIAGRA) 25 MG tablet     Dose/Frequency:  TAKE 1 TABLET BY MOUTH DAILY AS NEEDED FOR ERECTILE DYSFUNCTION     Quantity: 3    Pharmacy:  [facility]/pharmacy #[id number] - [place] - [address]     Local Pharmacy   Does the patient have enough for 3 days?   [] Yes   [x] No - Send as HP to POD    Mail Away Pharmacy   Does the patient have enough for 10 days?   [] Yes   [] No - Send as HP to POD

## (undated) DEVICE — INTENDED FOR TISSUE SEPARATION, AND OTHER PROCEDURES THAT REQUIRE A SHARP SURGICAL BLADE TO PUNCTURE OR CUT.: Brand: BARD-PARKER SAFETY BLADES SIZE 15, STERILE

## (undated) DEVICE — ENDOPATH ECHELON ENDOSCOPIC LINEAR CUTTER RELOADS, WHITE, 60MM: Brand: ECHELON ENDOPATH

## (undated) DEVICE — ACCESS PLATFORM FOR MINIMALLY INVASIVE SURGERY.: Brand: GELPORT® LAPAROSCOPIC  SYSTEM

## (undated) DEVICE — ADHESIVE SKIN HIGH VISCOSITY EXOFIN 1ML

## (undated) DEVICE — SUT MONOCRYL 4-0 PS-2 18 IN Y496G

## (undated) DEVICE — SUT MONOCRYL PLUS 3-0 SH 27IN MCP316H

## (undated) DEVICE — ELECTRODE EZ CLEAN BLADE -0012

## (undated) DEVICE — INVIEW CLEAR LEGGINGS: Brand: CONVERTORS

## (undated) DEVICE — SUT VICRYL PLUS 0 UR-6 27IN VCP603H

## (undated) DEVICE — NEEDLE 25G X 1 1/2

## (undated) DEVICE — CATH URETHERAL CONNECTOR

## (undated) DEVICE — PLUMEPEN PRO 10FT

## (undated) DEVICE — TRAY FOLEY 16FR URIMETER SILICONE SURESTEP

## (undated) DEVICE — PDS II VLT 0 107CM AG ST3: Brand: ENDOLOOP

## (undated) DEVICE — SUT VICRYL 0 54 IN J207G

## (undated) DEVICE — CIRCULAR MECH XL SEAL 29MM

## (undated) DEVICE — TISSUE RETRIEVAL SYSTEM: Brand: INZII RETRIEVAL SYSTEM

## (undated) DEVICE — INSUFLATION TUBING INSUFLOW (LEXION)

## (undated) DEVICE — STERILE CYSTO PACK: Brand: CARDINAL HEALTH

## (undated) DEVICE — SUT PROLENE 2-0 SH 36 IN 8523H

## (undated) DEVICE — 3000CC GUARDIAN II: Brand: GUARDIAN

## (undated) DEVICE — CATH FOLEY 18FR 5ML 2WAY LUBRICATH

## (undated) DEVICE — INTENDED FOR TISSUE SEPARATION, AND OTHER PROCEDURES THAT REQUIRE A SHARP SURGICAL BLADE TO PUNCTURE OR CUT.: Brand: BARD-PARKER SAFETY BLADES SIZE 11, STERILE

## (undated) DEVICE — 40595 XL TRENDELENBURG POSITIONING KIT: Brand: 40595 XL TRENDELENBURG POSITIONING KIT

## (undated) DEVICE — GLOVE INDICATOR UNDERGLOVE SZ 6 BLUE

## (undated) DEVICE — SUCTION TIP SUCTION RETRACTOR: Brand: PRO

## (undated) DEVICE — ELECTRODE LAP J HOOK E-Z CLEAN 33CM-0021

## (undated) DEVICE — SUT PDS PLUS 1 CTX 36IN PDP371T

## (undated) DEVICE — SUT VICRYL 3-0 SH 27 IN J416H

## (undated) DEVICE — TROCAR: Brand: KII FIOS FIRST ENTRY

## (undated) DEVICE — PREMIUM DRY TRAY LF: Brand: MEDLINE INDUSTRIES, INC.

## (undated) DEVICE — SCD SEQUENTIAL COMPRESSION COMFORT SLEEVE MEDIUM KNEE LENGTH: Brand: KENDALL SCD

## (undated) DEVICE — Device: Brand: OMNICLOSE TROCAR SITE CLOSURE DEVICE

## (undated) DEVICE — ENDOPATH ECHELON ENDOSCOPIC LINEAR CUTTER RELOADS, BLUE, 60MM: Brand: ECHELON ENDOPATH

## (undated) DEVICE — ELECTRODE BLADE MOD  E-Z CLEAN 6.5IN -0014M

## (undated) DEVICE — ENSEAL X1 TISSUE SEALER, CURVED JAW, 37 CM SHAFT LENGTH: Brand: ENSEAL

## (undated) DEVICE — ECHELON FLEX 60 ARTICULATING ENDOSCOPIC LINEAR CUTTER (NO CARTRIDGE): Brand: ECHELON FLEX ENDOPATH

## (undated) DEVICE — CO2 AND WATER TUBING/CAP SET FOR OLYMPUS® SCOPES & UCR: Brand: ERBE

## (undated) DEVICE — GLOVE SRG BIOGEL ORTHOPEDIC 7.5

## (undated) DEVICE — 3M™ IOBAN™ 2 ANTIMICROBIAL INCISE DRAPE 6640EZ: Brand: IOBAN™ 2

## (undated) DEVICE — GLOVE SRG BIOGEL ECLIPSE 7

## (undated) DEVICE — TROCAR: Brand: KII® SLEEVE

## (undated) DEVICE — MEDI-VAC YANK SUCT HNDL W/TPRD BULBOUS TIP: Brand: CARDINAL HEALTH

## (undated) DEVICE — GLOVE SRG BIOGEL ECLIPSE 6

## (undated) DEVICE — UNDER BUTTOCKS DRAPE W/FLUID CONTROL POUCH: Brand: CONVERTORS

## (undated) DEVICE — LIGAMAX 5 MM ENDOSCOPIC MULTIPLE CLIP APPLIER: Brand: LIGAMAX

## (undated) DEVICE — TUBING SMOKE EVAC W/FILTRATION DEVICE PLUMEPORT ACTIV

## (undated) DEVICE — TROCAR: Brand: KII SLEEVE

## (undated) DEVICE — CATH URETERAL 5FR X 70 CM FLEX TIP POLYUR BARD

## (undated) DEVICE — EXOFIN PRECISION PEN HIGH VISCOSITY TOPICAL SKIN ADHESIVE: Brand: EXOFIN PRECISION PEN, 1G

## (undated) DEVICE — CHLORAPREP HI-LITE 26ML ORANGE

## (undated) DEVICE — ANTIBACTERIAL UNDYED BRAIDED (POLYGLACTIN 910), SYNTHETIC ABSORBABLE SUTURE: Brand: COATED VICRYL

## (undated) DEVICE — 2, DISPOSABLE SUCTION/IRRIGATOR WITHOUT DISPOSABLE TIP: Brand: STRYKEFLOW

## (undated) DEVICE — 3M™ IOBAN™ 2 ANTIMICROBIAL INCISE DRAPE 6650EZ: Brand: IOBAN™ 2

## (undated) DEVICE — PACK PBDS STERILE LAP LITHOTOMY RF

## (undated) DEVICE — PACK PBDS LAP CHOLE RF

## (undated) DEVICE — DRAPE SHEET THREE QUARTER

## (undated) DEVICE — CHLORHEXIDINE 4PCT 4 OZ

## (undated) DEVICE — FIRST STEP BEDSIDE KIT - STAND-UP POUCH, ENDOSCOPIC CLEANING PAD - 1 POUCH: Brand: FIRST STEP BEDSIDE KIT - STAND-UP POUCH, ENDOSCOPIC CLEANING PAD

## (undated) DEVICE — Device: Brand: DEFENDO AIR/WATER/SUCTION AND BIOPSY VALVE

## (undated) DEVICE — CATH SECURE FOLEY

## (undated) DEVICE — BAG URINE DRAINAGE URIMETER 350ML LF

## (undated) DEVICE — VISUALIZATION SYSTEM: Brand: CLEARIFY

## (undated) DEVICE — CATH FOLEY 18FR 5ML 2 WAY SILICONE ELASTIMER

## (undated) DEVICE — GOWN,SLEEVE,STERILE,W/CSR WRAP,1/P: Brand: MEDLINE

## (undated) DEVICE — METZENBAUM ADTEC SINGLE USE DISSECTING SCISSORS, SHAFT ONLY, MONOPOLAR, CURVED TO LEFT, WORKING LENGTH: 12 1/4", (310 MM), DIAM. 5 MM, INSULATED, DOUBLE ACTION, STERILE, DISPOSABLE, PACKAGE OF 10 PIECES: Brand: AESCULAP

## (undated) DEVICE — SPY ELITE SINGLE VIAL KIT

## (undated) DEVICE — PENCIL ELECTROSURG E-Z CLEAN -0035H